# Patient Record
Sex: MALE | Race: WHITE | Employment: OTHER | URBAN - METROPOLITAN AREA
[De-identification: names, ages, dates, MRNs, and addresses within clinical notes are randomized per-mention and may not be internally consistent; named-entity substitution may affect disease eponyms.]

---

## 2017-06-24 PROBLEM — R77.8 ELEVATED TROPONIN: Status: ACTIVE | Noted: 2017-06-24

## 2017-06-24 PROBLEM — R79.89 ELEVATED TROPONIN: Status: ACTIVE | Noted: 2017-06-24

## 2017-06-24 PROBLEM — R42 DIZZINESS: Status: ACTIVE | Noted: 2017-06-24

## 2017-12-18 PROBLEM — R06.89 ACUTE RESPIRATORY INSUFFICIENCY: Status: ACTIVE | Noted: 2017-12-18

## 2017-12-18 PROBLEM — J18.9 PNEUMONIA: Status: ACTIVE | Noted: 2017-12-18

## 2017-12-18 PROBLEM — Z86.711 HISTORY OF PULMONARY EMBOLUS (PE): Status: ACTIVE | Noted: 2017-12-18

## 2019-08-07 ENCOUNTER — HOSPITAL ENCOUNTER (OUTPATIENT)
Age: 84
Discharge: HOME | End: 2019-08-09
Payer: MEDICARE

## 2019-08-07 PROCEDURE — 87205 SMEAR GRAM STAIN: CPT

## 2019-08-07 PROCEDURE — 87070 CULTURE OTHR SPECIMN AEROBIC: CPT

## 2019-08-07 PROCEDURE — 87075 CULTR BACTERIA EXCEPT BLOOD: CPT

## 2019-08-08 LAB — GRAM STAIN ORDERABLE: NORMAL

## 2019-08-09 LAB
ANAEROBIC CULTURE: NORMAL
WOUND/ABSCESS: NORMAL

## 2020-07-28 ENCOUNTER — HOSPITAL ENCOUNTER (OUTPATIENT)
Age: 85
Discharge: HOME | End: 2020-07-30
Payer: MEDICARE

## 2020-07-28 PROCEDURE — U0003 INFECTIOUS AGENT DETECTION BY NUCLEIC ACID (DNA OR RNA); SEVERE ACUTE RESPIRATORY SYNDROME CORONAVIRUS 2 (SARS-COV-2) (CORONAVIRUS DISEASE [COVID-19]), AMPLIFIED PROBE TECHNIQUE, MAKING USE OF HIGH THROUGHPUT TECHNOLOGIES AS DESCRIBED BY CMS-2020-01-R: HCPCS

## 2020-07-30 LAB
SARS-COV-2: NOT DETECTED
SOURCE: NORMAL

## 2020-08-12 ENCOUNTER — HOSPITAL ENCOUNTER (OUTPATIENT)
Age: 85
Discharge: HOME | End: 2020-08-14
Payer: MEDICARE

## 2020-08-12 PROCEDURE — U0003 INFECTIOUS AGENT DETECTION BY NUCLEIC ACID (DNA OR RNA); SEVERE ACUTE RESPIRATORY SYNDROME CORONAVIRUS 2 (SARS-COV-2) (CORONAVIRUS DISEASE [COVID-19]), AMPLIFIED PROBE TECHNIQUE, MAKING USE OF HIGH THROUGHPUT TECHNOLOGIES AS DESCRIBED BY CMS-2020-01-R: HCPCS

## 2020-08-14 LAB
SARS-COV-2: NOT DETECTED
SOURCE: NORMAL

## 2020-09-18 ENCOUNTER — HOSPITAL ENCOUNTER (OUTPATIENT)
Age: 85
Discharge: HOME | End: 2020-09-20
Payer: MEDICARE

## 2020-09-18 PROCEDURE — U0003 INFECTIOUS AGENT DETECTION BY NUCLEIC ACID (DNA OR RNA); SEVERE ACUTE RESPIRATORY SYNDROME CORONAVIRUS 2 (SARS-COV-2) (CORONAVIRUS DISEASE [COVID-19]), AMPLIFIED PROBE TECHNIQUE, MAKING USE OF HIGH THROUGHPUT TECHNOLOGIES AS DESCRIBED BY CMS-2020-01-R: HCPCS

## 2020-09-20 LAB
SARS-COV-2: NOT DETECTED
SOURCE: NORMAL

## 2020-10-16 ENCOUNTER — HOSPITAL ENCOUNTER (OUTPATIENT)
Age: 85
Discharge: HOME | End: 2020-10-18
Payer: MEDICARE

## 2020-10-16 PROCEDURE — U0003 INFECTIOUS AGENT DETECTION BY NUCLEIC ACID (DNA OR RNA); SEVERE ACUTE RESPIRATORY SYNDROME CORONAVIRUS 2 (SARS-COV-2) (CORONAVIRUS DISEASE [COVID-19]), AMPLIFIED PROBE TECHNIQUE, MAKING USE OF HIGH THROUGHPUT TECHNOLOGIES AS DESCRIBED BY CMS-2020-01-R: HCPCS

## 2020-10-17 LAB
SARS-COV-2: NOT DETECTED
SOURCE: NORMAL

## 2020-10-23 ENCOUNTER — HOSPITAL ENCOUNTER (OUTPATIENT)
Age: 85
Discharge: HOME | End: 2020-10-25
Payer: MEDICARE

## 2020-10-23 LAB
ALBUMIN SERPL-MCNC: 3.4 G/DL (ref 3.5–5.2)
ALP BLD-CCNC: 138 U/L (ref 40–129)
ALT SERPL-CCNC: 15 U/L (ref 0–40)
ANION GAP SERPL CALCULATED.3IONS-SCNC: 9 MMOL/L (ref 7–16)
AST SERPL-CCNC: 23 U/L (ref 0–39)
BILIRUB SERPL-MCNC: 0.3 MG/DL (ref 0–1.2)
BUN BLDV-MCNC: 42 MG/DL (ref 8–23)
CALCIUM SERPL-MCNC: 9.1 MG/DL (ref 8.6–10.2)
CHLORIDE BLD-SCNC: 103 MMOL/L (ref 98–107)
CHOLESTEROL, TOTAL: 118 MG/DL (ref 0–199)
CO2: 30 MMOL/L (ref 22–29)
CREAT SERPL-MCNC: 1.4 MG/DL (ref 0.7–1.2)
GFR AFRICAN AMERICAN: 58
GFR NON-AFRICAN AMERICAN: 48 ML/MIN/1.73
GLUCOSE BLD-MCNC: 192 MG/DL (ref 74–99)
HBA1C MFR BLD: 7.2 % (ref 4–5.6)
HDLC SERPL-MCNC: 43 MG/DL
LDL CHOLESTEROL CALCULATED: 56 MG/DL (ref 0–99)
POTASSIUM SERPL-SCNC: 4.9 MMOL/L (ref 3.5–5)
SODIUM BLD-SCNC: 142 MMOL/L (ref 132–146)
TOTAL PROTEIN: 6 G/DL (ref 6.4–8.3)
TRIGL SERPL-MCNC: 97 MG/DL (ref 0–149)
VLDLC SERPL CALC-MCNC: 19 MG/DL

## 2020-10-23 PROCEDURE — 80061 LIPID PANEL: CPT

## 2020-10-23 PROCEDURE — 36415 COLL VENOUS BLD VENIPUNCTURE: CPT

## 2020-10-23 PROCEDURE — 80053 COMPREHEN METABOLIC PANEL: CPT

## 2020-10-23 PROCEDURE — 83036 HEMOGLOBIN GLYCOSYLATED A1C: CPT

## 2020-10-23 PROCEDURE — 85025 COMPLETE CBC W/AUTO DIFF WBC: CPT

## 2020-11-03 LAB
SARS-COV-2: NOT DETECTED
SOURCE: NORMAL

## 2020-11-10 LAB
SARS-COV-2: NOT DETECTED
SOURCE: NORMAL

## 2020-11-18 LAB
SARS-COV-2: NOT DETECTED
SOURCE: NORMAL

## 2020-11-25 LAB
SARS-COV-2: NOT DETECTED
SOURCE: NORMAL

## 2020-12-02 LAB
SARS-COV-2: NOT DETECTED
SOURCE: NORMAL

## 2020-12-08 LAB
SARS-COV-2: NOT DETECTED
SOURCE: NORMAL

## 2020-12-17 LAB
SARS-COV-2: NOT DETECTED
SOURCE: NORMAL

## 2020-12-23 LAB
SARS-COV-2: NOT DETECTED
SOURCE: NORMAL

## 2020-12-30 LAB
SARS-COV-2: NOT DETECTED
SOURCE: NORMAL

## 2021-01-03 LAB — SOURCE: NORMAL

## 2021-01-06 LAB
SARS-COV-2: NOT DETECTED
SOURCE: NORMAL

## 2021-01-14 LAB
SARS-COV-2: NOT DETECTED
SOURCE: NORMAL

## 2021-01-20 LAB
SARS-COV-2: NOT DETECTED
SOURCE: NORMAL

## 2021-01-27 LAB
SARS-COV-2: NOT DETECTED
SOURCE: NORMAL

## 2021-02-03 LAB
SARS-COV-2: NOT DETECTED
SOURCE: NORMAL

## 2021-02-10 LAB
SARS-COV-2: NOT DETECTED
SOURCE: NORMAL

## 2021-02-17 LAB
SARS-COV-2: NOT DETECTED
SOURCE: NORMAL

## 2021-02-24 LAB
SARS-COV-2: NOT DETECTED
SOURCE: NORMAL

## 2021-03-03 LAB
SARS-COV-2: NOT DETECTED
SOURCE: NORMAL

## 2021-03-10 LAB
SARS-COV-2: NOT DETECTED
SOURCE: NORMAL

## 2021-03-17 LAB
SARS-COV-2: NOT DETECTED
SOURCE: NORMAL

## 2021-03-19 LAB — SOURCE: NORMAL

## 2021-03-24 LAB
SARS-COV-2: NOT DETECTED
SOURCE: NORMAL

## 2021-03-30 ENCOUNTER — APPOINTMENT (OUTPATIENT)
Dept: ULTRASOUND IMAGING | Age: 86
DRG: 602 | End: 2021-03-30
Payer: MEDICARE

## 2021-03-30 ENCOUNTER — APPOINTMENT (OUTPATIENT)
Dept: CT IMAGING | Age: 86
DRG: 602 | End: 2021-03-30
Payer: MEDICARE

## 2021-03-30 ENCOUNTER — HOSPITAL ENCOUNTER (INPATIENT)
Age: 86
LOS: 1 days | Discharge: OTHER INSTITUTION | DRG: 602 | End: 2021-03-31
Attending: EMERGENCY MEDICINE | Admitting: INTERNAL MEDICINE
Payer: MEDICARE

## 2021-03-30 ENCOUNTER — APPOINTMENT (OUTPATIENT)
Dept: GENERAL RADIOLOGY | Age: 86
DRG: 602 | End: 2021-03-30
Payer: MEDICARE

## 2021-03-30 DIAGNOSIS — R60.9 PERIPHERAL EDEMA: Primary | ICD-10-CM

## 2021-03-30 DIAGNOSIS — N17.9 AKI (ACUTE KIDNEY INJURY) (HCC): ICD-10-CM

## 2021-03-30 DIAGNOSIS — I50.9 CONGESTIVE HEART FAILURE, UNSPECIFIED HF CHRONICITY, UNSPECIFIED HEART FAILURE TYPE (HCC): ICD-10-CM

## 2021-03-30 PROBLEM — I50.1 ACUTE LEFT-SIDED CHF (CONGESTIVE HEART FAILURE) (HCC): Status: ACTIVE | Noted: 2021-03-30

## 2021-03-30 LAB
ALBUMIN SERPL-MCNC: 3.6 G/DL (ref 3.5–5.2)
ALP BLD-CCNC: 158 U/L (ref 40–129)
ALT SERPL-CCNC: 10 U/L (ref 0–40)
ANION GAP SERPL CALCULATED.3IONS-SCNC: 6 MMOL/L (ref 7–16)
AST SERPL-CCNC: 18 U/L (ref 0–39)
BASOPHILS ABSOLUTE: 0.02 E9/L (ref 0–0.2)
BASOPHILS RELATIVE PERCENT: 0.2 % (ref 0–2)
BILIRUB SERPL-MCNC: 0.2 MG/DL (ref 0–1.2)
BUN BLDV-MCNC: 38 MG/DL (ref 8–23)
CALCIUM SERPL-MCNC: 8.8 MG/DL (ref 8.6–10.2)
CHLORIDE BLD-SCNC: 103 MMOL/L (ref 98–107)
CO2: 30 MMOL/L (ref 22–29)
CREAT SERPL-MCNC: 1.7 MG/DL (ref 0.7–1.2)
D DIMER: 313 NG/ML DDU
EOSINOPHILS ABSOLUTE: 0.14 E9/L (ref 0.05–0.5)
EOSINOPHILS RELATIVE PERCENT: 1.5 % (ref 0–6)
GFR AFRICAN AMERICAN: 46
GFR NON-AFRICAN AMERICAN: 38 ML/MIN/1.73
GLUCOSE BLD-MCNC: 151 MG/DL (ref 74–99)
HCT VFR BLD CALC: 36.6 % (ref 37–54)
HEMOGLOBIN: 11.3 G/DL (ref 12.5–16.5)
IMMATURE GRANULOCYTES #: 0.04 E9/L
IMMATURE GRANULOCYTES %: 0.4 % (ref 0–5)
LACTIC ACID, SEPSIS: 1.5 MMOL/L (ref 0.5–1.9)
LYMPHOCYTES ABSOLUTE: 1.08 E9/L (ref 1.5–4)
LYMPHOCYTES RELATIVE PERCENT: 11.5 % (ref 20–42)
MCH RBC QN AUTO: 29 PG (ref 26–35)
MCHC RBC AUTO-ENTMCNC: 30.9 % (ref 32–34.5)
MCV RBC AUTO: 93.8 FL (ref 80–99.9)
METER GLUCOSE: 135 MG/DL (ref 74–99)
MONOCYTES ABSOLUTE: 0.63 E9/L (ref 0.1–0.95)
MONOCYTES RELATIVE PERCENT: 6.7 % (ref 2–12)
NEUTROPHILS ABSOLUTE: 7.49 E9/L (ref 1.8–7.3)
NEUTROPHILS RELATIVE PERCENT: 79.7 % (ref 43–80)
PDW BLD-RTO: 14.4 FL (ref 11.5–15)
PLATELET # BLD: 176 E9/L (ref 130–450)
PMV BLD AUTO: 9.5 FL (ref 7–12)
POTASSIUM SERPL-SCNC: 5 MMOL/L (ref 3.5–5)
PRO-BNP: 379 PG/ML (ref 0–450)
RBC # BLD: 3.9 E12/L (ref 3.8–5.8)
SARS-COV-2, NAAT: NOT DETECTED
SODIUM BLD-SCNC: 139 MMOL/L (ref 132–146)
TOTAL PROTEIN: 6.4 G/DL (ref 6.4–8.3)
TROPONIN: 0.04 NG/ML (ref 0–0.03)
WBC # BLD: 9.4 E9/L (ref 4.5–11.5)

## 2021-03-30 PROCEDURE — 71045 X-RAY EXAM CHEST 1 VIEW: CPT

## 2021-03-30 PROCEDURE — 6370000000 HC RX 637 (ALT 250 FOR IP): Performed by: INTERNAL MEDICINE

## 2021-03-30 PROCEDURE — 96376 TX/PRO/DX INJ SAME DRUG ADON: CPT

## 2021-03-30 PROCEDURE — 71250 CT THORAX DX C-: CPT

## 2021-03-30 PROCEDURE — 2580000003 HC RX 258: Performed by: INTERNAL MEDICINE

## 2021-03-30 PROCEDURE — 93005 ELECTROCARDIOGRAM TRACING: CPT | Performed by: EMERGENCY MEDICINE

## 2021-03-30 PROCEDURE — 96374 THER/PROPH/DIAG INJ IV PUSH: CPT

## 2021-03-30 PROCEDURE — 83605 ASSAY OF LACTIC ACID: CPT

## 2021-03-30 PROCEDURE — 84484 ASSAY OF TROPONIN QUANT: CPT

## 2021-03-30 PROCEDURE — 85025 COMPLETE CBC W/AUTO DIFF WBC: CPT

## 2021-03-30 PROCEDURE — 2060000000 HC ICU INTERMEDIATE R&B

## 2021-03-30 PROCEDURE — 85378 FIBRIN DEGRADE SEMIQUANT: CPT

## 2021-03-30 PROCEDURE — 87635 SARS-COV-2 COVID-19 AMP PRB: CPT

## 2021-03-30 PROCEDURE — 99284 EMERGENCY DEPT VISIT MOD MDM: CPT

## 2021-03-30 PROCEDURE — 83880 ASSAY OF NATRIURETIC PEPTIDE: CPT

## 2021-03-30 PROCEDURE — 96375 TX/PRO/DX INJ NEW DRUG ADDON: CPT

## 2021-03-30 PROCEDURE — 36415 COLL VENOUS BLD VENIPUNCTURE: CPT

## 2021-03-30 PROCEDURE — 80053 COMPREHEN METABOLIC PANEL: CPT

## 2021-03-30 PROCEDURE — 82962 GLUCOSE BLOOD TEST: CPT

## 2021-03-30 PROCEDURE — 6360000002 HC RX W HCPCS: Performed by: INTERNAL MEDICINE

## 2021-03-30 PROCEDURE — 87150 DNA/RNA AMPLIFIED PROBE: CPT

## 2021-03-30 PROCEDURE — 87040 BLOOD CULTURE FOR BACTERIA: CPT

## 2021-03-30 PROCEDURE — 93970 EXTREMITY STUDY: CPT

## 2021-03-30 RX ORDER — CALCIUM CARBONATE 200(500)MG
1 TABLET,CHEWABLE ORAL EVERY 4 HOURS PRN
Status: DISCONTINUED | OUTPATIENT
Start: 2021-03-30 | End: 2021-03-31 | Stop reason: HOSPADM

## 2021-03-30 RX ORDER — CALCIUM CARBONATE 200(500)MG
1 TABLET,CHEWABLE ORAL EVERY 4 HOURS PRN
COMMUNITY

## 2021-03-30 RX ORDER — FUROSEMIDE 40 MG/1
20 TABLET ORAL DAILY
COMMUNITY

## 2021-03-30 RX ORDER — ONDANSETRON 4 MG/1
4 TABLET, FILM COATED ORAL
Status: ON HOLD | COMMUNITY
End: 2021-03-31 | Stop reason: HOSPADM

## 2021-03-30 RX ORDER — ACETAMINOPHEN 325 MG/1
650 TABLET ORAL EVERY 4 HOURS PRN
COMMUNITY

## 2021-03-30 RX ORDER — FUROSEMIDE 40 MG/1
40 TABLET ORAL DAILY
Status: DISCONTINUED | OUTPATIENT
Start: 2021-03-31 | End: 2021-03-31 | Stop reason: HOSPADM

## 2021-03-30 RX ORDER — PANTOPRAZOLE SODIUM 40 MG/1
40 TABLET, DELAYED RELEASE ORAL
Status: DISCONTINUED | OUTPATIENT
Start: 2021-03-31 | End: 2021-03-31 | Stop reason: HOSPADM

## 2021-03-30 RX ORDER — OMEPRAZOLE 20 MG/1
20 CAPSULE, DELAYED RELEASE ORAL DAILY
COMMUNITY

## 2021-03-30 RX ORDER — CLOTRIMAZOLE AND BETAMETHASONE DIPROPIONATE 10; .64 MG/G; MG/G
CREAM TOPICAL 2 TIMES DAILY PRN
Status: DISCONTINUED | OUTPATIENT
Start: 2021-03-30 | End: 2021-03-31 | Stop reason: HOSPADM

## 2021-03-30 RX ORDER — M-VIT,TX,IRON,MINS/CALC/FOLIC 27MG-0.4MG
1 TABLET ORAL DAILY
COMMUNITY

## 2021-03-30 RX ORDER — FUROSEMIDE 10 MG/ML
40 INJECTION INTRAMUSCULAR; INTRAVENOUS ONCE
Status: COMPLETED | OUTPATIENT
Start: 2021-03-30 | End: 2021-03-30

## 2021-03-30 RX ORDER — GABAPENTIN 100 MG/1
200 CAPSULE ORAL
Status: DISCONTINUED | OUTPATIENT
Start: 2021-03-30 | End: 2021-03-31 | Stop reason: HOSPADM

## 2021-03-30 RX ORDER — ACETAMINOPHEN 325 MG/1
650 TABLET ORAL EVERY 6 HOURS PRN
Status: DISCONTINUED | OUTPATIENT
Start: 2021-03-30 | End: 2021-03-31 | Stop reason: HOSPADM

## 2021-03-30 RX ORDER — ONDANSETRON 4 MG/1
4 TABLET, FILM COATED ORAL
Status: DISCONTINUED | OUTPATIENT
Start: 2021-03-30 | End: 2021-03-31 | Stop reason: HOSPADM

## 2021-03-30 RX ORDER — ATORVASTATIN CALCIUM 40 MG/1
80 TABLET, FILM COATED ORAL
Status: DISCONTINUED | OUTPATIENT
Start: 2021-03-30 | End: 2021-03-31 | Stop reason: HOSPADM

## 2021-03-30 RX ORDER — M-VIT,TX,IRON,MINS/CALC/FOLIC 27MG-0.4MG
1 TABLET ORAL DAILY
Status: DISCONTINUED | OUTPATIENT
Start: 2021-03-31 | End: 2021-03-31 | Stop reason: HOSPADM

## 2021-03-30 RX ORDER — VITS A,C,E/LUTEIN/MINERALS 300MCG-200
1 TABLET ORAL 2 TIMES DAILY WITH MEALS
Status: DISCONTINUED | OUTPATIENT
Start: 2021-03-31 | End: 2021-03-31 | Stop reason: HOSPADM

## 2021-03-30 RX ADMIN — ATORVASTATIN CALCIUM 80 MG: 40 TABLET, FILM COATED ORAL at 23:56

## 2021-03-30 RX ADMIN — CEFAZOLIN 1000 MG: 1 INJECTION, POWDER, FOR SOLUTION INTRAMUSCULAR; INTRAVENOUS at 23:56

## 2021-03-30 RX ADMIN — ONDANSETRON HYDROCHLORIDE 4 MG: 4 TABLET, FILM COATED ORAL at 23:56

## 2021-03-30 RX ADMIN — APIXABAN 5 MG: 5 TABLET, FILM COATED ORAL at 23:55

## 2021-03-30 RX ADMIN — FUROSEMIDE 40 MG: 10 INJECTION, SOLUTION INTRAMUSCULAR; INTRAVENOUS at 23:56

## 2021-03-30 RX ADMIN — GABAPENTIN 200 MG: 100 CAPSULE ORAL at 23:56

## 2021-03-30 ASSESSMENT — PAIN SCALES - GENERAL: PAINLEVEL_OUTOF10: 0

## 2021-03-30 NOTE — ED PROVIDER NOTES
HPI:  3/30/21,   Time: 4:01 PM EDT       Jeanne Nguyen is a 80 y.o. male presenting to the ED for leg swelling and shortness of breath, beginning 3 days ago. The complaint has been persistent, mild in severity, and worsened by walking. She is very pleasant 24-year-old gentleman looks younger than his stated age comes in from assisted living. He states the nurse at his facility took a look at his right shin today and there was an area of redness about 2 x 3 cm they were concerned about cellulitis they also noted pitting edema. When asked about shortness of breath he states he has had some shortness of breath for weeks and actually has noticed leg swelling for \"years\". He denies any chest pain or pleuritic pain. No fevers or chills. He does not recall any injury to the shin. He was sent in by his PCP to rule out for blood clots versus CHF diagnosis. He is not chronically on Lasix. He is never been diagnosed with CHF. He is on Eliquis for prior PE. No abdominal pain no nausea vomiting no fevers no urinary complaints. Patient has had by both Covid shots already; second was greater than 4 weeks ago. Review of Systems:   Pertinent positives and negatives are stated within HPI, all other systems reviewed and are negative.          --------------------------------------------- PAST HISTORY ---------------------------------------------  Past Medical History:  has a past medical history of Cancer ([facility] [address] Diabetes mellitus ([facility] [address] History of lumpectomy, Hx of blood clots, Hyperlipidemia, PE [facility] throm[facility]embolism) [facility] and Staph aureus infection. Past Surgical History:  has a past surgical history that includes Mastectomy; Toe Surgery; Toe amputation; and Tonsillectomy. Social History:  reports that he has never smoked. He has never used smokeless tobacco. He reports current alcohol use of about 1.0 standard drinks of alcohol per week. He reports that he does not use drugs.     Family History: family history is not on file. The patients home medications have been reviewed. Allergies: Clindamycin/lincomycin and Sulfa antibiotics        ---------------------------------------------------PHYSICAL EXAM--------------------------------------    Constitutional/General: Alert and oriented x3, well appearing, non toxic in NAD; younger than his stated age  Head: Normocephalic and atraumatic  Eyes: PERRL, EOMI, conjunctive normal, sclera non icteric  Mouth: Oropharynx clear, handling secretions, no trismus, no asymmetry of the posterior oropharynx or uvular edema  Neck: Supple, full ROM, non tender to palpation in the midline, no stridor, no crepitus, no meningeal signs  Respiratory: Lungs good air movement bilaterally. No wheezes or rhonchi. Slightly decreased breath sounds at the bases; mild rales at both bases; not in respiratory distress; no tachypnea or accessory muscle use or retractions. He is able speak in full sentences  Cardiovascular:  Regular rate. Regular rhythm. No murmurs, gallops, or rubs. 2+ distal pulses  Chest: No chest wall tenderness  GI:  Abdomen Soft, Non tender, Non distended. +BS. No organomegaly, no palpable masses,  No rebound, guarding, or rigidity. Musculoskeletal: Moves all extremities x 4. Warm and well perfused, cyanosis. 1+ pitting edema to bilateral lower extremities from feet to knees. Capillary refill <3 seconds; area of 2 x 3 cm to the anterior right distal shin area of increased warmth and redness; normal distal pulses of bilateral lower extremities  Integument: skin warm and dry. No rashes. Lymphatic: no lymphadenopathy noted  Neurologic: GCS 15, no focal deficits, symmetric strength 5/5 in the upper and lower extremities bilaterally  Psychiatric: Normal Affect    -------------------------------------------------- RESULTS -------------------------------------------------  I have personally reviewed all laboratory and imaging results for this patient.  Results are listed below.      LABS:  Results for orders placed or performed during the hospital encounter of 03/30/21   COVID-19, Rapid    Specimen: Nasopharyngeal Swab   Result Value Ref Range    SARS-CoV-2, NAAT Not Detected Not Detected   CBC Auto Differential   Result Value Ref Range    WBC 9.4 4.5 - 11.5 E9/L    RBC 3.90 3.80 - 5.80 E12/L    Hemoglobin 11.3 (L) 12.5 - 16.5 g/dL    Hematocrit 36.6 (L) 37.0 - 54.0 %    MCV 93.8 80.0 - 99.9 fL    MCH 29.0 26.0 - 35.0 pg    MCHC 30.9 (L) 32.0 - 34.5 %    RDW 14.4 11.5 - 15.0 fL    Platelets 543 319 - 058 E9/L    MPV 9.5 7.0 - 12.0 fL    Neutrophils % 79.7 43.0 - 80.0 %    Immature Granulocytes % 0.4 0.0 - 5.0 %    Lymphocytes % 11.5 (L) 20.0 - 42.0 %    Monocytes % 6.7 2.0 - 12.0 %    Eosinophils % 1.5 0.0 - 6.0 %    Basophils % 0.2 0.0 - 2.0 %    Neutrophils Absolute 7.49 (H) 1.80 - 7.30 E9/L    Immature Granulocytes # 0.04 E9/L    Lymphocytes Absolute 1.08 (L) 1.50 - 4.00 E9/L    Monocytes Absolute 0.63 0.10 - 0.95 E9/L    Eosinophils Absolute 0.14 0.05 - 0.50 E9/L    Basophils Absolute 0.02 0.00 - 0.20 E9/L   Comprehensive Metabolic Panel   Result Value Ref Range    Sodium 139 132 - 146 mmol/L    Potassium 5.0 3.5 - 5.0 mmol/L    Chloride 103 98 - 107 mmol/L    CO2 30 (H) 22 - 29 mmol/L    Anion Gap 6 (L) 7 - 16 mmol/L    Glucose 151 (H) 74 - 99 mg/dL    BUN 38 (H) 8 - 23 mg/dL    CREATININE 1.7 (H) 0.7 - 1.2 mg/dL    GFR Non-African American 38 >=60 mL/min/1.73    GFR African American 46     Calcium 8.8 8.6 - 10.2 mg/dL    Total Protein 6.4 6.4 - 8.3 g/dL    Albumin 3.6 3.5 - 5.2 g/dL    Total Bilirubin 0.2 0.0 - 1.2 mg/dL    Alkaline Phosphatase 158 (H) 40 - 129 U/L    ALT 10 0 - 40 U/L    AST 18 0 - 39 U/L   Troponin   Result Value Ref Range    Troponin 0.04 (H) 0.00 - 0.03 ng/mL   Brain Natriuretic Peptide   Result Value Ref Range    Pro- 0 - 450 pg/mL   Lactate, Sepsis   Result Value Ref Range    Lactic Acid, Sepsis 1.5 0.5 - 1.9 mmol/L   D-Dimer, Quantitative   Result Value Ref Range    D-Dimer, Quant 313 ng/mL DDU   Basic Metabolic Panel   Result Value Ref Range    Sodium 142 132 - 146 mmol/L    Potassium 4.3 3.5 - 5.0 mmol/L    Chloride 103 98 - 107 mmol/L    CO2 28 22 - 29 mmol/L    Anion Gap 11 7 - 16 mmol/L    Glucose 179 (H) 74 - 99 mg/dL    BUN 40 (H) 8 - 23 mg/dL    CREATININE 1.6 (H) 0.7 - 1.2 mg/dL    GFR Non-African American 41 >=60 mL/min/1.73    GFR African American 50     Calcium 8.9 8.6 - 10.2 mg/dL   POCT Glucose   Result Value Ref Range    Meter Glucose 135 (H) 74 - 99 mg/dL   POCT Glucose   Result Value Ref Range    Meter Glucose 138 (H) 74 - 99 mg/dL   EKG 12 Lead   Result Value Ref Range    Ventricular Rate 86 BPM    Atrial Rate 86 BPM    P-R Interval 216 ms    QRS Duration 88 ms    Q-T Interval 374 ms    QTc Calculation (Bazett) 447 ms    P Axis 45 degrees    R Axis -5 degrees    T Axis 55 degrees       RADIOLOGY:  Interpreted by Radiologist.  CT CHEST WO CONTRAST   Final Result   No evidence of acute intrathoracic pathology. Prominent coronary atherosclerotic calcifications. Stable 4 mm nodule in the right lower lobe. No routine follow-up recommended. US DUP LOWER EXTREMITIES BILATERAL VENOUS   Final Result   No evidence of DVT in either lower extremity. XR CHEST PORTABLE   Final Result   Bibasilar opacities right greater than left could represent atelectasis or   pneumonia             EKG:  This EKG is signed and interpreted by the EP. Time: 18:47  Rate: 86  Rhythm: Sinus  Interpretation: no acute changes; no ST changes. QTc 447  Comparison: stable as compared to patient's most recent EKG      ------------------------- NURSING NOTES AND VITALS REVIEWED ---------------------------   The nursing notes within the ED encounter and vital signs as below have been reviewed by myself.   /60   Pulse 97   Temp 98.5 °F (36.9 °C) (Oral)   Resp 16   Ht 6' (1.829 m)   Wt 258 lb (117 kg)   SpO2 95%   BMI 34.99 kg/m²   Oxygen Saturation Interpretation: Normal    The patients available past medical records and past encounters were reviewed. ------------------------------ ED COURSE/MEDICAL DECISION MAKING----------------------  Medications   furosemide (LASIX) injection 40 mg (40 mg Intravenous Given 3/30/21 9020)   apixaban (ELIQUIS) tablet 5 mg (5 mg Oral Given 3/30/21 2775)         ED COURSE:  ED Course as of Mar 31 1952   Tue Mar 30, 2021   2051 And continues to be on Eliquis at home. Does not need a CTA. However, he does have acute on chronic kidney disease. He states he is on 40 of Lasix at this point by his PCP. We have called Dr. Henry Bee. My colleague Dr. Vasile Davila will talk with him about possible admission and renal consult as well as cardiac consult regarding his CHF and ROWAN.    [KK]      ED Course User Index  [KK] Jerod Back MD       Medical Decision Making:    Patient is a pleasant [de-identified] gentleman comes in with increasing leg swelling pitting edema and mild shortness of breath. No known diagnosis of CHF. Doppler bilateral lower extremities to rule out for VT. Differential diagnosis CHF pneumonia bronchitis cellulitis of right lower extremity DVT    This patient has remained hemodynamically stable during their ED course. Patient EKG shows sinus rhythm 86 beats minute no signs of any ST changes. Covid test was negative. CBC was normal.  White count was 9. Chemistry showed an elevated creatinine of 1.7. Is elevated above his baseline of 1.4. Troponin was borderline at 0.04 BNP was 379 lactic acid was normal at 1.5. Bilateral Doppler lower extremities were negative for DVT chest x-ray showed some opacities that could be atelectasis versus early CHF or pneumonia. CTA of the chest showed no evidence of any other computer acute pathology. D-dimer was positive, but patient is already anticoagulated on Eliquis.   Patient does have increasing pitting edema and a very weak could be a very early cellulitis. Given the acute on chronic kidney disease as well as increasing peripheral edema despite Lasix at home, patient will be admitted for further evaluation by his PCP and cardiology as needed. Patient otherwise will be able to be orally treated for the early cellulitis to the right anterior shin area. Patient stable for admission to a monitored bed. Dr. Nimo Carvalho accepted. Re-Evaluations:             Re-evaluation. Patients symptoms show no change    Re-examination  3/30/21   4:01 PM EDT          Vital Signs:   Vitals:    03/30/21 1830 03/30/21 2125 03/30/21 2200 03/31/21 0845   BP: (!) 168/80 (!) 178/71 (!) 171/76 138/60   Pulse: 85 94 97 97   Resp: 20 16 18 16   Temp: 98.1 °F (36.7 °C) 97.6 °F (36.4 °C) 97.9 °F (36.6 °C) 98.5 °F (36.9 °C)   TempSrc:  Oral Oral Oral   SpO2: 94% 95% 96% 95%   Weight:       Height:               Consultations:       Patient was signed out to my colleague, Dr. Jason Dia. He spoke to patient's PCP Dr. Nimo Carvalho who admit the patient for further assessment and treatment. Counseling: The emergency provider has spoken with the patient and discussed todays results, in addition to providing specific details for the plan of care and counseling regarding the diagnosis and prognosis. Questions are answered at this time and they are agreeable with the plan.       --------------------------------- IMPRESSION AND DISPOSITION ---------------------------------    IMPRESSION  1. Peripheral edema    2. Congestive heart failure, unspecified HF chronicity, unspecified heart failure type ([facility] [address]    3. ROWAN (acute kidney injury) ([facility]        DISPOSITION  Disposition: Admit to telemetry  Patient condition is fair    NOTE: This report was transcribed using voice recognition software.  Every effort was made to ensure accuracy; however, inadvertent computerized transcription errors may be present        Berna Arauz MD  03/31/21 1956

## 2021-03-30 NOTE — ED NOTES
Pt states that he is felling \"off\". Pt states that he would like his bgl checked. Pt bgl is 135 and within his normal range according to him. Physician aware at this time.      Allie Alvarez RN  03/30/21 1958

## 2021-03-31 VITALS
OXYGEN SATURATION: 95 % | BODY MASS INDEX: 34.95 KG/M2 | SYSTOLIC BLOOD PRESSURE: 138 MMHG | TEMPERATURE: 98.5 F | RESPIRATION RATE: 16 BRPM | WEIGHT: 258 LBS | DIASTOLIC BLOOD PRESSURE: 60 MMHG | HEIGHT: 72 IN | HEART RATE: 97 BPM

## 2021-03-31 PROBLEM — L02.415 CELLULITIS AND ABSCESS OF RIGHT LOWER EXTREMITY: Status: ACTIVE | Noted: 2021-03-31

## 2021-03-31 PROBLEM — L02.416 CELLULITIS AND ABSCESS OF LEFT LOWER EXTREMITY: Status: ACTIVE | Noted: 2021-03-31

## 2021-03-31 PROBLEM — N18.30 TYPE 1 DIABETES WITH STAGE 3 CHRONIC KIDNEY DISEASE MODERATE GFR 30-59 (HCC): Status: ACTIVE | Noted: 2021-03-31

## 2021-03-31 PROBLEM — I87.2 CHRONIC VENOUS INSUFFICIENCY OF LOWER EXTREMITY: Status: ACTIVE | Noted: 2021-03-31

## 2021-03-31 PROBLEM — L03.116 CELLULITIS AND ABSCESS OF LEFT LOWER EXTREMITY: Status: ACTIVE | Noted: 2021-03-31

## 2021-03-31 PROBLEM — E10.22 TYPE 1 DIABETES WITH STAGE 3 CHRONIC KIDNEY DISEASE MODERATE GFR 30-59 (HCC): Status: ACTIVE | Noted: 2021-03-31

## 2021-03-31 PROBLEM — L03.115 CELLULITIS AND ABSCESS OF RIGHT LOWER EXTREMITY: Status: ACTIVE | Noted: 2021-03-31

## 2021-03-31 LAB
ANION GAP SERPL CALCULATED.3IONS-SCNC: 11 MMOL/L (ref 7–16)
BUN BLDV-MCNC: 40 MG/DL (ref 8–23)
CALCIUM SERPL-MCNC: 8.9 MG/DL (ref 8.6–10.2)
CHLORIDE BLD-SCNC: 103 MMOL/L (ref 98–107)
CO2: 28 MMOL/L (ref 22–29)
CREAT SERPL-MCNC: 1.6 MG/DL (ref 0.7–1.2)
EKG ATRIAL RATE: 86 BPM
EKG P AXIS: 45 DEGREES
EKG P-R INTERVAL: 216 MS
EKG Q-T INTERVAL: 374 MS
EKG QRS DURATION: 88 MS
EKG QTC CALCULATION (BAZETT): 447 MS
EKG R AXIS: -5 DEGREES
EKG T AXIS: 55 DEGREES
EKG VENTRICULAR RATE: 86 BPM
GFR AFRICAN AMERICAN: 50
GFR NON-AFRICAN AMERICAN: 41 ML/MIN/1.73
GLUCOSE BLD-MCNC: 179 MG/DL (ref 74–99)
METER GLUCOSE: 138 MG/DL (ref 74–99)
POTASSIUM SERPL-SCNC: 4.3 MMOL/L (ref 3.5–5)
SODIUM BLD-SCNC: 142 MMOL/L (ref 132–146)

## 2021-03-31 PROCEDURE — 82962 GLUCOSE BLOOD TEST: CPT

## 2021-03-31 PROCEDURE — 36415 COLL VENOUS BLD VENIPUNCTURE: CPT

## 2021-03-31 PROCEDURE — 6360000002 HC RX W HCPCS: Performed by: INTERNAL MEDICINE

## 2021-03-31 PROCEDURE — 80048 BASIC METABOLIC PNL TOTAL CA: CPT

## 2021-03-31 PROCEDURE — 2580000003 HC RX 258: Performed by: INTERNAL MEDICINE

## 2021-03-31 PROCEDURE — 6370000000 HC RX 637 (ALT 250 FOR IP): Performed by: INTERNAL MEDICINE

## 2021-03-31 PROCEDURE — 93010 ELECTROCARDIOGRAM REPORT: CPT | Performed by: INTERNAL MEDICINE

## 2021-03-31 RX ORDER — NICOTINE POLACRILEX 4 MG
15 LOZENGE BUCCAL PRN
Status: DISCONTINUED | OUTPATIENT
Start: 2021-03-31 | End: 2021-03-31 | Stop reason: HOSPADM

## 2021-03-31 RX ORDER — AMOXICILLIN AND CLAVULANATE POTASSIUM 875; 125 MG/1; MG/1
1 TABLET, FILM COATED ORAL EVERY 12 HOURS SCHEDULED
Status: DISCONTINUED | OUTPATIENT
Start: 2021-03-31 | End: 2021-03-31 | Stop reason: HOSPADM

## 2021-03-31 RX ORDER — AMOXICILLIN AND CLAVULANATE POTASSIUM 875; 125 MG/1; MG/1
1 TABLET, FILM COATED ORAL EVERY 12 HOURS SCHEDULED
Qty: 20 TABLET | Refills: 0 | DISCHARGE
Start: 2021-03-31 | End: 2021-04-10

## 2021-03-31 RX ORDER — DEXTROSE MONOHYDRATE 50 MG/ML
100 INJECTION, SOLUTION INTRAVENOUS PRN
Status: DISCONTINUED | OUTPATIENT
Start: 2021-03-31 | End: 2021-03-31 | Stop reason: HOSPADM

## 2021-03-31 RX ORDER — DEXTROSE MONOHYDRATE 25 G/50ML
12.5 INJECTION, SOLUTION INTRAVENOUS PRN
Status: DISCONTINUED | OUTPATIENT
Start: 2021-03-31 | End: 2021-03-31 | Stop reason: HOSPADM

## 2021-03-31 RX ADMIN — CALCIUM CARBONATE 500 MG: 500 TABLET, CHEWABLE ORAL at 12:55

## 2021-03-31 RX ADMIN — PANTOPRAZOLE SODIUM 40 MG: 40 TABLET, DELAYED RELEASE ORAL at 07:01

## 2021-03-31 RX ADMIN — Medication 1 TABLET: at 08:56

## 2021-03-31 RX ADMIN — FUROSEMIDE 40 MG: 40 TABLET ORAL at 08:56

## 2021-03-31 RX ADMIN — INSULIN LISPRO 55 UNITS: 100 INJECTION, SUSPENSION SUBCUTANEOUS at 00:00

## 2021-03-31 RX ADMIN — APIXABAN 5 MG: 5 TABLET, FILM COATED ORAL at 07:01

## 2021-03-31 RX ADMIN — CEFAZOLIN 1000 MG: 1 INJECTION, POWDER, FOR SOLUTION INTRAMUSCULAR; INTRAVENOUS at 07:01

## 2021-03-31 RX ADMIN — Medication 1 TABLET: at 08:57

## 2021-03-31 RX ADMIN — INSULIN LISPRO 72 UNITS: 100 INJECTION, SUSPENSION SUBCUTANEOUS at 07:00

## 2021-03-31 ASSESSMENT — PAIN SCALES - GENERAL: PAINLEVEL_OUTOF10: 0

## 2021-03-31 NOTE — CARE COORDINATION
COVID negative 3/30. From [facility] PTA. Hx [facility] [place] [facility]. Has anders,nnamdi. PCP is Dr. Audelia Leigh and pharmacy is [facility]. Per pt, plan is to return to [facility] on discharge- no needs- states he drove himself to the hospital and will be providing his own transportation home. On Eliquis PTA.   Will follow Gilmer Fleischer

## 2021-03-31 NOTE — H&P
CHIEF COMPLAINT:  Lower extremity swelling and erythema      HISTORY OF PRESENT ILLNESS:      The patient is a 80 y.o. male patient of  who presents with the above. Admitted from [facility] after concerns regarding loweer extremity swelling and erythema anterior legs. He has chronically swollen legs secondary to chronic venous insufficiency. On chronic anticoagulation re remote PE. US of legs negative(as they have been in the past). Past Medical History:    Past Medical History:   Diagnosis Date    Cancer ([facility] [address]     breast    Diabetes mellitus ([facility] [address]     History of lumpectomy     Hx of blood clots     Hyperlipidemia     PE (pulmonary thromboembolism) ([facility] [address]     Staph aureus infection        Past Surgical History:    Past Surgical History:   Procedure Laterality Date    MASTECTOMY      TOE AMPUTATION      TOE SURGERY      TONSILLECTOMY         Medications Prior to Admission:    Medications Prior to Admission: furosemide (LASIX) 40 MG tablet, Take 40 mg by mouth daily  omeprazole (PRILOSEC) 20 MG delayed release capsule, Take 20 mg by mouth daily  ondansetron (ZOFRAN) 4 MG tablet, Take 4 mg by mouth Daily with supper  Multiple Vitamins-Minerals (THERAPEUTIC MULTIVITAMIN-MINERALS) tablet, Take 1 tablet by mouth daily  Multiple Vitamins-Minerals (PRESERVISION AREDS PO), Take 1 tablet by mouth 2 times daily (with meals)  acetaminophen (TYLENOL) 325 MG tablet, Take 650 mg by mouth every 6 hours as needed for Pain or Fever  calcium carbonate (TUMS) 500 MG chewable tablet, Take 1 tablet by mouth every 4 hours as needed for Heartburn  apixaban (ELIQUIS) 5 MG TABS tablet, Take 5 mg by mouth 2 times daily (before meals)   gabapentin (NEURONTIN) 100 MG capsule, Take 200 mg by mouth Daily with supper.    atorvastatin (LIPITOR) 80 MG tablet, Take 80 mg by mouth Daily with supper   INSULIN LISP PROT & LISP, HUM, (75-25) 100 UNIT/ML SUSP, Inject 72 Units into the skin daily (with breakfast)   INSULIN LISP PROT & LISP, HUM, (75-25) 100 UNIT/ML SUSP, Inject 55 Units into the skin Daily with supper   clotrimazole-betamethasone (LOTRISONE) cream, Apply  topically as needed. Apply topically 2 times daily. ipratropium-albuterol (DUONEB) 0.5-2.5 (3) MG/3ML SOLN nebulizer solution, Inhale 3 mLs into the lungs every 4 hours (while awake) for 30 doses  [DISCONTINUED] metoprolol tartrate (LOPRESSOR) 25 MG tablet, Take 0.5 tablets by mouth 2 times daily  [DISCONTINUED] famotidine (PEPCID) 20 MG tablet, Take 20 mg by mouth Daily with supper  [DISCONTINUED] ranitidine (ZANTAC) 150 MG capsule, Take 150 mg by mouth daily (with breakfast)  [DISCONTINUED] b complex vitamins capsule, Take 1 capsule by mouth daily  [DISCONTINUED] therapeutic multivitamin-minerals (THERAGRAN-M) tablet, Take 1 tablet by mouth daily. [DISCONTINUED] calcium carbonate (OSCAL) 500 MG TABS tablet, Take 500 mg by mouth Daily with supper   [DISCONTINUED] diclofenac sodium 1 % GEL, Apply 2 g topically as needed. Allergies:    Clindamycin/lincomycin and Sulfa antibiotics    Social History:    reports that he has never smoked. He has never used smokeless tobacco. He reports current alcohol use of about 1.0 standard drinks of alcohol per week. He reports that he does not use drugs. Family History:   family history is not on file. REVIEW OF SYSTEMS:  As above in the HPI, otherwise negative    PHYSICAL EXAM:    Vitals:  /60   Pulse 97   Temp 98.5 °F (36.9 °C) (Oral)   Resp 16   Ht 6' (1.829 m)   Wt 258 lb (117 kg)   SpO2 95%   BMI 34.99 kg/m²     General:  Awake, alert, oriented X 3. Well developed, well nourished, well groomed. No apparent distress. HEENT:  Normocephalic, atraumatic. Pupils equal, round, reactive to light. No scleral icterus. No conjunctival injection. Normal lips, teeth, and gums. No nasal discharge.   Neck:  Supple  Heart:  RRR, no murmurs, gallops, rubs  Lungs:  CTA bilaterally, bilat symmetrical expansion, no wheeze, rales, or rhonchi  Abdomen: Bowel sounds present, soft, nontender, no masses, no organomegaly, no peritoneal signs  Extremities:  No clubbing, cyanosis, or edema  Skin:  Warm and dry, no open lesions.   Anterio lower ext erythema bilaterally s ulceration or breakdown  Neuro:  Cranial nerves 2-12 intact, no focal deficits  Breast: deferred  Rectal: deferred  Genitalia:  deferred    LABS:  CBC:   Lab Results   Component Value Date    WBC 9.4 03/30/2021    RBC 3.90 03/30/2021    HGB 11.3 03/30/2021    HCT 36.6 03/30/2021    MCV 93.8 03/30/2021    MCH 29.0 03/30/2021    MCHC 30.9 03/30/2021    RDW 14.4 03/30/2021     03/30/2021    MPV 9.5 03/30/2021     CMP:    Lab Results   Component Value Date     03/31/2021    K 4.3 03/31/2021     03/31/2021    CO2 28 03/31/2021    BUN 40 03/31/2021    CREATININE 1.6 03/31/2021    GFRAA 50 03/31/2021    LABGLOM 41 03/31/2021    GLUCOSE 179 03/31/2021    PROT 6.4 03/30/2021    LABALBU 3.6 03/30/2021    CALCIUM 8.9 03/31/2021    BILITOT 0.2 03/30/2021    ALKPHOS 158 03/30/2021    AST 18 03/30/2021    ALT 10 03/30/2021     BMP:    Lab Results   Component Value Date     03/31/2021    K 4.3 03/31/2021     03/31/2021    CO2 28 03/31/2021    BUN 40 03/31/2021    LABALBU 3.6 03/30/2021    CREATININE 1.6 03/31/2021    CALCIUM 8.9 03/31/2021    GFRAA 50 03/31/2021    LABGLOM 41 03/31/2021    GLUCOSE 179 03/31/2021         ASSESSMENT:      Active Problems:    HTN (hypertension)  Plan: Controlled    Essential hypertension, benign  Plan: Stable    Troponin level elevated  Plan: Chronic    Shortness of breath  Plan: Stable    History of pulmonary embolus (PE)  Plan: On Eliquis    Acute left-sided CHF (congestive heart failure) ([facility])  Plan: Diuresed    Type 1 diabetes with stage 3 chronic kidney disease moderate GFR 30-59 ([facility])  Plan: Control in progress    Cellulitis and abscess of right lower extremity  Plan: Mild chronic ant erythema    Cellulitis and abscess of left lower extremity  Plan: Mild ant erythema    Chronic venous insufficiency of lower extremity  Plan: Chronic.   Rx stockings      PLAN:    Home on po ATBs Lavona Severance Koval  12:28 PM  3/31/2021

## 2021-03-31 NOTE — PROGRESS NOTES
Full H&P to follow. Admitted from [facility] after concerns regarding loweer extremity swelling and erythema anterior legs. He has chronically swollen legs secondary to chronic venous insufficiency. On chronic anticoagulation re remote PE.  US of legs negative(as they have been in the past). IV ATBs given. Will likely DC later this AM or early PM on po ATB regimen.

## 2021-03-31 NOTE — PROGRESS NOTES
Patient admitting dx, Acute left-sided CHF.  Strict I&O and standing daily weights added to orders per protocol  Electronically signed by Shaheen Marion RN on 3/31/2021 at 9:32 AM

## 2021-04-01 LAB
ACINETOBACTER BAUMANNII BY PCR: NOT DETECTED
BOTTLE TYPE: ABNORMAL
CANDIDA ALBICANS BY PCR: NOT DETECTED
CANDIDA GLABRATA BY PCR: NOT DETECTED
CANDIDA KRUSEI BY PCR: NOT DETECTED
CANDIDA PARAPSILOSIS BY PCR: NOT DETECTED
CANDIDA TROPICALIS BY PCR: NOT DETECTED
ENTEROBACTER CLOACAE COMPLEX BY PCR: NOT DETECTED
ENTEROBACTERALES BY PCR: NOT DETECTED
ENTEROCOCCUS BY PCR: NOT DETECTED
ESCHERICHIA COLI BY PCR: NOT DETECTED
HAEMOPHILUS INFLUENZAE BY PCR: NOT DETECTED
KLEBSIELLA OXYTOCA BY PCR: NOT DETECTED
KLEBSIELLA PNEUMONIAE GROUP BY PCR: NOT DETECTED
LISTERIA MONOCYTOGENES BY PCR: NOT DETECTED
METHICILLIN RESISTANCE MECA/C  BY PCR: NOT DETECTED
NEISSERIA MENINGITIDIS BY PCR: NOT DETECTED
ORDER NUMBER: ABNORMAL
PROTEUS SPECIES BY PCR: NOT DETECTED
PSEUDOMONAS AERUGINOSA BY PCR: NOT DETECTED
SERRATIA MARCESCENS BY PCR: NOT DETECTED
SOURCE OF BLOOD CULTURE: ABNORMAL
STAPHYLOCOCCUS AUREUS BY PCR: NOT DETECTED
STAPHYLOCOCCUS SPECIES BY PCR: DETECTED
STREPTOCOCCUS AGALACTIAE BY PCR: NOT DETECTED
STREPTOCOCCUS PNEUMONIAE BY PCR: NOT DETECTED
STREPTOCOCCUS PYOGENES  BY PCR: NOT DETECTED
STREPTOCOCCUS SPECIES BY PCR: NOT DETECTED

## 2021-04-01 NOTE — PROGRESS NOTES
Physician Progress Note      PATIENT:               Louise Almonte  [facility] #:                  [id number]  :                       1932  ADMIT DATE:       3/30/2021 3:07 PM  Socorro Elanie DATE:        3/31/2021 2:35 PM  RESPONDING  PROVIDER #:        Jamie Dunn MD          QUERY TEXT:    Pt admitted with leg swelling and has CHF documented. If possible, please   document in progress notes and discharge summary further specificity regarding   the type and acuity of CHF:    The medical record reflects the following:  Risk Factors: h/o DM  Clinical Indicators: +3 pitting leg edema, per H&P \". Bettey Grad Bettey Grad Acute left-sided CHF   (congestive heart failure)? \"  Treatment: IV Lasix x1 dose, oral Lasix started    Thank you,  Jacqueline Buchanan RN, BSN, CDIS  Clinical Documentation Improvement  [email or web address]  Options provided:  -- Acute on Chronic Systolic CHF/HFrEF  -- Acute on Chronic Diastolic CHF/HFpEF  -- Acute on Chronic Systolic and Diastolic CHF  -- Acute Systolic CHF/HFrEF  -- Acute Diastolic CHF/HFpEF  -- Acute Systolic and Diastolic CHF  -- Other - I will add my own diagnosis  -- Disagree - Not applicable / Not valid  -- Disagree - Clinically unable to determine / Unknown  -- Refer to Clinical Documentation Reviewer    PROVIDER RESPONSE TEXT:    This patient is in acute on chronic systolic and diastolic CHF.     Query created by: Rickey Sheth on 3/31/2021 2:07 PM      Electronically signed by:  Jamie Dunn MD 2021 4:47 AM

## 2021-04-04 LAB — BLOOD CULTURE, ROUTINE: NORMAL

## 2021-04-05 LAB
CULTURE, BLOOD 2: ABNORMAL
ORGANISM: ABNORMAL

## 2021-04-09 LAB
SARS-COV-2: NOT DETECTED
SOURCE: NORMAL

## 2021-08-25 ENCOUNTER — HOSPITAL ENCOUNTER (INPATIENT)
Age: 86
LOS: 9 days | Discharge: NURSING, REHAB OR LONG-TERM CARE | DRG: 177 | End: 2021-09-03
Attending: EMERGENCY MEDICINE | Admitting: INTERNAL MEDICINE
Payer: MEDICARE

## 2021-08-25 ENCOUNTER — APPOINTMENT (OUTPATIENT)
Dept: GENERAL RADIOLOGY | Age: 86
DRG: 177 | End: 2021-08-25
Payer: MEDICARE

## 2021-08-25 DIAGNOSIS — J96.21 ACUTE ON CHRONIC RESPIRATORY FAILURE WITH HYPOXIA (HCC): Primary | ICD-10-CM

## 2021-08-25 DIAGNOSIS — I50.9 ACUTE ON CHRONIC CONGESTIVE HEART FAILURE, UNSPECIFIED HEART FAILURE TYPE (HCC): ICD-10-CM

## 2021-08-25 DIAGNOSIS — U07.1 COVID-19: ICD-10-CM

## 2021-08-25 PROBLEM — J96.01 ACUTE HYPOXEMIC RESPIRATORY FAILURE DUE TO COVID-19 (HCC): Status: ACTIVE | Noted: 2021-08-25

## 2021-08-25 LAB
ALBUMIN SERPL-MCNC: 3.4 G/DL (ref 3.5–5.2)
ALBUMIN SERPL-MCNC: 3.4 G/DL (ref 3.5–5.2)
ALP BLD-CCNC: 138 U/L (ref 40–129)
ALP BLD-CCNC: 141 U/L (ref 40–129)
ALT SERPL-CCNC: 12 U/L (ref 0–40)
ALT SERPL-CCNC: 12 U/L (ref 0–40)
ANION GAP SERPL CALCULATED.3IONS-SCNC: 10 MMOL/L (ref 7–16)
ANION GAP SERPL CALCULATED.3IONS-SCNC: 8 MMOL/L (ref 7–16)
ANISOCYTOSIS: ABNORMAL
AST SERPL-CCNC: 31 U/L (ref 0–39)
AST SERPL-CCNC: 32 U/L (ref 0–39)
B.E.: 1.8 MMOL/L (ref -3–3)
BACTERIA: ABNORMAL /HPF
BASOPHILS ABSOLUTE: 0.01 E9/L (ref 0–0.2)
BASOPHILS RELATIVE PERCENT: 0.1 % (ref 0–2)
BILIRUB SERPL-MCNC: 0.4 MG/DL (ref 0–1.2)
BILIRUB SERPL-MCNC: 0.4 MG/DL (ref 0–1.2)
BILIRUBIN DIRECT: <0.2 MG/DL (ref 0–0.3)
BILIRUBIN URINE: NEGATIVE
BILIRUBIN, INDIRECT: ABNORMAL MG/DL (ref 0–1)
BLOOD, URINE: ABNORMAL
BUN BLDV-MCNC: 25 MG/DL (ref 6–23)
BUN BLDV-MCNC: 25 MG/DL (ref 6–23)
BURR CELLS: ABNORMAL
CALCIUM SERPL-MCNC: 8.1 MG/DL (ref 8.6–10.2)
CALCIUM SERPL-MCNC: 8.3 MG/DL (ref 8.6–10.2)
CHLORIDE BLD-SCNC: 97 MMOL/L (ref 98–107)
CHLORIDE BLD-SCNC: 99 MMOL/L (ref 98–107)
CLARITY: CLEAR
CO2: 27 MMOL/L (ref 22–29)
CO2: 30 MMOL/L (ref 22–29)
COHB: 1 % (ref 0–1.5)
COLOR: YELLOW
CREAT SERPL-MCNC: 1.1 MG/DL (ref 0.7–1.2)
CREAT SERPL-MCNC: 1.2 MG/DL (ref 0.7–1.2)
CRITICAL: ABNORMAL
DATE ANALYZED: ABNORMAL
DATE OF COLLECTION: ABNORMAL
EKG ATRIAL RATE: 96 BPM
EKG P AXIS: 41 DEGREES
EKG P-R INTERVAL: 192 MS
EKG Q-T INTERVAL: 362 MS
EKG QRS DURATION: 96 MS
EKG QTC CALCULATION (BAZETT): 457 MS
EKG R AXIS: 3 DEGREES
EKG T AXIS: 51 DEGREES
EKG VENTRICULAR RATE: 96 BPM
EOSINOPHILS ABSOLUTE: 0 E9/L (ref 0.05–0.5)
EOSINOPHILS RELATIVE PERCENT: 0 % (ref 0–6)
EPITHELIAL CELLS, UA: ABNORMAL /HPF
GFR AFRICAN AMERICAN: >60
GFR AFRICAN AMERICAN: >60
GFR NON-AFRICAN AMERICAN: 57 ML/MIN/1.73
GFR NON-AFRICAN AMERICAN: >60 ML/MIN/1.73
GLUCOSE BLD-MCNC: 203 MG/DL (ref 74–99)
GLUCOSE BLD-MCNC: 216 MG/DL (ref 74–99)
GLUCOSE URINE: NEGATIVE MG/DL
HCO3: 26.9 MMOL/L (ref 22–26)
HCT VFR BLD CALC: 35.5 % (ref 37–54)
HEMOGLOBIN: 11.2 G/DL (ref 12.5–16.5)
HHB: 7.7 % (ref 0–5)
HYALINE CASTS: ABNORMAL /LPF (ref 0–2)
IMMATURE GRANULOCYTES #: 0.04 E9/L
IMMATURE GRANULOCYTES %: 0.5 % (ref 0–5)
KETONES, URINE: NEGATIVE MG/DL
LAB: ABNORMAL
LACTIC ACID: 1.2 MMOL/L (ref 0.5–2.2)
LEUKOCYTE ESTERASE, URINE: NEGATIVE
LYMPHOCYTES ABSOLUTE: 0.59 E9/L (ref 1.5–4)
LYMPHOCYTES RELATIVE PERCENT: 7.4 % (ref 20–42)
Lab: ABNORMAL
MCH RBC QN AUTO: 28.9 PG (ref 26–35)
MCHC RBC AUTO-ENTMCNC: 31.5 % (ref 32–34.5)
MCV RBC AUTO: 91.7 FL (ref 80–99.9)
METER GLUCOSE: 212 MG/DL (ref 74–99)
METHB: 0.3 % (ref 0–1.5)
MODE: ABNORMAL
MONOCYTES ABSOLUTE: 0.76 E9/L (ref 0.1–0.95)
MONOCYTES RELATIVE PERCENT: 9.6 % (ref 2–12)
NEUTROPHILS ABSOLUTE: 6.53 E9/L (ref 1.8–7.3)
NEUTROPHILS RELATIVE PERCENT: 82.4 % (ref 43–80)
NITRITE, URINE: NEGATIVE
O2 CONTENT: 15.6 ML/DL
O2 SATURATION: 92.2 % (ref 92–98.5)
O2HB: 91 % (ref 94–97)
OPERATOR ID: 1741
OVALOCYTES: ABNORMAL
PATIENT TEMP: 37 C
PCO2: 44.6 MMHG (ref 35–45)
PDW BLD-RTO: 14.7 FL (ref 11.5–15)
PH BLOOD GAS: 7.4 (ref 7.35–7.45)
PH UA: 5 (ref 5–9)
PLATELET # BLD: 128 E9/L (ref 130–450)
PMV BLD AUTO: 9.3 FL (ref 7–12)
PO2: 67.1 MMHG (ref 75–100)
POIKILOCYTES: ABNORMAL
POLYCHROMASIA: ABNORMAL
POTASSIUM REFLEX MAGNESIUM: 4 MMOL/L (ref 3.5–5)
POTASSIUM REFLEX MAGNESIUM: 4.1 MMOL/L (ref 3.5–5)
PRO-BNP: 7687 PG/ML (ref 0–450)
PROTEIN UA: ABNORMAL MG/DL
RBC # BLD: 3.87 E12/L (ref 3.8–5.8)
RBC UA: ABNORMAL /HPF (ref 0–2)
SCHISTOCYTES: ABNORMAL
SODIUM BLD-SCNC: 135 MMOL/L (ref 132–146)
SODIUM BLD-SCNC: 136 MMOL/L (ref 132–146)
SOURCE, BLOOD GAS: ABNORMAL
SPECIFIC GRAVITY UA: 1.01 (ref 1–1.03)
TEAR DROP CELLS: ABNORMAL
THB: 12.2 G/DL (ref 11.5–16.5)
TIME ANALYZED: 1450
TOTAL PROTEIN: 6.3 G/DL (ref 6.4–8.3)
TOTAL PROTEIN: 6.5 G/DL (ref 6.4–8.3)
TROPONIN, HIGH SENSITIVITY: 100 NG/L (ref 0–11)
TROPONIN, HIGH SENSITIVITY: 95 NG/L (ref 0–11)
UROBILINOGEN, URINE: 0.2 E.U./DL
WBC # BLD: 7.9 E9/L (ref 4.5–11.5)
WBC UA: ABNORMAL /HPF (ref 0–5)

## 2021-08-25 PROCEDURE — 83880 ASSAY OF NATRIURETIC PEPTIDE: CPT

## 2021-08-25 PROCEDURE — 93005 ELECTROCARDIOGRAM TRACING: CPT | Performed by: EMERGENCY MEDICINE

## 2021-08-25 PROCEDURE — 6370000000 HC RX 637 (ALT 250 FOR IP): Performed by: NURSE PRACTITIONER

## 2021-08-25 PROCEDURE — 6360000002 HC RX W HCPCS: Performed by: NURSE PRACTITIONER

## 2021-08-25 PROCEDURE — 80048 BASIC METABOLIC PNL TOTAL CA: CPT

## 2021-08-25 PROCEDURE — 2580000003 HC RX 258: Performed by: NURSE PRACTITIONER

## 2021-08-25 PROCEDURE — 71045 X-RAY EXAM CHEST 1 VIEW: CPT

## 2021-08-25 PROCEDURE — 99223 1ST HOSP IP/OBS HIGH 75: CPT | Performed by: INTERNAL MEDICINE

## 2021-08-25 PROCEDURE — 87150 DNA/RNA AMPLIFIED PROBE: CPT

## 2021-08-25 PROCEDURE — 82805 BLOOD GASES W/O2 SATURATION: CPT

## 2021-08-25 PROCEDURE — 99283 EMERGENCY DEPT VISIT LOW MDM: CPT

## 2021-08-25 PROCEDURE — 82962 GLUCOSE BLOOD TEST: CPT

## 2021-08-25 PROCEDURE — 80076 HEPATIC FUNCTION PANEL: CPT

## 2021-08-25 PROCEDURE — 6370000000 HC RX 637 (ALT 250 FOR IP): Performed by: EMERGENCY MEDICINE

## 2021-08-25 PROCEDURE — 87077 CULTURE AEROBIC IDENTIFY: CPT

## 2021-08-25 PROCEDURE — 36415 COLL VENOUS BLD VENIPUNCTURE: CPT

## 2021-08-25 PROCEDURE — 87040 BLOOD CULTURE FOR BACTERIA: CPT

## 2021-08-25 PROCEDURE — 2500000003 HC RX 250 WO HCPCS: Performed by: NURSE PRACTITIONER

## 2021-08-25 PROCEDURE — 2060000000 HC ICU INTERMEDIATE R&B

## 2021-08-25 PROCEDURE — 94640 AIRWAY INHALATION TREATMENT: CPT

## 2021-08-25 PROCEDURE — 84484 ASSAY OF TROPONIN QUANT: CPT

## 2021-08-25 PROCEDURE — 85025 COMPLETE CBC W/AUTO DIFF WBC: CPT

## 2021-08-25 PROCEDURE — 80053 COMPREHEN METABOLIC PANEL: CPT

## 2021-08-25 PROCEDURE — 81001 URINALYSIS AUTO W/SCOPE: CPT

## 2021-08-25 PROCEDURE — 6360000002 HC RX W HCPCS: Performed by: EMERGENCY MEDICINE

## 2021-08-25 PROCEDURE — 36600 WITHDRAWAL OF ARTERIAL BLOOD: CPT

## 2021-08-25 PROCEDURE — 87186 SC STD MICRODIL/AGAR DIL: CPT

## 2021-08-25 PROCEDURE — 83605 ASSAY OF LACTIC ACID: CPT

## 2021-08-25 RX ORDER — NICOTINE POLACRILEX 4 MG
15 LOZENGE BUCCAL PRN
Status: DISCONTINUED | OUTPATIENT
Start: 2021-08-25 | End: 2021-09-03 | Stop reason: HOSPADM

## 2021-08-25 RX ORDER — ACETAMINOPHEN 650 MG/1
650 SUPPOSITORY RECTAL EVERY 6 HOURS PRN
Status: DISCONTINUED | OUTPATIENT
Start: 2021-08-25 | End: 2021-09-03 | Stop reason: HOSPADM

## 2021-08-25 RX ORDER — VITAMIN B COMPLEX
2000 TABLET ORAL DAILY
Status: DISCONTINUED | OUTPATIENT
Start: 2021-08-25 | End: 2021-09-03 | Stop reason: HOSPADM

## 2021-08-25 RX ORDER — SODIUM CHLORIDE 0.9 % (FLUSH) 0.9 %
5-40 SYRINGE (ML) INJECTION PRN
Status: DISCONTINUED | OUTPATIENT
Start: 2021-08-25 | End: 2021-09-03 | Stop reason: HOSPADM

## 2021-08-25 RX ORDER — DEXAMETHASONE 4 MG/1
6 TABLET ORAL DAILY
Status: COMPLETED | OUTPATIENT
Start: 2021-08-25 | End: 2021-09-03

## 2021-08-25 RX ORDER — ATORVASTATIN CALCIUM 40 MG/1
80 TABLET, FILM COATED ORAL
Status: DISCONTINUED | OUTPATIENT
Start: 2021-08-25 | End: 2021-09-03 | Stop reason: HOSPADM

## 2021-08-25 RX ORDER — ONDANSETRON 4 MG/1
4 TABLET, ORALLY DISINTEGRATING ORAL EVERY 8 HOURS PRN
Status: DISCONTINUED | OUTPATIENT
Start: 2021-08-25 | End: 2021-09-03 | Stop reason: HOSPADM

## 2021-08-25 RX ORDER — FUROSEMIDE 10 MG/ML
40 INJECTION INTRAMUSCULAR; INTRAVENOUS ONCE
Status: COMPLETED | OUTPATIENT
Start: 2021-08-25 | End: 2021-08-25

## 2021-08-25 RX ORDER — PANTOPRAZOLE SODIUM 40 MG/1
40 TABLET, DELAYED RELEASE ORAL DAILY
Status: DISCONTINUED | OUTPATIENT
Start: 2021-08-25 | End: 2021-09-03 | Stop reason: HOSPADM

## 2021-08-25 RX ORDER — 0.9 % SODIUM CHLORIDE 0.9 %
30 INTRAVENOUS SOLUTION INTRAVENOUS PRN
Status: DISCONTINUED | OUTPATIENT
Start: 2021-08-25 | End: 2021-09-03 | Stop reason: HOSPADM

## 2021-08-25 RX ORDER — DEXTROSE MONOHYDRATE 50 MG/ML
100 INJECTION, SOLUTION INTRAVENOUS PRN
Status: DISCONTINUED | OUTPATIENT
Start: 2021-08-25 | End: 2021-09-03 | Stop reason: HOSPADM

## 2021-08-25 RX ORDER — CALCIUM CARBONATE 200(500)MG
1 TABLET,CHEWABLE ORAL EVERY 4 HOURS PRN
Status: DISCONTINUED | OUTPATIENT
Start: 2021-08-25 | End: 2021-09-03 | Stop reason: HOSPADM

## 2021-08-25 RX ORDER — SODIUM CHLORIDE 0.9 % (FLUSH) 0.9 %
5-40 SYRINGE (ML) INJECTION EVERY 12 HOURS SCHEDULED
Status: DISCONTINUED | OUTPATIENT
Start: 2021-08-25 | End: 2021-09-03 | Stop reason: HOSPADM

## 2021-08-25 RX ORDER — ONDANSETRON 2 MG/ML
4 INJECTION INTRAMUSCULAR; INTRAVENOUS EVERY 6 HOURS PRN
Status: DISCONTINUED | OUTPATIENT
Start: 2021-08-25 | End: 2021-09-03 | Stop reason: HOSPADM

## 2021-08-25 RX ORDER — ASCORBIC ACID 500 MG
500 TABLET ORAL 2 TIMES DAILY
Status: DISCONTINUED | OUTPATIENT
Start: 2021-08-25 | End: 2021-09-03 | Stop reason: HOSPADM

## 2021-08-25 RX ORDER — CLOTRIMAZOLE AND BETAMETHASONE DIPROPIONATE 10; .64 MG/G; MG/G
CREAM TOPICAL PRN
Status: DISCONTINUED | OUTPATIENT
Start: 2021-08-25 | End: 2021-09-03 | Stop reason: HOSPADM

## 2021-08-25 RX ORDER — INSULIN GLARGINE 100 [IU]/ML
33 INJECTION, SOLUTION SUBCUTANEOUS NIGHTLY
Status: DISCONTINUED | OUTPATIENT
Start: 2021-08-25 | End: 2021-08-30

## 2021-08-25 RX ORDER — POLYETHYLENE GLYCOL 3350 17 G/17G
17 POWDER, FOR SOLUTION ORAL DAILY PRN
Status: DISCONTINUED | OUTPATIENT
Start: 2021-08-25 | End: 2021-09-03 | Stop reason: HOSPADM

## 2021-08-25 RX ORDER — IPRATROPIUM BROMIDE AND ALBUTEROL SULFATE 2.5; .5 MG/3ML; MG/3ML
1 SOLUTION RESPIRATORY (INHALATION) ONCE
Status: COMPLETED | OUTPATIENT
Start: 2021-08-25 | End: 2021-08-25

## 2021-08-25 RX ORDER — FUROSEMIDE 40 MG/1
40 TABLET ORAL DAILY
Status: DISCONTINUED | OUTPATIENT
Start: 2021-08-26 | End: 2021-09-01

## 2021-08-25 RX ORDER — SODIUM CHLORIDE 9 MG/ML
25 INJECTION, SOLUTION INTRAVENOUS PRN
Status: DISCONTINUED | OUTPATIENT
Start: 2021-08-25 | End: 2021-09-03 | Stop reason: HOSPADM

## 2021-08-25 RX ORDER — GUAIFENESIN/DEXTROMETHORPHAN 100-10MG/5
5 SYRUP ORAL EVERY 4 HOURS PRN
Status: DISCONTINUED | OUTPATIENT
Start: 2021-08-25 | End: 2021-09-03 | Stop reason: HOSPADM

## 2021-08-25 RX ORDER — ONDANSETRON 4 MG/1
4 TABLET, ORALLY DISINTEGRATING ORAL ONCE
Status: COMPLETED | OUTPATIENT
Start: 2021-08-25 | End: 2021-08-25

## 2021-08-25 RX ORDER — DEXTROSE MONOHYDRATE 25 G/50ML
12.5 INJECTION, SOLUTION INTRAVENOUS PRN
Status: DISCONTINUED | OUTPATIENT
Start: 2021-08-25 | End: 2021-09-03 | Stop reason: HOSPADM

## 2021-08-25 RX ORDER — ALBUTEROL SULFATE 90 UG/1
2 AEROSOL, METERED RESPIRATORY (INHALATION) EVERY 6 HOURS PRN
Status: DISCONTINUED | OUTPATIENT
Start: 2021-08-25 | End: 2021-09-03 | Stop reason: HOSPADM

## 2021-08-25 RX ORDER — INSULIN GLARGINE 100 [IU]/ML
43 INJECTION, SOLUTION SUBCUTANEOUS EVERY MORNING
Status: DISCONTINUED | OUTPATIENT
Start: 2021-08-26 | End: 2021-08-30

## 2021-08-25 RX ORDER — VITS A,C,E/LUTEIN/MINERALS 300MCG-200
1 TABLET ORAL 2 TIMES DAILY WITH MEALS
Status: DISCONTINUED | OUTPATIENT
Start: 2021-08-25 | End: 2021-09-03 | Stop reason: HOSPADM

## 2021-08-25 RX ORDER — ZINC SULFATE 50(220)MG
50 CAPSULE ORAL DAILY
Status: DISCONTINUED | OUTPATIENT
Start: 2021-08-25 | End: 2021-09-03 | Stop reason: HOSPADM

## 2021-08-25 RX ORDER — M-VIT,TX,IRON,MINS/CALC/FOLIC 27MG-0.4MG
1 TABLET ORAL DAILY
Status: DISCONTINUED | OUTPATIENT
Start: 2021-08-25 | End: 2021-09-03 | Stop reason: HOSPADM

## 2021-08-25 RX ORDER — ACETAMINOPHEN 325 MG/1
650 TABLET ORAL EVERY 6 HOURS PRN
Status: DISCONTINUED | OUTPATIENT
Start: 2021-08-25 | End: 2021-09-03 | Stop reason: HOSPADM

## 2021-08-25 RX ORDER — GABAPENTIN 100 MG/1
200 CAPSULE ORAL
Status: DISCONTINUED | OUTPATIENT
Start: 2021-08-25 | End: 2021-09-03 | Stop reason: HOSPADM

## 2021-08-25 RX ADMIN — IPRATROPIUM BROMIDE AND ALBUTEROL SULFATE 1 AMPULE: .5; 3 SOLUTION RESPIRATORY (INHALATION) at 13:01

## 2021-08-25 RX ADMIN — Medication 500 MG: at 23:00

## 2021-08-25 RX ADMIN — REMDESIVIR 200 MG: 100 INJECTION, POWDER, LYOPHILIZED, FOR SOLUTION INTRAVENOUS at 23:00

## 2021-08-25 RX ADMIN — ZINC SULFATE 220 MG (50 MG) CAPSULE 50 MG: CAPSULE at 23:00

## 2021-08-25 RX ADMIN — INSULIN GLARGINE 33 UNITS: 100 INJECTION, SOLUTION SUBCUTANEOUS at 23:04

## 2021-08-25 RX ADMIN — DEXAMETHASONE 6 MG: 4 TABLET ORAL at 23:01

## 2021-08-25 RX ADMIN — FUROSEMIDE 40 MG: 10 INJECTION, SOLUTION INTRAMUSCULAR; INTRAVENOUS at 16:31

## 2021-08-25 RX ADMIN — GABAPENTIN 200 MG: 100 CAPSULE ORAL at 23:00

## 2021-08-25 RX ADMIN — ONDANSETRON 4 MG: 4 TABLET, ORALLY DISINTEGRATING ORAL at 13:51

## 2021-08-25 RX ADMIN — APIXABAN 5 MG: 5 TABLET, FILM COATED ORAL at 23:01

## 2021-08-25 RX ADMIN — Medication 10 ML: at 23:02

## 2021-08-25 RX ADMIN — Medication 1 TABLET: at 23:01

## 2021-08-25 ASSESSMENT — ENCOUNTER SYMPTOMS
DIARRHEA: 0
VOMITING: 0
NAUSEA: 0
EYE REDNESS: 0
BACK PAIN: 0
COUGH: 1
ABDOMINAL PAIN: 0
SHORTNESS OF BREATH: 1
WHEEZING: 0
SORE THROAT: 0
EYE PAIN: 0
SINUS PAIN: 0

## 2021-08-25 ASSESSMENT — PAIN DESCRIPTION - LOCATION: LOCATION: ABDOMEN;FLANK

## 2021-08-25 ASSESSMENT — PAIN DESCRIPTION - DESCRIPTORS: DESCRIPTORS: CONSTANT;STABBING

## 2021-08-25 ASSESSMENT — PAIN SCALES - GENERAL
PAINLEVEL_OUTOF10: 0
PAINLEVEL_OUTOF10: 7

## 2021-08-25 ASSESSMENT — PAIN DESCRIPTION - FREQUENCY: FREQUENCY: CONTINUOUS

## 2021-08-25 ASSESSMENT — PAIN DESCRIPTION - PAIN TYPE: TYPE: ACUTE PAIN

## 2021-08-25 NOTE — H&P
[facility] History and Physical      CHIEF COMPLAINT: sent in from nursing home, COVID +, sob      History of Present Illness:    Patient is an 81 yo male patient who follows with PCP Dr. Andre Killian with a past medical history of breast cancer- h/o lumpectomy, DM, h/o blood clots- on anticoagulation, h/o PE, HLD. Pt resides in Assisted Living- reporting he is very independent. Patient presented to the ER with concerns of sob and worsening symptoms of COVID- 19. Per pt he started not feeling well on Monday. Reporting significant fatigue and body aches. Reporting associated cough and sob starting yesterday. Symptoms moderate, ongoing and progressive. Nothing makes better or worse. Reporting he had nursing call his PCP due to concern that he was feeling worse and PCP told assisted living to send him to ER for evaluation and covid treatment. Per pt he was vaccinated in January- he received 2 Pfizer vaccines. Reporting he has never been diagnosed with CHF. He does have chronic lower extremity swelling, however per pt does not appear worse. Appetite has been decreased the last 3 days and he has only been able to keep down orange juice. Reporting due to juice his blood sugar has been running in the 300s. Patient awake and alert resting in ER in no acute distress. Seen coughing. Denies fevers, chills, nausea, vomiting, dysuria, hematuria, hematochezia.      Informant(s) for H&P:Patient    REVIEW OF SYSTEMS:  A comprehensive review of systems was negative except for: what is in the HPI      PMH:  Past Medical History:   Diagnosis Date    Cancer ([facility]     breast    Diabetes mellitus ([facility]     History of lumpectomy     Hx of blood clots     Hyperlipidemia     PE (pulmonary thromboembolism) [facility]     Staph aureus infection        Surgical History:  Past Surgical History:   Procedure Laterality Date    MASTECTOMY      TOE AMPUTATION      TOE SURGERY      TONSILLECTOMY         Medications Prior to Admission:    Prior to Admission medications    Medication Sig Start Date End Date Taking? Authorizing Provider   furosemide (LASIX) 40 MG tablet Take 40 mg by mouth daily    Historical Provider, MD   omeprazole (PRILOSEC) 20 MG delayed release capsule Take 20 mg by mouth daily    Historical Provider, MD   Multiple Vitamins-Minerals (THERAPEUTIC MULTIVITAMIN-MINERALS) tablet Take 1 tablet by mouth daily    Historical Provider, MD   Multiple Vitamins-Minerals (PRESERVISION AREDS PO) Take 1 tablet by mouth 2 times daily (with meals)    Historical Provider, MD   acetaminophen (TYLENOL) 325 MG tablet Take 650 mg by mouth every 6 hours as needed for Pain or Fever    Historical Provider, MD   calcium carbonate (TUMS) 500 MG chewable tablet Take 1 tablet by mouth every 4 hours as needed for Heartburn    Historical Provider, MD   ipratropium-albuterol (DUONEB) 0.5-2.5 (3) MG/3ML SOLN nebulizer solution Inhale 3 mLs into the lungs every 4 hours (while awake) for 30 doses 12/21/17 12/29/17  Edel Castañeda MD   apixaban (ELIQUIS) 5 MG TABS tablet Take 5 mg by mouth 2 times daily (before meals)     Historical Provider, MD   gabapentin (NEURONTIN) 100 MG capsule Take 200 mg by mouth Daily with supper. Historical Provider, MD   atorvastatin (LIPITOR) 80 MG tablet Take 80 mg by mouth Daily with supper     Historical Provider, MD   INSULIN LISP PROT & LISP, HUM, (75-25) 100 UNIT/ML SUSP Inject 72 Units into the skin daily (with breakfast)     Historical Provider, MD   INSULIN LISP PROT & LISP, HUM, (75-25) 100 UNIT/ML SUSP Inject 55 Units into the skin Daily with supper     Historical Provider, MD   clotrimazole-betamethasone (LOTRISONE) cream Apply  topically as needed. Apply topically 2 times daily. Historical Provider, MD       Allergies:    Clindamycin/lincomycin and Sulfa antibiotics    Social History:    reports that he has never smoked.  He has never used smokeless tobacco. He reports current alcohol use of about 1.0 standard drinks of alcohol per week. He reports that he does not use drugs. Denies tobacco  Remote h/o alcohol    Family History:   family history is not on file. No significant medical issues run in family     PHYSICAL EXAM:  Vitals:  BP (!) 163/82   Pulse 90   Temp 98.6 °F (37 °C) (Oral)   Resp 18   SpO2 96%     General Appearance: alert and oriented to person, place and time and in no acute distress  Skin: warm and dry  Head: normocephalic and atraumatic  Neck: neck supple and non tender without mass   Pulmonary/Chest: diminished throughout to auscultation   Cardiovascular: normal rate, normal S1 and S2 and no carotid bruits  Abdomen: soft, non-tender, non-distended, normal bowel sounds, no masses or organomegaly  Extremities: no cyanosis, no clubbing and lower extremity edema 1+ pitting   Neurologic: speech normal         LABS:  Recent Labs     08/25/21  1510      K 4.1   CL 99   CO2 27   BUN 25*   CREATININE 1.1   GLUCOSE 216*   CALCIUM 8.1*       Recent Labs     08/25/21  1510   WBC 7.9   RBC 3.87   HGB 11.2*   HCT 35.5*   MCV 91.7   MCH 28.9   MCHC 31.5*   RDW 14.7   *   MPV 9.3       No results for input(s): POCGLU in the last 72 hours. Radiology:   XR CHEST PORTABLE   Final Result   Pulmonary vascular congestive changes. ASSESSMENT:      Active Problems:    Acute hypoxemic respiratory failure due to COVID-19 [facility])  Resolved Problems:    * No resolved hospital problems. *      PLAN:    1. Acute respiratory failure with hypoxia related to COVID - 19 +/- fluid overload: pt presents to the ER with c/o worsening sob. He was diagnosed with COVID- 19 on 8/23/21. + cough and nausea. Currently on 2 L 02. He does not wear 02 at baseline. No documented hypoxia in ER. Will trial off RA. Monitor pulse ox and wean. Check inflammatory markers. 2. COVID 19 Viral infection in vaccinated individual ( pfizer x 2 in January): tested positive on 8/23. Check inflammatory markers. Start decadron. Pharmacy consult for IV remdesivir. CXR reviewed- no significant viral pneumonia. Encourage SMI. Vitamin supplementation. Pt already on chronic anticoagulation. Check procal    3. Possible fluid overload/ CHF: last echo in system from 6/2017 with EF 55%. Pro bnp elevated and CXR showing pulmonary vascular congestion. Denies prior h/o CHFOn lasix 40mg daily for chronic lower ext swelling/ venous insuffiency. PT reporting no worse edema than normal. Received IV lasix in ER- hold off on further diuresis for now as reporting he has not eaten well in 3 days and no appetite. May benefit from echo. Low salt diet. Strict intake /output. Weight monitoring. Monitor electrolytes. 4. 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 DM: check hg a1c. PT on 75/24 insulin- monitor blood sugars. Reporting only drinking OJ at assisted living. Monitor on decadron    6. H/o breast cancer in male- h/o lumpectomy     7. HLD: statin         Code Status: DNR CCA  DVT prophylaxis: eliquis      NOTE: This report was transcribed using voice recognition software. Every effort was made to ensure accuracy; however, inadvertent computerized transcription errors may be present.   Electronically signed by SHAHEEN Burgess on 8/25/2021 at 4:19 PM

## 2021-08-25 NOTE — LETTER
that your Medicare rights and benefits wont change because your health care provider is participating in [address] [place] [address]. Medicare will keep covering all of your medically necessary services. Even though Medicare will pay your doctor in a different way under B[employer] Advanced, how much you have to pay wont change. Health care providers and suppliers who are enrolled in Medicare will submit their Medicare claims like they always have. Youll have all the same Medicare rights and protections, including the right to choose which hospital, doctor, or other health care provider you see. If you dont want to get care from a health care provider whos participating in [address] [place] [address], then youll have to choose a different health care provider whos not participating in the Model. How can I give feedback about my health care? Medicare might ask you to take a voluntary survey about the services and care you received from [address] [place] [address] during your hospital stay or outpatient procedure and for a specific period of time afterwards. You can decide whether you want to take the voluntary survey, but if you do, itll help Medicare make BPCI Advanced and the care of other Medicare patients better. If you have concerns or complaints about your care, you can:   · Talk to your doctor or health care provider. · Contact your Beneficiary and Family Centered Care Quality Improvement   Organization [facility]). You can get your [employer]s phone number  at Medicare.gov/contacts or by calling 1-800-MEDICARE. TTY users can  call 4-668.247.4310. Where can I learn more about [employer] Advanced? Learn more about [employer] at https://innovation.cms.gov/initiatives/bpci-advanced/:  · A list of all the hospitals and physician group practices in the country participating in [address] [place] [address]. · All of the inpatient and outpatient Clinical Episodes that are currently included under B[employer] Advanced.  A Clinical Episode is a grouping of medical conditions or diagnoses that are included in the [address].

## 2021-08-25 NOTE — ED PROVIDER NOTES
80-year-old male with past medical history since type 2 diabetes, hypertension, hyperlipidemia, CHF, on Eliquis with a chief complaint of flank pain and shortness of breath. He tested positive for Covid on the 23rd he resides in a nursing home. He says that shortness of breath has been worsening, associated with cough, talking makes it worse, nothing seems to make it better. He has a cough and says he has not been coughing anything up yet, also associated with some nausea. He is not admitting to fevers or chills, no itching or burning with urination however he is admitting to increased frequency. His legs are chronically swollen. He did not take any of his medications this morning. Review of Systems   Constitutional: Negative for chills and fever. HENT: Negative for ear pain, sinus pain and sore throat. Eyes: Negative for pain and redness. Respiratory: Positive for cough and shortness of breath. Negative for wheezing. Cardiovascular: Negative for chest pain. Gastrointestinal: Negative for abdominal pain, diarrhea, nausea and vomiting. Genitourinary: Positive for flank pain. Negative for dysuria. Musculoskeletal: Negative for back pain and neck pain. Skin: Negative for rash. Neurological: Negative for seizures and headaches. Hematological: Negative for adenopathy. All other systems reviewed and are negative. Physical Exam  Vitals and nursing note reviewed. Constitutional:       Appearance: Normal appearance. He is not ill-appearing. HENT:      Head: Normocephalic and atraumatic. Right Ear: External ear normal.      Left Ear: External ear normal.      Nose: Nose normal.      Mouth/Throat:      Mouth: Mucous membranes are moist.      Pharynx: Oropharynx is clear. Eyes:      Conjunctiva/sclera: Conjunctivae normal.      Pupils: Pupils are equal, round, and reactive to light. Cardiovascular:      Rate and Rhythm: Normal rate and regular rhythm.    Pulmonary: Breath sounds: Wheezing and rales present. Comments: Accessory muscle usage. Abdominal:      General: Abdomen is flat. Palpations: Abdomen is soft. Musculoskeletal:         General: No deformity or signs of injury. Cervical back: Neck supple. Right lower leg: Edema present. Left lower leg: Edema present. Comments: Chronic and nontender. Skin:     General: Skin is warm and dry. Neurological:      General: No focal deficit present. Mental Status: He is alert. Mental status is at baseline. Procedures   EKG: This EKG is signed and interpreted by me. Rate: 96  Rhythm: Sinus rhythm with no acute ST elevations or changes. Intervals normal.  Interpretation: no acute changes  Comparison: stable as compared to patient's most recent EKG      MDM  Number of Diagnoses or Management Options  Acute on chronic congestive heart failure, unspecified heart failure type ([address]  Acute on chronic respiratory failure with hypoxia ([address]  COVID-19  Diagnosis management comments: 43-year-old male with past medical history of type 2 diabetes, hypertension, hyperlipidemia, CHF, on Eliquis with a chief complaint of leg pain or shortness of breath. He is hemodynamically stable upon arrival to the emergency department. BMP did not show any acute changes, BUN was slightly elevated at 25 otherwise within normal limits. He has been on 4 L of oxygen since his arrival he is normally not on any oxygen. Hepatic function panel did not show any acute changes. Initial high-sensitivity troponin was 95 and we are awaiting repeat. proBNP was 7687 which is up from the 300s it was in 3 months ago. CBC showed hemoglobin of 11.2 with no white cell count. Arterial blood gas did not show acute abnormalities, PO2 was 67.1 and bicarb was slightly elevated to 26.9. Otherwise normal limits. Lactic acid was 1.2. EKG showed normal sinus rhythm without acute ST elevations or other changes.   Chest x-ray showing pulmonary vasculature congestive changes. Since his BNP was elevated, potassium was normal at 4.1 he was given a dose of 40 mg IV Lasix here. He had been feeling a little better after DuoNeb's. Since he is in acute hypoxic respiratory failure with Covid he will be admitted. Spoke to Dr. Julieta Harper he will take him. Repeat troponin is still pending. ED Course as of Aug 25 1641   Wed Aug 25, 2021   1329 Was feeling better after DuoNeb. [MM]   1501 Pt with [place] dnr signed form by pcp pt is dnrcca    [FERCHO]   8027 Patient's BNP is elevated and chest x-ray showed pulmonary vascular congestion, potassium was 4.1 so we will give him a dose of IV Lasix. [MM]   9196 Spoke with Dr. Julieta Harper he will accept patient. [MM]   426 1660 Discussed plan for admission with patient who verbalized understanding. [MM]      ED Course User Index  [FERCHO] Checo Forrester DO  [MM] Michelle Dubon DO      ED Course as of Aug 25 1641   Wed Aug 25, 2021   1329 Was feeling better after DuoNeb. [MM]   1501 Pt with [place] dnr signed form by pcp pt is dnrcca    [FERCHO]   9864 Patient's BNP is elevated and chest x-ray showed pulmonary vascular congestion, potassium was 4.1 so we will give him a dose of IV Lasix. [MM]   9971 Spoke with Dr. Julieta Harper he will accept patient. [MM]   428 1660 Discussed plan for admission with patient who verbalized understanding. [MM]      ED Course User Index  [FERCHO] Checo Forrester DO  [MM] Michelle Dubon DO       --------------------------------------------- PAST HISTORY ---------------------------------------------  Past Medical History:  has a past medical history of Cancer ([facility] Diabetes mellitus ([facility] History of lumpectomy, Hx of blood clots, Hyperlipidemia, PE (pulmonary thromboembolism) ([address] and Staph aureus infection. Past Surgical History:  has a past surgical history that includes Mastectomy; Toe Surgery; Toe amputation; and Tonsillectomy.     Social History:  reports that he has never smoked. He has never used smokeless tobacco. He reports current alcohol use of about 1.0 standard drinks of alcohol per week. He reports that he does not use drugs. Family History: family history is not on file. The patients home medications have been reviewed.     Allergies: Clindamycin/lincomycin and Sulfa antibiotics    -------------------------------------------------- RESULTS -------------------------------------------------    LABS:  Results for orders placed or performed during the hospital encounter of 08/25/21   CBC Auto Differential   Result Value Ref Range    WBC 7.9 4.5 - 11.5 E9/L    RBC 3.87 3.80 - 5.80 E12/L    Hemoglobin 11.2 (L) 12.5 - 16.5 g/dL    Hematocrit 35.5 (L) 37.0 - 54.0 %    MCV 91.7 80.0 - 99.9 fL    MCH 28.9 26.0 - 35.0 pg    MCHC 31.5 (L) 32.0 - 34.5 %    RDW 14.7 11.5 - 15.0 fL    Platelets 314 (L) 116 - 450 E9/L    MPV 9.3 7.0 - 12.0 fL   Basic Metabolic Panel w/ Reflex to MG   Result Value Ref Range    Sodium 136 132 - 146 mmol/L    Potassium reflex Magnesium 4.1 3.5 - 5.0 mmol/L    Chloride 99 98 - 107 mmol/L    CO2 27 22 - 29 mmol/L    Anion Gap 10 7 - 16 mmol/L    Glucose 216 (H) 74 - 99 mg/dL    BUN 25 (H) 6 - 23 mg/dL    CREATININE 1.1 0.7 - 1.2 mg/dL    GFR Non-African American >60 >=60 mL/min/1.73    GFR African American >60     Calcium 8.1 (L) 8.6 - 10.2 mg/dL   Hepatic Function Panel   Result Value Ref Range    Total Protein 6.3 (L) 6.4 - 8.3 g/dL    Albumin 3.4 (L) 3.5 - 5.2 g/dL    Alkaline Phosphatase 138 (H) 40 - 129 U/L    ALT 12 0 - 40 U/L    AST 32 0 - 39 U/L    Total Bilirubin 0.4 0.0 - 1.2 mg/dL    Bilirubin, Direct <0.2 0.0 - 0.3 mg/dL    Bilirubin, Indirect see below 0.0 - 1.0 mg/dL   Troponin   Result Value Ref Range    Troponin, High Sensitivity 95 (H) 0 - 11 ng/L   Brain Natriuretic Peptide   Result Value Ref Range    Pro-BNP 7,687 (H) 0 - 450 pg/mL   Lactic Acid, Plasma   Result Value Ref Range    Lactic Acid 1.2 0.5 - 2.2 mmol/L   Blood Gas, Arterial Result Value Ref Range    Date Analyzed [id number]     Time Analyzed 1450     Source: Blood Arterial     pH, Blood Gas 7.399 7.350 - 7.450    PCO2 44.6 35.0 - 45.0 mmHg    PO2 67.1 (L) 75.0 - 100.0 mmHg    HCO3 26.9 (H) 22.0 - 26.0 mmol/L    B.E. 1.8 -3.0 - 3.0 mmol/L    O2 Sat 92.2 92.0 - 98.5 %    O2Hb 91.0 (L) 94.0 - 97.0 %    COHb 1.0 0.0 - 1.5 %    MetHb 0.3 0.0 - 1.5 %    O2 Content 15.6 mL/dL    HHb 7.7 (H) 0.0 - 5.0 %    tHb (est) 12.2 11.5 - 16.5 g/dL    Mode NC- 3 L     Date Of Collection      Time Collected      Pt Temp 37.0 C     ID [id number]     Lab [id number]     Critical(s) Notified . No Critical Values    EKG 12 Lead   Result Value Ref Range    Ventricular Rate 96 BPM    Atrial Rate 96 BPM    P-R Interval 192 ms    QRS Duration 96 ms    Q-T Interval 362 ms    QTc Calculation (Bazett) 457 ms    P Axis 41 degrees    R Axis 3 degrees    T Axis 51 degrees       RADIOLOGY:  XR CHEST PORTABLE   Final Result   Pulmonary vascular congestive changes. ------------------------- NURSING NOTES AND VITALS REVIEWED ---------------------------  Date / Time Roomed:  8/25/2021 11:16 AM  ED Bed Assignment:  28/28    The nursing notes within the ED encounter and vital signs as below have been reviewed. Patient Vitals for the past 24 hrs:   BP Temp Temp src Pulse Resp SpO2   08/25/21 1356 (!) 163/82 98.6 °F (37 °C) Oral 90 18 96 %   08/25/21 1301 -- -- -- -- 18 94 %       Oxygen Saturation Interpretation: Improved after treatment    ------------------------------------------ PROGRESS NOTES ------------------------------------------  Re-evaluation(s):  Time: 1500  Patients symptoms are improving  Repeat physical examination is not changed    Counseling:  I have spoken with the patient and discussed todays results, in addition to providing specific details for the plan of care and counseling regarding the diagnosis and prognosis.   Their questions are answered at this time and they are agreeable with the

## 2021-08-26 LAB
ALBUMIN SERPL-MCNC: 3.6 G/DL (ref 3.5–5.2)
ALP BLD-CCNC: 147 U/L (ref 40–129)
ALT SERPL-CCNC: 15 U/L (ref 0–40)
ANION GAP SERPL CALCULATED.3IONS-SCNC: 6 MMOL/L (ref 7–16)
AST SERPL-CCNC: 39 U/L (ref 0–39)
BASOPHILS ABSOLUTE: 0.01 E9/L (ref 0–0.2)
BASOPHILS RELATIVE PERCENT: 0.2 % (ref 0–2)
BILIRUB SERPL-MCNC: 0.4 MG/DL (ref 0–1.2)
BUN BLDV-MCNC: 30 MG/DL (ref 6–23)
BURR CELLS: ABNORMAL
C-REACTIVE PROTEIN: 7.5 MG/DL (ref 0–0.4)
CALCIUM SERPL-MCNC: 8.8 MG/DL (ref 8.6–10.2)
CHLORIDE BLD-SCNC: 98 MMOL/L (ref 98–107)
CO2: 32 MMOL/L (ref 22–29)
CREAT SERPL-MCNC: 1.2 MG/DL (ref 0.7–1.2)
D DIMER: 468 NG/ML DDU
EOSINOPHILS ABSOLUTE: 0 E9/L (ref 0.05–0.5)
EOSINOPHILS RELATIVE PERCENT: 0 % (ref 0–6)
GFR AFRICAN AMERICAN: >60
GFR NON-AFRICAN AMERICAN: 57 ML/MIN/1.73
GLUCOSE BLD-MCNC: 232 MG/DL (ref 74–99)
HCT VFR BLD CALC: 40.8 % (ref 37–54)
HEMOGLOBIN: 12.8 G/DL (ref 12.5–16.5)
IMMATURE GRANULOCYTES #: 0.03 E9/L
IMMATURE GRANULOCYTES %: 0.5 % (ref 0–5)
LYMPHOCYTES ABSOLUTE: 0.38 E9/L (ref 1.5–4)
LYMPHOCYTES RELATIVE PERCENT: 6 % (ref 20–42)
MCH RBC QN AUTO: 29 PG (ref 26–35)
MCHC RBC AUTO-ENTMCNC: 31.4 % (ref 32–34.5)
MCV RBC AUTO: 92.5 FL (ref 80–99.9)
METER GLUCOSE: 217 MG/DL (ref 74–99)
METER GLUCOSE: 248 MG/DL (ref 74–99)
METER GLUCOSE: 258 MG/DL (ref 74–99)
METER GLUCOSE: 348 MG/DL (ref 74–99)
MONOCYTES ABSOLUTE: 0.3 E9/L (ref 0.1–0.95)
MONOCYTES RELATIVE PERCENT: 4.7 % (ref 2–12)
NEUTROPHILS ABSOLUTE: 5.62 E9/L (ref 1.8–7.3)
NEUTROPHILS RELATIVE PERCENT: 88.6 % (ref 43–80)
OVALOCYTES: ABNORMAL
PDW BLD-RTO: 14.6 FL (ref 11.5–15)
PLATELET # BLD: 154 E9/L (ref 130–450)
PMV BLD AUTO: 9.3 FL (ref 7–12)
POIKILOCYTES: ABNORMAL
POTASSIUM REFLEX MAGNESIUM: 4.2 MMOL/L (ref 3.5–5)
RBC # BLD: 4.41 E12/L (ref 3.8–5.8)
SODIUM BLD-SCNC: 136 MMOL/L (ref 132–146)
TOTAL PROTEIN: 7 G/DL (ref 6.4–8.3)
WBC # BLD: 6.3 E9/L (ref 4.5–11.5)

## 2021-08-26 PROCEDURE — 82962 GLUCOSE BLOOD TEST: CPT

## 2021-08-26 PROCEDURE — 6360000002 HC RX W HCPCS: Performed by: NURSE PRACTITIONER

## 2021-08-26 PROCEDURE — 36415 COLL VENOUS BLD VENIPUNCTURE: CPT

## 2021-08-26 PROCEDURE — 2580000003 HC RX 258: Performed by: NURSE PRACTITIONER

## 2021-08-26 PROCEDURE — 99233 SBSQ HOSP IP/OBS HIGH 50: CPT | Performed by: INTERNAL MEDICINE

## 2021-08-26 PROCEDURE — 2060000000 HC ICU INTERMEDIATE R&B

## 2021-08-26 PROCEDURE — 86140 C-REACTIVE PROTEIN: CPT

## 2021-08-26 PROCEDURE — 85378 FIBRIN DEGRADE SEMIQUANT: CPT

## 2021-08-26 PROCEDURE — 85025 COMPLETE CBC W/AUTO DIFF WBC: CPT

## 2021-08-26 PROCEDURE — 2500000003 HC RX 250 WO HCPCS: Performed by: INTERNAL MEDICINE

## 2021-08-26 PROCEDURE — 2700000000 HC OXYGEN THERAPY PER DAY

## 2021-08-26 PROCEDURE — 80053 COMPREHEN METABOLIC PANEL: CPT

## 2021-08-26 PROCEDURE — 6370000000 HC RX 637 (ALT 250 FOR IP): Performed by: NURSE PRACTITIONER

## 2021-08-26 PROCEDURE — 2500000003 HC RX 250 WO HCPCS: Performed by: NURSE PRACTITIONER

## 2021-08-26 RX ADMIN — MICONAZOLE NITRATE: 20 POWDER TOPICAL at 20:51

## 2021-08-26 RX ADMIN — DEXAMETHASONE 6 MG: 4 TABLET ORAL at 08:44

## 2021-08-26 RX ADMIN — MICONAZOLE NITRATE: 20 POWDER TOPICAL at 11:36

## 2021-08-26 RX ADMIN — APIXABAN 5 MG: 5 TABLET, FILM COATED ORAL at 06:17

## 2021-08-26 RX ADMIN — INSULIN LISPRO 6 UNITS: 100 INJECTION, SOLUTION INTRAVENOUS; SUBCUTANEOUS at 16:30

## 2021-08-26 RX ADMIN — GABAPENTIN 200 MG: 100 CAPSULE ORAL at 16:22

## 2021-08-26 RX ADMIN — REMDESIVIR 100 MG: 100 INJECTION, POWDER, LYOPHILIZED, FOR SOLUTION INTRAVENOUS at 20:51

## 2021-08-26 RX ADMIN — ZINC SULFATE 220 MG (50 MG) CAPSULE 50 MG: CAPSULE at 08:44

## 2021-08-26 RX ADMIN — FUROSEMIDE 40 MG: 40 TABLET ORAL at 08:44

## 2021-08-26 RX ADMIN — Medication 10 ML: at 08:45

## 2021-08-26 RX ADMIN — Medication 1 TABLET: at 08:44

## 2021-08-26 RX ADMIN — Medication 1 TABLET: at 16:22

## 2021-08-26 RX ADMIN — Medication 2000 UNITS: at 08:44

## 2021-08-26 RX ADMIN — ATORVASTATIN CALCIUM 80 MG: 40 TABLET, FILM COATED ORAL at 16:23

## 2021-08-26 RX ADMIN — INSULIN LISPRO 6 UNITS: 100 INJECTION, SOLUTION INTRAVENOUS; SUBCUTANEOUS at 06:19

## 2021-08-26 RX ADMIN — PANTOPRAZOLE SODIUM 40 MG: 40 TABLET, DELAYED RELEASE ORAL at 08:44

## 2021-08-26 RX ADMIN — Medication 500 MG: at 20:51

## 2021-08-26 RX ADMIN — INSULIN GLARGINE 43 UNITS: 100 INJECTION, SOLUTION SUBCUTANEOUS at 08:33

## 2021-08-26 RX ADMIN — APIXABAN 5 MG: 5 TABLET, FILM COATED ORAL at 16:22

## 2021-08-26 RX ADMIN — Medication 1 TABLET: at 08:45

## 2021-08-26 RX ADMIN — Medication 500 MG: at 08:44

## 2021-08-26 RX ADMIN — INSULIN LISPRO 6 UNITS: 100 INJECTION, SOLUTION INTRAVENOUS; SUBCUTANEOUS at 11:28

## 2021-08-26 RX ADMIN — INSULIN GLARGINE 33 UNITS: 100 INJECTION, SOLUTION SUBCUTANEOUS at 21:33

## 2021-08-26 RX ADMIN — Medication 10 ML: at 20:51

## 2021-08-26 ASSESSMENT — PAIN SCALES - GENERAL
PAINLEVEL_OUTOF10: 0
PAINLEVEL_OUTOF10: 0

## 2021-08-27 LAB
BOTTLE TYPE: ABNORMAL
C-REACTIVE PROTEIN: 4.9 MG/DL (ref 0–0.4)
CANDIDA ALBICANS BY PCR: NOT DETECTED
CANDIDA GLABRATA BY PCR: NOT DETECTED
CANDIDA KRUSEI BY PCR: NOT DETECTED
CANDIDA PARAPSILOSIS BY PCR: NOT DETECTED
CANDIDA TROPICALIS BY PCR: NOT DETECTED
D DIMER: 349 NG/ML DDU
ENTEROBACTER CLOACAE COMPLEX BY PCR: NOT DETECTED
ENTEROBACTERALES BY PCR: NOT DETECTED
ESCHERICHIA COLI BY PCR: NOT DETECTED
HAEMOPHILUS INFLUENZAE BY PCR: NOT DETECTED
KLEBSIELLA OXYTOCA BY PCR: NOT DETECTED
KLEBSIELLA PNEUMONIAE GROUP BY PCR: NOT DETECTED
LISTERIA MONOCYTOGENES BY PCR: NOT DETECTED
METER GLUCOSE: 258 MG/DL (ref 74–99)
METER GLUCOSE: 265 MG/DL (ref 74–99)
METER GLUCOSE: 292 MG/DL (ref 74–99)
METER GLUCOSE: 308 MG/DL (ref 74–99)
METHICILLIN RESISTANCE MECA/C  BY PCR: NOT DETECTED
NEISSERIA MENINGITIDIS BY PCR: NOT DETECTED
ORDER NUMBER: ABNORMAL
PROCALCITONIN: 0.18 NG/ML (ref 0–0.08)
PROTEUS SPECIES BY PCR: NOT DETECTED
PSEUDOMONAS AERUGINOSA BY PCR: NOT DETECTED
SERRATIA MARCESCENS BY PCR: NOT DETECTED
SOURCE OF BLOOD CULTURE: ABNORMAL
STAPHYLOCOCCUS AUREUS BY PCR: NOT DETECTED
STAPHYLOCOCCUS SPECIES BY PCR: DETECTED
STREPTOCOCCUS AGALACTIAE BY PCR: NOT DETECTED
STREPTOCOCCUS PNEUMONIAE BY PCR: NOT DETECTED
STREPTOCOCCUS PYOGENES  BY PCR: NOT DETECTED
STREPTOCOCCUS SPECIES BY PCR: NOT DETECTED

## 2021-08-27 PROCEDURE — 2060000000 HC ICU INTERMEDIATE R&B

## 2021-08-27 PROCEDURE — 84145 PROCALCITONIN (PCT): CPT

## 2021-08-27 PROCEDURE — 85378 FIBRIN DEGRADE SEMIQUANT: CPT

## 2021-08-27 PROCEDURE — 6360000002 HC RX W HCPCS: Performed by: NURSE PRACTITIONER

## 2021-08-27 PROCEDURE — 86140 C-REACTIVE PROTEIN: CPT

## 2021-08-27 PROCEDURE — 82962 GLUCOSE BLOOD TEST: CPT

## 2021-08-27 PROCEDURE — 97165 OT EVAL LOW COMPLEX 30 MIN: CPT

## 2021-08-27 PROCEDURE — 97161 PT EVAL LOW COMPLEX 20 MIN: CPT

## 2021-08-27 PROCEDURE — 2580000003 HC RX 258: Performed by: NURSE PRACTITIONER

## 2021-08-27 PROCEDURE — 2500000003 HC RX 250 WO HCPCS: Performed by: NURSE PRACTITIONER

## 2021-08-27 PROCEDURE — 6370000000 HC RX 637 (ALT 250 FOR IP): Performed by: NURSE PRACTITIONER

## 2021-08-27 PROCEDURE — 36415 COLL VENOUS BLD VENIPUNCTURE: CPT

## 2021-08-27 PROCEDURE — 97530 THERAPEUTIC ACTIVITIES: CPT

## 2021-08-27 PROCEDURE — 99232 SBSQ HOSP IP/OBS MODERATE 35: CPT | Performed by: INTERNAL MEDICINE

## 2021-08-27 PROCEDURE — 6370000000 HC RX 637 (ALT 250 FOR IP): Performed by: INTERNAL MEDICINE

## 2021-08-27 PROCEDURE — 2700000000 HC OXYGEN THERAPY PER DAY

## 2021-08-27 RX ADMIN — Medication 10 ML: at 17:46

## 2021-08-27 RX ADMIN — Medication 1 TABLET: at 09:09

## 2021-08-27 RX ADMIN — FUROSEMIDE 40 MG: 40 TABLET ORAL at 09:09

## 2021-08-27 RX ADMIN — DEXAMETHASONE 6 MG: 4 TABLET ORAL at 09:09

## 2021-08-27 RX ADMIN — MICONAZOLE NITRATE: 20 POWDER TOPICAL at 09:11

## 2021-08-27 RX ADMIN — APIXABAN 5 MG: 5 TABLET, FILM COATED ORAL at 06:19

## 2021-08-27 RX ADMIN — INSULIN LISPRO 6 UNITS: 100 INJECTION, SOLUTION INTRAVENOUS; SUBCUTANEOUS at 16:17

## 2021-08-27 RX ADMIN — Medication 500 MG: at 21:15

## 2021-08-27 RX ADMIN — MICONAZOLE NITRATE: 20 POWDER TOPICAL at 21:19

## 2021-08-27 RX ADMIN — GABAPENTIN 200 MG: 100 CAPSULE ORAL at 16:21

## 2021-08-27 RX ADMIN — INSULIN LISPRO 2 UNITS: 100 INJECTION, SOLUTION INTRAVENOUS; SUBCUTANEOUS at 21:17

## 2021-08-27 RX ADMIN — Medication 10 ML: at 21:15

## 2021-08-27 RX ADMIN — Medication 500 MG: at 09:09

## 2021-08-27 RX ADMIN — Medication 2000 UNITS: at 11:32

## 2021-08-27 RX ADMIN — INSULIN LISPRO 6 UNITS: 100 INJECTION, SOLUTION INTRAVENOUS; SUBCUTANEOUS at 11:28

## 2021-08-27 RX ADMIN — ZINC SULFATE 220 MG (50 MG) CAPSULE 50 MG: CAPSULE at 09:09

## 2021-08-27 RX ADMIN — INSULIN GLARGINE 33 UNITS: 100 INJECTION, SOLUTION SUBCUTANEOUS at 21:17

## 2021-08-27 RX ADMIN — ATORVASTATIN CALCIUM 80 MG: 40 TABLET, FILM COATED ORAL at 16:21

## 2021-08-27 RX ADMIN — PANTOPRAZOLE SODIUM 40 MG: 40 TABLET, DELAYED RELEASE ORAL at 09:09

## 2021-08-27 RX ADMIN — Medication 10 ML: at 09:10

## 2021-08-27 RX ADMIN — REMDESIVIR 100 MG: 100 INJECTION, POWDER, LYOPHILIZED, FOR SOLUTION INTRAVENOUS at 21:18

## 2021-08-27 RX ADMIN — APIXABAN 5 MG: 5 TABLET, FILM COATED ORAL at 16:21

## 2021-08-27 RX ADMIN — INSULIN LISPRO 3 UNITS: 100 INJECTION, SOLUTION INTRAVENOUS; SUBCUTANEOUS at 16:16

## 2021-08-27 RX ADMIN — INSULIN LISPRO 6 UNITS: 100 INJECTION, SOLUTION INTRAVENOUS; SUBCUTANEOUS at 06:50

## 2021-08-27 RX ADMIN — Medication 1 TABLET: at 16:21

## 2021-08-27 RX ADMIN — INSULIN GLARGINE 43 UNITS: 100 INJECTION, SOLUTION SUBCUTANEOUS at 09:33

## 2021-08-27 ASSESSMENT — PAIN SCALES - GENERAL
PAINLEVEL_OUTOF10: 0

## 2021-08-27 NOTE — PROGRESS NOTES
Physical Therapy    Facility/Department: [address] INTERMEDIATE 1  Initial Assessment    NAME: Vane Sinlcair. : 1932  MRN: [id number]    Date of Service: 2021      Patient Diagnosis(es): The primary encounter diagnosis was Acute on chronic respiratory failure with hypoxia ([facility] Diagnoses of Acute on chronic congestive heart failure, unspecified heart failure type ([facility] and COVID-19 were also pertinent to this visit. has a past medical history of Cancer ([facility] Diabetes mellitus ([facility] History of lumpectomy, Hx of blood clots, Hyperlipidemia, PE (pulmonary thromboembolism) ([facility] and Staph aureus infection. has a past surgical history that includes Mastectomy; Toe Surgery; Toe amputation; and Tonsillectomy. Evaluating Therapist: Kwame Dillard PT      Room #:  7391/9604-D  Diagnosis:  Acute on chronic respiratory failure with hypoxia (HCC) [J96.21]  Acute on chronic congestive heart failure, unspecified heart failure type ([facility] [I50.9]  Acute hypoxemic respiratory failure due to COVID-19 (HCC) [U07.1, J96.01]  COVID-19 [U07.1]  PMHx/PSHx:  CA, DM, CHF  Precautions:  Falls, O2, droplet plus isolation      Social:  Pt from [facility]. Ambulates with swivel wheeled walker. Initial Evaluation  Date: 21 Treatment      Short Term/ Long Term   Goals   Was pt agreeable to Eval/treatment? yes     Does pt have pain? No c/o pain     Bed Mobility  Rolling: min assist  Supine to sit: min assist  Sit to supine: NT  Scooting: min assist  SBA   Transfers Sit <> stand min assist from bed  Mod assist from commode and lower chair.    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 Patient education  Pt educated on PT objectives    Patient response to education:   Pt verbalized understanding Pt demonstrated skill Pt requires further education in this area   yes           Comments/treatment:  Pt in bed and motivated for PT session. Assisted to sit up to edge of bed. Mild dizziness upon sitting up. Sitting balance edge of bed supervision. Pt instructed on transfer technique and walker safety. Pt had his own swivel walker in room. At this time swivel walker was unsafe for pt unable to put weight through swivel walker. Switched pt to non swivel wheeled walker and was able to ambulate in room min assist 2 times. SpO2 at rest on 3L 97%. Nursing present and decreased O2 to 2L. After ambulation SpOn on 2L 85%. Increased back to 3L and pt recovered to 98%. Pt's/ family goals   1. To be independent    Conditions Requiring Skilled Therapeutic Intervention:    [x]Decreased strength     []Decreased ROM  [x]Decreased functional mobility  [x]Decreased balance   [x]Decreased endurance   []Decreased posture  []Decreased sensation  []Decreased coordination   []Decreased vision  []Decreased safety awareness   []Increased pain       Patient and or family understand(s) diagnosis, prognosis, and plan of care.     Prognosis is good for reaching above PT goals    PHYSICAL THERAPY PLAN OF CARE:    PT POC is established based on physician order and patient diagnosis     Referring provider/PT Order: Lesley Wilks DO/ PT eval and treat      Current Treatment Recommendations:     [x] Strengthening to improve independence with functional mobility   [] ROM to improve independence with functional mobility   [x] Balance Training to improve static/dynamic balance and to reduce fall risk  [x] Endurance Training to improve activity tolerance during functional mobility

## 2021-08-27 NOTE — CARE COORDINATION
Social Work/Discharge Planning:  Chart reviewed. COVID positive 8/23. Remdesivir day 3. Called patient and completed initial assessment. Explained Social Work role and discussed transition of care/discharge planning. Patient admitted from [address] [place], [place] [address] and he plans to return to facility at discharge. PTA patient was independent with a walker. Patient is currently requiring three liters of oxygen and RN to wean as tolerated. Patient did NOT wear oxygen at the assisted living. Called liaison Tsering Roberts with [facility] and confirmed patient can return to the assisted living at discharge. Will continue to follow and assist with discharge planning.   Electronically signed by ELKE Prather on 8/27/2021 at 10:44 AM

## 2021-08-27 NOTE — PROGRESS NOTES
PRN         Objective:          PHYSICAL EXAM:    Vitals:  BP (!) 178/79   Pulse 88   Temp 97.3 °F (36.3 °C) (Oral)   Resp 18   Ht 6' (1.829 m)   Wt 251 lb (113.9 kg)   SpO2 92%   BMI 34.04 kg/m²     General:  Appears comfortable. Answers questions appropriately and cooperative with exam  HEENT:  Mucous membranes moist. No erythema, rhinorrhea, or post-nasal drip noted. Neck:  No carotid bruits. Heart:  Rhythm regular at rate of 84  Lungs:  CTA. No wheeze, rales, or rhonchi  Abdomen:  Positive bowel sounds positive. Soft. Non-tender. No guarding, rebound or rigidity. Breast/Rectal/Genitourinary: not pertinent. Extremities:  positive for 1-2+ b/l lower extremity edema  Skin:  Warm and dry  Vascular: 2/4 Dorsalis Pedis pulses bilaterally. Neuro:  Cranial nerves 2-12 grossly intact, no focal weakness or change in sensation noted. Extraocular muscles intact. Pupils equal, round, reactive to light.               Recent Labs     08/25/21  1510 08/25/21  1852 08/26/21  1135    135 136   K 4.1 4.0 4.2   CL 99 97* 98   CO2 27 30* 32*   BUN 25* 25* 30*   CREATININE 1.1 1.2 1.2   GLUCOSE 216* 203* 232*   CALCIUM 8.1* 8.3* 8.8       Recent Labs     08/25/21  1510 08/26/21  1135   WBC 7.9 6.3   RBC 3.87 4.41   HGB 11.2* 12.8   HCT 35.5* 40.8   MCV 91.7 92.5   MCH 28.9 29.0   MCHC 31.5* 31.4*   RDW 14.7 14.6   * 154   MPV 9.3 9.3       CBC with Differential:    Lab Results   Component Value Date    WBC 6.3 08/26/2021    RBC 4.41 08/26/2021    HGB 12.8 08/26/2021    HCT 40.8 08/26/2021     08/26/2021    MCV 92.5 08/26/2021    MCH 29.0 08/26/2021    MCHC 31.4 08/26/2021    RDW 14.6 08/26/2021    LYMPHOPCT 6.0 08/26/2021    MONOPCT 4.7 08/26/2021    BASOPCT 0.2 08/26/2021    MONOSABS 0.30 08/26/2021    LYMPHSABS 0.38 08/26/2021    EOSABS 0.00 08/26/2021    BASOSABS 0.01 08/26/2021     CMP:    Lab Results   Component Value Date     08/26/2021    K 4.2 08/26/2021    CL 98 08/26/2021    CO2 32 08/26/2021    BUN 30 08/26/2021    CREATININE 1.2 08/26/2021    GFRAA >60 08/26/2021    LABGLOM 57 08/26/2021    GLUCOSE 232 08/26/2021    PROT 7.0 08/26/2021    LABALBU 3.6 08/26/2021    CALCIUM 8.8 08/26/2021    BILITOT 0.4 08/26/2021    ALKPHOS 147 08/26/2021    AST 39 08/26/2021    ALT 15 08/26/2021        Radiology:   XR CHEST PORTABLE   Final Result   Pulmonary vascular congestive changes. Assessment:    Active Problems:    Acute hypoxemic respiratory failure due to COVID-19 [facility])  Resolved Problems:    * No resolved hospital problems. *      Plan:  1. Acute respiratory failure with hypoxia(po2 is 67. 1)POA pt being weaned down of oxygen. Encourage activity and IS. Pt had been tried off o2 while I was in room and pt was 84%. 2. covid 19 infection with likely pneumonia  Pt on steroids and remdesivir. Will check ct chest for further workup   3. Hyperlipidemia continue med  4. Dm type 2 uncontrolled monitor bs and tx with long and short term insulin    5.  Hx of blood clots   Continue pt's eliquis    Chart reviewed and updated by nursing    Time spent is 35 min    Electronically signed by Hemrelinda Kam DO on 8/26/2021 at 9:18 PM

## 2021-08-27 NOTE — ACP (ADVANCE CARE PLANNING)
Advance Care Planning     Advance Care Planning Activator (Inpatient)  Conversation Note      Date of ACP Conversation: 8/27/2021     Conversation Conducted with: Patient with Decision Making Capacity    ACP Activator: Jadyn Velasquez, [address] [place] [address] Decision Maker:     Current Designated Health Care Decision Maker:     Primary Decision Maker (Active): Demi Sanchez Child - [phone]    Secondary Decision Maker: Tyesha De Los Santos - Child - [phone]  Click here to complete Healthcare Decision Makers including section of the Healthcare Decision Maker Relationship (ie \"Primary\")  Today we documented Decision Maker(s) consistent with Legal Next of Kin hierarchy. Care Preferences    Ventilation: \"If you were in your present state of health and suddenly became very ill and were unable to breathe on your own, what would your preference be about the use of a ventilator (breathing machine) if it were available to you? \"      Would the patient desire the use of ventilator (breathing machine)?: yes    \"If your health worsens and it becomes clear that your chance of recovery is unlikely, what would your preference be about the use of a ventilator (breathing machine) if it were available to you? \"     Would the patient desire the use of ventilator (breathing machine)?: No      Resuscitation  \"CPR works best to restart the heart when there is a sudden event, like a heart attack, in someone who is otherwise healthy. Unfortunately, CPR does not typically restart the heart for people who have serious health conditions or who are very sick. \"    \"In the event your heart stopped as a result of an underlying serious health condition, would you want attempts to be made to restart your heart (answer \"yes\" for attempt to resuscitate) or would you prefer a natural death (answer \"no\" for do not attempt to resuscitate)? \" no       [] Yes   [x] No   Educated Patient / At[place] Blank regarding differences between Advance Directives and portable DNR orders.     Length of ACP Conversation in minutes:  10 minutes    Conversation Outcomes:  [x] ACP discussion completed  [] Existing advance directive reviewed with patient; no changes to patient's previously recorded wishes  [] New Advance Directive completed  [] Portable Do Not Rescitate prepared for Provider review and signature  [] POLST/POST/MOLST/MOST prepared for Provider review and signature      Follow-up plan:    [] Schedule follow-up conversation to continue planning  [x] Referred individual to Provider for additional questions/concerns   [] Advised patient/agent/surrogate to review completed ACP document and update if needed with changes in condition, patient preferences or care setting    [] This note routed to one or more involved healthcare providers

## 2021-08-28 ENCOUNTER — APPOINTMENT (OUTPATIENT)
Dept: CT IMAGING | Age: 86
DRG: 177 | End: 2021-08-28
Payer: MEDICARE

## 2021-08-28 LAB
ALBUMIN SERPL-MCNC: 3.1 G/DL (ref 3.5–5.2)
ALP BLD-CCNC: 118 U/L (ref 40–129)
ALT SERPL-CCNC: 20 U/L (ref 0–40)
ANION GAP SERPL CALCULATED.3IONS-SCNC: 7 MMOL/L (ref 7–16)
AST SERPL-CCNC: 42 U/L (ref 0–39)
BASOPHILS ABSOLUTE: 0 E9/L (ref 0–0.2)
BASOPHILS RELATIVE PERCENT: 0 % (ref 0–2)
BILIRUB SERPL-MCNC: 0.3 MG/DL (ref 0–1.2)
BUN BLDV-MCNC: 39 MG/DL (ref 6–23)
C-REACTIVE PROTEIN: 3 MG/DL (ref 0–0.4)
CALCIUM SERPL-MCNC: 8.3 MG/DL (ref 8.6–10.2)
CHLORIDE BLD-SCNC: 100 MMOL/L (ref 98–107)
CO2: 31 MMOL/L (ref 22–29)
CREAT SERPL-MCNC: 1.3 MG/DL (ref 0.7–1.2)
D DIMER: 318 NG/ML DDU
EOSINOPHILS ABSOLUTE: 0 E9/L (ref 0.05–0.5)
EOSINOPHILS RELATIVE PERCENT: 0 % (ref 0–6)
GFR AFRICAN AMERICAN: >60
GFR NON-AFRICAN AMERICAN: 52 ML/MIN/1.73
GLUCOSE BLD-MCNC: 243 MG/DL (ref 74–99)
HCT VFR BLD CALC: 37 % (ref 37–54)
HEMOGLOBIN: 11.8 G/DL (ref 12.5–16.5)
IMMATURE GRANULOCYTES #: 0.04 E9/L
IMMATURE GRANULOCYTES %: 0.6 % (ref 0–5)
LYMPHOCYTES ABSOLUTE: 0.67 E9/L (ref 1.5–4)
LYMPHOCYTES RELATIVE PERCENT: 10 % (ref 20–42)
MAGNESIUM: 2.4 MG/DL (ref 1.6–2.6)
MCH RBC QN AUTO: 29.4 PG (ref 26–35)
MCHC RBC AUTO-ENTMCNC: 31.9 % (ref 32–34.5)
MCV RBC AUTO: 92 FL (ref 80–99.9)
METER GLUCOSE: 193 MG/DL (ref 74–99)
METER GLUCOSE: 196 MG/DL (ref 74–99)
METER GLUCOSE: 218 MG/DL (ref 74–99)
METER GLUCOSE: 234 MG/DL (ref 74–99)
MONOCYTES ABSOLUTE: 0.64 E9/L (ref 0.1–0.95)
MONOCYTES RELATIVE PERCENT: 9.6 % (ref 2–12)
NEUTROPHILS ABSOLUTE: 5.34 E9/L (ref 1.8–7.3)
NEUTROPHILS RELATIVE PERCENT: 79.8 % (ref 43–80)
PDW BLD-RTO: 14.3 FL (ref 11.5–15)
PHOSPHORUS: 3.4 MG/DL (ref 2.5–4.5)
PLATELET # BLD: 140 E9/L (ref 130–450)
PMV BLD AUTO: 9.8 FL (ref 7–12)
POTASSIUM SERPL-SCNC: 4.6 MMOL/L (ref 3.5–5)
RBC # BLD: 4.02 E12/L (ref 3.8–5.8)
REASON FOR REJECTION: NORMAL
REJECTED TEST: NORMAL
SODIUM BLD-SCNC: 138 MMOL/L (ref 132–146)
TOTAL PROTEIN: 6.2 G/DL (ref 6.4–8.3)
WBC # BLD: 6.7 E9/L (ref 4.5–11.5)

## 2021-08-28 PROCEDURE — 2700000000 HC OXYGEN THERAPY PER DAY

## 2021-08-28 PROCEDURE — 71250 CT THORAX DX C-: CPT

## 2021-08-28 PROCEDURE — 36415 COLL VENOUS BLD VENIPUNCTURE: CPT

## 2021-08-28 PROCEDURE — 85378 FIBRIN DEGRADE SEMIQUANT: CPT

## 2021-08-28 PROCEDURE — 83735 ASSAY OF MAGNESIUM: CPT

## 2021-08-28 PROCEDURE — 6370000000 HC RX 637 (ALT 250 FOR IP): Performed by: INTERNAL MEDICINE

## 2021-08-28 PROCEDURE — 99232 SBSQ HOSP IP/OBS MODERATE 35: CPT | Performed by: INTERNAL MEDICINE

## 2021-08-28 PROCEDURE — 84100 ASSAY OF PHOSPHORUS: CPT

## 2021-08-28 PROCEDURE — 2060000000 HC ICU INTERMEDIATE R&B

## 2021-08-28 PROCEDURE — 80053 COMPREHEN METABOLIC PANEL: CPT

## 2021-08-28 PROCEDURE — 85025 COMPLETE CBC W/AUTO DIFF WBC: CPT

## 2021-08-28 PROCEDURE — 6360000002 HC RX W HCPCS: Performed by: NURSE PRACTITIONER

## 2021-08-28 PROCEDURE — 6370000000 HC RX 637 (ALT 250 FOR IP): Performed by: NURSE PRACTITIONER

## 2021-08-28 PROCEDURE — 2580000003 HC RX 258: Performed by: NURSE PRACTITIONER

## 2021-08-28 PROCEDURE — 86140 C-REACTIVE PROTEIN: CPT

## 2021-08-28 PROCEDURE — 82962 GLUCOSE BLOOD TEST: CPT

## 2021-08-28 PROCEDURE — 2500000003 HC RX 250 WO HCPCS: Performed by: NURSE PRACTITIONER

## 2021-08-28 RX ADMIN — DEXAMETHASONE 6 MG: 4 TABLET ORAL at 09:04

## 2021-08-28 RX ADMIN — Medication 500 MG: at 22:42

## 2021-08-28 RX ADMIN — MICONAZOLE NITRATE: 20 POWDER TOPICAL at 22:46

## 2021-08-28 RX ADMIN — FUROSEMIDE 40 MG: 40 TABLET ORAL at 09:04

## 2021-08-28 RX ADMIN — Medication 10 ML: at 09:05

## 2021-08-28 RX ADMIN — INSULIN LISPRO 6 UNITS: 100 INJECTION, SOLUTION INTRAVENOUS; SUBCUTANEOUS at 13:03

## 2021-08-28 RX ADMIN — APIXABAN 5 MG: 5 TABLET, FILM COATED ORAL at 16:52

## 2021-08-28 RX ADMIN — INSULIN GLARGINE 43 UNITS: 100 INJECTION, SOLUTION SUBCUTANEOUS at 09:07

## 2021-08-28 RX ADMIN — INSULIN LISPRO 6 UNITS: 100 INJECTION, SOLUTION INTRAVENOUS; SUBCUTANEOUS at 17:59

## 2021-08-28 RX ADMIN — APIXABAN 5 MG: 5 TABLET, FILM COATED ORAL at 06:48

## 2021-08-28 RX ADMIN — REMDESIVIR 100 MG: 100 INJECTION, POWDER, LYOPHILIZED, FOR SOLUTION INTRAVENOUS at 22:45

## 2021-08-28 RX ADMIN — MICONAZOLE NITRATE: 20 POWDER TOPICAL at 09:06

## 2021-08-28 RX ADMIN — PANTOPRAZOLE SODIUM 40 MG: 40 TABLET, DELAYED RELEASE ORAL at 09:07

## 2021-08-28 RX ADMIN — Medication 1 TABLET: at 16:52

## 2021-08-28 RX ADMIN — INSULIN LISPRO 2 UNITS: 100 INJECTION, SOLUTION INTRAVENOUS; SUBCUTANEOUS at 13:02

## 2021-08-28 RX ADMIN — CALCIUM CARBONATE 500 MG: 500 TABLET, CHEWABLE ORAL at 09:04

## 2021-08-28 RX ADMIN — INSULIN LISPRO 6 UNITS: 100 INJECTION, SOLUTION INTRAVENOUS; SUBCUTANEOUS at 07:22

## 2021-08-28 RX ADMIN — ZINC SULFATE 220 MG (50 MG) CAPSULE 50 MG: CAPSULE at 09:04

## 2021-08-28 RX ADMIN — INSULIN LISPRO 1 UNITS: 100 INJECTION, SOLUTION INTRAVENOUS; SUBCUTANEOUS at 22:58

## 2021-08-28 RX ADMIN — Medication 500 MG: at 09:04

## 2021-08-28 RX ADMIN — Medication 2000 UNITS: at 09:03

## 2021-08-28 RX ADMIN — ATORVASTATIN CALCIUM 80 MG: 40 TABLET, FILM COATED ORAL at 16:52

## 2021-08-28 RX ADMIN — Medication 10 ML: at 22:45

## 2021-08-28 RX ADMIN — INSULIN LISPRO 2 UNITS: 100 INJECTION, SOLUTION INTRAVENOUS; SUBCUTANEOUS at 06:53

## 2021-08-28 RX ADMIN — GABAPENTIN 200 MG: 100 CAPSULE ORAL at 18:01

## 2021-08-28 RX ADMIN — Medication 1 TABLET: at 09:06

## 2021-08-28 RX ADMIN — ALBUTEROL SULFATE 2 PUFF: 90 AEROSOL, METERED RESPIRATORY (INHALATION) at 09:05

## 2021-08-28 RX ADMIN — INSULIN GLARGINE 33 UNITS: 100 INJECTION, SOLUTION SUBCUTANEOUS at 22:58

## 2021-08-28 RX ADMIN — Medication 1 TABLET: at 09:03

## 2021-08-28 RX ADMIN — INSULIN LISPRO 1 UNITS: 100 INJECTION, SOLUTION INTRAVENOUS; SUBCUTANEOUS at 17:58

## 2021-08-28 ASSESSMENT — PAIN SCALES - GENERAL
PAINLEVEL_OUTOF10: 0

## 2021-08-28 NOTE — PROGRESS NOTES
Lindsay Goodman Hospitalist   Progress Note    Admitting Date and Time: 8/25/2021 11:16 AM  Admit Dx: Acute on chronic respiratory failure with hypoxia (HCC) [J96.21]  Acute on chronic congestive heart failure, unspecified heart failure type ([facility] [I50.9]  [facility] hypox[facility] [U07.1, J96.01]  COVID-19 [U07.1]    Subjective:    Patient was admitted with Acute on chronic respiratory failure with hypoxia (HCC) [J96.21]  Acute on chronic congestive heart failure, unspecified heart failure type ([facility] [address] [I50.9]  Acute hypoxemic respiratory failure due to COVID-19 ([facility] [U07.1, J96.01]  COVID-19 [U07.1].  Patient denies fever, chills, cp, sob, n/v.     insulin lispro  0-6 Units Subcutaneous TID WC    insulin lispro  0-3 Units Subcutaneous Nightly    miconazole   Topical BID    sodium chloride flush  5-40 mL IntraVENous 2 times per day    dexamethasone  6 mg Oral Daily    Vitamin D  2,000 Units Oral Daily    ascorbic acid  500 mg Oral BID    zinc sulfate  50 mg Oral Daily    atorvastatin  80 mg Oral Dinner    furosemide  40 mg Oral Daily    gabapentin  200 mg Oral Dinner    ocuvite-lutein  1 tablet Oral BID WC    therapeutic multivitamin-minerals  1 tablet Oral Daily    pantoprazole  40 mg Oral Daily    apixaban  5 mg Oral BID AC    remdesivir IVPB  100 mg IntraVENous Q24H    insulin glargine  43 Units Subcutaneous QAM    insulin glargine  33 Units Subcutaneous Nightly    insulin lispro  6 Units Subcutaneous TID      sodium chloride flush, 5-40 mL, PRN  sodium chloride, 25 mL, PRN  ondansetron, 4 mg, Q8H PRN   Or  ondansetron, 4 mg, Q6H PRN  polyethylene glycol, 17 g, Daily PRN  acetaminophen, 650 mg, Q6H PRN   Or  acetaminophen, 650 mg, Q6H PRN  guaiFENesin-dextromethorphan, 5 mL, Q4H PRN  albuterol sulfate HFA, 2 puff, Q6H PRN  calcium carbonate, 1 tablet, Q4H PRN  clotrimazole-betamethasone, , PRN  glucose, 15 g, PRN  dextrose, 12.5 g, PRN  glucagon (rDNA), 1 mg, PRN  dextrose, 100 mL/hr, PRN  sodium chloride, 30 mL, PRN         Objective:    /66   Pulse 76   Temp 98.8 °F (37.1 °C) (Oral)   Resp 20   Ht 6' (1.829 m)   Wt 251 lb (113.9 kg)   SpO2 97%   BMI 34.04 kg/m²   Skin: warm and dry, no rash or erythema  Pulmonary/Chest: clear to auscultation bilaterally- no wheezes, rales or rhonchi, normal air movement, no respiratory distress  Cardiovascular: rhythm reg at rate of 78  Abdomen: soft, non-tender, non-distended, normal bowel sounds, no masses or organomegaly  Extremities: no cyanosis, no clubbing and no edema      Recent Labs     08/25/21  1510 08/25/21  1852 08/26/21  1135    135 136   K 4.1 4.0 4.2   CL 99 97* 98   CO2 27 30* 32*   BUN 25* 25* 30*   CREATININE 1.1 1.2 1.2   GLUCOSE 216* 203* 232*   CALCIUM 8.1* 8.3* 8.8       Recent Labs     08/25/21  1510 08/26/21  1135   WBC 7.9 6.3   RBC 3.87 4.41   HGB 11.2* 12.8   HCT 35.5* 40.8   MCV 91.7 92.5   MCH 28.9 29.0   MCHC 31.5* 31.4*   RDW 14.7 14.6   * 154   MPV 9.3 9.3            Radiology:   XR CHEST PORTABLE   Final Result   Pulmonary vascular congestive changes. Assessment:    Active Problems:    Acute hypoxemic respiratory failure due to COVID-19 [facility])    Acute respiratory failure with hypoxia ([facility]    COVID-19 virus infection    Hyperlipidemia  Resolved Problems:    * No resolved hospital problems. *      Plan:  1. Acute respiratory failure with hypoxia(po2 is 67. 1)POA pt being weaned down of oxygen. Encourage activity and IS. 2. covid 19 infection with likely pneumonia  Pt on steroids and remdesivir. Will check ct chest for further workup   3. Hyperlipidemia continue med  4. Dm type 2 uncontrolled monitor bs and tx with long and short term insulin    5. Hx of blood clots   Continue eliquis    Pt/ot following pt.     Electronically signed by Noé Escalante DO on 8/27/2021 at 10:44 PM

## 2021-08-29 LAB
ALBUMIN SERPL-MCNC: 3.1 G/DL (ref 3.5–5.2)
ALP BLD-CCNC: 119 U/L (ref 40–129)
ALT SERPL-CCNC: 19 U/L (ref 0–40)
ANION GAP SERPL CALCULATED.3IONS-SCNC: 8 MMOL/L (ref 7–16)
AST SERPL-CCNC: 36 U/L (ref 0–39)
BASOPHILS ABSOLUTE: 0 E9/L (ref 0–0.2)
BASOPHILS RELATIVE PERCENT: 0 % (ref 0–2)
BILIRUB SERPL-MCNC: 0.3 MG/DL (ref 0–1.2)
BLOOD CULTURE, ROUTINE: ABNORMAL
BUN BLDV-MCNC: 38 MG/DL (ref 6–23)
C-REACTIVE PROTEIN: 1.5 MG/DL (ref 0–0.4)
CALCIUM SERPL-MCNC: 8.4 MG/DL (ref 8.6–10.2)
CHLORIDE BLD-SCNC: 100 MMOL/L (ref 98–107)
CO2: 30 MMOL/L (ref 22–29)
CREAT SERPL-MCNC: 1.1 MG/DL (ref 0.7–1.2)
EOSINOPHILS ABSOLUTE: 0 E9/L (ref 0.05–0.5)
EOSINOPHILS RELATIVE PERCENT: 0 % (ref 0–6)
GFR AFRICAN AMERICAN: >60
GFR NON-AFRICAN AMERICAN: >60 ML/MIN/1.73
GLUCOSE BLD-MCNC: 126 MG/DL (ref 74–99)
HCT VFR BLD CALC: 38.9 % (ref 37–54)
HEMOGLOBIN: 12.4 G/DL (ref 12.5–16.5)
IMMATURE GRANULOCYTES #: 0.06 E9/L
IMMATURE GRANULOCYTES %: 0.8 % (ref 0–5)
LYMPHOCYTES ABSOLUTE: 0.83 E9/L (ref 1.5–4)
LYMPHOCYTES RELATIVE PERCENT: 11 % (ref 20–42)
MCH RBC QN AUTO: 28.8 PG (ref 26–35)
MCHC RBC AUTO-ENTMCNC: 31.9 % (ref 32–34.5)
MCV RBC AUTO: 90.5 FL (ref 80–99.9)
METER GLUCOSE: 105 MG/DL (ref 74–99)
METER GLUCOSE: 129 MG/DL (ref 74–99)
METER GLUCOSE: 135 MG/DL (ref 74–99)
METER GLUCOSE: 235 MG/DL (ref 74–99)
MONOCYTES ABSOLUTE: 0.88 E9/L (ref 0.1–0.95)
MONOCYTES RELATIVE PERCENT: 11.7 % (ref 2–12)
NEUTROPHILS ABSOLUTE: 5.78 E9/L (ref 1.8–7.3)
NEUTROPHILS RELATIVE PERCENT: 76.5 % (ref 43–80)
ORGANISM: ABNORMAL
ORGANISM: ABNORMAL
PDW BLD-RTO: 14.3 FL (ref 11.5–15)
PLATELET # BLD: 167 E9/L (ref 130–450)
PMV BLD AUTO: 9.6 FL (ref 7–12)
POTASSIUM SERPL-SCNC: 4.1 MMOL/L (ref 3.5–5)
RBC # BLD: 4.3 E12/L (ref 3.8–5.8)
SEDIMENTATION RATE, ERYTHROCYTE: 23 MM/HR (ref 0–15)
SODIUM BLD-SCNC: 138 MMOL/L (ref 132–146)
TOTAL PROTEIN: 6 G/DL (ref 6.4–8.3)
WBC # BLD: 7.6 E9/L (ref 4.5–11.5)

## 2021-08-29 PROCEDURE — 2500000003 HC RX 250 WO HCPCS: Performed by: NURSE PRACTITIONER

## 2021-08-29 PROCEDURE — 6370000000 HC RX 637 (ALT 250 FOR IP): Performed by: NURSE PRACTITIONER

## 2021-08-29 PROCEDURE — 87040 BLOOD CULTURE FOR BACTERIA: CPT

## 2021-08-29 PROCEDURE — 6370000000 HC RX 637 (ALT 250 FOR IP): Performed by: INTERNAL MEDICINE

## 2021-08-29 PROCEDURE — 2700000000 HC OXYGEN THERAPY PER DAY

## 2021-08-29 PROCEDURE — 36415 COLL VENOUS BLD VENIPUNCTURE: CPT

## 2021-08-29 PROCEDURE — 80053 COMPREHEN METABOLIC PANEL: CPT

## 2021-08-29 PROCEDURE — 2580000003 HC RX 258: Performed by: NURSE PRACTITIONER

## 2021-08-29 PROCEDURE — 85025 COMPLETE CBC W/AUTO DIFF WBC: CPT

## 2021-08-29 PROCEDURE — 6360000002 HC RX W HCPCS: Performed by: SPECIALIST

## 2021-08-29 PROCEDURE — 86140 C-REACTIVE PROTEIN: CPT

## 2021-08-29 PROCEDURE — 85651 RBC SED RATE NONAUTOMATED: CPT

## 2021-08-29 PROCEDURE — 2060000000 HC ICU INTERMEDIATE R&B

## 2021-08-29 PROCEDURE — 87081 CULTURE SCREEN ONLY: CPT

## 2021-08-29 PROCEDURE — 82962 GLUCOSE BLOOD TEST: CPT

## 2021-08-29 PROCEDURE — 6360000002 HC RX W HCPCS: Performed by: NURSE PRACTITIONER

## 2021-08-29 PROCEDURE — 99232 SBSQ HOSP IP/OBS MODERATE 35: CPT | Performed by: INTERNAL MEDICINE

## 2021-08-29 PROCEDURE — 2580000003 HC RX 258: Performed by: SPECIALIST

## 2021-08-29 RX ADMIN — APIXABAN 5 MG: 5 TABLET, FILM COATED ORAL at 06:18

## 2021-08-29 RX ADMIN — Medication 500 MG: at 21:29

## 2021-08-29 RX ADMIN — FUROSEMIDE 40 MG: 40 TABLET ORAL at 10:00

## 2021-08-29 RX ADMIN — Medication 500 MG: at 10:00

## 2021-08-29 RX ADMIN — GABAPENTIN 200 MG: 100 CAPSULE ORAL at 16:28

## 2021-08-29 RX ADMIN — Medication 10 ML: at 10:00

## 2021-08-29 RX ADMIN — VANCOMYCIN HYDROCHLORIDE 1750 MG: 5 INJECTION, POWDER, LYOPHILIZED, FOR SOLUTION INTRAVENOUS at 15:14

## 2021-08-29 RX ADMIN — PANTOPRAZOLE SODIUM 40 MG: 40 TABLET, DELAYED RELEASE ORAL at 10:00

## 2021-08-29 RX ADMIN — ZINC SULFATE 220 MG (50 MG) CAPSULE 50 MG: CAPSULE at 09:59

## 2021-08-29 RX ADMIN — Medication 2000 UNITS: at 09:58

## 2021-08-29 RX ADMIN — INSULIN GLARGINE 33 UNITS: 100 INJECTION, SOLUTION SUBCUTANEOUS at 21:00

## 2021-08-29 RX ADMIN — Medication 1 TABLET: at 09:58

## 2021-08-29 RX ADMIN — CALCIUM CARBONATE 500 MG: 500 TABLET, CHEWABLE ORAL at 12:00

## 2021-08-29 RX ADMIN — INSULIN LISPRO 1 UNITS: 100 INJECTION, SOLUTION INTRAVENOUS; SUBCUTANEOUS at 22:10

## 2021-08-29 RX ADMIN — Medication 1 TABLET: at 10:00

## 2021-08-29 RX ADMIN — INSULIN GLARGINE 43 UNITS: 100 INJECTION, SOLUTION SUBCUTANEOUS at 09:44

## 2021-08-29 RX ADMIN — ATORVASTATIN CALCIUM 80 MG: 40 TABLET, FILM COATED ORAL at 16:28

## 2021-08-29 RX ADMIN — DEXAMETHASONE 6 MG: 4 TABLET ORAL at 09:58

## 2021-08-29 RX ADMIN — Medication 10 ML: at 21:00

## 2021-08-29 RX ADMIN — REMDESIVIR 100 MG: 100 INJECTION, POWDER, LYOPHILIZED, FOR SOLUTION INTRAVENOUS at 21:29

## 2021-08-29 RX ADMIN — MICONAZOLE NITRATE: 20 POWDER TOPICAL at 09:59

## 2021-08-29 RX ADMIN — INSULIN LISPRO 6 UNITS: 100 INJECTION, SOLUTION INTRAVENOUS; SUBCUTANEOUS at 16:40

## 2021-08-29 RX ADMIN — INSULIN LISPRO 6 UNITS: 100 INJECTION, SOLUTION INTRAVENOUS; SUBCUTANEOUS at 06:47

## 2021-08-29 RX ADMIN — Medication 1 TABLET: at 16:27

## 2021-08-29 RX ADMIN — APIXABAN 5 MG: 5 TABLET, FILM COATED ORAL at 16:28

## 2021-08-29 ASSESSMENT — PAIN SCALES - GENERAL: PAINLEVEL_OUTOF10: 0

## 2021-08-29 NOTE — PROGRESS NOTES
Patient with upset stomach. Not eating. States will not allow this nurse to get blood work until [employer] down. Medication given per order, see MAR.

## 2021-08-29 NOTE — PROGRESS NOTES
Lindsay Goodman Hospitalist   Progress Note    Admitting Date and Time: 8/25/2021 11:16 AM  Admit Dx: Acute on chronic respiratory failure with hypoxia (HCC) [J96.21]  Acute on chronic congestive heart failure, unspecified heart failure type ([facility] [I50.9]  Ac[facility] hypoxemic respiratory [facility] [U07.1, J96.01]  COVID-19 [U07.1]    Subjective:    Patient was admitted with Acute on chronic respiratory failure with hypoxia (HCC) [J96.21]  Acute on chronic congestive heart failure, unspecified heart failure type ([facility] [I50.9]  Acute hypoxemic respiratory failure due to COVID-19 ([facility] [U07.1, J96.01]  COVID-19 [U07.1].  Patient denies fever, chills, cp, sob, n/v.     insulin lispro  0-6 Units Subcutaneous TID WC    insulin lispro  0-3 Units Subcutaneous Nightly    miconazole   Topical BID    sodium chloride flush  5-40 mL IntraVENous 2 times per day    dexamethasone  6 mg Oral Daily    Vitamin D  2,000 Units Oral Daily    ascorbic acid  500 mg Oral BID    zinc sulfate  50 mg Oral Daily    atorvastatin  80 mg Oral Dinner    furosemide  40 mg Oral Daily    gabapentin  200 mg Oral Dinner    ocuvite-lutein  1 tablet Oral BID WC    therapeutic multivitamin-minerals  1 tablet Oral Daily    pantoprazole  40 mg Oral Daily    apixaban  5 mg Oral BID AC    remdesivir IVPB  100 mg IntraVENous Q24H    insulin glargine  43 Units Subcutaneous QAM    insulin glargine  33 Units Subcutaneous Nightly    insulin lispro  6 Units Subcutaneous TID      sodium chloride flush, 5-40 mL, PRN  sodium chloride, 25 mL, PRN  ondansetron, 4 mg, Q8H PRN   Or  ondansetron, 4 mg, Q6H PRN  polyethylene glycol, 17 g, Daily PRN  acetaminophen, 650 mg, Q6H PRN   Or  acetaminophen, 650 mg, Q6H PRN  guaiFENesin-dextromethorphan, 5 mL, Q4H PRN  albuterol sulfate HFA, 2 puff, Q6H PRN  calcium carbonate, 1 tablet, Q4H PRN  clotrimazole-betamethasone, , PRN  glucose, 15 g, PRN  dextrose, 12.5 g, PRN  glucagon (rDNA), 1 mg, PRN  dextrose, 100 mL/hr, PRN  sodium chloride, 30 mL, PRN         Objective:    BP (!) 151/73   Pulse 75   Temp 98.4 °F (36.9 °C) (Oral)   Resp 18   Ht 6' (1.829 m)   Wt 254 lb (115.2 kg)   SpO2 93%   BMI 34.45 kg/m²   Skin: warm and dry, no rash or erythema  Pulmonary/Chest: clear to auscultation bilaterally- no wheezes, rales or rhonchi, normal air movement, no respiratory distress  Cardiovascular: rhythm reg at rate of 76  Abdomen: soft, non-tender, non-distended, normal bowel sounds, no masses or organomegaly  Extremities: no cyanosis, no clubbing and no edema      Recent Labs     08/26/21  1135 08/28/21  0427    138   K 4.2 4.6   CL 98 100   CO2 32* 31*   BUN 30* 39*   CREATININE 1.2 1.3*   GLUCOSE 232* 243*   CALCIUM 8.8 8.3*       Recent Labs     08/26/21  1135 08/28/21  0427   WBC 6.3 6.7   RBC 4.41 4.02   HGB 12.8 11.8*   HCT 40.8 37.0   MCV 92.5 92.0   MCH 29.0 29.4   MCHC 31.4* 31.9*   RDW 14.6 14.3    140   MPV 9.3 9.8       CBC with Differential:    Lab Results   Component Value Date    WBC 6.7 08/28/2021    RBC 4.02 08/28/2021    HGB 11.8 08/28/2021    HCT 37.0 08/28/2021     08/28/2021    MCV 92.0 08/28/2021    MCH 29.4 08/28/2021    MCHC 31.9 08/28/2021    RDW 14.3 08/28/2021    LYMPHOPCT 10.0 08/28/2021    MONOPCT 9.6 08/28/2021    BASOPCT 0.0 08/28/2021    MONOSABS 0.64 08/28/2021    LYMPHSABS 0.67 08/28/2021    EOSABS 0.00 08/28/2021    BASOSABS 0.00 08/28/2021     CMP:    Lab Results   Component Value Date     08/28/2021    K 4.6 08/28/2021    K 4.2 08/26/2021     08/28/2021    CO2 31 08/28/2021    BUN 39 08/28/2021    CREATININE 1.3 08/28/2021    GFRAA >60 08/28/2021    LABGLOM 52 08/28/2021    GLUCOSE 243 08/28/2021    PROT 6.2 08/28/2021    LABALBU 3.1 08/28/2021    CALCIUM 8.3 08/28/2021    BILITOT 0.3 08/28/2021    ALKPHOS 118 08/28/2021    AST 42 08/28/2021    ALT 20 08/28/2021     Magnesium:    Lab Results   Component Value Date    MG 2.4 08/28/2021     Phosphorus:    Lab Results   Component Value Date    PHOS 3.4 08/28/2021        Radiology:   CT CHEST WO CONTRAST   Final Result   1. Patchy ground-glass opacities throughout both lungs suggesting bilateral   viral pneumonia or pneumonitis. 2. Confluent opacities are present in lung bases likely related to   atelectatic changes. 3. Small bilateral pleural effusions slightly larger on the left. XR CHEST PORTABLE   Final Result   Pulmonary vascular congestive changes. Assessment:    Active Problems:    Acute hypoxemic respiratory failure due to COVID-19 [facility])    Acute respiratory failure with hypoxia ([address]    COVID-19 virus infection    Hyperlipidemia  Resolved Problems:    * No resolved hospital problems. *      Plan:  1. Acute respiratory failure with hypoxia(po2 is 67. 1)POA pt being weaned down of oxygen. Encourage activity and IS. 2. Pneumonia due to covid 19 ct scan reviewed  Pt on steroids and remdesivir. 3. Hyperlipidemia continue med  4. Dm type 2 uncontrolled monitor bs and tx with long and short term insulin    5. B/l pleural effusions monitor  6. Hx of blood clots   Continue eliquis  7. Debility/deconditioning. [facility] scores reviewed and discussed with nursing who are concerned that he has been requiring a lot more assistance getting up. Will have pt/ot reevaluate pt. Discussed with pt that he may not be able to back to [place] right away.          Electronically signed by Mary Matias DO on 8/28/2021 at 10:11 PM

## 2021-08-29 NOTE — CONSULTS
[address] [place] [address] [facility]  Consultation Note     Admit Date: 8/25/2021 11:16 AM    Reason for Consult:   Positive blood cultures    Attending Physician:  Ermelinda Patrick DO    HISTORY OF PRESENT ILLNESS:             The history is obtained from extensive review of available past medical records. The patient is a 80 y.o. male who is not previously known to the ID service. He received a SARS-CoV-2 vaccine earlier this year. The patient presents to the ED on 8/25/2021 with shortness of breath and flank pain. He had tested positive for SARS-CoV-2 on 8/23/2021. Remdesivir was started. He feels good. Denies any fevers. 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 He has not had any fevers since he was admitted. His breathing seems to be better. He has mild dry cough.     Past Medical History:        Diagnosis Date    Cancer ([facility]     breast    Diabetes mellitus ([facility]     History of lumpectomy     Hx of blood clots     Hyperlipidemia     PE (pulmonary thromboembolism) (HCC)     Staph aureus infection      Past Surgical History:        Procedure Laterality Date    MASTECTOMY      TOE AMPUTATION      TOE SURGERY      TONSILLECTOMY       Current Medications:   Scheduled Meds:   insulin lispro  0-6 Units Subcutaneous TID WC    insulin lispro  0-3 Units Subcutaneous Nightly    miconazole   Topical BID    sodium chloride flush  5-40 mL IntraVENous 2 times per day    dexamethasone  6 mg Oral Daily    Vitamin D  2,000 Units Oral Daily    ascorbic acid  500 mg Oral BID    zinc sulfate  50 mg Oral Daily    atorvastatin  80 mg Oral Dinner    furosemide  40 mg Oral Daily    gabapentin  200 mg Oral Dinner    ocuvite-lutein  1 tablet Oral BID WC    therapeutic multivitamin-minerals  1 tablet Oral Daily    pantoprazole  40 mg Oral Daily    apixaban  5 mg Oral BID AC    remdesivir IVPB  100 mg IntraVENous Q24H    insulin glargine  43 Units Subcutaneous QAM    insulin glargine  33 Units Subcutaneous Nightly    insulin lispro  6 Units Subcutaneous TID WC     Continuous Infusions:   sodium chloride      dextrose       PRN Meds:sodium chloride flush, sodium chloride, ondansetron **OR** ondansetron, polyethylene glycol, acetaminophen **OR** acetaminophen, guaiFENesin-dextromethorphan, albuterol sulfate HFA, calcium carbonate, clotrimazole-betamethasone, glucose, dextrose, glucagon (rDNA), dextrose, sodium chloride    Allergies:  Clindamycin/lincomycin and Sulfa antibiotics    Social History:   Social History     Socioeconomic History    Marital status:      Spouse name: None    Number of children: None    Years of education: None    Highest education level: None   Occupational History    None   Tobacco Use    Smoking status: Never Smoker    Smokeless tobacco: Never Used   Vaping Use    Vaping Use: Never used   Substance and Sexual Activity    Alcohol use: Not Currently     Alcohol/week: 1.0 standard drinks     Types: 1 Cans of beer per week    Drug use: No    Sexual activity: Not Currently     Partners: Female   Other Topics Concern    None   Social History Narrative    None     Social Determinants of Health     Financial Resource Strain:     Difficulty of Paying Living Expenses:    Food Insecurity:     Worried About Running Out of Food in the Last Year:     Ran Out of Food in the Last Year:    Transportation Needs:     Lack of Transportation (Medical):      Lack of Transportation (Non-Medical):    Physical Activity:     Days of Exercise per Week:     Minutes of Exercise per Session:    Stress:     Feeling of Stress :    Social Connections:     Frequency of Communication with Friends and Family:     Frequency of Social Gatherings with Friends and Family:     Attends Spiritism Services:     Active Member of Clubs or Organizations:     Attends Club or Organization Meetings:     Marital Status:    Intimate Partner Violence:     Fear of Current or Ex-Partner:     Emotionally Abused:     Physically Abused:     Sexually Abused:       Nursing home resident. He retired from operating heavy machinery    Family History:   History reviewed. No pertinent family history. . Otherwise non-pertinent to the chief complaint. REVIEW OF SYSTEMS:    Constitutional: Negative for fevers, chills, diaphoresis  Neurologic: Negative   Psychiatric: Negative  Rheumatologic: Negative   Endocrine: Negative  Hematologic: Negative  Immunologic: SARS-CoV-2 vaccine January-February 2021  ENT: Negative  Respiratory: As in the HPI  Cardiovascular: Negative  GI: Negative  : Negative  Musculoskeletal: As in the HPI. He has right fourth toe amputation about 15 years ago and said he had a staphylococcal infection. Skin: No rash. PHYSICAL EXAM:    Vitals:   BP (!) 168/85   Pulse 77   Temp 98.9 °F (37.2 °C) (Oral)   Resp 16   Ht 6' (1.829 m)   Wt 254 lb (115.2 kg)   SpO2 95%   BMI 34.45 kg/m²   Constitutional: The patient is awake, alert, and oriented. Sitting up in a chair. He is in no distress. 2 L nasal cannula. Skin: Warm and dry. No rashes were noted. HEENT: Eyes show round, and reactive pupils. No jaundice. Moist mucous membranes, no ulcerations, no thrush. Neck: Supple to movements. No lymphadenopathy. Chest: No use of accessory muscles to breathe. Symmetrical expansion. Auscultation reveals crackles on bases posteriorly. Cardiovascular: S1 and S2 are rhythmic and regular. No murmurs appreciated. Abdomen: Positive bowel sounds to auscultation. Benign to palpation. No masses felt. No hepatosplenomegaly. Extremities:   Bilateral feet edema. Right fourth toe is missing. No open wounds. No erythema.   Lines: peripheral      CBC+dif:  Recent Labs     08/26/21  1135 08/26/21  1135 08/28/21  0427 08/28/21  0427 08/29/21  0630   WBC 6.3  --  6.7  --  7.6   HGB 12.8   < > 11.8*   < > 12.4*   HCT 40.8   < > 37.0   < > 38.9   MCV 92.5   < > 92.0   < > 90.5      < > 140   < > 167   NEUTROABS 5.62   < > 5.34   < > 5.78    < > = values in this interval not displayed. Lab Results   Component Value Date    CRP 3.0 (H) 08/28/2021    CRP 4.9 (H) 08/27/2021    CRP 7.5 (H) 08/26/2021      No results found for: CRPHS  No results found for: SEDRATE  Lab Results   Component Value Date    ALT 19 08/29/2021    AST 36 08/29/2021    ALKPHOS 119 08/29/2021    BILITOT 0.3 08/29/2021     Lab Results   Component Value Date     08/29/2021    K 4.1 08/29/2021    K 4.2 08/26/2021     08/29/2021    CO2 30 08/29/2021    BUN 38 08/29/2021    CREATININE 1.1 08/29/2021    GFRAA >60 08/29/2021    LABGLOM >60 08/29/2021    GLUCOSE 126 08/29/2021    PROT 6.0 08/29/2021    LABALBU 3.1 08/29/2021    CALCIUM 8.4 08/29/2021    BILITOT 0.3 08/29/2021    ALKPHOS 119 08/29/2021    AST 36 08/29/2021    ALT 19 08/29/2021       Lab Results   Component Value Date    PROTIME 15.7 06/23/2017    INR 1.4 06/23/2017       No results found for: TSH    Lab Results   Component Value Date    COLORU Yellow 08/25/2021    PHUR 5.0 08/25/2021    WBCUA 1-3 08/25/2021    RBCUA 2-5 08/25/2021    BACTERIA RARE 08/25/2021    CLARITYU Clear 08/25/2021    SPECGRAV 1.015 08/25/2021    LEUKOCYTESUR Negative 08/25/2021    UROBILINOGEN 0.2 08/25/2021    BILIRUBINUR Negative 08/25/2021    BLOODU MODERATE 08/25/2021    GLUCOSEU Negative 08/25/2021       Lab Results   Component Value Date    HCO3 26.9 08/25/2021    BE 1.8 08/25/2021    O2SAT 92.2 08/25/2021    PH 7.399 08/25/2021    PCO2 44.6 08/25/2021    PO2 67.1 08/25/2021     Radiology:      Microbiology:  Pending  No results for input(s): BC in the last 72 hours. No results for input(s): ORG in the last 72 hours. No results for input(s): Elmira Gallegos in the last 72 hours. No results for input(s): STREPNEUMAGU in the last 72 hours. No results for input(s): LP1UAG in the last 72 hours.   No results for input(s): ASO in the last 72 hours. No results for input(s): CULTRESP in the last 72 hours. Recent Labs     08/27/21  1735   PROCAL 0.18*       Assessment:  · SARS-CoV-2 infection with mild to moderate viral pneumonia  · Left lower lobe infiltrate. Probable superimposed bacterial pneumonia  · MRSA bacteremia on admission. Patient has no fever. Her source is not clear. This organism usually does not cause blood culture contamination. Cannot rule out pneumonia or prosthesis infection. He does not report any pain in the right hip, however. He has no pacers    Plan:    · Remdesivir, day 5  · Repeat blood cultures x2  · Vancomycin 1.25 g IV x1. Check random level in a.m. · Check cultures, baseline ESR, CRP  · Nares screen MRSA  · Urine antigen dexamethasone  ·   · Will follow with you    Thank you for having us see this patient in consultation.  Case discussed with Dr. Jc Conte MD  10:31 AM  8/29/2021

## 2021-08-30 LAB
ALBUMIN SERPL-MCNC: 3.1 G/DL (ref 3.5–5.2)
ALP BLD-CCNC: 114 U/L (ref 40–129)
ALT SERPL-CCNC: 21 U/L (ref 0–40)
ANION GAP SERPL CALCULATED.3IONS-SCNC: 7 MMOL/L (ref 7–16)
AST SERPL-CCNC: 38 U/L (ref 0–39)
BASOPHILS ABSOLUTE: 0.01 E9/L (ref 0–0.2)
BASOPHILS RELATIVE PERCENT: 0.1 % (ref 0–2)
BILIRUB SERPL-MCNC: 0.4 MG/DL (ref 0–1.2)
BUN BLDV-MCNC: 38 MG/DL (ref 6–23)
CALCIUM SERPL-MCNC: 8.4 MG/DL (ref 8.6–10.2)
CHLORIDE BLD-SCNC: 98 MMOL/L (ref 98–107)
CO2: 33 MMOL/L (ref 22–29)
CREAT SERPL-MCNC: 1.2 MG/DL (ref 0.7–1.2)
EOSINOPHILS ABSOLUTE: 0 E9/L (ref 0.05–0.5)
EOSINOPHILS RELATIVE PERCENT: 0 % (ref 0–6)
GFR AFRICAN AMERICAN: >60
GFR NON-AFRICAN AMERICAN: 57 ML/MIN/1.73
GLUCOSE BLD-MCNC: 70 MG/DL (ref 74–99)
HCT VFR BLD CALC: 37.5 % (ref 37–54)
HEMOGLOBIN: 12.2 G/DL (ref 12.5–16.5)
IMMATURE GRANULOCYTES #: 0.06 E9/L
IMMATURE GRANULOCYTES %: 0.7 % (ref 0–5)
LYMPHOCYTES ABSOLUTE: 1.19 E9/L (ref 1.5–4)
LYMPHOCYTES RELATIVE PERCENT: 14.2 % (ref 20–42)
MCH RBC QN AUTO: 29 PG (ref 26–35)
MCHC RBC AUTO-ENTMCNC: 32.5 % (ref 32–34.5)
MCV RBC AUTO: 89.3 FL (ref 80–99.9)
METER GLUCOSE: 102 MG/DL (ref 74–99)
METER GLUCOSE: 194 MG/DL (ref 74–99)
METER GLUCOSE: 213 MG/DL (ref 74–99)
METER GLUCOSE: 84 MG/DL (ref 74–99)
MONOCYTES ABSOLUTE: 0.95 E9/L (ref 0.1–0.95)
MONOCYTES RELATIVE PERCENT: 11.4 % (ref 2–12)
NEUTROPHILS ABSOLUTE: 6.16 E9/L (ref 1.8–7.3)
NEUTROPHILS RELATIVE PERCENT: 73.6 % (ref 43–80)
PDW BLD-RTO: 14.3 FL (ref 11.5–15)
PLATELET # BLD: 186 E9/L (ref 130–450)
PMV BLD AUTO: 9.4 FL (ref 7–12)
POTASSIUM SERPL-SCNC: 4 MMOL/L (ref 3.5–5)
RBC # BLD: 4.2 E12/L (ref 3.8–5.8)
SODIUM BLD-SCNC: 138 MMOL/L (ref 132–146)
TOTAL PROTEIN: 5.9 G/DL (ref 6.4–8.3)
VANCOMYCIN RANDOM: 8.9 MCG/ML (ref 5–40)
WBC # BLD: 8.4 E9/L (ref 4.5–11.5)

## 2021-08-30 PROCEDURE — 80053 COMPREHEN METABOLIC PANEL: CPT

## 2021-08-30 PROCEDURE — 6370000000 HC RX 637 (ALT 250 FOR IP): Performed by: INTERNAL MEDICINE

## 2021-08-30 PROCEDURE — 6360000002 HC RX W HCPCS: Performed by: NURSE PRACTITIONER

## 2021-08-30 PROCEDURE — 82962 GLUCOSE BLOOD TEST: CPT

## 2021-08-30 PROCEDURE — 36415 COLL VENOUS BLD VENIPUNCTURE: CPT

## 2021-08-30 PROCEDURE — 85025 COMPLETE CBC W/AUTO DIFF WBC: CPT

## 2021-08-30 PROCEDURE — 2580000003 HC RX 258: Performed by: NURSE PRACTITIONER

## 2021-08-30 PROCEDURE — 99232 SBSQ HOSP IP/OBS MODERATE 35: CPT | Performed by: INTERNAL MEDICINE

## 2021-08-30 PROCEDURE — 6370000000 HC RX 637 (ALT 250 FOR IP): Performed by: NURSE PRACTITIONER

## 2021-08-30 PROCEDURE — 2580000003 HC RX 258: Performed by: SPECIALIST

## 2021-08-30 PROCEDURE — 80202 ASSAY OF VANCOMYCIN: CPT

## 2021-08-30 PROCEDURE — 6360000002 HC RX W HCPCS: Performed by: SPECIALIST

## 2021-08-30 PROCEDURE — 97530 THERAPEUTIC ACTIVITIES: CPT

## 2021-08-30 PROCEDURE — 2700000000 HC OXYGEN THERAPY PER DAY

## 2021-08-30 PROCEDURE — 2060000000 HC ICU INTERMEDIATE R&B

## 2021-08-30 PROCEDURE — XW033E5 INTRODUCTION OF REMDESIVIR ANTI-INFECTIVE INTO PERIPHERAL VEIN, PERCUTANEOUS APPROACH, NEW TECHNOLOGY GROUP 5: ICD-10-PCS | Performed by: NURSE PRACTITIONER

## 2021-08-30 RX ORDER — INSULIN GLARGINE 100 [IU]/ML
20 INJECTION, SOLUTION SUBCUTANEOUS EVERY MORNING
Status: DISCONTINUED | OUTPATIENT
Start: 2021-08-31 | End: 2021-09-03 | Stop reason: HOSPADM

## 2021-08-30 RX ORDER — INSULIN GLARGINE 100 [IU]/ML
10 INJECTION, SOLUTION SUBCUTANEOUS NIGHTLY
Status: DISCONTINUED | OUTPATIENT
Start: 2021-08-30 | End: 2021-09-02

## 2021-08-30 RX ORDER — INSULIN GLARGINE 100 [IU]/ML
25 INJECTION, SOLUTION SUBCUTANEOUS NIGHTLY
Status: DISCONTINUED | OUTPATIENT
Start: 2021-08-30 | End: 2021-08-30

## 2021-08-30 RX ADMIN — Medication 10 ML: at 10:04

## 2021-08-30 RX ADMIN — INSULIN LISPRO 1 UNITS: 100 INJECTION, SOLUTION INTRAVENOUS; SUBCUTANEOUS at 21:16

## 2021-08-30 RX ADMIN — GABAPENTIN 200 MG: 100 CAPSULE ORAL at 17:12

## 2021-08-30 RX ADMIN — FUROSEMIDE 40 MG: 40 TABLET ORAL at 10:00

## 2021-08-30 RX ADMIN — APIXABAN 5 MG: 5 TABLET, FILM COATED ORAL at 06:12

## 2021-08-30 RX ADMIN — ATORVASTATIN CALCIUM 80 MG: 40 TABLET, FILM COATED ORAL at 17:12

## 2021-08-30 RX ADMIN — APIXABAN 5 MG: 5 TABLET, FILM COATED ORAL at 17:12

## 2021-08-30 RX ADMIN — MICONAZOLE NITRATE: 20 POWDER TOPICAL at 10:01

## 2021-08-30 RX ADMIN — Medication 1 TABLET: at 17:12

## 2021-08-30 RX ADMIN — PANTOPRAZOLE SODIUM 40 MG: 40 TABLET, DELAYED RELEASE ORAL at 10:00

## 2021-08-30 RX ADMIN — Medication 1 TABLET: at 09:59

## 2021-08-30 RX ADMIN — Medication 1 TABLET: at 10:01

## 2021-08-30 RX ADMIN — ZINC SULFATE 220 MG (50 MG) CAPSULE 50 MG: CAPSULE at 10:00

## 2021-08-30 RX ADMIN — VANCOMYCIN HYDROCHLORIDE 1750 MG: 5 INJECTION, POWDER, LYOPHILIZED, FOR SOLUTION INTRAVENOUS at 17:13

## 2021-08-30 RX ADMIN — DEXAMETHASONE 6 MG: 4 TABLET ORAL at 10:00

## 2021-08-30 RX ADMIN — INSULIN GLARGINE 10 UNITS: 100 INJECTION, SOLUTION SUBCUTANEOUS at 21:15

## 2021-08-30 RX ADMIN — Medication 500 MG: at 10:00

## 2021-08-30 RX ADMIN — Medication 2000 UNITS: at 10:00

## 2021-08-30 RX ADMIN — INSULIN LISPRO 1 UNITS: 100 INJECTION, SOLUTION INTRAVENOUS; SUBCUTANEOUS at 17:40

## 2021-08-30 RX ADMIN — Medication 500 MG: at 21:04

## 2021-08-30 RX ADMIN — MICONAZOLE NITRATE: 20 POWDER TOPICAL at 21:04

## 2021-08-30 RX ADMIN — Medication 10 ML: at 21:04

## 2021-08-30 ASSESSMENT — PAIN SCALES - GENERAL: PAINLEVEL_OUTOF10: 0

## 2021-08-30 NOTE — CARE COORDINATION
Social Work/Discharge Planning:  Chart reviewed. COVID positive 8/23. Remdesivr completed. Patient currently on two liters oxygen and RN to wean as tolerated. Called Digna with [facility] and confirmed patient can return to the Assisted Living at discharge. Will continue to follow.   Electronically signed by ELKE Giang on 8/30/2021 at 10:53 AM

## 2021-08-30 NOTE — PROGRESS NOTES
[address] [place] [address] [facility]  Progress Note    SUBJECTIVE:  Chief Complaint   Patient presents with    Flank Pain    Back Pain     The patient has no appetite today. No fever. He tolerated Vancomycin well yesterday. No nausea or vomiting. Review of systems:  As stated above in the chief complaint, otherwise negative. Medications:  Scheduled Meds:   insulin lispro  0-6 Units SubCUTAneous TID WC    insulin lispro  0-3 Units SubCUTAneous Nightly    miconazole   Topical BID    sodium chloride flush  5-40 mL IntraVENous 2 times per day    dexamethasone  6 mg Oral Daily    Vitamin D  2,000 Units Oral Daily    ascorbic acid  500 mg Oral BID    zinc sulfate  50 mg Oral Daily    atorvastatin  80 mg Oral Dinner    furosemide  40 mg Oral Daily    gabapentin  200 mg Oral Dinner    ocuvite-lutein  1 tablet Oral BID WC    therapeutic multivitamin-minerals  1 tablet Oral Daily    pantoprazole  40 mg Oral Daily    apixaban  5 mg Oral BID AC    insulin glargine  43 Units SubCUTAneous QAM    insulin glargine  33 Units SubCUTAneous Nightly    insulin lispro  6 Units SubCUTAneous TID WC     Continuous Infusions:   sodium chloride      dextrose       PRN Meds:sodium chloride flush, sodium chloride, ondansetron **OR** ondansetron, polyethylene glycol, acetaminophen **OR** acetaminophen, guaiFENesin-dextromethorphan, albuterol sulfate HFA, calcium carbonate, clotrimazole-betamethasone, glucose, dextrose, glucagon (rDNA), dextrose, sodium chloride    OBJECTIVE:  BP (!) 146/68   Pulse 79   Temp 97.6 °F (36.4 °C) (Oral)   Resp 18   Ht 6' (1.829 m)   Wt 254 lb (115.2 kg)   SpO2 96%   BMI 34.45 kg/m²   Temp  Av.2 °F (36.8 °C)  Min: 97.6 °F (36.4 °C)  Max: 98.5 °F (36.9 °C)  Constitutional: The patient is awake, alert, and oriented. O2 by nasal cannula.  2 L. Skin: Warm and dry. No rashes were noted. HEENT: Round and reactive pupils. Moist mucous membranes.   No ulcerations or thrush. Neck: Supple to movements. Chest: No use of accessory muscles to breathe. Symmetrical expansion. No wheezing, crackles or rhonchi. Cardiovascular: S1 and S2 are rhythmic and regular. No murmurs appreciated. Abdomen: Positive bowel sounds to auscultation. Benign to palpation. No masses felt. No hepatosplenomegaly. Extremities: No clubbing, no cyanosis, no edema. Lines: peripheral    Laboratory and Tests Review:  Lab Results   Component Value Date    WBC 8.4 08/30/2021    WBC 7.6 08/29/2021    WBC 6.7 08/28/2021    HGB 12.2 (L) 08/30/2021    HCT 37.5 08/30/2021    MCV 89.3 08/30/2021     08/30/2021     Lab Results   Component Value Date    NEUTROABS 6.16 08/30/2021    NEUTROABS 5.78 08/29/2021    NEUTROABS 5.34 08/28/2021     No results found for: CRP  Lab Results   Component Value Date    ALT 21 08/30/2021    AST 38 08/30/2021    ALKPHOS 114 08/30/2021    BILITOT 0.4 08/30/2021     Lab Results   Component Value Date     08/30/2021    K 4.0 08/30/2021    K 4.2 08/26/2021    CL 98 08/30/2021    CO2 33 08/30/2021    BUN 38 08/30/2021    CREATININE 1.2 08/30/2021    CREATININE 1.1 08/29/2021    CREATININE 1.3 08/28/2021    GFRAA >60 08/30/2021    LABGLOM 57 08/30/2021    GLUCOSE 70 08/30/2021    PROT 5.9 08/30/2021    LABALBU 3.1 08/30/2021    CALCIUM 8.4 08/30/2021    BILITOT 0.4 08/30/2021    ALKPHOS 114 08/30/2021    AST 38 08/30/2021    ALT 21 08/30/2021     Lab Results   Component Value Date    CRP 1.5 (H) 08/29/2021    CRP 3.0 (H) 08/28/2021    CRP 4.9 (H) 08/27/2021     Lab Results   Component Value Date    SEDRATE 23 (H) 08/29/2021     Radiology:      Microbiology:   Blood cultures 8/25/2021: MRSA in 2 of 4 bottles. Staphylococcus epidermidis in 1 of 4 bottles. Blood cultures 8/29/2021: Negative so far  Nares screen MRSA: Pending    Recent Labs     08/27/21  1735   PROCAL 0.18*       ASSESSMENT:  · Polymicrobial bacteremia. Blood cultures turned +2 days after the admission. They both have MRSA and CoNS. Bacteremia may have been transient or possibly even a contaminant  · SARS-CoV-2 infection with mild to moderate pneumonia. Completed 5 days of Remdesivir    PLAN:  · We will give another dose of Vancomycin today and check another random level tomorrow a.m.   · Check repeat blood cultures  · Dexamethasone    Spoke with Juve Emanuel MD  12:21 PM  8/30/2021

## 2021-08-30 NOTE — PROGRESS NOTES
[facility] Hospitalist   Progress Note    Admitting Date and Time: 8/25/2021 11:16 AM  Admit Dx: Acute on chronic respiratory failure with hypoxia (HCC) [J96.21]  Acute on chronic congestive heart failure, unspecified heart failure type ([facility] [I50.9]  Acute hypoxemic respiratory failure due to COVID-19 ([facility] [U07.1, J96.01]  COVID-19 [U07.1]    Subjective:    Patient was admitted with Acute on chronic respiratory failure with hypoxia (HCC) [J96.21]  Acute on chronic congestive heart failure, unspecified heart failure type ([facility] [I50.9]  Acute hypoxemic respiratory failure due to COVID-19 ([facility] [U07.1, J96.01]  COVID-19 [U07.1]. Patient denies fever, chills, cp, n/v. Pt with some sob.       insulin lispro  0-6 Units Subcutaneous TID WC    insulin lispro  0-3 Units Subcutaneous Nightly    miconazole   Topical BID    sodium chloride flush  5-40 mL IntraVENous 2 times per day    dexamethasone  6 mg Oral Daily    Vitamin D  2,000 Units Oral Daily    ascorbic acid  500 mg Oral BID    zinc sulfate  50 mg Oral Daily    atorvastatin  80 mg Oral Dinner    furosemide  40 mg Oral Daily    gabapentin  200 mg Oral Dinner    ocuvite-lutein  1 tablet Oral BID WC    therapeutic multivitamin-minerals  1 tablet Oral Daily    pantoprazole  40 mg Oral Daily    apixaban  5 mg Oral BID AC    remdesivir IVPB  100 mg IntraVENous Q24H    insulin glargine  43 Units Subcutaneous QAM    insulin glargine  33 Units Subcutaneous Nightly    insulin lispro  6 Units Subcutaneous TID      sodium chloride flush, 5-40 mL, PRN  sodium chloride, 25 mL, PRN  ondansetron, 4 mg, Q8H PRN   Or  ondansetron, 4 mg, Q6H PRN  polyethylene glycol, 17 g, Daily PRN  acetaminophen, 650 mg, Q6H PRN   Or  acetaminophen, 650 mg, Q6H PRN  guaiFENesin-dextromethorphan, 5 mL, Q4H PRN  albuterol sulfate HFA, 2 puff, Q6H PRN  calcium carbonate, 1 tablet, Q4H PRN  clotrimazole-betamethasone, , PRN  glucose, 15 g, PRN  dextrose, 12.5 g, PRN  glucagon (rDNA), 1 mg, PRN  dextrose, 100 mL/hr, PRN  sodium chloride, 30 mL, PRN         Objective:    /63   Pulse 85   Temp 98.3 °F (36.8 °C) (Oral)   Resp 18   Ht 6' (1.829 m)   Wt 254 lb (115.2 kg)   SpO2 97%   BMI 34.45 kg/m²   Skin: warm and dry, no rash or erythema  Pulmonary/Chest: clear to auscultation bilaterally- no wheezes, rales or rhonchi, normal air movement, no respiratory distress  Cardiovascular: rhythm reg at rate of 84  Abdomen: soft, non-tender, non-distended, normal bowel sounds, no masses or organomegaly  Extremities: no cyanosis, no clubbing and no edema      Recent Labs     08/28/21  0427 08/29/21  0630    138   K 4.6 4.1    100   CO2 31* 30*   BUN 39* 38*   CREATININE 1.3* 1.1   GLUCOSE 243* 126*   CALCIUM 8.3* 8.4*       Recent Labs     08/28/21  0427 08/29/21  0630   WBC 6.7 7.6   RBC 4.02 4.30   HGB 11.8* 12.4*   HCT 37.0 38.9   MCV 92.0 90.5   MCH 29.4 28.8   MCHC 31.9* 31.9*   RDW 14.3 14.3    167   MPV 9.8 9.6       CBC with Differential:    Lab Results   Component Value Date    WBC 7.6 08/29/2021    RBC 4.30 08/29/2021    HGB 12.4 08/29/2021    HCT 38.9 08/29/2021     08/29/2021    MCV 90.5 08/29/2021    MCH 28.8 08/29/2021    MCHC 31.9 08/29/2021    RDW 14.3 08/29/2021    LYMPHOPCT 11.0 08/29/2021    MONOPCT 11.7 08/29/2021    BASOPCT 0.0 08/29/2021    MONOSABS 0.88 08/29/2021    LYMPHSABS 0.83 08/29/2021    EOSABS 0.00 08/29/2021    BASOSABS 0.00 08/29/2021     CMP:    Lab Results   Component Value Date     08/29/2021    K 4.1 08/29/2021    K 4.2 08/26/2021     08/29/2021    CO2 30 08/29/2021    BUN 38 08/29/2021    CREATININE 1.1 08/29/2021    GFRAA >60 08/29/2021    LABGLOM >60 08/29/2021    GLUCOSE 126 08/29/2021    PROT 6.0 08/29/2021    LABALBU 3.1 08/29/2021    CALCIUM 8.4 08/29/2021    BILITOT 0.3 08/29/2021    ALKPHOS 119 08/29/2021    AST 36 08/29/2021    ALT 19 08/29/2021        Radiology:   CT CHEST [address] Final Result   1. Patchy ground-glass opacities throughout both lungs suggesting bilateral   viral pneumonia or pneumonitis. 2. Confluent opacities are present in lung bases likely related to   atelectatic changes. 3. Small bilateral pleural effusions slightly larger on the left. XR CHEST PORTABLE   Final Result   Pulmonary vascular congestive changes. Assessment:    Active Problems:    Acute hypoxemic respiratory failure due to COVID-19 [facility])    Acute respiratory failure with hypoxia ([address]    COVID-19 virus infection    Hyperlipidemia    Pneumonia due to COVID-19 virus  Resolved Problems:    * No resolved hospital problems. *      Plan:  1. Acute respiratory failure with hypoxia(po2 is 67. 1)POA pt being weaned down of oxygen. Encourage activity and IS. 2. Pneumonia due to covid 19 ct scan reviewed  Pt on steroids and remdesivir. 3. Hyperlipidemia continue med  4. Dm type 2 uncontrolled monitor bs and tx with long and short term insulin    5. B/l pleural effusions monitor  6. Hx of blood clots   Continue eliquis  7. Debility/deconditioning. [facility] scores reviewed and discussed with nursing who are concerned that he has been requiring a lot more assistance getting up. Will have pt/ot reevaluate pt. Discussed with pt that he may not be able to back to [place] right away. 8. Positive blood cultures with mrsa ID consulted and d/w Dr Samuel Lei. Pt given a dose of vanc. Repeat bc ordered. 9.  LLL infiltrate on ct with possible bacterial pneumonia ID following         Electronically signed by Carleen Ross,  on 8/29/2021 at 8:15 PM

## 2021-08-30 NOTE — PROGRESS NOTES
Physical Therapy  Facility/Department: [address] INTERMEDIATE 1  Daily Treatment Note  NAME: Digna Garcia. : 1932  MRN: [id number]    Date of Service: 2021    Patient Diagnosis(es): The primary encounter diagnosis was Acute on chronic respiratory failure with hypoxia ([facility] Diagnoses of Acute on chronic congestive heart failure, unspecified heart failure type ([facility] and COVID-19 were also pertinent to this visit. has a past medical history of Cancer ([facility] Diabetes mellitus ([facility] History of lumpectomy, Hx of blood clots, Hyperlipidemia, PE (pulmonary thromboembolism) ([facility] and Staph aureus infection. has a past surgical history that includes Mastectomy; Toe Surgery; Toe amputation; and Tonsillectomy. Evaluating Therapist: Katlin Cui PT        Room #:  4128/7546-X  Diagnosis:  Acute on chronic respiratory failure with hypoxia (HCC) [J96.21]  Acute on chronic congestive heart failure, unspecified heart failure type ([facility] [I50.9]  Acute hypoxemic respiratory failure due to COVID-19 (HCC) [U07.1, J96.01]  COVID-19 [U07.1]  PMHx/PSHx:  CA, DM, CHF  Precautions:  Falls, O2, droplet plus isolation        Social:  Pt from [facility]. Ambulates with swivel wheeled walker.           Initial Evaluation  Date: 21 Treatment     21 Short Term/ Long Term   Goals   Was pt agreeable to Eval/treatment? yes yes      Does pt have pain?  No c/o pain No c/o pain      Bed Mobility  Rolling: min assist  Supine to sit: min assist  Sit to supine: NT  Scooting: min assist  supine to sit min assist  Sit to supine min assist  Scooting seated edge of bed min assist SBA   Transfers Sit <> stand min assist from bed  Mod assist from commode and lower chair.   sit to stand min assist from bed mod assist form commode secondary to lightheaded SBA   Ambulation    40 feet and 25 feet x2 with ww with min assist  25 feet with ww min assist  75 feet with ww with SBA   Stair Negotiation  Ascended and descended  NT    N/A   LE strength     4-/5     4/5   balance      Fair with ww       AM-PAC Raw score               16/24 16/24             Patient education  Pt was educated on transfer technique    Patient response to education:   Pt verbalized understanding Pt demonstrated skill Pt requires further education in this area   yes With assist yes     Additional Comments: Pt c/o dizziness upon first sitting up. SpO2 on room air seated edge of bed 99%. Pt ambulated in room with ww min assist. Pt began to c/o dizziness, sat on bedside commode, pt then stated he felt as if her were blacking out. Slow to respond to questions. SpO2 88%. Nursing also present in room /47. Pt assisted back to bed mod assist for transfer secondary to feeling lightheaded. Pt in bed at end of session with nursing in room. Pt was left in bed with call light left by patient. Time in: 0956  Time out: 1020    Total treatment time 24 minutes    Pt is making  progress toward established Physical Therapy goals. Continue with physical therapy current plan of care.     [facility] PSYCHIATRY PT [id number]      CPT codes:  [] Low Complexity PT evaluation 99134  [] Moderate Complexity PT evaluation 18247  [] High Complexity PT evaluation 22538  [] PT Re-evaluation 15585  [] Gait training 11989  minutes  [] Manual therapy 34498    minutes  [x] Therapeutic activities 38352 24   minutes  [] Therapeutic exercises 64248     minutes  [] Neuromuscular reeducation 40854     minutes

## 2021-08-31 ENCOUNTER — APPOINTMENT (OUTPATIENT)
Dept: GENERAL RADIOLOGY | Age: 86
DRG: 177 | End: 2021-08-31
Payer: MEDICARE

## 2021-08-31 LAB
ALBUMIN SERPL-MCNC: 3 G/DL (ref 3.5–5.2)
ALP BLD-CCNC: 109 U/L (ref 40–129)
ALT SERPL-CCNC: 20 U/L (ref 0–40)
ANION GAP SERPL CALCULATED.3IONS-SCNC: 8 MMOL/L (ref 7–16)
AST SERPL-CCNC: 33 U/L (ref 0–39)
BASOPHILS ABSOLUTE: 0.01 E9/L (ref 0–0.2)
BASOPHILS RELATIVE PERCENT: 0.1 % (ref 0–2)
BILIRUB SERPL-MCNC: 0.4 MG/DL (ref 0–1.2)
BUN BLDV-MCNC: 37 MG/DL (ref 6–23)
CALCIUM SERPL-MCNC: 8.2 MG/DL (ref 8.6–10.2)
CHLORIDE BLD-SCNC: 99 MMOL/L (ref 98–107)
CO2: 29 MMOL/L (ref 22–29)
CREAT SERPL-MCNC: 1.2 MG/DL (ref 0.7–1.2)
CULTURE, BLOOD 2: ABNORMAL
CULTURE, BLOOD 2: ABNORMAL
EOSINOPHILS ABSOLUTE: 0.01 E9/L (ref 0.05–0.5)
EOSINOPHILS RELATIVE PERCENT: 0.1 % (ref 0–6)
GFR AFRICAN AMERICAN: >60
GFR NON-AFRICAN AMERICAN: 57 ML/MIN/1.73
GLUCOSE BLD-MCNC: 148 MG/DL (ref 74–99)
HCT VFR BLD CALC: 37.3 % (ref 37–54)
HEMOGLOBIN: 11.9 G/DL (ref 12.5–16.5)
IMMATURE GRANULOCYTES #: 0.09 E9/L
IMMATURE GRANULOCYTES %: 0.8 % (ref 0–5)
LYMPHOCYTES ABSOLUTE: 1.42 E9/L (ref 1.5–4)
LYMPHOCYTES RELATIVE PERCENT: 12.6 % (ref 20–42)
MCH RBC QN AUTO: 28.7 PG (ref 26–35)
MCHC RBC AUTO-ENTMCNC: 31.9 % (ref 32–34.5)
MCV RBC AUTO: 90.1 FL (ref 80–99.9)
METER GLUCOSE: 112 MG/DL (ref 74–99)
METER GLUCOSE: 157 MG/DL (ref 74–99)
METER GLUCOSE: 158 MG/DL (ref 74–99)
METER GLUCOSE: 262 MG/DL (ref 74–99)
MONOCYTES ABSOLUTE: 1.32 E9/L (ref 0.1–0.95)
MONOCYTES RELATIVE PERCENT: 11.7 % (ref 2–12)
MRSA CULTURE ONLY: NORMAL
NEUTROPHILS ABSOLUTE: 8.46 E9/L (ref 1.8–7.3)
NEUTROPHILS RELATIVE PERCENT: 74.7 % (ref 43–80)
ORGANISM: ABNORMAL
PDW BLD-RTO: 14.3 FL (ref 11.5–15)
PLATELET # BLD: 200 E9/L (ref 130–450)
PMV BLD AUTO: 9.7 FL (ref 7–12)
POTASSIUM SERPL-SCNC: 4.1 MMOL/L (ref 3.5–5)
RBC # BLD: 4.14 E12/L (ref 3.8–5.8)
SODIUM BLD-SCNC: 136 MMOL/L (ref 132–146)
TOTAL PROTEIN: 5.6 G/DL (ref 6.4–8.3)
VANCOMYCIN RANDOM: 15.6 MCG/ML (ref 5–40)
WBC # BLD: 11.3 E9/L (ref 4.5–11.5)

## 2021-08-31 PROCEDURE — 97530 THERAPEUTIC ACTIVITIES: CPT

## 2021-08-31 PROCEDURE — 80053 COMPREHEN METABOLIC PANEL: CPT

## 2021-08-31 PROCEDURE — 71045 X-RAY EXAM CHEST 1 VIEW: CPT

## 2021-08-31 PROCEDURE — 80202 ASSAY OF VANCOMYCIN: CPT

## 2021-08-31 PROCEDURE — 2580000003 HC RX 258: Performed by: INTERNAL MEDICINE

## 2021-08-31 PROCEDURE — 6360000002 HC RX W HCPCS: Performed by: SPECIALIST

## 2021-08-31 PROCEDURE — 2580000003 HC RX 258: Performed by: SPECIALIST

## 2021-08-31 PROCEDURE — 2700000000 HC OXYGEN THERAPY PER DAY

## 2021-08-31 PROCEDURE — 6370000000 HC RX 637 (ALT 250 FOR IP): Performed by: INTERNAL MEDICINE

## 2021-08-31 PROCEDURE — 99233 SBSQ HOSP IP/OBS HIGH 50: CPT | Performed by: INTERNAL MEDICINE

## 2021-08-31 PROCEDURE — 82962 GLUCOSE BLOOD TEST: CPT

## 2021-08-31 PROCEDURE — 2060000000 HC ICU INTERMEDIATE R&B

## 2021-08-31 PROCEDURE — 6360000002 HC RX W HCPCS: Performed by: NURSE PRACTITIONER

## 2021-08-31 PROCEDURE — 6370000000 HC RX 637 (ALT 250 FOR IP): Performed by: NURSE PRACTITIONER

## 2021-08-31 PROCEDURE — 36415 COLL VENOUS BLD VENIPUNCTURE: CPT

## 2021-08-31 PROCEDURE — 2580000003 HC RX 258: Performed by: NURSE PRACTITIONER

## 2021-08-31 PROCEDURE — 85025 COMPLETE CBC W/AUTO DIFF WBC: CPT

## 2021-08-31 RX ORDER — SODIUM CHLORIDE 9 MG/ML
INJECTION, SOLUTION INTRAVENOUS CONTINUOUS
Status: DISCONTINUED | OUTPATIENT
Start: 2021-08-31 | End: 2021-09-03 | Stop reason: HOSPADM

## 2021-08-31 RX ADMIN — FUROSEMIDE 40 MG: 40 TABLET ORAL at 08:26

## 2021-08-31 RX ADMIN — Medication 500 MG: at 08:25

## 2021-08-31 RX ADMIN — Medication 500 MG: at 21:08

## 2021-08-31 RX ADMIN — ATORVASTATIN CALCIUM 80 MG: 40 TABLET, FILM COATED ORAL at 16:20

## 2021-08-31 RX ADMIN — SODIUM CHLORIDE: 9 INJECTION, SOLUTION INTRAVENOUS at 11:56

## 2021-08-31 RX ADMIN — GABAPENTIN 200 MG: 100 CAPSULE ORAL at 16:20

## 2021-08-31 RX ADMIN — MICONAZOLE NITRATE: 20 POWDER TOPICAL at 08:28

## 2021-08-31 RX ADMIN — Medication 1 TABLET: at 08:28

## 2021-08-31 RX ADMIN — INSULIN GLARGINE 20 UNITS: 100 INJECTION, SOLUTION SUBCUTANEOUS at 10:09

## 2021-08-31 RX ADMIN — Medication 10 ML: at 11:57

## 2021-08-31 RX ADMIN — Medication 2000 UNITS: at 08:26

## 2021-08-31 RX ADMIN — INSULIN LISPRO 1 UNITS: 100 INJECTION, SOLUTION INTRAVENOUS; SUBCUTANEOUS at 06:19

## 2021-08-31 RX ADMIN — APIXABAN 5 MG: 5 TABLET, FILM COATED ORAL at 06:15

## 2021-08-31 RX ADMIN — APIXABAN 5 MG: 5 TABLET, FILM COATED ORAL at 16:20

## 2021-08-31 RX ADMIN — VANCOMYCIN HYDROCHLORIDE 1500 MG: 10 INJECTION, POWDER, LYOPHILIZED, FOR SOLUTION INTRAVENOUS at 16:24

## 2021-08-31 RX ADMIN — ZINC SULFATE 220 MG (50 MG) CAPSULE 50 MG: CAPSULE at 08:25

## 2021-08-31 RX ADMIN — INSULIN GLARGINE 10 UNITS: 100 INJECTION, SOLUTION SUBCUTANEOUS at 21:20

## 2021-08-31 RX ADMIN — INSULIN LISPRO 2 UNITS: 100 INJECTION, SOLUTION INTRAVENOUS; SUBCUTANEOUS at 21:21

## 2021-08-31 RX ADMIN — DEXAMETHASONE 6 MG: 4 TABLET ORAL at 08:26

## 2021-08-31 RX ADMIN — Medication 1 TABLET: at 16:20

## 2021-08-31 RX ADMIN — MICONAZOLE NITRATE: 20 POWDER TOPICAL at 21:09

## 2021-08-31 RX ADMIN — Medication 10 ML: at 09:15

## 2021-08-31 RX ADMIN — PANTOPRAZOLE SODIUM 40 MG: 40 TABLET, DELAYED RELEASE ORAL at 08:26

## 2021-08-31 RX ADMIN — Medication 1 TABLET: at 08:26

## 2021-08-31 RX ADMIN — SODIUM CHLORIDE: 9 INJECTION, SOLUTION INTRAVENOUS at 21:08

## 2021-08-31 ASSESSMENT — PAIN SCALES - GENERAL
PAINLEVEL_OUTOF10: 0

## 2021-08-31 NOTE — PLAN OF CARE
Problem: Airway Clearance - Ineffective  Goal: Achieve or maintain patent airway  Outcome: Met This Shift     Problem: Gas Exchange - Impaired  Goal: Absence of hypoxia  Outcome: Met This Shift  Goal: Promote optimal lung function  Outcome: Met This Shift     Problem: Falls - Risk of:  Goal: Will remain free from falls  Description: Will remain free from falls  Outcome: Met This Shift  Goal: Absence of physical injury  Description: Absence of physical injury  Outcome: Met This Shift

## 2021-08-31 NOTE — PROGRESS NOTES
Occupational Therapy  OT BEDSIDE TREATMENT NOTE      Date:2021  Patient Name: Luis Jones. MRN: [id number]  : 1932  Room: [address] [place], [place] [address]     Evaluating OT: Joo Gallegos OTR/L   [id number]       Referring Provider:Stephen Velasquez DO    Specific Provider Orders/Date:OT eval and treat 2021       Diagnosis: Acute resp failure with hypoxia       Pertinent Medical History: DM, CA    Precautions:  Fall Risk, DROPLET PLUS      Assessment of current deficits    [x]? Functional mobility            [x]?ADLs           [x]? Strength                  []?Cognition    [x]? Functional transfers          [x]? IADLs         [x]? Safety Awareness   [x]? Endurance    []? Fine Coordination                         [x]? Balance      []? Vision/perception   []? Sensation      []? Gross Motor Coordination             []? ROM           []? Delirium                   []? Motor Control      OT PLAN OF CARE   OT POC based on physician orders, patient diagnosis and results of clinical assessment     Frequency/Duration  2-4 days/wk for 2 weeks PRN   Specific OT Treatment Interventions to include:   ADL retraining/adapted techniques and AE recommendations to increase functional independence within precautions                    Energy conservation techniques to improve tolerance for selfcare routine   Functional transfer/mobility training/DME recommendations for increased independence, safety and fall prevention         Patient/family education to increase safety and functional independence             Environmental modifications for safe mobility and completion of ADLs                             Therapeutic activity to improve functional performance during ADLs.                                         Therapeutic exercise to improve tolerance and functional strength for ADLs    Balance retraining/tolerance tasks for facilitation of postural control with dynamic challenges during ADLs .       Positioning to improve functional independence        Recommended Adaptive Equipment: TBD      Home Living: Pt lives in [facility].       Prior Level of Function: Independent  with ADLs , assist as needed  with IADLs; ambulated with walke     Pain Level: Pt reports pain in R buttock. Pillow under L hip shifting pt to R side. At end of the session, pt repositioned off R buttock. Cognition: Awake and alert. Min cues for safety. Functional Assessment:  AM-PAC Daily Activity Raw Score: 16/24    Initial Eval Status  Date: 8/27/21 Treatment Status  Date:8/31/21  STGs = LTGs  Time frame: 10-14 days   Feeding Independent        Grooming SBA/Set-up  Seated   Decrease standing tolerance  SBA seated.   Independent    UB Dressing SBA/set-up    Independent    LB Dressing Mod A   LE weakness    Mod I    Bathing Mod A    Mod I    Toileting SBA, seated on commode    Independent    Bed Mobility  Min A  Supine to sit    Meli    Functional Transfers Min A  Sit-stand from bed   Mod A  Sit-stand from chair and commode   (lower surfaces) CGA to stand from elevated bed surface.   Mod I    Functional Mobility Min A,w/walker   Ambulated to/from bathroom  CGA using w/w side step to the [facility].   Mdo I  with good tolerance    Balance Sitting:     Static:  Independent    Dynamic:Min A  Standing: min A   Independent    Activity Tolerance Fair with light activity  Patient on 3 L initially - at rest O2 sats 97%  O2 lowered to 2 L during activity - sats dropped lowest to 87%  Increased to 3 L - O2 sats in 90s  Mild SOB with activity      -reinforced importance of incentive spirometer  O2 saturation decreased to 87% when standing less than 30 seconds for repositioning. See comments.   Good  with ADL activity        Comments:  Pt agreeable to activity. Nursing requesting EOB activity only and return to bed at end of the session due to +orthostatics this AM.  Limited activity at this time. Min dizziness when standing to side step to the [facility].   No other complaint of dizziness. Pt requesting ensure drinks due to poor appetite. Nursing notified. Education/treatment:  ADL retraining with facilitation of movement to increase self care skills. Therapeutic activity to address balance and endurance for ADL and transfers. Pt education of transfer safety and walker safety. · Pt has made  progress towards set goals.        Time In: 2:15  Time Out: 2:33     Min Units   Therapeutic Ex [id number]     Therapeutic Activities [id number]   ADL/Self Care [id number] 5    Orthotic Management [id number]     Neuro Re-Ed [id number]     Non-Billable Time     TOTAL TIMED TREATMENT 18 [address] [facility] VALERIO/L [id number]

## 2021-08-31 NOTE — CONSULTS
8/31/2021  2:31 PM      Comprehensive Nutrition Assessment    Type and Reason for Visit:  Initial, Consult    Nutrition Recommendations/Plan: Continue current diet and ONS, as tolerated    Nutrition Assessment:  [place] from [place] 2/2 Covid+PNA. Hx. DM, CHF, Breast CA. Pt has had poor appetite since admit and PTA. Will add ONS to all meals to optimize nutrient intake. Malnutrition Assessment:  Malnutrition Status: At risk for malnutrition (Comment)    Context:  Acute Illness     Findings of the 6 clinical characteristics of malnutrition:  Energy Intake:  7 - 50% or less of estimated energy requirements for 5 or more days  Weight Loss:  No significant weight loss     Body Fat Loss:  Unable to assess (Covid Isolation)     Muscle Mass Loss:  Unable to assess (Covid Isolation)    Fluid Accumulation:  No significant fluid accumulation     Strength:  Not Performed    Estimated Daily Nutrient Needs:  Energy (kcal):  ; Weight Used for Energy Requirements:  Current     Protein (g):  100-115 (1.3-1.5 g/kg); Weight Used for Protein Requirements:  Ideal        Fluid (ml/day):  ; Method Used for Fluid Requirements:  1 ml/kcal      Nutrition Related Findings:  A&Ox4, +2-+3 edema, poor appetite, nausea, abdomen WDL, -I/O 4.9L,      Wounds:  None       Current Nutrition Therapies:    ADULT DIET; Regular; 5 carb choices (75 gm/meal); No Added Salt (3-4 gm)  Adult Oral Nutrition Supplement; Low Calorie/High Protein Oral Supplement  Adult Oral Nutrition Supplement; Other Oral Supplement; GELATEIN 20    Anthropometric Measures:  · Height: 6' (182.9 cm)  · Current Body Weight: 244 lb 9.6 oz (110.9 kg) (8/[facility])   · Admission Body Weight:      · Usual Body Weight: 258 lb (117 kg) (per EMR x 6 mo)     · Ideal Body Weight: 178 lbs; % Ideal Body Weight 137.4 %   · BMI: 33.2  · BMI Categories: Obese Class 1 (BMI 30.0-34. 9)       Nutrition Diagnosis:   · Inadequate oral intake related to impaired respiratory function (2/2 Covid PNA) as evidenced by poor intake prior to admission, intake 0-25%      Nutrition Interventions:   Food and/or Nutrient Delivery:  Continue Current Diet, Start Oral Nutrition Supplement  Nutrition Education/Counseling:  No recommendation at this time   Coordination of Nutrition Care:  Continue to monitor while inpatient    Goals:  PO >75% at meals/ONS       Nutrition Monitoring and Evaluation:   Behavioral-Environmental Outcomes:  None Identified   Food/Nutrient Intake Outcomes:  Food and Nutrient Intake, Supplement Intake  Physical Signs/Symptoms Outcomes:  Biochemical Data, Meal Time Behavior, GI Status, Fluid Status or Edema, Nutrition Focused Physical Findings, Skin, Weight     Discharge Planning:    Continue Oral Nutrition Supplement     Electronically signed by Lisa Guerrero RD, CNSC, LD on 8/31/21 at 2:31 PM EDT    Contact: [phone]

## 2021-08-31 NOTE — PROGRESS NOTES
[address] [place] [address] [facility]  Progress Note    SUBJECTIVE:  Chief Complaint   Patient presents with    Flank Pain    Back Pain     The patient is doing well. No new complaints. Tolerating antibiotics. No nausea or vomiting. Appetite is poor    Review of systems:  As stated above in the chief complaint, otherwise negative. Medications:  Scheduled Meds:   insulin glargine  10 Units SubCUTAneous Nightly    insulin glargine  20 Units SubCUTAneous QAM    insulin lispro  0-6 Units SubCUTAneous TID WC    insulin lispro  0-3 Units SubCUTAneous Nightly    miconazole   Topical BID    sodium chloride flush  5-40 mL IntraVENous 2 times per day    dexamethasone  6 mg Oral Daily    Vitamin D  2,000 Units Oral Daily    ascorbic acid  500 mg Oral BID    zinc sulfate  50 mg Oral Daily    atorvastatin  80 mg Oral Dinner    furosemide  40 mg Oral Daily    gabapentin  200 mg Oral Dinner    ocuvite-lutein  1 tablet Oral BID WC    therapeutic multivitamin-minerals  1 tablet Oral Daily    pantoprazole  40 mg Oral Daily    apixaban  5 mg Oral BID AC    insulin lispro  6 Units SubCUTAneous TID WC     Continuous Infusions:   sodium chloride 100 mL/hr at 21 1156    sodium chloride      dextrose       PRN Meds:sodium chloride flush, sodium chloride, ondansetron **OR** ondansetron, polyethylene glycol, acetaminophen **OR** acetaminophen, guaiFENesin-dextromethorphan, albuterol sulfate HFA, calcium carbonate, clotrimazole-betamethasone, glucose, dextrose, glucagon (rDNA), dextrose, sodium chloride    OBJECTIVE:  /68   Pulse 72   Temp 97 °F (36.1 °C) (Oral)   Resp 18   Ht 6' (1.829 m)   Wt 244 lb 9.6 oz (110.9 kg)   SpO2 92%   BMI 33.17 kg/m²   Temp  Av.6 °F (36.4 °C)  Min: 97 °F (36.1 °C)  Max: 97.9 °F (36.6 °C)  Constitutional: The patient is awake, alert, and oriented. O2 by nasal cannula.  2 L. Skin: Warm and dry. No rashes were noted.    HEENT: Round and reactive pupils. Moist mucous membranes. No ulcerations or thrush. Neck: Supple to movements. Chest: No use of accessory muscles to breathe. Symmetrical expansion. Cardiovascular: Heart sounds rhythmic and regular. Abdomen: Positive bowel sounds to auscultation. Benign to palpation. Extremities: No edema. Lines: peripheral    Laboratory and Tests Review:  Lab Results   Component Value Date    WBC 11.3 08/31/2021    WBC 8.4 08/30/2021    WBC 7.6 08/29/2021    HGB 11.9 (L) 08/31/2021    HCT 37.3 08/31/2021    MCV 90.1 08/31/2021     08/31/2021     Lab Results   Component Value Date    NEUTROABS 8.46 (H) 08/31/2021    NEUTROABS 6.16 08/30/2021    NEUTROABS 5.78 08/29/2021     No results found for: [facility]  Lab Results   Component Value Date    ALT 20 08/31/2021    AST 33 08/31/2021    ALKPHOS 109 08/31/2021    BILITOT 0.4 08/31/2021     Lab Results   Component Value Date     08/31/2021    K 4.1 08/31/2021    K 4.2 08/26/2021    CL 99 08/31/2021    CO2 29 08/31/2021    BUN 37 08/31/2021    CREATININE 1.2 08/31/2021    CREATININE 1.2 08/30/2021    CREATININE 1.1 08/29/2021    GFRAA >60 08/31/2021    LABGLOM 57 08/31/2021    GLUCOSE 148 08/31/2021    PROT 5.6 08/31/2021    LABALBU 3.0 08/31/2021    CALCIUM 8.2 08/31/2021    BILITOT 0.4 08/31/2021    ALKPHOS 109 08/31/2021    AST 33 08/31/2021    ALT 20 08/31/2021     Lab Results   Component Value Date    CRP 1.5 (H) 08/29/2021    CRP 3.0 (H) 08/28/2021    CRP 4.9 (H) 08/27/2021     Lab Results   Component Value Date    SEDRATE 23 (H) 08/29/2021     Radiology:      Microbiology:   Blood cultures 8/25/2021: MRSA in 2 of 4 bottles. Staphylococcus epidermidis in 1 of 4 bottles. Blood cultures 8/29/2021: Negative so far  Nares screen MRSA: Pending    No results for input(s): PROCAL in the last 72 hours. ASSESSMENT:  · Polymicrobial bacteremia. Blood cultures turned +2 days after the admission. They both have MRSA and CoNS.   Bacteremia may have been transient or possibly even a contaminant  · SARS-CoV-2 infection with mild to moderate pneumonia. Completed 5 days of Remdesivir    PLAN:  · Continue Vancomycin once daily.   Check trough tomorrow  · Check repeat blood cultures  · Dexamethasone    Spoke with nursing    Ray Rodgers MD  12:01 PM  8/31/2021

## 2021-08-31 NOTE — CARE COORDINATION
CASE MANAGEMENT. .. COVID + 8/23. ID following. Received 2 doses of iv vanc. Off atbs at this time. Repeat blood cx pending. On po decadron. Had + orthostatic bps yesterday. Nursing to reassess orthos today. On 2lnc-weaning as tolerated. Await pcp/id input today. PT 16/24. Anticipate discharge soon. Plan is for patient to return to [facility]. Will cont to follow.

## 2021-08-31 NOTE — PROGRESS NOTES
PRN  sodium chloride, 30 mL, PRN         Objective:    BP (!) 152/78   Pulse 75   Temp 97.5 °F (36.4 °C) (Oral)   Resp 18   Ht 6' (1.829 m)   Wt 254 lb (115.2 kg)   SpO2 94%   BMI 34.45 kg/m²   Skin: warm and dry, no rash or erythema  Pulmonary/Chest: clear to auscultation bilaterally- no wheezes, rales or rhonchi, normal air movement, no respiratory distress  Cardiovascular: rhythm reg at rate of 76  Abdomen: soft, non-tender, non-distended, normal bowel sounds, no masses or organomegaly  Extremities: no cyanosis, no clubbing and no edema      Recent Labs     08/28/21 0427 08/29/21  0630 08/30/21  0954    138 138   K 4.6 4.1 4.0    100 98   CO2 31* 30* 33*   BUN 39* 38* 38*   CREATININE 1.3* 1.1 1.2   GLUCOSE 243* 126* 70*   CALCIUM 8.3* 8.4* 8.4*       Recent Labs     08/28/21 0427 08/29/21  0630 08/30/21  0954   WBC 6.7 7.6 8.4   RBC 4.02 4.30 4.20   HGB 11.8* 12.4* 12.2*   HCT 37.0 38.9 37.5   MCV 92.0 90.5 89.3   MCH 29.4 28.8 29.0   MCHC 31.9* 31.9* 32.5   RDW 14.3 14.3 14.3    167 186   MPV 9.8 9.6 9.4       CBC with Differential:    Lab Results   Component Value Date    WBC 8.4 08/30/2021    RBC 4.20 08/30/2021    HGB 12.2 08/30/2021    HCT 37.5 08/30/2021     08/30/2021    MCV 89.3 08/30/2021    MCH 29.0 08/30/2021    MCHC 32.5 08/30/2021    RDW 14.3 08/30/2021    LYMPHOPCT 14.2 08/30/2021    MONOPCT 11.4 08/30/2021    BASOPCT 0.1 08/30/2021    MONOSABS 0.95 08/30/2021    LYMPHSABS 1.19 08/30/2021    EOSABS 0.00 08/30/2021    BASOSABS 0.01 08/30/2021     CMP:    Lab Results   Component Value Date     08/30/2021    K 4.0 08/30/2021    K 4.2 08/26/2021    CL 98 08/30/2021    CO2 33 08/30/2021    BUN 38 08/30/2021    CREATININE 1.2 08/30/2021    GFRAA >60 08/30/2021    LABGLOM 57 08/30/2021    GLUCOSE 70 08/30/2021    PROT 5.9 08/30/2021    LABALBU 3.1 08/30/2021    CALCIUM 8.4 08/30/2021    BILITOT 0.4 08/30/2021    ALKPHOS 114 08/30/2021    AST 38 08/30/2021    ALT 21 08/30/2021        Radiology:   CT CHEST WO CONTRAST   Final Result   1. Patchy ground-glass opacities throughout both lungs suggesting bilateral   viral pneumonia or pneumonitis. 2. Confluent opacities are present in lung bases likely related to   atelectatic changes. 3. Small bilateral pleural effusions slightly larger on the left. XR CHEST PORTABLE   Final Result   Pulmonary vascular congestive changes. Assessment:    Active Problems:    Acute hypoxemic respiratory failure due to COVID-19 [facility])    Acute respiratory failure with hypoxia ([address]    COVID-19 virus infection    Hyperlipidemia    Pneumonia due to COVID-19 virus  Resolved Problems:    * No resolved hospital problems. *      Plan:  1. Acute respiratory failure with hypoxia(po2 is 67. 1)POA pt being weaned down of oxygen. Encourage activity and IS.   2. Pneumonia due to covid 19   Pt on steroids and s/p remdesivir. 3. Hyperlipidemia continue med  4. Dm type 2 uncontrolled monitor bs and tx with long and short term insulin    5. B/l pleural effusions monitor  6. Hx of blood clots   Continue eliquis  7. Debility/deconditioning. ampac scores reviewed and discussed with nursing who are concerned that he has been requiring a lot more assistance getting up. PT note reviewed. Discussed with pt that he may not be able to back to [place] right away.   8. Positive blood cultures with mrsa  vanc per ID. Repeat bc ordered. 9. LLL infiltrate on ct with possible bacterial pneumonia ID following      bs appear to be trending downward and will adjust insulin tonight. Nursing reports to me that appetite has been diminished  Elevated bp/htn will consider adding low dose norvasc.      Electronically signed by Lawson Phillips DO on 8/30/2021 at 8:14 PM

## 2021-08-31 NOTE — PROGRESS NOTES
[facility] Hospitalist   Progress Note    Admitting Date and Time: 8/25/2021 11:16 AM  Admit Dx: Acute on chronic respiratory failure with hypoxia (HCC) [J96.21]  Acute on chronic congestive heart failure, unspecified heart failure type ([facility] [I50.9]  Acute hypoxemic respiratory failure due to COVID-19 ([facility] [U07.1, J96.01]  COVID-19 [U07.1]    Subjective:    Patient was admitted with Acute on chronic respiratory failure with hypoxia (HCC) [J96.21]  Acute on chronic congestive heart failure, unspecified heart failure type ([facility] [I50.9]  Acute hypoxemic respiratory failure due to COVID-19 ([facility] [U07.1, J96.01]  COVID-19 [U07.1].  Patient denies fever, chills, cp, sob, n/v. Pt c/o dizziness     vancomycin  1,500 mg IntraVENous Q24H    insulin glargine  10 Units SubCUTAneous Nightly    insulin glargine  20 Units SubCUTAneous QAM    insulin lispro  0-6 Units SubCUTAneous TID WC    insulin lispro  0-3 Units SubCUTAneous Nightly    miconazole   Topical BID    sodium chloride flush  5-40 mL IntraVENous 2 times per day    dexamethasone  6 mg Oral Daily    Vitamin D  2,000 Units Oral Daily    ascorbic acid  500 mg Oral BID    zinc sulfate  50 mg Oral Daily    atorvastatin  80 mg Oral Dinner    furosemide  40 mg Oral Daily    gabapentin  200 mg Oral Dinner    ocuvite-lutein  1 tablet Oral BID WC    therapeutic multivitamin-minerals  1 tablet Oral Daily    pantoprazole  40 mg Oral Daily    apixaban  5 mg Oral BID AC     sodium chloride flush, 5-40 mL, PRN  sodium chloride, 25 mL, PRN  ondansetron, 4 mg, Q8H PRN   Or  ondansetron, 4 mg, Q6H PRN  polyethylene glycol, 17 g, Daily PRN  acetaminophen, 650 mg, Q6H PRN   Or  acetaminophen, 650 mg, Q6H PRN  guaiFENesin-dextromethorphan, 5 mL, Q4H PRN  albuterol sulfate HFA, 2 puff, Q6H PRN  calcium carbonate, 1 tablet, Q4H PRN  clotrimazole-betamethasone, , PRN  glucose, 15 g, PRN  dextrose, 12.5 g, PRN  glucagon (rDNA), 1 mg, PRN  dextrose, 100 08/31/2021    K 4.1 08/31/2021    K 4.2 08/26/2021    CL 99 08/31/2021    CO2 29 08/31/2021    BUN 37 08/31/2021    CREATININE 1.2 08/31/2021    GFRAA >60 08/31/2021    LABGLOM 57 08/31/2021    GLUCOSE 148 08/31/2021    PROT 5.6 08/31/2021    LABALBU 3.0 08/31/2021    CALCIUM 8.2 08/31/2021    BILITOT 0.4 08/31/2021    ALKPHOS 109 08/31/2021    AST 33 08/31/2021    ALT 20 08/31/2021        Radiology:   CT CHEST WO CONTRAST   Final Result   1. Patchy ground-glass opacities throughout both lungs suggesting bilateral   viral pneumonia or pneumonitis. 2. Confluent opacities are present in lung bases likely related to   atelectatic changes. 3. Small bilateral pleural effusions slightly larger on the left. XR CHEST PORTABLE   Final Result   Pulmonary vascular congestive changes. XR CHEST PORTABLE    (Results Pending)       Assessment:    Active Problems:    Acute hypoxemic respiratory failure due to COVID-19 [facility])    Acute respiratory failure with hypoxia ([facility]    COVID-19 virus infection    Hyperlipidemia    Pneumonia due to COVID-19 virus  Resolved Problems:    * No resolved hospital problems. *      Plan:  1. Acute respiratory failure with hypoxia(po2 is 67. 1)POA pt being weaned down off of oxygen. Encourage activity and IS.   2. Pneumonia due to covid 19   Pt on steroids and s/p remdesivir. 3. Hyperlipidemia continue med  4. Dm type 2 uncontrolled monitor bs and scale back insulin as glucose trending downward    5. B/l pleural effusions monitor  6. Hx of blood clots   Continue eliquis  7. Debility/deconditioning. ampac scores reviewed and discussed with nursing who are concerned that he has been requiring a lot more assistance getting up. PT note reviewed. Discussed with pt that he may not be able to back to [place] right away.   8. Positive blood cultures with mrsa  vanc per ID. Repeat bc ordered. 9. LLL infiltrate on ct with possible bacterial pneumonia ID following    10.  Orthostatic hypotension orthos ordered due to symptoms and are  Positive   will give iv fluids    Chart reviewed and updated by nursing    Time spent is 35 min    Electronically signed by Kaur Rojo DO on 8/31/2021 at 7:25 PM

## 2021-09-01 LAB
ALBUMIN SERPL-MCNC: 2.8 G/DL (ref 3.5–5.2)
ALP BLD-CCNC: 106 U/L (ref 40–129)
ALT SERPL-CCNC: 22 U/L (ref 0–40)
ANION GAP SERPL CALCULATED.3IONS-SCNC: 7 MMOL/L (ref 7–16)
AST SERPL-CCNC: 35 U/L (ref 0–39)
BASOPHILS ABSOLUTE: 0.02 E9/L (ref 0–0.2)
BASOPHILS RELATIVE PERCENT: 0.1 % (ref 0–2)
BILIRUB SERPL-MCNC: 0.5 MG/DL (ref 0–1.2)
BUN BLDV-MCNC: 37 MG/DL (ref 6–23)
CALCIUM SERPL-MCNC: 8.1 MG/DL (ref 8.6–10.2)
CHLORIDE BLD-SCNC: 103 MMOL/L (ref 98–107)
CO2: 29 MMOL/L (ref 22–29)
CREAT SERPL-MCNC: 1.2 MG/DL (ref 0.7–1.2)
EOSINOPHILS ABSOLUTE: 0.02 E9/L (ref 0.05–0.5)
EOSINOPHILS RELATIVE PERCENT: 0.1 % (ref 0–6)
GFR AFRICAN AMERICAN: >60
GFR NON-AFRICAN AMERICAN: 57 ML/MIN/1.73
GLUCOSE BLD-MCNC: 151 MG/DL (ref 74–99)
HCT VFR BLD CALC: 35.5 % (ref 37–54)
HEMOGLOBIN: 11.4 G/DL (ref 12.5–16.5)
IMMATURE GRANULOCYTES #: 0.16 E9/L
IMMATURE GRANULOCYTES %: 1 % (ref 0–5)
LYMPHOCYTES ABSOLUTE: 1.24 E9/L (ref 1.5–4)
LYMPHOCYTES RELATIVE PERCENT: 7.8 % (ref 20–42)
MCH RBC QN AUTO: 28.9 PG (ref 26–35)
MCHC RBC AUTO-ENTMCNC: 32.1 % (ref 32–34.5)
MCV RBC AUTO: 90.1 FL (ref 80–99.9)
METER GLUCOSE: 138 MG/DL (ref 74–99)
METER GLUCOSE: 188 MG/DL (ref 74–99)
METER GLUCOSE: 196 MG/DL (ref 74–99)
METER GLUCOSE: 305 MG/DL (ref 74–99)
MONOCYTES ABSOLUTE: 1.27 E9/L (ref 0.1–0.95)
MONOCYTES RELATIVE PERCENT: 8 % (ref 2–12)
NEUTROPHILS ABSOLUTE: 13.14 E9/L (ref 1.8–7.3)
NEUTROPHILS RELATIVE PERCENT: 83 % (ref 43–80)
PDW BLD-RTO: 14.4 FL (ref 11.5–15)
PLATELET # BLD: 199 E9/L (ref 130–450)
PMV BLD AUTO: 9.8 FL (ref 7–12)
POTASSIUM SERPL-SCNC: 4.1 MMOL/L (ref 3.5–5)
RBC # BLD: 3.94 E12/L (ref 3.8–5.8)
SODIUM BLD-SCNC: 139 MMOL/L (ref 132–146)
TOTAL PROTEIN: 5.4 G/DL (ref 6.4–8.3)
VANCOMYCIN TROUGH: 16.5 MCG/ML (ref 5–16)
WBC # BLD: 15.9 E9/L (ref 4.5–11.5)

## 2021-09-01 PROCEDURE — 2580000003 HC RX 258: Performed by: SPECIALIST

## 2021-09-01 PROCEDURE — 6360000002 HC RX W HCPCS: Performed by: NURSE PRACTITIONER

## 2021-09-01 PROCEDURE — 99232 SBSQ HOSP IP/OBS MODERATE 35: CPT | Performed by: INTERNAL MEDICINE

## 2021-09-01 PROCEDURE — 6370000000 HC RX 637 (ALT 250 FOR IP): Performed by: NURSE PRACTITIONER

## 2021-09-01 PROCEDURE — 85025 COMPLETE CBC W/AUTO DIFF WBC: CPT

## 2021-09-01 PROCEDURE — 6360000002 HC RX W HCPCS: Performed by: SPECIALIST

## 2021-09-01 PROCEDURE — 6370000000 HC RX 637 (ALT 250 FOR IP): Performed by: INTERNAL MEDICINE

## 2021-09-01 PROCEDURE — 36415 COLL VENOUS BLD VENIPUNCTURE: CPT

## 2021-09-01 PROCEDURE — 80053 COMPREHEN METABOLIC PANEL: CPT

## 2021-09-01 PROCEDURE — 2060000000 HC ICU INTERMEDIATE R&B

## 2021-09-01 PROCEDURE — 2580000003 HC RX 258: Performed by: INTERNAL MEDICINE

## 2021-09-01 PROCEDURE — 82962 GLUCOSE BLOOD TEST: CPT

## 2021-09-01 PROCEDURE — 97530 THERAPEUTIC ACTIVITIES: CPT

## 2021-09-01 PROCEDURE — 80202 ASSAY OF VANCOMYCIN: CPT

## 2021-09-01 RX ORDER — SODIUM CHLORIDE 9 MG/ML
25 INJECTION, SOLUTION INTRAVENOUS PRN
Status: DISCONTINUED | OUTPATIENT
Start: 2021-09-01 | End: 2021-09-03 | Stop reason: HOSPADM

## 2021-09-01 RX ORDER — SODIUM CHLORIDE 0.9 % (FLUSH) 0.9 %
5-40 SYRINGE (ML) INJECTION PRN
Status: DISCONTINUED | OUTPATIENT
Start: 2021-09-01 | End: 2021-09-03 | Stop reason: HOSPADM

## 2021-09-01 RX ORDER — HEPARIN SODIUM (PORCINE) LOCK FLUSH IV SOLN 100 UNIT/ML 100 UNIT/ML
3 SOLUTION INTRAVENOUS PRN
Status: DISCONTINUED | OUTPATIENT
Start: 2021-09-01 | End: 2021-09-03 | Stop reason: HOSPADM

## 2021-09-01 RX ORDER — HEPARIN SODIUM (PORCINE) LOCK FLUSH IV SOLN 100 UNIT/ML 100 UNIT/ML
3 SOLUTION INTRAVENOUS EVERY 12 HOURS SCHEDULED
Status: DISCONTINUED | OUTPATIENT
Start: 2021-09-01 | End: 2021-09-03 | Stop reason: HOSPADM

## 2021-09-01 RX ORDER — SODIUM CHLORIDE 0.9 % (FLUSH) 0.9 %
5-40 SYRINGE (ML) INJECTION EVERY 12 HOURS SCHEDULED
Status: DISCONTINUED | OUTPATIENT
Start: 2021-09-01 | End: 2021-09-03 | Stop reason: HOSPADM

## 2021-09-01 RX ORDER — LIDOCAINE HYDROCHLORIDE 10 MG/ML
5 INJECTION, SOLUTION EPIDURAL; INFILTRATION; INTRACAUDAL; PERINEURAL ONCE
Status: COMPLETED | OUTPATIENT
Start: 2021-09-01 | End: 2021-09-02

## 2021-09-01 RX ADMIN — SODIUM CHLORIDE, PRESERVATIVE FREE 10 ML: 5 INJECTION INTRAVENOUS at 20:31

## 2021-09-01 RX ADMIN — INSULIN GLARGINE 20 UNITS: 100 INJECTION, SOLUTION SUBCUTANEOUS at 08:19

## 2021-09-01 RX ADMIN — Medication 1 TABLET: at 08:03

## 2021-09-01 RX ADMIN — INSULIN LISPRO 1 UNITS: 100 INJECTION, SOLUTION INTRAVENOUS; SUBCUTANEOUS at 16:19

## 2021-09-01 RX ADMIN — MICONAZOLE NITRATE: 20 POWDER TOPICAL at 08:05

## 2021-09-01 RX ADMIN — Medication 1 TABLET: at 08:04

## 2021-09-01 RX ADMIN — SODIUM CHLORIDE: 9 INJECTION, SOLUTION INTRAVENOUS at 05:58

## 2021-09-01 RX ADMIN — Medication 1 TABLET: at 15:46

## 2021-09-01 RX ADMIN — PANTOPRAZOLE SODIUM 40 MG: 40 TABLET, DELAYED RELEASE ORAL at 08:05

## 2021-09-01 RX ADMIN — DEXAMETHASONE 6 MG: 4 TABLET ORAL at 08:04

## 2021-09-01 RX ADMIN — Medication 2000 UNITS: at 08:03

## 2021-09-01 RX ADMIN — ACETAMINOPHEN 650 MG: 325 TABLET ORAL at 08:07

## 2021-09-01 RX ADMIN — APIXABAN 5 MG: 5 TABLET, FILM COATED ORAL at 05:51

## 2021-09-01 RX ADMIN — ATORVASTATIN CALCIUM 80 MG: 40 TABLET, FILM COATED ORAL at 15:46

## 2021-09-01 RX ADMIN — SODIUM CHLORIDE: 9 INJECTION, SOLUTION INTRAVENOUS at 22:01

## 2021-09-01 RX ADMIN — APIXABAN 5 MG: 5 TABLET, FILM COATED ORAL at 15:46

## 2021-09-01 RX ADMIN — INSULIN LISPRO 1 UNITS: 100 INJECTION, SOLUTION INTRAVENOUS; SUBCUTANEOUS at 11:47

## 2021-09-01 RX ADMIN — MICONAZOLE NITRATE: 20 POWDER TOPICAL at 20:33

## 2021-09-01 RX ADMIN — VANCOMYCIN HYDROCHLORIDE 1500 MG: 10 INJECTION, POWDER, LYOPHILIZED, FOR SOLUTION INTRAVENOUS at 15:47

## 2021-09-01 RX ADMIN — ACETAMINOPHEN 650 MG: 325 TABLET ORAL at 21:22

## 2021-09-01 RX ADMIN — Medication 500 MG: at 20:27

## 2021-09-01 RX ADMIN — INSULIN LISPRO 2 UNITS: 100 INJECTION, SOLUTION INTRAVENOUS; SUBCUTANEOUS at 21:54

## 2021-09-01 RX ADMIN — INSULIN GLARGINE 10 UNITS: 100 INJECTION, SOLUTION SUBCUTANEOUS at 21:53

## 2021-09-01 RX ADMIN — GABAPENTIN 200 MG: 100 CAPSULE ORAL at 15:46

## 2021-09-01 RX ADMIN — ZINC SULFATE 220 MG (50 MG) CAPSULE 50 MG: CAPSULE at 08:03

## 2021-09-01 RX ADMIN — Medication 500 MG: at 08:04

## 2021-09-01 ASSESSMENT — PAIN SCALES - GENERAL
PAINLEVEL_OUTOF10: 0
PAINLEVEL_OUTOF10: 6
PAINLEVEL_OUTOF10: 4
PAINLEVEL_OUTOF10: 0

## 2021-09-01 NOTE — PROGRESS NOTES
Lindsay Goodman Hospitalist   Progress Note    Admitting Date and Time: 8/25/2021 11:16 AM  Admit Dx: Acute on chronic respiratory failure with hypoxia (HCC) [J96.21]  Acute on chronic congestive heart failure, unspecified heart failure type ([facility] [I50.9]  Acute hypoxemic respiratory failure due to COVID-19 ([facility] [U07.1, J96.01]  COVID-19 [U07.1]    Subjective:    Patient was admitted with Acute on chronic respiratory failure with hypoxia (HCC) [J96.21]  Acute on chronic congestive heart failure, unspecified heart failure type ([facility] [I50.9]  Acute hypoxemic respiratory failure due to COVID-19 ([facility] [U07.1, J96.01]  COVID-19 [U07.1]. Patient denies fever, chills, cp, sob, n/v.  Pt with some dizziness      lidocaine PF  5 mL IntraDERmal Once    sodium chloride flush  5-40 mL IntraVENous 2 times per day    heparin flush  3 mL IntraVENous 2 times per day    vancomycin  1,500 mg IntraVENous Q24H    insulin glargine  10 Units SubCUTAneous Nightly    insulin glargine  20 Units SubCUTAneous QAM    insulin lispro  0-6 Units SubCUTAneous TID WC    insulin lispro  0-3 Units SubCUTAneous Nightly    miconazole   Topical BID    sodium chloride flush  5-40 mL IntraVENous 2 times per day    dexamethasone  6 mg Oral Daily    Vitamin D  2,000 Units Oral Daily    ascorbic acid  500 mg Oral BID    zinc sulfate  50 mg Oral Daily    atorvastatin  80 mg Oral Dinner    gabapentin  200 mg Oral Dinner    ocuvite-lutein  1 tablet Oral BID WC    therapeutic multivitamin-minerals  1 tablet Oral Daily    pantoprazole  40 mg Oral Daily    apixaban  5 mg Oral BID AC     sodium chloride flush, 5-40 mL, PRN  sodium chloride, 25 mL, PRN  heparin flush, 3 mL, PRN  sodium chloride flush, 5-40 mL, PRN  sodium chloride, 25 mL, PRN  ondansetron, 4 mg, Q8H PRN   Or  ondansetron, 4 mg, Q6H PRN  polyethylene glycol, 17 g, Daily PRN  acetaminophen, 650 mg, Q6H PRN   Or  acetaminophen, 650 mg, Q6H PRN  guaiFENesin-dextromethorphan, 5 mL, Q4H PRN  albuterol sulfate HFA, 2 puff, Q6H PRN  calcium carbonate, 1 tablet, Q4H PRN  clotrimazole-betamethasone, , PRN  glucose, 15 g, PRN  dextrose, 12.5 g, PRN  glucagon (rDNA), 1 mg, PRN  dextrose, 100 mL/hr, PRN  sodium chloride, 30 mL, PRN         Objective:    /68   Pulse 82   Temp 98.3 °F (36.8 °C) (Oral)   Resp 18   Ht 6' (1.829 m)   Wt 244 lb 9.6 oz (110.9 kg)   SpO2 93%   BMI 33.17 kg/m²   Skin: warm and dry, no rash or erythema  Pulmonary/Chest: clear to auscultation bilaterally- no wheezes, rales or rhonchi, normal air movement, no respiratory distress  Cardiovascular: rhythm reg at rate of 84  Abdomen: soft, non-tender, non-distended, normal bowel sounds, no masses or organomegaly  Extremities: no cyanosis, no clubbing and pos for 1+ b/l le edema      Recent Labs     08/30/21  0954 08/31/21  1034 09/01/21  1028    136 139   K 4.0 4.1 4.1   CL 98 99 103   CO2 33* 29 29   BUN 38* 37* 37*   CREATININE 1.2 1.2 1.2   GLUCOSE 70* 148* 151*   CALCIUM 8.4* 8.2* 8.1*       Recent Labs     08/30/21  0954 08/31/21  1034 09/01/21  1028   WBC 8.4 11.3 15.9*   RBC 4.20 4.14 3.94   HGB 12.2* 11.9* 11.4*   HCT 37.5 37.3 35.5*   MCV 89.3 90.1 90.1   MCH 29.0 28.7 28.9   MCHC 32.5 31.9* 32.1   RDW 14.3 14.3 14.4    200 199   MPV 9.4 9.7 9.8       CBC with Differential:    Lab Results   Component Value Date    WBC 15.9 09/01/2021    RBC 3.94 09/01/2021    HGB 11.4 09/01/2021    HCT 35.5 09/01/2021     09/01/2021    MCV 90.1 09/01/2021    MCH 28.9 09/01/2021    MCHC 32.1 09/01/2021    RDW 14.4 09/01/2021    LYMPHOPCT 7.8 09/01/2021    MONOPCT 8.0 09/01/2021    BASOPCT 0.1 09/01/2021    MONOSABS 1.27 09/01/2021    LYMPHSABS 1.24 09/01/2021    EOSABS 0.02 09/01/2021    BASOSABS 0.02 09/01/2021     CMP:    Lab Results   Component Value Date     09/01/2021    K 4.1 09/01/2021    K 4.2 08/26/2021     09/01/2021    CO2 29 09/01/2021    BUN 37 09/01/2021    CREATININE 1.2 09/01/2021    GFRAA >60 09/01/2021    LABGLOM 57 09/01/2021    GLUCOSE 151 09/01/2021    PROT 5.4 09/01/2021    LABALBU 2.8 09/01/2021    CALCIUM 8.1 09/01/2021    BILITOT 0.5 09/01/2021    ALKPHOS 106 09/01/2021    AST 35 09/01/2021    ALT 22 09/01/2021        Radiology:   XR CHEST PORTABLE   Final Result   Partial interval clearing of the previously noted multifocal ground-glass   infiltrates. Trace left pleural effusion. CT CHEST WO CONTRAST   Final Result   1. Patchy ground-glass opacities throughout both lungs suggesting bilateral   viral pneumonia or pneumonitis. 2. Confluent opacities are present in lung bases likely related to   atelectatic changes. 3. Small bilateral pleural effusions slightly larger on the left. XR CHEST PORTABLE   Final Result   Pulmonary vascular congestive changes. Assessment:    Active Problems:    Acute hypoxemic respiratory failure due to COVID-19 [facility])    Acute respiratory failure with hypoxia (HCC)    COVID-19 virus infection    Hyperlipidemia    Pneumonia due to COVID-19 virus    Orthostatic hypotension  Resolved Problems:    * No resolved hospital problems. *      Plan:  1. Acute respiratory failure with hypoxia(po2 is 67. 1)POA pt being weaned down off of oxygen. Encourage activity and IS.   2. Pneumonia due to covid 19   Pt on steroids and s/p remdesivir. 3. Hyperlipidemia continue med  4. Dm type 2 uncontrolled monitor bs and scale back insulin as glucose trending downward    5. B/l pleural effusions monitor  6. Hx of blood clots   Continue eliquis  7. Debility/deconditioning. ampac scores reviewed and discussed with nursing who are concerned that he has been requiring a lot more assistance getting up. PT note reviewed. Discussed with pt that he may not be able to back to [place] right away.   8. Bacteremia/Positive blood cultures with mrsa  vanc per ID. Pt to have picc for further abx. 9.  LLL infiltrate on ct with possible bacterial pneumonia ID following   10.  Orthostatic hypotension orthos ordered due to symptoms and are still positive continue iv fluids        Electronically signed by Hermelinda Kam DO on 9/1/2021 at 6:53 PM

## 2021-09-01 NOTE — CARE COORDINATION
Social Work/Discharge Planning:  Referral made to Ana Laura Cade with [facility] and she states she will review patient information. Ana Laura Cade states facility only has shared rooms available. Notified patient and he states he prefers [facility].  Referral made to Alex Rodriguez with [facility] and she will review patient information. Will continue to follow.   Electronically signed by ELKE Marie on 9/1/2021 at 3:26 PM

## 2021-09-01 NOTE — PROGRESS NOTES
[address] [place] [address] [facility]  Progress Note    SUBJECTIVE:  Chief Complaint   Patient presents with    Flank Pain    Back Pain     The patient is sitting up in bed. On room air today  Tolerating antibiotics     Review of systems:  As stated above in the chief complaint, otherwise negative. Medications:  Scheduled Meds:   vancomycin  1,500 mg IntraVENous Q24H    insulin glargine  10 Units SubCUTAneous Nightly    insulin glargine  20 Units SubCUTAneous QAM    insulin lispro  0-6 Units SubCUTAneous TID WC    insulin lispro  0-3 Units SubCUTAneous Nightly    miconazole   Topical BID    sodium chloride flush  5-40 mL IntraVENous 2 times per day    dexamethasone  6 mg Oral Daily    Vitamin D  2,000 Units Oral Daily    ascorbic acid  500 mg Oral BID    zinc sulfate  50 mg Oral Daily    atorvastatin  80 mg Oral Dinner    gabapentin  200 mg Oral Dinner    ocuvite-lutein  1 tablet Oral BID WC    therapeutic multivitamin-minerals  1 tablet Oral Daily    pantoprazole  40 mg Oral Daily    apixaban  5 mg Oral BID AC     Continuous Infusions:   sodium chloride 100 mL/hr at 21 0558    sodium chloride      dextrose       PRN Meds:sodium chloride flush, sodium chloride, ondansetron **OR** ondansetron, polyethylene glycol, acetaminophen **OR** acetaminophen, guaiFENesin-dextromethorphan, albuterol sulfate HFA, calcium carbonate, clotrimazole-betamethasone, glucose, dextrose, glucagon (rDNA), dextrose, sodium chloride    OBJECTIVE:  /68   Pulse 82   Temp 97 °F (36.1 °C) (Oral)   Resp 18   Ht 6' (1.829 m)   Wt 244 lb 9.6 oz (110.9 kg)   SpO2 93%   BMI 33.17 kg/m²   Temp  Av.6 °F (36.4 °C)  Min: 97 °F (36.1 °C)  Max: 98 °F (36.7 °C)  Constitutional: The patient is awake, alert, and oriented. In no distress. On room air. Skin: Warm and dry. No rashes were noted. HEENT: Round and reactive pupils. Moist mucous membranes. No ulcerations or thrush.   Neck: Supple to movements. Chest: No use of accessory muscles to breathe. Symmetrical expansion. Cardiovascular: Heart sounds rhythmic and regular. Abdomen: Positive bowel sounds to auscultation. Benign to palpation. Extremities: No edema. Lines: peripheral    Laboratory and Tests Review:  Lab Results   Component Value Date    WBC 15.9 (H) 09/01/2021    WBC 11.3 08/31/2021    WBC 8.4 08/30/2021    HGB 11.4 (L) 09/01/2021    HCT 35.5 (L) 09/01/2021    MCV 90.1 09/01/2021     09/01/2021     Lab Results   Component Value Date    NEUTROABS 13.14 (H) 09/01/2021    NEUTROABS 8.46 (H) 08/31/2021    NEUTROABS 6.16 08/30/2021     No results found for: [facility]  Lab Results   Component Value Date    ALT 22 09/01/2021    AST 35 09/01/2021    ALKPHOS 106 09/01/2021    BILITOT 0.5 09/01/2021     Lab Results   Component Value Date     09/01/2021    K 4.1 09/01/2021    K 4.2 08/26/2021     09/01/2021    CO2 29 09/01/2021    BUN 37 09/01/2021    CREATININE 1.2 09/01/2021    CREATININE 1.2 08/31/2021    CREATININE 1.2 08/30/2021    GFRAA >60 09/01/2021    LABGLOM 57 09/01/2021    GLUCOSE 151 09/01/2021    PROT 5.4 09/01/2021    LABALBU 2.8 09/01/2021    CALCIUM 8.1 09/01/2021    BILITOT 0.5 09/01/2021    ALKPHOS 106 09/01/2021    AST 35 09/01/2021    ALT 22 09/01/2021     Lab Results   Component Value Date    CRP 1.5 (H) 08/29/2021    CRP 3.0 (H) 08/28/2021    CRP 4.9 (H) 08/27/2021     Lab Results   Component Value Date    SEDRATE 23 (H) 08/29/2021     Radiology:      Microbiology:   Blood cultures 8/25/2021: MRSA in 2 of 4 bottles. Staphylococcus epidermidis in 1 of 4 bottles. Blood cultures 8/29/2021: Negative so far  Nares screen MRSA: Pending    No results for input(s): PROCAL in the last 72 hours. ASSESSMENT:  · Polymicrobial bacteremia. Blood cultures turned +2 days after the admission. They both have MRSA and CoNS.   Bacteremia may have been transient or possibly even a contaminant  · SARS-CoV-2 infection with mild to moderate pneumonia. Completed 5 days of Remdesivir    PLAN:  · Continue Vancomycin once daily. Check trough today  · Check repeat blood cultures - negative so far  · Dexamethasone 6mg PO daily     SUSANNA Banerjee - CNP  1:04 PM  9/1/2021    Patient seen and examined. I had a face to face encounter with the patient. Agree with exam.  Assessment and plan as outlined above and directed by me. Addition and corrections were done as deemed appropriate. My exam and plan include: Patient is doing well. He is currently off oxygen. Complaining of hypotension and being somewhat lightheaded. He is tolerating antibiotics. He will need a PICC for IV antibiotics for at least another 10 days. Informed Consent for PICC:  I explained the risks, benefits, alternative options, and potential complications associated with insertion of Peripherally Inserted Central Catheter (PICC) with the patient prior to the procedure.     Electronically signed by Mariama Colunga MD on 9/1/2021 at 1:20 PM     Mariama Colunga MD  9/1/2021  1:20 PM

## 2021-09-01 NOTE — DISCHARGE INSTR - COC
Continuity of Care Form    Patient Name: Halina Velazquez. :  1932  MRN:  [id number]    Admit date:  2021  Discharge date:  21    Code Status Order: DNR-CCA   Advance Directives:     Admitting Physician:  Patricia Butts MD  PCP: Joi Courtney MD    Discharging Nurse: [facility] Unit/Room#: 4973/9617-H  Discharging Unit Phone Number: [phone]    Emergency Contact:   Extended Emergency Contact Information  Primary Emergency Contact: Javan Thomas [address] Phone: [phone]  Relation: Child  Secondary Emergency Contact: Claude Lizarraga [place] [address] Phone: [phone]  Relation: Child    Past Surgical History:  Past Surgical History:   Procedure Laterality Date    MASTECTOMY      TOE AMPUTATION      TOE SURGERY      TONSILLECTOMY         Immunization History:   Immunization History   Administered Date(s) Administered    Influenza Virus Vaccine 10/23/2016    Pneumococcal Conjugate Vaccine 2013    Td, unspecified formulation 2012       Active Problems:  Patient Active Problem List   Diagnosis Code    HTN (hypertension) I10    Breast cancer, male ([facility] C50.929    Obesity (BMI 30.0-34. 9) E66.9    Right hip pain M25.551    Diabetes mellitus type 2, uncontrolled ([facility]) E11.65    Essential hypertension, benign I10    Hyperlipidemia with target LDL less than 100 E78.5    Dizzy spells R42    Troponin level elevated R77.8    Shortness of breath R06.02    Chest pressure R07.89    Acute respiratory insufficiency R06.89    Pneumonia J18.9    History of pulmonary embolus (PE) Z86.711    Acute left-sided CHF (congestive heart failure) ([facility]) I50.1    Type 1 diabetes with stage 3 chronic kidney disease moderate GFR 30-59 ([facility]) E10.22, N18.30    Cellulitis and abscess of right lower extremity L03.115, L02.415    Cellulitis and abscess of left lower extremity L03.116, L02.416    Chronic venous insufficiency of lower extremity I87.2  Acute hypoxemic respiratory failure due to COVID-19 ([facility] [address] U07.1, J96.01    Acute on chronic respiratory failure with hypoxia (HCC) J96.21    Acute respiratory failure with hypoxia (HCC) J96.01    COVID-19 virus infection U07.1    Hyperlipidemia E78.5    Pneumonia due to COVID-19 virus U07.1, J12.82    Orthostatic hypotension I95.1       Isolation/Infection:   Isolation          Droplet Plus        Patient Infection Status     Infection Onset Added Last Indicated Last Indicated By Review Planned Expiration Resolved Resolved By    MRSA 08/25/21 08/29/21 08/25/21 Culture, Blood 2        MRSA blood 8/25/2021    COVID-19 08/23/21 08/24/21 08/23/21 Covid-19 Ambulatory 08/31/21 09/06/21      Resolved    COVID-19 Rule Out 03/30/21 03/30/21 03/30/21 COVID-19, Rapid (Ordered)   03/30/21 Rule-Out Test Resulted    COVID-19 Rule Out 10/16/20 10/16/20 10/16/20 Covid-19 Ambulatory (Ordered)   10/17/20 Rule-Out Test Resulted    COVID-19 Rule Out 09/18/20 09/18/20 09/18/20 Covid-19 Ambulatory (Ordered)   09/20/20 Rule-Out Test Resulted    COVID-19 Rule Out 08/12/20 08/12/20 08/12/20 Covid-19 Ambulatory (Ordered)   08/14/20 Rule-Out Test Resulted    COVID-19 Rule Out 07/28/20 07/29/20 07/28/20 Covid-19 Ambulatory (Ordered)   07/30/20 Rule-Out Test Resulted          Nurse Assessment:  Last Vital Signs: /68   Pulse 82   Temp 97 °F (36.1 °C) (Oral)   Resp 18   Ht 6' (1.829 m)   Wt 244 lb 9.6 oz (110.9 kg)   SpO2 93%   BMI 33.17 kg/m²     Last documented pain score (0-10 scale): Pain Level: 4  Last Weight:   Wt Readings from Last 1 Encounters:   08/31/21 244 lb 9.6 oz (110.9 kg)     Mental Status:  oriented, alert and able to concentrate and follow conversation    IV Access:  - PICC - site  left brachial, insertion date: 9/2/21    Nursing Mobility/ADLs:  Walking   Assisted  Transfer  Assisted  Bathing  Assisted  Dressing  Assisted  Toileting  Assisted  Feeding  Independent  Med Admin  Assisted  Med Delivery whole    Wound Care Documentation and Therapy:        Elimination:  Continence:   · Bowel: Yes/accidents at times  · Bladder: Yes/accidents at times  Urinary Catheter: None   Colostomy/Ileostomy/Ileal Conduit: No       Date of Last BM: 9/2/21    Intake/Output Summary (Last 24 hours) at 9/1/2021 1327  Last data filed at 9/1/2021 1137  Gross per 24 hour   Intake 360 ml   Output 1800 ml   Net -1440 ml     I/O last 3 completed shifts: In: 180 [P.O.:180]  Out: 1200 [Urine:1200]    Safety Concerns: At Risk for Falls    Impairments/Disabilities:      Hearing    Nutrition Therapy:  Current Nutrition Therapy:   - Oral Diet:  Carb Control 5 carbs/meal (2000kcals/day), Low Sodium (3-4gm) and low calorie/highprotein oral supplement /aldult oralsupplement ,Gelatein    Routes of Feeding: Oral  Liquids: Thin Liquids  Daily Fluid Restriction: no  Last Modified Barium Swallow with Video (Video Swallowing Test): not done    Treatments at the Time of Hospital Discharge:   Respiratory Treatments:albuterol PRN  Oxygen Therapy:  is not on home oxygen therapy. Ventilator:    - No ventilator support    Rehab Therapies: Physical Therapy and Occupational Therapy  Weight Bearing Status/Restrictions: No weight bearing restirctions  Other Medical Equipment (for information only, NOT a DME order):  walker, bedside commode and hospital bed  Other Treatments:     Patient's personal belongings (please select all that are sent with patient):  Glasses.  Phone ,  , partials , 3 bags of clothes ,slippers,wallet    RN SIGNATURE:  Electronically signed by Lili Vargas RN on 9/2/21 at 5:28 PM EDT    CASE MANAGEMENT/SOCIAL WORK SECTION    Inpatient Status Date: 8/25/2021    Readmission Risk Assessment Score:  Readmission Risk              Risk of Unplanned Readmission:  29           Discharging to Facility/ Agency   · Name: [facility]   · Address: [address] [place], [place] [address]megan Lange, S.E., [facility], [address]  · Phone: [phone]  · Fax: [phone]    Dialysis Facility (if applicable)   · Name:  · Address:  · Dialysis Schedule:  · Phone:  · Fax:    / signature: Electronically signed by ELKE Cullen on 9/1/21 at 1:27 PM EDT    PHYSICIAN SECTION    Prognosis: Good    Condition at Discharge: Stable    Rehab Potential (if transferring to Rehab): Good    Recommended Labs or Other Treatments After Discharge: ***    Physician Certification: I certify the above information and transfer of Luis Jones.  is necessary for the continuing treatment of the diagnosis listed and that he requires [facility] for less 30 days.      Update Admission H&P: {CHP DME Changes in [facility]:[id number]}    PHYSICIAN SIGNATURE:  Nico Laird MD

## 2021-09-01 NOTE — PROGRESS NOTES
Physical Therapy  Facility/Department: [address] INTERMEDIATE 1  Daily Treatment Note  NAME: Caesar Romero. : 1932  MRN: [id number]    Date of Service: 2021    Patient Diagnosis(es): The primary encounter diagnosis was Acute on chronic respiratory failure with hypoxia ([facility] [address] [address] Diagnoses of Acute on chronic congestive heart failure, unspecified heart failure type ([facility] [address] and COVID-19 were also pertinent to this visit. has a past medical history of Cancer ([facility] [address] Diabetes mellitus ([address] History of lumpectomy, Hx of blood clots, Hyperlipidemia, PE (pulmonary thromboembolism) ([facility] [address] and Staph aureus infection. has a past surgical history that includes Mastectomy; Toe Surgery; Toe amputation; and Tonsillectomy. Evaluating Therapist: Patricia Fox PT        Room #:  0430/0430-A  Diagnosis:  Acute on chronic respiratory failure with hypoxia ([facility] [address] [J96.21]  Acute on chronic congestive heart failure, unspecified heart failure type ([facility] [address] [facility] [I50.9]  Acute hypoxemic respiratory failure due to COVID-19 ([facility]) [U07.1, J96.01]  COVID-19 [U07.1]  PMHx/PSHx:  CA, DM, CHF  Precautions:  Falls, O2, droplet plus isolation        Social:  Pt from [facility]. Ambulates with swivel wheeled walker.           Initial Evaluation  Date: 21 Treatment     21 Short Term/ Long Term   Goals   Was pt agreeable to Eval/treatment? yes yes      Does pt have pain? No c/o pain No c/o pain      Bed Mobility  Rolling: min assist  Supine to sit: min assist  Sit to supine: NT  Scooting: min assist  supine to sit mod  Sit to supine NT  Scooting seated edge of bed min assist SBA   Transfers Sit <> stand min assist from bed  Mod assist from commode and lower chair.   Sit <> stand mod asssit SBA   Ambulation    40 feet and 25 feet x2 with ww with min assist  3 feet with ww bed to chair min assist 75 feet with ww with SBA   Stair Negotiation  Ascended and descended  NT    N/A   LE strength     4-/5     4/5   balance      Fair with ww     AM-PAC Raw score               16/24 14/24          Patient education  Pt was educated on safety with transfers    Patient response to education:   Pt verbalized understanding Pt demonstrated skill Pt requires further education in this area   yes With assist yes     Additional Comments/treatment: Pt in bed and agreeable to PT session. Per nursing pt has been orthostatic. Pt assisted to sit up to edge of bed. C/o dizziness feeling like he is in \"twilight\" Pt sat edge of bed x10 minutes. States he was beginning to feel better. Instructed on safety with transfers, pt able to take a few steps bed to chair with ww but limited by dizziness. Reviewed use of incentive spirometer with pt. Pt was left in chair with call light left by patient. Chair/bed alarm: yes    Time in: 100  Time out: 125    Total treatment time 25 minutes    Pt is making  progress toward established Physical Therapy goals. Continue with physical therapy current plan of care.     Saima PT [id number]      CPT codes:  [] Low Complexity PT evaluation 78805  [] Moderate Complexity PT evaluation 82826  [] High Complexity PT evaluation 52410  [] PT Re-evaluation 87076  [] Gait training 36218  minutes  [] Manual therapy 87832    minutes  [x] Therapeutic activities 01103  25  minutes  [] Therapeutic exercises 39083     minutes  [] Neuromuscular reeducation 57047     minutes

## 2021-09-02 LAB
ALBUMIN SERPL-MCNC: 2.9 G/DL (ref 3.5–5.2)
ALP BLD-CCNC: 107 U/L (ref 40–129)
ALT SERPL-CCNC: 22 U/L (ref 0–40)
ANION GAP SERPL CALCULATED.3IONS-SCNC: 7 MMOL/L (ref 7–16)
AST SERPL-CCNC: 32 U/L (ref 0–39)
BILIRUB SERPL-MCNC: 0.4 MG/DL (ref 0–1.2)
BUN BLDV-MCNC: 40 MG/DL (ref 6–23)
CALCIUM SERPL-MCNC: 8.1 MG/DL (ref 8.6–10.2)
CHLORIDE BLD-SCNC: 99 MMOL/L (ref 98–107)
CO2: 28 MMOL/L (ref 22–29)
CREAT SERPL-MCNC: 1.1 MG/DL (ref 0.7–1.2)
GFR AFRICAN AMERICAN: >60
GFR NON-AFRICAN AMERICAN: >60 ML/MIN/1.73
GLUCOSE BLD-MCNC: 269 MG/DL (ref 74–99)
METER GLUCOSE: 164 MG/DL (ref 74–99)
METER GLUCOSE: 164 MG/DL (ref 74–99)
METER GLUCOSE: 246 MG/DL (ref 74–99)
METER GLUCOSE: 259 MG/DL (ref 74–99)
POTASSIUM SERPL-SCNC: 4.1 MMOL/L (ref 3.5–5)
SODIUM BLD-SCNC: 134 MMOL/L (ref 132–146)
TOTAL PROTEIN: 5.4 G/DL (ref 6.4–8.3)

## 2021-09-02 PROCEDURE — 2580000003 HC RX 258: Performed by: SPECIALIST

## 2021-09-02 PROCEDURE — 76937 US GUIDE VASCULAR ACCESS: CPT

## 2021-09-02 PROCEDURE — 2580000003 HC RX 258: Performed by: NURSE PRACTITIONER

## 2021-09-02 PROCEDURE — 2580000003 HC RX 258: Performed by: INTERNAL MEDICINE

## 2021-09-02 PROCEDURE — 36569 INSJ PICC 5 YR+ W/O IMAGING: CPT

## 2021-09-02 PROCEDURE — 6370000000 HC RX 637 (ALT 250 FOR IP): Performed by: INTERNAL MEDICINE

## 2021-09-02 PROCEDURE — 36415 COLL VENOUS BLD VENIPUNCTURE: CPT

## 2021-09-02 PROCEDURE — 6360000002 HC RX W HCPCS: Performed by: NURSE PRACTITIONER

## 2021-09-02 PROCEDURE — 02HV33Z INSERTION OF INFUSION DEVICE INTO SUPERIOR VENA CAVA, PERCUTANEOUS APPROACH: ICD-10-PCS | Performed by: INTERNAL MEDICINE

## 2021-09-02 PROCEDURE — 82962 GLUCOSE BLOOD TEST: CPT

## 2021-09-02 PROCEDURE — 99239 HOSP IP/OBS DSCHRG MGMT >30: CPT | Performed by: INTERNAL MEDICINE

## 2021-09-02 PROCEDURE — C1751 CATH, INF, PER/CENT/MIDLINE: HCPCS

## 2021-09-02 PROCEDURE — 2060000000 HC ICU INTERMEDIATE R&B

## 2021-09-02 PROCEDURE — 6360000002 HC RX W HCPCS: Performed by: SPECIALIST

## 2021-09-02 PROCEDURE — 80053 COMPREHEN METABOLIC PANEL: CPT

## 2021-09-02 PROCEDURE — 2500000003 HC RX 250 WO HCPCS: Performed by: SPECIALIST

## 2021-09-02 PROCEDURE — 6370000000 HC RX 637 (ALT 250 FOR IP): Performed by: NURSE PRACTITIONER

## 2021-09-02 RX ORDER — DEXAMETHASONE 6 MG/1
6 TABLET ORAL DAILY
Qty: 2 TABLET | Refills: 0 | DISCHARGE
Start: 2021-09-03 | End: 2021-09-05

## 2021-09-02 RX ORDER — GUAIFENESIN/DEXTROMETHORPHAN 100-10MG/5
5 SYRUP ORAL EVERY 4 HOURS PRN
Qty: 120 ML | DISCHARGE
Start: 2021-09-02 | End: 2021-09-12

## 2021-09-02 RX ORDER — INSULIN GLARGINE 100 [IU]/ML
20 INJECTION, SOLUTION SUBCUTANEOUS NIGHTLY
Status: DISCONTINUED | OUTPATIENT
Start: 2021-09-02 | End: 2021-09-03 | Stop reason: HOSPADM

## 2021-09-02 RX ORDER — ASCORBIC ACID 500 MG
500 TABLET ORAL 2 TIMES DAILY
Qty: 30 TABLET | Refills: 0 | DISCHARGE
Start: 2021-09-02

## 2021-09-02 RX ORDER — CHOLECALCIFEROL (VITAMIN D3) 50 MCG
2000 TABLET ORAL DAILY
Qty: 60 TABLET | Refills: 0 | DISCHARGE
Start: 2021-09-03

## 2021-09-02 RX ADMIN — INSULIN LISPRO 3 UNITS: 100 INJECTION, SOLUTION INTRAVENOUS; SUBCUTANEOUS at 06:25

## 2021-09-02 RX ADMIN — LIDOCAINE HYDROCHLORIDE 1.5 ML: 10 INJECTION, SOLUTION EPIDURAL; INFILTRATION; INTRACAUDAL; PERINEURAL at 16:00

## 2021-09-02 RX ADMIN — Medication 20 ML: at 16:10

## 2021-09-02 RX ADMIN — Medication 300 UNITS: at 20:59

## 2021-09-02 RX ADMIN — INSULIN LISPRO 1 UNITS: 100 INJECTION, SOLUTION INTRAVENOUS; SUBCUTANEOUS at 17:25

## 2021-09-02 RX ADMIN — Medication 1 TABLET: at 17:03

## 2021-09-02 RX ADMIN — INSULIN GLARGINE 20 UNITS: 100 INJECTION, SOLUTION SUBCUTANEOUS at 21:11

## 2021-09-02 RX ADMIN — SODIUM CHLORIDE: 9 INJECTION, SOLUTION INTRAVENOUS at 12:53

## 2021-09-02 RX ADMIN — INSULIN GLARGINE 20 UNITS: 100 INJECTION, SOLUTION SUBCUTANEOUS at 10:00

## 2021-09-02 RX ADMIN — Medication 500 MG: at 20:59

## 2021-09-02 RX ADMIN — ATORVASTATIN CALCIUM 80 MG: 40 TABLET, FILM COATED ORAL at 17:03

## 2021-09-02 RX ADMIN — Medication 500 MG: at 10:42

## 2021-09-02 RX ADMIN — Medication 10 ML: at 10:10

## 2021-09-02 RX ADMIN — VANCOMYCIN HYDROCHLORIDE 1500 MG: 10 INJECTION, POWDER, LYOPHILIZED, FOR SOLUTION INTRAVENOUS at 17:06

## 2021-09-02 RX ADMIN — Medication 1 TABLET: at 09:59

## 2021-09-02 RX ADMIN — PANTOPRAZOLE SODIUM 40 MG: 40 TABLET, DELAYED RELEASE ORAL at 09:59

## 2021-09-02 RX ADMIN — GABAPENTIN 200 MG: 100 CAPSULE ORAL at 17:03

## 2021-09-02 RX ADMIN — Medication 2000 UNITS: at 09:59

## 2021-09-02 RX ADMIN — SODIUM CHLORIDE, PRESERVATIVE FREE 10 ML: 5 INJECTION INTRAVENOUS at 20:59

## 2021-09-02 RX ADMIN — MICONAZOLE NITRATE: 20 POWDER TOPICAL at 21:00

## 2021-09-02 RX ADMIN — SODIUM CHLORIDE, PRESERVATIVE FREE 10 ML: 5 INJECTION INTRAVENOUS at 10:10

## 2021-09-02 RX ADMIN — SODIUM CHLORIDE, PRESERVATIVE FREE 10 ML: 5 INJECTION INTRAVENOUS at 09:58

## 2021-09-02 RX ADMIN — APIXABAN 5 MG: 5 TABLET, FILM COATED ORAL at 17:03

## 2021-09-02 RX ADMIN — INSULIN LISPRO 1 UNITS: 100 INJECTION, SOLUTION INTRAVENOUS; SUBCUTANEOUS at 11:40

## 2021-09-02 RX ADMIN — DEXAMETHASONE 6 MG: 4 TABLET ORAL at 09:59

## 2021-09-02 RX ADMIN — INSULIN LISPRO 1 UNITS: 100 INJECTION, SOLUTION INTRAVENOUS; SUBCUTANEOUS at 21:12

## 2021-09-02 RX ADMIN — APIXABAN 5 MG: 5 TABLET, FILM COATED ORAL at 06:19

## 2021-09-02 RX ADMIN — ZINC SULFATE 220 MG (50 MG) CAPSULE 50 MG: CAPSULE at 09:58

## 2021-09-02 ASSESSMENT — PAIN SCALES - GENERAL
PAINLEVEL_OUTOF10: 0
PAINLEVEL_OUTOF10: 0

## 2021-09-02 NOTE — PROGRESS NOTES
During assessment patient's IV was found to be infiltrated. Patient is no R arm, has normal saline ordered due to orthostatic hypotension. IV team consult placed, PO intake encouraged.

## 2021-09-02 NOTE — PROCEDURES
PICC   Catheter insertion date: 9/2/2021     Product Number:  [id number]   Lot No: [id number]   Gauge: 18   Lumen: SINGLE/ 4.5 Greek   L BRACHIAL    Vein Diameter : 0.56CM   Mid upper arm circumference: 32CM   Catheter Length : 50CM   Internal Length: 47CM   External Catheter Length: 3CM   Ultrasound Used: YES  ARIANNE Talamantes confirms PICC tip is placed in the lower 1/3 of the SVC or at the Cavoatrial junction. Floor nurse notified PICC is okay to use.    : Lilibeth Zhu RN

## 2021-09-02 NOTE — DISCHARGE SUMMARY
[facility] Physician Discharge Summary       Marilynn Hay, [address] [facility] [address]  [address] [phone]    Schedule an appointment as soon as possible for a visit in 2 weeks  For outpatient follow up    [address] [place] [address]. [address] [facility] [address] [phone]          Activity level: As tolerated     Dispo: SNF      Condition on discharge: Stable     Patient ID:  Jermaine Nam  [id number]  80 y.o.  1932    Admit date: 2021    Discharge date and time:  2021  5:34 PM    Admission Diagnoses: Active Problems:    Acute hypoxemic respiratory failure due to COVID-19 [facility])    Acute respiratory failure with hypoxia (HCC)    COVID-19 virus infection    Hyperlipidemia    Pneumonia due to COVID-19 virus    Orthostatic hypotension  Resolved Problems:    * No resolved hospital problems. *      Discharge Diagnoses: Active Problems:    Acute hypoxemic respiratory failure due to COVID-19 [facility])    Acute respiratory failure with hypoxia (HCC)    COVID-19 virus infection    Hyperlipidemia    Pneumonia due to COVID-19 virus    Orthostatic hypotension  Resolved Problems:    * No resolved hospital problems. *      Consults:  IP CONSULT TO PHARMACY  IP CONSULT TO IV TEAM  IP CONSULT TO INFECTIOUS DISEASES  IP CONSULT TO DIETITIAN  IP CONSULT TO IV TEAM  IP CONSULT TO IV TEAM      Hospital Course:   Patient Jermaine Nam. is a 80 y.o. presented with shortness of breath, found to be in acute hypoxic respiratory failure, due to covid 19 pneumonia, we admitted the patient for eval and treated him for the followin. Acute hypoxic aspiratory failure on presentation O2 sat was at 67, patient was on oxygen during his stay, we were able to wean off oxygen with good oxygen saturation on room air now. Most likely due to Covid pneumonia.   2. Covid pneumonia, patient finished a course of remdesivir, continue on Decadron, monitor inflammatory markers. 3. Polymicrobial bacteremia MRSA and coag negative staph, appreciate ID input, not sure if true or transient or contaminant, patient was started on vancomycin. Patient will need a PICC in 10 days if IV vancomycin. 4. Diabetes type 2, uncontrolled especially in the setting of steroids, currently on Lantus 20 units in the morning and 10 units at night as well as sliding scale insulin, restarted on home insulin on discharge  5. History of hyperlipidemia, continue on atorvastatin. 6. History of PE, continue Eliquis  7. Dispo, SNF    Discharge Exam:    General Appearance: alert and oriented to person, place and time and in no acute distress  Skin: warm and dry  Head: normocephalic and atraumatic  Eyes: pupils equal, round, and reactive to light, extraocular eye movements intact, conjunctivae normal  Neck: neck supple and non tender without mass   Pulmonary/Chest: clear to auscultation bilaterally- no wheezes, rales or rhonchi, normal air movement, no respiratory distress  Cardiovascular: normal rate, normal S1 and S2 and no carotid bruits  Abdomen: soft, non-tender, non-distended, normal bowel sounds, no masses or organomegaly  Extremities: no cyanosis, no clubbing and +1 edema  Neurologic: no cranial nerve deficit and speech normal    I/O last 3 completed shifts: In: 1160 [P.O.:760; I.V.:400]  Out: 850 [Urine:850]  I/O this shift: In: 869 [I.V.:869]  Out: 200 [Urine:200]      LABS:  Recent Labs     08/31/21  1034 09/01/21  1028 09/02/21  0200    139 134   K 4.1 4.1 4.1   CL 99 103 99   CO2 29 29 28   BUN 37* 37* 40*   CREATININE 1.2 1.2 1.1   GLUCOSE 148* 151* 269*   CALCIUM 8.2* 8.1* 8.1*       Recent Labs     08/31/21  1034 09/01/21  1028   WBC 11.3 15.9*   RBC 4.14 3.94   HGB 11.9* 11.4*   HCT 37.3 35.5*   MCV 90.1 90.1   MCH 28.7 28.9   MCHC 31.9* 32.1   RDW 14.3 14.4    199   MPV 9.7 9.8       No results for input(s): POCGLU in the last 72 hours.     Imaging:  XR CHEST PORTABLE    Result Date: 8/25/2021  EXAMINATION: ONE XRAY VIEW OF THE CHEST 8/25/2021 12:11 pm COMPARISON: 03/30/2021 HISTORY: ORDERING SYSTEM PROVIDED HISTORY: shortness of breath TECHNOLOGIST PROVIDED HISTORY: Reason for exam:->shortness of breath FINDINGS: Pulmonary vascular congestive changes. .  There is no effusion or pneumothorax. The cardiomediastinal silhouette is without acute process. The osseous structures are without acute process. Pulmonary vascular congestive changes. Patient Instructions:      Medication List      START taking these medications    ascorbic acid 500 MG tablet  Commonly known as: VITAMIN C  Take 1 tablet by mouth 2 times daily     dexamethasone 6 MG tablet  Commonly known as: DECADRON  Take 1 tablet by mouth daily for 2 doses  Start taking on: September 3, 2021     guaiFENesin-dextromethorphan 100-10 MG/5ML syrup  Commonly known as: ROBITUSSIN DM  Take 5 mLs by mouth every 4 hours as needed for Cough     miconazole 2 % powder  Commonly known as: MICOTIN  Apply topically 2 times daily. vancomycin  infusion  Commonly known as: VANCOCIN  Infuse 1,500 mg intravenously every 24 hours for 10 days Compound per protocol.      vitamin D 50 MCG (2000 UT) Tabs tablet  Commonly known as: CHOLECALCIFEROL  Take 1 tablet by mouth daily  Start taking on: September 3, 2021        CONTINUE taking these medications    acetaminophen 325 MG tablet  Commonly known as: TYLENOL     atorvastatin 80 MG tablet  Commonly known as: LIPITOR     calcium carbonate 500 MG chewable tablet  Commonly known as: TUMS     clotrimazole-betamethasone 1-0.05 % cream  Commonly known as: LOTRISONE     Eliquis 5 MG Tabs tablet  Generic drug: apixaban     furosemide 40 MG tablet  Commonly known as: LASIX     gabapentin 100 MG capsule  Commonly known as: NEURONTIN     * insulin lispro protamine & lispro (75-25) 100 UNIT per ML Susp injection vial  Commonly known as: HUMALOG MIX     * insulin lispro protamine & lispro (75-25) 100 UNIT per ML Susp injection vial  Commonly known as: HUMALOG MIX     ipratropium-albuterol 0.5-2.5 (3) MG/3ML Soln nebulizer solution  Commonly known as: DUONEB  Inhale 3 mLs into the lungs every 4 hours (while awake) for 30 doses     omeprazole 20 MG delayed release capsule  Commonly known as: PRILOSEC     * therapeutic multivitamin-minerals tablet     * PRESERVISION AREDS PO         * This list has 4 medication(s) that are the same as other medications prescribed for you. Read the directions carefully, and ask your doctor or other care provider to review them with you.                Where to Get Your Medications      Information about where to get these medications is not yet available    Ask your nurse or doctor about these medications  · ascorbic acid 500 MG tablet  · dexamethasone 6 MG tablet  · guaiFENesin-dextromethorphan 100-10 MG/5ML syrup  · miconazole 2 % powder  · vancomycin  infusion  · vitamin D 50 MCG (2000 UT) Tabs tablet           Note that more than 30 minutes was spent in preparing discharge papers, discussing discharge with patient, medication review, etc.    Signed:  Electronically signed by Debby Martin MD on 9/2/2021 at 5:34 PM

## 2021-09-02 NOTE — CARE COORDINATION
Social Work/Discharge Planning:  Discharge order noted. Patient to discharge to [facility] following PICC placement. Called [facility] and arranged Ambulance transport for 7:00pm.  Notified patient, RN and liaison Kimberley Hastings at [place] of discharge time. HENS completed.   Electronically signed by ELKE Duran on 9/2/2021 at 3:17 PM

## 2021-09-02 NOTE — CARE COORDINATION
Patient/family seen: No: called patients room and explained letter. Informed patient/family of [facility] Medical Bundle Program with potential outreach by either Care Transitions Team or [facility]iHealth Team based on hospital admission and location.        [facility] Notification Letter given: Yes  Letter sent in the mail     Current discharge plan:

## 2021-09-02 NOTE — CARE COORDINATION
Social Work/Discharge Planning:  Chart reviewed. Patient to get PICC line. Axel Parker with [facility] states facility can accept patient at discharge and no pre-cert needed. Called patient and informed him of facility acceptance. Electronic N-17 in Epic and transport form in soft chart. Social Work will need to complete HENS, ONCE patient has a discharge order. Will continue to follow.   Electronically signed by ELKE Grove on 9/2/2021 at 9:56 AM

## 2021-09-02 NOTE — PROGRESS NOTES
is sitting in a chair. Awake and alert. No distress. Skin: Warm and dry. No rashes were noted. HEENT: Round and reactive pupils. Moist mucous membranes. No ulcerations or thrush. Neck: Supple to movements. Chest: No use of accessory muscles to breathe. Symmetrical expansion. Cardiovascular: Heart sounds rhythmic and regular. Abdomen: Positive bowel sounds to auscultation. Benign to palpation. Extremities: No edema. Lines: peripheral    Laboratory and Tests Review:  Lab Results   Component Value Date    WBC 15.9 (H) 09/01/2021    WBC 11.3 08/31/2021    WBC 8.4 08/30/2021    HGB 11.4 (L) 09/01/2021    HCT 35.5 (L) 09/01/2021    MCV 90.1 09/01/2021     09/01/2021     Lab Results   Component Value Date    NEUTROABS 13.14 (H) 09/01/2021    NEUTROABS 8.46 (H) 08/31/2021    NEUTROABS 6.16 08/30/2021     No results found for: [facility]  Lab Results   Component Value Date    ALT 22 09/02/2021    AST 32 09/02/2021    ALKPHOS 107 09/02/2021    BILITOT 0.4 09/02/2021     Lab Results   Component Value Date     09/02/2021    K 4.1 09/02/2021    K 4.2 08/26/2021    CL 99 09/02/2021    CO2 28 09/02/2021    BUN 40 09/02/2021    CREATININE 1.1 09/02/2021    CREATININE 1.2 09/01/2021    CREATININE 1.2 08/31/2021    GFRAA >60 09/02/2021    LABGLOM >60 09/02/2021    GLUCOSE 269 09/02/2021    PROT 5.4 09/02/2021    LABALBU 2.9 09/02/2021    CALCIUM 8.1 09/02/2021    BILITOT 0.4 09/02/2021    ALKPHOS 107 09/02/2021    AST 32 09/02/2021    ALT 22 09/02/2021     Lab Results   Component Value Date    CRP 1.5 (H) 08/29/2021    CRP 3.0 (H) 08/28/2021    CRP 4.9 (H) 08/27/2021     Lab Results   Component Value Date    SEDRATE 23 (H) 08/29/2021     Radiology:      Microbiology:   Blood cultures 8/25/2021: MRSA in 2 of 4 bottles. Staphylococcus epidermidis in 1 of 4 bottles.   Blood cultures 8/29/2021: Negative so far  Nares screen MRSA: Pending    No results for input(s): PROCAL in the last 72 hours.    ASSESSMENT:  · Polymicrobial bacteremia. Blood cultures turned +2 days after the admission. They both have MRSA and CoNS. Bacteremia may have been transient or possibly even a contaminant. Repeat cultures are negative so far  · SARS-CoV-2 infection with mild to moderate pneumonia. Completed 5 days of Remdesivir    PLAN:  · Continue Vancomycin once daily.  Trough was therapeutic at 16.5 yesterday  · Awaiting for PICC    Spoke with Yesenia García MD  12:29 PM  9/2/2021

## 2021-09-03 VITALS
BODY MASS INDEX: 33.31 KG/M2 | HEART RATE: 75 BPM | WEIGHT: 245.91 LBS | OXYGEN SATURATION: 96 % | HEIGHT: 72 IN | DIASTOLIC BLOOD PRESSURE: 75 MMHG | RESPIRATION RATE: 20 BRPM | TEMPERATURE: 98 F | SYSTOLIC BLOOD PRESSURE: 172 MMHG

## 2021-09-03 LAB
ALBUMIN SERPL-MCNC: 2.9 G/DL (ref 3.5–5.2)
ALP BLD-CCNC: 105 U/L (ref 40–129)
ALT SERPL-CCNC: 21 U/L (ref 0–40)
ANION GAP SERPL CALCULATED.3IONS-SCNC: 6 MMOL/L (ref 7–16)
AST SERPL-CCNC: 28 U/L (ref 0–39)
BASOPHILS ABSOLUTE: 0.01 E9/L (ref 0–0.2)
BASOPHILS RELATIVE PERCENT: 0.1 % (ref 0–2)
BILIRUB SERPL-MCNC: 0.5 MG/DL (ref 0–1.2)
BLOOD CULTURE, ROUTINE: NORMAL
BUN BLDV-MCNC: 32 MG/DL (ref 6–23)
CALCIUM SERPL-MCNC: 8.3 MG/DL (ref 8.6–10.2)
CHLORIDE BLD-SCNC: 104 MMOL/L (ref 98–107)
CO2: 30 MMOL/L (ref 22–29)
CREAT SERPL-MCNC: 1 MG/DL (ref 0.7–1.2)
CULTURE, BLOOD 2: NORMAL
EOSINOPHILS ABSOLUTE: 0.07 E9/L (ref 0.05–0.5)
EOSINOPHILS RELATIVE PERCENT: 0.6 % (ref 0–6)
GFR AFRICAN AMERICAN: >60
GFR NON-AFRICAN AMERICAN: >60 ML/MIN/1.73
GLUCOSE BLD-MCNC: 158 MG/DL (ref 74–99)
HCT VFR BLD CALC: 36.3 % (ref 37–54)
HEMOGLOBIN: 11.5 G/DL (ref 12.5–16.5)
IMMATURE GRANULOCYTES #: 0.11 E9/L
IMMATURE GRANULOCYTES %: 0.9 % (ref 0–5)
LYMPHOCYTES ABSOLUTE: 1.44 E9/L (ref 1.5–4)
LYMPHOCYTES RELATIVE PERCENT: 12.4 % (ref 20–42)
MCH RBC QN AUTO: 28.6 PG (ref 26–35)
MCHC RBC AUTO-ENTMCNC: 31.7 % (ref 32–34.5)
MCV RBC AUTO: 90.3 FL (ref 80–99.9)
METER GLUCOSE: 168 MG/DL (ref 74–99)
METER GLUCOSE: 196 MG/DL (ref 74–99)
MONOCYTES ABSOLUTE: 0.96 E9/L (ref 0.1–0.95)
MONOCYTES RELATIVE PERCENT: 8.3 % (ref 2–12)
NEUTROPHILS ABSOLUTE: 9 E9/L (ref 1.8–7.3)
NEUTROPHILS RELATIVE PERCENT: 77.7 % (ref 43–80)
PDW BLD-RTO: 14.4 FL (ref 11.5–15)
PLATELET # BLD: 236 E9/L (ref 130–450)
PMV BLD AUTO: 9.3 FL (ref 7–12)
POTASSIUM SERPL-SCNC: 4 MMOL/L (ref 3.5–5)
RBC # BLD: 4.02 E12/L (ref 3.8–5.8)
SODIUM BLD-SCNC: 140 MMOL/L (ref 132–146)
TOTAL PROTEIN: 5.5 G/DL (ref 6.4–8.3)
WBC # BLD: 11.6 E9/L (ref 4.5–11.5)

## 2021-09-03 PROCEDURE — 85025 COMPLETE CBC W/AUTO DIFF WBC: CPT

## 2021-09-03 PROCEDURE — 6370000000 HC RX 637 (ALT 250 FOR IP): Performed by: INTERNAL MEDICINE

## 2021-09-03 PROCEDURE — 2580000003 HC RX 258: Performed by: SPECIALIST

## 2021-09-03 PROCEDURE — 36415 COLL VENOUS BLD VENIPUNCTURE: CPT

## 2021-09-03 PROCEDURE — 6360000002 HC RX W HCPCS: Performed by: NURSE PRACTITIONER

## 2021-09-03 PROCEDURE — 36592 COLLECT BLOOD FROM PICC: CPT

## 2021-09-03 PROCEDURE — 6360000002 HC RX W HCPCS: Performed by: SPECIALIST

## 2021-09-03 PROCEDURE — 80053 COMPREHEN METABOLIC PANEL: CPT

## 2021-09-03 PROCEDURE — 82962 GLUCOSE BLOOD TEST: CPT

## 2021-09-03 PROCEDURE — 6370000000 HC RX 637 (ALT 250 FOR IP): Performed by: NURSE PRACTITIONER

## 2021-09-03 RX ORDER — ZINC SULFATE 50(220)MG
50 CAPSULE ORAL DAILY
Qty: 14 CAPSULE | Refills: 0 | DISCHARGE
Start: 2021-09-04 | End: 2022-11-02

## 2021-09-03 RX ADMIN — Medication 500 MG: at 08:49

## 2021-09-03 RX ADMIN — Medication 1 TABLET: at 08:48

## 2021-09-03 RX ADMIN — Medication 2000 UNITS: at 08:48

## 2021-09-03 RX ADMIN — APIXABAN 5 MG: 5 TABLET, FILM COATED ORAL at 06:28

## 2021-09-03 RX ADMIN — Medication 300 UNITS: at 08:49

## 2021-09-03 RX ADMIN — DEXAMETHASONE 6 MG: 4 TABLET ORAL at 08:49

## 2021-09-03 RX ADMIN — ZINC SULFATE 220 MG (50 MG) CAPSULE 50 MG: CAPSULE at 08:48

## 2021-09-03 RX ADMIN — INSULIN GLARGINE 20 UNITS: 100 INJECTION, SOLUTION SUBCUTANEOUS at 09:15

## 2021-09-03 RX ADMIN — PANTOPRAZOLE SODIUM 40 MG: 40 TABLET, DELAYED RELEASE ORAL at 08:49

## 2021-09-03 RX ADMIN — Medication 1 TABLET: at 08:50

## 2021-09-03 RX ADMIN — INSULIN LISPRO 1 UNITS: 100 INJECTION, SOLUTION INTRAVENOUS; SUBCUTANEOUS at 06:55

## 2021-09-03 RX ADMIN — INSULIN LISPRO 1 UNITS: 100 INJECTION, SOLUTION INTRAVENOUS; SUBCUTANEOUS at 11:07

## 2021-09-03 RX ADMIN — MICONAZOLE NITRATE: 20 POWDER TOPICAL at 08:50

## 2021-09-03 RX ADMIN — SODIUM CHLORIDE, PRESERVATIVE FREE 10 ML: 5 INJECTION INTRAVENOUS at 08:49

## 2021-09-03 ASSESSMENT — PAIN SCALES - GENERAL: PAINLEVEL_OUTOF10: 0

## 2021-09-03 NOTE — CARE COORDINATION
CASE MANAGEMENT. .. Patient was set up for discharge with [address] [place] [address] at 7pm last pm. Did not show until 11pm and then refused to take patient because he had more than 1 bag of belongings. Patient now set up for transport this am at 6 with [facility]. N 17 and forms in chart. OF NOTE. .. Mahad Cherry Patient has 4 bags of belongings and a walker. [facility] can only transport 2 bags. I informed Mr Kevin Merritt of this and he is going to call friend Ruth Leavitt [phone] to see if she can  his belongings. IN ADDENDUM. Mahad Merritt states that [facility] Barry will be able to  his belongings at 2pm today.

## 2021-09-03 NOTE — PROGRESS NOTES
Ambulance company arrived at approximately [phone]. When preparing to transfer patient onto the cart he was told by transport company that they were unable to accommodate more than one bag of belongings. Patient stated that he could and would not transfer tonight without his rehab equipment in his room. Transport canceled at this time.

## 2021-12-01 ENCOUNTER — OFFICE VISIT (OUTPATIENT)
Dept: VASCULAR SURGERY | Age: 86
End: 2021-12-01
Payer: MEDICARE

## 2021-12-01 ENCOUNTER — TELEPHONE (OUTPATIENT)
Dept: VASCULAR SURGERY | Age: 86
End: 2021-12-01

## 2021-12-01 VITALS — HEIGHT: 72 IN | WEIGHT: 248 LBS | BODY MASS INDEX: 33.59 KG/M2

## 2021-12-01 DIAGNOSIS — L89.620 UNSTAGEABLE PRESSURE ULCER OF LEFT HEEL (HCC): Primary | ICD-10-CM

## 2021-12-01 DIAGNOSIS — I73.9 PERIPHERAL VASCULAR DISEASE (HCC): Primary | ICD-10-CM

## 2021-12-01 DIAGNOSIS — L89.610 UNSTAGEABLE PRESSURE ULCER OF RIGHT HEEL (HCC): ICD-10-CM

## 2021-12-01 DIAGNOSIS — I70.299 ATHEROSCLEROSIS OF ARTERY OF EXTREMITY WITH ULCERATION (HCC): ICD-10-CM

## 2021-12-01 DIAGNOSIS — L97.909 ATHEROSCLEROSIS OF ARTERY OF EXTREMITY WITH ULCERATION (HCC): ICD-10-CM

## 2021-12-01 PROCEDURE — G8484 FLU IMMUNIZE NO ADMIN: HCPCS | Performed by: SURGERY

## 2021-12-01 PROCEDURE — 4040F PNEUMOC VAC/ADMIN/RCVD: CPT | Performed by: SURGERY

## 2021-12-01 PROCEDURE — G8417 CALC BMI ABV UP PARAM F/U: HCPCS | Performed by: SURGERY

## 2021-12-01 PROCEDURE — 1036F TOBACCO NON-USER: CPT | Performed by: SURGERY

## 2021-12-01 PROCEDURE — 1123F ACP DISCUSS/DSCN MKR DOCD: CPT | Performed by: SURGERY

## 2021-12-01 PROCEDURE — G8427 DOCREV CUR MEDS BY ELIG CLIN: HCPCS | Performed by: SURGERY

## 2021-12-01 PROCEDURE — 99203 OFFICE O/P NEW LOW 30 MIN: CPT | Performed by: NURSE PRACTITIONER

## 2021-12-01 NOTE — TELEPHONE ENCOUNTER
Spoke with Sandra Pope at [facility] at [address] [facility] on 12-14-21 at 8:00 am, register at 7:30 am.  Angiogram on 12-20-21 (instructions sent to nursing home with patient).

## 2021-12-14 ENCOUNTER — HOSPITAL ENCOUNTER (OUTPATIENT)
Dept: INTERVENTIONAL RADIOLOGY/VASCULAR | Age: 86
Discharge: HOME | End: 2021-12-16
Payer: MEDICARE

## 2021-12-14 DIAGNOSIS — I73.9 PERIPHERAL VASCULAR DISEASE (HCC): ICD-10-CM

## 2021-12-14 PROCEDURE — 93922 UPR/L XTREMITY ART 2 LEVELS: CPT

## 2021-12-15 ENCOUNTER — TELEPHONE (OUTPATIENT)
Dept: VASCULAR SURGERY | Age: 86
End: 2021-12-15

## 2021-12-15 PROBLEM — I70.244 ATHEROSCLEROSIS OF NATIVE ARTERY OF LEFT LOWER EXTREMITY WITH ULCERATION OF HEEL (HCC): Status: ACTIVE | Noted: 2021-12-15

## 2021-12-15 NOTE — TELEPHONE ENCOUNTER
Km Cárdenas at [facility] notified of time change for 12/20 angiogram; she will have the pt report to [facility] at 6:15 a.m.

## 2021-12-20 ENCOUNTER — HOSPITAL ENCOUNTER (OUTPATIENT)
Dept: CARDIAC CATH/INVASIVE PROCEDURES | Age: 86
Discharge: NURSING, REHAB OR LONG-TERM CARE | End: 2021-12-20
Attending: SURGERY | Admitting: SURGERY
Payer: MEDICARE

## 2021-12-20 VITALS
DIASTOLIC BLOOD PRESSURE: 75 MMHG | SYSTOLIC BLOOD PRESSURE: 159 MMHG | HEART RATE: 81 BPM | OXYGEN SATURATION: 95 % | TEMPERATURE: 97.3 F | RESPIRATION RATE: 18 BRPM

## 2021-12-20 DIAGNOSIS — I73.9 PAD (PERIPHERAL ARTERY DISEASE) (HCC): ICD-10-CM

## 2021-12-20 DIAGNOSIS — I73.9 PVD (PERIPHERAL VASCULAR DISEASE) (HCC): ICD-10-CM

## 2021-12-20 LAB
ABO/RH: NORMAL
ANION GAP SERPL CALCULATED.3IONS-SCNC: 8 MMOL/L (ref 7–16)
ANTIBODY SCREEN: NORMAL
BUN BLDV-MCNC: 45 MG/DL (ref 6–23)
CALCIUM SERPL-MCNC: 9.3 MG/DL (ref 8.6–10.2)
CHLORIDE BLD-SCNC: 103 MMOL/L (ref 98–107)
CO2: 31 MMOL/L (ref 22–29)
CREAT SERPL-MCNC: 1.2 MG/DL (ref 0.7–1.2)
GFR AFRICAN AMERICAN: >60
GFR NON-AFRICAN AMERICAN: 57 ML/MIN/1.73
GLUCOSE BLD-MCNC: 134 MG/DL (ref 74–99)
HCT VFR BLD CALC: 36.2 % (ref 37–54)
HEMOGLOBIN: 11.1 G/DL (ref 12.5–16.5)
INR BLD: 1.1
MCH RBC QN AUTO: 27 PG (ref 26–35)
MCHC RBC AUTO-ENTMCNC: 30.7 % (ref 32–34.5)
MCV RBC AUTO: 88.1 FL (ref 80–99.9)
PDW BLD-RTO: 15.8 FL (ref 11.5–15)
PLATELET # BLD: 196 E9/L (ref 130–450)
PMV BLD AUTO: 9.8 FL (ref 7–12)
POTASSIUM REFLEX MAGNESIUM: 4.2 MMOL/L (ref 3.5–5)
PROTHROMBIN TIME: 12.1 SEC (ref 9.3–12.4)
RBC # BLD: 4.11 E12/L (ref 3.8–5.8)
SODIUM BLD-SCNC: 142 MMOL/L (ref 132–146)
WBC # BLD: 10.6 E9/L (ref 4.5–11.5)

## 2021-12-20 PROCEDURE — 2709999900 HC NON-CHARGEABLE SUPPLY

## 2021-12-20 PROCEDURE — 75625 CONTRAST EXAM ABDOMINL AORTA: CPT | Performed by: SURGERY

## 2021-12-20 PROCEDURE — 75716 ARTERY X-RAYS ARMS/LEGS: CPT

## 2021-12-20 PROCEDURE — 36200 PLACE CATHETER IN AORTA: CPT

## 2021-12-20 PROCEDURE — 85610 PROTHROMBIN TIME: CPT

## 2021-12-20 PROCEDURE — 80048 BASIC METABOLIC PNL TOTAL CA: CPT

## 2021-12-20 PROCEDURE — C1769 GUIDE WIRE: HCPCS

## 2021-12-20 PROCEDURE — 75716 ARTERY X-RAYS ARMS/LEGS: CPT | Performed by: SURGERY

## 2021-12-20 PROCEDURE — 2500000003 HC RX 250 WO HCPCS

## 2021-12-20 PROCEDURE — 2580000003 HC RX 258: Performed by: NURSE PRACTITIONER

## 2021-12-20 PROCEDURE — 86900 BLOOD TYPING SEROLOGIC ABO: CPT

## 2021-12-20 PROCEDURE — 86901 BLOOD TYPING SEROLOGIC RH(D): CPT

## 2021-12-20 PROCEDURE — C1894 INTRO/SHEATH, NON-LASER: HCPCS

## 2021-12-20 PROCEDURE — 36415 COLL VENOUS BLD VENIPUNCTURE: CPT

## 2021-12-20 PROCEDURE — 6360000002 HC RX W HCPCS

## 2021-12-20 PROCEDURE — 85027 COMPLETE CBC AUTOMATED: CPT

## 2021-12-20 PROCEDURE — 86850 RBC ANTIBODY SCREEN: CPT

## 2021-12-20 PROCEDURE — 36247 INS CATH ABD/L-EXT ART 3RD: CPT | Performed by: SURGERY

## 2021-12-20 RX ORDER — SODIUM CHLORIDE 0.9 % (FLUSH) 0.9 %
10 SYRINGE (ML) INJECTION EVERY 12 HOURS SCHEDULED
Status: DISCONTINUED | OUTPATIENT
Start: 2021-12-20 | End: 2021-12-20 | Stop reason: HOSPADM

## 2021-12-20 RX ORDER — SODIUM CHLORIDE 0.9 % (FLUSH) 0.9 %
10 SYRINGE (ML) INJECTION PRN
Status: DISCONTINUED | OUTPATIENT
Start: 2021-12-20 | End: 2021-12-20 | Stop reason: HOSPADM

## 2021-12-20 RX ORDER — SODIUM CHLORIDE 9 MG/ML
INJECTION, SOLUTION INTRAVENOUS CONTINUOUS
Status: DISCONTINUED | OUTPATIENT
Start: 2021-12-20 | End: 2021-12-20 | Stop reason: HOSPADM

## 2021-12-20 RX ORDER — SODIUM CHLORIDE 9 MG/ML
25 INJECTION, SOLUTION INTRAVENOUS PRN
Status: DISCONTINUED | OUTPATIENT
Start: 2021-12-20 | End: 2021-12-20 | Stop reason: HOSPADM

## 2021-12-20 RX ADMIN — SODIUM CHLORIDE: 9 INJECTION, SOLUTION INTRAVENOUS at 10:03

## 2021-12-20 NOTE — PROGRESS NOTES
Teresa Sandy from [facility] notified of patient transport needed, he will provide transportation back to facility. [phone]- Nurse to nurse called to Vic Dwyer at [facility].

## 2021-12-20 NOTE — DISCHARGE INSTR - COC
Continuity of Care Form    Patient Name: Gregor Kwok :  1932  MRN:  [id number]    Admit date:  2021  Discharge date:  ***    Code Status Order: Full Code   Advance Directives:      Admitting Physician:  Esteban Juarez MD  PCP: Jose L Chanel MD    Discharging Nurse: [facility] Unit/Room#: 7638/0329-P  Discharging Unit Phone Number: ***    Emergency Contact:   Extended Emergency Contact Information  Primary Emergency Contact: [address] Phone: [phone]  Relation: Child  Secondary Emergency Contact: Claude Lizarraga [place] [address] Phone: [phone]  Relation: Child    Past Surgical History:  Past Surgical History:   Procedure Laterality Date    [facility] [address] [facility]  2021         MASTECTOMY      TOE AMPUTATION      TOE SURGERY      TONSILLECTOMY         Immunization History:   Immunization History   Administered Date(s) Administered    Influenza Virus Vaccine 10/23/2016    Pneumococcal Conjugate Vaccine 2013    Td, unspecified formulation 2012       Active Problems:  Patient Active Problem List   Diagnosis Code    HTN (hypertension) I10    [facility] C50.929    Obesity (BMI 30.0-34. 9) E66.9    Right hip pain M25.551    Diabetes mellitus type 2, uncontrolled ([facility]) E11.65    Essential hypertension, benign I10    Hyperlipidemia with target LDL less than 100 E78.5    Dizzy spells R42    Troponin level elevated R77.8    Shortness of breath R06.02    Chest pressure R07.89    Acute respiratory insufficiency R06.89    Pneumonia J18.9    History of pulmonary embolus (PE) Z86.711    Acute left-sided CHF (congestive heart failure) ([facility]) I50.1    Type 1 diabetes with stage 3 chronic kidney disease moderate GFR 30-59 ([facility]) E10.22, N18.30    Cellulitis and abscess of right lower extremity L03.115, L02.415    Cellulitis and abscess of left lower extremity L03.116, L02.416    Chronic venous insufficiency of lower extremity I87.2    Acute hypoxemic respiratory failure due to COVID-19 ([facility]) U07.1, J96.01    Acute on chronic respiratory failure with hypoxia ([facility]) J96.21    Acute respiratory failure with hypoxia ([facility]) J96.01    COVID-19 virus infection U07.1    Hyperlipidemia E78.5    Pneumonia due to COVID-19 virus U07.1, J12.82    Orthostatic hypotension I95.1    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 ([facility]) I70.244    PVD (peripheral vascular disease) ([facility]) I73.9    PAD (peripheral artery disease) ([facility]) I73.9       Isolation/Infection:   Isolation            No Isolation          Patient Infection Status       Infection Onset Added Last Indicated Last Indicated By Review Planned Expiration Resolved Resolved By    MRSA 21 Culture, Blood 2        MRSA blood 2021    Resolved    COVID-19 21 Covid-19 Ambulatory   21     COVID-19 (Rule Out) 21 COVID-19, Rapid (Ordered)   21 Rule-Out Test Resulted    COVID-19 (Rule Out) 10/16/20 10/16/20 10/16/20 Covid-19 Ambulatory (Ordered)   10/17/20 Rule-Out Test Resulted    COVID-19 (Rule Out) 20 Covid-19 Ambulatory (Ordered)   20 Rule-Out Test Resulted    COVID-19 (Rule Out) 20 Covid-19 Ambulatory (Ordered)   20 Rule-Out Test Resulted    COVID-19 (Rule Out) 20 Covid-19 Ambulatory (Ordered)   20 Rule-Out Test Resulted            Nurse Assessment:  Last Vital Signs: BP (!) 149/70   Pulse 76   Temp 97.3 °F (36.3 °C) (Temporal)   Resp 18   SpO2 95%     Last documented pain score (0-10 scale):    Last Weight:   Wt Readings from Last 1 Encounters:   21 248 lb (112.5 kg)     Mental Status:  {IP PT MENTAL STATUS:}    IV Access:  - None    Nursing Mobility/ADLs:  Walking   Assisted  Transfer  Assisted  Bathing  Assisted  Dressing  Assisted  Toileting  Assisted  Feeding

## 2021-12-20 NOTE — H&P
HISTORY OF PRESENT ILLNESS:                 The patient is a 80 y.o. male who is referred for evaluation of nonhealing bilateral heel wounds. He developed the wounds after hospitalization due to COVID-19 this past August/September. The wounds have been stable with dressing changes and offloading, but are not making progress healing. He presents for evaluation.      He had a similar wound on the left heel several years ago after a lengthy hospitalization from PE. It eventually healed without any intervention.      He denies any history of vascular surgery. He has not had any arterial studies in the recent past.   Past Medical History:             Diagnosis Date    Cancer [facility])       breast    Diabetes mellitus ([facility] [address]      History of lumpectomy      Hx of blood clots      Hyperlipidemia      PE (pulmonary thromboembolism) (HCC)      Staph aureus infection        Past Surgical History:              Procedure Laterality Date    [facility]          MASTECTOMY        TOE AMPUTATION        TOE SURGERY        TONSILLECTOMY          Current Medications:           Prior to Admission medications    Medication Sig Start Date End Date Taking? Authorizing Provider   zinc sulfate (ZINCATE) 220 (50 Zn) MG capsule Take 1 capsule by mouth daily for 14 days 9/4/21 9/18/21   SHAHEEN Elam   miconazole (MICOTIN) 2 % powder Apply topically 2 times daily.  9/2/21     Galen Workman MD   ascorbic acid (VITAMIN C) 500 MG tablet Take 1 tablet by mouth 2 times daily 9/2/21     Galen Workman MD   Vitamin D (CHOLECALCIFEROL) 50 MCG (2000 UT) TABS tablet Take 1 tablet by mouth daily 9/3/21     Galen Workman MD   furosemide (LASIX) 40 MG tablet Take 40 mg by mouth daily       Historical Provider, MD   omeprazole (PRILOSEC) 20 MG delayed release capsule Take 20 mg by mouth daily       Historical Provider, MD   Multiple Vitamins-Minerals (THERAPEUTIC MULTIVITAMIN-MINERALS) tablet Take 1 tablet by of beer per week    Drug use: No    Sexual activity: Not Currently       Partners: Female   Other Topics Concern    Not on file   Social History Narrative    Not on file      Social Determinants of Health      Financial Resource Strain:     Difficulty of Paying Living Expenses: Not on file   Food Insecurity:     Worried About Running Out of Food in the Last Year: Not on file    Naty of Food in the Last Year: Not on file   Transportation Needs:     Lack of Transportation (Medical): Not on file    Lack of Transportation (Non-Medical): Not on file   Physical Activity:     Days of Exercise per Week: Not on file    Minutes of Exercise per Session: Not on file   Stress:     Feeling of Stress : Not on file   Social Connections:     Frequency of Communication with Friends and Family: Not on file    Frequency of Social Gatherings with Friends and Family: Not on file    Attends Religion Services: Not on file    Active Member of [address] [place] [address] or Organizations: Not on file    Attends Club or Organization Meetings: Not on file    Marital Status: Not on file   Intimate Partner Violence:     Fear of Current or Ex-Partner: Not on file    Emotionally Abused: Not on file    Physically Abused: Not on file    Sexually Abused: Not on file   Housing Stability:     Unable to Pay for Housing in the Last Year: Not on file    Number of [place] in the Last Year: Not on file    Unstable Housing in the Last Year: Not on file         History reviewed. No pertinent family history.     REVIEW OF SYSTEMS (New symptoms):     Eyes:                                       Blurred vision:             No [x]?/Yes []? Diplopia:                      No [x]?/Yes []? Vision loss:                  No [x]?/Yes []? Ears, nose, throat:                Hearing loss:               No [x]?/Yes []? Vertigo:                        No [x]?/Yes []? Swallowing problem:   No [x]?/Yes []? Nose bleeds:               No [x]?/Yes []? Voice hoarseness:      No [x]?/Yes []? Respiratory:                           Cough:                        No [x]?/Yes []? Pleuritic chest pain:     No [x]?/Yes []? Dyspnea:                     No [x]?/Yes []? Wheezing:                   No [x]?/Yes []? Cardiovascular:                     Angina:                        No [x]?/Yes []? Palpitations:                No [x]?/Yes []? Claudication:               No [x]?/Yes []? Leg swelling:               No []?/Yes [x]? Gastrointestinal:                   Nausea or vomiting:    No [x]?/Yes []? Abdominal pain:          No [x]?/Yes []? Intestinal bleeding:      No [x]?/Yes []? Musculoskeletal:                   Leg pain:                     No [x]?/Yes []? Back pain:                   No [x]?/Yes []? Weakness:                  No []?/Yes [x]? Neurologic:                            Numbness:                  No [x]?/Yes []? Paralysis:                    No [x]?/Yes []? Headaches:                 No [x]?/Yes []? Hematologic, lymphatic:      Anemia:                       No [x]?/Yes []? Bleeding or bruising:   No [x]?/Yes []? Fevers or chills:           No [x]?/Yes []? Endocrine:                             Temp intolerance:       No [x]?/Yes []? Polydipsia, polyuria:    No [x]?/Yes []? Skin:                                       Rash:                           No [x]?/Yes []? Ulcers:                         No [x]?/Yes []? Abnorm pigment:        No [x]?/Yes []? :                                          Frequency/urgency:    No [x]?/Yes []? Hematuria:                  No [x]?/Yes []? Incontinence:               No [x]?/Yes []?     PHYSICAL EXAM:  There were no vitals filed for this visit.   General Appearance: alert and oriented to person, place and time, well developed and well- nourished, in no acute distress  Skin: warm and dry, no rash or erythema  Head: normocephalic and atraumatic  Eyes: extraocular eye movements intact, conjunctivae normal  ENT: external ear and ear canal normal bilaterally, nose without deformity  Pulmonary/Chest: clear to auscultation bilaterally- no wheezes, rales or rhonchi, normal air movement, no respiratory distress  Cardiovascular: normal rate, regular rhythm, normal S1 and S2  Abdomen: soft, non-tender, non-distended, normal bowel sounds, no masses or organomegaly  Musculoskeletal: normal range of motion, no joint swelling, deformity or tenderness  Neurologic: no cranial nerve deficit, gait, coordination and speech normal  Extremities: + leg edema bilaterally  R LE: unstageable heel wound covered in eschar  L LE: larger unstageable heel wound covered in eschar      PULSE EXAM      Right      Left   Brachial       Radial 2 2   Femoral       Popliteal     Dorsalis Pedis signal signal   Posterior Tibial signal signal   (3=normal, 2=diminished, 1=barely palpable, 4=widened)            Problem List Items Addressed This Visit      None               Visit Diagnoses      Unstageable pressure ulcer of left heel (HCC)    -  Primary     Unstageable pressure ulcer of right heel (HCC)         Atherosclerosis of artery of extremity with ulceration (HCC)              I reviewed with the patient that he has evidence of arterial occlusive disease on exam. At this point, due to his nonhealing wounds, I would recommend arteriogram. The procedure including risks, benefits, and alternative options were dicussed. The patient understands and wishes to proceed. We will proceed with the left leg first as the wound is worse on that side. We will also obtain ABIs prior to his procedure to obtain a baseline. Patient was seen and examined. Agree with above. Were planning arteriogram the bilateral lower extremity. Left is worse than the right therefore we will come from the right to the left side. H&P has been updated. Unchanged from prior. Risk benefits alternatives were discussed again with the patient he understands and wishes to proceed    His prior ABIs were 1 bilaterally. However he had poor signals on physical examination. He may have inline flow if he has inline flow with an I recommend just local wound care. However based on the findings and the ulcerations. I would not be surprised if there is disease distally.     Inocencia Delacruz

## 2021-12-20 NOTE — PROGRESS NOTES
Patient [address] printed and reviewed with Negrito Trveino at [facility]. IV removed and dressing applied. Telemetry box removed, cleaned, and returned to nurses station. Elizabeth Hackett from [facility] providing transportation to patient. Patient in possession of all bedside belongings including his home wheelchair upon discharge from the unit.

## 2021-12-20 NOTE — OP NOTE
Operative Note      Patient: Milton Teran. YOB: 1932  MRN: [id number]    Date of Procedure: 12/20/2021    Preoperative diagnosis: Bilateral lower extremity nonhealing heel ulcers    Postoperative diagnosis: Same    Findings: The abdominal aorta is widely patent. Mild calcific atherosclerotic disease is noted. The bilateral renals are patent. The infrarenal abdominal aorta is patent. The bilateral common iliacs, hypogastric, and external iliacs are widely patent. The right lower extremity demonstrates a common femoral, profunda, superficial femoral, and popliteal that are widely patent. The below-knee popliteal is widely patent. The trifurcation is patent except for the anterior tibial artery which occludes just distal to the takeoff. The dominant runoff is the posterior tibial vessel all the way onto the foot. The peroneal vessel also continues just to the level of the ankle. Left lower extremity demonstrates a common femoral, profunda femoris, superficial femoral, above-knee and below-knee popliteal artery all are patent. There is mild calcific disease noted. He has an anterior tibial artery that is occluded right at the takeoff. This reconstitutes just above the level of the ankle. The posterior tibial is a dominant runoff to the foot without any significant disease. The peroneal extends down to the ankle. Surgeon: Antwan Figueroa MD    Assistant: None    Anesthesia: Local combined with fentanyl and Versed    Estimated blood loss: Less than 10 cc    Estimated contrast: Approximate 60 to 65 cc    Procedure:  #1 ultrasound-guided needle access the right common femoral artery  #2 abdominal aortogram with catheter position placed above the level of the suprarenal aortic position.   Images were also taken infrarenally at the aortic bifurcation  #3 catheter position at the contralateral common femoral and superficial femoral artery    Description of the procedure: After risk benefits and alternatives were discussed with the patient consent was achieved. The day the procedure is brought to the operating room and placed in the supine position. I reviewed his ABIs. His ABIs were good he had good waveforms. However secondary the fact that he has been nonhealing for the better part of 2 months. We decided to perform an arteriogram to rule out any occlusive disease and any need for any intervention. The day the procedure is brought to the Cath Lab and placed in supine position. He was prepped and draped in the standard sterile fashion. A timeout was taken verify the person the operative site as well as the procedure. Fluoroscopy was used identify the femoral head. There was an indwelling prosthesis. Ultrasound was used identify the bifurcation. With ultrasound guidance lidocaine was infused followed by micropuncture needle micropuncture sheath and wire. We then placed a Bentson wire upsized to a 4 Western Aisha sheath/flushed with heparinized saline solution. The pigtail catheter was brought up to the level of the suprarenal aortic position images taken. Brought down to the infrarenal aortic position and [id number] image was taken. We then crossed over the aortic bifurcation with a pigtail catheter the Bentson wire. The catheter was placed at the common femoral artery and the proximal superficial femoral artery. The wire was then removed. Images were taken of the left extremity via the pigtail catheter. See above at this point we had good runoff. Anterior tibial vessel was occluded. But based on his blood flow in the ulcer location no plans for intervention were performed    We then took all the catheters and wires out. We we then imaged the right extremity via the sheath demonstrating the above findings. He has similar bilateral disease. At this point the catheters were taken out pressure held.   At the end of the procedure over-the-counter for the patient tolerated procedure well he go back to podiatry. No plans for any additional vascular surgical intervention. He will need aggressive wound care and offloading.         Electronically signed by Sixto Parham MD on 12/20/2021 at 8:20 AM

## 2021-12-20 NOTE — CARE COORDINATION
12/20: Transition of care: Pt came in for an Angiogram with Margo Allison on 12/20. Pt is from [employer] is to return to [employer]. Pt has a wheelchair & his RN Yusra Lay has arranged transportation. Sw/KEITH will continue to follow.  Electronically signed by Brett Nunes RN on 12/20/2021 at 12:04 PM

## 2022-01-05 ENCOUNTER — HOSPITAL ENCOUNTER (OUTPATIENT)
Dept: WOUND CARE | Age: 87
Discharge: HOME | End: 2022-01-05
Payer: MEDICARE

## 2022-01-05 VITALS
HEIGHT: 72 IN | DIASTOLIC BLOOD PRESSURE: 70 MMHG | WEIGHT: 243 LBS | HEART RATE: 94 BPM | SYSTOLIC BLOOD PRESSURE: 148 MMHG | RESPIRATION RATE: 18 BRPM | BODY MASS INDEX: 32.91 KG/M2 | TEMPERATURE: 96.5 F

## 2022-01-05 DIAGNOSIS — L97.422 DIABETIC ULCER OF LEFT HEEL ASSOCIATED WITH DIABETES MELLITUS DUE TO UNDERLYING CONDITION, WITH FAT LAYER EXPOSED (HCC): ICD-10-CM

## 2022-01-05 DIAGNOSIS — E08.621 DIABETIC ULCER OF LEFT HEEL ASSOCIATED WITH DIABETES MELLITUS DUE TO UNDERLYING CONDITION, WITH FAT LAYER EXPOSED (HCC): ICD-10-CM

## 2022-01-05 DIAGNOSIS — L97.412 DIABETIC ULCER OF RIGHT HEEL ASSOCIATED WITH TYPE 2 DIABETES MELLITUS, WITH FAT LAYER EXPOSED (HCC): ICD-10-CM

## 2022-01-05 DIAGNOSIS — E11.621 DIABETIC ULCER OF RIGHT HEEL ASSOCIATED WITH TYPE 2 DIABETES MELLITUS, WITH FAT LAYER EXPOSED (HCC): ICD-10-CM

## 2022-01-05 PROBLEM — L97.429 DIABETIC ULCER OF LEFT HEEL (HCC): Status: ACTIVE | Noted: 2022-01-05

## 2022-01-05 PROCEDURE — 87070 CULTURE OTHR SPECIMN AEROBIC: CPT

## 2022-01-05 PROCEDURE — 11045 DBRDMT SUBQ TISS EACH ADDL: CPT

## 2022-01-05 PROCEDURE — 87075 CULTR BACTERIA EXCEPT BLOOD: CPT

## 2022-01-05 PROCEDURE — 99203 OFFICE O/P NEW LOW 30 MIN: CPT

## 2022-01-05 PROCEDURE — 11042 DBRDMT SUBQ TIS 1ST 20SQCM/<: CPT

## 2022-01-05 RX ORDER — LIDOCAINE 50 MG/G
OINTMENT TOPICAL ONCE
Status: CANCELLED | OUTPATIENT
Start: 2022-01-05 | End: 2022-01-05

## 2022-01-05 RX ORDER — LIDOCAINE HYDROCHLORIDE 20 MG/ML
JELLY TOPICAL ONCE
Status: CANCELLED | OUTPATIENT
Start: 2022-01-05 | End: 2022-01-05

## 2022-01-05 RX ORDER — CLOBETASOL PROPIONATE 0.5 MG/G
OINTMENT TOPICAL ONCE
Status: CANCELLED | OUTPATIENT
Start: 2022-01-05 | End: 2022-01-05

## 2022-01-05 RX ORDER — LIDOCAINE HYDROCHLORIDE 40 MG/ML
SOLUTION TOPICAL ONCE
Status: CANCELLED | OUTPATIENT
Start: 2022-01-05 | End: 2022-01-05

## 2022-01-05 RX ORDER — BACITRACIN ZINC AND POLYMYXIN B SULFATE 500; 1000 [USP'U]/G; [USP'U]/G
OINTMENT TOPICAL ONCE
Status: CANCELLED | OUTPATIENT
Start: 2022-01-05 | End: 2022-01-05

## 2022-01-05 RX ORDER — GENTAMICIN SULFATE 1 MG/G
OINTMENT TOPICAL ONCE
Status: CANCELLED | OUTPATIENT
Start: 2022-01-05 | End: 2022-01-05

## 2022-01-05 RX ORDER — LIDOCAINE HYDROCHLORIDE 40 MG/ML
SOLUTION TOPICAL ONCE
Status: DISCONTINUED | OUTPATIENT
Start: 2022-01-05 | End: 2022-01-06 | Stop reason: HOSPADM

## 2022-01-05 RX ORDER — LIDOCAINE 40 MG/G
CREAM TOPICAL ONCE
Status: CANCELLED | OUTPATIENT
Start: 2022-01-05 | End: 2022-01-05

## 2022-01-05 RX ORDER — BACITRACIN, NEOMYCIN, POLYMYXIN B 400; 3.5; 5 [USP'U]/G; MG/G; [USP'U]/G
OINTMENT TOPICAL ONCE
Status: CANCELLED | OUTPATIENT
Start: 2022-01-05 | End: 2022-01-05

## 2022-01-05 RX ORDER — BETAMETHASONE DIPROPIONATE 0.05 %
OINTMENT (GRAM) TOPICAL ONCE
Status: CANCELLED | OUTPATIENT
Start: 2022-01-05 | End: 2022-01-05

## 2022-01-05 RX ORDER — GINSENG 100 MG
CAPSULE ORAL ONCE
Status: CANCELLED | OUTPATIENT
Start: 2022-01-05 | End: 2022-01-05

## 2022-01-05 ASSESSMENT — PAIN - FUNCTIONAL ASSESSMENT: PAIN_FUNCTIONAL_ASSESSMENT: PREVENTS OR INTERFERES SOME ACTIVE ACTIVITIES AND ADLS

## 2022-01-05 ASSESSMENT — PAIN DESCRIPTION - PAIN TYPE: TYPE: CHRONIC PAIN

## 2022-01-05 ASSESSMENT — PAIN DESCRIPTION - ONSET: ONSET: ON-GOING

## 2022-01-05 ASSESSMENT — PAIN DESCRIPTION - ORIENTATION: ORIENTATION: RIGHT;LEFT

## 2022-01-05 ASSESSMENT — PAIN DESCRIPTION - LOCATION: LOCATION: FOOT

## 2022-01-05 ASSESSMENT — PAIN DESCRIPTION - DESCRIPTORS: DESCRIPTORS: ACHING

## 2022-01-05 ASSESSMENT — PAIN DESCRIPTION - FREQUENCY: FREQUENCY: CONTINUOUS

## 2022-01-05 ASSESSMENT — PAIN SCALES - GENERAL: PAINLEVEL_OUTOF10: 2

## 2022-01-05 ASSESSMENT — PAIN DESCRIPTION - PROGRESSION: CLINICAL_PROGRESSION: NOT CHANGED

## 2022-01-05 NOTE — PROGRESS NOTES
Wound Healing Center  History and Physical/Consultation  Podiatry    Referring Physician : Roya Hancock MD  Juarez Justice. MEDICAL RECORD NUMBER:  [id number]  AGE: 80 y.o. GENDER: male  : 1932  EPISODE DATE:  2022  Subjective:     Chief Complaint   Patient presents with    Wound Check     bilateral heels         HISTORY of PRESENT ILLNESS HPI     Juarez Justice. is a 80 y.o. male who presents today for wound/ulcer evaluation. History of Wound Context:  The patient has had a wound of diabetic b/l heels,and right ankle which was first noted approximately months ago. This has been treated betadyne. On their initial visit to the wound healing center, 22 ,  the patient has noted that the wound has been improving. The patient has had similar previous wounds in the past.      Pt is not on abx at time of initial visit.       Wound/Ulcer Pain Timing/Severity: constant  Quality of pain: sharp  Severity:  3 / 10   Modifying Factors: Pain worsens with walking  Associated Signs/Symptoms: edema, erythema and drainage    Ulcer Identification:  Ulcer Type: diabetic  Contributing Factors: edema and diabetes    Diabetic/Pressure/Non Pressure Ulcers onl y:  Ulcer: Diabetic ulcer, fat layer exposed    If patient has diabetic lower extremity wounds  Campbell Classification of diabetic lower extremity wounds:    Grade Description   []  0 No open wound   []  1 Superficial ulcer involving the full skin thickness   [x]  2 Deep ulcer involves ligament, tendon, joint capsule, or fascia  No bone involvement or abscess presence   []  3 Deep Ulcer with abcess formation and/or osteomyelitis   []  4 Localized gangrene   []  5 Extensive gangrene of the foot     Wound: N/A        PAST MEDICAL HISTORY      Diagnosis Date    Arthritis     Cancer ([facility] [address]     breast    CHF (congestive heart failure) ([facility] [address]     Diabetes mellitus ([facility] [address]     History of lumpectomy     Hx of blood clots     Hyperlipidemia     Hypertension     PE (pulmonary thromboembolism) ([facility] [address]     Staph aureus infection      Past Surgical History:   Procedure Laterality Date    [facility] S[facility]  9/2/2021         MASTECTOMY      TOE AMPUTATION      TOE SURGERY      TONSILLECTOMY       History reviewed. No pertinent family history. Social History     Tobacco Use    Smoking status: Never Smoker    Smokeless tobacco: Never Used   Vaping Use    Vaping Use: Never used   Substance Use Topics    Alcohol use: Not Currently     Alcohol/week: 1.0 standard drink     Types: 1 Cans of beer per week    Drug use: No     Allergies   Allergen Reactions    Clindamycin/Lincomycin     Sulfa Antibiotics      Current Outpatient Medications on File Prior to Encounter   Medication Sig Dispense Refill    miconazole (MICOTIN) 2 % powder Apply topically 2 times daily. 45 g 1    ascorbic acid (VITAMIN C) 500 MG tablet Take 1 tablet by mouth 2 times daily 30 tablet 0    Vitamin D (CHOLECALCIFEROL) 50 MCG (2000 UT) TABS tablet Take 1 tablet by mouth daily 60 tablet 0    furosemide (LASIX) 40 MG tablet Take 40 mg by mouth daily      omeprazole (PRILOSEC) 20 MG delayed release capsule Take 20 mg by mouth daily      Multiple Vitamins-Minerals (THERAPEUTIC MULTIVITAMIN-MINERALS) tablet Take 1 tablet by mouth daily      Multiple Vitamins-Minerals (PRESERVISION AREDS PO) Take 1 tablet by mouth 2 times daily (with meals)      acetaminophen (TYLENOL) 325 MG tablet Take 650 mg by mouth every 6 hours as needed for Pain or Fever      apixaban (ELIQUIS) 5 MG TABS tablet Take 5 mg by mouth 2 times daily (before meals)       gabapentin (NEURONTIN) 100 MG capsule Take 200 mg by mouth Daily with supper.        atorvastatin (LIPITOR) 80 MG tablet Take 80 mg by mouth Daily with supper       INSULIN LISP PROT & LISP, HUM, (75-25) 100 UNIT/ML SUSP Inject 72 Units into the skin daily (with breakfast)       INSULIN LISP PROT & LISP, HUM, (75-25) 100 UNIT/ML SUSP Inject 55 Units into the skin Daily with supper       clotrimazole-betamethasone (LOTRISONE) cream Apply  topically as needed. Apply topically 2 times daily.  zinc sulfate (ZINCATE) 220 (50 Zn) MG capsule Take 1 capsule by mouth daily for 14 days 14 capsule 0    calcium carbonate (TUMS) 500 MG chewable tablet Take 1 tablet by mouth every 4 hours as needed for Heartburn      ipratropium-albuterol (DUONEB) 0.5-2.5 (3) MG/3ML SOLN nebulizer solution Inhale 3 mLs into the lungs every 4 hours (while awake) for 30 doses 90 mL 0     No current facility-administered medications on file prior to encounter.        REVIEW OF SYSTEMS   ROS : All others Negative if blank [], Positive if [x]  General Vascular   [] Fevers [] Claudication   [] Chills [] Rest Pain   Skin Neurologic   [x] Tissue Loss [x] Lower extremity neuropathy     Objective:    BP (!) 148/70   Pulse 94   Temp 96.5 °F (35.8 °C) (Temporal)   Resp 18   Ht 6' (1.829 m)   Wt 243 lb (110.2 kg)   BMI 32.96 kg/m²   Wt Readings from Last 3 Encounters:   01/05/22 243 lb (110.2 kg)   12/01/21 248 lb (112.5 kg)   09/03/21 245 lb 14.6 oz (111.5 kg)       PHYSICAL EXAM   CONSTITUTIONAL:   Awake, alert, cooperative   PSYCHIATRIC :  Oriented to time, place and person      normal insight to disease process  EXTREMITIES:   R LE Open wounds are noted   Skin color is abnormal with ulcers   Edema is  noted   Sensation deficit noted - protective   Palpation of the foot does cause pain   4/5 strength DF/PF  L LE Open wounds are noted   Skin color is abnormal with ulcers   Edema is  noted   Sensation deficit noted - protective   Palpation of the foot does cause pain   4/5 strength DF/PF  R dorsalis pedis 1 L dorsalis pedis 1   R posterior tibial 1 L posterior tibial 1     Assessment:     Problem List Items Addressed This Visit     Diabetic ulcer of right heel associated with type 2 diabetes mellitus, with fat layer exposed ([address]    Diabetic ulcer of left heel (HCC)          Pre Debridement Measurements:  Are located in the [place]  Documentation Flow Sheet  Post Debridement Measurements:  Wound/Ulcer Descriptions are Pre Debridement except measurements:     Wound 01/05/22 Heel Left #1 (Active)   Wound Image   01/05/22 0902   Wound Etiology Diabetic Campbell 2 01/05/22 0943   Wound Length (cm) 4.6 cm 01/05/22 0902   Wound Width (cm) 3 cm 01/05/22 0902   Wound Depth (cm) 0.2 cm 01/05/22 0902   Wound Surface Area (cm^2) 13.8 cm^2 01/05/22 0902   Wound Volume (cm^3) 2.76 cm^3 01/05/22 0902   Post-Procedure Length (cm) 4.6 cm 01/05/22 0943   Post-Procedure Width (cm) 3 cm 01/05/22 0943   Post-Procedure Depth (cm) 0.2 cm 01/05/22 0943   Post-Procedure Surface Area (cm^2) 13.8 cm^2 01/05/22 0943   Post-Procedure Volume (cm^3) 2.76 cm^3 01/05/22 0943   Wound Assessment Pink/red 01/05/22 0902   Drainage Amount Small 01/05/22 0902   Drainage Description Yellow 01/05/22 0902   Odor None 01/05/22 0902   Rashmi-wound Assessment Hyperkeratosis (callous) 01/05/22 0902   Number of days: 0       Wound 01/05/22 Heel Right #2 (Active)   Wound Image   01/05/22 0902   Wound Etiology Venous 01/05/22 0943   Wound Length (cm) 2.5 cm 01/05/22 0902   Wound Width (cm) 1.7 cm 01/05/22 0902   Wound Depth (cm) 0.2 cm 01/05/22 0902   Wound Surface Area (cm^2) 4.25 cm^2 01/05/22 0902   Wound Volume (cm^3) 0.85 cm^3 01/05/22 0902   Post-Procedure Length (cm) 2.5 cm 01/05/22 0943   Post-Procedure Width (cm) 1.7 cm 01/05/22 0943   Post-Procedure Depth (cm) 0.1 cm 01/05/22 0943   Post-Procedure Surface Area (cm^2) 4.25 cm^2 01/05/22 0943   Post-Procedure Volume (cm^3) 0.425 cm^3 01/05/22 0943   Wound Assessment Eschar dry 01/05/22 0902   Drainage Amount None 01/05/22 0902   Odor None 01/05/22 0902   Rashmi-wound Assessment Hyperkeratosis (callous) 01/05/22 0902   Number of days: 0       Wound 01/05/22 Ankle Right;Lateral #3 (Active)   Wound Image   01/05/22 0902   Wound Etiology Venous 01/05/22 0943   Wound Length (cm) 0.3 cm 01/05/22 0902   Wound Width (cm) 0.3 cm 01/05/22 0902   Wound Depth (cm) 0.1 cm 01/05/22 0902   Wound Surface Area (cm^2) 0.09 cm^2 01/05/22 0902   Wound Volume (cm^3) 0.009 cm^3 01/05/22 0902   Post-Procedure Length (cm) 0.3 cm 01/05/22 0943   Post-Procedure Width (cm) 0.3 cm 01/05/22 0943   Post-Procedure Depth (cm) 0.1 cm 01/05/22 0943   Post-Procedure Surface Area (cm^2) 0.09 cm^2 01/05/22 0943   Post-Procedure Volume (cm^3) 0.009 cm^3 01/05/22 0943   Wound Assessment Pink/red 01/05/22 0902   Drainage Amount Small 01/05/22 0902   Drainage Description Yellow 01/05/22 0902   Odor None 01/05/22 0902   Rashmi-wound Assessment Fragile 01/05/22 0902   Number of days: 0       Wound 01/05/22 Ankle Left;Lateral #4 (Active)   Wound Image   01/05/22 0902   Wound Etiology Venous 01/05/22 0943   Wound Length (cm) 0.7 cm 01/05/22 0902   Wound Width (cm) 0.6 cm 01/05/22 0902   Wound Depth (cm) 0.2 cm 01/05/22 0902   Wound Surface Area (cm^2) 0.42 cm^2 01/05/22 0902   Wound Volume (cm^3) 0.084 cm^3 01/05/22 0902   Post-Procedure Length (cm) 0.7 cm 01/05/22 0943   Post-Procedure Width (cm) 0.6 cm 01/05/22 0943   Post-Procedure Depth (cm) 0.2 cm 01/05/22 0943   Post-Procedure Surface Area (cm^2) 0.42 cm^2 01/05/22 0943   Post-Procedure Volume (cm^3) 0.084 cm^3 01/05/22 0943   Wound Assessment Fibrin;Pink/red 01/05/22 0902   Drainage Amount Moderate 01/05/22 0902   Drainage Description Yellow 01/05/22 0902   Odor None 01/05/22 0902   Rashmi-wound Assessment Intact 01/05/22 0902   Number of days: 0       Wound 01/05/22 Pretibial Left #5 (Active)   Wound Image   01/05/22 0902   Wound Etiology Venous 01/05/22 0943   Wound Length (cm) 3.5 cm 01/05/22 0902   Wound Width (cm) 3 cm 01/05/22 0902   Wound Depth (cm) 0.1 cm 01/05/22 0902   Wound Surface Area (cm^2) 10.5 cm^2 01/05/22 0902   Wound Volume (cm^3) 1.05 cm^3 01/05/22 0902   Post-Procedure Length (cm) 3.5 cm 01/05/22 0943   Post-Procedure Width (cm) 3 cm 01/05/22 0943

## 2022-01-05 NOTE — PLAN OF CARE
Problem: Pain:  Goal: Pain level will decrease  Description: Pain level will decrease  Outcome: Ongoing  Goal: Control of acute pain  Description: Control of acute pain  Outcome: Ongoing  Goal: Control of chronic pain  Description: Control of chronic pain  Outcome: Ongoing     Problem: Wound:  Goal: Will show signs of wound healing; wound closure and no evidence of infection  Description: Will show signs of wound healing; wound closure and no evidence of infection  Outcome: Ongoing     Problem: Venous:  Goal: Signs of wound healing will improve  Description: Signs of wound healing will improve  Outcome: Ongoing     Problem: Blood Glucose:  Goal: Ability to maintain appropriate glucose levels will improve  Description: Ability to maintain appropriate glucose levels will improve  Outcome: Ongoing

## 2022-01-08 LAB — WOUND/ABSCESS: NORMAL

## 2022-01-11 LAB — ANAEROBIC CULTURE: NORMAL

## 2022-01-12 ENCOUNTER — HOSPITAL ENCOUNTER (OUTPATIENT)
Dept: WOUND CARE | Age: 87
Discharge: HOME | End: 2022-01-12
Payer: MEDICARE

## 2022-01-12 VITALS
TEMPERATURE: 97.2 F | DIASTOLIC BLOOD PRESSURE: 76 MMHG | BODY MASS INDEX: 32.91 KG/M2 | WEIGHT: 243 LBS | SYSTOLIC BLOOD PRESSURE: 138 MMHG | RESPIRATION RATE: 18 BRPM | HEIGHT: 72 IN | HEART RATE: 83 BPM

## 2022-01-12 DIAGNOSIS — L97.412 DIABETIC ULCER OF RIGHT HEEL ASSOCIATED WITH TYPE 2 DIABETES MELLITUS, WITH FAT LAYER EXPOSED (HCC): Primary | ICD-10-CM

## 2022-01-12 DIAGNOSIS — E11.621 DIABETIC ULCER OF RIGHT HEEL ASSOCIATED WITH TYPE 2 DIABETES MELLITUS, WITH FAT LAYER EXPOSED (HCC): Primary | ICD-10-CM

## 2022-01-12 DIAGNOSIS — E08.621 DIABETIC ULCER OF LEFT HEEL ASSOCIATED WITH DIABETES MELLITUS DUE TO UNDERLYING CONDITION, LIMITED TO BREAKDOWN OF SKIN (HCC): ICD-10-CM

## 2022-01-12 DIAGNOSIS — L97.421 DIABETIC ULCER OF LEFT HEEL ASSOCIATED WITH DIABETES MELLITUS DUE TO UNDERLYING CONDITION, LIMITED TO BREAKDOWN OF SKIN (HCC): ICD-10-CM

## 2022-01-12 PROCEDURE — 11042 DBRDMT SUBQ TIS 1ST 20SQCM/<: CPT

## 2022-01-12 PROCEDURE — 11042 DBRDMT SUBQ TIS 1ST 20SQCM/<: CPT | Performed by: SURGERY

## 2022-01-12 PROCEDURE — 6370000000 HC RX 637 (ALT 250 FOR IP): Performed by: PODIATRIST

## 2022-01-12 RX ORDER — LIDOCAINE HYDROCHLORIDE 20 MG/ML
JELLY TOPICAL ONCE
Status: CANCELLED | OUTPATIENT
Start: 2022-01-12 | End: 2022-01-12

## 2022-01-12 RX ORDER — LIDOCAINE HYDROCHLORIDE 40 MG/ML
SOLUTION TOPICAL ONCE
Status: CANCELLED | OUTPATIENT
Start: 2022-01-12 | End: 2022-01-12

## 2022-01-12 RX ORDER — GENTAMICIN SULFATE 1 MG/G
OINTMENT TOPICAL ONCE
Status: CANCELLED | OUTPATIENT
Start: 2022-01-12 | End: 2022-01-12

## 2022-01-12 RX ORDER — BACITRACIN, NEOMYCIN, POLYMYXIN B 400; 3.5; 5 [USP'U]/G; MG/G; [USP'U]/G
OINTMENT TOPICAL ONCE
Status: CANCELLED | OUTPATIENT
Start: 2022-01-12 | End: 2022-01-12

## 2022-01-12 RX ORDER — BACITRACIN ZINC AND POLYMYXIN B SULFATE 500; 1000 [USP'U]/G; [USP'U]/G
OINTMENT TOPICAL ONCE
Status: CANCELLED | OUTPATIENT
Start: 2022-01-12 | End: 2022-01-12

## 2022-01-12 RX ORDER — LIDOCAINE 40 MG/G
CREAM TOPICAL ONCE
Status: CANCELLED | OUTPATIENT
Start: 2022-01-12 | End: 2022-01-12

## 2022-01-12 RX ORDER — GINSENG 100 MG
CAPSULE ORAL ONCE
Status: CANCELLED | OUTPATIENT
Start: 2022-01-12 | End: 2022-01-12

## 2022-01-12 RX ORDER — CLOBETASOL PROPIONATE 0.5 MG/G
OINTMENT TOPICAL ONCE
Status: CANCELLED | OUTPATIENT
Start: 2022-01-12 | End: 2022-01-12

## 2022-01-12 RX ORDER — LIDOCAINE 50 MG/G
OINTMENT TOPICAL ONCE
Status: CANCELLED | OUTPATIENT
Start: 2022-01-12 | End: 2022-01-12

## 2022-01-12 RX ORDER — BETAMETHASONE DIPROPIONATE 0.05 %
OINTMENT (GRAM) TOPICAL ONCE
Status: CANCELLED | OUTPATIENT
Start: 2022-01-12 | End: 2022-01-12

## 2022-01-12 RX ORDER — LIDOCAINE HYDROCHLORIDE 40 MG/ML
SOLUTION TOPICAL ONCE
Status: COMPLETED | OUTPATIENT
Start: 2022-01-12 | End: 2022-01-12

## 2022-01-12 RX ADMIN — LIDOCAINE HYDROCHLORIDE 10 ML: 40 SOLUTION TOPICAL at 08:25

## 2022-01-12 ASSESSMENT — PAIN SCALES - GENERAL: PAINLEVEL_OUTOF10: 0

## 2022-01-12 ASSESSMENT — PAIN - FUNCTIONAL ASSESSMENT: PAIN_FUNCTIONAL_ASSESSMENT: PREVENTS OR INTERFERES SOME ACTIVE ACTIVITIES AND ADLS

## 2022-01-12 NOTE — PROGRESS NOTES
Wound Healing Center Followup Visit Note    Referring Physician : Carl Nicolas MD  [facility] MEDICAL RECORD NUMBER:  [id number]  AGE: 80 y.o. GENDER: male  : 1932  EPISODE DATE:  2022    Subjective:     Chief Complaint   Patient presents with    Wound Check     bilateral heels      HISTORY of PRESENT ILLNESS HPI   [facility] Anders Elaine. is a 80 y.o. male who presents today in regards to follow up evaluation and treatment of wound/ulcer. That patient's past medical, family and social hx were reviewed and changes were made if present. History of Wound Context:  The patient has had a wound of diabetic b/l heels,and right ankle which was first noted approximately months ago. This has been treated betadyne. On their initial visit to the wound healing center, 22 ,  the patient has noted that the wound has been improving. The patient has had similar previous wounds in the past.       Pt is not on abx at time of initial visit. 2022  · Patient does have heel ulcers left heel more than the right, with necrotic tissues, wound care nurses tells me that the last culture did not reveal any bacterial growth      Wound/Ulcer Pain Timing/Severity: constant  Quality of pain: sharp  Severity:  3 / 10   Modifying Factors: Pain worsens with walking  Associated Signs/Symptoms: edema, erythema and drainage     Ulcer Identification:  Ulcer Type: diabetic  Contributing Factors: edema and diabetes     Diabetic/Pressure/Non Pressure Ulcers onl y:  Ulcer: Diabetic ulcer, fat layer exposed     If patient has diabetic lower extremity wounds  Campbell Classification of diabetic lower extremity wounds:     Grade Description   []? 0 No open wound   []? 1 Superficial ulcer involving the full skin thickness   [x]? 2 Deep ulcer involves ligament, tendon, joint capsule, or fascia  No bone involvement or abscess presence   []?   3 Deep Ulcer with abcess formation and/or osteomyelitis   []?  4 Localized gangrene   []? 5 Extensive gangrene of the foot      Wound:  Patient has bilateral heel ulcers, left more than the right, please look at the wound care notes for details                                                  PAST MEDICAL HISTORY      Diagnosis Date    Arthritis     Cancer [facility])     breast    CHF ([facility]estive heart failure) [facility]     Diabetes mellitus ([facility]     History of lumpectomy     Hx of blood clots     Hyperlipidemia     Hypertension     PE (pulmonary thromboembolism) ([facility]     Staph aureus infection      Past Surgical History:   Procedure Laterality Date    [facility] SINGLE  9/2/2021         MASTECTOMY      TOE AMPUTATION      TOE SURGERY      TONSILLECTOMY       History reviewed. No pertinent family history. Social History     Tobacco Use    Smoking status: Never Smoker    Smokeless tobacco: Never Used   Vaping Use    Vaping Use: Never used   Substance Use Topics    Alcohol use: Not Currently     Alcohol/week: 1.0 standard drink     Types: 1 Cans of beer per week    Drug use: No     Allergies   Allergen Reactions    Clindamycin/Lincomycin     Sulfa Antibiotics      Current Outpatient Medications on File Prior to Encounter   Medication Sig Dispense Refill    miconazole (MICOTIN) 2 % powder Apply topically 2 times daily.  45 g 1    ascorbic acid (VITAMIN C) 500 MG tablet Take 1 tablet by mouth 2 times daily 30 tablet 0    Vitamin D (CHOLECALCIFEROL) 50 MCG (2000 UT) TABS tablet Take 1 tablet by mouth daily 60 tablet 0    furosemide (LASIX) 40 MG tablet Take 40 mg by mouth daily      omeprazole (PRILOSEC) 20 MG delayed release capsule Take 20 mg by mouth daily      Multiple Vitamins-Minerals (THERAPEUTIC MULTIVITAMIN-MINERALS) tablet Take 1 tablet by mouth daily      Multiple Vitamins-Minerals (PRESERVISION AREDS PO) Take 1 tablet by mouth 2 times daily (with meals)      apixaban (ELIQUIS) 5 MG TABS tablet Take 5 mg by mouth 2 times daily (before meals)  gabapentin (NEURONTIN) 100 MG capsule Take 200 mg by mouth Daily with supper.  atorvastatin (LIPITOR) 80 MG tablet Take 80 mg by mouth Daily with supper       INSULIN LISP PROT & LISP, HUM, (75-25) 100 UNIT/ML SUSP Inject 72 Units into the skin daily (with breakfast)       INSULIN LISP PROT & LISP, HUM, (75-25) 100 UNIT/ML SUSP Inject 55 Units into the skin Daily with supper       clotrimazole-betamethasone (LOTRISONE) cream Apply  topically as needed. Apply topically 2 times daily.  zinc sulfate (ZINCATE) 220 (50 Zn) MG capsule Take 1 capsule by mouth daily for 14 days 14 capsule 0    acetaminophen (TYLENOL) 325 MG tablet Take 650 mg by mouth every 6 hours as needed for Pain or Fever      calcium carbonate (TUMS) 500 MG chewable tablet Take 1 tablet by mouth every 4 hours as needed for Heartburn      ipratropium-albuterol (DUONEB) 0.5-2.5 (3) MG/3ML SOLN nebulizer solution Inhale 3 mLs into the lungs every 4 hours (while awake) for 30 doses 90 mL 0     No current facility-administered medications on file prior to encounter.        REVIEW OF SYSTEMS See HPI    Objective:    /76   Pulse 83   Temp 97.2 °F (36.2 °C) (Temporal)   Resp 18   Ht 6' (1.829 m)   Wt 243 lb (110.2 kg)   BMI 32.96 kg/m²   Wt Readings from Last 3 Encounters:   01/12/22 243 lb (110.2 kg)   01/05/22 243 lb (110.2 kg)   12/01/21 248 lb (112.5 kg)     PHYSICAL EXAM  CONSTITUTIONAL:   Awake, alert, cooperative   EYES:  lids and lashes normal   ENT: external ears and nose without lesions   NECK:  supple, symmetrical, trachea midline   SKIN:  Open wound Present    Assessment:     Problem List Items Addressed This Visit     Diabetic ulcer of left heel ([address]    Relevant Orders    Initiate Outpatient Wound Care Protocol    Diabetic ulcer of right heel associated with type 2 diabetes mellitus, with fat layer exposed ([address] - Primary    Relevant Orders    Initiate Outpatient Wound Care Protocol          Pre Debridement Measurements:  Are located in the [place]  Documentation Flow Sheet  Post Debridement Measurements:  Wound/Ulcer Descriptions are Pre Debridement except measurements:     Wound 01/05/22 Heel Left #1 (Active)   Wound Image   01/05/22 0902   Wound Etiology Diabetic Campbell 2 01/05/22 0943   Wound Length (cm) 5 cm 01/12/22 0814   Wound Width (cm) 2.6 cm 01/12/22 0814   Wound Depth (cm) 1 cm 01/12/22 0814   Wound Surface Area (cm^2) 13 cm^2 01/12/22 0814   Change in Wound Size % (l*w) 5.8 01/12/22 0814   Wound Volume (cm^3) 13 cm^3 01/12/22 0814   Wound Healing % -371 01/12/22 0814   Post-Procedure Length (cm) 4.6 cm 01/05/22 0943   Post-Procedure Width (cm) 3 cm 01/05/22 0943   Post-Procedure Depth (cm) 0.2 cm 01/05/22 0943   Post-Procedure Surface Area (cm^2) 13.8 cm^2 01/05/22 0943   Post-Procedure Volume (cm^3) 2.76 cm^3 01/05/22 0943   Wound Assessment Granulation tissue;Slough 01/12/22 0814   Drainage Amount Moderate 01/12/22 0814   Drainage Description Serosanguinous 01/12/22 0814   Odor None 01/12/22 0814   Rashmi-wound Assessment Maceration 01/12/22 0814   Number of days: 6       Wound 01/05/22 Heel Right #2 (Active)   Wound Image   01/05/22 0902   Wound Etiology Venous 01/05/22 0943   Wound Length (cm) 2.4 cm 01/12/22 0814   Wound Width (cm) 1.5 cm 01/12/22 0814   Wound Depth (cm) 0.1 cm 01/12/22 0814   Wound Surface Area (cm^2) 3.6 cm^2 01/12/22 0814   Change in Wound Size % (l*w) 15.29 01/12/22 0814   Wound Volume (cm^3) 0.36 cm^3 01/12/22 0814   Wound Healing % 58 01/12/22 0814   Post-Procedure Length (cm) 2.5 cm 01/05/22 0943   Post-Procedure Width (cm) 1.7 cm 01/05/22 0943   Post-Procedure Depth (cm) 0.1 cm 01/05/22 0943   Post-Procedure Surface Area (cm^2) 4.25 cm^2 01/05/22 0943   Post-Procedure Volume (cm^3) 0.425 cm^3 01/05/22 0943   Wound Assessment Granulation tissue 01/12/22 0814   Drainage Amount Moderate 01/12/22 0814   Drainage Description Serosanguinous 01/12/22 0814   Odor None 01/12/22 0814 Rashmi-wound Assessment Maceration 01/12/22 0814   Number of days: 6       Wound 01/05/22 Ankle Right;Lateral #3 (Active)   Wound Image   01/05/22 0902   Wound Etiology Venous 01/05/22 0943   Wound Length (cm) 0.9 cm 01/12/22 0814   Wound Width (cm) 0.6 cm 01/12/22 0814   Wound Depth (cm) 0.1 cm 01/12/22 0814   Wound Surface Area (cm^2) 0.54 cm^2 01/12/22 0814   Change in Wound Size % (l*w) -500 01/12/22 0814   Wound Volume (cm^3) 0.054 cm^3 01/12/22 0814   Wound Healing % -500 01/12/22 0814   Post-Procedure Length (cm) 0.3 cm 01/05/22 0943   Post-Procedure Width (cm) 0.3 cm 01/05/22 0943   Post-Procedure Depth (cm) 0.1 cm 01/05/22 0943   Post-Procedure Surface Area (cm^2) 0.09 cm^2 01/05/22 0943   Post-Procedure Volume (cm^3) 0.009 cm^3 01/05/22 0943   Wound Assessment Pink/red 01/12/22 0814   Drainage Amount Moderate 01/12/22 0814   Drainage Description Serous 01/12/22 0814   Odor None 01/12/22 0814   Rashmi-wound Assessment Intact 01/12/22 0814   Number of days: 6       Wound 01/05/22 Ankle Left;Lateral #4 (Active)   Wound Image   01/05/22 0902   Wound Etiology Venous 01/05/22 0943   Wound Length (cm) 0.2 cm 01/12/22 0814   Wound Width (cm) 0.2 cm 01/12/22 0814   Wound Depth (cm) 0.2 cm 01/12/22 0814   Wound Surface Area (cm^2) 0.04 cm^2 01/12/22 0814   Change in Wound Size % (l*w) 90.48 01/12/22 0814   Wound Volume (cm^3) 0.008 cm^3 01/12/22 0814   Wound Healing % 90 01/12/22 0814   Post-Procedure Length (cm) 0.7 cm 01/05/22 0943   Post-Procedure Width (cm) 0.6 cm 01/05/22 0943   Post-Procedure Depth (cm) 0.2 cm 01/05/22 0943   Post-Procedure Surface Area (cm^2) 0.42 cm^2 01/05/22 0943   Post-Procedure Volume (cm^3) 0.084 cm^3 01/05/22 0943   Wound Assessment Pale granulation tissue 01/12/22 0814   Drainage Amount Scant 01/12/22 0814   Drainage Description Yellow 01/12/22 0814   Odor None 01/12/22 0814   Rashmi-wound Assessment Intact 01/12/22 0814   Number of days: 6       Wound 01/05/22 Pretibial Left #5 (Active) Wound Image   01/05/22 0902   Wound Etiology Venous 01/05/22 0943   Wound Length (cm) 0.6 cm 01/12/22 0814   Wound Width (cm) 0.4 cm 01/12/22 0814   Wound Depth (cm) 0.1 cm 01/12/22 0814   Wound Surface Area (cm^2) 0.24 cm^2 01/12/22 0814   Change in Wound Size % (l*w) 97.71 01/12/22 0814   Wound Volume (cm^3) 0.024 cm^3 01/12/22 0814   Wound Healing % 98 01/12/22 0814   Post-Procedure Length (cm) 3.5 cm 01/05/22 0943   Post-Procedure Width (cm) 3 cm 01/05/22 0943   Post-Procedure Depth (cm) 0.1 cm 01/05/22 0943   Post-Procedure Surface Area (cm^2) 10.5 cm^2 01/05/22 0943   Post-Procedure Volume (cm^3) 1.05 cm^3 01/05/22 0943   Wound Assessment Pink/red 01/12/22 0814   Drainage Amount Scant 01/12/22 0814   Drainage Description Serous 01/12/22 0814   Odor None 01/12/22 0814   Rashmi-wound Assessment Fragile 01/12/22 0814   Number of days: 6          Procedure Note  Indications:  Based on my examination of this patient's wound(s)/ulcer(s) today, debridement is required to promote healing and evaluate the wound base. Performed by: Maritza Pickett MD    Consent obtained:  Yes    Time out taken:  Yes    Pain Control: Anesthetic  Anesthetic: 4% Lidocaine Liquid Topical     Debridement:Excisional Debridement    Using curette, scissors and forceps the wound(s)/ulcer(s) was/were sharply debrided down through and including the removal of epidermis, dermis and subcutaneous tissue. Devitalized Tissue Debrided:  fibrin, slough and necrotic/eschar to stimulate bleeding to promote healing, post debridement good bleeding base and wound edges noted    Wound/Ulcer #: 1 and 2    Percent of Wound/Ulcer Debrided: 100%    Total Surface Area Debrided:  15 sq cm     Estimated Blood Loss:  Minimal  Hemostasis Achieved:  by pressure    Procedural Pain:  4  / 10   Post Procedural Pain:  3 / 10     Response to treatment:  Well tolerated by patient.      Plan:   Treatment Note please see attached Discharge Instructions    Written patient dismissal instructions given to patient and signed by patient or POA. Discharge Instructions       Visit Discharge/Physician Orders    Discharge condition: Stable    Assessment of pain at discharge:  NONE    Anesthetic used: 4% lidocaine solution    Discharge to: Home    Left via:Private automobile    Accompanied by: accompanied by SELF    ECF/[facility]:  [facility]    Dressing Orders:    Treatment Orders:    [facility] followup visit ________ONE WEEK WITH DR. JUAREZ_____________________  (Please note your next appointment above and if you are unable to keep, kindly give a 24 hour notice. Thank you.)    Physician signature:__________________________      If you experience any of the following, please call the [facility] [place] during business hours:    * Increase in Pain  * Temperature over 101  * Increase in drainage from your wound  * Drainage with a foul odor  * Bleeding  * Increase in swelling  * Need for compression bandage changes due to slippage, breakthrough drainage. If you need medical attention outside of the business hours of the [address] [place] [address] [facility] please contact your PCP or go to the nearest emergency room.         Electronically signed by Jorge Mccarthy MD on 1/12/2022 at 8:50 AM

## 2022-01-19 ENCOUNTER — HOSPITAL ENCOUNTER (OUTPATIENT)
Dept: WOUND CARE | Age: 87
Discharge: HOME | End: 2022-01-19
Payer: MEDICARE

## 2022-01-19 VITALS
SYSTOLIC BLOOD PRESSURE: 144 MMHG | TEMPERATURE: 97.9 F | DIASTOLIC BLOOD PRESSURE: 80 MMHG | RESPIRATION RATE: 18 BRPM | HEART RATE: 96 BPM

## 2022-01-19 DIAGNOSIS — E08.621 DIABETIC ULCER OF LEFT HEEL ASSOCIATED WITH DIABETES MELLITUS DUE TO UNDERLYING CONDITION, LIMITED TO BREAKDOWN OF SKIN (HCC): ICD-10-CM

## 2022-01-19 DIAGNOSIS — E11.621 DIABETIC ULCER OF RIGHT HEEL ASSOCIATED WITH TYPE 2 DIABETES MELLITUS, WITH FAT LAYER EXPOSED (HCC): Primary | ICD-10-CM

## 2022-01-19 DIAGNOSIS — L97.412 DIABETIC ULCER OF RIGHT HEEL ASSOCIATED WITH TYPE 2 DIABETES MELLITUS, WITH FAT LAYER EXPOSED (HCC): Primary | ICD-10-CM

## 2022-01-19 DIAGNOSIS — L97.421 DIABETIC ULCER OF LEFT HEEL ASSOCIATED WITH DIABETES MELLITUS DUE TO UNDERLYING CONDITION, LIMITED TO BREAKDOWN OF SKIN (HCC): ICD-10-CM

## 2022-01-19 DIAGNOSIS — L97.422 DIABETIC ULCER OF LEFT HEEL ASSOCIATED WITH DIABETES MELLITUS DUE TO UNDERLYING CONDITION, WITH FAT LAYER EXPOSED (HCC): ICD-10-CM

## 2022-01-19 DIAGNOSIS — E08.621 DIABETIC ULCER OF LEFT HEEL ASSOCIATED WITH DIABETES MELLITUS DUE TO UNDERLYING CONDITION, WITH FAT LAYER EXPOSED (HCC): ICD-10-CM

## 2022-01-19 PROCEDURE — 11042 DBRDMT SUBQ TIS 1ST 20SQCM/<: CPT

## 2022-01-19 PROCEDURE — 6370000000 HC RX 637 (ALT 250 FOR IP): Performed by: PODIATRIST

## 2022-01-19 RX ORDER — LIDOCAINE HYDROCHLORIDE 40 MG/ML
SOLUTION TOPICAL ONCE
Status: CANCELLED | OUTPATIENT
Start: 2022-01-19 | End: 2022-01-19

## 2022-01-19 RX ORDER — LIDOCAINE HYDROCHLORIDE 40 MG/ML
SOLUTION TOPICAL ONCE
Status: COMPLETED | OUTPATIENT
Start: 2022-01-19 | End: 2022-01-19

## 2022-01-19 RX ORDER — GINSENG 100 MG
CAPSULE ORAL ONCE
Status: CANCELLED | OUTPATIENT
Start: 2022-01-19 | End: 2022-01-19

## 2022-01-19 RX ORDER — CLOBETASOL PROPIONATE 0.5 MG/G
OINTMENT TOPICAL ONCE
Status: CANCELLED | OUTPATIENT
Start: 2022-01-19 | End: 2022-01-19

## 2022-01-19 RX ORDER — LIDOCAINE 40 MG/G
CREAM TOPICAL ONCE
Status: CANCELLED | OUTPATIENT
Start: 2022-01-19 | End: 2022-01-19

## 2022-01-19 RX ORDER — BACITRACIN, NEOMYCIN, POLYMYXIN B 400; 3.5; 5 [USP'U]/G; MG/G; [USP'U]/G
OINTMENT TOPICAL ONCE
Status: CANCELLED | OUTPATIENT
Start: 2022-01-19 | End: 2022-01-19

## 2022-01-19 RX ORDER — LIDOCAINE HYDROCHLORIDE 20 MG/ML
JELLY TOPICAL ONCE
Status: CANCELLED | OUTPATIENT
Start: 2022-01-19 | End: 2022-01-19

## 2022-01-19 RX ORDER — GENTAMICIN SULFATE 1 MG/G
OINTMENT TOPICAL ONCE
Status: CANCELLED | OUTPATIENT
Start: 2022-01-19 | End: 2022-01-19

## 2022-01-19 RX ORDER — LIDOCAINE 50 MG/G
OINTMENT TOPICAL ONCE
Status: CANCELLED | OUTPATIENT
Start: 2022-01-19 | End: 2022-01-19

## 2022-01-19 RX ORDER — BACITRACIN ZINC AND POLYMYXIN B SULFATE 500; 1000 [USP'U]/G; [USP'U]/G
OINTMENT TOPICAL ONCE
Status: CANCELLED | OUTPATIENT
Start: 2022-01-19 | End: 2022-01-19

## 2022-01-19 RX ORDER — BETAMETHASONE DIPROPIONATE 0.05 %
OINTMENT (GRAM) TOPICAL ONCE
Status: CANCELLED | OUTPATIENT
Start: 2022-01-19 | End: 2022-01-19

## 2022-01-19 RX ADMIN — LIDOCAINE HYDROCHLORIDE: 40 SOLUTION TOPICAL at 08:48

## 2022-01-19 ASSESSMENT — PAIN SCALES - GENERAL: PAINLEVEL_OUTOF10: 0

## 2022-01-19 NOTE — PROGRESS NOTES
[facility]  History and Physical/Consultation  Podiatry    Referring Physician : Lara Byrne MD  Shaista Lima. MEDICAL RECORD NUMBER:  [id number]  AGE: 80 y.o. GENDER: male  : 1932  EPISODE DATE:  2022  Subjective:     Chief Complaint   Patient presents with    Wound Check     bilateral legs         HISTORY of PRESENT ILLNESS HPI     Shaista Benjamin is a 80 y.o. male who presents today for wound/ulcer evaluation. History of Wound Context:  The patient has had a wound of diabetic b/l heels,and right ankle which was first noted approximately months ago. This has been treated betadyne. On their initial visit to the wound healing center, 22 ,  the patient has noted that the wound has been improving. The patient has had similar previous wounds in the past.      Pt is not on abx at time of initial visit.       Wound/Ulcer Pain Timing/Severity: constant  Quality of pain: sharp  Severity:  3 / 10   Modifying Factors: Pain worsens with walking  Associated Signs/Symptoms: edema, erythema and drainage    Ulcer Identification:  Ulcer Type: diabetic  Contributing Factors: edema and diabetes    Diabetic/Pressure/Non Pressure Ulcers onl y:  Ulcer: Diabetic ulcer, fat layer exposed    If patient has diabetic lower extremity wounds  Campbell Classification of diabetic lower extremity wounds:    Grade Description   []  0 No open wound   []  1 Superficial ulcer involving the full skin thickness   [x]  2 Deep ulcer involves ligament, tendon, joint capsule, or fascia  No bone involvement or abscess presence   []  3 Deep Ulcer with abcess formation and/or osteomyelitis   []  4 Localized gangrene   []  5 Extensive gangrene of the foot     Wound: N/A          22  Debrided, cont tx and care   PAST MEDICAL HISTORY      Diagnosis Date    Arthritis     Cancer ([facility] [address]     breast    CHF (congestive heart failure) ([facility] [facility]     Diabetes mellitus ([facility] [address]     History of lumpectomy     Hx of blood clots     Hyperlipidemia     Hypertension     PE (pulmonary thromboembolism) ([address]     Staph aureus infection      Past Surgical History:   Procedure Laterality Date    [facility]  [facility] SINGLE  9/2/2021         MASTECTOMY      TOE AMPUTATION      TOE SURGERY      TONSILLECTOMY       History reviewed. No pertinent family history. Social History     Tobacco Use    Smoking status: Never Smoker    Smokeless tobacco: Never Used   Vaping Use    Vaping Use: Never used   Substance Use Topics    Alcohol use: Not Currently     Alcohol/week: 1.0 standard drink     Types: 1 Cans of beer per week    Drug use: No     Allergies   Allergen Reactions    Clindamycin/Lincomycin     Sulfa Antibiotics      Current Outpatient Medications on File Prior to Encounter   Medication Sig Dispense Refill    zinc sulfate (ZINCATE) 220 (50 Zn) MG capsule Take 1 capsule by mouth daily for 14 days 14 capsule 0    miconazole (MICOTIN) 2 % powder Apply topically 2 times daily.  45 g 1    ascorbic acid (VITAMIN C) 500 MG tablet Take 1 tablet by mouth 2 times daily 30 tablet 0    Vitamin D (CHOLECALCIFEROL) 50 MCG (2000 UT) TABS tablet Take 1 tablet by mouth daily 60 tablet 0    furosemide (LASIX) 40 MG tablet Take 40 mg by mouth daily      omeprazole (PRILOSEC) 20 MG delayed release capsule Take 20 mg by mouth daily      Multiple Vitamins-Minerals (THERAPEUTIC MULTIVITAMIN-MINERALS) tablet Take 1 tablet by mouth daily      Multiple Vitamins-Minerals (PRESERVISION AREDS PO) Take 1 tablet by mouth 2 times daily (with meals)      acetaminophen (TYLENOL) 325 MG tablet Take 650 mg by mouth every 6 hours as needed for Pain or Fever      calcium carbonate (TUMS) 500 MG chewable tablet Take 1 tablet by mouth every 4 hours as needed for Heartburn      ipratropium-albuterol (DUONEB) 0.5-2.5 (3) MG/3ML SOLN nebulizer solution Inhale 3 mLs into the lungs every 4 hours (while awake) for 30 doses 90 mL 0    apixaban (ELIQUIS) 5 MG TABS tablet Take 5 mg by mouth 2 times daily (before meals)       gabapentin (NEURONTIN) 100 MG capsule Take 200 mg by mouth Daily with supper.  atorvastatin (LIPITOR) 80 MG tablet Take 80 mg by mouth Daily with supper       INSULIN LISP PROT & LISP, HUM, (75-25) 100 UNIT/ML SUSP Inject 72 Units into the skin daily (with breakfast)       INSULIN LISP PROT & LISP, HUM, (75-25) 100 UNIT/ML SUSP Inject 55 Units into the skin Daily with supper       clotrimazole-betamethasone (LOTRISONE) cream Apply  topically as needed. Apply topically 2 times daily. No current facility-administered medications on file prior to encounter.        REVIEW OF SYSTEMS   ROS : All others Negative if blank [], Positive if [x]  General Vascular   [] Fevers [] Claudication   [] Chills [] Rest Pain   Skin Neurologic   [x] Tissue Loss [x] Lower extremity neuropathy     Objective:    BP (!) 144/80   Pulse 96   Temp 97.9 °F (36.6 °C) (Temporal)   Resp 18   Wt Readings from Last 3 Encounters:   01/12/22 243 lb (110.2 kg)   01/05/22 243 lb (110.2 kg)   12/01/21 248 lb (112.5 kg)       PHYSICAL EXAM   CONSTITUTIONAL:   Awake, alert, cooperative   PSYCHIATRIC :  Oriented to time, place and person      normal insight to disease process  EXTREMITIES:   R LE Open wounds are noted   Skin color is abnormal with ulcers   Edema is  noted   Sensation deficit noted - protective   Palpation of the foot does cause pain   4/5 strength DF/PF  L LE Open wounds are noted   Skin color is abnormal with ulcers   Edema is  noted   Sensation deficit noted - protective   Palpation of the foot does cause pain   4/5 strength DF/PF  R dorsalis pedis 1 L dorsalis pedis 1   R posterior tibial 1 L posterior tibial 1     Assessment:     Problem List Items Addressed This Visit     Diabetic ulcer of right heel associated with type 2 diabetes mellitus, with fat layer exposed ([address] - Primary    Relevant Orders    Initiate Outpatient Wound Care Protocol    Diabetic ulcer of left heel [facility])    Relevant Orders    Initiate Outpatient Wound Care Protocol          Pre Debridement Measurements:  Are located in the [place]  Documentation Flow Sheet  Post Debridement Measurements:  Wound/Ulcer Descriptions are Pre Debridement except measurements:     Wound 01/05/22 Heel Left #1 (Active)   Wound Image   01/05/22 0902   Wound Etiology Diabetic Campbell 2 01/05/22 0943   Dressing Status New dressing applied 01/12/22 0915   Wound Cleansed Cleansed with saline 01/12/22 0915   Dressing/Treatment Pharmaceutical agent (see MAR); Dry dressing 01/12/22 0915   Wound Length (cm) 4.3 cm 01/19/22 0839   Wound Width (cm) 2.9 cm 01/19/22 0839   Wound Depth (cm) 0.2 cm 01/19/22 0839   Wound Surface Area (cm^2) 12.47 cm^2 01/19/22 0839   Change in Wound Size % (l*w) 9.64 01/19/22 0839   Wound Volume (cm^3) 2.494 cm^3 01/19/22 0839   Wound Healing % 10 01/19/22 0839   Post-Procedure Length (cm) 5 cm 01/12/22 0850   Post-Procedure Width (cm) 2.6 cm 01/12/22 0850   Post-Procedure Depth (cm) 1 cm 01/12/22 0850   Post-Procedure Surface Area (cm^2) 13 cm^2 01/12/22 0850   Post-Procedure Volume (cm^3) 13 cm^3 01/12/22 0850   Wound Assessment Granulation tissue;Slough 01/19/22 0839   Drainage Amount Moderate 01/19/22 0839   Drainage Description Serosanguinous;Green 01/19/22 0839   Odor None 01/19/22 0839   Rashmi-wound Assessment Maceration 01/19/22 0839   Number of days: 14       Wound 01/05/22 Heel Right #2 (Active)   Wound Image   01/05/22 0902   Wound Etiology Venous 01/05/22 0943   Dressing Status New dressing applied 01/12/22 0915   Wound Cleansed Cleansed with saline 01/12/22 0915   Dressing/Treatment Pharmaceutical agent (see MAR); Dry dressing 01/12/22 0915   Wound Length (cm) 2.5 cm 01/19/22 0839   Wound Width (cm) 2.6 cm 01/19/22 0839   Wound Depth (cm) 0.1 cm 01/19/22 0839   Wound Surface Area (cm^2) 6.5 cm^2 01/19/22 0839   Change in Wound Size % (l*w) -52.94 01/19/22 0839   Wound Volume (cm^3) 0.65 cm^3 01/19/22 0839   Wound Healing % 24 01/19/22 0839   Post-Procedure Length (cm) 2.4 cm 01/12/22 0850   Post-Procedure Width (cm) 1.5 cm 01/12/22 0850   Post-Procedure Depth (cm) 0.1 cm 01/12/22 0850   Post-Procedure Surface Area (cm^2) 3.6 cm^2 01/12/22 0850   Post-Procedure Volume (cm^3) 0.36 cm^3 01/12/22 0850   Wound Assessment Granulation tissue;Slough 01/19/22 0839   Drainage Amount Moderate 01/19/22 0839   Drainage Description Serosanguinous;Green 01/19/22 0839   Odor None 01/19/22 0839   Rashmi-wound Assessment Maceration 01/19/22 0839   Number of days: 14       Wound 01/05/22 Ankle Right;Lateral #3 (Active)   Wound Image   01/05/22 0902   Wound Etiology Venous 01/05/22 0943   Wound Length (cm) 0.5 cm 01/19/22 0839   Wound Width (cm) 0.3 cm 01/19/22 0839   Wound Depth (cm) 0.1 cm 01/19/22 0839   Wound Surface Area (cm^2) 0.15 cm^2 01/19/22 0839   Change in Wound Size % (l*w) -66.67 01/19/22 0839   Wound Volume (cm^3) 0.015 cm^3 01/19/22 0839   Wound Healing % -67 01/19/22 0839   Post-Procedure Length (cm) 0.9 cm 01/12/22 0850   Post-Procedure Width (cm) 0.6 cm 01/12/22 0850   Post-Procedure Depth (cm) 0.1 cm 01/12/22 0850   Post-Procedure Surface Area (cm^2) 0.54 cm^2 01/12/22 0850   Post-Procedure Volume (cm^3) 0.054 cm^3 01/12/22 0850   Wound Assessment Pink/red 01/19/22 0839   Drainage Amount Moderate 01/19/22 0839   Drainage Description Serous 01/19/22 0839   Odor None 01/19/22 0839   Rashmi-wound Assessment Intact 01/19/22 0839   Number of days: 13       Wound 01/05/22 Ankle Left;Lateral #4 (Active)   Wound Image   01/05/22 0902   Wound Etiology Venous 01/05/22 0943   Wound Length (cm) 0.2 cm 01/19/22 0839   Wound Width (cm) 0.2 cm 01/19/22 0839   Wound Depth (cm) 0.2 cm 01/19/22 0839   Wound Surface Area (cm^2) 0.04 cm^2 01/19/22 0839   Change in Wound Size % (l*w) 90.48 01/19/22 0839   Wound Volume (cm^3) 0.008 cm^3 01/19/22 0839   Wound Healing % 90 01/19/22 0839   Post-Procedure Length (cm) 0.2 cm 01/12/22 0076   Post-Procedure Width (cm) 0.2 cm 01/12/22 0850   Post-Procedure Depth (cm) 0.2 cm 01/12/22 0850   Post-Procedure Surface Area (cm^2) 0.04 cm^2 01/12/22 0850   Post-Procedure Volume (cm^3) 0.008 cm^3 01/12/22 0850   Wound Assessment Pale granulation tissue 01/19/22 0839   Drainage Amount Scant 01/19/22 0839   Drainage Description Yellow 01/19/22 0839   Odor None 01/19/22 0839   Rashmi-wound Assessment Intact 01/19/22 0839   Number of days: 13       Wound 01/05/22 Pretibial Left #5 (Active)   Wound Image   01/05/22 0902   Wound Etiology Venous 01/05/22 0943   Wound Length (cm) 0.3 cm 01/19/22 0839   Wound Width (cm) 0.3 cm 01/19/22 0839   Wound Depth (cm) 0.1 cm 01/19/22 0839   Wound Surface Area (cm^2) 0.09 cm^2 01/19/22 0839   Change in Wound Size % (l*w) 99.14 01/19/22 0839   Wound Volume (cm^3) 0.009 cm^3 01/19/22 0839   Wound Healing % 99 01/19/22 0839   Post-Procedure Length (cm) 0.6 cm 01/12/22 0850   Post-Procedure Width (cm) 0.4 cm 01/12/22 0850   Post-Procedure Depth (cm) 0.1 cm 01/12/22 0850   Post-Procedure Surface Area (cm^2) 0.24 cm^2 01/12/22 0850   Post-Procedure Volume (cm^3) 0.024 cm^3 01/12/22 0850   Wound Assessment Pink/red 01/19/22 0839   Drainage Amount Scant 01/19/22 0839   Drainage Description Serous 01/19/22 0839   Odor None 01/19/22 0839   Rashmi-wound Assessment Fragile 01/19/22 0839   Number of days: 13          Procedure Note  Indications:  Based on my examination of this patient's wound(s)/ulcer(s) today, debridement is required to promote healing and evaluate the wound base. Performed by: Scott Elizondo DPM    Consent obtained:  Yes    Time out taken:  Yes    Pain Control: Anesthetic  Anesthetic: 4% Lidocaine Liquid Topical     Debridement:Excisional Debridement    Using #15 blade scalpel the wound(s)/ulcer(s) was/were sharply debrided down through and including the removal of subcutaneous tissue.         Devitalized Tissue Debrided:  fibrin, biofilm, slough and necrotic/eschar to stimulate bleeding to promote healing, post debridement good bleeding base and wound edges noted    Wound/Ulcer #: 1, 2 and 3, 4    Percent of Wound/Ulcer Debrided: 100%    Total Surface Area Debrided:  18 sq cm     Estimated Blood Loss:  Minimal  Hemostasis Achieved:  by pressure    Procedural Pain:  4  / 10   Post Procedural Pain:  5 / 10     Response to treatment:  Well tolerated by patient. A culture was done. Plan:     Pt is not a smoker   - Discussed relationship of smoking and negative affects on wound healing   - Emphasized importance of tobacco avoidace/cessation   - Script for nicotine patch given to patient   - Information regarding support groups for smoking cessation given    In my professional opinion and based off the information that is available at this time this patient has appropriate indication for HBO Therapy: Maybe    Treatment Note please see attached Discharge Instructions    Written patient dismissal instructions given to patient and signed by patient or POA. Discharge Instructions       Visit Discharge/Physician Orders    Discharge condition: Stable    Assessment of pain at discharge:  NONE    Anesthetic used: 4% lidocaine solution    Discharge to: Home    Left via:Private automobile    Accompanied by: accompanied by SELF    [facility]F/[facility]:  [facility]    Dressing Orders:  Clean bilateral heel ulcers with normal saline. Apply Santyl, cover with Alginate, dry dressing and ABD and secure. Change every day. Prescription given for bilateral heel offloading boot. Physical therapy for ambulating safely while wearing offloading boots as indicated. Treatment Orders:      [facility] followup visit ________ONE WEEK WITH DR. JUAREZ_____________________  (Please note your next appointment above and if you are unable to keep, kindly give a 24 hour notice.  Thank you.)    Physician signature:__________________________      If you experience any of the following, please call the [facility] during business hours:    * Increase in Pain  * Temperature over 101  * Increase in drainage from your wound  * Drainage with a foul odor  * Bleeding  * Increase in swelling  * Need for compression bandage changes due to slippage, breakthrough drainage. If you need medical attention outside of the business hours of the [facility] please contact your PCP or go to the nearest emergency room.         Electronically signed by Riddhi Eli DPM on 1/19/2022 at 9:08 AM

## 2022-01-19 NOTE — PLAN OF CARE
Problem: Wound:  Goal: Will show signs of wound healing; wound closure and no evidence of infection  Description: Will show signs of wound healing; wound closure and no evidence of infection  Outcome: Ongoing     Problem: Venous:  Goal: Signs of wound healing will improve  Description: Signs of wound healing will improve  Outcome: Ongoing     Problem: Blood Glucose:  Goal: Ability to maintain appropriate glucose levels will improve  Description: Ability to maintain appropriate glucose levels will improve  Outcome: Ongoing     Problem: Pain:  Goal: Pain level will decrease  Description: Pain level will decrease  Outcome: Ongoing  Goal: Control of acute pain  Description: Control of acute pain  Outcome: Ongoing  Goal: Control of chronic pain  Description: Control of chronic pain  Outcome: Ongoing

## 2022-01-26 ENCOUNTER — HOSPITAL ENCOUNTER (OUTPATIENT)
Dept: WOUND CARE | Age: 87
Discharge: HOME | End: 2022-01-26
Payer: MEDICARE

## 2022-01-26 VITALS
HEART RATE: 91 BPM | SYSTOLIC BLOOD PRESSURE: 136 MMHG | TEMPERATURE: 97.8 F | DIASTOLIC BLOOD PRESSURE: 70 MMHG | RESPIRATION RATE: 18 BRPM

## 2022-01-26 DIAGNOSIS — E08.621 DIABETIC ULCER OF LEFT HEEL ASSOCIATED WITH DIABETES MELLITUS DUE TO UNDERLYING CONDITION, WITH FAT LAYER EXPOSED (HCC): ICD-10-CM

## 2022-01-26 DIAGNOSIS — E08.621 DIABETIC ULCER OF LEFT HEEL ASSOCIATED WITH DIABETES MELLITUS DUE TO UNDERLYING CONDITION, LIMITED TO BREAKDOWN OF SKIN (HCC): ICD-10-CM

## 2022-01-26 DIAGNOSIS — L97.421 DIABETIC ULCER OF LEFT HEEL ASSOCIATED WITH DIABETES MELLITUS DUE TO UNDERLYING CONDITION, LIMITED TO BREAKDOWN OF SKIN (HCC): ICD-10-CM

## 2022-01-26 DIAGNOSIS — E11.621 DIABETIC ULCER OF RIGHT HEEL ASSOCIATED WITH TYPE 2 DIABETES MELLITUS, WITH FAT LAYER EXPOSED (HCC): Primary | ICD-10-CM

## 2022-01-26 DIAGNOSIS — L97.412 DIABETIC ULCER OF RIGHT HEEL ASSOCIATED WITH TYPE 2 DIABETES MELLITUS, WITH FAT LAYER EXPOSED (HCC): Primary | ICD-10-CM

## 2022-01-26 DIAGNOSIS — L97.422 DIABETIC ULCER OF LEFT HEEL ASSOCIATED WITH DIABETES MELLITUS DUE TO UNDERLYING CONDITION, WITH FAT LAYER EXPOSED (HCC): ICD-10-CM

## 2022-01-26 PROCEDURE — 11042 DBRDMT SUBQ TIS 1ST 20SQCM/<: CPT

## 2022-01-26 PROCEDURE — 6370000000 HC RX 637 (ALT 250 FOR IP): Performed by: PODIATRIST

## 2022-01-26 RX ORDER — LIDOCAINE HYDROCHLORIDE 40 MG/ML
SOLUTION TOPICAL ONCE
Status: CANCELLED | OUTPATIENT
Start: 2022-01-26 | End: 2022-01-26

## 2022-01-26 RX ORDER — LIDOCAINE 40 MG/G
CREAM TOPICAL ONCE
Status: CANCELLED | OUTPATIENT
Start: 2022-01-26 | End: 2022-01-26

## 2022-01-26 RX ORDER — GINSENG 100 MG
CAPSULE ORAL ONCE
Status: CANCELLED | OUTPATIENT
Start: 2022-01-26 | End: 2022-01-26

## 2022-01-26 RX ORDER — LIDOCAINE HYDROCHLORIDE 20 MG/ML
JELLY TOPICAL ONCE
Status: CANCELLED | OUTPATIENT
Start: 2022-01-26 | End: 2022-01-26

## 2022-01-26 RX ORDER — CLOBETASOL PROPIONATE 0.5 MG/G
OINTMENT TOPICAL ONCE
Status: CANCELLED | OUTPATIENT
Start: 2022-01-26 | End: 2022-01-26

## 2022-01-26 RX ORDER — LIDOCAINE 50 MG/G
OINTMENT TOPICAL ONCE
Status: CANCELLED | OUTPATIENT
Start: 2022-01-26 | End: 2022-01-26

## 2022-01-26 RX ORDER — LIDOCAINE HYDROCHLORIDE 40 MG/ML
SOLUTION TOPICAL ONCE
Status: COMPLETED | OUTPATIENT
Start: 2022-01-26 | End: 2022-01-26

## 2022-01-26 RX ORDER — BACITRACIN ZINC AND POLYMYXIN B SULFATE 500; 1000 [USP'U]/G; [USP'U]/G
OINTMENT TOPICAL ONCE
Status: CANCELLED | OUTPATIENT
Start: 2022-01-26 | End: 2022-01-26

## 2022-01-26 RX ORDER — GENTAMICIN SULFATE 1 MG/G
OINTMENT TOPICAL ONCE
Status: CANCELLED | OUTPATIENT
Start: 2022-01-26 | End: 2022-01-26

## 2022-01-26 RX ORDER — BETAMETHASONE DIPROPIONATE 0.05 %
OINTMENT (GRAM) TOPICAL ONCE
Status: CANCELLED | OUTPATIENT
Start: 2022-01-26 | End: 2022-01-26

## 2022-01-26 RX ORDER — BACITRACIN, NEOMYCIN, POLYMYXIN B 400; 3.5; 5 [USP'U]/G; MG/G; [USP'U]/G
OINTMENT TOPICAL ONCE
Status: CANCELLED | OUTPATIENT
Start: 2022-01-26 | End: 2022-01-26

## 2022-01-26 RX ADMIN — LIDOCAINE HYDROCHLORIDE 5 ML: 40 SOLUTION TOPICAL at 09:10

## 2022-01-26 ASSESSMENT — PAIN DESCRIPTION - FREQUENCY: FREQUENCY: INTERMITTENT

## 2022-01-26 ASSESSMENT — PAIN DESCRIPTION - DESCRIPTORS: DESCRIPTORS: ACHING

## 2022-01-26 ASSESSMENT — PAIN DESCRIPTION - PROGRESSION: CLINICAL_PROGRESSION: NOT CHANGED

## 2022-01-26 ASSESSMENT — PAIN DESCRIPTION - ONSET: ONSET: ON-GOING

## 2022-01-26 ASSESSMENT — PAIN SCALES - GENERAL: PAINLEVEL_OUTOF10: 4

## 2022-01-26 ASSESSMENT — PAIN DESCRIPTION - ORIENTATION: ORIENTATION: LEFT

## 2022-01-26 ASSESSMENT — PAIN DESCRIPTION - PAIN TYPE: TYPE: CHRONIC PAIN

## 2022-01-26 ASSESSMENT — PAIN - FUNCTIONAL ASSESSMENT: PAIN_FUNCTIONAL_ASSESSMENT: PREVENTS OR INTERFERES SOME ACTIVE ACTIVITIES AND ADLS

## 2022-01-26 ASSESSMENT — PAIN DESCRIPTION - LOCATION: LOCATION: FOOT

## 2022-01-26 NOTE — PROGRESS NOTES
[facility]  History and Physical/Consultation  Podiatry    Referring Physician : Juan Jose Herrera MD  Oralia Meeks. MEDICAL RECORD NUMBER:  [id number]  AGE: 80 y.o. GENDER: male  : 1932  EPISODE DATE:  2022  Subjective:     Chief Complaint   Patient presents with    Wound Check     RIGHT AND LEFT LOWER EXTREMITIES         HISTORY of PRESENT ILLNESS HPI     Oralia Chapa is a 80 y.o. male who presents today for wound/ulcer evaluation. History of Wound Context:  The patient has had a wound of diabetic b/l heels,and right ankle which was first noted approximately months ago. This has been treated betadyne. On their initial visit to the wound healing center, 22 ,  the patient has noted that the wound has been improving. The patient has had similar previous wounds in the past.      Pt is not on abx at time of initial visit.       Wound/Ulcer Pain Timing/Severity: constant  Quality of pain: sharp  Severity:  3 / 10   Modifying Factors: Pain worsens with walking  Associated Signs/Symptoms: edema, erythema and drainage    Ulcer Identification:  Ulcer Type: diabetic  Contributing Factors: edema and diabetes    Diabetic/Pressure/Non Pressure Ulcers onl y:  Ulcer: Diabetic ulcer, fat layer exposed    If patient has diabetic lower extremity wounds  Campbell Classification of diabetic lower extremity wounds:    Grade Description   []  0 No open wound   []  1 Superficial ulcer involving the full skin thickness   [x]  2 Deep ulcer involves ligament, tendon, joint capsule, or fascia  No bone involvement or abscess presence   []  3 Deep Ulcer with abcess formation and/or osteomyelitis   []  4 Localized gangrene   []  5 Extensive gangrene of the foot     Wound: N/A          22  Debrided, cont tx and care     22  Debrided, cont tx and care, await graft approval  PAST MEDICAL HISTORY      Diagnosis Date    Arthritis     Cancer [facility])     breast    [facility]estive [facility] failure) [facility] [address]     Diabetes mellitus ([facility] [facility]     History of lumpectomy     Hx of blood clots     Hyperlipidemia     Hypertension     PE (pulmonary thromboembolism) ([facility] [address]     Staph aureus infection      Past Surgical History:   Procedure Laterality Date    [facility]  [facility] SINGLE  9/2/2021         MASTECTOMY      TOE AMPUTATION      TOE SURGERY      TONSILLECTOMY       History reviewed. No pertinent family history. Social History     Tobacco Use    Smoking status: Never Smoker    Smokeless tobacco: Never Used   Vaping Use    Vaping Use: Never used   Substance Use Topics    Alcohol use: Not Currently     Alcohol/week: 1.0 standard drink     Types: 1 Cans of beer per week    Drug use: No     Allergies   Allergen Reactions    Clindamycin/Lincomycin     Sulfa Antibiotics      Current Outpatient Medications on File Prior to Encounter   Medication Sig Dispense Refill    zinc sulfate (ZINCATE) 220 (50 Zn) MG capsule Take 1 capsule by mouth daily for 14 days 14 capsule 0    miconazole (MICOTIN) 2 % powder Apply topically 2 times daily.  45 g 1    ascorbic acid (VITAMIN C) 500 MG tablet Take 1 tablet by mouth 2 times daily 30 tablet 0    Vitamin D (CHOLECALCIFEROL) 50 MCG (2000 UT) TABS tablet Take 1 tablet by mouth daily 60 tablet 0    furosemide (LASIX) 40 MG tablet Take 40 mg by mouth daily      omeprazole (PRILOSEC) 20 MG delayed release capsule Take 20 mg by mouth daily      Multiple Vitamins-Minerals (THERAPEUTIC MULTIVITAMIN-MINERALS) tablet Take 1 tablet by mouth daily      Multiple Vitamins-Minerals (PRESERVISION AREDS PO) Take 1 tablet by mouth 2 times daily (with meals)      acetaminophen (TYLENOL) 325 MG tablet Take 650 mg by mouth every 6 hours as needed for Pain or Fever      calcium carbonate (TUMS) 500 MG chewable tablet Take 1 tablet by mouth every 4 hours as needed for Heartburn      ipratropium-albuterol (DUONEB) 0.5-2.5 (3) MG/3ML SOLN nebulizer solution Inhale 3 mLs into the lungs every 4 hours (while awake) for 30 doses 90 mL 0    apixaban (ELIQUIS) 5 MG TABS tablet Take 5 mg by mouth 2 times daily (before meals)       gabapentin (NEURONTIN) 100 MG capsule Take 200 mg by mouth Daily with supper.  atorvastatin (LIPITOR) 80 MG tablet Take 80 mg by mouth Daily with supper       INSULIN LISP PROT & LISP, HUM, (75-25) 100 UNIT/ML SUSP Inject 72 Units into the skin daily (with breakfast)       INSULIN LISP PROT & LISP, HUM, (75-25) 100 UNIT/ML SUSP Inject 55 Units into the skin Daily with supper       clotrimazole-betamethasone (LOTRISONE) cream Apply  topically as needed. Apply topically 2 times daily. No current facility-administered medications on file prior to encounter.        REVIEW OF SYSTEMS   ROS : All others Negative if blank [], Positive if [x]  General Vascular   [] Fevers [] Claudication   [] Chills [] Rest Pain   Skin Neurologic   [x] Tissue Loss [x] Lower extremity neuropathy     Objective:    /70   Pulse 91   Temp 97.8 °F (36.6 °C) (Temporal)   Resp 18   Wt Readings from Last 3 Encounters:   01/12/22 243 lb (110.2 kg)   01/05/22 243 lb (110.2 kg)   12/01/21 248 lb (112.5 kg)       PHYSICAL EXAM   CONSTITUTIONAL:   Awake, alert, cooperative   PSYCHIATRIC :  Oriented to time, place and person      normal insight to disease process  EXTREMITIES:   R LE Open wounds are noted   Skin color is abnormal with ulcers   Edema is  noted   Sensation deficit noted - protective   Palpation of the foot does cause pain   4/5 strength DF/PF  L LE Open wounds are noted   Skin color is abnormal with ulcers   Edema is  noted   Sensation deficit noted - protective   Palpation of the foot does cause pain   4/5 strength DF/PF  R dorsalis pedis 1 L dorsalis pedis 1   R posterior tibial 1 L posterior tibial 1     Assessment:     Problem List Items Addressed This Visit     Diabetic ulcer of right heel associated with type 2 diabetes mellitus, with fat layer exposed ([facility] [address] - Primary Relevant Orders    Initiate Outpatient Wound Care Protocol    Diabetic ulcer of left heel ([address]    Relevant Orders    Initiate Outpatient Wound Care Protocol          Pre Debridement Measurements:  Are located in the [place]  Documentation Flow Sheet  Post Debridement Measurements:  Wound/Ulcer Descriptions are Pre Debridement except measurements:     Wound 01/05/22 Heel Left #1 (Active)   Wound Image   01/05/22 0902   Wound Etiology Diabetic Campbell 2 01/05/22 0943   Dressing Status New dressing applied 01/19/22 0921   Wound Cleansed Cleansed with saline 01/19/22 0921   Dressing/Treatment Alginate;ABD;Roll gauze 01/19/22 0921   Wound Length (cm) 4 cm 01/26/22 0855   Wound Width (cm) 2.6 cm 01/26/22 0855   Wound Depth (cm) 0.2 cm 01/26/22 0855   Wound Surface Area (cm^2) 10.4 cm^2 01/26/22 0855   Change in Wound Size % (l*w) 24.64 01/26/22 0855   Wound Volume (cm^3) 2.08 cm^3 01/26/22 0855   Wound Healing % 25 01/26/22 0855   Post-Procedure Length (cm) 4 cm 01/26/22 0901   Post-Procedure Width (cm) 2.6 cm 01/26/22 0901   Post-Procedure Depth (cm) 0.2 cm 01/26/22 0901   Post-Procedure Surface Area (cm^2) 10.4 cm^2 01/26/22 0901   Post-Procedure Volume (cm^3) 2.08 cm^3 01/26/22 0901   Wound Assessment Pink/red 01/26/22 0855   Drainage Amount Moderate 01/26/22 0855   Drainage Description Serosanguinous 01/26/22 0855   Odor None 01/26/22 0855   Rashmi-wound Assessment Intact 01/26/22 0855   Wound Thickness Description not for Pressure Injury Full thickness 01/19/22 0909   Number of days: 21       Wound 01/05/22 Heel Right #2 (Active)   Wound Image   01/05/22 0902   Wound Etiology Venous 01/05/22 0943   Dressing Status New dressing applied 01/19/22 0921   Wound Cleansed Cleansed with saline 01/19/22 0921   Dressing/Treatment Alginate;ABD;Roll gauze 01/19/22 0921   Wound Length (cm) 2.2 cm 01/26/22 0855   Wound Width (cm) 1.7 cm 01/26/22 0855   Wound Depth (cm) 0.2 cm 01/26/22 0855   Wound Surface Area (cm^2) 3.74 cm^2 01/26/22 0855   Change in Wound Size % (l*w) 12 01/26/22 0855   Wound Volume (cm^3) 0.748 cm^3 01/26/22 0855   Wound Healing % 12 01/26/22 0855   Post-Procedure Length (cm) 2.2 cm 01/26/22 0901   Post-Procedure Width (cm) 1.7 cm 01/26/22 0901   Post-Procedure Depth (cm) 0.2 cm 01/26/22 0901   Post-Procedure Surface Area (cm^2) 3.74 cm^2 01/26/22 0901   Post-Procedure Volume (cm^3) 0.748 cm^3 01/26/22 0901   Wound Assessment Pink/red;Slough 01/26/22 0855   Drainage Amount Moderate 01/26/22 0855   Drainage Description Serosanguinous 01/26/22 0855   Odor None 01/26/22 0855   Rashmi-wound Assessment Intact 01/26/22 0855   Wound Thickness Description not for Pressure Injury Full thickness 01/19/22 0909   Number of days: 21       Wound 01/05/22 Ankle Right;Lateral #3 (Active)   Wound Image   01/05/22 0902   Wound Etiology Venous 01/05/22 0943   Dressing Status New dressing applied 01/19/22 0921   Wound Cleansed Cleansed with saline 01/19/22 0921   Dressing/Treatment Alginate;Dry dressing 01/19/22 0921   Wound Length (cm) 0.2 cm 01/26/22 0855   Wound Width (cm) 0.2 cm 01/26/22 0855   Wound Depth (cm) 0.1 cm 01/26/22 0855   Wound Surface Area (cm^2) 0.04 cm^2 01/26/22 0855   Change in Wound Size % (l*w) 55.56 01/26/22 0855   Wound Volume (cm^3) 0.004 cm^3 01/26/22 0855   Wound Healing % 56 01/26/22 0855   Post-Procedure Length (cm) 0.2 cm 01/26/22 0901   Post-Procedure Width (cm) 0.2 cm 01/26/22 0901   Post-Procedure Depth (cm) 0.1 cm 01/26/22 0901   Post-Procedure Surface Area (cm^2) 0.04 cm^2 01/26/22 0901   Post-Procedure Volume (cm^3) 0.004 cm^3 01/26/22 0901   Wound Assessment Dry;Fibrin 01/26/22 0855   Drainage Amount None 01/26/22 0855   Drainage Description Serous 01/19/22 0839   Odor None 01/26/22 0855   Rashmi-wound Assessment Intact; Blanchable erythema 01/26/22 0855   Number of days: 21       Wound 01/05/22 Ankle Left;Lateral #4 (Active)   Wound Image   01/05/22 0902   Wound Etiology Venous 01/05/22 0943   Dressing Status New dressing applied 01/19/22 0921   Wound Cleansed Cleansed with saline 01/19/22 0921   Dressing/Treatment Alginate;Dry dressing 01/19/22 0921   Wound Length (cm) 0.3 cm 01/26/22 0855   Wound Width (cm) 0.3 cm 01/26/22 0855   Wound Depth (cm) 0.2 cm 01/26/22 0855   Wound Surface Area (cm^2) 0.09 cm^2 01/26/22 0855   Change in Wound Size % (l*w) 78.57 01/26/22 0855   Wound Volume (cm^3) 0.018 cm^3 01/26/22 0855   Wound Healing % 79 01/26/22 0855   Post-Procedure Length (cm) 0.3 cm 01/26/22 0901   Post-Procedure Width (cm) 0.3 cm 01/26/22 0901   Post-Procedure Depth (cm) 0.2 cm 01/26/22 0901   Post-Procedure Surface Area (cm^2) 0.09 cm^2 01/26/22 0901   Post-Procedure Volume (cm^3) 0.018 cm^3 01/26/22 0901   Wound Assessment Fibrin;Pink/red 01/26/22 0855   Drainage Amount Small 01/26/22 0855   Drainage Description Serosanguinous; Yellow 01/26/22 0855   Odor None 01/26/22 0855   Rashmi-wound Assessment Intact 01/26/22 0855   Number of days: 21       Wound 01/05/22 Pretibial Left #5 (Active)   Wound Image   01/26/22 0901   Wound Etiology Venous 01/05/22 0943   Dressing Status New dressing applied 01/19/22 0921   Wound Cleansed Cleansed with saline 01/19/22 0921   Dressing/Treatment Alginate;Dry dressing 01/19/22 0921   Wound Length (cm) 0 cm 01/26/22 0901   Wound Width (cm) 0 cm 01/26/22 0901   Wound Depth (cm) 0 cm 01/26/22 0901   Wound Surface Area (cm^2) 0 cm^2 01/26/22 0901   Change in Wound Size % (l*w) 100 01/26/22 0901   Wound Volume (cm^3) 0 cm^3 01/26/22 0901   Wound Healing % 100 01/26/22 0901   Post-Procedure Length (cm) 0.3 cm 01/19/22 0909   Post-Procedure Width (cm) 0.3 cm 01/19/22 0909   Post-Procedure Depth (cm) 0.1 cm 01/19/22 0909   Post-Procedure Surface Area (cm^2) 0.09 cm^2 01/19/22 0909   Post-Procedure Volume (cm^3) 0.009 cm^3 01/19/22 0909   Wound Assessment Pink/red 01/19/22 0839   Drainage Amount Scant 01/19/22 0839   Drainage Description Serous 01/19/22 0839   Odor None 01/19/22 0839 Rashmi-wound Assessment Fragile 01/19/22 0839   Number of days: 21       Wound 01/26/22 Tibial Left;Posterior #6 (Active)   Wound Image   01/26/22 0855   Wound Length (cm) 1.2 cm 01/26/22 0855   Wound Width (cm) 1.3 cm 01/26/22 0855   Wound Depth (cm) 0.1 cm 01/26/22 0855   Wound Surface Area (cm^2) 1.56 cm^2 01/26/22 0855   Wound Volume (cm^3) 0.156 cm^3 01/26/22 0855   Post-Procedure Length (cm) 1.2 cm 01/26/22 0901   Post-Procedure Width (cm) 1.3 cm 01/26/22 0901   Post-Procedure Depth (cm) 0.1 cm 01/26/22 0901   Post-Procedure Surface Area (cm^2) 1.56 cm^2 01/26/22 0901   Post-Procedure Volume (cm^3) 0.156 cm^3 01/26/22 0901   Wound Assessment Pink/red 01/26/22 0855   Drainage Amount Small 01/26/22 0855   Drainage Description Serosanguinous 01/26/22 0855   Odor None 01/26/22 0855   Rashmi-wound Assessment Maceration;Blanchable erythema 01/26/22 0855   Number of days: 0       Wound 01/26/22 Pretibial Left;Medial #7 (Active)   Wound Image   01/26/22 0855   Wound Length (cm) 1 cm 01/26/22 0855   Wound Width (cm) 1.1 cm 01/26/22 0855   Wound Depth (cm) 0.1 cm 01/26/22 0855   Wound Surface Area (cm^2) 1.1 cm^2 01/26/22 0855   Wound Volume (cm^3) 0.11 cm^3 01/26/22 0855   Post-Procedure Length (cm) 1 cm 01/26/22 0901   Post-Procedure Width (cm) 1.1 cm 01/26/22 0901   Post-Procedure Depth (cm) 0.1 cm 01/26/22 0901   Post-Procedure Surface Area (cm^2) 1.1 cm^2 01/26/22 0901   Post-Procedure Volume (cm^3) 0.11 cm^3 01/26/22 0901   Wound Assessment Pink/red 01/26/22 0855   Drainage Amount None 01/26/22 0855   Odor None 01/26/22 0855   Rashmi-wound Assessment Intact 01/26/22 0855   Number of days: 0          Procedure Note  Indications:  Based on my examination of this patient's wound(s)/ulcer(s) today, debridement is required to promote healing and evaluate the wound base.     Performed by: Kristen Phillips DPM    Consent obtained:  Yes    Time out taken:  Yes    Pain Control: Anesthetic  Anesthetic: 4% Lidocaine Liquid Topical     Debridement:Excisional Debridement    Using #15 blade scalpel the wound(s)/ulcer(s) was/were sharply debrided down through and including the removal of subcutaneous tissue. Devitalized Tissue Debrided:  fibrin, biofilm, slough and necrotic/eschar to stimulate bleeding to promote healing, post debridement good bleeding base and wound edges noted    Wound/Ulcer #: 1, 2 and 3, 4, 5, 6, 7    Percent of Wound/Ulcer Debrided: 100%    Total Surface Area Debrided:  18 sq cm     Estimated Blood Loss:  Minimal  Hemostasis Achieved:  by pressure    Procedural Pain:  4  / 10   Post Procedural Pain:  5 / 10     Response to treatment:  Well tolerated by patient. A culture was done. Plan:     Pt is not a smoker   - Discussed relationship of smoking and negative affects on wound healing   - Emphasized importance of tobacco avoidace/cessation   - Script for nicotine patch given to patient   - Information regarding support groups for smoking cessation given    In my professional opinion and based off the information that is available at this time this patient has appropriate indication for HBO Therapy: Maybe    Treatment Note please see attached Discharge Instructions    Written patient dismissal instructions given to patient and signed by patient or POA. Discharge Instructions         Visit Discharge/Physician Orders    Discharge condition: Stable    Assessment of pain at discharge:  NONE    Anesthetic used: 4% lidocaine solution    Discharge to: Home    Left via:Private automobile    Accompanied by: accompanied by SELF    ECF/[facility]:  [facility]    Dressing Orders:  Clean bilateral heel ulcers AND LEFT POSTERIOR TIBIAL AND LEFT MEDIAL PRETIBIAL ULCERS with normal saline. Apply Santyl, cover with Alginate, dry dressing and ABD and secure. Change every day. Prescription given for bilateral heel offloading boot. ECF placed on order.  Thank you   Physical therapy for ambulating safely while wearing offloading boots as indicated.     Treatment Orders:      Wound measurements:      Wound 01/05/22 Heel Left #1 (Active)   Wound Image   01/05/22 0902   Wound Etiology Diabetic Campbell 2 01/05/22 0943   Dressing Status New dressing applied 01/19/22 0921   Wound Cleansed Cleansed with saline 01/19/22 0921   Dressing/Treatment Alginate;ABD;Roll gauze 01/19/22 0921   Wound Length (cm) 4 cm 01/26/22 0855   Wound Width (cm) 2.6 cm 01/26/22 0855   Wound Depth (cm) 0.2 cm 01/26/22 0855   Wound Surface Area (cm^2) 10.4 cm^2 01/26/22 0855   Change in Wound Size % (l*w) 24.64 01/26/22 0855   Wound Volume (cm^3) 2.08 cm^3 01/26/22 0855   Wound Healing % 25 01/26/22 0855   Post-Procedure Length (cm) 4 cm 01/26/22 0901   Post-Procedure Width (cm) 2.6 cm 01/26/22 0901   Post-Procedure Depth (cm) 0.2 cm 01/26/22 0901   Post-Procedure Surface Area (cm^2) 10.4 cm^2 01/26/22 0901   Post-Procedure Volume (cm^3) 2.08 cm^3 01/26/22 0901   Wound Assessment Pink/red 01/26/22 0855   Drainage Amount Moderate 01/26/22 0855   Drainage Description Serosanguinous 01/26/22 0855   Odor None 01/26/22 0855   Rashmi-wound Assessment Intact 01/26/22 0855   Wound Thickness Description not for Pressure Injury Full thickness 01/19/22 0909   Number of days: 21       Wound 01/05/22 Heel Right #2 (Active)   Wound Image   01/05/22 0902   Wound Etiology Venous 01/05/22 0943   Dressing Status New dressing applied 01/19/22 0921   Wound Cleansed Cleansed with saline 01/19/22 0921   Dressing/Treatment Alginate;ABD;Roll gauze 01/19/22 0921   Wound Length (cm) 2.2 cm 01/26/22 0855   Wound Width (cm) 1.7 cm 01/26/22 0855   Wound Depth (cm) 0.2 cm 01/26/22 0855   Wound Surface Area (cm^2) 3.74 cm^2 01/26/22 0855   Change in Wound Size % (l*w) 12 01/26/22 0855   Wound Volume (cm^3) 0.748 cm^3 01/26/22 0855   Wound Healing % 12 01/26/22 0855   Post-Procedure Length (cm) 2.2 cm 01/26/22 0901   Post-Procedure Width (cm) 1.7 cm 01/26/22 0901   Post-Procedure Depth (cm) 0.2 cm 01/26/22 0901   Post-Procedure Surface Area (cm^2) 3.74 cm^2 01/26/22 0901   Post-Procedure Volume (cm^3) 0.748 cm^3 01/26/22 0901   Wound Assessment Pink/red;Slough 01/26/22 0855   Drainage Amount Moderate 01/26/22 0855   Drainage Description Serosanguinous 01/26/22 0855   Odor None 01/26/22 0855   Rashmi-wound Assessment Intact 01/26/22 0855   Wound Thickness Description not for Pressure Injury Full thickness 01/19/22 0909   Number of days: 21       Wound 01/05/22 Ankle Right;Lateral #3 (Active)   Wound Image   01/05/22 0902   Wound Etiology Venous 01/05/22 0943   Dressing Status New dressing applied 01/19/22 0921   Wound Cleansed Cleansed with saline 01/19/22 0921   Dressing/Treatment Alginate;Dry dressing 01/19/22 0921   Wound Length (cm) 0.2 cm 01/26/22 0855   Wound Width (cm) 0.2 cm 01/26/22 0855   Wound Depth (cm) 0.1 cm 01/26/22 0855   Wound Surface Area (cm^2) 0.04 cm^2 01/26/22 0855   Change in Wound Size % (l*w) 55.56 01/26/22 0855   Wound Volume (cm^3) 0.004 cm^3 01/26/22 0855   Wound Healing % 56 01/26/22 0855   Post-Procedure Length (cm) 0.2 cm 01/26/22 0901   Post-Procedure Width (cm) 0.2 cm 01/26/22 0901   Post-Procedure Depth (cm) 0.1 cm 01/26/22 0901   Post-Procedure Surface Area (cm^2) 0.04 cm^2 01/26/22 0901   Post-Procedure Volume (cm^3) 0.004 cm^3 01/26/22 0901   Wound Assessment Dry;Fibrin 01/26/22 0855   Drainage Amount None 01/26/22 0855   Drainage Description Serous 01/19/22 0839   Odor None 01/26/22 0855   Rashmi-wound Assessment Intact; Blanchable erythema 01/26/22 0855   Number of days: 21       Wound 01/05/22 Ankle Left;Lateral #4 (Active)   Wound Image   01/05/22 0902   Wound Etiology Venous 01/05/22 0943   Dressing Status New dressing applied 01/19/22 0921   Wound Cleansed Cleansed with saline 01/19/22 0921   Dressing/Treatment Alginate;Dry dressing 01/19/22 0921   Wound Length (cm) 0.3 cm 01/26/22 0855   Wound Width (cm) 0.3 cm 01/26/22 0855   Wound Depth (cm) 0.2 cm 01/26/22 4827   Wound Surface Area (cm^2) 0.09 cm^2 01/26/22 0855   Change in Wound Size % (l*w) 78.57 01/26/22 0855   Wound Volume (cm^3) 0.018 cm^3 01/26/22 0855   Wound Healing % 79 01/26/22 0855   Post-Procedure Length (cm) 0.3 cm 01/26/22 0901   Post-Procedure Width (cm) 0.3 cm 01/26/22 0901   Post-Procedure Depth (cm) 0.2 cm 01/26/22 0901   Post-Procedure Surface Area (cm^2) 0.09 cm^2 01/26/22 0901   Post-Procedure Volume (cm^3) 0.018 cm^3 01/26/22 0901   Wound Assessment Fibrin;Pink/red 01/26/22 0855   Drainage Amount Small 01/26/22 0855   Drainage Description Serosanguinous; Yellow 01/26/22 0855   Odor None 01/26/22 0855   Rashmi-wound Assessment Intact 01/26/22 0855   Number of days: 21       Wound 01/05/22 Pretibial Left #5 (Active)   Wound Image   01/26/22 0901   Wound Etiology Venous 01/05/22 0943   Dressing Status New dressing applied 01/19/22 0921   Wound Cleansed Cleansed with saline 01/19/22 0921   Dressing/Treatment Alginate;Dry dressing 01/19/22 0921   Wound Length (cm) 0 cm 01/26/22 0901   Wound Width (cm) 0 cm 01/26/22 0901   Wound Depth (cm) 0 cm 01/26/22 0901   Wound Surface Area (cm^2) 0 cm^2 01/26/22 0901   Change in Wound Size % (l*w) 100 01/26/22 0901   Wound Volume (cm^3) 0 cm^3 01/26/22 0901   Wound Healing % 100 01/26/22 0901   Post-Procedure Length (cm) 0.3 cm 01/19/22 0909   Post-Procedure Width (cm) 0.3 cm 01/19/22 0909   Post-Procedure Depth (cm) 0.1 cm 01/19/22 0909   Post-Procedure Surface Area (cm^2) 0.09 cm^2 01/19/22 0909   Post-Procedure Volume (cm^3) 0.009 cm^3 01/19/22 0909   Wound Assessment Pink/red 01/19/22 0839   Drainage Amount Scant 01/19/22 0839   Drainage Description Serous 01/19/22 0839   Odor None 01/19/22 0839   Rashmi-wound Assessment Fragile 01/19/22 0839   Number of days: 21       Wound 01/26/22 Tibial Left;Posterior #6 (Active)   Wound Image   01/26/22 0855   Wound Length (cm) 1.2 cm 01/26/22 0855   Wound Width (cm) 1.3 cm 01/26/22 0855   Wound Depth (cm) 0.1 cm 01/26/22 0855   Wound Surface Area (cm^2) 1.56 cm^2 01/26/22 0855   Wound Volume (cm^3) 0.156 cm^3 01/26/22 0855   Post-Procedure Length (cm) 1.2 cm 01/26/22 0901   Post-Procedure Width (cm) 1.3 cm 01/26/22 0901   Post-Procedure Depth (cm) 0.1 cm 01/26/22 0901   Post-Procedure Surface Area (cm^2) 1.56 cm^2 01/26/22 0901   Post-Procedure Volume (cm^3) 0.156 cm^3 01/26/22 0901   Wound Assessment Pink/red 01/26/22 0855   Drainage Amount Small 01/26/22 0855   Drainage Description Serosanguinous 01/26/22 0855   Odor None 01/26/22 0855   Rashmi-wound Assessment Maceration;Blanchable erythema 01/26/22 0855   Number of days: 0       Wound 01/26/22 Pretibial Left;Medial #7 (Active)   Wound Image   01/26/22 0855   Wound Length (cm) 1 cm 01/26/22 0855   Wound Width (cm) 1.1 cm 01/26/22 0855   Wound Depth (cm) 0.1 cm 01/26/22 0855   Wound Surface Area (cm^2) 1.1 cm^2 01/26/22 0855   Wound Volume (cm^3) 0.11 cm^3 01/26/22 0855   Post-Procedure Length (cm) 1 cm 01/26/22 0901   Post-Procedure Width (cm) 1.1 cm 01/26/22 0901   Post-Procedure Depth (cm) 0.1 cm 01/26/22 0901   Post-Procedure Surface Area (cm^2) 1.1 cm^2 01/26/22 0901   Post-Procedure Volume (cm^3) 0.11 cm^3 01/26/22 0901   Wound Assessment Pink/red 01/26/22 0855   Drainage Amount None 01/26/22 0855   Odor None 01/26/22 0855   Rashmi-wound Assessment Intact 01/26/22 0855   Number of days: 0        [facility] followup visit ______One week with Dr. Gastelum_______________________  (Please note your next appointment above and if you are unable to keep, kindly give a 24 hour notice. Thank you.)    Physician signature:__________________________      If you experience any of the following, please call the [address] [place] [address] Road during business hours:    * Increase in Pain  * Temperature over 101  * Increase in drainage from your wound  * Drainage with a foul odor  * Bleeding  * Increase in swelling  * Need for compression bandage changes due to slippage, breakthrough drainage.     If you need medical attention outside of the business hours of the [address] [place] [address] Road please contact your PCP or go to the nearest emergency room.         Electronically signed by Debra Dupree DPM on 1/26/2022 at 9:42 AM

## 2022-02-02 ENCOUNTER — HOSPITAL ENCOUNTER (OUTPATIENT)
Dept: WOUND CARE | Age: 87
Discharge: HOME | End: 2022-02-02
Payer: MEDICARE

## 2022-02-02 ENCOUNTER — HOSPITAL ENCOUNTER (OUTPATIENT)
Dept: ULTRASOUND IMAGING | Age: 87
Discharge: HOME | End: 2022-02-04
Payer: MEDICARE

## 2022-02-02 VITALS
TEMPERATURE: 97.5 F | BODY MASS INDEX: 32.91 KG/M2 | HEART RATE: 94 BPM | DIASTOLIC BLOOD PRESSURE: 62 MMHG | SYSTOLIC BLOOD PRESSURE: 122 MMHG | RESPIRATION RATE: 18 BRPM | HEIGHT: 72 IN | WEIGHT: 243 LBS

## 2022-02-02 DIAGNOSIS — M79.89 SWELLING, LIMB: ICD-10-CM

## 2022-02-02 DIAGNOSIS — I82.403 DEEP VEIN THROMBOSIS (DVT) OF BOTH LOWER EXTREMITIES, UNSPECIFIED CHRONICITY, UNSPECIFIED VEIN (HCC): ICD-10-CM

## 2022-02-02 DIAGNOSIS — M79.89 SWELLING OF LIMB: ICD-10-CM

## 2022-02-02 DIAGNOSIS — M79.604 PAIN IN BOTH LOWER EXTREMITIES: ICD-10-CM

## 2022-02-02 DIAGNOSIS — M79.662 PAIN IN BOTH LOWER LEGS: ICD-10-CM

## 2022-02-02 DIAGNOSIS — M79.661 PAIN IN BOTH LOWER LEGS: ICD-10-CM

## 2022-02-02 DIAGNOSIS — L97.412 DIABETIC ULCER OF RIGHT HEEL ASSOCIATED WITH TYPE 2 DIABETES MELLITUS, WITH FAT LAYER EXPOSED (HCC): Primary | ICD-10-CM

## 2022-02-02 DIAGNOSIS — L97.421 DIABETIC ULCER OF LEFT HEEL ASSOCIATED WITH DIABETES MELLITUS DUE TO UNDERLYING CONDITION, LIMITED TO BREAKDOWN OF SKIN (HCC): ICD-10-CM

## 2022-02-02 DIAGNOSIS — M79.605 PAIN IN BOTH LOWER EXTREMITIES: ICD-10-CM

## 2022-02-02 DIAGNOSIS — E11.621 DIABETIC ULCER OF RIGHT HEEL ASSOCIATED WITH TYPE 2 DIABETES MELLITUS, WITH FAT LAYER EXPOSED (HCC): Primary | ICD-10-CM

## 2022-02-02 DIAGNOSIS — E08.621 DIABETIC ULCER OF LEFT HEEL ASSOCIATED WITH DIABETES MELLITUS DUE TO UNDERLYING CONDITION, LIMITED TO BREAKDOWN OF SKIN (HCC): ICD-10-CM

## 2022-02-02 PROCEDURE — 6370000000 HC RX 637 (ALT 250 FOR IP): Performed by: PODIATRIST

## 2022-02-02 PROCEDURE — 93970 EXTREMITY STUDY: CPT

## 2022-02-02 PROCEDURE — 15271 SKIN SUB GRAFT TRNK/ARM/LEG: CPT

## 2022-02-02 RX ORDER — LIDOCAINE HYDROCHLORIDE 20 MG/ML
JELLY TOPICAL ONCE
Status: CANCELLED | OUTPATIENT
Start: 2022-02-02 | End: 2022-02-02

## 2022-02-02 RX ORDER — CLOBETASOL PROPIONATE 0.5 MG/G
OINTMENT TOPICAL ONCE
Status: CANCELLED | OUTPATIENT
Start: 2022-02-02 | End: 2022-02-02

## 2022-02-02 RX ORDER — GINSENG 100 MG
CAPSULE ORAL ONCE
Status: CANCELLED | OUTPATIENT
Start: 2022-02-02 | End: 2022-02-02

## 2022-02-02 RX ORDER — GENTAMICIN SULFATE 1 MG/G
OINTMENT TOPICAL ONCE
Status: CANCELLED | OUTPATIENT
Start: 2022-02-02 | End: 2022-02-02

## 2022-02-02 RX ORDER — BACITRACIN ZINC AND POLYMYXIN B SULFATE 500; 1000 [USP'U]/G; [USP'U]/G
OINTMENT TOPICAL ONCE
Status: CANCELLED | OUTPATIENT
Start: 2022-02-02 | End: 2022-02-02

## 2022-02-02 RX ORDER — BETAMETHASONE DIPROPIONATE 0.05 %
OINTMENT (GRAM) TOPICAL ONCE
Status: CANCELLED | OUTPATIENT
Start: 2022-02-02 | End: 2022-02-02

## 2022-02-02 RX ORDER — LIDOCAINE 40 MG/G
CREAM TOPICAL ONCE
Status: CANCELLED | OUTPATIENT
Start: 2022-02-02 | End: 2022-02-02

## 2022-02-02 RX ORDER — LIDOCAINE HYDROCHLORIDE 40 MG/ML
SOLUTION TOPICAL ONCE
Status: COMPLETED | OUTPATIENT
Start: 2022-02-02 | End: 2022-02-02

## 2022-02-02 RX ORDER — BACITRACIN, NEOMYCIN, POLYMYXIN B 400; 3.5; 5 [USP'U]/G; MG/G; [USP'U]/G
OINTMENT TOPICAL ONCE
Status: CANCELLED | OUTPATIENT
Start: 2022-02-02 | End: 2022-02-02

## 2022-02-02 RX ORDER — LIDOCAINE 50 MG/G
OINTMENT TOPICAL ONCE
Status: CANCELLED | OUTPATIENT
Start: 2022-02-02 | End: 2022-02-02

## 2022-02-02 RX ORDER — LIDOCAINE HYDROCHLORIDE 40 MG/ML
SOLUTION TOPICAL ONCE
Status: CANCELLED | OUTPATIENT
Start: 2022-02-02 | End: 2022-02-02

## 2022-02-02 RX ADMIN — LIDOCAINE HYDROCHLORIDE 5 ML: 40 SOLUTION TOPICAL at 08:30

## 2022-02-02 ASSESSMENT — PAIN SCALES - GENERAL: PAINLEVEL_OUTOF10: 0

## 2022-02-02 NOTE — PROGRESS NOTES
[facility]  History and Physical/Consultation  Podiatry    Referring Physician : Trini Brooke MD  Candice You. MEDICAL RECORD NUMBER:  [id number]  AGE: 80 y.o. GENDER: male  : 1932  EPISODE DATE:  2022  Subjective:     Chief Complaint   Patient presents with    Wound Check     bilateral heels         HISTORY of PRESENT ILLNESS HPI     Candice Antonio is a 80 y.o. male who presents today for wound/ulcer evaluation. History of Wound Context:  The patient has had a wound of diabetic b/l heels,and right ankle which was first noted approximately months ago. This has been treated betadyne. On their initial visit to the wound healing center, 22 ,  the patient has noted that the wound has been improving. The patient has had similar previous wounds in the past.      Pt is not on abx at time of initial visit.       Wound/Ulcer Pain Timing/Severity: constant  Quality of pain: sharp  Severity:  3 / 10   Modifying Factors: Pain worsens with walking  Associated Signs/Symptoms: edema, erythema and drainage    Ulcer Identification:  Ulcer Type: diabetic  Contributing Factors: edema and diabetes    Diabetic/Pressure/Non Pressure Ulcers onl y:  Ulcer: Diabetic ulcer, fat layer exposed    If patient has diabetic lower extremity wounds  Campbell Classification of diabetic lower extremity wounds:    Grade Description   []  0 No open wound   []  1 Superficial ulcer involving the full skin thickness   [x]  2 Deep ulcer involves ligament, tendon, joint capsule, or fascia  No bone involvement or abscess presence   []  3 Deep Ulcer with abcess formation and/or osteomyelitis   []  4 Localized gangrene   []  5 Extensive gangrene of the foot     Wound: N/A          22  Debrided, cont tx and care     22  Debrided, cont tx and care, await graft approval    22  Debrided, cont tx and care, puraply applied  PAST MEDICAL HISTORY      Diagnosis Date    Arthritis     Cancer [facility])     breast  CHF (congestive heart failure) (HCC)     Diabetes mellitus ([facility]     History of lumpectomy     Hx of blood clots     Hyperlipidemia     Hypertension     PE (pulmonary thromboembolism) ([facility]     Staph aureus infection      Past Surgical History:   Procedure Laterality Date    [facility].  [facility] SINGLE  9/2/2021         MASTECTOMY      TOE AMPUTATION      TOE SURGERY      TONSILLECTOMY       History reviewed. No pertinent family history. Social History     Tobacco Use    Smoking status: Never Smoker    Smokeless tobacco: Never Used   Vaping Use    Vaping Use: Never used   Substance Use Topics    Alcohol use: Not Currently     Alcohol/week: 1.0 standard drink     Types: 1 Cans of beer per week    Drug use: No     Allergies   Allergen Reactions    Clindamycin/Lincomycin     Sulfa Antibiotics      Current Outpatient Medications on File Prior to Encounter   Medication Sig Dispense Refill    miconazole (MICOTIN) 2 % powder Apply topically 2 times daily. 45 g 1    ascorbic acid (VITAMIN C) 500 MG tablet Take 1 tablet by mouth 2 times daily 30 tablet 0    Vitamin D (CHOLECALCIFEROL) 50 MCG (2000 UT) TABS tablet Take 1 tablet by mouth daily 60 tablet 0    furosemide (LASIX) 40 MG tablet Take 40 mg by mouth daily      omeprazole (PRILOSEC) 20 MG delayed release capsule Take 20 mg by mouth daily      Multiple Vitamins-Minerals (THERAPEUTIC MULTIVITAMIN-MINERALS) tablet Take 1 tablet by mouth daily      Multiple Vitamins-Minerals (PRESERVISION AREDS PO) Take 1 tablet by mouth 2 times daily (with meals)      apixaban (ELIQUIS) 5 MG TABS tablet Take 5 mg by mouth 2 times daily (before meals)       gabapentin (NEURONTIN) 100 MG capsule Take 200 mg by mouth Daily with supper.        atorvastatin (LIPITOR) 80 MG tablet Take 80 mg by mouth Daily with supper       INSULIN LISP PROT & LISP, HUM, (75-25) 100 UNIT/ML SUSP Inject 72 Units into the skin daily (with breakfast)       INSULIN LISP PROT & LISP, HUM, (75-25) 100 UNIT/ML SUSP Inject 55 Units into the skin Daily with supper       zinc sulfate (ZINCATE) 220 (50 Zn) MG capsule Take 1 capsule by mouth daily for 14 days 14 capsule 0    acetaminophen (TYLENOL) 325 MG tablet Take 650 mg by mouth every 6 hours as needed for Pain or Fever      calcium carbonate (TUMS) 500 MG chewable tablet Take 1 tablet by mouth every 4 hours as needed for Heartburn      ipratropium-albuterol (DUONEB) 0.5-2.5 (3) MG/3ML SOLN nebulizer solution Inhale 3 mLs into the lungs every 4 hours (while awake) for 30 doses 90 mL 0    clotrimazole-betamethasone (LOTRISONE) cream Apply  topically as needed. Apply topically 2 times daily. No current facility-administered medications on file prior to encounter.        REVIEW OF SYSTEMS   ROS : All others Negative if blank [], Positive if [x]  General Vascular   [] Fevers [] Claudication   [] Chills [] Rest Pain   Skin Neurologic   [x] Tissue Loss [x] Lower extremity neuropathy     Objective:    /62   Pulse 94   Temp 97.5 °F (36.4 °C) (Temporal)   Resp 18   Ht 6' (1.829 m)   Wt 243 lb (110.2 kg)   BMI 32.96 kg/m²   Wt Readings from Last 3 Encounters:   02/02/22 243 lb (110.2 kg)   01/12/22 243 lb (110.2 kg)   01/05/22 243 lb (110.2 kg)       PHYSICAL EXAM   CONSTITUTIONAL:   Awake, alert, cooperative   PSYCHIATRIC :  Oriented to time, place and person      normal insight to disease process  EXTREMITIES:   R LE Open wounds are noted   Skin color is abnormal with ulcers   Edema is  noted   Sensation deficit noted - protective   Palpation of the foot does cause pain   4/5 strength DF/PF  L LE Open wounds are noted   Skin color is abnormal with ulcers   Edema is  noted   Sensation deficit noted - protective   Palpation of the foot does cause pain   4/5 strength DF/PF  R dorsalis pedis 1 L dorsalis pedis 1   R posterior tibial 1 L posterior tibial 1     Assessment:     Problem List Items Addressed This Visit     Diabetic ulcer of right heel associated with type 2 diabetes mellitus, with fat layer exposed ([address] - Primary    Relevant Orders    Initiate Outpatient Wound Care Protocol    US DUP LOWER EXTREMITIES BILATERAL VENOUS    Diabetic ulcer of left heel ([address]    Relevant Orders    Initiate Outpatient Wound Care Protocol    US DUP LOWER EXTREMITIES BILATERAL VENOUS      Other Visit Diagnoses     Deep vein thrombosis (DVT) of both lower extremities, unspecified chronicity, unspecified vein ([address]        Relevant Orders    US DUP LOWER EXTREMITIES BILATERAL VENOUS          Pre Debridement Measurements:  Are located in the [place]  Documentation Flow Sheet  Post Debridement Measurements:  Wound/Ulcer Descriptions are Pre Debridement except measurements:     Wound 01/05/22 Heel Left #1 (Active)   Wound Image   01/05/22 0902   Wound Etiology Diabetic Campbell 2 01/05/22 0943   Dressing Status New dressing applied 02/02/22 0918   Wound Cleansed Cleansed with saline 02/02/22 0918   Dressing/Treatment Dry dressing;Foam;Non adherent 02/02/22 0918   Offloading for Diabetic Foot Ulcers Offloading boot 02/02/22 0918   Wound Length (cm) 4.5 cm 02/02/22 0819   Wound Width (cm) 2.5 cm 02/02/22 0819   Wound Depth (cm) 0.1 cm 02/02/22 0819   Wound Surface Area (cm^2) 11.25 cm^2 02/02/22 0819   Change in Wound Size % (l*w) 18.48 02/02/22 0819   Wound Volume (cm^3) 1.125 cm^3 02/02/22 0819   Wound Healing % 59 02/02/22 0819   Post-Procedure Length (cm) 4.5 cm 02/02/22 0840   Post-Procedure Width (cm) 2.5 cm 02/02/22 0840   Post-Procedure Depth (cm) 0.1 cm 02/02/22 0840   Post-Procedure Surface Area (cm^2) 11.25 cm^2 02/02/22 0840   Post-Procedure Volume (cm^3) 1.125 cm^3 02/02/22 0840   Wound Assessment Fibrin;Pink/red 02/02/22 0819   Drainage Amount Moderate 02/02/22 0819   Drainage Description Serosanguinous 02/02/22 0819   Odor None 02/02/22 0819   Rashmi-wound Assessment Maceration 02/02/22 0819   Wound Thickness Description not for Pressure Injury Full thickness 01/19/22 0909   Number of days: 28       Wound 01/05/22 Heel Right #2 (Active)   Wound Image   01/05/22 0902   Wound Etiology Venous 01/05/22 0943   Dressing Status New dressing applied 02/02/22 0918   Wound Cleansed Cleansed with saline 02/02/22 0918   Dressing/Treatment Dry dressing;Foam;Non adherent 02/02/22 0918   Offloading for Diabetic Foot Ulcers Offloading boot 02/02/22 0918   Wound Length (cm) 2.5 cm 02/02/22 0819   Wound Width (cm) 1.7 cm 02/02/22 0819   Wound Depth (cm) 0.2 cm 02/02/22 0819   Wound Surface Area (cm^2) 4.25 cm^2 02/02/22 0819   Change in Wound Size % (l*w) 0 02/02/22 0819   Wound Volume (cm^3) 0.85 cm^3 02/02/22 0819   Wound Healing % 0 02/02/22 0819   Post-Procedure Length (cm) 2.5 cm 02/02/22 0840   Post-Procedure Width (cm) 1.7 cm 02/02/22 0840   Post-Procedure Depth (cm) 0.2 cm 02/02/22 0840   Post-Procedure Surface Area (cm^2) 4.25 cm^2 02/02/22 0840   Post-Procedure Volume (cm^3) 0.85 cm^3 02/02/22 0840   Wound Assessment Fibrin;Pink/red 02/02/22 0819   Drainage Amount Moderate 02/02/22 0819   Drainage Description Serosanguinous; Yellow 02/02/22 0819   Odor None 02/02/22 0819   Rashmi-wound Assessment Intact 02/02/22 0819   Wound Thickness Description not for Pressure Injury Full thickness 01/19/22 0909   Number of days: 28       Wound 01/05/22 Ankle Right;Lateral #3 (Active)   Wound Image   01/05/22 0902   Wound Etiology Venous 01/05/22 0943   Dressing Status New dressing applied 01/26/22 0901   Wound Cleansed Cleansed with saline 01/26/22 0901   Dressing/Treatment Alginate;ABD 01/26/22 0901   Wound Length (cm) 0 cm 02/02/22 0819   Wound Width (cm) 0 cm 02/02/22 0819   Wound Depth (cm) 0 cm 02/02/22 0819   Wound Surface Area (cm^2) 0 cm^2 02/02/22 0819   Change in Wound Size % (l*w) 100 02/02/22 0819   Wound Volume (cm^3) 0 cm^3 02/02/22 0819   Wound Healing % 100 02/02/22 0819   Post-Procedure Length (cm) 0.2 cm 01/26/22 0901   Post-Procedure Width (cm) 0.2 cm 01/26/22 0901 Post-Procedure Depth (cm) 0.1 cm 01/26/22 0901   Post-Procedure Surface Area (cm^2) 0.04 cm^2 01/26/22 0901   Post-Procedure Volume (cm^3) 0.004 cm^3 01/26/22 0901   Wound Assessment Dry;Fibrin 01/26/22 0855   Drainage Amount None 01/26/22 0855   Drainage Description Serous 01/19/22 0839   Odor None 01/26/22 0855   Rashmi-wound Assessment Intact; Blanchable erythema 01/26/22 0855   Number of days: 28       Wound 01/05/22 Ankle Left;Lateral #4 (Active)   Wound Image   01/05/22 0902   Wound Etiology Venous 01/05/22 0943   Dressing Status New dressing applied 02/02/22 0918   Wound Cleansed Cleansed with saline 02/02/22 0918   Dressing/Treatment Collagen with Ag;Dry dressing 02/02/22 0918   Offloading for Diabetic Foot Ulcers Offloading boot 02/02/22 0918   Wound Length (cm) 0.7 cm 02/02/22 0819   Wound Width (cm) 0.5 cm 02/02/22 0819   Wound Depth (cm) 0.2 cm 02/02/22 0819   Wound Surface Area (cm^2) 0.35 cm^2 02/02/22 0819   Change in Wound Size % (l*w) 16.67 02/02/22 0819   Wound Volume (cm^3) 0.07 cm^3 02/02/22 0819   Wound Healing % 17 02/02/22 0819   Post-Procedure Length (cm) 0.7 cm 02/02/22 0840   Post-Procedure Width (cm) 0.5 cm 02/02/22 0840   Post-Procedure Depth (cm) 0.2 cm 02/02/22 0840   Post-Procedure Surface Area (cm^2) 0.35 cm^2 02/02/22 0840   Post-Procedure Volume (cm^3) 0.07 cm^3 02/02/22 0840   Wound Assessment Pink/red 02/02/22 0819   Drainage Amount Scant 02/02/22 0819   Drainage Description Yellow 02/02/22 0819   Odor None 02/02/22 0819   Rashmi-wound Assessment Intact 02/02/22 0819   Number of days: 28       Wound 01/05/22 Pretibial Left #5 (Active)   Wound Image   01/26/22 0901   Wound Etiology Venous 01/05/22 0943   Dressing Status New dressing applied 01/19/22 0921   Wound Cleansed Cleansed with saline 01/19/22 0921   Dressing/Treatment Alginate;Dry dressing 01/19/22 0921   Wound Length (cm) 0 cm 02/02/22 0819   Wound Width (cm) 0 cm 02/02/22 0819   Wound Depth (cm) 0 cm 02/02/22 0819   Wound Surface Area (cm^2) 0 cm^2 02/02/22 0819   Change in Wound Size % (l*w) 100 02/02/22 0819   Wound Volume (cm^3) 0 cm^3 02/02/22 0819   Wound Healing % 100 02/02/22 0819   Post-Procedure Length (cm) 0.3 cm 01/19/22 0909   Post-Procedure Width (cm) 0.3 cm 01/19/22 0909   Post-Procedure Depth (cm) 0.1 cm 01/19/22 0909   Post-Procedure Surface Area (cm^2) 0.09 cm^2 01/19/22 0909   Post-Procedure Volume (cm^3) 0.009 cm^3 01/19/22 0909   Wound Assessment Pink/red 01/19/22 0839   Drainage Amount Scant 01/19/22 0839   Drainage Description Serous 01/19/22 0839   Odor None 01/19/22 0839   Rashmi-wound Assessment Fragile 01/19/22 0839   Number of days: 28       Wound 01/26/22 Tibial Left;Posterior #6 (Active)   Wound Image   01/26/22 0855   Dressing Status New dressing applied 02/02/22 0918   Wound Cleansed Cleansed with saline 02/02/22 0918   Dressing/Treatment Non adherent;Dry dressing 02/02/22 0918   Offloading for Diabetic Foot Ulcers Offloading boot 02/02/22 0918   Wound Length (cm) 2.1 cm 02/02/22 0819   Wound Width (cm) 1.4 cm 02/02/22 0819   Wound Depth (cm) 0.1 cm 02/02/22 0819   Wound Surface Area (cm^2) 2.94 cm^2 02/02/22 0819   Change in Wound Size % (l*w) -88.46 02/02/22 0819   Wound Volume (cm^3) 0.294 cm^3 02/02/22 0819   Wound Healing % -88 02/02/22 0819   Post-Procedure Length (cm) 2.1 cm 02/02/22 0840   Post-Procedure Width (cm) 1.4 cm 02/02/22 0840   Post-Procedure Depth (cm) 0.1 cm 02/02/22 0840   Post-Procedure Surface Area (cm^2) 2.94 cm^2 02/02/22 0840   Post-Procedure Volume (cm^3) 0.294 cm^3 02/02/22 0840   Wound Assessment Fibrin 02/02/22 0819   Drainage Amount Small 02/02/22 0819   Drainage Description Serosanguinous 02/02/22 0819   Odor None 02/02/22 0819   Rashmi-wound Assessment Blanchable erythema; Maceration 02/02/22 0819   Number of days: 7       Wound 01/26/22 Pretibial Left;Medial #7 (Active)   Wound Image   01/26/22 0855   Dressing Status New dressing applied 02/02/22 0918   Wound Cleansed Cleansed with saline 02/02/22 0918   Dressing/Treatment Dry dressing;Non adherent 02/02/22 0918   Offloading for Diabetic Foot Ulcers Offloading boot 02/02/22 0918   Wound Length (cm) 1.2 cm 02/02/22 0819   Wound Width (cm) 1 cm 02/02/22 0819   Wound Depth (cm) 0.1 cm 02/02/22 0819   Wound Surface Area (cm^2) 1.2 cm^2 02/02/22 0819   Change in Wound Size % (l*w) -9.09 02/02/22 0819   Wound Volume (cm^3) 0.12 cm^3 02/02/22 0819   Wound Healing % -9 02/02/22 0819   Post-Procedure Length (cm) 1.2 cm 02/02/22 0840   Post-Procedure Width (cm) 1 cm 02/02/22 0840   Post-Procedure Depth (cm) 0.1 cm 02/02/22 0840   Post-Procedure Surface Area (cm^2) 1.2 cm^2 02/02/22 0840   Post-Procedure Volume (cm^3) 0.12 cm^3 02/02/22 0840   Wound Assessment Pink/red 02/02/22 0819   Drainage Amount None 02/02/22 0819   Odor None 02/02/22 0819   Rashmi-wound Assessment Intact 02/02/22 0819   Number of days: 7          Procedure Note  Indications:  Based on my examination of this patient's wound(s)/ulcer(s) today, debridement is required to promote healing and evaluate the wound base. Performed by: Jennifer Morelos DPM    Consent obtained:  Yes    Time out taken:  Yes    Pain Control: Anesthetic  Anesthetic: 4% Lidocaine Liquid Topical     Debridement:Excisional Debridement    Using #15 blade scalpel the wound(s)/ulcer(s) was/were sharply debrided down through and including the removal of subcutaneous tissue. Devitalized Tissue Debrided:  fibrin, biofilm, slough and necrotic/eschar to stimulate bleeding to promote healing, post debridement good bleeding base and wound edges noted    Wound/Ulcer #: 1, 2 and 3, 4, 5, 6, 7    Percent of Wound/Ulcer Debrided: 100%    Total Surface Area Debrided:  18 sq cm     Estimated Blood Loss:  Minimal  Hemostasis Achieved:  by pressure    Procedural Pain:  4  / 10   Post Procedural Pain:  5 / 10     Response to treatment:  Well tolerated by patient. A culture was done.       Skin Substitute Applied:         [] APLIGRAF   []44 sq/cm   []88 sq/cm    []132 sq/cm  []176  sq/cm           [] DERMAGRAFT  [] 38sq/cm   []76 sq/cm    []114 sq/cm  []152 sq/cm         [] OASIS   [] 10.5 sq/cm   []21 sq/cm    []4 sq/cm PuraPly  []8 sq/cm PuraPly        [x] OTHER puraply 16    [x]        sq/cm       Performed by: Daria Winn DPM    Wound Type:diabetic    Consent obtained: Yes    Time out taken: Yes     Fenestrated: Yes    Instrument(s) #15 blade scalpel      [] Mesher Utilized    Skin Substitute was Applied to Wound Number(s):     Wound #: 1 and 2      Wound 01/05/22 Heel Left #1 (Active)   Wound Image   01/05/22 0902   Wound Etiology Diabetic Campbell 2 01/05/22 0943   Dressing Status New dressing applied 02/02/22 0918   Wound Cleansed Cleansed with saline 02/02/22 0918   Dressing/Treatment Dry dressing;Foam;Non adherent 02/02/22 0918   Offloading for Diabetic Foot Ulcers Offloading boot 02/02/22 0918   Wound Length (cm) 4.5 cm 02/02/22 0819   Wound Width (cm) 2.5 cm 02/02/22 0819   Wound Depth (cm) 0.1 cm 02/02/22 0819   Wound Surface Area (cm^2) 11.25 cm^2 02/02/22 0819   Change in Wound Size % (l*w) 18.48 02/02/22 0819   Wound Volume (cm^3) 1.125 cm^3 02/02/22 0819   Wound Healing % 59 02/02/22 0819   Post-Procedure Length (cm) 4.5 cm 02/02/22 0840   Post-Procedure Width (cm) 2.5 cm 02/02/22 0840   Post-Procedure Depth (cm) 0.1 cm 02/02/22 0840   Post-Procedure Surface Area (cm^2) 11.25 cm^2 02/02/22 0840   Post-Procedure Volume (cm^3) 1.125 cm^3 02/02/22 0840   Wound Assessment Fibrin;Pink/red 02/02/22 0819   Drainage Amount Moderate 02/02/22 0819   Drainage Description Serosanguinous 02/02/22 0819   Odor None 02/02/22 0819   Rashmi-wound Assessment Maceration 02/02/22 0819   Wound Thickness Description not for Pressure Injury Full thickness 01/19/22 0909   Number of days: 28       Wound 01/05/22 Heel Right #2 (Active)   Wound Image   01/05/22 0902   Wound Etiology Venous 01/05/22 0943   Dressing Status New dressing applied 02/02/22 0918   Wound Cleansed Cleansed with saline 02/02/22 0918   Dressing/Treatment Dry dressing;Foam;Non adherent 02/02/22 0918   Offloading for Diabetic Foot Ulcers Offloading boot 02/02/22 0918   Wound Length (cm) 2.5 cm 02/02/22 0819   Wound Width (cm) 1.7 cm 02/02/22 0819   Wound Depth (cm) 0.2 cm 02/02/22 0819   Wound Surface Area (cm^2) 4.25 cm^2 02/02/22 0819   Change in Wound Size % (l*w) 0 02/02/22 0819   Wound Volume (cm^3) 0.85 cm^3 02/02/22 0819   Wound Healing % 0 02/02/22 0819   Post-Procedure Length (cm) 2.5 cm 02/02/22 0840   Post-Procedure Width (cm) 1.7 cm 02/02/22 0840   Post-Procedure Depth (cm) 0.2 cm 02/02/22 0840   Post-Procedure Surface Area (cm^2) 4.25 cm^2 02/02/22 0840   Post-Procedure Volume (cm^3) 0.85 cm^3 02/02/22 0840   Wound Assessment Fibrin;Pink/red 02/02/22 0819   Drainage Amount Moderate 02/02/22 0819   Drainage Description Serosanguinous; Yellow 02/02/22 0819   Odor None 02/02/22 0819   Rashmi-wound Assessment Intact 02/02/22 0819   Wound Thickness Description not for Pressure Injury Full thickness 01/19/22 0909   Number of days: 28       Wound 01/05/22 Ankle Right;Lateral #3 (Active)   Wound Image   01/05/22 0902   Wound Etiology Venous 01/05/22 0943   Dressing Status New dressing applied 01/26/22 0901   Wound Cleansed Cleansed with saline 01/26/22 0901   Dressing/Treatment Alginate;ABD 01/26/22 0901   Wound Length (cm) 0 cm 02/02/22 0819   Wound Width (cm) 0 cm 02/02/22 0819   Wound Depth (cm) 0 cm 02/02/22 0819   Wound Surface Area (cm^2) 0 cm^2 02/02/22 0819   Change in Wound Size % (l*w) 100 02/02/22 0819   Wound Volume (cm^3) 0 cm^3 02/02/22 0819   Wound Healing % 100 02/02/22 0819   Post-Procedure Length (cm) 0.2 cm 01/26/22 0901   Post-Procedure Width (cm) 0.2 cm 01/26/22 0901   Post-Procedure Depth (cm) 0.1 cm 01/26/22 0901   Post-Procedure Surface Area (cm^2) 0.04 cm^2 01/26/22 0901   Post-Procedure Volume (cm^3) 0.004 cm^3 01/26/22 0901   Wound Assessment Dry;Fibrin 01/26/22 0855   Drainage Amount None 01/26/22 0855   Drainage Description Serous 01/19/22 0839   Odor None 01/26/22 0855   Rashmi-wound Assessment Intact; Blanchable erythema 01/26/22 0855   Number of days: 28       Wound 01/05/22 Ankle Left;Lateral #4 (Active)   Wound Image   01/05/22 0902   Wound Etiology Venous 01/05/22 0943   Dressing Status New dressing applied 02/02/22 0918   Wound Cleansed Cleansed with saline 02/02/22 0918   Dressing/Treatment Collagen with Ag;Dry dressing 02/02/22 0918   Offloading for Diabetic Foot Ulcers Offloading boot 02/02/22 0918   Wound Length (cm) 0.7 cm 02/02/22 0819   Wound Width (cm) 0.5 cm 02/02/22 0819   Wound Depth (cm) 0.2 cm 02/02/22 0819   Wound Surface Area (cm^2) 0.35 cm^2 02/02/22 0819   Change in Wound Size % (l*w) 16.67 02/02/22 0819   Wound Volume (cm^3) 0.07 cm^3 02/02/22 0819   Wound Healing % 17 02/02/22 0819   Post-Procedure Length (cm) 0.7 cm 02/02/22 0840   Post-Procedure Width (cm) 0.5 cm 02/02/22 0840   Post-Procedure Depth (cm) 0.2 cm 02/02/22 0840   Post-Procedure Surface Area (cm^2) 0.35 cm^2 02/02/22 0840   Post-Procedure Volume (cm^3) 0.07 cm^3 02/02/22 0840   Wound Assessment Pink/red 02/02/22 0819   Drainage Amount Scant 02/02/22 0819   Drainage Description Yellow 02/02/22 0819   Odor None 02/02/22 0819   Rashmi-wound Assessment Intact 02/02/22 0819   Number of days: 28       Wound 01/05/22 Pretibial Left #5 (Active)   Wound Image   01/26/22 0901   Wound Etiology Venous 01/05/22 0943   Dressing Status New dressing applied 01/19/22 0921   Wound Cleansed Cleansed with saline 01/19/22 0921   Dressing/Treatment Alginate;Dry dressing 01/19/22 0921   Wound Length (cm) 0 cm 02/02/22 0819   Wound Width (cm) 0 cm 02/02/22 0819   Wound Depth (cm) 0 cm 02/02/22 0819   Wound Surface Area (cm^2) 0 cm^2 02/02/22 0819   Change in Wound Size % (l*w) 100 02/02/22 0819   Wound Volume (cm^3) 0 cm^3 02/02/22 0819   Wound Healing % 100 02/02/22 0819 Post-Procedure Length (cm) 0.3 cm 01/19/22 0909   Post-Procedure Width (cm) 0.3 cm 01/19/22 0909   Post-Procedure Depth (cm) 0.1 cm 01/19/22 0909   Post-Procedure Surface Area (cm^2) 0.09 cm^2 01/19/22 0909   Post-Procedure Volume (cm^3) 0.009 cm^3 01/19/22 0909   Wound Assessment Pink/red 01/19/22 0839   Drainage Amount Scant 01/19/22 0839   Drainage Description Serous 01/19/22 0839   Odor None 01/19/22 0839   Rashmi-wound Assessment Fragile 01/19/22 0839   Number of days: 28       Wound 01/26/22 Tibial Left;Posterior #6 (Active)   Wound Image   01/26/22 0855   Dressing Status New dressing applied 02/02/22 0918   Wound Cleansed Cleansed with saline 02/02/22 0918   Dressing/Treatment Non adherent;Dry dressing 02/02/22 0918   Offloading for Diabetic Foot Ulcers Offloading boot 02/02/22 0918   Wound Length (cm) 2.1 cm 02/02/22 0819   Wound Width (cm) 1.4 cm 02/02/22 0819   Wound Depth (cm) 0.1 cm 02/02/22 0819   Wound Surface Area (cm^2) 2.94 cm^2 02/02/22 0819   Change in Wound Size % (l*w) -88.46 02/02/22 0819   Wound Volume (cm^3) 0.294 cm^3 02/02/22 0819   Wound Healing % -88 02/02/22 0819   Post-Procedure Length (cm) 2.1 cm 02/02/22 0840   Post-Procedure Width (cm) 1.4 cm 02/02/22 0840   Post-Procedure Depth (cm) 0.1 cm 02/02/22 0840   Post-Procedure Surface Area (cm^2) 2.94 cm^2 02/02/22 0840   Post-Procedure Volume (cm^3) 0.294 cm^3 02/02/22 0840   Wound Assessment Fibrin 02/02/22 0819   Drainage Amount Small 02/02/22 0819   Drainage Description Serosanguinous 02/02/22 0819   Odor None 02/02/22 0819   Rashmi-wound Assessment Blanchable erythema; Maceration 02/02/22 0819   Number of days: 7       Wound 01/26/22 Pretibial Left;Medial #7 (Active)   Wound Image   01/26/22 0855   Dressing Status New dressing applied 02/02/22 0918   Wound Cleansed Cleansed with saline 02/02/22 0918   Dressing/Treatment Dry dressing;Non adherent 02/02/22 0918   Offloading for Diabetic Foot Ulcers Offloading boot 02/02/22 0918   Wound Length (cm) 1.2 cm 02/02/22 0819   Wound Width (cm) 1 cm 02/02/22 0819   Wound Depth (cm) 0.1 cm 02/02/22 0819   Wound Surface Area (cm^2) 1.2 cm^2 02/02/22 0819   Change in Wound Size % (l*w) -9.09 02/02/22 0819   Wound Volume (cm^3) 0.12 cm^3 02/02/22 0819   Wound Healing % -9 02/02/22 0819   Post-Procedure Length (cm) 1.2 cm 02/02/22 0840   Post-Procedure Width (cm) 1 cm 02/02/22 0840   Post-Procedure Depth (cm) 0.1 cm 02/02/22 0840   Post-Procedure Surface Area (cm^2) 1.2 cm^2 02/02/22 0840   Post-Procedure Volume (cm^3) 0.12 cm^3 02/02/22 0840   Wound Assessment Pink/red 02/02/22 0819   Drainage Amount None 02/02/22 0819   Odor None 02/02/22 0819   Rashmi-wound Assessment Intact 02/02/22 0819   Number of days: 7         Total Surface Area Covered 4 sq/cm     Amount Wasted 0 sq/cm    Reason for Waste 0      Secured: Yes    Secured With:  [x]Steri Strips    []Sutures     []Staples []Other    Procedural Pain: 2/10     Post Procedural Pain: 3 / 10    Response to Treatment:  Well tolerated by patient. Plan:     Pt is not a smoker   - Discussed relationship of smoking and negative affects on wound healing   - Emphasized importance of tobacco avoidace/cessation   - Script for nicotine patch given to patient   - Information regarding support groups for smoking cessation given    In my professional opinion and based off the information that is available at this time this patient has appropriate indication for HBO Therapy: Maybe    Treatment Note please see attached Discharge Instructions    Written patient dismissal instructions given to patient and signed by patient or POA.          Discharge Instructions         Visit Discharge/Physician Orders    Discharge condition: Stable    Assessment of pain at discharge:  NONE    Anesthetic used: 4% lidocaine solution    Discharge to: Home    Left via:Private automobile    Accompanied by: accompanied by SELF    ECF/[facility]:  [facility]    Dressing Orders:  Clean bilateral heel ulcers AND LEFT POSTERIOR TIBIAL AND LEFT MEDIAL PRETIBIAL ULCERS with normal saline. DR. Kia White APPLIED 2 X 4 CM PURAPLY BILATERAL HEELS. APPLY ADAPTIC, FOAM AND DRY  DRESSING TO SECURE. LEAVE IN PLACE FOR ONE WEEK. TO LEG ULCER, APPLY SALINE MOIST BRANDEE AND DRY DRESSING TO SECURE. CHANGE EVERY OTHER DAY. Prescription given for bilateral heel offloading boot. ECF placed on order. Thank you   Physical therapy for ambulating safely while wearing offloading boots as indicated.     Treatment Orders:  STAT VENOUS ULTRASOUND BILATERAL LEGS  Wound measurements:                                                      Wound 01/05/22 Heel Left #1 (Active)   Wound Image   01/05/22 0902   Wound Etiology Diabetic Campbell 2 01/05/22 0943   Dressing Status New dressing applied 01/26/22 0901   Wound Cleansed Cleansed with saline 01/26/22 0901   Dressing/Treatment Alginate;ABD 01/26/22 0901   Offloading for Diabetic Foot Ulcers Back offloading shoe 01/26/22 0901   Wound Length (cm) 4.5 cm 02/02/22 0819   Wound Width (cm) 2.5 cm 02/02/22 0819   Wound Depth (cm) 0.1 cm 02/02/22 0819   Wound Surface Area (cm^2) 11.25 cm^2 02/02/22 0819   Change in Wound Size % (l*w) 18.48 02/02/22 0819   Wound Volume (cm^3) 1.125 cm^3 02/02/22 0819   Wound Healing % 59 02/02/22 0819   Post-Procedure Length (cm) 4.5 cm 02/02/22 0840   Post-Procedure Width (cm) 2.5 cm 02/02/22 0840   Post-Procedure Depth (cm) 0.1 cm 02/02/22 0840   Post-Procedure Surface Area (cm^2) 11.25 cm^2 02/02/22 0840   Post-Procedure Volume (cm^3) 1.125 cm^3 02/02/22 0840   Wound Assessment Fibrin;Pink/red 02/02/22 0819   Drainage Amount Moderate 02/02/22 0819   Drainage Description Serosanguinous 02/02/22 0819   Odor None 02/02/22 0819   Rashmi-wound Assessment Maceration 02/02/22 0819   Wound Thickness Description not for Pressure Injury Full thickness 01/19/22 0909   Number of days: 28       Wound 01/05/22 Heel Right #2 (Active)   Wound Image   01/05/22 0902   Wound Etiology Venous 01/05/22 0943   Dressing Status New dressing applied 01/19/22 0921   Wound Cleansed Cleansed with saline 01/19/22 0921   Dressing/Treatment Alginate;ABD;Roll gauze 01/19/22 0921   Wound Length (cm) 2.5 cm 02/02/22 0819   Wound Width (cm) 1.7 cm 02/02/22 0819   Wound Depth (cm) 0.2 cm 02/02/22 0819   Wound Surface Area (cm^2) 4.25 cm^2 02/02/22 0819   Change in Wound Size % (l*w) 0 02/02/22 0819   Wound Volume (cm^3) 0.85 cm^3 02/02/22 0819   Wound Healing % 0 02/02/22 0819   Post-Procedure Length (cm) 2.5 cm 02/02/22 0840   Post-Procedure Width (cm) 1.7 cm 02/02/22 0840   Post-Procedure Depth (cm) 0.2 cm 02/02/22 0840   Post-Procedure Surface Area (cm^2) 4.25 cm^2 02/02/22 0840   Post-Procedure Volume (cm^3) 0.85 cm^3 02/02/22 0840   Wound Assessment Fibrin;Pink/red 02/02/22 0819   Drainage Amount Moderate 02/02/22 0819   Drainage Description Serosanguinous; Yellow 02/02/22 0819   Odor None 02/02/22 0819   Rashmi-wound Assessment Intact 02/02/22 0819   Wound Thickness Description not for Pressure Injury Full thickness 01/19/22 0909   Number of days: 28       Wound 01/05/22 Ankle Right;Lateral #3 (Active)   Wound Image   01/05/22 0902   Wound Etiology Venous 01/05/22 0943   Dressing Status New dressing applied 01/26/22 0901   Wound Cleansed Cleansed with saline 01/26/22 0901   Dressing/Treatment Alginate;ABD 01/26/22 0901   Wound Length (cm) 0 cm 02/02/22 0819   Wound Width (cm) 0 cm 02/02/22 0819   Wound Depth (cm) 0 cm 02/02/22 0819   Wound Surface Area (cm^2) 0 cm^2 02/02/22 0819   Change in Wound Size % (l*w) 100 02/02/22 0819   Wound Volume (cm^3) 0 cm^3 02/02/22 0819   Wound Healing % 100 02/02/22 0819   Post-Procedure Length (cm) 0.2 cm 01/26/22 0901   Post-Procedure Width (cm) 0.2 cm 01/26/22 0901   Post-Procedure Depth (cm) 0.1 cm 01/26/22 0901   Post-Procedure Surface Area (cm^2) 0.04 cm^2 01/26/22 0901   Post-Procedure Volume (cm^3) 0.004 cm^3 01/26/22 0901   Wound Assessment Dry;Fibrin 01/26/22 0855 Drainage Amount None 01/26/22 0855   Drainage Description Serous 01/19/22 0839   Odor None 01/26/22 0855   Rashmi-wound Assessment Intact; Blanchable erythema 01/26/22 0855   Number of days: 27       Wound 01/05/22 Ankle Left;Lateral #4 (Active)   Wound Image   01/05/22 0902   Wound Etiology Venous 01/05/22 0943   Dressing Status New dressing applied 01/19/22 0921   Wound Cleansed Cleansed with saline 01/19/22 0921   Dressing/Treatment Alginate;Dry dressing 01/19/22 0921   Wound Length (cm) 0.7 cm 02/02/22 0819   Wound Width (cm) 0.5 cm 02/02/22 0819   Wound Depth (cm) 0.2 cm 02/02/22 0819   Wound Surface Area (cm^2) 0.35 cm^2 02/02/22 0819   Change in Wound Size % (l*w) 16.67 02/02/22 0819   Wound Volume (cm^3) 0.07 cm^3 02/02/22 0819   Wound Healing % 17 02/02/22 0819   Post-Procedure Length (cm) 0.7 cm 02/02/22 0840   Post-Procedure Width (cm) 0.5 cm 02/02/22 0840   Post-Procedure Depth (cm) 0.2 cm 02/02/22 0840   Post-Procedure Surface Area (cm^2) 0.35 cm^2 02/02/22 0840   Post-Procedure Volume (cm^3) 0.07 cm^3 02/02/22 0840   Wound Assessment Pink/red 02/02/22 0819   Drainage Amount Scant 02/02/22 0819   Drainage Description Yellow 02/02/22 0819   Odor None 02/02/22 0819   Rashmi-wound Assessment Intact 02/02/22 0819   Number of days: 27       Wound 01/05/22 Pretibial Left #5 (Active)   Wound Image   01/26/22 0901   Wound Etiology Venous 01/05/22 0943   Dressing Status New dressing applied 01/19/22 0921   Wound Cleansed Cleansed with saline 01/19/22 0921   Dressing/Treatment Alginate;Dry dressing 01/19/22 0921   Wound Length (cm) 0 cm 02/02/22 0819   Wound Width (cm) 0 cm 02/02/22 0819   Wound Depth (cm) 0 cm 02/02/22 0819   Wound Surface Area (cm^2) 0 cm^2 02/02/22 0819   Change in Wound Size % (l*w) 100 02/02/22 0819   Wound Volume (cm^3) 0 cm^3 02/02/22 0819   Wound Healing % 100 02/02/22 0819   Post-Procedure Length (cm) 0.3 cm 01/19/22 0909   Post-Procedure Width (cm) 0.3 cm 01/19/22 0909   Post-Procedure Depth (cm) 0.1 cm 01/19/22 0909   Post-Procedure Surface Area (cm^2) 0.09 cm^2 01/19/22 0909   Post-Procedure Volume (cm^3) 0.009 cm^3 01/19/22 0909   Wound Assessment Pink/red 01/19/22 0839   Drainage Amount Scant 01/19/22 0839   Drainage Description Serous 01/19/22 0839   Odor None 01/19/22 0839   Rashmi-wound Assessment Fragile 01/19/22 0839   Number of days: 27       Wound 01/26/22 Tibial Left;Posterior #6 (Active)   Wound Image   01/26/22 0855   Dressing Status New dressing applied 01/26/22 0901   Wound Cleansed Cleansed with saline 01/26/22 0901   Dressing/Treatment Alginate;ABD 01/26/22 0901   Offloading for Diabetic Foot Ulcers Back offloading shoe 01/26/22 0901   Wound Length (cm) 2.1 cm 02/02/22 0819   Wound Width (cm) 1.4 cm 02/02/22 0819   Wound Depth (cm) 0.1 cm 02/02/22 0819   Wound Surface Area (cm^2) 2.94 cm^2 02/02/22 0819   Change in Wound Size % (l*w) -88.46 02/02/22 0819   Wound Volume (cm^3) 0.294 cm^3 02/02/22 0819   Wound Healing % -88 02/02/22 0819   Post-Procedure Length (cm) 2.1 cm 02/02/22 0840   Post-Procedure Width (cm) 1.4 cm 02/02/22 0840   Post-Procedure Depth (cm) 0.1 cm 02/02/22 0840   Post-Procedure Surface Area (cm^2) 2.94 cm^2 02/02/22 0840   Post-Procedure Volume (cm^3) 0.294 cm^3 02/02/22 0840   Wound Assessment Fibrin 02/02/22 0819   Drainage Amount Small 02/02/22 0819   Drainage Description Serosanguinous 02/02/22 0819   Odor None 02/02/22 0819   Rashmi-wound Assessment Blanchable erythema; Maceration 02/02/22 0819   Number of days: 7       Wound 01/26/22 Pretibial Left;Medial #7 (Active)   Wound Image   01/26/22 0855   Wound Length (cm) 1.2 cm 02/02/22 0819   Wound Width (cm) 1 cm 02/02/22 0819   Wound Depth (cm) 0.1 cm 02/02/22 0819   Wound Surface Area (cm^2) 1.2 cm^2 02/02/22 0819   Change in Wound Size % (l*w) -9.09 02/02/22 0819   Wound Volume (cm^3) 0.12 cm^3 02/02/22 0819   Wound Healing % -9 02/02/22 0819   Post-Procedure Length (cm) 1.2 cm 02/02/22 0840   Post-Procedure Width (cm) 1 cm 02/02/22 0840   Post-Procedure Depth (cm) 0.1 cm 02/02/22 0840   Post-Procedure Surface Area (cm^2) 1.2 cm^2 02/02/22 0840   Post-Procedure Volume (cm^3) 0.12 cm^3 02/02/22 0840   Wound Assessment Pink/red 02/02/22 0819   Drainage Amount None 02/02/22 0819   Odor None 02/02/22 0819   Rashmi-wound Assessment Intact 02/02/22 0819   Number of days: 7          [address] [place] [address],[address] followup visit ________One week with Dr. Gastelum___________________  (Please note your next appointment above and if you are unable to keep, kindly give a 24 hour notice. Thank you.)    Physician signature:__________________________      If you experience any of the following, please call the [address] [place] [address] during business hours:    * Increase in Pain  * Temperature over 101  * Increase in drainage from your wound  * Drainage with a foul odor  * Bleeding  * Increase in swelling  * Need for compression bandage changes due to slippage, breakthrough drainage. If you need medical attention outside of the business hours of the [address] [place] [address] please contact your PCP or go to the nearest emergency room.         Electronically signed by Riddhi Eli DPM on 2/2/2022 at 9:21 AM

## 2022-02-09 ENCOUNTER — HOSPITAL ENCOUNTER (OUTPATIENT)
Dept: WOUND CARE | Age: 87
Discharge: HOME | End: 2022-02-09
Payer: MEDICARE

## 2022-02-09 VITALS — HEART RATE: 98 BPM | RESPIRATION RATE: 18 BRPM | TEMPERATURE: 97.9 F

## 2022-02-09 DIAGNOSIS — L97.421 DIABETIC ULCER OF LEFT HEEL ASSOCIATED WITH DIABETES MELLITUS DUE TO UNDERLYING CONDITION, LIMITED TO BREAKDOWN OF SKIN (HCC): ICD-10-CM

## 2022-02-09 DIAGNOSIS — L97.412 DIABETIC ULCER OF RIGHT HEEL ASSOCIATED WITH TYPE 2 DIABETES MELLITUS, WITH FAT LAYER EXPOSED (HCC): Primary | ICD-10-CM

## 2022-02-09 DIAGNOSIS — E11.621 DIABETIC ULCER OF RIGHT HEEL ASSOCIATED WITH TYPE 2 DIABETES MELLITUS, WITH FAT LAYER EXPOSED (HCC): Primary | ICD-10-CM

## 2022-02-09 DIAGNOSIS — E08.621 DIABETIC ULCER OF LEFT HEEL ASSOCIATED WITH DIABETES MELLITUS DUE TO UNDERLYING CONDITION, LIMITED TO BREAKDOWN OF SKIN (HCC): ICD-10-CM

## 2022-02-09 PROCEDURE — 15275 SKIN SUB GRAFT FACE/NK/HF/G: CPT

## 2022-02-09 RX ORDER — BETAMETHASONE DIPROPIONATE 0.05 %
OINTMENT (GRAM) TOPICAL ONCE
Status: CANCELLED | OUTPATIENT
Start: 2022-02-09 | End: 2022-02-09

## 2022-02-09 RX ORDER — LIDOCAINE HYDROCHLORIDE 20 MG/ML
JELLY TOPICAL ONCE
Status: CANCELLED | OUTPATIENT
Start: 2022-02-09 | End: 2022-02-09

## 2022-02-09 RX ORDER — LIDOCAINE 50 MG/G
OINTMENT TOPICAL ONCE
Status: CANCELLED | OUTPATIENT
Start: 2022-02-09 | End: 2022-02-09

## 2022-02-09 RX ORDER — GENTAMICIN SULFATE 1 MG/G
OINTMENT TOPICAL ONCE
Status: CANCELLED | OUTPATIENT
Start: 2022-02-09 | End: 2022-02-09

## 2022-02-09 RX ORDER — LIDOCAINE 40 MG/G
CREAM TOPICAL ONCE
Status: CANCELLED | OUTPATIENT
Start: 2022-02-09 | End: 2022-02-09

## 2022-02-09 RX ORDER — GINSENG 100 MG
CAPSULE ORAL ONCE
Status: CANCELLED | OUTPATIENT
Start: 2022-02-09 | End: 2022-02-09

## 2022-02-09 RX ORDER — BACITRACIN ZINC AND POLYMYXIN B SULFATE 500; 1000 [USP'U]/G; [USP'U]/G
OINTMENT TOPICAL ONCE
Status: CANCELLED | OUTPATIENT
Start: 2022-02-09 | End: 2022-02-09

## 2022-02-09 RX ORDER — LIDOCAINE HYDROCHLORIDE 40 MG/ML
SOLUTION TOPICAL ONCE
Status: CANCELLED | OUTPATIENT
Start: 2022-02-09 | End: 2022-02-09

## 2022-02-09 RX ORDER — BACITRACIN, NEOMYCIN, POLYMYXIN B 400; 3.5; 5 [USP'U]/G; MG/G; [USP'U]/G
OINTMENT TOPICAL ONCE
Status: CANCELLED | OUTPATIENT
Start: 2022-02-09 | End: 2022-02-09

## 2022-02-09 RX ORDER — CLOBETASOL PROPIONATE 0.5 MG/G
OINTMENT TOPICAL ONCE
Status: CANCELLED | OUTPATIENT
Start: 2022-02-09 | End: 2022-02-09

## 2022-02-09 RX ORDER — LIDOCAINE HYDROCHLORIDE 40 MG/ML
SOLUTION TOPICAL ONCE
Status: DISCONTINUED | OUTPATIENT
Start: 2022-02-09 | End: 2022-02-10 | Stop reason: HOSPADM

## 2022-02-09 ASSESSMENT — PAIN DESCRIPTION - LOCATION: LOCATION: LEG;FOOT

## 2022-02-09 ASSESSMENT — PAIN - FUNCTIONAL ASSESSMENT: PAIN_FUNCTIONAL_ASSESSMENT: PREVENTS OR INTERFERES SOME ACTIVE ACTIVITIES AND ADLS

## 2022-02-09 ASSESSMENT — PAIN DESCRIPTION - PROGRESSION: CLINICAL_PROGRESSION: NOT CHANGED

## 2022-02-09 ASSESSMENT — PAIN DESCRIPTION - FREQUENCY: FREQUENCY: INTERMITTENT

## 2022-02-09 ASSESSMENT — PAIN DESCRIPTION - ONSET: ONSET: ON-GOING

## 2022-02-09 ASSESSMENT — PAIN DESCRIPTION - PAIN TYPE: TYPE: CHRONIC PAIN

## 2022-02-09 ASSESSMENT — PAIN SCALES - GENERAL: PAINLEVEL_OUTOF10: 3

## 2022-02-09 ASSESSMENT — PAIN DESCRIPTION - ORIENTATION: ORIENTATION: LEFT

## 2022-02-09 ASSESSMENT — PAIN DESCRIPTION - DESCRIPTORS: DESCRIPTORS: ACHING

## 2022-02-09 NOTE — PROGRESS NOTES
Dermagraft Treatment Note    NAME:  Marry Dominguez. YOB: 1932  MEDICAL RECORD NUMBER:  [id number]  DATE:  2/9/2022    Goal:  Patient will receive safe and proper application of skin substitute. Patient will comply with caring for dressing, offloading and reporting complications. Expiration date of Dermagraft checked immediately prior to use. Package intact prior to use and no damage noted. Transport temperature controlled and acceptable. Dermagraft was prepared for application by removing from box and within 1 minute placing in thaw tub at a temperature of 34 to 37 degrees celsius until ice crystals were no longer present but no longer than 3 minutes. Dermagraft was rinsed 3 times in room temperature normal saline for 5 seconds each time. A 4th saline rinse was left on the Dermagraft until the physician was ready to apply it within 30 minutes of thawing. Dermagraft was applied to bilateral heel ulcers. and affixed with steri-strips by the provider. Dermagraft was covered with non-adherent dressing. steristrip to secure graft was applied on top of non-adherent dressing. A foam plug cut to fit into ulcer was applied. Fluffed gauze was applied. Dressing was secured with cover roll type tape to stabilize graft. Coban or ace wrap was applied to secure graft and decrease edema. Patient/caregiver was instructed not to remove dressing and to keep it clean and dry. Pt/family/caregiver was instructed on signs and symptoms of complications to report such as draining through, falling down/slipping, getting wet, or severe pain or tingling in toes   Pt/family/caregiver was instructed on need for offloading and elevation of affected extremity and on use of prescribed offloading device.  Dermagraft may be applied a total of 8 times per wound over a 12 week period. Additionally Dermagraft may only be used every 12 months per wound.       Date of first application of Dermagraft for this current wound is January 9, 2022.                 Guidelines followed  Dermagraft 24 steps followed    Electronically signed by Bry García RN on 2/9/2022 at 10:09 AM

## 2022-02-09 NOTE — PLAN OF CARE
Problem: Wound:  Goal: Will show signs of wound healing; wound closure and no evidence of infection  Description: Will show signs of wound healing; wound closure and no evidence of infection  Outcome: Ongoing     Problem: Venous:  Goal: Signs of wound healing will improve  Description: Signs of wound healing will improve  Outcome: Ongoing     Problem: Blood Glucose:  Goal: Ability to maintain appropriate glucose levels will improve  Description: Ability to maintain appropriate glucose levels will improve  Outcome: Ongoing     Problem: Pain:  Goal: Pain level will decrease  Description: Pain level will decrease  Outcome: Ongoing  Goal: Control of acute pain  Description: Control of acute pain  Outcome: Ongoing  Goal: Control of chronic pain  Description: Control of chronic pain  Outcome: Ongoing Ketoconazole Pregnancy And Lactation Text: This medication is Pregnancy Category C and it isn't know if it is safe during pregnancy. It is also excreted in breast milk and breast feeding isn't recommended.

## 2022-02-09 NOTE — PROGRESS NOTES
[facility]  History and Physical/Consultation  Podiatry    Referring Physician : Lakisha Norwood MD  Caroleen Bamberger. MEDICAL RECORD NUMBER:  [id number]  AGE: 80 y.o. GENDER: male  : 1932  EPISODE DATE:  2022  Subjective:     Chief Complaint   Patient presents with    Wound Check     BILATERAL FEET         HISTORY of PRESENT ILLNESS HPI     Caroleen Bamberger. is a 80 y.o. male who presents today for wound/ulcer evaluation. History of Wound Context:  The patient has had a wound of diabetic b/l heels,and right ankle which was first noted approximately months ago. This has been treated betadyne. On their initial visit to the wound healing center, 22 ,  the patient has noted that the wound has been improving. The patient has had similar previous wounds in the past.      Pt is not on abx at time of initial visit.       Wound/Ulcer Pain Timing/Severity: constant  Quality of pain: sharp  Severity:  3 / 10   Modifying Factors: Pain worsens with walking  Associated Signs/Symptoms: edema, erythema and drainage    Ulcer Identification:  Ulcer Type: diabetic  Contributing Factors: edema and diabetes    Diabetic/Pressure/Non Pressure Ulcers onl y:  Ulcer: Diabetic ulcer, fat layer exposed    If patient has diabetic lower extremity wounds  Campbell Classification of diabetic lower extremity wounds:    Grade Description   []  0 No open wound   []  1 Superficial ulcer involving the full skin thickness   [x]  2 Deep ulcer involves ligament, tendon, joint capsule, or fascia  No bone involvement or abscess presence   []  3 Deep Ulcer with abcess formation and/or osteomyelitis   []  4 Localized gangrene   []  5 Extensive gangrene of the foot     Wound: N/A          22  Debrided, cont tx and care     22  Debrided, cont tx and care, await graft approval    22  Debrided, cont tx and care, puraply applied    22  Debrided, dermagraft applied  PAST MEDICAL HISTORY      Diagnosis Date    Arthritis     Cancer ([facility]     breast    CHF (congestive heart failure) (HCC)     Diabetes mellitus ([facility]     History of lumpectomy     Hx of blood clots     Hyperlipidemia     Hypertension     PE (pulmonary thromboembolism) ([facility]     Staph aureus infection      Past Surgical History:   Procedure Laterality Date    [facility]  [facility]  9/2/2021         MASTECTOMY      TOE AMPUTATION      TOE SURGERY      TONSILLECTOMY       History reviewed. No pertinent family history. Social History     Tobacco Use    Smoking status: Never Smoker    Smokeless tobacco: Never Used   Vaping Use    Vaping Use: Never used   Substance Use Topics    Alcohol use: Not Currently     Alcohol/week: 1.0 standard drink     Types: 1 Cans of beer per week    Drug use: No     Allergies   Allergen Reactions    Clindamycin/Lincomycin     Sulfa Antibiotics      Current Outpatient Medications on File Prior to Encounter   Medication Sig Dispense Refill    zinc sulfate (ZINCATE) 220 (50 Zn) MG capsule Take 1 capsule by mouth daily for 14 days 14 capsule 0    miconazole (MICOTIN) 2 % powder Apply topically 2 times daily.  45 g 1    ascorbic acid (VITAMIN C) 500 MG tablet Take 1 tablet by mouth 2 times daily 30 tablet 0    Vitamin D (CHOLECALCIFEROL) 50 MCG (2000 UT) TABS tablet Take 1 tablet by mouth daily 60 tablet 0    furosemide (LASIX) 40 MG tablet Take 40 mg by mouth daily      omeprazole (PRILOSEC) 20 MG delayed release capsule Take 20 mg by mouth daily      Multiple Vitamins-Minerals (THERAPEUTIC MULTIVITAMIN-MINERALS) tablet Take 1 tablet by mouth daily      Multiple Vitamins-Minerals (PRESERVISION AREDS PO) Take 1 tablet by mouth 2 times daily (with meals)      acetaminophen (TYLENOL) 325 MG tablet Take 650 mg by mouth every 6 hours as needed for Pain or Fever      calcium carbonate (TUMS) 500 MG chewable tablet Take 1 tablet by mouth every 4 hours as needed for Heartburn      ipratropium-albuterol (DUONEB) 0.5-2.5 (3) MG/3ML SOLN nebulizer solution Inhale 3 mLs into the lungs every 4 hours (while awake) for 30 doses 90 mL 0    apixaban (ELIQUIS) 5 MG TABS tablet Take 5 mg by mouth 2 times daily (before meals)       gabapentin (NEURONTIN) 100 MG capsule Take 200 mg by mouth Daily with supper.  atorvastatin (LIPITOR) 80 MG tablet Take 80 mg by mouth Daily with supper       INSULIN LISP PROT & LISP, HUM, (75-25) 100 UNIT/ML SUSP Inject 72 Units into the skin daily (with breakfast)       INSULIN LISP PROT & LISP, HUM, (75-25) 100 UNIT/ML SUSP Inject 55 Units into the skin Daily with supper       clotrimazole-betamethasone (LOTRISONE) cream Apply  topically as needed. Apply topically 2 times daily. No current facility-administered medications on file prior to encounter.        REVIEW OF SYSTEMS   ROS : All others Negative if blank [], Positive if [x]  General Vascular   [] Fevers [] Claudication   [] Chills [] Rest Pain   Skin Neurologic   [x] Tissue Loss [x] Lower extremity neuropathy     Objective:    Pulse 98   Temp 97.9 °F (36.6 °C) (Temporal)   Resp 18   Wt Readings from Last 3 Encounters:   02/02/22 243 lb (110.2 kg)   01/12/22 243 lb (110.2 kg)   01/05/22 243 lb (110.2 kg)       PHYSICAL EXAM   CONSTITUTIONAL:   Awake, alert, cooperative   PSYCHIATRIC :  Oriented to time, place and person      normal insight to disease process  EXTREMITIES:   R LE Open wounds are noted   Skin color is abnormal with ulcers   Edema is  noted   Sensation deficit noted - protective   Palpation of the foot does cause pain   4/5 strength DF/PF  L LE Open wounds are noted   Skin color is abnormal with ulcers   Edema is  noted   Sensation deficit noted - protective   Palpation of the foot does cause pain   4/5 strength DF/PF  R dorsalis pedis 1 L dorsalis pedis 1   R posterior tibial 1 L posterior tibial 1     Assessment:     Problem List Items Addressed This Visit     Diabetic ulcer of right heel associated with type 2 diabetes mellitus, with fat layer exposed ([facility] - Primary    Relevant Medications    lidocaine (XYLOCAINE) 4 % external solution    Other Relevant Orders    Initiate Outpatient Wound Care Protocol    Diabetic ulcer of left heel (HCC)    Relevant Medications    lidocaine (XYLOCAINE) 4 % external solution    Other Relevant Orders    Initiate Outpatient Wound Care Protocol          Pre Debridement Measurements:  Are located in the [place]  Documentation Flow Sheet  Post Debridement Measurements:  Wound/Ulcer Descriptions are Pre Debridement except measurements:     Wound 01/05/22 Heel Left #1 (Active)   Wound Image   01/05/22 0902   Wound Etiology Diabetic Campbell 2 01/05/22 0943   Dressing Status New dressing applied 02/02/22 0918   Wound Cleansed Cleansed with saline 02/02/22 0918   Dressing/Treatment Dry dressing;Foam;Non adherent 02/02/22 0918   Offloading for Diabetic Foot Ulcers Offloading boot 02/02/22 0918   Wound Length (cm) 3.5 cm 02/09/22 0935   Wound Width (cm) 3.1 cm 02/09/22 0935   Wound Depth (cm) 0.2 cm 02/09/22 0935   Wound Surface Area (cm^2) 10.85 cm^2 02/09/22 0935   Change in Wound Size % (l*w) 21.38 02/09/22 0935   Wound Volume (cm^3) 2.17 cm^3 02/09/22 0935   Wound Healing % 21 02/09/22 0935   Post-Procedure Length (cm) 3.5 cm 02/09/22 0948   Post-Procedure Width (cm) 3.1 cm 02/09/22 0948   Post-Procedure Depth (cm) 0.2 cm 02/09/22 0948   Post-Procedure Surface Area (cm^2) 10.85 cm^2 02/09/22 0948   Post-Procedure Volume (cm^3) 2.17 cm^3 02/09/22 0948   Wound Assessment Fibrin;Pink/red 02/09/22 0935   Drainage Amount Large 02/09/22 0935   Drainage Description Serosanguinous 02/09/22 0935   Odor None 02/09/22 0935   Rashmi-wound Assessment Intact 02/09/22 0935   Wound Thickness Description not for Pressure Injury Full thickness 01/19/22 0909   Number of days: 35       Wound 01/05/22 Heel Right #2 (Active)   Wound Image   01/05/22 0902   Wound Etiology Venous 01/05/22 0943   Dressing Status New dressing applied 02/02/22 0918   Wound Cleansed Cleansed with saline 02/02/22 0918   Dressing/Treatment Dry dressing;Foam;Non adherent 02/02/22 0918   Offloading for Diabetic Foot Ulcers Offloading boot 02/02/22 0918   Wound Length (cm) 2.2 cm 02/09/22 0935   Wound Width (cm) 1.6 cm 02/09/22 0935   Wound Depth (cm) 0.2 cm 02/09/22 0935   Wound Surface Area (cm^2) 3.52 cm^2 02/09/22 0935   Change in Wound Size % (l*w) 17.18 02/09/22 0935   Wound Volume (cm^3) 0.704 cm^3 02/09/22 0935   Wound Healing % 17 02/09/22 0935   Post-Procedure Length (cm) 2.2 cm 02/09/22 0948   Post-Procedure Width (cm) 1.6 cm 02/09/22 0948   Post-Procedure Depth (cm) 0.2 cm 02/09/22 0948   Post-Procedure Surface Area (cm^2) 3.52 cm^2 02/09/22 0948   Post-Procedure Volume (cm^3) 0.704 cm^3 02/09/22 0948   Wound Assessment Fibrin;Pink/red 02/09/22 0935   Drainage Amount Moderate 02/09/22 0935   Drainage Description Serosanguinous; Yellow 02/09/22 0935   Odor None 02/09/22 0935   Rashmi-wound Assessment Intact 02/09/22 0935   Wound Thickness Description not for Pressure Injury Full thickness 01/19/22 0909   Number of days: 35       Wound 01/05/22 Ankle Left;Lateral #4 (Active)   Wound Image   01/05/22 0902   Wound Etiology Venous 01/05/22 0943   Dressing Status New dressing applied 02/02/22 0918   Wound Cleansed Cleansed with saline 02/02/22 0918   Dressing/Treatment Collagen with Ag;Dry dressing 02/02/22 0918   Offloading for Diabetic Foot Ulcers Offloading boot 02/02/22 0918   Wound Length (cm) 0.3 cm 02/09/22 0935   Wound Width (cm) 0.2 cm 02/09/22 0935   Wound Depth (cm) 0.1 cm 02/09/22 0935   Wound Surface Area (cm^2) 0.06 cm^2 02/09/22 0935   Change in Wound Size % (l*w) 85.71 02/09/22 0935   Wound Volume (cm^3) 0.006 cm^3 02/09/22 0935   Wound Healing % 93 02/09/22 0935   Post-Procedure Length (cm) 0.3 cm 02/09/22 0949   Post-Procedure Width (cm) 0.2 cm 02/09/22 0949   Post-Procedure Depth (cm) 0.1 cm 02/09/22 0949   Post-Procedure Surface Area (cm^2) 0.06 cm^2 02/09/22 0949   Post-Procedure Volume (cm^3) 0.006 cm^3 02/09/22 0949   Wound Assessment Pink/red 02/09/22 0935   Drainage Amount Scant 02/09/22 0935   Drainage Description Yellow 02/09/22 0935   Odor None 02/09/22 0935   Rashmi-wound Assessment Intact 02/09/22 0935   Number of days: 35       Wound 01/26/22 Tibial Left;Posterior #6 (Active)   Wound Image   01/26/22 0855   Dressing Status New dressing applied 02/02/22 0918   Wound Cleansed Cleansed with saline 02/02/22 0918   Dressing/Treatment Non adherent;Dry dressing 02/02/22 0918   Offloading for Diabetic Foot Ulcers Offloading boot 02/02/22 0918   Wound Length (cm) 1.9 cm 02/09/22 0935   Wound Width (cm) 1.3 cm 02/09/22 0935   Wound Depth (cm) 0.4 cm 02/09/22 0935   Wound Surface Area (cm^2) 2.47 cm^2 02/09/22 0935   Change in Wound Size % (l*w) -58.33 02/09/22 0935   Wound Volume (cm^3) 0.988 cm^3 02/09/22 0935   Wound Healing % -533 02/09/22 0935   Post-Procedure Length (cm) 1.9 cm 02/09/22 0948   Post-Procedure Width (cm) 1.3 cm 02/09/22 0948   Post-Procedure Depth (cm) 0.4 cm 02/09/22 0948   Post-Procedure Surface Area (cm^2) 2.47 cm^2 02/09/22 0948   Post-Procedure Volume (cm^3) 0.988 cm^3 02/09/22 0948   Wound Assessment Fibrin;Pale granulation tissue 02/09/22 0935   Drainage Amount Moderate 02/09/22 0935   Drainage Description Serosanguinous 02/09/22 0935   Odor None 02/09/22 0935   Rashmi-wound Assessment Blanchable erythema; Maceration 02/09/22 0935   Number of days: 14       Wound 01/26/22 Pretibial Left;Medial #7 (Active)   Wound Image   02/09/22 0935   Dressing Status New dressing applied 02/02/22 0918   Wound Cleansed Cleansed with saline 02/02/22 0918   Dressing/Treatment Dry dressing;Non adherent 02/02/22 0918   Offloading for Diabetic Foot Ulcers Offloading boot 02/02/22 0918   Wound Length (cm) 0 cm 02/09/22 0935   Wound Width (cm) 0 cm 02/09/22 0935   Wound Depth (cm) 0 cm 02/09/22 0935   Wound Surface Area (cm^2) 0 cm^2 Loss:  Minimal  Hemostasis Achieved:  by pressure    Procedural Pain:  4  / 10   Post Procedural Pain:  5 / 10     Response to treatment:  Well tolerated by patient. A culture was done. Skin Substitute Applied:         [] APLIGRAF   []44 sq/cm   []88 sq/cm    []132 sq/cm  []176  sq/cm           [x] DERMAGRAFT  [x] 38sq/cm   []76 sq/cm    []114 sq/cm  []152 sq/cm         [] OASIS   [] 10.5 sq/cm   []21 sq/cm    []4 sq/cm PuraPly  []8 sq/cm PuraPly        [] OTHER puraply 16    []        sq/cm       Performed by: Jerry Boss DPM    Wound Type:diabetic    Consent obtained: Yes    Time out taken: Yes     Fenestrated: Yes    Instrument(s) #15 blade scalpel      [] Mesher Utilized    Skin Substitute was Applied to Wound Number(s):     Wound #: 1 and 2      Wound 01/05/22 Heel Left #1 (Active)   Wound Image   01/05/22 0902   Wound Etiology Diabetic Campbell 2 01/05/22 0943   Dressing Status New dressing applied 02/02/22 0918   Wound Cleansed Cleansed with saline 02/02/22 0918   Dressing/Treatment Dry dressing;Foam;Non adherent 02/02/22 0918   Offloading for Diabetic Foot Ulcers Offloading boot 02/02/22 0918   Wound Length (cm) 4.5 cm 02/02/22 0819   Wound Width (cm) 2.5 cm 02/02/22 0819   Wound Depth (cm) 0.1 cm 02/02/22 0819   Wound Surface Area (cm^2) 11.25 cm^2 02/02/22 0819   Change in Wound Size % (l*w) 18.48 02/02/22 0819   Wound Volume (cm^3) 1.125 cm^3 02/02/22 0819   Wound Healing % 59 02/02/22 0819   Post-Procedure Length (cm) 4.5 cm 02/02/22 0840   Post-Procedure Width (cm) 2.5 cm 02/02/22 0840   Post-Procedure Depth (cm) 0.1 cm 02/02/22 0840   Post-Procedure Surface Area (cm^2) 11.25 cm^2 02/02/22 0840   Post-Procedure Volume (cm^3) 1.125 cm^3 02/02/22 0840   Wound Assessment Fibrin;Pink/red 02/02/22 0819   Drainage Amount Moderate 02/02/22 0819   Drainage Description Serosanguinous 02/02/22 0819   Odor None 02/02/22 0819   Rashmi-wound Assessment Maceration 02/02/22 0819   Wound Thickness Description not for Pressure Injury Full thickness 01/19/22 0909   Number of days: 28       Wound 01/05/22 Heel Right #2 (Active)   Wound Image   01/05/22 0902   Wound Etiology Venous 01/05/22 0943   Dressing Status New dressing applied 02/02/22 0918   Wound Cleansed Cleansed with saline 02/02/22 0918   Dressing/Treatment Dry dressing;Foam;Non adherent 02/02/22 0918   Offloading for Diabetic Foot Ulcers Offloading boot 02/02/22 0918   Wound Length (cm) 2.5 cm 02/02/22 0819   Wound Width (cm) 1.7 cm 02/02/22 0819   Wound Depth (cm) 0.2 cm 02/02/22 0819   Wound Surface Area (cm^2) 4.25 cm^2 02/02/22 0819   Change in Wound Size % (l*w) 0 02/02/22 0819   Wound Volume (cm^3) 0.85 cm^3 02/02/22 0819   Wound Healing % 0 02/02/22 0819   Post-Procedure Length (cm) 2.5 cm 02/02/22 0840   Post-Procedure Width (cm) 1.7 cm 02/02/22 0840   Post-Procedure Depth (cm) 0.2 cm 02/02/22 0840   Post-Procedure Surface Area (cm^2) 4.25 cm^2 02/02/22 0840   Post-Procedure Volume (cm^3) 0.85 cm^3 02/02/22 0840   Wound Assessment Fibrin;Pink/red 02/02/22 0819   Drainage Amount Moderate 02/02/22 0819   Drainage Description Serosanguinous; Yellow 02/02/22 0819   Odor None 02/02/22 0819   Rashmi-wound Assessment Intact 02/02/22 0819   Wound Thickness Description not for Pressure Injury Full thickness 01/19/22 0909   Number of days: 28       Wound 01/05/22 Ankle Right;Lateral #3 (Active)   Wound Image   01/05/22 0902   Wound Etiology Venous 01/05/22 0943   Dressing Status New dressing applied 01/26/22 0901   Wound Cleansed Cleansed with saline 01/26/22 0901   Dressing/Treatment Alginate;ABD 01/26/22 0901   Wound Length (cm) 0 cm 02/02/22 0819   Wound Width (cm) 0 cm 02/02/22 0819   Wound Depth (cm) 0 cm 02/02/22 0819   Wound Surface Area (cm^2) 0 cm^2 02/02/22 0819   Change in Wound Size % (l*w) 100 02/02/22 0819   Wound Volume (cm^3) 0 cm^3 02/02/22 0819   Wound Healing % 100 02/02/22 0819   Post-Procedure Length (cm) 0.2 cm 01/26/22 0901 Post-Procedure Width (cm) 0.2 cm 01/26/22 0901   Post-Procedure Depth (cm) 0.1 cm 01/26/22 0901   Post-Procedure Surface Area (cm^2) 0.04 cm^2 01/26/22 0901   Post-Procedure Volume (cm^3) 0.004 cm^3 01/26/22 0901   Wound Assessment Dry;Fibrin 01/26/22 0855   Drainage Amount None 01/26/22 0855   Drainage Description Serous 01/19/22 0839   Odor None 01/26/22 0855   Rashmi-wound Assessment Intact; Blanchable erythema 01/26/22 0855   Number of days: 28       Wound 01/05/22 Ankle Left;Lateral #4 (Active)   Wound Image   01/05/22 0902   Wound Etiology Venous 01/05/22 0943   Dressing Status New dressing applied 02/02/22 0918   Wound Cleansed Cleansed with saline 02/02/22 0918   Dressing/Treatment Collagen with Ag;Dry dressing 02/02/22 0918   Offloading for Diabetic Foot Ulcers Offloading boot 02/02/22 0918   Wound Length (cm) 0.7 cm 02/02/22 0819   Wound Width (cm) 0.5 cm 02/02/22 0819   Wound Depth (cm) 0.2 cm 02/02/22 0819   Wound Surface Area (cm^2) 0.35 cm^2 02/02/22 0819   Change in Wound Size % (l*w) 16.67 02/02/22 0819   Wound Volume (cm^3) 0.07 cm^3 02/02/22 0819   Wound Healing % 17 02/02/22 0819   Post-Procedure Length (cm) 0.7 cm 02/02/22 0840   Post-Procedure Width (cm) 0.5 cm 02/02/22 0840   Post-Procedure Depth (cm) 0.2 cm 02/02/22 0840   Post-Procedure Surface Area (cm^2) 0.35 cm^2 02/02/22 0840   Post-Procedure Volume (cm^3) 0.07 cm^3 02/02/22 0840   Wound Assessment Pink/red 02/02/22 0819   Drainage Amount Scant 02/02/22 0819   Drainage Description Yellow 02/02/22 0819   Odor None 02/02/22 0819   Rashmi-wound Assessment Intact 02/02/22 0819   Number of days: 28       Wound 01/05/22 Pretibial Left #5 (Active)   Wound Image   01/26/22 0901   Wound Etiology Venous 01/05/22 0943   Dressing Status New dressing applied 01/19/22 0921   Wound Cleansed Cleansed with saline 01/19/22 0921   Dressing/Treatment Alginate;Dry dressing 01/19/22 0921   Wound Length (cm) 0 cm 02/02/22 0819   Wound Width (cm) 0 cm 02/02/22 0819 Wound Depth (cm) 0 cm 02/02/22 0819   Wound Surface Area (cm^2) 0 cm^2 02/02/22 0819   Change in Wound Size % (l*w) 100 02/02/22 0819   Wound Volume (cm^3) 0 cm^3 02/02/22 0819   Wound Healing % 100 02/02/22 0819   Post-Procedure Length (cm) 0.3 cm 01/19/22 0909   Post-Procedure Width (cm) 0.3 cm 01/19/22 0909   Post-Procedure Depth (cm) 0.1 cm 01/19/22 0909   Post-Procedure Surface Area (cm^2) 0.09 cm^2 01/19/22 0909   Post-Procedure Volume (cm^3) 0.009 cm^3 01/19/22 0909   Wound Assessment Pink/red 01/19/22 0839   Drainage Amount Scant 01/19/22 0839   Drainage Description Serous 01/19/22 0839   Odor None 01/19/22 0839   Rashmi-wound Assessment Fragile 01/19/22 0839   Number of days: 28       Wound 01/26/22 Tibial Left;Posterior #6 (Active)   Wound Image   01/26/22 0855   Dressing Status New dressing applied 02/02/22 0918   Wound Cleansed Cleansed with saline 02/02/22 0918   Dressing/Treatment Non adherent;Dry dressing 02/02/22 0918   Offloading for Diabetic Foot Ulcers Offloading boot 02/02/22 0918   Wound Length (cm) 2.1 cm 02/02/22 0819   Wound Width (cm) 1.4 cm 02/02/22 0819   Wound Depth (cm) 0.1 cm 02/02/22 0819   Wound Surface Area (cm^2) 2.94 cm^2 02/02/22 0819   Change in Wound Size % (l*w) -88.46 02/02/22 0819   Wound Volume (cm^3) 0.294 cm^3 02/02/22 0819   Wound Healing % -88 02/02/22 0819   Post-Procedure Length (cm) 2.1 cm 02/02/22 0840   Post-Procedure Width (cm) 1.4 cm 02/02/22 0840   Post-Procedure Depth (cm) 0.1 cm 02/02/22 0840   Post-Procedure Surface Area (cm^2) 2.94 cm^2 02/02/22 0840   Post-Procedure Volume (cm^3) 0.294 cm^3 02/02/22 0840   Wound Assessment Fibrin 02/02/22 0819   Drainage Amount Small 02/02/22 0819   Drainage Description Serosanguinous 02/02/22 0819   Odor None 02/02/22 0819   Rashmi-wound Assessment Blanchable erythema; Maceration 02/02/22 0819   Number of days: 7       Wound 01/26/22 Pretibial Left;Medial #7 (Active)   Wound Image   01/26/22 0855   Dressing Status New dressing applied 02/02/22 0918   Wound Cleansed Cleansed with saline 02/02/22 0918   Dressing/Treatment Dry dressing;Non adherent 02/02/22 0918   Offloading for Diabetic Foot Ulcers Offloading boot 02/02/22 0918   Wound Length (cm) 1.2 cm 02/02/22 0819   Wound Width (cm) 1 cm 02/02/22 0819   Wound Depth (cm) 0.1 cm 02/02/22 0819   Wound Surface Area (cm^2) 1.2 cm^2 02/02/22 0819   Change in Wound Size % (l*w) -9.09 02/02/22 0819   Wound Volume (cm^3) 0.12 cm^3 02/02/22 0819   Wound Healing % -9 02/02/22 0819   Post-Procedure Length (cm) 1.2 cm 02/02/22 0840   Post-Procedure Width (cm) 1 cm 02/02/22 0840   Post-Procedure Depth (cm) 0.1 cm 02/02/22 0840   Post-Procedure Surface Area (cm^2) 1.2 cm^2 02/02/22 0840   Post-Procedure Volume (cm^3) 0.12 cm^3 02/02/22 0840   Wound Assessment Pink/red 02/02/22 0819   Drainage Amount None 02/02/22 0819   Odor None 02/02/22 0819   Rashmi-wound Assessment Intact 02/02/22 0819   Number of days: 7         Total Surface Area Covered 4 sq/cm     Amount Wasted 0 sq/cm    Reason for Waste 0      Secured: Yes    Secured With:  [x]Steri Strips    []Sutures     []Staples []Other    Procedural Pain: 2/10     Post Procedural Pain: 3 / 10    Response to Treatment:  Well tolerated by patient. Plan:     Pt is not a smoker   - Discussed relationship of smoking and negative affects on wound healing   - Emphasized importance of tobacco avoidace/cessation   - Script for nicotine patch given to patient   - Information regarding support groups for smoking cessation given    In my professional opinion and based off the information that is available at this time this patient has appropriate indication for HBO Therapy: Maybe    Treatment Note please see attached Discharge Instructions    Written patient dismissal instructions given to patient and signed by patient or POA.          Discharge Instructions         Visit Discharge/Physician Orders    Discharge condition: Stable    Assessment of pain at discharge: NONE    Anesthetic used: 4% lidocaine solution    Discharge to: Home    Left via:Private automobile    Accompanied by: accompanied by SELF    [facility]:  [facility]    Dressing Orders:  Clean bilateral heel ulcers AND LEFT POSTERIOR TIBIAL AND LEFT MEDIAL PRETIBIAL ULCERS with normal saline. DR. Delon Jolley 1ST DERMAGRAFT BILATERAL HEELS. (2 OF 10 TOTAL GRAFTS) APPLY ADAPTIC, FOAM AND DRY  DRESSING TO SECURE. LEAVE IN PLACE FOR ONE WEEK. MAKE SURE TO WRAP HEEL AND LEG ULCERS SEPARATELY. TO LEG ULCER, APPLY SALINE MOIST BRANDEE AND DRY DRESSING TO SECURE. CHANGE EVERY OTHER DAY. Prescription given for bilateral heel offloading boot. ECF placed on order. Thank you   Physical therapy for ambulating safely while wearing offloading boots as indicated.     Treatment Orders: wear prevalon boots    Wound measurements:                                                   Wound 01/05/22 Heel Left #1 (Active)   Wound Image   01/05/22 0902   Wound Etiology Diabetic Campbell 2 01/05/22 0943   Dressing Status New dressing applied 02/02/22 0918   Wound Cleansed Cleansed with saline 02/02/22 0918   Dressing/Treatment Dry dressing;Foam;Non adherent 02/02/22 0918   Offloading for Diabetic Foot Ulcers Offloading boot 02/02/22 0918   Wound Length (cm) 3.5 cm 02/09/22 0935   Wound Width (cm) 3.1 cm 02/09/22 0935   Wound Depth (cm) 0.2 cm 02/09/22 0935   Wound Surface Area (cm^2) 10.85 cm^2 02/09/22 0935   Change in Wound Size % (l*w) 21.38 02/09/22 0935   Wound Volume (cm^3) 2.17 cm^3 02/09/22 0935   Wound Healing % 21 02/09/22 0935   Post-Procedure Length (cm) 3.5 cm 02/09/22 0948   Post-Procedure Width (cm) 3.1 cm 02/09/22 0948   Post-Procedure Depth (cm) 0.2 cm 02/09/22 0948   Post-Procedure Surface Area (cm^2) 10.85 cm^2 02/09/22 0948   Post-Procedure Volume (cm^3) 2.17 cm^3 02/09/22 0948   Wound Assessment Fibrin;Pink/red 02/09/22 0935   Drainage Amount Large 02/09/22 0935   Drainage Description Serosanguinous 02/09/22 0935 Odor None 02/09/22 0935   Rashmi-wound Assessment Intact 02/09/22 0935   Wound Thickness Description not for Pressure Injury Full thickness 01/19/22 0909   Number of days: 35       Wound 01/05/22 Heel Right #2 (Active)   Wound Image   01/05/22 0902   Wound Etiology Venous 01/05/22 0943   Dressing Status New dressing applied 02/02/22 0918   Wound Cleansed Cleansed with saline 02/02/22 0918   Dressing/Treatment Dry dressing;Foam;Non adherent 02/02/22 0918   Offloading for Diabetic Foot Ulcers Offloading boot 02/02/22 0918   Wound Length (cm) 2.2 cm 02/09/22 0935   Wound Width (cm) 1.6 cm 02/09/22 0935   Wound Depth (cm) 0.2 cm 02/09/22 0935   Wound Surface Area (cm^2) 3.52 cm^2 02/09/22 0935   Change in Wound Size % (l*w) 17.18 02/09/22 0935   Wound Volume (cm^3) 0.704 cm^3 02/09/22 0935   Wound Healing % 17 02/09/22 0935   Post-Procedure Length (cm) 2.2 cm 02/09/22 0948   Post-Procedure Width (cm) 1.6 cm 02/09/22 0948   Post-Procedure Depth (cm) 0.2 cm 02/09/22 0948   Post-Procedure Surface Area (cm^2) 3.52 cm^2 02/09/22 0948   Post-Procedure Volume (cm^3) 0.704 cm^3 02/09/22 0948   Wound Assessment Fibrin;Pink/red 02/09/22 0935   Drainage Amount Moderate 02/09/22 0935   Drainage Description Serosanguinous; Yellow 02/09/22 0935   Odor None 02/09/22 0935   Rashmi-wound Assessment Intact 02/09/22 0935   Wound Thickness Description not for Pressure Injury Full thickness 01/19/22 0909   Number of days: 35       Wound 01/05/22 Ankle Left;Lateral #4 (Active)   Wound Image   01/05/22 0902   Wound Etiology Venous 01/05/22 0943   Dressing Status New dressing applied 02/02/22 0918   Wound Cleansed Cleansed with saline 02/02/22 0918   Dressing/Treatment Collagen with Ag;Dry dressing 02/02/22 0918   Offloading for Diabetic Foot Ulcers Offloading boot 02/02/22 0918   Wound Length (cm) 0.3 cm 02/09/22 0935   Wound Width (cm) 0.2 cm 02/09/22 0935   Wound Depth (cm) 0.1 cm 02/09/22 0935   Wound Surface Area (cm^2) 0.06 cm^2 02/09/22 0935   Change in Wound Size % (l*w) 85.71 02/09/22 0935   Wound Volume (cm^3) 0.006 cm^3 02/09/22 0935   Wound Healing % 93 02/09/22 0935   Post-Procedure Length (cm) 0.3 cm 02/09/22 0949   Post-Procedure Width (cm) 0.2 cm 02/09/22 0949   Post-Procedure Depth (cm) 0.1 cm 02/09/22 0949   Post-Procedure Surface Area (cm^2) 0.06 cm^2 02/09/22 0949   Post-Procedure Volume (cm^3) 0.006 cm^3 02/09/22 0949   Wound Assessment Pink/red 02/09/22 0935   Drainage Amount Scant 02/09/22 0935   Drainage Description Yellow 02/09/22 0935   Odor None 02/09/22 0935   Rashmi-wound Assessment Intact 02/09/22 0935   Number of days: 35       Wound 01/26/22 Tibial Left;Posterior #6 (Active)   Wound Image   01/26/22 0855   Dressing Status New dressing applied 02/02/22 0918   Wound Cleansed Cleansed with saline 02/02/22 0918   Dressing/Treatment Non adherent;Dry dressing 02/02/22 0918   Offloading for Diabetic Foot Ulcers Offloading boot 02/02/22 0918   Wound Length (cm) 1.9 cm 02/09/22 0935   Wound Width (cm) 1.3 cm 02/09/22 0935   Wound Depth (cm) 0.4 cm 02/09/22 0935   Wound Surface Area (cm^2) 2.47 cm^2 02/09/22 0935   Change in Wound Size % (l*w) -58.33 02/09/22 0935   Wound Volume (cm^3) 0.988 cm^3 02/09/22 0935   Wound Healing % -533 02/09/22 0935   Post-Procedure Length (cm) 1.9 cm 02/09/22 0948   Post-Procedure Width (cm) 1.3 cm 02/09/22 0948   Post-Procedure Depth (cm) 0.4 cm 02/09/22 0948   Post-Procedure Surface Area (cm^2) 2.47 cm^2 02/09/22 0948   Post-Procedure Volume (cm^3) 0.988 cm^3 02/09/22 0948   Wound Assessment Fibrin;Pale granulation tissue 02/09/22 0935   Drainage Amount Moderate 02/09/22 0935   Drainage Description Serosanguinous 02/09/22 0935   Odor None 02/09/22 0935   Rashmi-wound Assessment Blanchable erythema; Maceration 02/09/22 0935   Number of days: 14       Wound 01/26/22 Pretibial Left;Medial #7 (Active)   Wound Image   02/09/22 0935   Dressing Status New dressing applied 02/02/22 0918   Wound Cleansed Cleansed with saline 02/02/22 0918   Dressing/Treatment Dry dressing;Non adherent 02/02/22 0918   Offloading for Diabetic Foot Ulcers Offloading boot 02/02/22 0918   Wound Length (cm) 0 cm 02/09/22 0935   Wound Width (cm) 0 cm 02/09/22 0935   Wound Depth (cm) 0 cm 02/09/22 0935   Wound Surface Area (cm^2) 0 cm^2 02/09/22 0935   Change in Wound Size % (l*w) 100 02/09/22 0935   Wound Volume (cm^3) 0 cm^3 02/09/22 0935   Wound Healing % 100 02/09/22 0935   Post-Procedure Length (cm) 1.2 cm 02/02/22 0840   Post-Procedure Width (cm) 1 cm 02/02/22 0840   Post-Procedure Depth (cm) 0.1 cm 02/02/22 0840   Post-Procedure Surface Area (cm^2) 1.2 cm^2 02/02/22 0840   Post-Procedure Volume (cm^3) 0.12 cm^3 02/02/22 0840   Wound Assessment Pink/red 02/02/22 0819   Drainage Amount None 02/02/22 0819   Odor None 02/02/22 0819   Rashmi-wound Assessment Intact 02/02/22 0819   Number of days: 14       Wound 02/09/22 Toe (Comment  which one) Right #8 RIGHT 2ND TOE (Active)   Wound Image   02/09/22 0935   Wound Length (cm) 0.3 cm 02/09/22 0935   Wound Width (cm) 0.3 cm 02/09/22 0935   Wound Depth (cm) 0.1 cm 02/09/22 0935   Wound Surface Area (cm^2) 0.09 cm^2 02/09/22 0935   Wound Volume (cm^3) 0.009 cm^3 02/09/22 0935   Wound Assessment Pink/red 02/09/22 0935   Drainage Amount Scant 02/09/22 0935   Drainage Description Serosanguinous 02/09/22 0935   Odor None 02/09/22 0935   Rashmi-wound Assessment Intact 02/09/22 0935   Number of days: 0                                                          [facility] followup visit ___________one week with Dr. Gastelum__________________  (Please note your next appointment above and if you are unable to keep, kindly give a 24 hour notice.  Thank you.)    Physician signature:__________________________      If you experience any of the following, please call the [address] [place] [address] Road during business hours:    * Increase in Pain  * Temperature over 101  * Increase in drainage from your wound  * Drainage with a foul odor  * Bleeding  * Increase in swelling  * Need for compression bandage changes due to slippage, breakthrough drainage. If you need medical attention outside of the business hours of the [address] [place] [address] Road please contact your PCP or go to the nearest emergency room.         Electronically signed by Daria Winn DPM on 2/9/2022 at 10:18 AM

## 2022-02-16 ENCOUNTER — HOSPITAL ENCOUNTER (OUTPATIENT)
Dept: WOUND CARE | Age: 87
Discharge: HOME | End: 2022-02-16
Payer: MEDICARE

## 2022-02-16 VITALS
HEIGHT: 72 IN | DIASTOLIC BLOOD PRESSURE: 52 MMHG | RESPIRATION RATE: 19 BRPM | WEIGHT: 243 LBS | BODY MASS INDEX: 32.91 KG/M2 | SYSTOLIC BLOOD PRESSURE: 138 MMHG | HEART RATE: 68 BPM | TEMPERATURE: 97.9 F

## 2022-02-16 DIAGNOSIS — E08.621 DIABETIC ULCER OF LEFT HEEL ASSOCIATED WITH DIABETES MELLITUS DUE TO UNDERLYING CONDITION, LIMITED TO BREAKDOWN OF SKIN (HCC): ICD-10-CM

## 2022-02-16 DIAGNOSIS — L97.421 DIABETIC ULCER OF LEFT HEEL ASSOCIATED WITH DIABETES MELLITUS DUE TO UNDERLYING CONDITION, LIMITED TO BREAKDOWN OF SKIN (HCC): ICD-10-CM

## 2022-02-16 DIAGNOSIS — E11.621 DIABETIC ULCER OF RIGHT HEEL ASSOCIATED WITH TYPE 2 DIABETES MELLITUS, WITH FAT LAYER EXPOSED (HCC): Primary | ICD-10-CM

## 2022-02-16 DIAGNOSIS — L97.412 DIABETIC ULCER OF RIGHT HEEL ASSOCIATED WITH TYPE 2 DIABETES MELLITUS, WITH FAT LAYER EXPOSED (HCC): Primary | ICD-10-CM

## 2022-02-16 PROCEDURE — 6370000000 HC RX 637 (ALT 250 FOR IP): Performed by: PODIATRIST

## 2022-02-16 PROCEDURE — 15271 SKIN SUB GRAFT TRNK/ARM/LEG: CPT

## 2022-02-16 RX ORDER — LIDOCAINE HYDROCHLORIDE 20 MG/ML
JELLY TOPICAL ONCE
Status: CANCELLED | OUTPATIENT
Start: 2022-02-16 | End: 2022-02-16

## 2022-02-16 RX ORDER — BACITRACIN, NEOMYCIN, POLYMYXIN B 400; 3.5; 5 [USP'U]/G; MG/G; [USP'U]/G
OINTMENT TOPICAL ONCE
Status: CANCELLED | OUTPATIENT
Start: 2022-02-16 | End: 2022-02-16

## 2022-02-16 RX ORDER — LIDOCAINE 40 MG/G
CREAM TOPICAL ONCE
Status: CANCELLED | OUTPATIENT
Start: 2022-02-16 | End: 2022-02-16

## 2022-02-16 RX ORDER — BACITRACIN ZINC AND POLYMYXIN B SULFATE 500; 1000 [USP'U]/G; [USP'U]/G
OINTMENT TOPICAL ONCE
Status: CANCELLED | OUTPATIENT
Start: 2022-02-16 | End: 2022-02-16

## 2022-02-16 RX ORDER — GENTAMICIN SULFATE 1 MG/G
OINTMENT TOPICAL ONCE
Status: CANCELLED | OUTPATIENT
Start: 2022-02-16 | End: 2022-02-16

## 2022-02-16 RX ORDER — BETAMETHASONE DIPROPIONATE 0.05 %
OINTMENT (GRAM) TOPICAL ONCE
Status: CANCELLED | OUTPATIENT
Start: 2022-02-16 | End: 2022-02-16

## 2022-02-16 RX ORDER — LIDOCAINE 50 MG/G
OINTMENT TOPICAL ONCE
Status: CANCELLED | OUTPATIENT
Start: 2022-02-16 | End: 2022-02-16

## 2022-02-16 RX ORDER — GINSENG 100 MG
CAPSULE ORAL ONCE
Status: CANCELLED | OUTPATIENT
Start: 2022-02-16 | End: 2022-02-16

## 2022-02-16 RX ORDER — LIDOCAINE HYDROCHLORIDE 40 MG/ML
SOLUTION TOPICAL ONCE
Status: CANCELLED | OUTPATIENT
Start: 2022-02-16 | End: 2022-02-16

## 2022-02-16 RX ORDER — CLOBETASOL PROPIONATE 0.5 MG/G
OINTMENT TOPICAL ONCE
Status: CANCELLED | OUTPATIENT
Start: 2022-02-16 | End: 2022-02-16

## 2022-02-16 RX ORDER — LIDOCAINE HYDROCHLORIDE 40 MG/ML
SOLUTION TOPICAL ONCE
Status: COMPLETED | OUTPATIENT
Start: 2022-02-16 | End: 2022-02-16

## 2022-02-16 RX ADMIN — LIDOCAINE HYDROCHLORIDE: 40 SOLUTION TOPICAL at 09:03

## 2022-02-16 ASSESSMENT — PAIN DESCRIPTION - LOCATION: LOCATION: LEG;FOOT

## 2022-02-16 ASSESSMENT — PAIN DESCRIPTION - PROGRESSION: CLINICAL_PROGRESSION: NOT CHANGED

## 2022-02-16 ASSESSMENT — PAIN DESCRIPTION - DESCRIPTORS: DESCRIPTORS: ACHING

## 2022-02-16 ASSESSMENT — PAIN DESCRIPTION - PAIN TYPE: TYPE: CHRONIC PAIN

## 2022-02-16 ASSESSMENT — PAIN SCALES - GENERAL: PAINLEVEL_OUTOF10: 4

## 2022-02-16 ASSESSMENT — PAIN DESCRIPTION - ORIENTATION: ORIENTATION: LEFT

## 2022-02-16 ASSESSMENT — PAIN - FUNCTIONAL ASSESSMENT: PAIN_FUNCTIONAL_ASSESSMENT: PREVENTS OR INTERFERES SOME ACTIVE ACTIVITIES AND ADLS

## 2022-02-16 ASSESSMENT — PAIN DESCRIPTION - ONSET: ONSET: ON-GOING

## 2022-02-16 ASSESSMENT — PAIN DESCRIPTION - FREQUENCY: FREQUENCY: INTERMITTENT

## 2022-02-16 NOTE — PROGRESS NOTES
[facility]  History and Physical/Consultation  Podiatry    Referring Physician : Dimple Allen MD  Zoey Gray. MEDICAL RECORD NUMBER:  [id number]  AGE: 80 y.o. GENDER: male  : 1932  EPISODE DATE:  2022  Subjective:     Chief Complaint   Patient presents with    Wound Check     BLE         HISTORY of PRESENT ILLNESS HPI     Zoey Heath is a 80 y.o. male who presents today for wound/ulcer evaluation. History of Wound Context:  The patient has had a wound of diabetic b/l heels,and right ankle which was first noted approximately months ago. This has been treated betadyne. On their initial visit to the wound healing center, 22 ,  the patient has noted that the wound has been improving. The patient has had similar previous wounds in the past.      Pt is not on abx at time of initial visit.       Wound/Ulcer Pain Timing/Severity: constant  Quality of pain: sharp  Severity:  3 / 10   Modifying Factors: Pain worsens with walking  Associated Signs/Symptoms: edema, erythema and drainage    Ulcer Identification:  Ulcer Type: diabetic  Contributing Factors: edema and diabetes    Diabetic/Pressure/Non Pressure Ulcers onl y:  Ulcer: Diabetic ulcer, fat layer exposed    If patient has diabetic lower extremity wounds  Campbell Classification of diabetic lower extremity wounds:    Grade Description   []  0 No open wound   []  1 Superficial ulcer involving the full skin thickness   [x]  2 Deep ulcer involves ligament, tendon, joint capsule, or fascia  No bone involvement or abscess presence   []  3 Deep Ulcer with abcess formation and/or osteomyelitis   []  4 Localized gangrene   []  5 Extensive gangrene of the foot     Wound: N/A          22  Debrided, cont tx and care     22  Debrided, cont tx and care, await graft approval    22  Debrided, cont tx and care, puraply applied    22  Debrided, dermagraft applied    22  Debrided, dermagraft applied  PAST MEDICAL HISTORY      Diagnosis Date    Arthritis     [facility])     breast    CHF (congestive heart failure) (HCC)     Diabetes mellitus ([facility]     History of lumpectomy     Hx of blood clots     Hyperlipidemia     Hypertension     PE (pulmonary thromboembolism) ([facility]     Staph aureus infection      Past Surgical History:   Procedure Laterality Date    [facility]         MASTECTOMY      TOE AMPUTATION      TOE SURGERY      TONSILLECTOMY       History reviewed. No pertinent family history. Social History     Tobacco Use    Smoking status: Never Smoker    Smokeless tobacco: Never Used   Vaping Use    Vaping Use: Never used   Substance Use Topics    Alcohol use: Not Currently     Alcohol/week: 1.0 standard drink     Types: 1 Cans of beer per week    Drug use: No     Allergies   Allergen Reactions    Clindamycin/Lincomycin     Sulfa Antibiotics      Current Outpatient Medications on File Prior to Encounter   Medication Sig Dispense Refill    miconazole (MICOTIN) 2 % powder Apply topically 2 times daily.  45 g 1    ascorbic acid (VITAMIN C) 500 MG tablet Take 1 tablet by mouth 2 times daily 30 tablet 0    Vitamin D (CHOLECALCIFEROL) 50 MCG (2000 UT) TABS tablet Take 1 tablet by mouth daily 60 tablet 0    furosemide (LASIX) 40 MG tablet Take 40 mg by mouth daily      omeprazole (PRILOSEC) 20 MG delayed release capsule Take 20 mg by mouth daily      Multiple Vitamins-Minerals (THERAPEUTIC MULTIVITAMIN-MINERALS) tablet Take 1 tablet by mouth daily      Multiple Vitamins-Minerals (PRESERVISION AREDS PO) Take 1 tablet by mouth 2 times daily (with meals)      acetaminophen (TYLENOL) 325 MG tablet Take 650 mg by mouth every 6 hours as needed for Pain or Fever      calcium carbonate (TUMS) 500 MG chewable tablet Take 1 tablet by mouth every 4 hours as needed for Heartburn      apixaban (ELIQUIS) 5 MG TABS tablet Take 5 mg by mouth 2 times daily (before meals)       gabapentin (NEURONTIN) 100 MG capsule Take 200 mg by mouth Daily with supper.  atorvastatin (LIPITOR) 80 MG tablet Take 80 mg by mouth Daily with supper       INSULIN LISP PROT & LISP, HUM, (75-25) 100 UNIT/ML SUSP Inject 72 Units into the skin daily (with breakfast)       INSULIN LISP PROT & LISP, HUM, (75-25) 100 UNIT/ML SUSP Inject 55 Units into the skin Daily with supper       clotrimazole-betamethasone (LOTRISONE) cream Apply  topically as needed. Apply topically 2 times daily.  zinc sulfate (ZINCATE) 220 (50 Zn) MG capsule Take 1 capsule by mouth daily for 14 days 14 capsule 0    ipratropium-albuterol (DUONEB) 0.5-2.5 (3) MG/3ML SOLN nebulizer solution Inhale 3 mLs into the lungs every 4 hours (while awake) for 30 doses 90 mL 0     No current facility-administered medications on file prior to encounter.        REVIEW OF SYSTEMS   ROS : All others Negative if blank [], Positive if [x]  General Vascular   [] Fevers [] Claudication   [] Chills [] Rest Pain   Skin Neurologic   [x] Tissue Loss [x] Lower extremity neuropathy     Objective:    BP (!) 138/52   Pulse 68   Temp 97.9 °F (36.6 °C) (Temporal)   Resp 19   Ht 6' (1.829 m)   Wt 243 lb (110.2 kg)   BMI 32.96 kg/m²   Wt Readings from Last 3 Encounters:   02/16/22 243 lb (110.2 kg)   02/02/22 243 lb (110.2 kg)   01/12/22 243 lb (110.2 kg)       PHYSICAL EXAM   CONSTITUTIONAL:   Awake, alert, cooperative   PSYCHIATRIC :  Oriented to time, place and person      normal insight to disease process  EXTREMITIES:   R LE Open wounds are noted   Skin color is abnormal with ulcers   Edema is  noted   Sensation deficit noted - protective   Palpation of the foot does cause pain   4/5 strength DF/PF  L LE Open wounds are noted   Skin color is abnormal with ulcers   Edema is  noted   Sensation deficit noted - protective   Palpation of the foot does cause pain   4/5 strength DF/PF  R dorsalis pedis 1 L dorsalis pedis 1   R posterior tibial 1 L posterior tibial 1 Assessment:     Problem List Items Addressed This Visit     Diabetic ulcer of right heel associated with type 2 diabetes mellitus, with fat layer exposed ([facility] - Primary    Relevant Orders    Initiate Outpatient Wound Care Protocol    Diabetic ulcer of left heel ([facility]    Relevant Orders    Initiate Outpatient Wound Care Protocol          Pre Debridement Measurements:  Are located in the [place]  Documentation Flow Sheet  Post Debridement Measurements:  Wound/Ulcer Descriptions are Pre Debridement except measurements:     Wound 01/05/22 Heel Left #1 (Active)   Wound Image   01/05/22 0902   Wound Etiology Diabetic Campbell 2 01/05/22 0943   Dressing Status New dressing applied 02/09/22 1100   Wound Cleansed Cleansed with saline 02/09/22 1100   Dressing/Treatment Dry dressing;Foam;Non adherent 02/02/22 0918   Offloading for Diabetic Foot Ulcers Back offloading shoe 02/09/22 1100   Wound Length (cm) 3.6 cm 02/16/22 0852   Wound Width (cm) 3.1 cm 02/16/22 0852   Wound Depth (cm) 0.2 cm 02/16/22 0852   Wound Surface Area (cm^2) 11.16 cm^2 02/16/22 0852   Change in Wound Size % (l*w) 19.13 02/16/22 0852   Wound Volume (cm^3) 2.232 cm^3 02/16/22 0852   Wound Healing % 19 02/16/22 0852   Post-Procedure Length (cm) 3.6 cm 02/16/22 0908   Post-Procedure Width (cm) 3.1 cm 02/16/22 0908   Post-Procedure Depth (cm) 0.2 cm 02/16/22 0908   Post-Procedure Surface Area (cm^2) 11.16 cm^2 02/16/22 0908   Post-Procedure Volume (cm^3) 2.232 cm^3 02/16/22 0908   Wound Assessment Fibrin;Pink/red;Devitalized tissue 02/16/22 0852   Drainage Amount Large 02/16/22 0852   Drainage Description Serosanguinous 02/16/22 0852   Odor None 02/16/22 0852   Rashmi-wound Assessment Intact 02/16/22 0852   Wound Thickness Description not for Pressure Injury Full thickness 01/19/22 0909   Number of days: 42       Wound 01/05/22 Heel Right #2 (Active)   Wound Image   01/05/22 0902   Wound Etiology Venous 01/05/22 0943   Dressing Status New dressing applied 02/09/22 1100   Wound Cleansed Cleansed with saline 02/09/22 1100   Dressing/Treatment Dry dressing;Foam;Non adherent 02/02/22 0918   Offloading for Diabetic Foot Ulcers Back offloading shoe 02/09/22 1100   Wound Length (cm) 2.4 cm 02/16/22 0852   Wound Width (cm) 1.4 cm 02/16/22 0852   Wound Depth (cm) 0.2 cm 02/16/22 0852   Wound Surface Area (cm^2) 3.36 cm^2 02/16/22 0852   Change in Wound Size % (l*w) 20.94 02/16/22 0852   Wound Volume (cm^3) 0.672 cm^3 02/16/22 0852   Wound Healing % 21 02/16/22 0852   Post-Procedure Length (cm) 2.4 cm 02/16/22 0908   Post-Procedure Width (cm) 1.4 cm 02/16/22 0908   Post-Procedure Depth (cm) 0.2 cm 02/16/22 0908   Post-Procedure Surface Area (cm^2) 3.36 cm^2 02/16/22 0908   Post-Procedure Volume (cm^3) 0.672 cm^3 02/16/22 0908   Wound Assessment Fibrin;Pink/red;Devitalized tissue 02/16/22 0852   Drainage Amount Moderate 02/16/22 0852   Drainage Description Serosanguinous; Yellow 02/16/22 0852   Odor None 02/16/22 0852   Rashmi-wound Assessment Intact 02/16/22 0852   Wound Thickness Description not for Pressure Injury Full thickness 01/19/22 0909   Number of days: 42       Wound 01/05/22 Ankle Left;Lateral #4 (Active)   Wound Image   01/05/22 0902   Wound Etiology Venous 01/05/22 0943   Dressing Status New dressing applied 02/02/22 0918   Wound Cleansed Cleansed with saline 02/02/22 0918   Dressing/Treatment Collagen with Ag;Dry dressing 02/02/22 0918   Offloading for Diabetic Foot Ulcers Offloading boot 02/02/22 0918   Wound Length (cm) 0.1 cm 02/16/22 0852   Wound Width (cm) 0.1 cm 02/16/22 0852   Wound Depth (cm) 0.2 cm 02/16/22 0852   Wound Surface Area (cm^2) 0.01 cm^2 02/16/22 0852   Change in Wound Size % (l*w) 97.62 02/16/22 0852   Wound Volume (cm^3) 0.002 cm^3 02/16/22 0852   Wound Healing % 98 02/16/22 0852   Post-Procedure Length (cm) 0.1 cm 02/16/22 0908   Post-Procedure Width (cm) 0.1 cm 02/16/22 0908   Post-Procedure Depth (cm) 0.2 cm 02/16/22 0908 Post-Procedure Surface Area (cm^2) 0.01 cm^2 02/16/22 0908   Post-Procedure Volume (cm^3) 0.002 cm^3 02/16/22 0908   Wound Assessment Pink/red 02/16/22 0852   Drainage Amount Scant 02/16/22 0852   Drainage Description Yellow 02/16/22 0852   Odor None 02/16/22 0852   Rashmi-wound Assessment Intact 02/09/22 0935   Number of days: 42       Wound 01/26/22 Tibial Left;Posterior #6 (Active)   Wound Image   01/26/22 0855   Dressing Status New dressing applied 02/09/22 1100   Wound Cleansed Cleansed with saline 02/09/22 1100   Dressing/Treatment Collagen with Ag;Silicone border 20/24/39 1100   Offloading for Diabetic Foot Ulcers Offloading boot 02/02/22 0918   Wound Length (cm) 2 cm 02/16/22 0852   Wound Width (cm) 1.1 cm 02/16/22 0852   Wound Depth (cm) 0.4 cm 02/16/22 0852   Wound Surface Area (cm^2) 2.2 cm^2 02/16/22 0852   Change in Wound Size % (l*w) -41.03 02/16/22 0852   Wound Volume (cm^3) 0.88 cm^3 02/16/22 0852   Wound Healing % -464 02/16/22 0852   Post-Procedure Length (cm) 2 cm 02/16/22 0908   Post-Procedure Width (cm) 1.1 cm 02/16/22 0908   Post-Procedure Depth (cm) 0.4 cm 02/16/22 0908   Post-Procedure Surface Area (cm^2) 2.2 cm^2 02/16/22 0908   Post-Procedure Volume (cm^3) 0.88 cm^3 02/16/22 0908   Wound Assessment Fibrin;Pale granulation tissue 02/16/22 0852   Drainage Amount Moderate 02/16/22 0852   Drainage Description Serosanguinous 02/16/22 0852   Odor None 02/16/22 0852   Rashmi-wound Assessment Blanchable erythema; Maceration 02/16/22 0852   Number of days: 21       Wound 02/09/22 Toe (Comment  which one) Right #8 RIGHT 2ND TOE (Active)   Wound Image   02/09/22 0935   Dressing Status New dressing applied 02/09/22 1100   Wound Cleansed Not Cleansed 02/09/22 1100   Wound Length (cm) 0.2 cm 02/16/22 0852   Wound Width (cm) 0.2 cm 02/16/22 0852   Wound Depth (cm) 0.1 cm 02/16/22 0852   Wound Surface Area (cm^2) 0.04 cm^2 02/16/22 0852   Change in Wound Size % (l*w) 55.56 02/16/22 0852   Wound Volume (cm^3) 0.004 cm^3 02/16/22 0852   Wound Healing % 56 02/16/22 0852   Post-Procedure Length (cm) 0.2 cm 02/16/22 0908   Post-Procedure Width (cm) 0.2 cm 02/16/22 0908   Post-Procedure Depth (cm) 0.1 cm 02/16/22 0908   Post-Procedure Surface Area (cm^2) 0.04 cm^2 02/16/22 0908   Post-Procedure Volume (cm^3) 0.004 cm^3 02/16/22 0908   Wound Assessment Pink/red 02/16/22 0852   Drainage Amount Scant 02/16/22 0852   Drainage Description Serosanguinous 02/16/22 0852   Odor None 02/16/22 0852   Rashmi-wound Assessment Intact 02/16/22 0852   Number of days: 6          Procedure Note  Indications:  Based on my examination of this patient's wound(s)/ulcer(s) today, debridement is required to promote healing and evaluate the wound base. Performed by: Yony Langston DPM    Consent obtained:  Yes    Time out taken:  Yes    Pain Control: Anesthetic  Anesthetic: 4% Lidocaine Liquid Topical     Debridement:Excisional Debridement    Using #15 blade scalpel the wound(s)/ulcer(s) was/were sharply debrided down through and including the removal of subcutaneous tissue. Devitalized Tissue Debrided:  fibrin, biofilm, slough and necrotic/eschar to stimulate bleeding to promote healing, post debridement good bleeding base and wound edges noted    Wound/Ulcer #: 2, 4    Percent of Wound/Ulcer Debrided: 100%    Total Surface Area Debrided:  18 sq cm     Estimated Blood Loss:  Minimal  Hemostasis Achieved:  by pressure    Procedural Pain:  4  / 10   Post Procedural Pain:  5 / 10     Response to treatment:  Well tolerated by patient. A culture was done.       Skin Substitute Applied:         [] APLIGRAF   []44 sq/cm   []88 sq/cm    []132 sq/cm  []176  sq/cm           [x] DERMAGRAFT  [x] 38sq/cm   []76 sq/cm    []114 sq/cm  []152 sq/cm         [] OASIS   [] 10.5 sq/cm   []21 sq/cm    []4 sq/cm PuraPly  []8 sq/cm PuraPly        [] OTHER puraply 16    []        sq/cm       Performed by: Yony Langston DPM    Wound Type:diabetic    Consent obtained: Yes    Time out taken: Yes     Fenestrated: Yes    Instrument(s) #15 blade scalpel      [] Mesher Utilized    Skin Substitute was Applied to Wound Number(s):     Wound #: 1 and 2      Wound 01/05/22 Heel Left #1 (Active)   Wound Image   01/05/22 0902   Wound Etiology Diabetic Campbell 2 01/05/22 0943   Dressing Status New dressing applied 02/02/22 0918   Wound Cleansed Cleansed with saline 02/02/22 0918   Dressing/Treatment Dry dressing;Foam;Non adherent 02/02/22 0918   Offloading for Diabetic Foot Ulcers Offloading boot 02/02/22 0918   Wound Length (cm) 4.5 cm 02/02/22 0819   Wound Width (cm) 2.5 cm 02/02/22 0819   Wound Depth (cm) 0.1 cm 02/02/22 0819   Wound Surface Area (cm^2) 11.25 cm^2 02/02/22 0819   Change in Wound Size % (l*w) 18.48 02/02/22 0819   Wound Volume (cm^3) 1.125 cm^3 02/02/22 0819   Wound Healing % 59 02/02/22 0819   Post-Procedure Length (cm) 4.5 cm 02/02/22 0840   Post-Procedure Width (cm) 2.5 cm 02/02/22 0840   Post-Procedure Depth (cm) 0.1 cm 02/02/22 0840   Post-Procedure Surface Area (cm^2) 11.25 cm^2 02/02/22 0840   Post-Procedure Volume (cm^3) 1.125 cm^3 02/02/22 0840   Wound Assessment Fibrin;Pink/red 02/02/22 0819   Drainage Amount Moderate 02/02/22 0819   Drainage Description Serosanguinous 02/02/22 0819   Odor None 02/02/22 0819   Rashmi-wound Assessment Maceration 02/02/22 0819   Wound Thickness Description not for Pressure Injury Full thickness 01/19/22 0909   Number of days: 28       Wound 01/05/22 Heel Right #2 (Active)   Wound Image   01/05/22 0902   Wound Etiology Venous 01/05/22 0943   Dressing Status New dressing applied 02/02/22 0918   Wound Cleansed Cleansed with saline 02/02/22 0918   Dressing/Treatment Dry dressing;Foam;Non adherent 02/02/22 0918   Offloading for Diabetic Foot Ulcers Offloading boot 02/02/22 0918   Wound Length (cm) 2.5 cm 02/02/22 0819   Wound Width (cm) 1.7 cm 02/02/22 0819   Wound Depth (cm) 0.2 cm 02/02/22 0819   Wound Surface Area (cm^2) 4.25 cm^2 02/02/22 0819   Change in Wound Size % (l*w) 0 02/02/22 0819   Wound Volume (cm^3) 0.85 cm^3 02/02/22 0819   Wound Healing % 0 02/02/22 0819   Post-Procedure Length (cm) 2.5 cm 02/02/22 0840   Post-Procedure Width (cm) 1.7 cm 02/02/22 0840   Post-Procedure Depth (cm) 0.2 cm 02/02/22 0840   Post-Procedure Surface Area (cm^2) 4.25 cm^2 02/02/22 0840   Post-Procedure Volume (cm^3) 0.85 cm^3 02/02/22 0840   Wound Assessment Fibrin;Pink/red 02/02/22 0819   Drainage Amount Moderate 02/02/22 0819   Drainage Description Serosanguinous; Yellow 02/02/22 0819   Odor None 02/02/22 0819   Rashmi-wound Assessment Intact 02/02/22 0819   Wound Thickness Description not for Pressure Injury Full thickness 01/19/22 0909   Number of days: 28       Wound 01/05/22 Ankle Right;Lateral #3 (Active)   Wound Image   01/05/22 0902   Wound Etiology Venous 01/05/22 0943   Dressing Status New dressing applied 01/26/22 0901   Wound Cleansed Cleansed with saline 01/26/22 0901   Dressing/Treatment Alginate;ABD 01/26/22 0901   Wound Length (cm) 0 cm 02/02/22 0819   Wound Width (cm) 0 cm 02/02/22 0819   Wound Depth (cm) 0 cm 02/02/22 0819   Wound Surface Area (cm^2) 0 cm^2 02/02/22 0819   Change in Wound Size % (l*w) 100 02/02/22 0819   Wound Volume (cm^3) 0 cm^3 02/02/22 0819   Wound Healing % 100 02/02/22 0819   Post-Procedure Length (cm) 0.2 cm 01/26/22 0901   Post-Procedure Width (cm) 0.2 cm 01/26/22 0901   Post-Procedure Depth (cm) 0.1 cm 01/26/22 0901   Post-Procedure Surface Area (cm^2) 0.04 cm^2 01/26/22 0901   Post-Procedure Volume (cm^3) 0.004 cm^3 01/26/22 0901   Wound Assessment Dry;Fibrin 01/26/22 0855   Drainage Amount None 01/26/22 0855   Drainage Description Serous 01/19/22 0839   Odor None 01/26/22 0855   Rashmi-wound Assessment Intact; Blanchable erythema 01/26/22 0855   Number of days: 28       Wound 01/05/22 Ankle Left;Lateral #4 (Active)   Wound Image   01/05/22 0902   Wound Etiology Venous 01/05/22 0943 Dressing Status New dressing applied 02/02/22 0918   Wound Cleansed Cleansed with saline 02/02/22 0918   Dressing/Treatment Collagen with Ag;Dry dressing 02/02/22 0918   Offloading for Diabetic Foot Ulcers Offloading boot 02/02/22 0918   Wound Length (cm) 0.7 cm 02/02/22 0819   Wound Width (cm) 0.5 cm 02/02/22 0819   Wound Depth (cm) 0.2 cm 02/02/22 0819   Wound Surface Area (cm^2) 0.35 cm^2 02/02/22 0819   Change in Wound Size % (l*w) 16.67 02/02/22 0819   Wound Volume (cm^3) 0.07 cm^3 02/02/22 0819   Wound Healing % 17 02/02/22 0819   Post-Procedure Length (cm) 0.7 cm 02/02/22 0840   Post-Procedure Width (cm) 0.5 cm 02/02/22 0840   Post-Procedure Depth (cm) 0.2 cm 02/02/22 0840   Post-Procedure Surface Area (cm^2) 0.35 cm^2 02/02/22 0840   Post-Procedure Volume (cm^3) 0.07 cm^3 02/02/22 0840   Wound Assessment Pink/red 02/02/22 0819   Drainage Amount Scant 02/02/22 0819   Drainage Description Yellow 02/02/22 0819   Odor None 02/02/22 0819   Rashmi-wound Assessment Intact 02/02/22 0819   Number of days: 28       Wound 01/05/22 Pretibial Left #5 (Active)   Wound Image   01/26/22 0901   Wound Etiology Venous 01/05/22 0943   Dressing Status New dressing applied 01/19/22 0921   Wound Cleansed Cleansed with saline 01/19/22 0921   Dressing/Treatment Alginate;Dry dressing 01/19/22 0921   Wound Length (cm) 0 cm 02/02/22 0819   Wound Width (cm) 0 cm 02/02/22 0819   Wound Depth (cm) 0 cm 02/02/22 0819   Wound Surface Area (cm^2) 0 cm^2 02/02/22 0819   Change in Wound Size % (l*w) 100 02/02/22 0819   Wound Volume (cm^3) 0 cm^3 02/02/22 0819   Wound Healing % 100 02/02/22 0819   Post-Procedure Length (cm) 0.3 cm 01/19/22 0909   Post-Procedure Width (cm) 0.3 cm 01/19/22 0909   Post-Procedure Depth (cm) 0.1 cm 01/19/22 0909   Post-Procedure Surface Area (cm^2) 0.09 cm^2 01/19/22 0909   Post-Procedure Volume (cm^3) 0.009 cm^3 01/19/22 0909   Wound Assessment Pink/red 01/19/22 0839   Drainage Amount Scant 01/19/22 0839   Drainage Description Serous 01/19/22 0839   Odor None 01/19/22 0839   Rashmi-wound Assessment Fragile 01/19/22 0839   Number of days: 28       Wound 01/26/22 Tibial Left;Posterior #6 (Active)   Wound Image   01/26/22 0855   Dressing Status New dressing applied 02/02/22 0918   Wound Cleansed Cleansed with saline 02/02/22 0918   Dressing/Treatment Non adherent;Dry dressing 02/02/22 0918   Offloading for Diabetic Foot Ulcers Offloading boot 02/02/22 0918   Wound Length (cm) 2.1 cm 02/02/22 0819   Wound Width (cm) 1.4 cm 02/02/22 0819   Wound Depth (cm) 0.1 cm 02/02/22 0819   Wound Surface Area (cm^2) 2.94 cm^2 02/02/22 0819   Change in Wound Size % (l*w) -88.46 02/02/22 0819   Wound Volume (cm^3) 0.294 cm^3 02/02/22 0819   Wound Healing % -88 02/02/22 0819   Post-Procedure Length (cm) 2.1 cm 02/02/22 0840   Post-Procedure Width (cm) 1.4 cm 02/02/22 0840   Post-Procedure Depth (cm) 0.1 cm 02/02/22 0840   Post-Procedure Surface Area (cm^2) 2.94 cm^2 02/02/22 0840   Post-Procedure Volume (cm^3) 0.294 cm^3 02/02/22 0840   Wound Assessment Fibrin 02/02/22 0819   Drainage Amount Small 02/02/22 0819   Drainage Description Serosanguinous 02/02/22 0819   Odor None 02/02/22 0819   Rashmi-wound Assessment Blanchable erythema; Maceration 02/02/22 0819   Number of days: 7       Wound 01/26/22 Pretibial Left;Medial #7 (Active)   Wound Image   01/26/22 0855   Dressing Status New dressing applied 02/02/22 0918   Wound Cleansed Cleansed with saline 02/02/22 0918   Dressing/Treatment Dry dressing;Non adherent 02/02/22 0918   Offloading for Diabetic Foot Ulcers Offloading boot 02/02/22 0918   Wound Length (cm) 1.2 cm 02/02/22 0819   Wound Width (cm) 1 cm 02/02/22 0819   Wound Depth (cm) 0.1 cm 02/02/22 0819   Wound Surface Area (cm^2) 1.2 cm^2 02/02/22 0819   Change in Wound Size % (l*w) -9.09 02/02/22 0819   Wound Volume (cm^3) 0.12 cm^3 02/02/22 0819   Wound Healing % -9 02/02/22 0819   Post-Procedure Length (cm) 1.2 cm 02/02/22 0840 Post-Procedure Width (cm) 1 cm 02/02/22 0840   Post-Procedure Depth (cm) 0.1 cm 02/02/22 0840   Post-Procedure Surface Area (cm^2) 1.2 cm^2 02/02/22 0840   Post-Procedure Volume (cm^3) 0.12 cm^3 02/02/22 0840   Wound Assessment Pink/red 02/02/22 0819   Drainage Amount None 02/02/22 0819   Odor None 02/02/22 0819   Rashmi-wound Assessment Intact 02/02/22 0819   Number of days: 7         Total Surface Area Covered 17 sq/cm     Amount Wasted 0 sq/cm    Reason for Waste 0      Secured: Yes    Secured With:  [x]Steri Strips    []Sutures     []Staples []Other    Procedural Pain: 2/10     Post Procedural Pain: 3 / 10    Response to Treatment:  Well tolerated by patient. Plan:     Pt is not a smoker   - Discussed relationship of smoking and negative affects on wound healing   - Emphasized importance of tobacco avoidace/cessation   - Script for nicotine patch given to patient   - Information regarding support groups for smoking cessation given    In my professional opinion and based off the information that is available at this time this patient has appropriate indication for HBO Therapy: Maybe    Treatment Note please see attached Discharge Instructions    Written patient dismissal instructions given to patient and signed by patient or POA. Discharge Instructions         Visit Discharge/Physician Orders    Discharge condition: Stable    Assessment of pain at discharge:  NONE    Anesthetic used: 4% lidocaine solution    Discharge to: Home    Left via:Private automobile    Accompanied by: accompanied by SELF    [facility]:  [facility]    Dressing Orders:  Clean bilateral heel ulcers AND LEFT leg, ankle and right second tor ULCERS with normal saline. DR. Micaela Dakins 2ND DERMAGRAFT BILATERAL HEELS. (3 OF 10 TOTAL GRAFTS) APPLY ADAPTIC, FOAM AND DRY  DRESSING TO SECURE. LEAVE IN PLACE FOR ONE WEEK. MAKE SURE TO WRAP HEEL AND LEG ULCERS SEPARATELY.       TO LEFT LEG, ANKLE, RIGHT SECOND TOE AND, APPLY SALINE MOIST BRANDEE AND DRY DRESSING TO SECURE. CHANGE EVERY OTHER DAY. Prescription given for bilateral heel offloading boot. ECF placed on order. Thank you   Physical therapy for ambulating safely while wearing offloading boots as indicated.     Treatment Orders: wear prevalon boots    Wound measurements:                                Wound 01/05/22 Heel Left #1 (Active)   Wound Image   01/05/22 0902   Wound Etiology Diabetic Campbell 2 01/05/22 0943   Dressing Status New dressing applied 02/09/22 1100   Wound Cleansed Cleansed with saline 02/09/22 1100   Dressing/Treatment Dry dressing;Foam;Non adherent 02/02/22 0918   Offloading for Diabetic Foot Ulcers Back offloading shoe 02/09/22 1100   Wound Length (cm) 3.6 cm 02/16/22 0852   Wound Width (cm) 3.1 cm 02/16/22 0852   Wound Depth (cm) 0.2 cm 02/16/22 0852   Wound Surface Area (cm^2) 11.16 cm^2 02/16/22 0852   Change in Wound Size % (l*w) 19.13 02/16/22 0852   Wound Volume (cm^3) 2.232 cm^3 02/16/22 0852   Wound Healing % 19 02/16/22 0852   Post-Procedure Length (cm) 3.6 cm 02/16/22 0908   Post-Procedure Width (cm) 3.1 cm 02/16/22 0908   Post-Procedure Depth (cm) 0.2 cm 02/16/22 0908   Post-Procedure Surface Area (cm^2) 11.16 cm^2 02/16/22 0908   Post-Procedure Volume (cm^3) 2.232 cm^3 02/16/22 0908   Wound Assessment Fibrin;Pink/red;Devitalized tissue 02/16/22 0852   Drainage Amount Large 02/16/22 0852   Drainage Description Serosanguinous 02/16/22 0852   Odor None 02/16/22 0852   Rashmi-wound Assessment Intact 02/16/22 0852   Wound Thickness Description not for Pressure Injury Full thickness 01/19/22 0909   Number of days: 42       Wound 01/05/22 Heel Right #2 (Active)   Wound Image   01/05/22 0902   Wound Etiology Venous 01/05/22 0943   Dressing Status New dressing applied 02/09/22 1100   Wound Cleansed Cleansed with saline 02/09/22 1100   Dressing/Treatment Dry dressing;Foam;Non adherent 02/02/22 0918   Offloading for Diabetic Foot Ulcers Back offloading shoe 02/09/22 1100 Yellow 02/16/22 0852   Odor None 02/16/22 0852   Rashmi-wound Assessment Intact 02/09/22 0935   Number of days: 42       Wound 01/26/22 Tibial Left;Posterior #6 (Active)   Wound Image   01/26/22 0855   Dressing Status New dressing applied 02/09/22 1100   Wound Cleansed Cleansed with saline 02/09/22 1100   Dressing/Treatment Collagen with Ag;Silicone border 01/31/82 1100   Offloading for Diabetic Foot Ulcers Offloading boot 02/02/22 0918   Wound Length (cm) 2 cm 02/16/22 0852   Wound Width (cm) 1.1 cm 02/16/22 0852   Wound Depth (cm) 0.4 cm 02/16/22 0852   Wound Surface Area (cm^2) 2.2 cm^2 02/16/22 0852   Change in Wound Size % (l*w) -41.03 02/16/22 0852   Wound Volume (cm^3) 0.88 cm^3 02/16/22 0852   Wound Healing % -464 02/16/22 0852   Post-Procedure Length (cm) 2 cm 02/16/22 0908   Post-Procedure Width (cm) 1.1 cm 02/16/22 0908   Post-Procedure Depth (cm) 0.4 cm 02/16/22 0908   Post-Procedure Surface Area (cm^2) 2.2 cm^2 02/16/22 0908   Post-Procedure Volume (cm^3) 0.88 cm^3 02/16/22 0908   Wound Assessment Fibrin;Pale granulation tissue 02/16/22 0852   Drainage Amount Moderate 02/16/22 0852   Drainage Description Serosanguinous 02/16/22 0852   Odor None 02/16/22 0852   Rashmi-wound Assessment Blanchable erythema; Maceration 02/16/22 0852   Number of days: 21       Wound 02/09/22 Toe (Comment  which one) Right #8 RIGHT 2ND TOE (Active)   Wound Image   02/09/22 0935   Dressing Status New dressing applied 02/09/22 1100   Wound Cleansed Not Cleansed 02/09/22 1100   Wound Length (cm) 0.2 cm 02/16/22 0852   Wound Width (cm) 0.2 cm 02/16/22 0852   Wound Depth (cm) 0.1 cm 02/16/22 0852   Wound Surface Area (cm^2) 0.04 cm^2 02/16/22 0852   Change in Wound Size % (l*w) 55.56 02/16/22 0852   Wound Volume (cm^3) 0.004 cm^3 02/16/22 0852   Wound Healing % 56 02/16/22 0852   Post-Procedure Length (cm) 0.2 cm 02/16/22 0908   Post-Procedure Width (cm) 0.2 cm 02/16/22 0908   Post-Procedure Depth (cm) 0.1 cm 02/16/22 0908 Post-Procedure Surface Area (cm^2) 0.04 cm^2 02/16/22 0908   Post-Procedure Volume (cm^3) 0.004 cm^3 02/16/22 0908   Wound Assessment Pink/red 02/16/22 0852   Drainage Amount Scant 02/16/22 0852   Drainage Description Serosanguinous 02/16/22 0852   Odor None 02/16/22 0852   Rashmi-wound Assessment Intact 02/16/22 0852   Number of days: 6        [facility] followup visit __________ONE week with Dr. Gastelum___________________  (Please note your next appointment above and if you are unable to keep, kindly give a 24 hour notice. Thank you.)    Physician signature:__________________________      If you experience any of the following, please call the [address] [place] [address] during business hours:    * Increase in Pain  * Temperature over 101  * Increase in drainage from your wound  * Drainage with a foul odor  * Bleeding  * Increase in swelling  * Need for compression bandage changes due to slippage, breakthrough drainage. If you need medical attention outside of the business hours of the [address] [place] [address] Road please contact your PCP or go to the nearest emergency room.         Electronically signed by Randy Patton DPM on 2/16/2022 at 9:32 AM

## 2022-02-16 NOTE — PROGRESS NOTES
Dermagraft Treatment Note    NAME:  Lanny Ivey. YOB: 1932  MEDICAL RECORD NUMBER:  [id number]  DATE:  2/16/2022    Goal:  Patient will receive safe and proper application of skin substitute. Patient will comply with caring for dressing, offloading and reporting complications. Expiration date of Dermagraft checked immediately prior to use. Package intact prior to use and no damage noted. Transport temperature controlled and acceptable. Dermagraft was prepared for application by removing from box and within 1 minute placing in thaw tub at a temperature of 34 to 37 degrees celsius until ice crystals were no longer present but no longer than 3 minutes. Dermagraft was rinsed 3 times in room temperature normal saline for 5 seconds each time. A 4th saline rinse was left on the Dermagraft until the physician was ready to apply it within 30 minutes of thawing. Dermagraft was applied to bilateral heel ulcers and affixed with steri-strips by the provider. Dermagraft was covered with non-adherent dressing. steristrip applied to secure graft was applied on top of non-adherent dressing. A foam plug cut to fit into ulcer was applied. Fluffed gauze was applied. Dressing was secured with cover roll type tape to stabilize graft. Coban or ace wrap was applied to secure graft and decrease edema. Patient/caregiver was instructed not to remove dressing and to keep it clean and dry. Pt/family/caregiver was instructed on signs and symptoms of complications to report such as draining through, falling down/slipping, getting wet, or severe pain or tingling in toes   Pt/family/caregiver was instructed on need for offloading and elevation of affected extremity and on use of prescribed offloading device.  Dermagraft may be applied a total of 8 times per wound over a 12 week period. Additionally Dermagraft may only be used every 12 months per wound.       Date of first application of Dermagraft

## 2022-02-23 ENCOUNTER — HOSPITAL ENCOUNTER (OUTPATIENT)
Dept: WOUND CARE | Age: 87
Discharge: HOME | End: 2022-02-23
Payer: MEDICARE

## 2022-02-23 VITALS
SYSTOLIC BLOOD PRESSURE: 130 MMHG | TEMPERATURE: 97.9 F | DIASTOLIC BLOOD PRESSURE: 62 MMHG | HEART RATE: 84 BPM | RESPIRATION RATE: 18 BRPM

## 2022-02-23 DIAGNOSIS — E11.621 DIABETIC ULCER OF RIGHT HEEL ASSOCIATED WITH TYPE 2 DIABETES MELLITUS, WITH FAT LAYER EXPOSED (HCC): Primary | ICD-10-CM

## 2022-02-23 DIAGNOSIS — E08.621 DIABETIC ULCER OF LEFT HEEL ASSOCIATED WITH DIABETES MELLITUS DUE TO UNDERLYING CONDITION, LIMITED TO BREAKDOWN OF SKIN (HCC): ICD-10-CM

## 2022-02-23 DIAGNOSIS — L97.412 DIABETIC ULCER OF RIGHT HEEL ASSOCIATED WITH TYPE 2 DIABETES MELLITUS, WITH FAT LAYER EXPOSED (HCC): Primary | ICD-10-CM

## 2022-02-23 DIAGNOSIS — L97.421 DIABETIC ULCER OF LEFT HEEL ASSOCIATED WITH DIABETES MELLITUS DUE TO UNDERLYING CONDITION, LIMITED TO BREAKDOWN OF SKIN (HCC): ICD-10-CM

## 2022-02-23 PROCEDURE — 6370000000 HC RX 637 (ALT 250 FOR IP): Performed by: PODIATRIST

## 2022-02-23 PROCEDURE — 15271 SKIN SUB GRAFT TRNK/ARM/LEG: CPT

## 2022-02-23 RX ORDER — BETAMETHASONE DIPROPIONATE 0.05 %
OINTMENT (GRAM) TOPICAL ONCE
Status: CANCELLED | OUTPATIENT
Start: 2022-02-23 | End: 2022-02-23

## 2022-02-23 RX ORDER — GINSENG 100 MG
CAPSULE ORAL ONCE
Status: CANCELLED | OUTPATIENT
Start: 2022-02-23 | End: 2022-02-23

## 2022-02-23 RX ORDER — CLOBETASOL PROPIONATE 0.5 MG/G
OINTMENT TOPICAL ONCE
Status: CANCELLED | OUTPATIENT
Start: 2022-02-23 | End: 2022-02-23

## 2022-02-23 RX ORDER — GENTAMICIN SULFATE 1 MG/G
OINTMENT TOPICAL ONCE
Status: CANCELLED | OUTPATIENT
Start: 2022-02-23 | End: 2022-02-23

## 2022-02-23 RX ORDER — LIDOCAINE HYDROCHLORIDE 40 MG/ML
SOLUTION TOPICAL ONCE
Status: CANCELLED | OUTPATIENT
Start: 2022-02-23 | End: 2022-02-23

## 2022-02-23 RX ORDER — LIDOCAINE HYDROCHLORIDE 40 MG/ML
SOLUTION TOPICAL ONCE
Status: COMPLETED | OUTPATIENT
Start: 2022-02-23 | End: 2022-02-23

## 2022-02-23 RX ORDER — BACITRACIN ZINC AND POLYMYXIN B SULFATE 500; 1000 [USP'U]/G; [USP'U]/G
OINTMENT TOPICAL ONCE
Status: CANCELLED | OUTPATIENT
Start: 2022-02-23 | End: 2022-02-23

## 2022-02-23 RX ORDER — BACITRACIN, NEOMYCIN, POLYMYXIN B 400; 3.5; 5 [USP'U]/G; MG/G; [USP'U]/G
OINTMENT TOPICAL ONCE
Status: CANCELLED | OUTPATIENT
Start: 2022-02-23 | End: 2022-02-23

## 2022-02-23 RX ORDER — LIDOCAINE HYDROCHLORIDE 20 MG/ML
JELLY TOPICAL ONCE
Status: CANCELLED | OUTPATIENT
Start: 2022-02-23 | End: 2022-02-23

## 2022-02-23 RX ORDER — LIDOCAINE 40 MG/G
CREAM TOPICAL ONCE
Status: CANCELLED | OUTPATIENT
Start: 2022-02-23 | End: 2022-02-23

## 2022-02-23 RX ORDER — LIDOCAINE 50 MG/G
OINTMENT TOPICAL ONCE
Status: CANCELLED | OUTPATIENT
Start: 2022-02-23 | End: 2022-02-23

## 2022-02-23 RX ADMIN — LIDOCAINE HYDROCHLORIDE: 40 SOLUTION TOPICAL at 08:18

## 2022-02-23 ASSESSMENT — PAIN SCALES - GENERAL: PAINLEVEL_OUTOF10: 2

## 2022-02-23 ASSESSMENT — PAIN DESCRIPTION - ORIENTATION: ORIENTATION: LEFT;RIGHT

## 2022-02-23 ASSESSMENT — PAIN DESCRIPTION - FREQUENCY: FREQUENCY: INTERMITTENT

## 2022-02-23 ASSESSMENT — PAIN - FUNCTIONAL ASSESSMENT: PAIN_FUNCTIONAL_ASSESSMENT: PREVENTS OR INTERFERES SOME ACTIVE ACTIVITIES AND ADLS

## 2022-02-23 ASSESSMENT — PAIN DESCRIPTION - DESCRIPTORS: DESCRIPTORS: BURNING

## 2022-02-23 ASSESSMENT — PAIN DESCRIPTION - PAIN TYPE: TYPE: CHRONIC PAIN

## 2022-02-23 ASSESSMENT — PAIN DESCRIPTION - ONSET: ONSET: ON-GOING

## 2022-02-23 ASSESSMENT — PAIN DESCRIPTION - PROGRESSION: CLINICAL_PROGRESSION: NOT CHANGED

## 2022-02-23 ASSESSMENT — PAIN DESCRIPTION - LOCATION: LOCATION: LEG;FOOT

## 2022-02-23 NOTE — PROGRESS NOTES
Dermagraft Treatment Note    NAME:  Benigno Sanders. YOB: 1932  MEDICAL RECORD NUMBER:  [id number]  DATE:  2/23/2022    Goal:  Patient will receive safe and proper application of skin substitute. Patient will comply with caring for dressing, offloading and reporting complications. Expiration date of Dermagraft checked immediately prior to use. Package intact prior to use and no damage noted. Transport temperature controlled and acceptable. Dermagraft was prepared for application by removing from box and within 1 minute placing in thaw tub at a temperature of 34 to 37 degrees celsius until ice crystals were no longer present but no longer than 3 minutes. Dermagraft was rinsed 3 times in room temperature normal saline for 5 seconds each time. A 4th saline rinse was left on the Dermagraft until the physician was ready to apply it within 30 minutes of thawing. Dermagraft was applied to bilateral heel ulcers and affixed with steri-strips by the provider. Dermagraft was covered with non-adherent dressing. steri strips applied was applied on top of non-adherent dressing. A foam plug cut to fit into ulcer was applied. Fluffed gauze was applied. Dressing was secured with cover roll type tape to stabilize graft. Coban or ace wrap was applied to secure graft and decrease edema. Patient/caregiver was instructed not to remove dressing and to keep it clean and dry. Pt/family/caregiver was instructed on signs and symptoms of complications to report such as draining through, falling down/slipping, getting wet, or severe pain or tingling in toes   Pt/family/caregiver was instructed on need for offloading and elevation of affected extremity and on use of prescribed offloading device.  Dermagraft may be applied a total of 8 times per wound over a 12 week period. Additionally Dermagraft may only be used every 12 months per wound.       Date of first application of Dermagraft for this current wound is February 9, 2022.                 Guidelines followed  Dermagraft 24 steps followed    Electronically signed by Destiny Culver RN on 2/23/2022 at 8:53 AM

## 2022-02-23 NOTE — PROGRESS NOTES
[facility]  History and Physical/Consultation  Podiatry    Referring Physician : Veronika Magallanes MD  Carmen Pinto. MEDICAL RECORD NUMBER:  [id number]  AGE: 80 y.o. GENDER: male  : 1932  EPISODE DATE:  2022  Subjective:     Chief Complaint   Patient presents with    Wound Check     bilateral lower extremities         HISTORY of PRESENT ILLNESS HPI     Carmen Aldrich is a 80 y.o. male who presents today for wound/ulcer evaluation. History of Wound Context:  The patient has had a wound of diabetic b/l heels,and right ankle which was first noted approximately months ago. This has been treated betadyne. On their initial visit to the wound healing center, 22 ,  the patient has noted that the wound has been improving. The patient has had similar previous wounds in the past.      Pt is not on abx at time of initial visit.       Wound/Ulcer Pain Timing/Severity: constant  Quality of pain: sharp  Severity:  3 / 10   Modifying Factors: Pain worsens with walking  Associated Signs/Symptoms: edema, erythema and drainage    Ulcer Identification:  Ulcer Type: diabetic  Contributing Factors: edema and diabetes    Diabetic/Pressure/Non Pressure Ulcers onl y:  Ulcer: Diabetic ulcer, fat layer exposed    If patient has diabetic lower extremity wounds  Campbell Classification of diabetic lower extremity wounds:    Grade Description   []  0 No open wound   []  1 Superficial ulcer involving the full skin thickness   [x]  2 Deep ulcer involves ligament, tendon, joint capsule, or fascia  No bone involvement or abscess presence   []  3 Deep Ulcer with abcess formation and/or osteomyelitis   []  4 Localized gangrene   []  5 Extensive gangrene of the foot     Wound: N/A          22  Debrided, cont tx and care     22  Debrided, cont tx and care, await graft approval    22  Debrided, cont tx and care, puraply applied    22  Debrided, dermagraft applied    22  Debrided, dermagraft applied    2-23-22  Debrided, graft applied  PAST MEDICAL HISTORY      Diagnosis Date    Arthritis     Cancer [facility])     breast    CHF (congestive heart failure) ([facility] [address]     Diabetes mellitus ([facility] [address]     History of lumpectomy     Hx of blood clots     Hyperlipidemia     Hypertension     PE (pulmonary thromboembolism) ([facility] [address]     Staph aureus infection      Past Surgical History:   Procedure Laterality Date    [facility]  9/2/2021         MASTECTOMY      TOE AMPUTATION      TOE SURGERY      TONSILLECTOMY       History reviewed. No pertinent family history. Social History     Tobacco Use    Smoking status: Never Smoker    Smokeless tobacco: Never Used   Vaping Use    Vaping Use: Never used   Substance Use Topics    Alcohol use: Not Currently     Alcohol/week: 1.0 standard drink     Types: 1 Cans of beer per week    Drug use: No     Allergies   Allergen Reactions    Clindamycin/Lincomycin     Sulfa Antibiotics      Current Outpatient Medications on File Prior to Encounter   Medication Sig Dispense Refill    zinc sulfate (ZINCATE) 220 (50 Zn) MG capsule Take 1 capsule by mouth daily for 14 days 14 capsule 0    miconazole (MICOTIN) 2 % powder Apply topically 2 times daily.  45 g 1    ascorbic acid (VITAMIN C) 500 MG tablet Take 1 tablet by mouth 2 times daily 30 tablet 0    Vitamin D (CHOLECALCIFEROL) 50 MCG (2000 UT) TABS tablet Take 1 tablet by mouth daily 60 tablet 0    furosemide (LASIX) 40 MG tablet Take 40 mg by mouth daily      omeprazole (PRILOSEC) 20 MG delayed release capsule Take 20 mg by mouth daily      Multiple Vitamins-Minerals (THERAPEUTIC MULTIVITAMIN-MINERALS) tablet Take 1 tablet by mouth daily      Multiple Vitamins-Minerals (PRESERVISION AREDS PO) Take 1 tablet by mouth 2 times daily (with meals)      acetaminophen (TYLENOL) 325 MG tablet Take 650 mg by mouth every 6 hours as needed for Pain or Fever      calcium carbonate (TUMS) 500 MG chewable tablet Take 1 tablet by mouth every 4 hours as needed for Heartburn      ipratropium-albuterol (DUONEB) 0.5-2.5 (3) MG/3ML SOLN nebulizer solution Inhale 3 mLs into the lungs every 4 hours (while awake) for 30 doses 90 mL 0    apixaban (ELIQUIS) 5 MG TABS tablet Take 5 mg by mouth 2 times daily (before meals)       gabapentin (NEURONTIN) 100 MG capsule Take 200 mg by mouth Daily with supper.  atorvastatin (LIPITOR) 80 MG tablet Take 80 mg by mouth Daily with supper       INSULIN LISP PROT & LISP, HUM, (75-25) 100 UNIT/ML SUSP Inject 72 Units into the skin daily (with breakfast)       INSULIN LISP PROT & LISP, HUM, (75-25) 100 UNIT/ML SUSP Inject 55 Units into the skin Daily with supper       clotrimazole-betamethasone (LOTRISONE) cream Apply  topically as needed. Apply topically 2 times daily. No current facility-administered medications on file prior to encounter.        REVIEW OF SYSTEMS   ROS : All others Negative if blank [], Positive if [x]  General Vascular   [] Fevers [] Claudication   [] Chills [] Rest Pain   Skin Neurologic   [x] Tissue Loss [x] Lower extremity neuropathy     Objective:    /62   Pulse 84   Temp 97.9 °F (36.6 °C) (Temporal)   Resp 18   Wt Readings from Last 3 Encounters:   02/16/22 243 lb (110.2 kg)   02/02/22 243 lb (110.2 kg)   01/12/22 243 lb (110.2 kg)       PHYSICAL EXAM   CONSTITUTIONAL:   Awake, alert, cooperative   PSYCHIATRIC :  Oriented to time, place and person      normal insight to disease process  EXTREMITIES:   R LE Open wounds are noted   Skin color is abnormal with ulcers   Edema is  noted   Sensation deficit noted - protective   Palpation of the foot does cause pain   4/5 strength DF/PF  L LE Open wounds are noted   Skin color is abnormal with ulcers   Edema is  noted   Sensation deficit noted - protective   Palpation of the foot does cause pain   4/5 strength DF/PF  R dorsalis pedis 1 L dorsalis pedis 1   R posterior tibial 1 L posterior tibial 1 02/16/22 0908   Wound Cleansed Cleansed with saline 02/16/22 0908   Dressing/Treatment Dry dressing;Foam;Non adherent 02/02/22 0918   Offloading for Diabetic Foot Ulcers Back offloading shoe 02/16/22 0908   Wound Length (cm) 2.6 cm 02/23/22 0804   Wound Width (cm) 1.5 cm 02/23/22 0804   Wound Depth (cm) 0.2 cm 02/23/22 0804   Wound Surface Area (cm^2) 3.9 cm^2 02/23/22 0804   Change in Wound Size % (l*w) 8.24 02/23/22 0804   Wound Volume (cm^3) 0.78 cm^3 02/23/22 0804   Wound Healing % 8 02/23/22 0804   Post-Procedure Length (cm) 2.4 cm 02/16/22 0908   Post-Procedure Width (cm) 1.4 cm 02/16/22 0908   Post-Procedure Depth (cm) 0.2 cm 02/16/22 0908   Post-Procedure Surface Area (cm^2) 3.36 cm^2 02/16/22 0908   Post-Procedure Volume (cm^3) 0.672 cm^3 02/16/22 0908   Wound Assessment Fibrin;Pink/red;Devitalized tissue 02/23/22 0804   Drainage Amount Moderate 02/23/22 0804   Drainage Description Yellow;Brown 02/23/22 0804   Odor None 02/23/22 0804   Rashmi-wound Assessment Intact 02/23/22 0804   Wound Thickness Description not for Pressure Injury Full thickness 01/19/22 0909   Number of days: 48       Wound 01/05/22 Ankle Left;Lateral #4 (Active)   Wound Image   01/05/22 0902   Wound Etiology Venous 01/05/22 0943   Dressing Status New dressing applied 02/02/22 0918   Wound Cleansed Cleansed with saline 02/02/22 0918   Dressing/Treatment Collagen with Ag;Dry dressing 02/02/22 0918   Offloading for Diabetic Foot Ulcers Offloading boot 02/02/22 0918   Wound Length (cm) 0.1 cm 02/23/22 0804   Wound Width (cm) 0.1 cm 02/23/22 0804   Wound Depth (cm) 0.1 cm 02/23/22 0804   Wound Surface Area (cm^2) 0.01 cm^2 02/23/22 0804   Change in Wound Size % (l*w) 97.62 02/23/22 0804   Wound Volume (cm^3) 0.001 cm^3 02/23/22 0804   Wound Healing % 99 02/23/22 0804   Post-Procedure Length (cm) 0.1 cm 02/16/22 0908   Post-Procedure Width (cm) 0.1 cm 02/16/22 0908   Post-Procedure Depth (cm) 0.2 cm 02/16/22 0908   Post-Procedure Surface Area (cm^2) 0.01 cm^2 02/16/22 0908   Post-Procedure Volume (cm^3) 0.002 cm^3 02/16/22 0908   Wound Assessment Dry;Fibrin 02/23/22 0804   Drainage Amount None 02/23/22 0804   Drainage Description Yellow 02/16/22 0852   Odor None 02/23/22 0804   Rashmi-wound Assessment Intact 02/23/22 0804   Number of days: 48       Wound 01/26/22 Tibial Left;Posterior #6 (Active)   Wound Image   01/26/22 0855   Dressing Status New dressing applied 02/16/22 0908   Wound Cleansed Cleansed with saline 02/16/22 0908   Dressing/Treatment Collagen with Ag;Silicone border 79/87/32 0908   Offloading for Diabetic Foot Ulcers Offloading not required 02/16/22 0908   Wound Length (cm) 1.9 cm 02/23/22 0804   Wound Width (cm) 1.5 cm 02/23/22 0804   Wound Depth (cm) 0.3 cm 02/23/22 0804   Wound Surface Area (cm^2) 2.85 cm^2 02/23/22 0804   Change in Wound Size % (l*w) -82.69 02/23/22 0804   Wound Volume (cm^3) 0.855 cm^3 02/23/22 0804   Wound Healing % -448 02/23/22 0804   Post-Procedure Length (cm) 2 cm 02/16/22 0908   Post-Procedure Width (cm) 1.1 cm 02/16/22 0908   Post-Procedure Depth (cm) 0.4 cm 02/16/22 0908   Post-Procedure Surface Area (cm^2) 2.2 cm^2 02/16/22 0908   Post-Procedure Volume (cm^3) 0.88 cm^3 02/16/22 0908   Wound Assessment Fibrin;Pale granulation tissue 02/23/22 0804   Drainage Amount Moderate 02/23/22 0804   Drainage Description Serosanguinous 02/23/22 0804   Odor None 02/23/22 0804   Rashmi-wound Assessment Blanchable erythema; Maceration 02/23/22 0804   Number of days: 27       Wound 02/09/22 Toe (Comment  which one) Right #8 RIGHT 2ND TOE (Active)   Wound Image   02/09/22 0935   Dressing Status New dressing applied 02/16/22 0908   Wound Cleansed Not Cleansed 02/16/22 0908   Dressing/Treatment Collagen with Ag;Dry dressing 02/16/22 0908   Offloading for Diabetic Foot Ulcers Back offloading shoe 02/16/22 0908   Wound Length (cm) 0.2 cm 02/23/22 0804   Wound Width (cm) 0.2 cm 02/23/22 0804   Wound Depth (cm) 0.1 cm 02/23/22 0804 Wound Surface Area (cm^2) 0.04 cm^2 02/23/22 0804   Change in Wound Size % (l*w) 55.56 02/23/22 0804   Wound Volume (cm^3) 0.004 cm^3 02/23/22 0804   Wound Healing % 56 02/23/22 0804   Post-Procedure Length (cm) 0.2 cm 02/16/22 0908   Post-Procedure Width (cm) 0.2 cm 02/16/22 0908   Post-Procedure Depth (cm) 0.1 cm 02/16/22 0908   Post-Procedure Surface Area (cm^2) 0.04 cm^2 02/16/22 0908   Post-Procedure Volume (cm^3) 0.004 cm^3 02/16/22 0908   Wound Assessment Pink/red 02/23/22 0804   Drainage Amount Scant 02/23/22 0804   Drainage Description Serosanguinous 02/23/22 0804   Odor None 02/23/22 0804   Rashmi-wound Assessment Intact 02/23/22 0804   Number of days: 13          Procedure Note  Indications:  Based on my examination of this patient's wound(s)/ulcer(s) today, debridement is required to promote healing and evaluate the wound base. Performed by: Sadi August DPM    Consent obtained:  Yes    Time out taken:  Yes    Pain Control: Anesthetic  Anesthetic: 4% Lidocaine Liquid Topical     Debridement:Excisional Debridement    Using #15 blade scalpel the wound(s)/ulcer(s) was/were sharply debrided down through and including the removal of subcutaneous tissue. Devitalized Tissue Debrided:  fibrin, biofilm, slough and necrotic/eschar to stimulate bleeding to promote healing, post debridement good bleeding base and wound edges noted    Wound/Ulcer #: 2, 4    Percent of Wound/Ulcer Debrided: 100%    Total Surface Area Debrided:  15sq cm     Estimated Blood Loss:  Minimal  Hemostasis Achieved:  by pressure    Procedural Pain:  4  / 10   Post Procedural Pain:  5 / 10     Response to treatment:  Well tolerated by patient. A culture was done.       Skin Substitute Applied:         [] APLIGRAF   []44 sq/cm   []88 sq/cm    []132 sq/cm  []176  sq/cm           [x] DERMAGRAFT  [x] 38sq/cm   []76 sq/cm    []114 sq/cm  []152 sq/cm         [] OASIS   [] 10.5 sq/cm   []21 sq/cm    []4 sq/cm PuraPly  []8 sq/cm PuraPly        [] OTHER puraply 16    []        sq/cm       Performed by: Yandel Jackson DPM    Wound Type:diabetic    Consent obtained: Yes    Time out taken: Yes     Fenestrated: Yes    Instrument(s) #15 blade scalpel      [] Mesher Utilized    Skin Substitute was Applied to Wound Number(s):     Wound #: 1, 4, and 6      Wound 01/05/22 Heel Left #1 (Active)   Wound Image   01/05/22 0902   Wound Etiology Diabetic Campbell 2 01/05/22 0943   Dressing Status New dressing applied 02/02/22 0918   Wound Cleansed Cleansed with saline 02/02/22 0918   Dressing/Treatment Dry dressing;Foam;Non adherent 02/02/22 0918   Offloading for Diabetic Foot Ulcers Offloading boot 02/02/22 0918   Wound Length (cm) 4.5 cm 02/02/22 0819   Wound Width (cm) 2.5 cm 02/02/22 0819   Wound Depth (cm) 0.1 cm 02/02/22 0819   Wound Surface Area (cm^2) 11.25 cm^2 02/02/22 0819   Change in Wound Size % (l*w) 18.48 02/02/22 0819   Wound Volume (cm^3) 1.125 cm^3 02/02/22 0819   Wound Healing % 59 02/02/22 0819   Post-Procedure Length (cm) 4.5 cm 02/02/22 0840   Post-Procedure Width (cm) 2.5 cm 02/02/22 0840   Post-Procedure Depth (cm) 0.1 cm 02/02/22 0840   Post-Procedure Surface Area (cm^2) 11.25 cm^2 02/02/22 0840   Post-Procedure Volume (cm^3) 1.125 cm^3 02/02/22 0840   Wound Assessment Fibrin;Pink/red 02/02/22 0819   Drainage Amount Moderate 02/02/22 0819   Drainage Description Serosanguinous 02/02/22 0819   Odor None 02/02/22 0819   Rashmi-wound Assessment Maceration 02/02/22 0819   Wound Thickness Description not for Pressure Injury Full thickness 01/19/22 0909   Number of days: 28       Wound 01/05/22 Heel Right #2 (Active)   Wound Image   01/05/22 0902   Wound Etiology Venous 01/05/22 0943   Dressing Status New dressing applied 02/02/22 0918   Wound Cleansed Cleansed with saline 02/02/22 0918   Dressing/Treatment Dry dressing;Foam;Non adherent 02/02/22 0918   Offloading for Diabetic Foot Ulcers Offloading boot 02/02/22 0918   Wound Length (cm) 2.5 cm 02/02/22 0819   Wound Width (cm) 1.7 cm 02/02/22 0819   Wound Depth (cm) 0.2 cm 02/02/22 0819   Wound Surface Area (cm^2) 4.25 cm^2 02/02/22 0819   Change in Wound Size % (l*w) 0 02/02/22 0819   Wound Volume (cm^3) 0.85 cm^3 02/02/22 0819   Wound Healing % 0 02/02/22 0819   Post-Procedure Length (cm) 2.5 cm 02/02/22 0840   Post-Procedure Width (cm) 1.7 cm 02/02/22 0840   Post-Procedure Depth (cm) 0.2 cm 02/02/22 0840   Post-Procedure Surface Area (cm^2) 4.25 cm^2 02/02/22 0840   Post-Procedure Volume (cm^3) 0.85 cm^3 02/02/22 0840   Wound Assessment Fibrin;Pink/red 02/02/22 0819   Drainage Amount Moderate 02/02/22 0819   Drainage Description Serosanguinous; Yellow 02/02/22 0819   Odor None 02/02/22 0819   Rashmi-wound Assessment Intact 02/02/22 0819   Wound Thickness Description not for Pressure Injury Full thickness 01/19/22 0909   Number of days: 28       Wound 01/05/22 Ankle Right;Lateral #3 (Active)   Wound Image   01/05/22 0902   Wound Etiology Venous 01/05/22 0943   Dressing Status New dressing applied 01/26/22 0901   Wound Cleansed Cleansed with saline 01/26/22 0901   Dressing/Treatment Alginate;ABD 01/26/22 0901   Wound Length (cm) 0 cm 02/02/22 0819   Wound Width (cm) 0 cm 02/02/22 0819   Wound Depth (cm) 0 cm 02/02/22 0819   Wound Surface Area (cm^2) 0 cm^2 02/02/22 0819   Change in Wound Size % (l*w) 100 02/02/22 0819   Wound Volume (cm^3) 0 cm^3 02/02/22 0819   Wound Healing % 100 02/02/22 0819   Post-Procedure Length (cm) 0.2 cm 01/26/22 0901   Post-Procedure Width (cm) 0.2 cm 01/26/22 0901   Post-Procedure Depth (cm) 0.1 cm 01/26/22 0901   Post-Procedure Surface Area (cm^2) 0.04 cm^2 01/26/22 0901   Post-Procedure Volume (cm^3) 0.004 cm^3 01/26/22 0901   Wound Assessment Dry;Fibrin 01/26/22 0855   Drainage Amount None 01/26/22 0855   Drainage Description Serous 01/19/22 0839   Odor None 01/26/22 0855   Rashmi-wound Assessment Intact; Blanchable erythema 01/26/22 0855   Number of days: 28 Wound 01/05/22 Ankle Left;Lateral #4 (Active)   Wound Image   01/05/22 0902   Wound Etiology Venous 01/05/22 0943   Dressing Status New dressing applied 02/02/22 0918   Wound Cleansed Cleansed with saline 02/02/22 0918   Dressing/Treatment Collagen with Ag;Dry dressing 02/02/22 0918   Offloading for Diabetic Foot Ulcers Offloading boot 02/02/22 0918   Wound Length (cm) 0.7 cm 02/02/22 0819   Wound Width (cm) 0.5 cm 02/02/22 0819   Wound Depth (cm) 0.2 cm 02/02/22 0819   Wound Surface Area (cm^2) 0.35 cm^2 02/02/22 0819   Change in Wound Size % (l*w) 16.67 02/02/22 0819   Wound Volume (cm^3) 0.07 cm^3 02/02/22 0819   Wound Healing % 17 02/02/22 0819   Post-Procedure Length (cm) 0.7 cm 02/02/22 0840   Post-Procedure Width (cm) 0.5 cm 02/02/22 0840   Post-Procedure Depth (cm) 0.2 cm 02/02/22 0840   Post-Procedure Surface Area (cm^2) 0.35 cm^2 02/02/22 0840   Post-Procedure Volume (cm^3) 0.07 cm^3 02/02/22 0840   Wound Assessment Pink/red 02/02/22 0819   Drainage Amount Scant 02/02/22 0819   Drainage Description Yellow 02/02/22 0819   Odor None 02/02/22 0819   Rashmi-wound Assessment Intact 02/02/22 0819   Number of days: 28       Wound 01/05/22 Pretibial Left #5 (Active)   Wound Image   01/26/22 0901   Wound Etiology Venous 01/05/22 0943   Dressing Status New dressing applied 01/19/22 0921   Wound Cleansed Cleansed with saline 01/19/22 0921   Dressing/Treatment Alginate;Dry dressing 01/19/22 0921   Wound Length (cm) 0 cm 02/02/22 0819   Wound Width (cm) 0 cm 02/02/22 0819   Wound Depth (cm) 0 cm 02/02/22 0819   Wound Surface Area (cm^2) 0 cm^2 02/02/22 0819   Change in Wound Size % (l*w) 100 02/02/22 0819   Wound Volume (cm^3) 0 cm^3 02/02/22 0819   Wound Healing % 100 02/02/22 0819   Post-Procedure Length (cm) 0.3 cm 01/19/22 0909   Post-Procedure Width (cm) 0.3 cm 01/19/22 0909   Post-Procedure Depth (cm) 0.1 cm 01/19/22 0909   Post-Procedure Surface Area (cm^2) 0.09 cm^2 01/19/22 0909   Post-Procedure Volume (cm^3) 0.009 cm^3 01/19/22 0909   Wound Assessment Pink/red 01/19/22 0839   Drainage Amount Scant 01/19/22 0839   Drainage Description Serous 01/19/22 0839   Odor None 01/19/22 0839   Rashmi-wound Assessment Fragile 01/19/22 0839   Number of days: 28       Wound 01/26/22 Tibial Left;Posterior #6 (Active)   Wound Image   01/26/22 0855   Dressing Status New dressing applied 02/02/22 0918   Wound Cleansed Cleansed with saline 02/02/22 0918   Dressing/Treatment Non adherent;Dry dressing 02/02/22 0918   Offloading for Diabetic Foot Ulcers Offloading boot 02/02/22 0918   Wound Length (cm) 2.1 cm 02/02/22 0819   Wound Width (cm) 1.4 cm 02/02/22 0819   Wound Depth (cm) 0.1 cm 02/02/22 0819   Wound Surface Area (cm^2) 2.94 cm^2 02/02/22 0819   Change in Wound Size % (l*w) -88.46 02/02/22 0819   Wound Volume (cm^3) 0.294 cm^3 02/02/22 0819   Wound Healing % -88 02/02/22 0819   Post-Procedure Length (cm) 2.1 cm 02/02/22 0840   Post-Procedure Width (cm) 1.4 cm 02/02/22 0840   Post-Procedure Depth (cm) 0.1 cm 02/02/22 0840   Post-Procedure Surface Area (cm^2) 2.94 cm^2 02/02/22 0840   Post-Procedure Volume (cm^3) 0.294 cm^3 02/02/22 0840   Wound Assessment Fibrin 02/02/22 0819   Drainage Amount Small 02/02/22 0819   Drainage Description Serosanguinous 02/02/22 0819   Odor None 02/02/22 0819   Rashmi-wound Assessment Blanchable erythema; Maceration 02/02/22 0819   Number of days: 7       Wound 01/26/22 Pretibial Left;Medial #7 (Active)   Wound Image   01/26/22 0855   Dressing Status New dressing applied 02/02/22 0918   Wound Cleansed Cleansed with saline 02/02/22 0918   Dressing/Treatment Dry dressing;Non adherent 02/02/22 0918   Offloading for Diabetic Foot Ulcers Offloading boot 02/02/22 0918   Wound Length (cm) 1.2 cm 02/02/22 0819   Wound Width (cm) 1 cm 02/02/22 0819   Wound Depth (cm) 0.1 cm 02/02/22 0819   Wound Surface Area (cm^2) 1.2 cm^2 02/02/22 0819   Change in Wound Size % (l*w) -9.09 02/02/22 0819   Wound Volume (cm^3) 0.12 cm^3 02/02/22 0819   Wound Healing % -9 02/02/22 0819   Post-Procedure Length (cm) 1.2 cm 02/02/22 0840   Post-Procedure Width (cm) 1 cm 02/02/22 0840   Post-Procedure Depth (cm) 0.1 cm 02/02/22 0840   Post-Procedure Surface Area (cm^2) 1.2 cm^2 02/02/22 0840   Post-Procedure Volume (cm^3) 0.12 cm^3 02/02/22 0840   Wound Assessment Pink/red 02/02/22 0819   Drainage Amount None 02/02/22 0819   Odor None 02/02/22 0819   Rashmi-wound Assessment Intact 02/02/22 0819   Number of days: 7         Total Surface Area Covered 15 sq/cm     Amount Wasted 0 sq/cm    Reason for Waste 0      Secured: Yes    Secured With:  [x]Steri Strips    []Sutures     []Staples []Other    Procedural Pain: 2/10     Post Procedural Pain: 3 / 10    Response to Treatment:  Well tolerated by patient. Plan:     Pt is not a smoker   - Discussed relationship of smoking and negative affects on wound healing   - Emphasized importance of tobacco avoidace/cessation   - Script for nicotine patch given to patient   - Information regarding support groups for smoking cessation given    In my professional opinion and based off the information that is available at this time this patient has appropriate indication for HBO Therapy: Maybe    Treatment Note please see attached Discharge Instructions    Written patient dismissal instructions given to patient and signed by patient or POA. Discharge Instructions       Visit Discharge/Physician Orders    Discharge condition: Stable    Assessment of pain at discharge:  NONE    Anesthetic used: 4% lidocaine solution    Discharge to: Home    Left via:Private automobile    Accompanied by: accompanied by SELF    [facility]:  [facility]    Dressing Orders:  Clean bilateral heel ulcers AND LEFT leg, ankle and right second tor ULCERS with normal saline. DR. Heriberto Chavez DERMAGRAFT BILATERAL HEELS. (4 OF 10 TOTAL GRAFTS) APPLY ADAPTIC, FOAM AND DRY  DRESSING TO SECURE. LEAVE IN PLACE FOR ONE WEEK.    MAKE SURE TO WRAP HEEL AND LEG ULCERS SEPARATELY. TO LEFT LEG, ANKLE, RIGHT SECOND TOE AND, APPLY SALINE MOIST BRANDEE AND DRY DRESSING TO SECURE. CHANGE EVERY OTHER DAY. bilateral heel offloading boot. Physical therapy for ambulating safely while wearing offloading boots as indicated. Treatment Orders: wear prevalon boots    Wound measurements:                                [facility] followup visit ________one week with Dr. Gastelum_____________________  (Please note your next appointment above and if you are unable to keep, kindly give a 24 hour notice. Thank you.)    Physician signature:__________________________      If you experience any of the following, please call the [facility] during business hours:    * Increase in Pain  * Temperature over 101  * Increase in drainage from your wound  * Drainage with a foul odor  * Bleeding  * Increase in swelling  * Need for compression bandage changes due to slippage, breakthrough drainage. If you need medical attention outside of the business hours of the [facility] please contact your PCP or go to the nearest emergency room.         Electronically signed by Ida Chambers DPM on 2/23/2022 at 8:43 AM

## 2022-03-01 LAB
ALBUMIN SERPL-MCNC: 2.8 G/DL (ref 3.5–5.2)
ALP BLD-CCNC: 144 U/L (ref 40–129)
ALT SERPL-CCNC: 9 U/L (ref 0–40)
ANION GAP SERPL CALCULATED.3IONS-SCNC: 6 MMOL/L (ref 7–16)
AST SERPL-CCNC: 18 U/L (ref 0–39)
BASOPHILS ABSOLUTE: 0.04 E9/L (ref 0–0.2)
BASOPHILS RELATIVE PERCENT: 0.4 % (ref 0–2)
BILIRUB SERPL-MCNC: 0.3 MG/DL (ref 0–1.2)
BUN BLDV-MCNC: 35 MG/DL (ref 6–23)
CALCIUM SERPL-MCNC: 8.5 MG/DL (ref 8.6–10.2)
CHLORIDE BLD-SCNC: 104 MMOL/L (ref 98–107)
CO2: 32 MMOL/L (ref 22–29)
CREAT SERPL-MCNC: 1.1 MG/DL (ref 0.7–1.2)
EOSINOPHILS ABSOLUTE: 0.28 E9/L (ref 0.05–0.5)
EOSINOPHILS RELATIVE PERCENT: 2.6 % (ref 0–6)
GFR AFRICAN AMERICAN: >60
GFR NON-AFRICAN AMERICAN: >60 ML/MIN/1.73
GLUCOSE BLD-MCNC: 75 MG/DL (ref 74–99)
HBA1C MFR BLD: 7.4 % (ref 4–5.6)
HCT VFR BLD CALC: 33.6 % (ref 37–54)
HEMOGLOBIN: 9.9 G/DL (ref 12.5–16.5)
IMMATURE GRANULOCYTES #: 0.06 E9/L
IMMATURE GRANULOCYTES %: 0.6 % (ref 0–5)
LYMPHOCYTES ABSOLUTE: 1.67 E9/L (ref 1.5–4)
LYMPHOCYTES RELATIVE PERCENT: 15.7 % (ref 20–42)
MCH RBC QN AUTO: 26.1 PG (ref 26–35)
MCHC RBC AUTO-ENTMCNC: 29.5 % (ref 32–34.5)
MCV RBC AUTO: 88.7 FL (ref 80–99.9)
MONOCYTES ABSOLUTE: 0.75 E9/L (ref 0.1–0.95)
MONOCYTES RELATIVE PERCENT: 7.1 % (ref 2–12)
NEUTROPHILS ABSOLUTE: 7.83 E9/L (ref 1.8–7.3)
NEUTROPHILS RELATIVE PERCENT: 73.6 % (ref 43–80)
PDW BLD-RTO: 16.1 FL (ref 11.5–15)
PLATELET # BLD: 192 E9/L (ref 130–450)
PMV BLD AUTO: 9.5 FL (ref 7–12)
POTASSIUM SERPL-SCNC: 4.3 MMOL/L (ref 3.5–5)
RBC # BLD: 3.79 E12/L (ref 3.8–5.8)
SODIUM BLD-SCNC: 142 MMOL/L (ref 132–146)
TOTAL PROTEIN: 5.6 G/DL (ref 6.4–8.3)
WBC # BLD: 10.6 E9/L (ref 4.5–11.5)

## 2022-03-02 ENCOUNTER — HOSPITAL ENCOUNTER (OUTPATIENT)
Dept: WOUND CARE | Age: 87
Discharge: HOME | End: 2022-03-02
Payer: MEDICARE

## 2022-03-02 VITALS
WEIGHT: 243 LBS | HEART RATE: 87 BPM | SYSTOLIC BLOOD PRESSURE: 140 MMHG | HEIGHT: 72 IN | RESPIRATION RATE: 18 BRPM | BODY MASS INDEX: 32.91 KG/M2 | TEMPERATURE: 97.6 F | DIASTOLIC BLOOD PRESSURE: 76 MMHG

## 2022-03-02 DIAGNOSIS — L97.412 DIABETIC ULCER OF RIGHT HEEL ASSOCIATED WITH TYPE 2 DIABETES MELLITUS, WITH FAT LAYER EXPOSED (HCC): Primary | ICD-10-CM

## 2022-03-02 DIAGNOSIS — E11.621 DIABETIC ULCER OF RIGHT HEEL ASSOCIATED WITH TYPE 2 DIABETES MELLITUS, WITH FAT LAYER EXPOSED (HCC): Primary | ICD-10-CM

## 2022-03-02 DIAGNOSIS — L97.421 DIABETIC ULCER OF LEFT HEEL ASSOCIATED WITH DIABETES MELLITUS DUE TO UNDERLYING CONDITION, LIMITED TO BREAKDOWN OF SKIN (HCC): ICD-10-CM

## 2022-03-02 DIAGNOSIS — E08.621 DIABETIC ULCER OF LEFT HEEL ASSOCIATED WITH DIABETES MELLITUS DUE TO UNDERLYING CONDITION, LIMITED TO BREAKDOWN OF SKIN (HCC): ICD-10-CM

## 2022-03-02 PROCEDURE — 15275 SKIN SUB GRAFT FACE/NK/HF/G: CPT

## 2022-03-02 PROCEDURE — C5275 LOW COST SKIN SUBSTITUTE APP: HCPCS

## 2022-03-02 PROCEDURE — 6370000000 HC RX 637 (ALT 250 FOR IP): Performed by: PODIATRIST

## 2022-03-02 RX ORDER — LIDOCAINE HYDROCHLORIDE 40 MG/ML
SOLUTION TOPICAL ONCE
Status: COMPLETED | OUTPATIENT
Start: 2022-03-02 | End: 2022-03-02

## 2022-03-02 RX ORDER — LIDOCAINE HYDROCHLORIDE 20 MG/ML
JELLY TOPICAL ONCE
Status: CANCELLED | OUTPATIENT
Start: 2022-03-02 | End: 2022-03-02

## 2022-03-02 RX ORDER — LIDOCAINE 50 MG/G
OINTMENT TOPICAL ONCE
Status: CANCELLED | OUTPATIENT
Start: 2022-03-02 | End: 2022-03-02

## 2022-03-02 RX ORDER — BETAMETHASONE DIPROPIONATE 0.05 %
OINTMENT (GRAM) TOPICAL ONCE
Status: CANCELLED | OUTPATIENT
Start: 2022-03-02 | End: 2022-03-02

## 2022-03-02 RX ORDER — CLOBETASOL PROPIONATE 0.5 MG/G
OINTMENT TOPICAL ONCE
Status: CANCELLED | OUTPATIENT
Start: 2022-03-02 | End: 2022-03-02

## 2022-03-02 RX ORDER — BACITRACIN, NEOMYCIN, POLYMYXIN B 400; 3.5; 5 [USP'U]/G; MG/G; [USP'U]/G
OINTMENT TOPICAL ONCE
Status: CANCELLED | OUTPATIENT
Start: 2022-03-02 | End: 2022-03-02

## 2022-03-02 RX ORDER — GENTAMICIN SULFATE 1 MG/G
OINTMENT TOPICAL ONCE
Status: CANCELLED | OUTPATIENT
Start: 2022-03-02 | End: 2022-03-02

## 2022-03-02 RX ORDER — LIDOCAINE 40 MG/G
CREAM TOPICAL ONCE
Status: CANCELLED | OUTPATIENT
Start: 2022-03-02 | End: 2022-03-02

## 2022-03-02 RX ORDER — BACITRACIN ZINC AND POLYMYXIN B SULFATE 500; 1000 [USP'U]/G; [USP'U]/G
OINTMENT TOPICAL ONCE
Status: CANCELLED | OUTPATIENT
Start: 2022-03-02 | End: 2022-03-02

## 2022-03-02 RX ORDER — LIDOCAINE HYDROCHLORIDE 40 MG/ML
SOLUTION TOPICAL ONCE
Status: CANCELLED | OUTPATIENT
Start: 2022-03-02 | End: 2022-03-02

## 2022-03-02 RX ORDER — GINSENG 100 MG
CAPSULE ORAL ONCE
Status: CANCELLED | OUTPATIENT
Start: 2022-03-02 | End: 2022-03-02

## 2022-03-02 RX ADMIN — LIDOCAINE HYDROCHLORIDE: 40 SOLUTION TOPICAL at 09:02

## 2022-03-02 ASSESSMENT — PAIN DESCRIPTION - PAIN TYPE: TYPE: CHRONIC PAIN

## 2022-03-02 ASSESSMENT — PAIN DESCRIPTION - ORIENTATION: ORIENTATION: LEFT

## 2022-03-02 ASSESSMENT — PAIN DESCRIPTION - LOCATION: LOCATION: LEG;FOOT

## 2022-03-02 ASSESSMENT — PAIN SCALES - GENERAL: PAINLEVEL_OUTOF10: 3

## 2022-03-02 ASSESSMENT — PAIN DESCRIPTION - PROGRESSION: CLINICAL_PROGRESSION: NOT CHANGED

## 2022-03-02 NOTE — PROGRESS NOTES
[facility]  History and Physical/Consultation  Podiatry    Referring Physician : Carl Nicolas MD  Mandeep Sosa. MEDICAL RECORD NUMBER:  [id number]  AGE: 80 y.o. GENDER: male  : 1932  EPISODE DATE:  3/2/2022  Subjective:     Chief Complaint   Patient presents with    Wound Check     biltaeral lower extremity          HISTORY of PRESENT ILLNESS HPI     Mandeep Bills is a 80 y.o. male who presents today for wound/ulcer evaluation. History of Wound Context:  The patient has had a wound of diabetic b/l heels,and right ankle which was first noted approximately months ago. This has been treated betadyne. On their initial visit to the wound healing center, 22 ,  the patient has noted that the wound has been improving. The patient has had similar previous wounds in the past.      Pt is not on abx at time of initial visit.       Wound/Ulcer Pain Timing/Severity: constant  Quality of pain: sharp  Severity:  3 / 10   Modifying Factors: Pain worsens with walking  Associated Signs/Symptoms: edema, erythema and drainage    Ulcer Identification:  Ulcer Type: diabetic  Contributing Factors: edema and diabetes    Diabetic/Pressure/Non Pressure Ulcers onl y:  Ulcer: Diabetic ulcer, fat layer exposed    If patient has diabetic lower extremity wounds  Campbell Classification of diabetic lower extremity wounds:    Grade Description   []  0 No open wound   []  1 Superficial ulcer involving the full skin thickness   [x]  2 Deep ulcer involves ligament, tendon, joint capsule, or fascia  No bone involvement or abscess presence   []  3 Deep Ulcer with abcess formation and/or osteomyelitis   []  4 Localized gangrene   []  5 Extensive gangrene of the foot     Wound: N/A          22  Debrided, cont tx and care     22  Debrided, cont tx and care, await graft approval    22  Debrided, cont tx and care, puraply applied    22  Debrided, dermagraft applied    22  Debrided, dermagraft applied    2-23-22  Debrided, graft applied    3-2-22  Debrided, graft applied  PAST MEDICAL HISTORY      Diagnosis Date    Arthritis     Cancer [facility])     breast    [facility]     Diabetes mellitus [facility]     History of lumpectomy     Hx of blood clots     Hyperlipidemia     Hypertension     PE [facility] throm[facility]embolism) [facility]     Staph aureus infection      Past Surgical History:   Procedure Laterality Date    [facility] [address] [facility] [place]         MASTECTOMY      TOE AMPUTATION      TOE SURGERY      TONSILLECTOMY       History reviewed. No pertinent family history. Social History     Tobacco Use    Smoking status: Never Smoker    Smokeless tobacco: Never Used   Vaping Use    Vaping Use: Never used   Substance Use Topics    Alcohol use: Not Currently     Alcohol/week: 1.0 standard drink     Types: 1 Cans of beer per week    Drug use: No     Allergies   Allergen Reactions    Clindamycin/Lincomycin     Sulfa Antibiotics      Current Outpatient Medications on File Prior to Encounter   Medication Sig Dispense Refill    miconazole (MICOTIN) 2 % powder Apply topically 2 times daily.  45 g 1    ascorbic acid (VITAMIN C) 500 MG tablet Take 1 tablet by mouth 2 times daily 30 tablet 0    Vitamin D (CHOLECALCIFEROL) 50 MCG (2000 UT) TABS tablet Take 1 tablet by mouth daily 60 tablet 0    furosemide (LASIX) 40 MG tablet Take 40 mg by mouth daily      omeprazole (PRILOSEC) 20 MG delayed release capsule Take 20 mg by mouth daily      Multiple Vitamins-Minerals (THERAPEUTIC MULTIVITAMIN-MINERALS) tablet Take 1 tablet by mouth daily      Multiple Vitamins-Minerals (PRESERVISION AREDS PO) Take 1 tablet by mouth 2 times daily (with meals)      acetaminophen (TYLENOL) 325 MG tablet Take 650 mg by mouth every 6 hours as needed for Pain or Fever      calcium carbonate (TUMS) 500 MG chewable tablet Take 1 tablet by mouth every 4 hours as needed for Heartburn      apixaban (ELIQUIS) 5 MG TABS tablet Take 5 mg by mouth 2 times daily (before meals)       gabapentin (NEURONTIN) 100 MG capsule Take 200 mg by mouth Daily with supper.  atorvastatin (LIPITOR) 80 MG tablet Take 80 mg by mouth Daily with supper       INSULIN LISP PROT & LISP, HUM, (75-25) 100 UNIT/ML SUSP Inject 72 Units into the skin daily (with breakfast)       INSULIN LISP PROT & LISP, HUM, (75-25) 100 UNIT/ML SUSP Inject 55 Units into the skin Daily with supper       clotrimazole-betamethasone (LOTRISONE) cream Apply  topically as needed. Apply topically 2 times daily.  zinc sulfate (ZINCATE) 220 (50 Zn) MG capsule Take 1 capsule by mouth daily for 14 days 14 capsule 0    ipratropium-albuterol (DUONEB) 0.5-2.5 (3) MG/3ML SOLN nebulizer solution Inhale 3 mLs into the lungs every 4 hours (while awake) for 30 doses 90 mL 0     No current facility-administered medications on file prior to encounter.        REVIEW OF SYSTEMS   ROS : All others Negative if blank [], Positive if [x]  General Vascular   [] Fevers [] Claudication   [] Chills [] Rest Pain   Skin Neurologic   [x] Tissue Loss [x] Lower extremity neuropathy     Objective:    BP (!) 140/76   Pulse 87   Temp 97.6 °F (36.4 °C) (Temporal)   Resp 18   Ht 6' (1.829 m)   Wt 243 lb (110.2 kg)   BMI 32.96 kg/m²   Wt Readings from Last 3 Encounters:   03/02/22 243 lb (110.2 kg)   02/16/22 243 lb (110.2 kg)   02/02/22 243 lb (110.2 kg)       PHYSICAL EXAM   CONSTITUTIONAL:   Awake, alert, cooperative   PSYCHIATRIC :  Oriented to time, place and person      normal insight to disease process  EXTREMITIES:   R LE Open wounds are noted   Skin color is abnormal with ulcers   Edema is  noted   Sensation deficit noted - protective   Palpation of the foot does cause pain   4/5 strength DF/PF  L LE Open wounds are noted   Skin color is abnormal with ulcers   Edema is  noted   Sensation deficit noted - protective   Palpation of the foot does cause pain   4/5 strength DF/PF  R dorsalis pedis 1 L dorsalis pedis 1   R posterior tibial 1 L posterior tibial 1     Assessment:     Problem List Items Addressed This Visit     Diabetic ulcer of right heel associated with type 2 diabetes mellitus, with fat layer exposed ([facility] - Primary    Relevant Orders    Initiate Outpatient Wound Care Protocol    Diabetic ulcer of left heel ([facility]    Relevant Orders    Initiate Outpatient Wound Care Protocol          Pre Debridement Measurements:  Are located in the [place]  Documentation Flow Sheet  Post Debridement Measurements:  Wound/Ulcer Descriptions are Pre Debridement except measurements:     Wound 01/05/22 Heel Left #1 (Active)   Wound Image   01/05/22 0902   Wound Etiology Diabetic Campbell 2 01/05/22 0943   Dressing Status New dressing applied 02/23/22 0904   Wound Cleansed Cleansed with saline 02/23/22 0904   Dressing/Treatment Other (comment) 02/23/22 0904   Offloading for Diabetic Foot Ulcers Diabetic shoes/inserts 02/23/22 0904   Wound Length (cm) 3.4 cm 03/02/22 0854   Wound Width (cm) 3 cm 03/02/22 0854   Wound Depth (cm) 0.3 cm 03/02/22 0854   Wound Surface Area (cm^2) 10.2 cm^2 03/02/22 0854   Change in Wound Size % (l*w) 26.09 03/02/22 0854   Wound Volume (cm^3) 3.06 cm^3 03/02/22 0854   Wound Healing % -11 03/02/22 0854   Post-Procedure Length (cm) 3.4 cm 03/02/22 0929   Post-Procedure Width (cm) 3 cm 03/02/22 0929   Post-Procedure Depth (cm) 0.3 cm 03/02/22 0929   Post-Procedure Surface Area (cm^2) 10.2 cm^2 03/02/22 0929   Post-Procedure Volume (cm^3) 3.06 cm^3 03/02/22 0929   Wound Assessment Fibrin;Granulation tissue 03/02/22 0854   Drainage Amount Large 03/02/22 0854   Drainage Description Yellow;Brown 03/02/22 0854   Odor None 03/02/22 0854   Rashmi-wound Assessment Maceration 03/02/22 0854   Wound Thickness Description not for Pressure Injury Full thickness 01/19/22 0909   Number of days: 56       Wound 01/05/22 Heel Right #2 (Active)   Wound Image   01/05/22 0902   Wound Etiology Venous 01/05/22 0943   Dressing Status New dressing applied 02/23/22 0904   Wound Cleansed Cleansed with saline 02/23/22 0904   Dressing/Treatment Other (comment) 02/23/22 0904   Offloading for Diabetic Foot Ulcers Offloading boot 02/23/22 0904   Wound Length (cm) 2.2 cm 03/02/22 0854   Wound Width (cm) 1.6 cm 03/02/22 0854   Wound Depth (cm) 0.2 cm 03/02/22 0854   Wound Surface Area (cm^2) 3.52 cm^2 03/02/22 0854   Change in Wound Size % (l*w) 17.18 03/02/22 0854   Wound Volume (cm^3) 0.704 cm^3 03/02/22 0854   Wound Healing % 17 03/02/22 0854   Post-Procedure Length (cm) 2.2 cm 03/02/22 0929   Post-Procedure Width (cm) 1.6 cm 03/02/22 0929   Post-Procedure Depth (cm) 0.2 cm 03/02/22 0929   Post-Procedure Surface Area (cm^2) 3.52 cm^2 03/02/22 0929   Post-Procedure Volume (cm^3) 0.704 cm^3 03/02/22 0929   Wound Assessment Pink/red;Fibrin 03/02/22 0854   Drainage Amount Moderate 03/02/22 0854   Drainage Description Yellow 03/02/22 0854   Odor None 03/02/22 0854   Rashmi-wound Assessment Dry/flaky 03/02/22 0854   Wound Thickness Description not for Pressure Injury Full thickness 01/19/22 0909   Number of days: 56       Wound 01/05/22 Ankle Left;Lateral #4 (Active)   Wound Image   03/02/22 0854   Wound Etiology Venous 01/05/22 0943   Dressing Status New dressing applied 02/23/22 0904   Wound Cleansed Cleansed with saline 02/23/22 0904   Dressing/Treatment Collagen with Ag;Dry dressing 02/23/22 0904   Offloading for Diabetic Foot Ulcers Offloading boot 02/23/22 0904   Wound Length (cm) 0 cm 03/02/22 0854   Wound Width (cm) 0 cm 03/02/22 0854   Wound Depth (cm) 0 cm 03/02/22 0854   Wound Surface Area (cm^2) 0 cm^2 03/02/22 0854   Change in Wound Size % (l*w) 100 03/02/22 0854   Wound Volume (cm^3) 0 cm^3 03/02/22 0854   Wound Healing % 100 03/02/22 0854   Post-Procedure Length (cm) 0.1 cm 02/16/22 0908   Post-Procedure Width (cm) 0.1 cm 02/16/22 0908   Post-Procedure Depth (cm) 0.2 cm 02/16/22 0908 03/02/22 0854   Wound Surface Area (cm^2) 0 cm^2 03/02/22 0854   Change in Wound Size % (l*w) 100 03/02/22 0854   Wound Volume (cm^3) 0 cm^3 03/02/22 0854   Wound Healing % 100 03/02/22 0854   Post-Procedure Length (cm) 0.2 cm 02/23/22 0844   Post-Procedure Width (cm) 0.2 cm 02/23/22 0844   Post-Procedure Depth (cm) 0.1 cm 02/23/22 0844   Post-Procedure Surface Area (cm^2) 0.04 cm^2 02/23/22 0844   Post-Procedure Volume (cm^3) 0.004 cm^3 02/23/22 0844   Wound Assessment Pink/red 03/02/22 0854   Drainage Amount Scant 03/02/22 0854   Drainage Description Serosanguinous 03/02/22 0854   Odor None 03/02/22 0854   Rashmi-wound Assessment Intact 03/02/22 0854   Number of days: 21       Wound 03/02/22 Pretibial Left #9 (Active)   Wound Image   03/02/22 0854   Wound Length (cm) 1.2 cm 03/02/22 0854   Wound Width (cm) 1.6 cm 03/02/22 0854   Wound Depth (cm) 0.1 cm 03/02/22 0854   Wound Surface Area (cm^2) 1.92 cm^2 03/02/22 0854   Wound Volume (cm^3) 0.192 cm^3 03/02/22 0854   Post-Procedure Length (cm) 1.2 cm 03/02/22 0929   Post-Procedure Width (cm) 1.6 cm 03/02/22 0929   Post-Procedure Depth (cm) 0.1 cm 03/02/22 0929   Post-Procedure Surface Area (cm^2) 1.92 cm^2 03/02/22 0929   Post-Procedure Volume (cm^3) 0.192 cm^3 03/02/22 0929   Wound Assessment Pink/red 03/02/22 0854   Drainage Amount Small 03/02/22 0854   Drainage Description Yellow 03/02/22 0854   Odor None 03/02/22 0854   Rashmi-wound Assessment Intact 03/02/22 0854   Number of days: 0          Procedure Note  Indications:  Based on my examination of this patient's wound(s)/ulcer(s) today, debridement is required to promote healing and evaluate the wound base.     Performed by: Robinson Cross DPM    Consent obtained:  Yes    Time out taken:  Yes    Pain Control: Anesthetic  Anesthetic: 4% Lidocaine Liquid Topical     Debridement:Excisional Debridement    Using #15 blade scalpel the wound(s)/ulcer(s) was/were sharply debrided down through and including the removal of subcutaneous tissue. Devitalized Tissue Debrided:  fibrin, biofilm, slough and necrotic/eschar to stimulate bleeding to promote healing, post debridement good bleeding base and wound edges noted    Wound/Ulcer #: 2, 4    Percent of Wound/Ulcer Debrided: 100%    Total Surface Area Debrided:  15sq cm     Estimated Blood Loss:  Minimal  Hemostasis Achieved:  by pressure    Procedural Pain:  4  / 10   Post Procedural Pain:  5 / 10     Response to treatment:  Well tolerated by patient. A culture was done. Skin Substitute Applied:         [] APLIGRAF   []44 sq/cm   []88 sq/cm    []132 sq/cm  []176  sq/cm           [x] DERMAGRAFT  [x] 38sq/cm   []76 sq/cm    []114 sq/cm  []152 sq/cm         [] OASIS   [] 10.5 sq/cm   []21 sq/cm    []4 sq/cm PuraPly  []8 sq/cm PuraPly        [] OTHER puraply 16    []        sq/cm       Performed by: Jennifer Morelos DPM    Wound Type:diabetic    Consent obtained: Yes    Time out taken: Yes     Fenestrated: Yes    Instrument(s) #15 blade scalpel      [] Mesher Utilized    Skin Substitute was Applied to Wound Number(s):     Wound #: 1, 4, and 6      Wound 01/05/22 Heel Left #1 (Active)   Wound Image   01/05/22 0902   Wound Etiology Diabetic Campbell 2 01/05/22 0943   Dressing Status New dressing applied 02/02/22 0918   Wound Cleansed Cleansed with saline 02/02/22 0918   Dressing/Treatment Dry dressing;Foam;Non adherent 02/02/22 0918   Offloading for Diabetic Foot Ulcers Offloading boot 02/02/22 0918   Wound Length (cm) 4.5 cm 02/02/22 0819   Wound Width (cm) 2.5 cm 02/02/22 0819   Wound Depth (cm) 0.1 cm 02/02/22 0819   Wound Surface Area (cm^2) 11.25 cm^2 02/02/22 0819   Change in Wound Size % (l*w) 18.48 02/02/22 0819   Wound Volume (cm^3) 1.125 cm^3 02/02/22 0819   Wound Healing % 59 02/02/22 0819   Post-Procedure Length (cm) 4.5 cm 02/02/22 0840   Post-Procedure Width (cm) 2.5 cm 02/02/22 0840   Post-Procedure Depth (cm) 0.1 cm 02/02/22 0840   Post-Procedure Surface Area (cm^2) 11.25 cm^2 02/02/22 0840   Post-Procedure Volume (cm^3) 1.125 cm^3 02/02/22 0840   Wound Assessment Fibrin;Pink/red 02/02/22 0819   Drainage Amount Moderate 02/02/22 0819   Drainage Description Serosanguinous 02/02/22 0819   Odor None 02/02/22 0819   Rashmi-wound Assessment Maceration 02/02/22 0819   Wound Thickness Description not for Pressure Injury Full thickness 01/19/22 0909   Number of days: 28       Wound 01/05/22 Heel Right #2 (Active)   Wound Image   01/05/22 0902   Wound Etiology Venous 01/05/22 0943   Dressing Status New dressing applied 02/02/22 0918   Wound Cleansed Cleansed with saline 02/02/22 0918   Dressing/Treatment Dry dressing;Foam;Non adherent 02/02/22 0918   Offloading for Diabetic Foot Ulcers Offloading boot 02/02/22 0918   Wound Length (cm) 2.5 cm 02/02/22 0819   Wound Width (cm) 1.7 cm 02/02/22 0819   Wound Depth (cm) 0.2 cm 02/02/22 0819   Wound Surface Area (cm^2) 4.25 cm^2 02/02/22 0819   Change in Wound Size % (l*w) 0 02/02/22 0819   Wound Volume (cm^3) 0.85 cm^3 02/02/22 0819   Wound Healing % 0 02/02/22 0819   Post-Procedure Length (cm) 2.5 cm 02/02/22 0840   Post-Procedure Width (cm) 1.7 cm 02/02/22 0840   Post-Procedure Depth (cm) 0.2 cm 02/02/22 0840   Post-Procedure Surface Area (cm^2) 4.25 cm^2 02/02/22 0840   Post-Procedure Volume (cm^3) 0.85 cm^3 02/02/22 0840   Wound Assessment Fibrin;Pink/red 02/02/22 0819   Drainage Amount Moderate 02/02/22 0819   Drainage Description Serosanguinous; Yellow 02/02/22 0819   Odor None 02/02/22 0819   Rashmi-wound Assessment Intact 02/02/22 0819   Wound Thickness Description not for Pressure Injury Full thickness 01/19/22 0909   Number of days: 28       Wound 01/05/22 Ankle Right;Lateral #3 (Active)   Wound Image   01/05/22 0902   Wound Etiology Venous 01/05/22 0943   Dressing Status New dressing applied 01/26/22 0901   Wound Cleansed Cleansed with saline 01/26/22 0901   Dressing/Treatment Alginate;ABD 01/26/22 0901   Wound Length (cm) 0 cm 02/02/22 0819   Wound Width (cm) 0 cm 02/02/22 0819   Wound Depth (cm) 0 cm 02/02/22 0819   Wound Surface Area (cm^2) 0 cm^2 02/02/22 0819   Change in Wound Size % (l*w) 100 02/02/22 0819   Wound Volume (cm^3) 0 cm^3 02/02/22 0819   Wound Healing % 100 02/02/22 0819   Post-Procedure Length (cm) 0.2 cm 01/26/22 0901   Post-Procedure Width (cm) 0.2 cm 01/26/22 0901   Post-Procedure Depth (cm) 0.1 cm 01/26/22 0901   Post-Procedure Surface Area (cm^2) 0.04 cm^2 01/26/22 0901   Post-Procedure Volume (cm^3) 0.004 cm^3 01/26/22 0901   Wound Assessment Dry;Fibrin 01/26/22 0855   Drainage Amount None 01/26/22 0855   Drainage Description Serous 01/19/22 0839   Odor None 01/26/22 0855   Rashmi-wound Assessment Intact; Blanchable erythema 01/26/22 0855   Number of days: 28       Wound 01/05/22 Ankle Left;Lateral #4 (Active)   Wound Image   01/05/22 0902   Wound Etiology Venous 01/05/22 0943   Dressing Status New dressing applied 02/02/22 0918   Wound Cleansed Cleansed with saline 02/02/22 0918   Dressing/Treatment Collagen with Ag;Dry dressing 02/02/22 0918   Offloading for Diabetic Foot Ulcers Offloading boot 02/02/22 0918   Wound Length (cm) 0.7 cm 02/02/22 0819   Wound Width (cm) 0.5 cm 02/02/22 0819   Wound Depth (cm) 0.2 cm 02/02/22 0819   Wound Surface Area (cm^2) 0.35 cm^2 02/02/22 0819   Change in Wound Size % (l*w) 16.67 02/02/22 0819   Wound Volume (cm^3) 0.07 cm^3 02/02/22 0819   Wound Healing % 17 02/02/22 0819   Post-Procedure Length (cm) 0.7 cm 02/02/22 0840   Post-Procedure Width (cm) 0.5 cm 02/02/22 0840   Post-Procedure Depth (cm) 0.2 cm 02/02/22 0840   Post-Procedure Surface Area (cm^2) 0.35 cm^2 02/02/22 0840   Post-Procedure Volume (cm^3) 0.07 cm^3 02/02/22 0840   Wound Assessment Pink/red 02/02/22 0819   Drainage Amount Scant 02/02/22 0819   Drainage Description Yellow 02/02/22 0819   Odor None 02/02/22 0819   Rashmi-wound Assessment Intact 02/02/22 0819   Number of days: 28       Wound 01/05/22 Pretibial Left #5 (Active)   Wound Image   01/26/22 0901   Wound Etiology Venous 01/05/22 0943   Dressing Status New dressing applied 01/19/22 0921   Wound Cleansed Cleansed with saline 01/19/22 0921   Dressing/Treatment Alginate;Dry dressing 01/19/22 0921   Wound Length (cm) 0 cm 02/02/22 0819   Wound Width (cm) 0 cm 02/02/22 0819   Wound Depth (cm) 0 cm 02/02/22 0819   Wound Surface Area (cm^2) 0 cm^2 02/02/22 0819   Change in Wound Size % (l*w) 100 02/02/22 0819   Wound Volume (cm^3) 0 cm^3 02/02/22 0819   Wound Healing % 100 02/02/22 0819   Post-Procedure Length (cm) 0.3 cm 01/19/22 0909   Post-Procedure Width (cm) 0.3 cm 01/19/22 0909   Post-Procedure Depth (cm) 0.1 cm 01/19/22 0909   Post-Procedure Surface Area (cm^2) 0.09 cm^2 01/19/22 0909   Post-Procedure Volume (cm^3) 0.009 cm^3 01/19/22 0909   Wound Assessment Pink/red 01/19/22 0839   Drainage Amount Scant 01/19/22 0839   Drainage Description Serous 01/19/22 0839   Odor None 01/19/22 0839   Rashmi-wound Assessment Fragile 01/19/22 0839   Number of days: 28       Wound 01/26/22 Tibial Left;Posterior #6 (Active)   Wound Image   01/26/22 0855   Dressing Status New dressing applied 02/02/22 0918   Wound Cleansed Cleansed with saline 02/02/22 0918   Dressing/Treatment Non adherent;Dry dressing 02/02/22 0918   Offloading for Diabetic Foot Ulcers Offloading boot 02/02/22 0918   Wound Length (cm) 2.1 cm 02/02/22 0819   Wound Width (cm) 1.4 cm 02/02/22 0819   Wound Depth (cm) 0.1 cm 02/02/22 0819   Wound Surface Area (cm^2) 2.94 cm^2 02/02/22 0819   Change in Wound Size % (l*w) -88.46 02/02/22 0819   Wound Volume (cm^3) 0.294 cm^3 02/02/22 0819   Wound Healing % -88 02/02/22 0819   Post-Procedure Length (cm) 2.1 cm 02/02/22 0840   Post-Procedure Width (cm) 1.4 cm 02/02/22 0840   Post-Procedure Depth (cm) 0.1 cm 02/02/22 0840   Post-Procedure Surface Area (cm^2) 2.94 cm^2 02/02/22 0840   Post-Procedure Volume (cm^3) 0.294 cm^3 02/02/22 0840   Wound Assessment Fibrin 02/02/22 0819   Drainage Amount Small 02/02/22 0819   Drainage Description Serosanguinous 02/02/22 0819   Odor None 02/02/22 0819   Rashmi-wound Assessment Blanchable erythema; Maceration 02/02/22 0819   Number of days: 7       Wound 01/26/22 Pretibial Left;Medial #7 (Active)   Wound Image   01/26/22 0855   Dressing Status New dressing applied 02/02/22 0918   Wound Cleansed Cleansed with saline 02/02/22 0918   Dressing/Treatment Dry dressing;Non adherent 02/02/22 0918   Offloading for Diabetic Foot Ulcers Offloading boot 02/02/22 0918   Wound Length (cm) 1.2 cm 02/02/22 0819   Wound Width (cm) 1 cm 02/02/22 0819   Wound Depth (cm) 0.1 cm 02/02/22 0819   Wound Surface Area (cm^2) 1.2 cm^2 02/02/22 0819   Change in Wound Size % (l*w) -9.09 02/02/22 0819   Wound Volume (cm^3) 0.12 cm^3 02/02/22 0819   Wound Healing % -9 02/02/22 0819   Post-Procedure Length (cm) 1.2 cm 02/02/22 0840   Post-Procedure Width (cm) 1 cm 02/02/22 0840   Post-Procedure Depth (cm) 0.1 cm 02/02/22 0840   Post-Procedure Surface Area (cm^2) 1.2 cm^2 02/02/22 0840   Post-Procedure Volume (cm^3) 0.12 cm^3 02/02/22 0840   Wound Assessment Pink/red 02/02/22 0819   Drainage Amount None 02/02/22 0819   Odor None 02/02/22 0819   Rashmi-wound Assessment Intact 02/02/22 0819   Number of days: 7         Total Surface Area Covered 15 sq/cm     Amount Wasted 0 sq/cm    Reason for Waste 0      Secured: Yes    Secured With:  [x]Steri Strips    []Sutures     []Staples []Other    Procedural Pain: 2/10     Post Procedural Pain: 3 / 10    Response to Treatment:  Well tolerated by patient.   Plan:     Pt is not a smoker   - Discussed relationship of smoking and negative affects on wound healing   - Emphasized importance of tobacco avoidace/cessation   - Script for nicotine patch given to patient   - Information regarding support groups for smoking cessation given    In my professional opinion and based off the information that is available at this time this patient has appropriate indication for HBO Therapy: Maybe    Treatment Note please see attached Discharge Instructions    Written patient dismissal instructions given to patient and signed by patient or POA. Discharge Instructions         Visit Discharge/Physician Orders  Discharge condition: Stable    Assessment of pain at discharge:  LEVEL 4    Anesthetic used: 4% lidocaine solution    Discharge to: Home    Left via:Private automobile    Accompanied by: accompanied by SELF    [facility]:  [facility]. PLEASE MEDICATE FOR PAIN ON ARRIVAL    Dressing Orders:  Clean bilateral heel ulcers AND LEFT leg, ankle  ULCERS with normal saline. DR. Artemio Oseguera 4th DERMAGRAFT BILATERAL HEELS. (5 OF 10 TOTAL GRAFTS) APPLY ADAPTIC, FOAM AND DRY  DRESSING TO SECURE. LEAVE IN PLACE FOR ONE WEEK. MAKE SURE TO WRAP HEEL AND LEG ULCERS SEPARATELY. TO LEFT LEG ULCER, CLEAN WITH SALINE AND PACK WITH IODOFORM AND DRY DRESSING. CHANGE EVERY DAY     LEFT  ANKLE, HEALED   bilateral heel offloading boot. Physical therapy for ambulating safely while wearing offloading boots as indicated.     Treatment Orders: wear prevalon boots    Wound measurements:                                                 Wound 01/05/22 Heel Left #1 (Active)   Wound Image   01/05/22 0902   Wound Etiology Diabetic Campbell 2 01/05/22 0943   Dressing Status New dressing applied 02/23/22 0904   Wound Cleansed Cleansed with saline 02/23/22 0904   Dressing/Treatment Other (comment) 02/23/22 0904   Offloading for Diabetic Foot Ulcers Diabetic shoes/inserts 02/23/22 0904   Wound Length (cm) 3.4 cm 03/02/22 0854   Wound Width (cm) 3 cm 03/02/22 0854   Wound Depth (cm) 0.3 cm 03/02/22 0854   Wound Surface Area (cm^2) 10.2 cm^2 03/02/22 0854   Change in Wound Size % (l*w) 26.09 03/02/22 0854   Wound Volume (cm^3) 3.06 cm^3 03/02/22 0854   Wound Healing % -11 03/02/22 0854   Post-Procedure Length (cm) 3.4 cm 03/02/22 0929   Post-Procedure Width (cm) 3 cm 03/02/22 0929   Post-Procedure Depth (cm) 0.3 cm 03/02/22 0929   Post-Procedure Surface Area (cm^2) 10.2 cm^2 03/02/22 0929   Post-Procedure Volume (cm^3) 3.06 cm^3 03/02/22 0929   Wound Assessment Fibrin;Granulation tissue 03/02/22 0854   Drainage Amount Large 03/02/22 0854   Drainage Description Yellow;Brown 03/02/22 0854   Odor None 03/02/22 0854   Rashmi-wound Assessment Maceration 03/02/22 0854   Wound Thickness Description not for Pressure Injury Full thickness 01/19/22 0909   Number of days: 56       Wound 01/05/22 Heel Right #2 (Active)   Wound Image   01/05/22 0902   Wound Etiology Venous 01/05/22 0943   Dressing Status New dressing applied 02/23/22 0904   Wound Cleansed Cleansed with saline 02/23/22 0904   Dressing/Treatment Other (comment) 02/23/22 0904   Offloading for Diabetic Foot Ulcers Offloading boot 02/23/22 0904   Wound Length (cm) 2.2 cm 03/02/22 0854   Wound Width (cm) 1.6 cm 03/02/22 0854   Wound Depth (cm) 0.2 cm 03/02/22 0854   Wound Surface Area (cm^2) 3.52 cm^2 03/02/22 0854   Change in Wound Size % (l*w) 17.18 03/02/22 0854   Wound Volume (cm^3) 0.704 cm^3 03/02/22 0854   Wound Healing % 17 03/02/22 0854   Post-Procedure Length (cm) 2.2 cm 03/02/22 0929   Post-Procedure Width (cm) 1.6 cm 03/02/22 0929   Post-Procedure Depth (cm) 0.2 cm 03/02/22 0929   Post-Procedure Surface Area (cm^2) 3.52 cm^2 03/02/22 0929   Post-Procedure Volume (cm^3) 0.704 cm^3 03/02/22 0929   Wound Assessment Pink/red;Fibrin 03/02/22 0854   Drainage Amount Moderate 03/02/22 0854   Drainage Description Yellow 03/02/22 0854   Odor None 03/02/22 0854   Rashmi-wound Assessment Dry/flaky 03/02/22 0854   Wound Thickness Description not for Pressure Injury Full thickness 01/19/22 0909   Number of days: 56       Wound 01/05/22 Ankle Left;Lateral #4 (Active)   Wound Image   03/02/22 0854   Wound Etiology Venous 01/05/22 0943   Dressing Status New dressing applied 02/23/22 0904   Wound Cleansed Cleansed with saline 02/23/22 0904 Dressing/Treatment Collagen with Ag;Dry dressing 02/23/22 0904   Offloading for Diabetic Foot Ulcers Offloading boot 02/23/22 0904   Wound Length (cm) 0 cm 03/02/22 0854   Wound Width (cm) 0 cm 03/02/22 0854   Wound Depth (cm) 0 cm 03/02/22 0854   Wound Surface Area (cm^2) 0 cm^2 03/02/22 0854   Change in Wound Size % (l*w) 100 03/02/22 0854   Wound Volume (cm^3) 0 cm^3 03/02/22 0854   Wound Healing % 100 03/02/22 0854   Post-Procedure Length (cm) 0.1 cm 02/16/22 0908   Post-Procedure Width (cm) 0.1 cm 02/16/22 0908   Post-Procedure Depth (cm) 0.2 cm 02/16/22 0908   Post-Procedure Surface Area (cm^2) 0.01 cm^2 02/16/22 0908   Post-Procedure Volume (cm^3) 0.002 cm^3 02/16/22 0908   Wound Assessment Epithelialization 03/02/22 0854   Drainage Amount None 02/23/22 0804   Drainage Description Yellow 02/16/22 0852   Odor None 02/23/22 0804   Rashmi-wound Assessment Intact 02/23/22 0804   Number of days: 56       Wound 01/26/22 Tibial Left;Posterior #6 (Active)   Wound Image   01/26/22 0855   Dressing Status New dressing applied 02/23/22 0904   Wound Cleansed Cleansed with saline 02/23/22 0904   Dressing/Treatment Collagen with Ag;Dry dressing 02/23/22 0904   Offloading for Diabetic Foot Ulcers Offloading not required 02/16/22 0908   Wound Length (cm) 2.3 cm 03/02/22 0854   Wound Width (cm) 1.4 cm 03/02/22 0854   Wound Depth (cm) 0.4 cm 03/02/22 0854   Wound Surface Area (cm^2) 3.22 cm^2 03/02/22 0854   Change in Wound Size % (l*w) -106.41 03/02/22 0854   Wound Volume (cm^3) 1.288 cm^3 03/02/22 0854   Wound Healing % -726 03/02/22 0854   Post-Procedure Length (cm) 2.3 cm 03/02/22 0929   Post-Procedure Width (cm) 1.2 cm 03/02/22 0929   Post-Procedure Depth (cm) 0.4 cm 03/02/22 0929   Post-Procedure Surface Area (cm^2) 2.76 cm^2 03/02/22 0929   Post-Procedure Volume (cm^3) 1.104 cm^3 03/02/22 0929   Wound Assessment Fibrin;Eschar moist 03/02/22 0854   Drainage Amount Moderate 03/02/22 0854   Drainage Description Yellow 03/02/22 0854   Odor None 03/02/22 0854   Rashmi-wound Assessment Blanchable erythema; Maceration 03/02/22 0854   Number of days: 35       Wound 02/09/22 Toe (Comment  which one) Right #8 RIGHT 2ND TOE (Active)   Wound Image   02/09/22 0935   Dressing Status New dressing applied 02/23/22 0904   Wound Cleansed Not Cleansed 02/23/22 0904   Dressing/Treatment Collagen with Ag;Dry dressing 02/23/22 0904   Offloading for Diabetic Foot Ulcers Back offloading shoe 02/16/22 0908   Wound Length (cm) 0 cm 03/02/22 0854   Wound Width (cm) 0 cm 03/02/22 0854   Wound Depth (cm) 0 cm 03/02/22 0854   Wound Surface Area (cm^2) 0 cm^2 03/02/22 0854   Change in Wound Size % (l*w) 100 03/02/22 0854   Wound Volume (cm^3) 0 cm^3 03/02/22 0854   Wound Healing % 100 03/02/22 0854   Post-Procedure Length (cm) 0.2 cm 02/23/22 0844   Post-Procedure Width (cm) 0.2 cm 02/23/22 0844   Post-Procedure Depth (cm) 0.1 cm 02/23/22 0844   Post-Procedure Surface Area (cm^2) 0.04 cm^2 02/23/22 0844   Post-Procedure Volume (cm^3) 0.004 cm^3 02/23/22 0844   Wound Assessment Pink/red 03/02/22 0854   Drainage Amount Scant 03/02/22 0854   Drainage Description Serosanguinous 03/02/22 0854   Odor None 03/02/22 0854   Rashmi-wound Assessment Intact 03/02/22 0854   Number of days: 20       Wound 03/02/22 Pretibial Left #9 (Active)   Wound Image   03/02/22 0854   Wound Length (cm) 1.2 cm 03/02/22 0854   Wound Width (cm) 1.6 cm 03/02/22 0854   Wound Depth (cm) 0.1 cm 03/02/22 0854   Wound Surface Area (cm^2) 1.92 cm^2 03/02/22 0854   Wound Volume (cm^3) 0.192 cm^3 03/02/22 0854   Post-Procedure Length (cm) 1.2 cm 03/02/22 0929   Post-Procedure Width (cm) 1.6 cm 03/02/22 0929   Post-Procedure Depth (cm) 0.1 cm 03/02/22 0929   Post-Procedure Surface Area (cm^2) 1.92 cm^2 03/02/22 0929   Post-Procedure Volume (cm^3) 0.192 cm^3 03/02/22 0929   Wound Assessment Pink/red 03/02/22 0854   Drainage Amount Small 03/02/22 0854   Drainage Description Yellow 03/02/22 0854   Odor None 03/02/22 0854   Rashmi-wound Assessment Intact 03/02/22 0854   Number of days: 0      :    [facility] followup visit ______one week with Dr. Gastelum_______________________  (Please note your next appointment above and if you are unable to keep, kindly give a 24 hour notice. Thank you.)    Physician signature:__________________________      If you experience any of the following, please call the [facility] during business hours:    * Increase in Pain  * Temperature over 101  * Increase in drainage from your wound  * Drainage with a foul odor  * Bleeding  * Increase in swelling  * Need for compression bandage changes due to slippage, breakthrough drainage. If you need medical attention outside of the business hours of the [facility] please contact your PCP or go to the nearest emergency room.         Electronically signed by Forrest iHrsch DPM on 3/2/2022 at 10:43 AM

## 2022-03-02 NOTE — PROGRESS NOTES
Dermagraft Treatment Note    NAME:  Shaista Lima. YOB: 1932  MEDICAL RECORD NUMBER:  [id number]  DATE:  3/2/2022    Goal:  Patient will receive safe and proper application of skin substitute. Patient will comply with caring for dressing, offloading and reporting complications. Expiration date of Dermagraft checked immediately prior to use. Package intact prior to use and no damage noted. Transport temperature controlled and acceptable. Dermagraft was prepared for application by removing from box and within 1 minute placing in thaw tub at a temperature of 34 to 37 degrees celsius until ice crystals were no longer present but no longer than 3 minutes. Dermagraft was rinsed 3 times in room temperature normal saline for 5 seconds each time. A 4th saline rinse was left on the Dermagraft until the physician was ready to apply it within 30 minutes of thawing. Dermagraft was applied to bilateral heel ulcers and affixed with steri-strips by the provider. Dermagraft was covered with non-adherent dressing. steristrip applied to secure grafts was applied on top of non-adherent dressing. A foam plug cut to fit into ulcer was applied. Fluffed gauze was applied. Dressing was secured with cover roll type tape to stabilize graft. Coban or ace wrap was applied to secure graft and decrease edema. Patient/caregiver was instructed not to remove dressing and to keep it clean and dry. Pt/family/caregiver was instructed on signs and symptoms of complications to report such as draining through, falling down/slipping, getting wet, or severe pain or tingling in toes   Pt/family/caregiver was instructed on need for offloading and elevation of affected extremity and on use of prescribed offloading device.  Dermagraft may be applied a total of 8 times per wound over a 12 week period. Additionally Dermagraft may only be used every 12 months per wound.       Date of first application of Dermagraft for this current wound is February 9, 2022.                 Guidelines followed  Dermagraft 24 steps followed    Electronically signed by Bry García RN on 3/2/2022 at 9:44 AM

## 2022-03-09 ENCOUNTER — HOSPITAL ENCOUNTER (OUTPATIENT)
Dept: WOUND CARE | Age: 87
Discharge: HOME | End: 2022-03-09
Payer: MEDICARE

## 2022-03-09 VITALS
TEMPERATURE: 97.4 F | DIASTOLIC BLOOD PRESSURE: 60 MMHG | SYSTOLIC BLOOD PRESSURE: 138 MMHG | HEART RATE: 84 BPM | RESPIRATION RATE: 16 BRPM

## 2022-03-09 DIAGNOSIS — L97.421 DIABETIC ULCER OF LEFT HEEL ASSOCIATED WITH DIABETES MELLITUS DUE TO UNDERLYING CONDITION, LIMITED TO BREAKDOWN OF SKIN (HCC): ICD-10-CM

## 2022-03-09 DIAGNOSIS — E11.621 DIABETIC ULCER OF RIGHT HEEL ASSOCIATED WITH TYPE 2 DIABETES MELLITUS, WITH FAT LAYER EXPOSED (HCC): Primary | ICD-10-CM

## 2022-03-09 DIAGNOSIS — L97.412 DIABETIC ULCER OF RIGHT HEEL ASSOCIATED WITH TYPE 2 DIABETES MELLITUS, WITH FAT LAYER EXPOSED (HCC): Primary | ICD-10-CM

## 2022-03-09 DIAGNOSIS — E08.621 DIABETIC ULCER OF LEFT HEEL ASSOCIATED WITH DIABETES MELLITUS DUE TO UNDERLYING CONDITION, LIMITED TO BREAKDOWN OF SKIN (HCC): ICD-10-CM

## 2022-03-09 PROCEDURE — 87070 CULTURE OTHR SPECIMN AEROBIC: CPT

## 2022-03-09 PROCEDURE — 87077 CULTURE AEROBIC IDENTIFY: CPT

## 2022-03-09 PROCEDURE — 15271 SKIN SUB GRAFT TRNK/ARM/LEG: CPT

## 2022-03-09 PROCEDURE — 87205 SMEAR GRAM STAIN: CPT

## 2022-03-09 PROCEDURE — 87075 CULTR BACTERIA EXCEPT BLOOD: CPT

## 2022-03-09 PROCEDURE — 87186 SC STD MICRODIL/AGAR DIL: CPT

## 2022-03-09 RX ORDER — LIDOCAINE HYDROCHLORIDE 20 MG/ML
JELLY TOPICAL ONCE
Status: CANCELLED | OUTPATIENT
Start: 2022-03-09 | End: 2022-03-09

## 2022-03-09 RX ORDER — LIDOCAINE HYDROCHLORIDE 40 MG/ML
SOLUTION TOPICAL ONCE
Status: DISCONTINUED | OUTPATIENT
Start: 2022-03-09 | End: 2022-03-10 | Stop reason: HOSPADM

## 2022-03-09 RX ORDER — CLOBETASOL PROPIONATE 0.5 MG/G
OINTMENT TOPICAL ONCE
Status: CANCELLED | OUTPATIENT
Start: 2022-03-09 | End: 2022-03-09

## 2022-03-09 RX ORDER — BACITRACIN ZINC AND POLYMYXIN B SULFATE 500; 1000 [USP'U]/G; [USP'U]/G
OINTMENT TOPICAL ONCE
Status: CANCELLED | OUTPATIENT
Start: 2022-03-09 | End: 2022-03-09

## 2022-03-09 RX ORDER — GINSENG 100 MG
CAPSULE ORAL ONCE
Status: CANCELLED | OUTPATIENT
Start: 2022-03-09 | End: 2022-03-09

## 2022-03-09 RX ORDER — LIDOCAINE HYDROCHLORIDE 40 MG/ML
SOLUTION TOPICAL ONCE
Status: CANCELLED | OUTPATIENT
Start: 2022-03-09 | End: 2022-03-09

## 2022-03-09 RX ORDER — BACITRACIN, NEOMYCIN, POLYMYXIN B 400; 3.5; 5 [USP'U]/G; MG/G; [USP'U]/G
OINTMENT TOPICAL ONCE
Status: CANCELLED | OUTPATIENT
Start: 2022-03-09 | End: 2022-03-09

## 2022-03-09 RX ORDER — LIDOCAINE 40 MG/G
CREAM TOPICAL ONCE
Status: CANCELLED | OUTPATIENT
Start: 2022-03-09 | End: 2022-03-09

## 2022-03-09 RX ORDER — LIDOCAINE 50 MG/G
OINTMENT TOPICAL ONCE
Status: CANCELLED | OUTPATIENT
Start: 2022-03-09 | End: 2022-03-09

## 2022-03-09 RX ORDER — BETAMETHASONE DIPROPIONATE 0.05 %
OINTMENT (GRAM) TOPICAL ONCE
Status: CANCELLED | OUTPATIENT
Start: 2022-03-09 | End: 2022-03-09

## 2022-03-09 RX ORDER — GENTAMICIN SULFATE 1 MG/G
OINTMENT TOPICAL ONCE
Status: CANCELLED | OUTPATIENT
Start: 2022-03-09 | End: 2022-03-09

## 2022-03-09 RX ORDER — FERROUS SULFATE 325(65) MG
325 TABLET ORAL DAILY
COMMUNITY

## 2022-03-09 ASSESSMENT — PAIN SCALES - GENERAL: PAINLEVEL_OUTOF10: 0

## 2022-03-09 NOTE — PROGRESS NOTES
[facility]  History and Physical/Consultation  Podiatry    Referring Physician : Alfonzo Caceres MD  [address] MEDICAL RECORD NUMBER:  [id number]  AGE: 80 y.o. GENDER: male  : 1932  EPISODE DATE:  3/9/2022  Subjective:     Chief Complaint   Patient presents with    Wound Check     bilateral legs         HISTORY of PRESENT ILLNESS HPI     Diana Patel is a 80 y.o. male who presents today for wound/ulcer evaluation. History of Wound Context:  The patient has had a wound of diabetic b/l heels,and right ankle which was first noted approximately months ago. This has been treated betadyne. On their initial visit to the wound healing center, 22 ,  the patient has noted that the wound has been improving. The patient has had similar previous wounds in the past.      Pt is not on abx at time of initial visit.       Wound/Ulcer Pain Timing/Severity: constant  Quality of pain: sharp  Severity:  3 / 10   Modifying Factors: Pain worsens with walking  Associated Signs/Symptoms: edema, erythema and drainage    Ulcer Identification:  Ulcer Type: diabetic  Contributing Factors: edema and diabetes    Diabetic/Pressure/Non Pressure Ulcers onl y:  Ulcer: Diabetic ulcer, fat layer exposed    If patient has diabetic lower extremity wounds  Campbell Classification of diabetic lower extremity wounds:    Grade Description   []  0 No open wound   []  1 Superficial ulcer involving the full skin thickness   [x]  2 Deep ulcer involves ligament, tendon, joint capsule, or fascia  No bone involvement or abscess presence   []  3 Deep Ulcer with abcess formation and/or osteomyelitis   []  4 Localized gangrene   []  5 Extensive gangrene of the foot     Wound: N/A          22  Debrided, cont tx and care     22  Debrided, cont tx and care, await graft approval    22  Debrided, cont tx and care, puraply applied    22  Debrided, dermagraft applied    22  Debrided, dermagraft Fever      calcium carbonate (TUMS) 500 MG chewable tablet Take 1 tablet by mouth every 4 hours as needed for Heartburn      apixaban (ELIQUIS) 5 MG TABS tablet Take 5 mg by mouth 2 times daily (before meals)       gabapentin (NEURONTIN) 100 MG capsule Take 200 mg by mouth Daily with supper.  atorvastatin (LIPITOR) 80 MG tablet Take 80 mg by mouth Daily with supper       INSULIN LISP PROT & LISP, HUM, (75-25) 100 UNIT/ML SUSP Inject 72 Units into the skin daily (with breakfast)       INSULIN LISP PROT & LISP, HUM, (75-25) 100 UNIT/ML SUSP Inject 55 Units into the skin Daily with supper       clotrimazole-betamethasone (LOTRISONE) cream Apply  topically as needed. Apply topically 2 times daily.  zinc sulfate (ZINCATE) 220 (50 Zn) MG capsule Take 1 capsule by mouth daily for 14 days 14 capsule 0    ipratropium-albuterol (DUONEB) 0.5-2.5 (3) MG/3ML SOLN nebulizer solution Inhale 3 mLs into the lungs every 4 hours (while awake) for 30 doses 90 mL 0     No current facility-administered medications on file prior to encounter.        REVIEW OF SYSTEMS   ROS : All others Negative if blank [], Positive if [x]  General Vascular   [] Fevers [] Claudication   [] Chills [] Rest Pain   Skin Neurologic   [x] Tissue Loss [x] Lower extremity neuropathy     Objective:    /60   Pulse 84   Temp 97.4 °F (36.3 °C) (Temporal)   Resp 16   Wt Readings from Last 3 Encounters:   03/02/22 243 lb (110.2 kg)   02/16/22 243 lb (110.2 kg)   02/02/22 243 lb (110.2 kg)       PHYSICAL EXAM   CONSTITUTIONAL:   Awake, alert, cooperative   PSYCHIATRIC :  Oriented to time, place and person      normal insight to disease process  EXTREMITIES:   R LE Open wounds are noted   Skin color is abnormal with ulcers   Edema is  noted   Sensation deficit noted - protective   Palpation of the foot does cause pain   4/5 strength DF/PF  L LE Open wounds are noted   Skin color is abnormal with ulcers   Edema is  noted   Sensation deficit noted - protective   Palpation of the foot does cause pain   4/5 strength DF/PF  R dorsalis pedis 1 L dorsalis pedis 1   R posterior tibial 1 L posterior tibial 1     Assessment:     Problem List Items Addressed This Visit     Diabetic ulcer of right heel associated with type 2 diabetes mellitus, with fat layer exposed ([facility] [address] - Primary    Relevant Medications    lidocaine (XYLOCAINE) 4 % external solution    Other Relevant Orders    Initiate Outpatient Wound Care Protocol    Diabetic ulcer of left heel (HCC)    Relevant Medications    lidocaine (XYLOCAINE) 4 % external solution    Other Relevant Orders    Initiate Outpatient Wound Care Protocol          Pre Debridement Measurements:  Are located in the [facility]  Documentation Flow Sheet  Post Debridement Measurements:  Wound/Ulcer Descriptions are Pre Debridement except measurements:     Wound 01/05/22 Heel Left #1 (Active)   Wound Image   01/05/22 0902   Wound Etiology Diabetic Campbell 2 01/05/22 0943   Dressing Status New dressing applied 02/23/22 0904   Wound Cleansed Cleansed with saline 02/23/22 0904   Dressing/Treatment Other (comment) 02/23/22 0904   Offloading for Diabetic Foot Ulcers Diabetic shoes/inserts 02/23/22 0904   Wound Length (cm) 3.1 cm 03/09/22 0903   Wound Width (cm) 3 cm 03/09/22 0903   Wound Depth (cm) 0.2 cm 03/09/22 0903   Wound Surface Area (cm^2) 9.3 cm^2 03/09/22 0903   Change in Wound Size % (l*w) 32.61 03/09/22 0903   Wound Volume (cm^3) 1.86 cm^3 03/09/22 0903   Wound Healing % 33 03/09/22 0903   Post-Procedure Length (cm) 3.2 cm 03/09/22 0921   Post-Procedure Width (cm) 3.1 cm 03/09/22 0921   Post-Procedure Depth (cm) 0.3 cm 03/09/22 0921   Post-Procedure Surface Area (cm^2) 9.92 cm^2 03/09/22 0921   Post-Procedure Volume (cm^3) 2.976 cm^3 03/09/22 0921   Wound Assessment Fibrin;Granulation tissue 03/09/22 0903   Drainage Amount Large 03/09/22 0903   Drainage Description Yellow;Brown 03/09/22 0903   Odor None 03/09/22 0903   Rashmi-wound Assessment Maceration 03/09/22 0903   Wound Thickness Description not for Pressure Injury Full thickness 01/19/22 0909   Number of days: 63       Wound 01/05/22 Heel Right #2 (Active)   Wound Image   01/05/22 0902   Wound Etiology Venous 01/05/22 0943   Dressing Status New dressing applied 02/23/22 0904   Wound Cleansed Cleansed with saline 02/23/22 0904   Dressing/Treatment Other (comment) 02/23/22 0904   Offloading for Diabetic Foot Ulcers Offloading boot 02/23/22 0904   Wound Length (cm) 2.7 cm 03/09/22 0903   Wound Width (cm) 1.8 cm 03/09/22 0903   Wound Depth (cm) 0.2 cm 03/09/22 0903   Wound Surface Area (cm^2) 4.86 cm^2 03/09/22 0903   Change in Wound Size % (l*w) -14.35 03/09/22 0903   Wound Volume (cm^3) 0.972 cm^3 03/09/22 0903   Wound Healing % -14 03/09/22 0903   Post-Procedure Length (cm) 2.8 cm 03/09/22 0921   Post-Procedure Width (cm) 1.9 cm 03/09/22 0921   Post-Procedure Depth (cm) 0.3 cm 03/09/22 0921   Post-Procedure Surface Area (cm^2) 5.32 cm^2 03/09/22 0921   Post-Procedure Volume (cm^3) 1.596 cm^3 03/09/22 0921   Wound Assessment Pink/red;Fibrin 03/09/22 0903   Drainage Amount Moderate 03/09/22 0903   Drainage Description Yellow;Brown 03/09/22 0903   Odor None 03/09/22 0903   Rashmi-wound Assessment Maceration 03/09/22 0903   Wound Thickness Description not for Pressure Injury Full thickness 01/19/22 0909   Number of days: 63       Wound 01/26/22 Tibial Left;Posterior #6 (Active)   Wound Image   01/26/22 0855   Dressing Status New dressing applied 02/23/22 0904   Wound Cleansed Cleansed with saline 02/23/22 0904   Dressing/Treatment Collagen with Ag;Dry dressing 02/23/22 0904   Offloading for Diabetic Foot Ulcers Offloading not required 02/16/22 0908   Wound Length (cm) 1.7 cm 03/09/22 0903   Wound Width (cm) 1.6 cm 03/09/22 0903   Wound Depth (cm) 0.8 cm 03/09/22 0903   Wound Surface Area (cm^2) 2.72 cm^2 03/09/22 0903   Change in Wound Size % (l*w) -74.36 03/09/22 0903   Wound Volume (cm^3) 2.176 cm^3 03/09/22 0903   Wound Healing % -1295 03/09/22 0903   Post-Procedure Length (cm) 1.8 cm 03/09/22 0921   Post-Procedure Width (cm) 1.6 cm 03/09/22 0921   Post-Procedure Depth (cm) 0.9 cm 03/09/22 0921   Post-Procedure Surface Area (cm^2) 2.88 cm^2 03/09/22 0921   Post-Procedure Volume (cm^3) 2.592 cm^3 03/09/22 0921   Wound Assessment Fibrin;Eschar moist 03/09/22 0903   Drainage Amount Moderate 03/09/22 0903   Drainage Description Yellow;Serosanguinous 03/09/22 0903   Odor None 03/09/22 0903   Rashmi-wound Assessment Non-blanchable erythema 03/09/22 0903   Number of days: 42       Wound 03/02/22 Pretibial Left #9 (Active)   Wound Image   03/02/22 0854   Wound Length (cm) 1.7 cm 03/09/22 0903   Wound Width (cm) 3.1 cm 03/09/22 0903   Wound Depth (cm) 0.1 cm 03/09/22 0903   Wound Surface Area (cm^2) 5.27 cm^2 03/09/22 0903   Change in Wound Size % (l*w) -174.48 03/09/22 0903   Wound Volume (cm^3) 0.527 cm^3 03/09/22 0903   Wound Healing % -174 03/09/22 0903   Post-Procedure Length (cm) 1.8 cm 03/09/22 0921   Post-Procedure Width (cm) 3.2 cm 03/09/22 0921   Post-Procedure Depth (cm) 0.2 cm 03/09/22 0921   Post-Procedure Surface Area (cm^2) 5.76 cm^2 03/09/22 0921   Post-Procedure Volume (cm^3) 1.152 cm^3 03/09/22 0921   Wound Assessment Pink/red 03/09/22 0903   Drainage Amount Moderate 03/09/22 0903   Drainage Description Yellow 03/09/22 0903   Odor None 03/09/22 0903   Rashmi-wound Assessment Intact 03/09/22 0903   Number of days: 7          Procedure Note  Indications:  Based on my examination of this patient's wound(s)/ulcer(s) today, debridement is required to promote healing and evaluate the wound base.     Performed by: Jacob Ramires DPM    Consent obtained:  Yes    Time out taken:  Yes    Pain Control: Anesthetic  Anesthetic: 4% Lidocaine Liquid Topical     Debridement:Excisional Debridement    Using #15 blade scalpel the wound(s)/ulcer(s) was/were sharply debrided down through and including the removal of subcutaneous tissue. Devitalized Tissue Debrided:  fibrin, biofilm, slough and necrotic/eschar to stimulate bleeding to promote healing, post debridement good bleeding base and wound edges noted    Wound/Ulcer #: 2, 4    Percent of Wound/Ulcer Debrided: 100%    Total Surface Area Debrided:  15sq cm     Estimated Blood Loss:  Minimal  Hemostasis Achieved:  by pressure    Procedural Pain:  4  / 10   Post Procedural Pain:  5 / 10     Response to treatment:  Well tolerated by patient. A culture was done. Skin Substitute Applied:         [] APLIGRAF   []44 sq/cm   []88 sq/cm    []132 sq/cm  []176  sq/cm           [x] DERMAGRAFT  [x] 38sq/cm   []76 sq/cm    []114 sq/cm  []152 sq/cm         [] OASIS   [] 10.5 sq/cm   []21 sq/cm    []4 sq/cm PuraPly  []8 sq/cm PuraPly        [] OTHER puraply 16    []        sq/cm       Performed by: Ida Chambers DPM    Wound Type:diabetic    Consent obtained: Yes    Time out taken: Yes     Fenestrated: Yes    Instrument(s) #15 blade scalpel      [] Mesher Utilized    Skin Substitute was Applied to Wound Number(s):     Wound #: 1, 4, and 6      Wound 01/05/22 Heel Left #1 (Active)   Wound Image   01/05/22 0902   Wound Etiology Diabetic Campbell 2 01/05/22 0943   Dressing Status New dressing applied 02/02/22 0918   Wound Cleansed Cleansed with saline 02/02/22 0918   Dressing/Treatment Dry dressing;Foam;Non adherent 02/02/22 0918   Offloading for Diabetic Foot Ulcers Offloading boot 02/02/22 0918   Wound Length (cm) 4.5 cm 02/02/22 0819   Wound Width (cm) 2.5 cm 02/02/22 0819   Wound Depth (cm) 0.1 cm 02/02/22 0819   Wound Surface Area (cm^2) 11.25 cm^2 02/02/22 0819   Change in Wound Size % (l*w) 18.48 02/02/22 0819   Wound Volume (cm^3) 1.125 cm^3 02/02/22 0819   Wound Healing % 59 02/02/22 0819   Post-Procedure Length (cm) 4.5 cm 02/02/22 0840   Post-Procedure Width (cm) 2.5 cm 02/02/22 0840   Post-Procedure Depth (cm) 0.1 cm 02/02/22 0840   Post-Procedure Surface Area (cm^2) 11.25 cm^2 02/02/22 0840   Post-Procedure Volume (cm^3) 1.125 cm^3 02/02/22 0840   Wound Assessment Fibrin;Pink/red 02/02/22 0819   Drainage Amount Moderate 02/02/22 0819   Drainage Description Serosanguinous 02/02/22 0819   Odor None 02/02/22 0819   Rashmi-wound Assessment Maceration 02/02/22 0819   Wound Thickness Description not for Pressure Injury Full thickness 01/19/22 0909   Number of days: 28       Wound 01/05/22 Heel Right #2 (Active)   Wound Image   01/05/22 0902   Wound Etiology Venous 01/05/22 0943   Dressing Status New dressing applied 02/02/22 0918   Wound Cleansed Cleansed with saline 02/02/22 0918   Dressing/Treatment Dry dressing;Foam;Non adherent 02/02/22 0918   Offloading for Diabetic Foot Ulcers Offloading boot 02/02/22 0918   Wound Length (cm) 2.5 cm 02/02/22 0819   Wound Width (cm) 1.7 cm 02/02/22 0819   Wound Depth (cm) 0.2 cm 02/02/22 0819   Wound Surface Area (cm^2) 4.25 cm^2 02/02/22 0819   Change in Wound Size % (l*w) 0 02/02/22 0819   Wound Volume (cm^3) 0.85 cm^3 02/02/22 0819   Wound Healing % 0 02/02/22 0819   Post-Procedure Length (cm) 2.5 cm 02/02/22 0840   Post-Procedure Width (cm) 1.7 cm 02/02/22 0840   Post-Procedure Depth (cm) 0.2 cm 02/02/22 0840   Post-Procedure Surface Area (cm^2) 4.25 cm^2 02/02/22 0840   Post-Procedure Volume (cm^3) 0.85 cm^3 02/02/22 0840   Wound Assessment Fibrin;Pink/red 02/02/22 0819   Drainage Amount Moderate 02/02/22 0819   Drainage Description Serosanguinous; Yellow 02/02/22 0819   Odor None 02/02/22 0819   Rashmi-wound Assessment Intact 02/02/22 0819   Wound Thickness Description not for Pressure Injury Full thickness 01/19/22 0909   Number of days: 28       Wound 01/05/22 Ankle Right;Lateral #3 (Active)   Wound Image   01/05/22 0902   Wound Etiology Venous 01/05/22 0943   Dressing Status New dressing applied 01/26/22 0901   Wound Cleansed Cleansed with saline 01/26/22 0901   Dressing/Treatment Alginate;ABD 01/26/22 0901   Wound Length (cm) 0 cm 02/02/22 0819   Wound Width (cm) 0 cm 02/02/22 0819   Wound Depth (cm) 0 cm 02/02/22 0819   Wound Surface Area (cm^2) 0 cm^2 02/02/22 0819   Change in Wound Size % (l*w) 100 02/02/22 0819   Wound Volume (cm^3) 0 cm^3 02/02/22 0819   Wound Healing % 100 02/02/22 0819   Post-Procedure Length (cm) 0.2 cm 01/26/22 0901   Post-Procedure Width (cm) 0.2 cm 01/26/22 0901   Post-Procedure Depth (cm) 0.1 cm 01/26/22 0901   Post-Procedure Surface Area (cm^2) 0.04 cm^2 01/26/22 0901   Post-Procedure Volume (cm^3) 0.004 cm^3 01/26/22 0901   Wound Assessment Dry;Fibrin 01/26/22 0855   Drainage Amount None 01/26/22 0855   Drainage Description Serous 01/19/22 0839   Odor None 01/26/22 0855   Rashmi-wound Assessment Intact; Blanchable erythema 01/26/22 0855   Number of days: 28       Wound 01/05/22 Ankle Left;Lateral #4 (Active)   Wound Image   01/05/22 0902   Wound Etiology Venous 01/05/22 0943   Dressing Status New dressing applied 02/02/22 0918   Wound Cleansed Cleansed with saline 02/02/22 0918   Dressing/Treatment Collagen with Ag;Dry dressing 02/02/22 0918   Offloading for Diabetic Foot Ulcers Offloading boot 02/02/22 0918   Wound Length (cm) 0.7 cm 02/02/22 0819   Wound Width (cm) 0.5 cm 02/02/22 0819   Wound Depth (cm) 0.2 cm 02/02/22 0819   Wound Surface Area (cm^2) 0.35 cm^2 02/02/22 0819   Change in Wound Size % (l*w) 16.67 02/02/22 0819   Wound Volume (cm^3) 0.07 cm^3 02/02/22 0819   Wound Healing % 17 02/02/22 0819   Post-Procedure Length (cm) 0.7 cm 02/02/22 0840   Post-Procedure Width (cm) 0.5 cm 02/02/22 0840   Post-Procedure Depth (cm) 0.2 cm 02/02/22 0840   Post-Procedure Surface Area (cm^2) 0.35 cm^2 02/02/22 0840   Post-Procedure Volume (cm^3) 0.07 cm^3 02/02/22 0840   Wound Assessment Pink/red 02/02/22 0819   Drainage Amount Scant 02/02/22 0819   Drainage Description Yellow 02/02/22 0819   Odor None 02/02/22 0819   Rashmi-wound Assessment Intact 02/02/22 0819   Number of days: 28       Wound 01/05/22 Pretibial Left #5 (Active)   Wound Image   01/26/22 0901   Wound Etiology Venous 01/05/22 0943   Dressing Status New dressing applied 01/19/22 0921   Wound Cleansed Cleansed with saline 01/19/22 0921   Dressing/Treatment Alginate;Dry dressing 01/19/22 0921   Wound Length (cm) 0 cm 02/02/22 0819   Wound Width (cm) 0 cm 02/02/22 0819   Wound Depth (cm) 0 cm 02/02/22 0819   Wound Surface Area (cm^2) 0 cm^2 02/02/22 0819   Change in Wound Size % (l*w) 100 02/02/22 0819   Wound Volume (cm^3) 0 cm^3 02/02/22 0819   Wound Healing % 100 02/02/22 0819   Post-Procedure Length (cm) 0.3 cm 01/19/22 0909   Post-Procedure Width (cm) 0.3 cm 01/19/22 0909   Post-Procedure Depth (cm) 0.1 cm 01/19/22 0909   Post-Procedure Surface Area (cm^2) 0.09 cm^2 01/19/22 0909   Post-Procedure Volume (cm^3) 0.009 cm^3 01/19/22 0909   Wound Assessment Pink/red 01/19/22 0839   Drainage Amount Scant 01/19/22 0839   Drainage Description Serous 01/19/22 0839   Odor None 01/19/22 0839   Rashmi-wound Assessment Fragile 01/19/22 0839   Number of days: 28       Wound 01/26/22 Tibial Left;Posterior #6 (Active)   Wound Image   01/26/22 0855   Dressing Status New dressing applied 02/02/22 0918   Wound Cleansed Cleansed with saline 02/02/22 0918   Dressing/Treatment Non adherent;Dry dressing 02/02/22 0918   Offloading for Diabetic Foot Ulcers Offloading boot 02/02/22 0918   Wound Length (cm) 2.1 cm 02/02/22 0819   Wound Width (cm) 1.4 cm 02/02/22 0819   Wound Depth (cm) 0.1 cm 02/02/22 0819   Wound Surface Area (cm^2) 2.94 cm^2 02/02/22 0819   Change in Wound Size % (l*w) -88.46 02/02/22 0819   Wound Volume (cm^3) 0.294 cm^3 02/02/22 0819   Wound Healing % -88 02/02/22 0819   Post-Procedure Length (cm) 2.1 cm 02/02/22 0840   Post-Procedure Width (cm) 1.4 cm 02/02/22 0840   Post-Procedure Depth (cm) 0.1 cm 02/02/22 0840   Post-Procedure Surface Area (cm^2) 2.94 cm^2 02/02/22 0840   Post-Procedure Volume (cm^3) 0.294 cm^3 02/02/22 0840   Wound Assessment Fibrin 02/02/22 0819   Drainage Amount Small 02/02/22 0819   Drainage Description Serosanguinous 02/02/22 0819   Odor None 02/02/22 0819   Rashmi-wound Assessment Blanchable erythema; Maceration 02/02/22 0819   Number of days: 7       Wound 01/26/22 Pretibial Left;Medial #7 (Active)   Wound Image   01/26/22 0855   Dressing Status New dressing applied 02/02/22 0918   Wound Cleansed Cleansed with saline 02/02/22 0918   Dressing/Treatment Dry dressing;Non adherent 02/02/22 0918   Offloading for Diabetic Foot Ulcers Offloading boot 02/02/22 0918   Wound Length (cm) 1.2 cm 02/02/22 0819   Wound Width (cm) 1 cm 02/02/22 0819   Wound Depth (cm) 0.1 cm 02/02/22 0819   Wound Surface Area (cm^2) 1.2 cm^2 02/02/22 0819   Change in Wound Size % (l*w) -9.09 02/02/22 0819   Wound Volume (cm^3) 0.12 cm^3 02/02/22 0819   Wound Healing % -9 02/02/22 0819   Post-Procedure Length (cm) 1.2 cm 02/02/22 0840   Post-Procedure Width (cm) 1 cm 02/02/22 0840   Post-Procedure Depth (cm) 0.1 cm 02/02/22 0840   Post-Procedure Surface Area (cm^2) 1.2 cm^2 02/02/22 0840   Post-Procedure Volume (cm^3) 0.12 cm^3 02/02/22 0840   Wound Assessment Pink/red 02/02/22 0819   Drainage Amount None 02/02/22 0819   Odor None 02/02/22 0819   Rashmi-wound Assessment Intact 02/02/22 0819   Number of days: 7         Total Surface Area Covered 15 sq/cm     Amount Wasted 0 sq/cm    Reason for Waste 0      Secured: Yes    Secured With:  [x]Steri Strips    []Sutures     []Staples []Other    Procedural Pain: 2/10     Post Procedural Pain: 3 / 10    Response to Treatment:  Well tolerated by patient.   Plan:     Pt is not a smoker   - Discussed relationship of smoking and negative affects on wound healing   - Emphasized importance of tobacco avoidace/cessation   - Script for nicotine patch given to patient   - Information regarding support groups for smoking cessation given    In my professional opinion and based off the information that is available at this time this patient has appropriate indication for HBO Therapy: Maybe    Treatment Note please see attached Discharge Instructions    Written patient dismissal instructions given to patient and signed by patient or POA. Discharge Instructions       Visit Discharge/Physician Orders    Discharge condition: Stable    Assessment of pain at discharge:  LEVEL 4    Anesthetic used: 4% lidocaine solution    Discharge to: Home    Left via:Private automobile    Accompanied by: accompanied by SELF    ECF/[facility]:  [facility]. PLEASE MEDICATE FOR PAIN ON ARRIVAL    Dressing Orders:  Clean bilateral heel ulcers AND LEFT leg, ankle  ULCERS with normal saline. DR. Lenka Coates 5th DERMAGRAFT BILATERAL HEELS. (6 OF 10 TOTAL GRAFTS) APPLY ADAPTIC, FOAM AND DRY  DRESSING TO SECURE. LEAVE IN PLACE FOR ONE WEEK. MAKE SURE TO WRAP HEEL AND LEG ULCERS SEPARATELY. TO LEFT LEG ULCER, CLEAN WITH SALINE AND PACK WITH IODOFORM AND DRY DRESSING. CHANGE EVERY DAY     LEFT  ANKLE, HEALED   bilateral heel offloading boot. Physical therapy for ambulating safely while wearing offloading boots as indicated. Treatment Orders: wear prevalon boots    Wound measurements:                                               [facility] followup visit _________one week with Dr. Gastelum____________________  (Please note your next appointment above and if you are unable to keep, kindly give a 24 hour notice. Thank you.)    Physician signature:__________________________      If you experience any of the following, please call the [facility] during business hours:    * Increase in Pain  * Temperature over 101  * Increase in drainage from your wound  * Drainage with a foul odor  * Bleeding  * Increase in swelling  * Need for compression bandage changes due to slippage, breakthrough drainage. If you need medical attention outside of the business hours of the [facility] please contact your PCP or go to the nearest emergency room.         Electronically signed by Frankie Sever, DPM on 3/9/2022 at 9:42 AM

## 2022-03-09 NOTE — FLOWSHEET NOTE
Dermagraft Treatment Note    NAME:  Vijaya Moran. YOB: 1932  MEDICAL RECORD NUMBER:  [id number]  DATE:  3/9/2022    Goal:  Patient will receive safe and proper application of skin substitute. Patient will comply with caring for dressing, offloading and reporting complications. Expiration date of Dermagraft checked immediately prior to use. Package intact prior to use and no damage noted. Transport temperature controlled and acceptable. Dermagraft was prepared for application by removing from box and within 1 minute placing in thaw tub at a temperature of 34 to 37 degrees celsius until ice crystals were no longer present but no longer than 3 minutes. Dermagraft was rinsed 3 times in room temperature normal saline for 5 seconds each time. A 4th saline rinse was left on the Dermagraft until the physician was ready to apply it within 30 minutes of thawing. Dermagraft was applied to left and right heels and affixed with steri-strips by the provider. Dermagraft was covered with non-adherent dressing. adaptic was applied on top of non-adherent dressing. A foam plug cut to fit into ulcer was applied. Fluffed gauze was applied. Dressing was secured with cover roll type tape to stabilize graft. Coban or ace wrap was applied to secure graft and decrease edema. Patient/caregiver was instructed not to remove dressing and to keep it clean and dry. Pt/family/caregiver was instructed on signs and symptoms of complications to report such as draining through, falling down/slipping, getting wet, or severe pain or tingling in toes   Pt/family/caregiver was instructed on need for offloading and elevation of affected extremity and on use of prescribed offloading device.  Dermagraft may be applied a total of 8 times per wound over a 12 week period. Additionally Dermagraft may only be used every 12 months per wound.       Date of first application of Dermagraft for this current wound is February 22, 2022.                 Guidelines followed  Dermagraft 24 steps followed    Electronically signed by Karuna Omalley RN on 3/9/2022 at 1:04 PM

## 2022-03-12 LAB — ANAEROBIC CULTURE: NORMAL

## 2022-03-14 ENCOUNTER — HOSPITAL ENCOUNTER (INPATIENT)
Age: 87
LOS: 3 days | Discharge: NURSING, REHAB OR LONG-TERM CARE | DRG: 872 | End: 2022-03-17
Attending: EMERGENCY MEDICINE | Admitting: INTERNAL MEDICINE
Payer: MEDICARE

## 2022-03-14 ENCOUNTER — APPOINTMENT (OUTPATIENT)
Dept: GENERAL RADIOLOGY | Age: 87
DRG: 872 | End: 2022-03-14
Payer: MEDICARE

## 2022-03-14 DIAGNOSIS — S91.009A OPEN WOUND OF KNEE, LEG, AND ANKLE, UNSPECIFIED LATERALITY, INITIAL ENCOUNTER: ICD-10-CM

## 2022-03-14 DIAGNOSIS — S81.809A OPEN WOUND OF KNEE, LEG, AND ANKLE, UNSPECIFIED LATERALITY, INITIAL ENCOUNTER: ICD-10-CM

## 2022-03-14 DIAGNOSIS — I49.8 BIGEMINY: ICD-10-CM

## 2022-03-14 DIAGNOSIS — S81.009A OPEN WOUND OF KNEE, LEG, AND ANKLE, UNSPECIFIED LATERALITY, INITIAL ENCOUNTER: ICD-10-CM

## 2022-03-14 DIAGNOSIS — R09.02 HYPOXIA: ICD-10-CM

## 2022-03-14 DIAGNOSIS — R77.8 TROPONIN LEVEL ELEVATED: ICD-10-CM

## 2022-03-14 DIAGNOSIS — A41.9 SEPSIS, DUE TO UNSPECIFIED ORGANISM, UNSPECIFIED WHETHER ACUTE ORGAN DYSFUNCTION PRESENT (HCC): Primary | ICD-10-CM

## 2022-03-14 LAB
ALBUMIN SERPL-MCNC: 3.1 G/DL (ref 3.5–5.2)
ALP BLD-CCNC: 136 U/L (ref 40–129)
ALT SERPL-CCNC: 9 U/L (ref 0–40)
ANION GAP SERPL CALCULATED.3IONS-SCNC: 9 MMOL/L (ref 7–16)
ANTISTREPTOLYSIN-O: 42 IU/ML (ref 0–200)
AST SERPL-CCNC: 23 U/L (ref 0–39)
BACTERIA: ABNORMAL /HPF
BASOPHILS ABSOLUTE: 0.04 E9/L (ref 0–0.2)
BASOPHILS RELATIVE PERCENT: 0.1 % (ref 0–2)
BILIRUB SERPL-MCNC: 0.6 MG/DL (ref 0–1.2)
BILIRUBIN URINE: NEGATIVE
BLOOD, URINE: NEGATIVE
BUN BLDV-MCNC: 46 MG/DL (ref 6–23)
C-REACTIVE PROTEIN: 8.9 MG/DL (ref 0–0.4)
CALCIUM SERPL-MCNC: 8.7 MG/DL (ref 8.6–10.2)
CHLORIDE BLD-SCNC: 102 MMOL/L (ref 98–107)
CLARITY: CLEAR
CO2: 27 MMOL/L (ref 22–29)
COLOR: YELLOW
CREAT SERPL-MCNC: 1.4 MG/DL (ref 0.7–1.2)
EKG ATRIAL RATE: 99 BPM
EKG Q-T INTERVAL: 332 MS
EKG QRS DURATION: 80 MS
EKG QTC CALCULATION (BAZETT): 426 MS
EKG R AXIS: -8 DEGREES
EKG T AXIS: 115 DEGREES
EKG VENTRICULAR RATE: 99 BPM
EOSINOPHILS ABSOLUTE: 0 E9/L (ref 0.05–0.5)
EOSINOPHILS RELATIVE PERCENT: 0 % (ref 0–6)
GFR AFRICAN AMERICAN: 58
GFR NON-AFRICAN AMERICAN: 48 ML/MIN/1.73
GLUCOSE BLD-MCNC: 180 MG/DL (ref 74–99)
GLUCOSE URINE: NEGATIVE MG/DL
GRAM STAIN RESULT: ABNORMAL
HCT VFR BLD CALC: 34.2 % (ref 37–54)
HEMOGLOBIN: 10.5 G/DL (ref 12.5–16.5)
IMMATURE GRANULOCYTES #: 0.34 E9/L
IMMATURE GRANULOCYTES %: 1.2 % (ref 0–5)
KETONES, URINE: ABNORMAL MG/DL
LACTIC ACID, SEPSIS: 1.9 MMOL/L (ref 0.5–1.9)
LACTIC ACID, SEPSIS: 1.9 MMOL/L (ref 0.5–1.9)
LEUKOCYTE ESTERASE, URINE: NEGATIVE
LYMPHOCYTES ABSOLUTE: 0.59 E9/L (ref 1.5–4)
LYMPHOCYTES RELATIVE PERCENT: 2.2 % (ref 20–42)
MCH RBC QN AUTO: 26.4 PG (ref 26–35)
MCHC RBC AUTO-ENTMCNC: 30.7 % (ref 32–34.5)
MCV RBC AUTO: 86.1 FL (ref 80–99.9)
METER GLUCOSE: 226 MG/DL (ref 74–99)
METER GLUCOSE: 242 MG/DL (ref 74–99)
MONOCYTES ABSOLUTE: 0.65 E9/L (ref 0.1–0.95)
MONOCYTES RELATIVE PERCENT: 2.4 % (ref 2–12)
NEUTROPHILS ABSOLUTE: 25.82 E9/L (ref 1.8–7.3)
NEUTROPHILS RELATIVE PERCENT: 94.1 % (ref 43–80)
NITRITE, URINE: NEGATIVE
ORGANISM: ABNORMAL
OVALOCYTES: ABNORMAL
PDW BLD-RTO: 16.9 FL (ref 11.5–15)
PH UA: 5.5 (ref 5–9)
PLATELET # BLD: 171 E9/L (ref 130–450)
PMV BLD AUTO: 9.4 FL (ref 7–12)
POIKILOCYTES: ABNORMAL
POTASSIUM REFLEX MAGNESIUM: 4.6 MMOL/L (ref 3.5–5)
PRO-BNP: 5752 PG/ML (ref 0–450)
PROTEIN UA: ABNORMAL MG/DL
RBC # BLD: 3.97 E12/L (ref 3.8–5.8)
RBC UA: ABNORMAL /HPF (ref 0–2)
SARS-COV-2, NAAT: NOT DETECTED
SCHISTOCYTES: ABNORMAL
SEDIMENTATION RATE, ERYTHROCYTE: 44 MM/HR (ref 0–15)
SODIUM BLD-SCNC: 138 MMOL/L (ref 132–146)
SPECIFIC GRAVITY UA: 1.01 (ref 1–1.03)
TOTAL PROTEIN: 6 G/DL (ref 6.4–8.3)
TROPONIN, HIGH SENSITIVITY: 460 NG/L (ref 0–11)
TROPONIN, HIGH SENSITIVITY: 521 NG/L (ref 0–11)
UROBILINOGEN, URINE: 0.2 E.U./DL
WBC # BLD: 27.4 E9/L (ref 4.5–11.5)
WBC UA: ABNORMAL /HPF (ref 0–5)
WOUND/ABSCESS: ABNORMAL

## 2022-03-14 PROCEDURE — 93005 ELECTROCARDIOGRAM TRACING: CPT | Performed by: STUDENT IN AN ORGANIZED HEALTH CARE EDUCATION/TRAINING PROGRAM

## 2022-03-14 PROCEDURE — 87040 BLOOD CULTURE FOR BACTERIA: CPT

## 2022-03-14 PROCEDURE — APPSS180 APP SPLIT SHARED TIME > 60 MINUTES: Performed by: NURSE PRACTITIONER

## 2022-03-14 PROCEDURE — 71045 X-RAY EXAM CHEST 1 VIEW: CPT

## 2022-03-14 PROCEDURE — 83605 ASSAY OF LACTIC ACID: CPT

## 2022-03-14 PROCEDURE — 87186 SC STD MICRODIL/AGAR DIL: CPT

## 2022-03-14 PROCEDURE — 96361 HYDRATE IV INFUSION ADD-ON: CPT

## 2022-03-14 PROCEDURE — 2580000003 HC RX 258: Performed by: SPECIALIST

## 2022-03-14 PROCEDURE — 6370000000 HC RX 637 (ALT 250 FOR IP): Performed by: EMERGENCY MEDICINE

## 2022-03-14 PROCEDURE — 85651 RBC SED RATE NONAUTOMATED: CPT

## 2022-03-14 PROCEDURE — 73620 X-RAY EXAM OF FOOT: CPT

## 2022-03-14 PROCEDURE — 87070 CULTURE OTHR SPECIMN AEROBIC: CPT

## 2022-03-14 PROCEDURE — 73590 X-RAY EXAM OF LOWER LEG: CPT

## 2022-03-14 PROCEDURE — 99223 1ST HOSP IP/OBS HIGH 75: CPT | Performed by: INTERNAL MEDICINE

## 2022-03-14 PROCEDURE — 86140 C-REACTIVE PROTEIN: CPT

## 2022-03-14 PROCEDURE — 87635 SARS-COV-2 COVID-19 AMP PRB: CPT

## 2022-03-14 PROCEDURE — 6360000002 HC RX W HCPCS: Performed by: SPECIALIST

## 2022-03-14 PROCEDURE — 86060 ANTISTREPTOLYSIN O TITER: CPT

## 2022-03-14 PROCEDURE — 99285 EMERGENCY DEPT VISIT HI MDM: CPT

## 2022-03-14 PROCEDURE — 2580000003 HC RX 258: Performed by: STUDENT IN AN ORGANIZED HEALTH CARE EDUCATION/TRAINING PROGRAM

## 2022-03-14 PROCEDURE — 6370000000 HC RX 637 (ALT 250 FOR IP): Performed by: INTERNAL MEDICINE

## 2022-03-14 PROCEDURE — 2500000003 HC RX 250 WO HCPCS: Performed by: INTERNAL MEDICINE

## 2022-03-14 PROCEDURE — 81001 URINALYSIS AUTO W/SCOPE: CPT

## 2022-03-14 PROCEDURE — 2060000000 HC ICU INTERMEDIATE R&B

## 2022-03-14 PROCEDURE — 96374 THER/PROPH/DIAG INJ IV PUSH: CPT

## 2022-03-14 PROCEDURE — 87088 URINE BACTERIA CULTURE: CPT

## 2022-03-14 PROCEDURE — 36415 COLL VENOUS BLD VENIPUNCTURE: CPT

## 2022-03-14 PROCEDURE — 82962 GLUCOSE BLOOD TEST: CPT

## 2022-03-14 PROCEDURE — 84484 ASSAY OF TROPONIN QUANT: CPT

## 2022-03-14 PROCEDURE — 6360000002 HC RX W HCPCS: Performed by: INTERNAL MEDICINE

## 2022-03-14 PROCEDURE — 85025 COMPLETE CBC W/AUTO DIFF WBC: CPT

## 2022-03-14 PROCEDURE — 80053 COMPREHEN METABOLIC PANEL: CPT

## 2022-03-14 PROCEDURE — 87147 CULTURE TYPE IMMUNOLOGIC: CPT

## 2022-03-14 PROCEDURE — 83880 ASSAY OF NATRIURETIC PEPTIDE: CPT

## 2022-03-14 PROCEDURE — 93010 ELECTROCARDIOGRAM REPORT: CPT | Performed by: INTERNAL MEDICINE

## 2022-03-14 RX ORDER — PANTOPRAZOLE SODIUM 40 MG/1
40 TABLET, DELAYED RELEASE ORAL
Status: DISCONTINUED | OUTPATIENT
Start: 2022-03-14 | End: 2022-03-17 | Stop reason: HOSPADM

## 2022-03-14 RX ORDER — ZINC SULFATE 50(220)MG
50 CAPSULE ORAL DAILY
Status: DISCONTINUED | OUTPATIENT
Start: 2022-03-14 | End: 2022-03-17 | Stop reason: HOSPADM

## 2022-03-14 RX ORDER — CLOTRIMAZOLE AND BETAMETHASONE DIPROPIONATE 10; .64 MG/G; MG/G
CREAM TOPICAL PRN
Status: DISCONTINUED | OUTPATIENT
Start: 2022-03-14 | End: 2022-03-17 | Stop reason: HOSPADM

## 2022-03-14 RX ORDER — DOCUSATE SODIUM 100 MG/1
200 CAPSULE, LIQUID FILLED ORAL NIGHTLY
COMMUNITY

## 2022-03-14 RX ORDER — ACETAMINOPHEN 500 MG
1000 TABLET ORAL ONCE
Status: COMPLETED | OUTPATIENT
Start: 2022-03-14 | End: 2022-03-14

## 2022-03-14 RX ORDER — BISACODYL 10 MG
10 SUPPOSITORY, RECTAL RECTAL DAILY PRN
COMMUNITY

## 2022-03-14 RX ORDER — CHOLECALCIFEROL (VITAMIN D3) 50 MCG
2000 TABLET ORAL DAILY
Status: DISCONTINUED | OUTPATIENT
Start: 2022-03-14 | End: 2022-03-17 | Stop reason: HOSPADM

## 2022-03-14 RX ORDER — 0.9 % SODIUM CHLORIDE 0.9 %
1000 INTRAVENOUS SOLUTION INTRAVENOUS ONCE
Status: COMPLETED | OUTPATIENT
Start: 2022-03-14 | End: 2022-03-14

## 2022-03-14 RX ORDER — DOCUSATE SODIUM 100 MG/1
100 CAPSULE, LIQUID FILLED ORAL NIGHTLY
Status: DISCONTINUED | OUTPATIENT
Start: 2022-03-14 | End: 2022-03-17 | Stop reason: HOSPADM

## 2022-03-14 RX ORDER — M-VIT,TX,IRON,MINS/CALC/FOLIC 27MG-0.4MG
1 TABLET ORAL DAILY
Status: DISCONTINUED | OUTPATIENT
Start: 2022-03-14 | End: 2022-03-17 | Stop reason: HOSPADM

## 2022-03-14 RX ORDER — SODIUM PHOSPHATE, DIBASIC AND SODIUM PHOSPHATE, MONOBASIC 7; 19 G/133ML; G/133ML
1 ENEMA RECTAL DAILY PRN
COMMUNITY

## 2022-03-14 RX ORDER — INSULIN GLARGINE 100 [IU]/ML
76 INJECTION, SOLUTION SUBCUTANEOUS NIGHTLY
Status: DISCONTINUED | OUTPATIENT
Start: 2022-03-14 | End: 2022-03-17 | Stop reason: HOSPADM

## 2022-03-14 RX ORDER — VITS A,C,E/LUTEIN/MINERALS 300MCG-200
1 TABLET ORAL 2 TIMES DAILY WITH MEALS
Status: DISCONTINUED | OUTPATIENT
Start: 2022-03-14 | End: 2022-03-17 | Stop reason: HOSPADM

## 2022-03-14 RX ORDER — TAMSULOSIN HYDROCHLORIDE 0.4 MG/1
0.4 CAPSULE ORAL DAILY
COMMUNITY

## 2022-03-14 RX ORDER — ONDANSETRON 2 MG/ML
4 INJECTION INTRAMUSCULAR; INTRAVENOUS EVERY 6 HOURS PRN
Status: DISCONTINUED | OUTPATIENT
Start: 2022-03-14 | End: 2022-03-17 | Stop reason: HOSPADM

## 2022-03-14 RX ORDER — FERROUS SULFATE 325(65) MG
325 TABLET ORAL DAILY
Status: DISCONTINUED | OUTPATIENT
Start: 2022-03-14 | End: 2022-03-17 | Stop reason: HOSPADM

## 2022-03-14 RX ORDER — TAMSULOSIN HYDROCHLORIDE 0.4 MG/1
0.4 CAPSULE ORAL DAILY
Status: DISCONTINUED | OUTPATIENT
Start: 2022-03-14 | End: 2022-03-17 | Stop reason: HOSPADM

## 2022-03-14 RX ORDER — BISACODYL 10 MG
10 SUPPOSITORY, RECTAL RECTAL DAILY PRN
Status: DISCONTINUED | OUTPATIENT
Start: 2022-03-14 | End: 2022-03-17 | Stop reason: HOSPADM

## 2022-03-14 RX ORDER — CEFTRIAXONE 2 G/1
INJECTION, POWDER, FOR SOLUTION INTRAMUSCULAR; INTRAVENOUS
Status: DISPENSED
Start: 2022-03-14 | End: 2022-03-15

## 2022-03-14 RX ORDER — SODIUM PHOSPHATE, DIBASIC AND SODIUM PHOSPHATE, MONOBASIC 7; 19 G/133ML; G/133ML
1 ENEMA RECTAL DAILY PRN
Status: DISCONTINUED | OUTPATIENT
Start: 2022-03-14 | End: 2022-03-17 | Stop reason: HOSPADM

## 2022-03-14 RX ORDER — CALCIUM CARBONATE 200(500)MG
1 TABLET,CHEWABLE ORAL EVERY 4 HOURS PRN
Status: DISCONTINUED | OUTPATIENT
Start: 2022-03-14 | End: 2022-03-17 | Stop reason: HOSPADM

## 2022-03-14 RX ORDER — ATORVASTATIN CALCIUM 40 MG/1
80 TABLET, FILM COATED ORAL NIGHTLY
Status: DISCONTINUED | OUTPATIENT
Start: 2022-03-14 | End: 2022-03-17 | Stop reason: HOSPADM

## 2022-03-14 RX ORDER — FUROSEMIDE 20 MG/1
20 TABLET ORAL DAILY
Status: DISCONTINUED | OUTPATIENT
Start: 2022-03-14 | End: 2022-03-17 | Stop reason: HOSPADM

## 2022-03-14 RX ORDER — ACETAMINOPHEN 325 MG/1
650 TABLET ORAL EVERY 4 HOURS PRN
Status: DISCONTINUED | OUTPATIENT
Start: 2022-03-14 | End: 2022-03-17 | Stop reason: HOSPADM

## 2022-03-14 RX ORDER — GABAPENTIN 100 MG/1
200 CAPSULE ORAL 2 TIMES DAILY
Status: DISCONTINUED | OUTPATIENT
Start: 2022-03-14 | End: 2022-03-17 | Stop reason: HOSPADM

## 2022-03-14 RX ORDER — ASCORBIC ACID 500 MG
500 TABLET ORAL 2 TIMES DAILY
Status: DISCONTINUED | OUTPATIENT
Start: 2022-03-14 | End: 2022-03-17 | Stop reason: HOSPADM

## 2022-03-14 RX ADMIN — ZINC SULFATE 220 MG (50 MG) CAPSULE 50 MG: CAPSULE at 18:21

## 2022-03-14 RX ADMIN — WATER 2000 MG: 1 INJECTION INTRAMUSCULAR; INTRAVENOUS; SUBCUTANEOUS at 14:47

## 2022-03-14 RX ADMIN — SODIUM CHLORIDE 1000 ML: 9 INJECTION, SOLUTION INTRAVENOUS at 12:59

## 2022-03-14 RX ADMIN — INSULIN GLARGINE 55 UNITS: 100 INJECTION, SOLUTION SUBCUTANEOUS at 19:50

## 2022-03-14 RX ADMIN — ACETAMINOPHEN 1000 MG: 500 TABLET ORAL at 13:00

## 2022-03-14 RX ADMIN — Medication 2000 UNITS: at 18:21

## 2022-03-14 RX ADMIN — GABAPENTIN 200 MG: 100 CAPSULE ORAL at 20:04

## 2022-03-14 RX ADMIN — MICONAZOLE NITRATE: 2 POWDER TOPICAL at 19:55

## 2022-03-14 RX ADMIN — DOCUSATE SODIUM 100 MG: 100 CAPSULE, LIQUID FILLED ORAL at 20:04

## 2022-03-14 RX ADMIN — Medication 1 TABLET: at 18:20

## 2022-03-14 RX ADMIN — ONDANSETRON 4 MG: 2 INJECTION INTRAMUSCULAR; INTRAVENOUS at 20:03

## 2022-03-14 RX ADMIN — OXYCODONE HYDROCHLORIDE AND ACETAMINOPHEN 500 MG: 500 TABLET ORAL at 20:04

## 2022-03-14 RX ADMIN — APIXABAN 5 MG: 5 TABLET, FILM COATED ORAL at 18:21

## 2022-03-14 RX ADMIN — TAMSULOSIN HYDROCHLORIDE 0.4 MG: 0.4 CAPSULE ORAL at 18:21

## 2022-03-14 RX ADMIN — FERROUS SULFATE TAB 325 MG (65 MG ELEMENTAL FE) 325 MG: 325 (65 FE) TAB at 18:21

## 2022-03-14 RX ADMIN — ATORVASTATIN CALCIUM 80 MG: 40 TABLET, FILM COATED ORAL at 20:04

## 2022-03-14 RX ADMIN — ACETAMINOPHEN 650 MG: 325 TABLET ORAL at 18:50

## 2022-03-14 RX ADMIN — MULTIPLE VITAMINS W/ MINERALS TAB 1 TABLET: TAB at 18:21

## 2022-03-14 ASSESSMENT — PAIN DESCRIPTION - LOCATION
LOCATION: HIP
LOCATION: HIP

## 2022-03-14 ASSESSMENT — PAIN SCALES - GENERAL
PAINLEVEL_OUTOF10: 6
PAINLEVEL_OUTOF10: 0
PAINLEVEL_OUTOF10: 8
PAINLEVEL_OUTOF10: 0

## 2022-03-14 ASSESSMENT — PAIN DESCRIPTION - DESCRIPTORS: DESCRIPTORS: ACHING;CONSTANT;DISCOMFORT

## 2022-03-14 ASSESSMENT — ENCOUNTER SYMPTOMS
ABDOMINAL PAIN: 0
EYE DISCHARGE: 0
EYE REDNESS: 0
SINUS PRESSURE: 0
DIARRHEA: 0
NAUSEA: 0
VOMITING: 0
BACK PAIN: 0
SHORTNESS OF BREATH: 0
EYE PAIN: 0
WHEEZING: 0
COUGH: 0
SORE THROAT: 0

## 2022-03-14 ASSESSMENT — PAIN DESCRIPTION - PAIN TYPE
TYPE: CHRONIC PAIN
TYPE: CHRONIC PAIN

## 2022-03-14 ASSESSMENT — PAIN - FUNCTIONAL ASSESSMENT: PAIN_FUNCTIONAL_ASSESSMENT: PREVENTS OR INTERFERES SOME ACTIVE ACTIVITIES AND ADLS

## 2022-03-14 ASSESSMENT — PAIN DESCRIPTION - ORIENTATION
ORIENTATION: RIGHT
ORIENTATION: RIGHT

## 2022-03-14 ASSESSMENT — PAIN DESCRIPTION - ONSET: ONSET: ON-GOING

## 2022-03-14 ASSESSMENT — PAIN DESCRIPTION - FREQUENCY: FREQUENCY: CONTINUOUS

## 2022-03-14 ASSESSMENT — PAIN DESCRIPTION - PROGRESSION: CLINICAL_PROGRESSION: NOT CHANGED

## 2022-03-14 NOTE — CONSULTS
Podiatry Consult H&P  3/14/2022   [address] [place] REASON FOR CONSULT: b/l LE ulcers  REQUESTING PHYSICIAN: Rehana Merino    HISTORY OF PRESENT ILLNESS: This is a diabetic 80 y.o. male seen bedside in the ED for wounds to b/l LE. Pt states he currently follows up with Dr. Taryn Cazares at the wound care center. Dr. Taryn Cazares took wound cultures 5 days ago which grew Citrobacter werkmanii, Klebsiella oxytoca,  Corynebacterium species, and Streptococcus dysgalactiae ssp equisimilis. Pt states last night he developed nausea, fever and chills and was told to come to the ED. Pt states most of these symptoms have currently been resolved. Pt states the wound to posterior left leg is causing him moderate pain. Pt denies any other pain. Pt has no other pedal complaints at this time. Past Medical History:   Diagnosis Date    Arthritis     Cancer ([address]     breast    Cellulitis     CHF (congestive heart failure) ([facility])     CKD (chronic kidney disease) stage 3, GFR 30-59 ml/min ([facility])     Diabetes mellitus ([address]     History of lumpectomy     Hx of blood clots     Hyperlipidemia     Hypertension     Left ventricular failure ([facility])     Macular degeneration     Obesity     Orthostatic hypotension     PE (pulmonary thromboembolism) ([facility])     Pressure injury of both heels, unstageable ([address]     Staph aureus infection     Venous insufficiency         Past Surgical History:   Procedure Laterality Date    [facility] [address] SINGLE  9/2/2021         MASTECTOMY      TOE AMPUTATION      TOE SURGERY      TONSILLECTOMY           History reviewed. No pertinent family history. Social History     Tobacco Use    Smoking status: Never Smoker    Smokeless tobacco: Never Used   Substance Use Topics    Alcohol use: Not Currently     Alcohol/week: 1.0 standard drink     Types: 1 Cans of beer per week        Prior to Admission medications    Medication Sig Start Date End Date Taking?  Authorizing Provider   docusate sodium (COLACE) 100 MG capsule Take 100 mg by mouth nightly   Yes Historical Provider, MD   tamsulosin (FLOMAX) 0.4 MG capsule Take 0.4 mg by mouth daily   Yes Historical Provider, MD   bisacodyl (DULCOLAX) 10 MG suppository Place 10 mg rectally daily as needed for Constipation   Yes Historical Provider, MD   diphenhydrAMINE-zinc acetate (BENADRYL) 1-0.1 % cream Apply topically every 4 hours as needed for Itching Apply topically 3 times daily as needed. Yes Historical Provider, MD   Sodium Phosphates (FLEET) 7-19 GM/118ML Place 1 enema rectally daily as needed   Yes Historical Provider, MD   magnesium hydroxide (MILK OF MAGNESIA) 400 MG/5ML suspension Take 30 mLs by mouth daily as needed for Constipation   Yes Historical Provider, MD   Polyethylene Glycol 400 1 % SOLN Apply 1 drop to eye every 4 hours as needed   Yes Historical Provider, MD   ferrous sulfate (IRON 325) 325 (65 Fe) MG tablet Take 325 mg by mouth daily    Yes Historical Provider, MD   zinc sulfate (ZINCATE) 220 (50 Zn) MG capsule Take 1 capsule by mouth daily for 14 days 9/4/21 3/14/22 Yes SHAHEEN Thayer   miconazole (MICOTIN) 2 % powder Apply topically 2 times daily.  9/2/21  Yes Tray Morales MD   ascorbic acid (VITAMIN C) 500 MG tablet Take 1 tablet by mouth 2 times daily 9/2/21  Yes Tray Morales MD   Vitamin D (CHOLECALCIFEROL) 50 MCG (2000 UT) TABS tablet Take 1 tablet by mouth daily 9/3/21  Yes Tray Morales MD   furosemide (LASIX) 40 MG tablet Take 20 mg by mouth daily    Yes Historical Provider, MD   omeprazole (PRILOSEC) 20 MG delayed release capsule Take 20 mg by mouth daily   Yes Historical Provider, MD   Multiple Vitamins-Minerals (THERAPEUTIC MULTIVITAMIN-MINERALS) tablet Take 1 tablet by mouth daily   Yes Historical Provider, MD   Multiple Vitamins-Minerals (PRESERVISION AREDS PO) Take 1 tablet by mouth 2 times daily (with meals)   Yes Historical Provider, MD   acetaminophen (TYLENOL) 325 MG tablet Take 650 mg by mouth every 4 hours as needed for Pain or Fever    Yes Historical Provider, MD   calcium carbonate (TUMS) 500 MG chewable tablet Take 1 tablet by mouth every 4 hours as needed for Heartburn   Yes Historical Provider, MD   apixaban (ELIQUIS) 5 MG TABS tablet Take 5 mg by mouth 2 times daily (before meals)    Yes Historical Provider, MD   gabapentin (NEURONTIN) 100 MG capsule Take 200 mg by mouth Daily with supper. Yes Historical Provider, MD   atorvastatin (LIPITOR) 80 MG tablet Take 80 mg by mouth at bedtime    Yes Historical Provider, MD   INSULIN LISP PROT & LISP, HUM, (75-25) 100 UNIT/ML SUSP Inject 72 Units into the skin daily (with breakfast)    Yes Historical Provider, MD   INSULIN LISP PROT & LISP, HUM, (75-25) 100 UNIT/ML SUSP Inject 55 Units into the skin Daily with supper    Yes Historical Provider, MD   clotrimazole-betamethasone (LOTRISONE) cream Apply  topically as needed. Apply topically 2 times daily. Yes Historical Provider, MD   ipratropium-albuterol (DUONEB) 0.5-2.5 (3) MG/3ML SOLN nebulizer solution Inhale 3 mLs into the lungs every 4 hours (while awake) for 30 doses 12/21/17 12/29/17  Daylin Diaz MD        Clindamycin/lincomycin and Sulfa antibiotics         OBJECTIVE:        Vitals:    03/14/22 1420   BP: (!) 121/50   Pulse: 82   Resp: 16   Temp: 101.4 °F (38.6 °C)   SpO2: 95%              EXAM:        Pt is AAOx3, NAD    Vascular Exam:  DP and PT pulses are +2 b/l. CFT is <3 seconds bilateral lower extremity. Edema is present. Neuro Exam:  Light touch sensation and protective sensation is absent. Dermatologic Exam:  Full thickness ulcers measuring roughly 2x2 cm present to b/l heels to level of subcutaneous tissue. No malodor, crepitus or fluctuance noted. Minor serosanguinous drainage. There is also a full thickness ulcer measuring roughly 1.5x1.5 cm to L posterior calf to level of muscle. No malodor, crepitus or fluctuance noted. Minor serosanguinous drainage.  Erythema to b/l feet and ankles which pt states has been present for years. MSK: MMT deferred at this time. 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 Current Facility-Administered Medications   Medication Dose Route Frequency Provider Last Rate Last Admin                cefTRIAXone (ROCEPHIN) 2,000 mg in sterile water 20 mL IV syringe  2,000 mg IntraVENous Q24H Sharon Moseley MD   2,000 mg at 03/14/22 1447    cefTRIAXone (ROCEPHIN) 2 g injection              Current Outpatient Medications   Medication Sig Dispense Refill    docusate sodium (COLACE) 100 MG capsule Take 100 mg by mouth nightly      tamsulosin (FLOMAX) 0.4 MG capsule Take 0.4 mg by mouth daily      bisacodyl (DULCOLAX) 10 MG suppository Place 10 mg rectally daily as needed for Constipation      diphenhydrAMINE-zinc acetate (BENADRYL) 1-0.1 % cream Apply topically every 4 hours as needed for Itching Apply topically 3 times daily as needed. Sodium Phosphates (FLEET) 7-19 GM/118ML Place 1 enema rectally daily as needed      magnesium hydroxide (MILK OF MAGNESIA) 400 MG/5ML suspension Take 30 mLs by mouth daily as needed for Constipation      Polyethylene Glycol 400 1 % SOLN Apply 1 drop to eye every 4 hours as needed      ferrous sulfate (IRON 325) 325 (65 Fe) MG tablet Take 325 mg by mouth daily       zinc sulfate (ZINCATE) 220 (50 Zn) MG capsule Take 1 capsule by mouth daily for 14 days 14 capsule 0    miconazole (MICOTIN) 2 % powder Apply topically 2 times daily.  45 g 1    ascorbic acid (VITAMIN C) 500 MG tablet Take 1 tablet by mouth 2 times daily 30 tablet 0    Vitamin D (CHOLECALCIFEROL) 50 MCG (2000 UT) TABS tablet Take 1 tablet by mouth daily 60 tablet 0    furosemide (LASIX) 40 MG tablet Take 20 mg by mouth daily       omeprazole (PRILOSEC) 20 MG delayed release capsule Take 20 mg by mouth daily      Multiple Vitamins-Minerals (THERAPEUTIC MULTIVITAMIN-MINERALS) tablet Take 1 tablet by mouth daily      Multiple Vitamins-Minerals (PRESERVISION AREDS PO) Take 1 tablet by mouth 2 times daily (with meals)      acetaminophen (TYLENOL) 325 MG tablet Take 650 mg by mouth every 4 hours as needed for Pain or Fever       calcium carbonate (TUMS) 500 MG chewable tablet Take 1 tablet by mouth every 4 hours as needed for Heartburn      apixaban (ELIQUIS) 5 MG TABS tablet Take 5 mg by mouth 2 times daily (before meals)       gabapentin (NEURONTIN) 100 MG capsule Take 200 mg by mouth Daily with supper. atorvastatin (LIPITOR) 80 MG tablet Take 80 mg by mouth at bedtime       INSULIN LISP PROT & LISP, HUM, (75-25) 100 UNIT/ML SUSP Inject 72 Units into the skin daily (with breakfast)       INSULIN LISP PROT & LISP, HUM, (75-25) 100 UNIT/ML SUSP Inject 55 Units into the skin Daily with supper       clotrimazole-betamethasone (LOTRISONE) cream Apply  topically as needed. Apply topically 2 times daily. ipratropium-albuterol (DUONEB) 0.5-2.5 (3) MG/3ML SOLN nebulizer solution Inhale 3 mLs into the lungs every 4 hours (while awake) for 30 doses 90 mL 0        Lab Results   Component Value Date    WBC 27.4 (H) 03/14/2022    HCT 34.2 (L) 03/14/2022    HGB 10.5 (L) 03/14/2022     03/14/2022     03/14/2022    K 4.6 03/14/2022     03/14/2022    CO2 27 03/14/2022    BUN 46 (H) 03/14/2022    CREATININE 1.4 (H) 03/14/2022    GLUCOSE 180 (H) 03/14/2022    CRP 1.5 (H) 08/29/2021         Radiographs:    ASSESSMENT:  Full thickness ulcers to b/l LE  DM with neuropathy    Sepsis (HCC)  Diabetic ulcers b/l feet      PLAN:  -Patient examined and evaluated at bedside  -All pertinent labs, charts, and imaging reviewed prior to encounter  - WBC: 27.4  - x rays: Soft tissue defect without acute bony abnormality or radiopaque soft tissue foreign body. - Wound cultures pending. Previous wound cultures last week showed Citrobacter werkmanii, Klebsiella oxytoca,  Corynebacterium species, and Streptococcus dysgalactiae ssp equisimilis.   - ID consulted- Abx per their rec  - NIVS- no DVT seen on 2/2/22. - Arterial US on 12/14/22 show Findings suggestive of small caliber arterial stenosis. No convincing evidence of large or medium caliber arterial stenosis in the lower extremities. - Wounds dressed in DSD in ED.  - No plan for surgical debridement at this time. Will continue to follow pt while in house  - Pt discussed with Dr. Luis Cardona          Thank you for involving podiatry in this patients care.  Please do not hesitate to call with any questions or concerns       Morrison Holter, DPM  PGY1 Podiatry Resident   3/14/2022   3:27 PM

## 2022-03-14 NOTE — CONSULTS
[address] [place] [address] [facility]  Consultation Note     Admit Date: 3/14/2022 11:56 AM    Reason for Consult:   Concern for sepsis    Attending Physician:  Daniel Myers DO    HISTORY OF PRESENT ILLNESS:             The history is obtained from extensive review of available past medical records. The patient is a 80 y.o. male who is previously known to the ID service. The patient has has a chronic ulcer on his left heel. He sees podiatry at the wound clinic. A culture has been taken last week. He presents to the ED at [facility] on 3/14/2022 with generalized weakness. He admits to having some pain on the left leg where he has some redness. He had a temperature of 101.4 degrees at the facility. Past Medical History:        Diagnosis Date    Arthritis     Cancer [facility])     breast    CHF (congestive heart failure) (HCC)     [facility]     History of lumpectomy     Hx of blood clots     Hyperlipidemia     Hypertension     PE (pulmonary thromboembolism) [facility]     Staph aureus infection      August 2021. Admitted to [facility] with shortness of breath and flank pain. Tested positive for SARS-CoV-2 and treated with Remdesivir. Seen by ID for positive blood cultures with MRSA. Blood cultures also grew Staph epidermidis. It was thought this could possibly be a contaminant. Treated with Vancomycin for possible superimposed bacterial pneumonia. Repeat blood cultures were negative. He was discharged on Vancomycin for a minimum of 2 weeks. Seen in the office in follow-up. He was off antibiotics and doing well.     Past Surgical History:        Procedure Laterality Date    [facility]  9/2/2021         MASTECTOMY      TOE AMPUTATION      TOE SURGERY      TONSILLECTOMY       Current Medications:   Scheduled Meds:   sodium chloride  1,000 mL IntraVENous Once     Continuous Infusions:  PRN Meds:    Allergies:  Clindamycin/lincomycin and Sulfa antibiotics    Social History:   Social History     Socioeconomic History    Marital status:      Spouse name: Not on file    Number of children: Not on file    Years of education: Not on file    Highest education level: Not on file   Occupational History    Not on file   Tobacco Use    Smoking status: Never Smoker    Smokeless tobacco: Never Used   Vaping Use    Vaping Use: Never used   Substance and Sexual Activity    Alcohol use: Not Currently     Alcohol/week: 1.0 standard drink     Types: 1 Cans of beer per week    Drug use: No    Sexual activity: Not Currently     Partners: Female   Other Topics Concern    Not on file   Social History Narrative    Not on file     Social Determinants of Health     Financial Resource Strain:     Difficulty of Paying Living Expenses: Not on file   Food Insecurity:     Worried About Running Out of Food in the Last Year: Not on file    Naty of Food in the Last Year: Not on file   Transportation Needs:     Lack of Transportation (Medical): Not on file    Lack of Transportation (Non-Medical):  Not on file   Physical Activity:     Days of Exercise per Week: Not on file    Minutes of Exercise per Session: Not on file   Stress:     Feeling of Stress : Not on file   Social Connections:     Frequency of Communication with Friends and Family: Not on file    Frequency of Social Gatherings with Friends and Family: Not on file    Attends Yazidi Services: Not on file    Active Member of Clubs or Organizations: Not on file    Attends Club or Organization Meetings: Not on file    Marital Status: Not on file   Intimate Partner Violence:     Fear of Current or Ex-Partner: Not on file    Emotionally Abused: Not on file    Physically Abused: Not on file    Sexually Abused: Not on file   Housing Stability:     Unable to Pay for Housing in the Last Year: Not on file    Number of [place] in the Last Year: Not on file    Unstable Housing in the Last Year: Not on file      The patient resides at [address] [place] [address]. He retired from operating heavy machinery. Family History:   No family history on file. . Otherwise non-pertinent to the chief complaint. REVIEW OF SYSTEMS:    Constitutional: Positive for fevers, chills, diaphoresis  Neurologic: Negative   Psychiatric: Negative  Rheumatologic: Negative   Endocrine: Negative  Hematologic: Negative  Immunologic: SARS-CoV-2 vaccine up-to-date  ENT: Negative  Respiratory: Negative   Cardiovascular: Negative  GI: Negative  : Negative  Musculoskeletal: As in the HPI  Skin: As in the HPI    PHYSICAL EXAM:    Vitals:   /60   Pulse 105   Temp 103 °F (39.4 °C) (Axillary)   Resp 18   SpO2 94%   Constitutional: The patient is awake, alert, and oriented. No distress. Skin: Warm and dry. No rashes were noted. HEENT: Eyes show round, and reactive pupils. No jaundice. Moist mucous membranes, no ulcerations, no thrush. Neck: Supple to movements. No lymphadenopathy. Chest: No use of accessory muscles to breathe. Symmetrical expansion. Auscultation reveals no wheezing, crackles, or rhonchi. Cardiovascular: S1 and S2 are rhythmic and regular. No murmurs appreciated. Abdomen: Positive bowel sounds to auscultation. Benign to palpation. No masses felt. No hepatosplenomegaly. Extremities: Moderate edema with bilateral venous stasis dermatitis. There is diffuse erythema with warmth and some tender to the touch on the lower half of the left leg. There is an ulcer on the posterior left calf and another ulcer on the heel. None of these tunnel.   Lines: Peripheral.      CBC+dif:  Recent Labs     03/14/22  1217   WBC 27.4*   HGB 10.5*   HCT 34.2*   MCV 86.1      NEUTROABS 25.82*     Lab Results   Component Value Date    CRP 1.5 (H) 08/29/2021    CRP 3.0 (H) 08/28/2021    CRP 4.9 (H) 08/27/2021      No results found for: CRPHS  Lab Results   Component Value Date    SEDRATE 23 (H) 08/29/2021     Lab Results   Component Value Date    ALT 9 03/14/2022    AST 23 03/14/2022    ALKPHOS 136 (H) 03/14/2022    BILITOT 0.6 03/14/2022     Lab Results   Component Value Date     03/14/2022    K 4.6 03/14/2022     03/14/2022    CO2 27 03/14/2022    BUN 46 03/14/2022    CREATININE 1.4 03/14/2022    GFRAA 58 03/14/2022    LABGLOM 48 03/14/2022    GLUCOSE 180 03/14/2022    PROT 6.0 03/14/2022    LABALBU 3.1 03/14/2022    CALCIUM 8.7 03/14/2022    BILITOT 0.6 03/14/2022    ALKPHOS 136 03/14/2022    AST 23 03/14/2022    ALT 9 03/14/2022       Lab Results   Component Value Date    PROTIME 12.1 12/20/2021    INR 1.1 12/20/2021       No results found for: TSH    Lab Results   Component Value Date    COLORU Yellow 08/25/2021    PHUR 5.0 08/25/2021    WBCUA 1-3 08/25/2021    RBCUA 2-5 08/25/2021    BACTERIA RARE 08/25/2021    CLARITYU Clear 08/25/2021    SPECGRAV 1.015 08/25/2021    LEUKOCYTESUR Negative 08/25/2021    UROBILINOGEN 0.2 08/25/2021    BILIRUBINUR Negative 08/25/2021    BLOODU MODERATE 08/25/2021    GLUCOSEU Negative 08/25/2021       Lab Results   Component Value Date    HCO3 26.9 08/25/2021    BE 1.8 08/25/2021    O2SAT 92.2 08/25/2021    PH 7.399 08/25/2021    PCO2 44.6 08/25/2021    PO2 67.1 08/25/2021     Radiology:  Noted    Microbiology:  Left heel wound culture 3/9/2022: Citrobacter wermanii, Klebsiella oxytoca, Streptococcus agalactiae, Corynebacterium species, Klebsiella oxytoca  No results for input(s): BC in the last 72 hours. No results for input(s): ORG in the last 72 hours. No results for input(s): Bebeto Meiers in the last 72 hours. No results for input(s): STREPNEUMAGU in the last 72 hours. No results for input(s): LP1UAG in the last 72 hours. No results for input(s): ASO in the last 72 hours. No results for input(s): CULTRESP in the last 72 hours. No results for input(s): PROCAL in the last 72 hours. Assessment:  · Cellulitis of the left leg.   The port of entry was most likely the ulcer over the left heel. Cultures taken last week growing multiple organisms, including Citrobacter, Klebsiella and Streptococcus  · Fever with leukocytosis secondary to cellulitis  · Leukocytosis secondary to cellulitis    Plan:    · Start Ceftriaxone  · Check cultures, baseline ESR, CRP, ASO titer  · Repeat culture of the left heel  · Will follow with you    Thank you for having us see this patient in consultation. I will be discussing this case with the treating physicians. Spoke with nursing.     Jaspal Lee MD  1:46 PM  3/14/2022

## 2022-03-14 NOTE — PROGRESS NOTES
Admission database completed to best of this RN's ability. Care plan and education initiated. [place] [facility] Per facility , pt was able to ambulate with 1 assist and [employer]; but has been requiring the WC more. DNR CCA.

## 2022-03-14 NOTE — CONSULTS
Inpatient Cardiology Consultation      Reason for Consult:  Rising Troponin     Consulting Physician: Dr. Stella Hwang    Requesting Physician:  Dr. Leti Irwin    Date of Consultation: 3/14/2022    HISTORY OF PRESENT ILLNESS:   Mr. Pilar Lakhani is an 80year old male who was seen most recently in consultation with Dr. Patricia Tellez on 06/24/2017. He states that formally he followed with Dr. Stella Hwang at the [facility]. His medical history includes HLD, DM, Breast CA s/p right mastectomy, pulmonary embolism/DVT on chronic anticoagulation with Eliquis. Mr. Pilar Lakhani presented to [facility] ED on 3/14/2022 with complaints of fever and weakness. He states that prior to presentation the night before he noticed that he was having shaking and chills at which time he went to bed and \" put extra blankets on, including over my head\". He states that he woke up on 03/14/2022 with worsening chills and \" I knew I was not very lucid\". He states that he was also told that he had a fever and was hypoxic upon further evaluation by an APRN at the nursing facility where he currently resides. Sequently he was instructed to come to the ED for further evaluation. Upon arrival to the ED his VS were axillary temperature [id number] 9% on RA-117/60. SaO2 increased to 94% on 2 L by nasal cannula. CXR with shallow volumes with bibasilar atelectasis. BLE x-ray without osteomyelitis. BUN/SCR 46/1.4. Albumin 3.1. Troponin of 460, 521. proBNP 5752. WBC 27.4. H&H 10.5/34. 2. He received Tylenol 1 g, NS bolus, and Rocephin. Podiatry and ID were consulted. Cardiology was consulted for management of rising troponins. Currently, Mr. Pilar Lakhani is sitting up in bed. Denies chest pain and dyspnea. VS stable. Please note: past medical records were reviewed per electronic medical record (EMR) - see detailed reports under Past Medical/ Surgical History. Past Medical and Surgical History:  1.  Lexiscan MPS 05/12/2013: Limited area of basilar inferior ischemia, otherwise normal study. EF 58%. 2. Lexiscan MPS 06/24/2017: No evidence of left ventricular myocardial stress-induced ischemia. Focal small fixed foot appendix defect of the inferior wall laterally as prior study no significant interval change. 3. TTE 06/24/2017 (Dr. Bessie Bean): LV size is grossly normal.  Normal LV segmental wall motion. EF is visually estimated at 55%. Mild LVH. Normal LV diastolic filling pattern for age. 4. HLD  5. T2DM  6. Obesity  7. Breast CA s/p right mastectomy  8. Pulmonary embolism/DVT in 2015  9. Chronic anticoagulation with Eliquis  10. Peripheral neuropathy  11. CKD  12. CHF  13. Macular degeneration  14. Arthritis  15. S/p right hip replacement 03/2016  16. Chronic neck pain s/p injury   17. S/p tonsillectomy and toe amputation        Medications Prior to admit:  Prior to Admission medications    Medication Sig Start Date End Date Taking? Authorizing Provider   docusate sodium (COLACE) 100 MG capsule Take 100 mg by mouth nightly   Yes Historical Provider, MD   tamsulosin (FLOMAX) 0.4 MG capsule Take 0.4 mg by mouth daily   Yes Historical Provider, MD   bisacodyl (DULCOLAX) 10 MG suppository Place 10 mg rectally daily as needed for Constipation   Yes Historical Provider, MD   diphenhydrAMINE-zinc acetate (BENADRYL) 1-0.1 % cream Apply topically every 4 hours as needed for Itching Apply topically 3 times daily as needed.    Yes Historical Provider, MD   Sodium Phosphates (FLEET) 7-19 GM/118ML Place 1 enema rectally daily as needed   Yes Historical Provider, MD   magnesium hydroxide (MILK OF MAGNESIA) 400 MG/5ML suspension Take 30 mLs by mouth daily as needed for Constipation   Yes Historical Provider, MD   Polyethylene Glycol 400 1 % SOLN Apply 1 drop to eye every 4 hours as needed   Yes Historical Provider, MD   ferrous sulfate (IRON 325) 325 (65 Fe) MG tablet Take 325 mg by mouth daily    Yes Historical Provider, MD   zinc sulfate (ZINCATE) 220 (50 Zn) MG capsule Take 1 capsule by mouth daily for 14 days 9/4/21 3/14/22 Yes SHAHEEN Rankin   miconazole (MICOTIN) 2 % powder Apply topically 2 times daily. 9/2/21  Yes Jennifer Lorenzana MD   ascorbic acid (VITAMIN C) 500 MG tablet Take 1 tablet by mouth 2 times daily 9/2/21  Yes Jennifer Lorenzana MD   Vitamin D (CHOLECALCIFEROL) 50 MCG (2000 UT) TABS tablet Take 1 tablet by mouth daily 9/3/21  Yes Jennifer Lorenzana MD   furosemide (LASIX) 40 MG tablet Take 20 mg by mouth daily    Yes Historical Provider, MD   omeprazole (PRILOSEC) 20 MG delayed release capsule Take 20 mg by mouth daily   Yes Historical Provider, MD   Multiple Vitamins-Minerals (THERAPEUTIC MULTIVITAMIN-MINERALS) tablet Take 1 tablet by mouth daily   Yes Historical Provider, MD   Multiple Vitamins-Minerals (PRESERVISION AREDS PO) Take 1 tablet by mouth 2 times daily (with meals)   Yes Historical Provider, MD   acetaminophen (TYLENOL) 325 MG tablet Take 650 mg by mouth every 4 hours as needed for Pain or Fever    Yes Historical Provider, MD   calcium carbonate (TUMS) 500 MG chewable tablet Take 1 tablet by mouth every 4 hours as needed for Heartburn   Yes Historical Provider, MD   apixaban (ELIQUIS) 5 MG TABS tablet Take 5 mg by mouth 2 times daily (before meals)    Yes Historical Provider, MD   gabapentin (NEURONTIN) 100 MG capsule Take 200 mg by mouth Daily with supper. Yes Historical Provider, MD   atorvastatin (LIPITOR) 80 MG tablet Take 80 mg by mouth at bedtime    Yes Historical Provider, MD   INSULIN LISP PROT & LISP, HUM, (75-25) 100 UNIT/ML SUSP Inject 72 Units into the skin daily (with breakfast)    Yes Historical Provider, MD   INSULIN LISP PROT & LISP, HUM, (75-25) 100 UNIT/ML SUSP Inject 55 Units into the skin Daily with supper    Yes Historical Provider, MD   clotrimazole-betamethasone (LOTRISONE) cream Apply  topically as needed. Apply topically 2 times daily.    Yes Historical Provider, MD   ipratropium-albuterol (DUONEB) 0.5-2.5 (3) MG/3ML SOLN nebulizer solution Inhale 3 mLs into the lungs every 4 hours (while awake) for 30 doses 12/21/17 12/29/17  Markus Marcano MD       Current Medications:    Current Facility-Administered Medications: cefTRIAXone (ROCEPHIN) 2,000 mg in sterile water 20 mL IV syringe, 2,000 mg, IntraVENous, Q24H  cefTRIAXone (ROCEPHIN) 2 g injection, , ,     Allergies:  Clindamycin/lincomycin and Sulfa antibiotics    Social History:    Uses a walker with ambulation. Denies tobacco, alcohol, illicit drug use. Caffeine intake includes soda and coffee daily. Family History:   Please note this information was not obtained at this time, as it is limited in nature due to the patient's advanced age. REVIEW OF SYSTEMS:     · Constitutional: Complains of fevers, chills, night sweats, and fatigue-see HPI  · HEENT: Denies headaches, nose bleeds, and blurred vision,oral pain, abscess or lesion. Complains of chronic tinnitus with hearing loss left greater than right ear  · Musculoskeletal: Denies falls, pain to BLE with ambulation and complains of worsening edema to BLE-see HPI. · Neurological: Denies dizziness and lightheadedness, numbness and tingling  · Cardiovascular: Denies chest pain, palpitations, and feelings of heart racing. · Respiratory: Denies orthopnea and PND. Denies STARK  · Gastrointestinal: Denies heartburn, nausea/vomiting, diarrhea and constipation, black/bloody, and tarry stools. · Genitourinary: Denies dysuria and hematuria  · Hematologic: Denies excessive bruising or bleeding  · Lymphatic: Denies lumps and bumps to neck, axilla, breast, and groin  · Endocrine: Denies excessive thirst. Denies intolerance to hot and cold  · Psychiatric: Denies anxiety and depression. PHYSICAL EXAM:   BP (!) 121/50   Pulse 82   Temp 103 °F (39.4 °C) (Axillary)   Resp 16   SpO2 95%   CONST:  Well developed, obese elderly  male who appears stated age.  Awake, alert, cooperative, no apparent distress  HEENT:   Head- Normocephalic, atraumatic   Eyes- Conjunctivae pink, anicteric  Throat- Oral mucosa pink and moist  Neck-  No stridor, trachea midline, no jugular venous distention. No adenopathy   CHEST: Chest symmetrical and non-tender to palpation. No accessory muscle use or intercostal retractions  RESPIRATORY: Lung sounds - clear throughout anterior and lateral fields   CARDIOVASCULAR:     No carotid bruit  Heart Inspection- shows no noted pulsations  Heart Palpation- no heaves or thrills; PMI is non-displaced   Heart Ausculation- Regular rate and rhythm, no murmur. No s3, s4 or rub   PV: 2+ pitting bilateral lower extremity edema. No varicosities. Pedal pulses palpable, no clubbing or cyanosis. Noted erythema to BLE  ABDOMEN: Soft, obese, non-tender to light palpation. Bowel sounds present. No palpable masses no organomegaly; no abdominal bruit  MS: Good muscle strength and tone. No atrophy or abnormal movements. : Deferred  SKIN: Warm and dry no statis dermatitis or ulcers   NEURO / PSYCH: Oriented to person, place and time. Speech clear and appropriate. Follows all commands. Pleasant affect     DATA:    ECG: As above  Tele strips: SR  Diagnostic:      Intake/Output Summary (Last 24 hours) at 3/14/2022 1504  Last data filed at 3/14/2022 1408  Gross per 24 hour   Intake 1000 ml   Output --   Net 1000 ml       Labs:   CBC:   Recent Labs     03/14/22  1217   WBC 27.4*   HGB 10.5*   HCT 34.2*        BMP:   Recent Labs     03/14/22  1217      K 4.6   CO2 27   BUN 46*   CREATININE 1.4*   LABGLOM 48   CALCIUM 8.7     Lab Results   Component Value Date    LABA1C 7.4 (H) 03/01/2022   FASTING LIPID PANEL:  Lab Results   Component Value Date    CHOL 107 07/21/2021    HDL 33 07/21/2021    LDLCALC 49 07/21/2021    TRIG 124 07/21/2021     LIVER PROFILE:  Recent Labs     03/14/22  1217   AST 23   ALT 9   LABALBU 3.1*   Results for Enrique Miller (MRN [id number]) as of 3/14/2022 15:09   Ref.  Range 3/14/2022 12:17 3/14/2022 13:53 Troponin, High Sensitivity Latest Ref Range: 0 - 11 ng/L 460 (H) 521 (H)     03/14/2022 CXR:   Shallow lung volumes with bibasilar atelectasis. 03/14/2022 Left Tibia and Fibula XRay:  Soft tissue defect in the lateral aspect of the mid lower leg without acute  bony abnormality or radiopaque soft tissue foreign body. 03/14/2022 Left Foot XRay:  No radiographic evidence of osteomyelitis    03/14/2022 Right Foot XRay:   No radiographic evidence of osteomyelitis    IMPRESSION and PLAN to follow per Dr. Dileep Ford    Electronically signed by SUSANNA Gonzalez CNP on 3/14/2022 at 3:04 PM     The above documentation has been prepared under my direction and personally reviewed by me in its entirety. I confirm that the note above accurately reflects all work, treatment, procedures, and medical decision making performed by me. The patient's history was independently obtained. The patient was independently examined. Electrocardiogram, prior and present cardiovascular assessment, and laboratory studies were reviewed. The patient is an 80-year-old white male with no active association to [facility] and most recent inpatient assessment by Esme Medina in 2017 following presentation with an abnormal troponin level. At that time, he underwent a gated vasodilator myocardial perfusion imaging study demonstrating a small fixed inferior perfusion abnormality in the absence of regional wall motion abnormalities and a post stress ejection fraction between 45 and 50%. A corresponding echocardiogram demonstrated evidence of a normal-sized left ventricular chamber with mild concentric left ventricular hypertrophy with normal left ventricular systolic function. He additionally has a history of diabetes, hyperlipidemia, male breast cancer with a prior right mastectomy and thromboembolic disease with chronic anticoagulation.   He presently resides in an extended care facility and presents following the onset of shaking chills the previous evening with an associated documented fever and apparent hypoxia. On presentation, he was febrile to 103 degrees with borderline saturations on room air and adequate saturations with supplemental oxygen. Serial resting electrocardiographic tracings reviewed at the time of evaluation demonstrated evidence of sinus rhythm with occasional supraventricular ectopy and nonspecific ST changes without evolution and a chest x-ray again reviewed demonstrates poor inspiratory effort with volume loss of the right lower lobe. Additional laboratory studies demonstrated evidence of elevated high-sensitivity troponin level beyond that of baseline elevation and with asending levels during hospitalization as well as a proBNP level of 5750 pg/mL in the face of evidence of prerenal azotemia and acute kidney injury superimposed upon his chronic kidney disease as well as hypoalbuminemia and in the face of a significant leukocytosis. At the time of evaluation, he specifically denies anginal symptomatology or ischemic equivalents with mild lower extremity edema and no additional significant clinical evidence of volume overload. At the time of evaluation, the patient's medications and allergies were reviewed as well as that of his past medical history and review of systems as documented. On examination, he appears somewhat chronically ill and in no present distress. He is hemodynamically stable vital signs as documented. Jugular venous pressure appears normal with no identified carotid bruits. Lung fields are clear to auscultation. Cardiac examination is notable for a regular rate and rhythm with no gallop rhythm or cardiac murmur. A benign abdominal examination is present with the exception of obesity and mild pretibial and pedal edema is present. Diagnostic Assessment and Plan:  On a clinical basis, the patient presents with evidence suggestive of an infectious process presently of uncertain origin and

## 2022-03-14 NOTE — ED PROVIDER NOTES
guarding or rebound. Musculoskeletal:         General: No tenderness or deformity. Cervical back: Normal range of motion and neck supple. Skin:     General: Skin is warm and dry. Comments: Wounds on bilateral heels and left lateral calf   Neurological:      Mental Status: He is alert and oriented to person, place, and time. Cranial Nerves: No cranial nerve deficit. Coordination: Coordination normal.                      Procedures     MDM  Number of Diagnoses or Management Options  Hypoxia  Sepsis, due to unspecified organism, unspecified whether acute organ dysfunction present ([address]  Troponin level elevated  Diagnosis management comments: Patient presents with fever and fatigue. He does have a temperature of 103. Tylenol given. Patient is also hypoxic on room air. Patient placed on oxygen. He was also tachycardic. Fluids were given. EKG showed a new bigeminy. Patient also had an elevated troponin from baseline. He does not have any chest pain at this time. Cardiology was consulted. CBC showed a leukocytosis at 27.4. Inflammatory markers were elevated. Concern for sepsis given patient's vitals and history of lower extremity wounds. Cultures were drawn. ID was consulted who started patient on ceftriaxone. X-rays did not show any concerns for osteomyelitis. At this time patient will need to be admitted for further evaluation and care of his possible sepsis, acute respiratory failure with hypoxia, and bigeminy. Patient admitted to telemetry.          Amount and/or Complexity of Data Reviewed  Clinical lab tests: reviewed  Tests in the radiology section of CPT®: reviewed  Tests in the medicine section of CPT®: reviewed  Decide to obtain previous medical records or to obtain history from someone other than the patient: yes         ED Course as of 03/14/22 1533   Mon Mar 14, 2022   1408 D/w dr Meeta Woodward podiatry will evaluate [FERCHO]   1408 Infectious disease dr Jaime Watson now evaluating pt in ED [FERCHO]   1503 EKG shows a bigeminy with a ventricular rate of 99 bpm.  There appears to be a first-degree AV block. There are no obvious ST elevations or T wave inversions present. New compared to previous EKG on 8/25/2021. As interpreted by myself the ED physician. [BB]   1504 Repeat EKG again shows bigeminy with a ventricular rate of 91 bpm.  Continues to have a first-degree AV block. There are no ST elevations or T wave inversions. Comparable to previous EKG. As interpreted by myself the physician. [BB]      ED Course User Index  [BB] Luke Carver DO  [FERCHO] Jose Adkins DO        ED Course as of 03/14/22 1533   Mon Mar 14, 2022   1408 D/w dr Marquise Acosta podiatry will evaluate [FERCHO]   1408 Infectious disease dr Erinn Woods now evaluating pt in ED [FERCHO]   1503 EKG shows a bigeminy with a ventricular rate of 99 bpm.  There appears to be a first-degree AV block. There are no obvious ST elevations or T wave inversions present. New compared to previous EKG on 8/25/2021. As interpreted by myself the ED physician. [BB]   1504 Repeat EKG again shows bigeminy with a ventricular rate of 91 bpm.  Continues to have a first-degree AV block. There are no ST elevations or T wave inversions. Comparable to previous EKG.   As interpreted by myself the physician. [BB]      ED Course User Index  [BB] Luke Carver DO  [FERCHO] Jose Adkins DO       --------------------------------------------- PAST HISTORY ---------------------------------------------  Past Medical History:  has a past medical history of Arthritis, Cancer ([facility] Cellulitis, CHF (congestive heart failure) ([facility] [address] CKD (chronic kidney disease) stage 3, GFR 30-59 ml/min ([address] Diabetes mellitus ([address] History of lumpectomy, Hx of blood clots, Hyperlipidemia, Hypertension, Left ventricular failure ([address] Macular degeneration, Obesity, Orthostatic hypotension, PE (pulmonary thromboembolism) ([address] Pressure injury of both heels, unstageable ([address] Staph aureus infection, and Venous insufficiency. Past Surgical History:  has a past surgical history that includes Mastectomy; Toe Surgery; Toe amputation; Tonsillectomy; and hc  picc powerpic single (9/2/2021). Social History:  reports that he has never smoked. He has never used smokeless tobacco. He reports previous alcohol use of about 1.0 standard drink of alcohol per week. He reports that he does not use drugs. Family History: family history is not on file. The patients home medications have been reviewed.     Allergies: Clindamycin/lincomycin and Sulfa antibiotics    -------------------------------------------------- RESULTS -------------------------------------------------    LABS:  Results for orders placed or performed during the hospital encounter of 03/14/22   COVID-19, Rapid    Specimen: Nasopharyngeal Swab   Result Value Ref Range    SARS-CoV-2, NAAT Not Detected Not Detected   CBC with Auto Differential   Result Value Ref Range    WBC 27.4 (H) 4.5 - 11.5 E9/L    RBC 3.97 3.80 - 5.80 E12/L    Hemoglobin 10.5 (L) 12.5 - 16.5 g/dL    Hematocrit 34.2 (L) 37.0 - 54.0 %    MCV 86.1 80.0 - 99.9 fL    MCH 26.4 26.0 - 35.0 pg    MCHC 30.7 (L) 32.0 - 34.5 %    RDW 16.9 (H) 11.5 - 15.0 fL    Platelets 206 662 - 768 E9/L    MPV 9.4 7.0 - 12.0 fL    Neutrophils % 94.1 (H) 43.0 - 80.0 %    Immature Granulocytes % 1.2 0.0 - 5.0 %    Lymphocytes % 2.2 (L) 20.0 - 42.0 %    Monocytes % 2.4 2.0 - 12.0 %    Eosinophils % 0.0 0.0 - 6.0 %    Basophils % 0.1 0.0 - 2.0 %    Neutrophils Absolute 25.82 (H) 1.80 - 7.30 E9/L    Immature Granulocytes # 0.34 E9/L    Lymphocytes Absolute 0.59 (L) 1.50 - 4.00 E9/L    Monocytes Absolute 0.65 0.10 - 0.95 E9/L    Eosinophils Absolute 0.00 (L) 0.05 - 0.50 E9/L    Basophils Absolute 0.04 0.00 - 0.20 E9/L    Poikilocytes 1+     Schistocytes 1+     Ovalocytes 1+    Comprehensive Metabolic Panel w/ Reflex to MG   Result Value Ref Range    Sodium 138 132 - 146 mmol/L    Potassium ---------------------------  Date / Time Roomed:  3/14/2022 11:56 AM  ED Bed Assignment:  22/22    The nursing notes within the ED encounter and vital signs as below have been reviewed. Patient Vitals for the past 24 hrs:   BP Temp Temp src Pulse Resp SpO2   03/14/22 1420 (!) 121/50 101.4 °F (38.6 °C) Axillary 82 16 95 %   03/14/22 1206 -- -- -- -- -- 94 %   03/14/22 1205 117/60 103 °F (39.4 °C) Axillary 105 18 (!) 89 %       Oxygen Saturation Interpretation: Abnormal    ------------------------------------------ PROGRESS NOTES ------------------------------------------  Re-evaluation(s):  Time: 9406  Patients symptoms show no change  Repeat physical examination is not changed    Counseling:  I have spoken with the patient and discussed todays results, in addition to providing specific details for the plan of care and counseling regarding the diagnosis and prognosis. Their questions are answered at this time and they are agreeable with the plan of admission.    --------------------------------- ADDITIONAL PROVIDER NOTES ---------------------------------  Consultations:  Time: 1500. Spoke with Dr. Faustina Boo. Discussed case. They will admit the patient. This patient's ED course included: a personal history and physicial examination, multiple bedside re-evaluations, IV medications, cardiac monitoring and continuous pulse oximetry    This patient has remained hemodynamically stable during their ED course. Diagnosis:  1. Sepsis, due to unspecified organism, unspecified whether acute organ dysfunction present ([address]    2. Troponin level elevated    3. Hypoxia    4. Bigeminy    5. Open wound of knee, leg, and ankle, unspecified laterality, initial encounter        Disposition:  Patient's disposition: Admit to telemetry  Patient's condition is stable. Patient was seen and evaluated by both myself and Mau Wiley DO.          Opal Mullins DO  Resident  03/14/22 8159

## 2022-03-15 PROBLEM — L08.9 DIABETIC FOOT INFECTION (HCC): Status: ACTIVE | Noted: 2022-03-15

## 2022-03-15 PROBLEM — E11.628 DIABETIC FOOT INFECTION (HCC): Status: ACTIVE | Noted: 2022-03-15

## 2022-03-15 LAB
ANION GAP SERPL CALCULATED.3IONS-SCNC: 11 MMOL/L (ref 7–16)
ANISOCYTOSIS: ABNORMAL
BASOPHILS ABSOLUTE: 0.03 E9/L (ref 0–0.2)
BASOPHILS RELATIVE PERCENT: 0.2 % (ref 0–2)
BUN BLDV-MCNC: 52 MG/DL (ref 6–23)
CALCIUM SERPL-MCNC: 8.3 MG/DL (ref 8.6–10.2)
CHLORIDE BLD-SCNC: 100 MMOL/L (ref 98–107)
CO2: 26 MMOL/L (ref 22–29)
CREAT SERPL-MCNC: 1.6 MG/DL (ref 0.7–1.2)
EKG ATRIAL RATE: 91 BPM
EKG Q-T INTERVAL: 384 MS
EKG QRS DURATION: 90 MS
EKG QTC CALCULATION (BAZETT): 472 MS
EKG R AXIS: -11 DEGREES
EKG T AXIS: 92 DEGREES
EKG VENTRICULAR RATE: 91 BPM
EOSINOPHILS ABSOLUTE: 0 E9/L (ref 0.05–0.5)
EOSINOPHILS RELATIVE PERCENT: 0 % (ref 0–6)
GFR AFRICAN AMERICAN: 49
GFR NON-AFRICAN AMERICAN: 41 ML/MIN/1.73
GLUCOSE BLD-MCNC: 284 MG/DL (ref 74–99)
HCT VFR BLD CALC: 32.6 % (ref 37–54)
HEMOGLOBIN: 9.9 G/DL (ref 12.5–16.5)
IMMATURE GRANULOCYTES #: 0.12 E9/L
IMMATURE GRANULOCYTES %: 0.6 % (ref 0–5)
LV EF: 60 %
LVEF MODALITY: NORMAL
LYMPHOCYTES ABSOLUTE: 0.41 E9/L (ref 1.5–4)
LYMPHOCYTES RELATIVE PERCENT: 2.1 % (ref 20–42)
MCH RBC QN AUTO: 26.3 PG (ref 26–35)
MCHC RBC AUTO-ENTMCNC: 30.4 % (ref 32–34.5)
MCV RBC AUTO: 86.5 FL (ref 80–99.9)
METER GLUCOSE: 194 MG/DL (ref 74–99)
METER GLUCOSE: 210 MG/DL (ref 74–99)
METER GLUCOSE: 271 MG/DL (ref 74–99)
METER GLUCOSE: 284 MG/DL (ref 74–99)
METER GLUCOSE: 348 MG/DL (ref 74–99)
MONOCYTES ABSOLUTE: 0.45 E9/L (ref 0.1–0.95)
MONOCYTES RELATIVE PERCENT: 2.3 % (ref 2–12)
NEUTROPHILS ABSOLUTE: 18.4 E9/L (ref 1.8–7.3)
NEUTROPHILS RELATIVE PERCENT: 94.8 % (ref 43–80)
OVALOCYTES: ABNORMAL
PDW BLD-RTO: 17.2 FL (ref 11.5–15)
PLATELET # BLD: 149 E9/L (ref 130–450)
PMV BLD AUTO: 9.3 FL (ref 7–12)
POIKILOCYTES: ABNORMAL
POTASSIUM SERPL-SCNC: 4.4 MMOL/L (ref 3.5–5)
RBC # BLD: 3.77 E12/L (ref 3.8–5.8)
SODIUM BLD-SCNC: 137 MMOL/L (ref 132–146)
WBC # BLD: 19.4 E9/L (ref 4.5–11.5)

## 2022-03-15 PROCEDURE — 99233 SBSQ HOSP IP/OBS HIGH 50: CPT | Performed by: INTERNAL MEDICINE

## 2022-03-15 PROCEDURE — 6370000000 HC RX 637 (ALT 250 FOR IP): Performed by: INTERNAL MEDICINE

## 2022-03-15 PROCEDURE — 93306 TTE W/DOPPLER COMPLETE: CPT

## 2022-03-15 PROCEDURE — 36415 COLL VENOUS BLD VENIPUNCTURE: CPT

## 2022-03-15 PROCEDURE — 6360000002 HC RX W HCPCS: Performed by: SPECIALIST

## 2022-03-15 PROCEDURE — 6360000002 HC RX W HCPCS: Performed by: INTERNAL MEDICINE

## 2022-03-15 PROCEDURE — 82962 GLUCOSE BLOOD TEST: CPT

## 2022-03-15 PROCEDURE — 93010 ELECTROCARDIOGRAM REPORT: CPT | Performed by: INTERNAL MEDICINE

## 2022-03-15 PROCEDURE — 2060000000 HC ICU INTERMEDIATE R&B

## 2022-03-15 PROCEDURE — 80048 BASIC METABOLIC PNL TOTAL CA: CPT

## 2022-03-15 PROCEDURE — 2580000003 HC RX 258: Performed by: SPECIALIST

## 2022-03-15 PROCEDURE — 85025 COMPLETE CBC W/AUTO DIFF WBC: CPT

## 2022-03-15 PROCEDURE — 6360000004 HC RX CONTRAST MEDICATION: Performed by: INTERNAL MEDICINE

## 2022-03-15 RX ADMIN — DOCUSATE SODIUM 100 MG: 100 CAPSULE, LIQUID FILLED ORAL at 20:19

## 2022-03-15 RX ADMIN — Medication 2000 UNITS: at 08:25

## 2022-03-15 RX ADMIN — ONDANSETRON 4 MG: 2 INJECTION INTRAMUSCULAR; INTRAVENOUS at 11:54

## 2022-03-15 RX ADMIN — APIXABAN 5 MG: 5 TABLET, FILM COATED ORAL at 05:55

## 2022-03-15 RX ADMIN — APIXABAN 2.5 MG: 2.5 TABLET, FILM COATED ORAL at 16:02

## 2022-03-15 RX ADMIN — MICONAZOLE NITRATE: 2 POWDER TOPICAL at 20:19

## 2022-03-15 RX ADMIN — FERROUS SULFATE TAB 325 MG (65 MG ELEMENTAL FE) 325 MG: 325 (65 FE) TAB at 08:25

## 2022-03-15 RX ADMIN — GABAPENTIN 200 MG: 100 CAPSULE ORAL at 20:19

## 2022-03-15 RX ADMIN — INSULIN LISPRO 10 UNITS: 100 INJECTION, SOLUTION INTRAVENOUS; SUBCUTANEOUS at 06:03

## 2022-03-15 RX ADMIN — INSULIN GLARGINE 76 UNITS: 100 INJECTION, SOLUTION SUBCUTANEOUS at 20:20

## 2022-03-15 RX ADMIN — INSULIN LISPRO 10 UNITS: 100 INJECTION, SOLUTION INTRAVENOUS; SUBCUTANEOUS at 11:54

## 2022-03-15 RX ADMIN — GABAPENTIN 200 MG: 100 CAPSULE ORAL at 08:25

## 2022-03-15 RX ADMIN — TAMSULOSIN HYDROCHLORIDE 0.4 MG: 0.4 CAPSULE ORAL at 08:25

## 2022-03-15 RX ADMIN — OXYCODONE HYDROCHLORIDE AND ACETAMINOPHEN 500 MG: 500 TABLET ORAL at 08:25

## 2022-03-15 RX ADMIN — PANTOPRAZOLE SODIUM 40 MG: 40 TABLET, DELAYED RELEASE ORAL at 16:02

## 2022-03-15 RX ADMIN — ATORVASTATIN CALCIUM 80 MG: 40 TABLET, FILM COATED ORAL at 20:19

## 2022-03-15 RX ADMIN — PANTOPRAZOLE SODIUM 40 MG: 40 TABLET, DELAYED RELEASE ORAL at 05:55

## 2022-03-15 RX ADMIN — INSULIN LISPRO 10 UNITS: 100 INJECTION, SOLUTION INTRAVENOUS; SUBCUTANEOUS at 16:02

## 2022-03-15 RX ADMIN — Medication 1 TABLET: at 16:02

## 2022-03-15 RX ADMIN — PERFLUTREN 1.65 MG: 6.52 INJECTION, SUSPENSION INTRAVENOUS at 12:44

## 2022-03-15 RX ADMIN — WATER 2000 MG: 1 INJECTION INTRAMUSCULAR; INTRAVENOUS; SUBCUTANEOUS at 14:29

## 2022-03-15 RX ADMIN — ZINC SULFATE 220 MG (50 MG) CAPSULE 50 MG: CAPSULE at 08:25

## 2022-03-15 RX ADMIN — ACETAMINOPHEN 650 MG: 325 TABLET ORAL at 01:20

## 2022-03-15 RX ADMIN — MICONAZOLE NITRATE: 2 POWDER TOPICAL at 08:26

## 2022-03-15 RX ADMIN — OXYCODONE HYDROCHLORIDE AND ACETAMINOPHEN 500 MG: 500 TABLET ORAL at 20:19

## 2022-03-15 RX ADMIN — Medication 1 TABLET: at 08:25

## 2022-03-15 RX ADMIN — MULTIPLE VITAMINS W/ MINERALS TAB 1 TABLET: TAB at 08:25

## 2022-03-15 ASSESSMENT — PAIN SCALES - GENERAL
PAINLEVEL_OUTOF10: 0
PAINLEVEL_OUTOF10: 6
PAINLEVEL_OUTOF10: 0
PAINLEVEL_OUTOF10: 0

## 2022-03-15 NOTE — PROGRESS NOTES
Physical Therapy    Facility/Department: [address] INTERMEDIATE 1    NAME: Candice You. : 1932  MRN: [id number]    Date of Service: 3/15/2022    Order received for PT evaluation. Pt wear B heel offloading shoes due to heel wounds. Pt does not have shoes here and states they must be on for walking or transfers. Spoke with social work to see if shoes can be brought in from facility.      Saima PT [id number]

## 2022-03-15 NOTE — PLAN OF CARE
Problem: Falls - Risk of:  Goal: Will remain free from falls  Description: Will remain free from falls  3/14/2022 2337 by Munira Mccoy RN  Outcome: Ongoing  3/14/2022 1838 by Rosa Salas RN  Outcome: Met This Shift  Goal: Absence of physical injury  Description: Absence of physical injury  3/14/2022 2337 by Munira Mccoy RN  Outcome: Ongoing  3/14/2022 1838 by Rosa Salas RN  Outcome: Met This Shift     Problem: Skin Integrity:  Goal: Will show no infection signs and symptoms  Description: Will show no infection signs and symptoms  Outcome: Ongoing  Goal: Absence of new skin breakdown  Description: Absence of new skin breakdown  3/14/2022 2337 by Munira Mccoy RN  Outcome: Ongoing  3/14/2022 1838 by Rosa Salas RN  Outcome: Met This Shift

## 2022-03-15 NOTE — PROGRESS NOTES
Comprehensive Nutrition Assessment    Type and Reason for Visit:  Initial,Positive Nutrition Screen,Wound    Nutrition Recommendations/Plan: Continue Current Diet, Start Glucerna BID and Renny BID    Nutrition Assessment:  Pt w/ sepsis, noted L leg cellulitis. Hx DM/CKD/CHF/breast CA. Wounds noted, will add ONS & monitor. Malnutrition Assessment:  Malnutrition Status: At risk for malnutrition (Comment)    Context:  Acute Illness     Findings of the 6 clinical characteristics of malnutrition:  Energy Intake:  Mild decrease in energy intake (Comment)  Weight Loss:  No significant weight loss     Body Fat Loss:  No significant body fat loss     Muscle Mass Loss:  No significant muscle mass loss    Fluid Accumulation:  No significant fluid accumulation     Strength:  Not Performed    Estimated Daily Nutrient Needs:  Energy (kcal):  ; Weight Used for Energy Requirements:  Current     Protein (g):  105-120 (1.3-1.5 monitor renal labs); Weight Used for Protein Requirements:  Ideal        Fluid (ml/day):  ; Method Used for Fluid Requirements:  1 ml/kcal      Nutrition Related Findings:  +I&Os, BLE +2/BUE +1 edema, abd soft, +BS, abnormal renal labs      Wounds:  Multiple,Full Thickness,Diabetic Ulcer,Wound Consult Pending (coccyx- fragile)       Current Nutrition Therapies:    ADULT DIET; Regular    Anthropometric Measures:  · Height: 6' (182.9 cm)  · Current Body Weight: 255 lb (115.7 kg) (3/15 bed)   · Admission Body Weight: 255 lb (115.7 kg) (3/15 bed)    · Usual Body Weight: 245 lb (111.1 kg) (8/2021 actual per EMR)     · Ideal Body Weight: 178 lbs; % Ideal Body Weight 143.3 %   · BMI: 34.6  · BMI Categories: Obese Class 1 (BMI 30.0-34. 9)       Nutrition Diagnosis:   · Increased nutrient needs related to increase demand for energy/nutrients as evidenced by wounds    Nutrition Interventions:   Nutrition Education/Counseling:  Education initiated (discussed appetite/ONS w/ pt)   Coordination of Nutrition Care:  Continue to monitor while inpatient    Goals:  Pt to consume >25% of meals/ONS       Nutrition Monitoring and Evaluation:   Food/Nutrient Intake Outcomes:  Food and Nutrient Intake,Supplement Intake  Physical Signs/Symptoms Outcomes:  Biochemical Data,GI Status,Fluid Status or Edema,Nutrition Focused Physical Findings,Skin,Weight     Discharge Planning:     Too soon to determine     Electronically signed by Jaime Fenton RD, LD on 3/15/22 at 12:56 PM EDT  Contact: [phone]

## 2022-03-15 NOTE — CARE COORDINATION
Social Work/Discharge Planning:  Met with patient and completed initial assessment. Explained Social Work role and discussed transition of care/discharge planning. COVID negative 3/14. He admitted from [facility] where he was getting rehab. He states he lives at [facility]. He plans to return to [facility] at discharge. Called patient son Torrie Euceda (ph: [phone]) and confirmed patient plan to return to [place] at discharge. Called Justin Cruz with [facility] and confirmed patient is a bed hold and no pre-cert needed. He will need a negative covid result on day of discharge. Electronic N-17 in Epic and transport form in soft chart. Will continue to follow and assist with discharge planning.   Electronically signed by ELKE Wiseman on 3/15/2022 at 11:32 AM

## 2022-03-15 NOTE — PROGRESS NOTES
[address] [place] [address] [facility]  Progress Note    SUBJECTIVE:  Chief Complaint   Patient presents with    Fever     woke up with 101.4 fever this morning    Fatigue     feeling weak since yesterday     No new complaints. The patient is tolerating IV antibiotic. No nausea or vomiting. His left leg still hurting. He says it always hurts. Review of systems:  As stated above in the chief complaint, otherwise negative. Medications:  Scheduled Meds:   apixaban  2.5 mg Oral BID AC    cefTRIAXone (ROCEPHIN) IV  2,000 mg IntraVENous Q24H    ascorbic acid  500 mg Oral BID    atorvastatin  80 mg Oral Nightly    docusate sodium  100 mg Oral Nightly    tamsulosin  0.4 mg Oral Daily    ferrous sulfate  325 mg Oral Daily    [Held by provider] furosemide  20 mg Oral Daily    miconazole   Topical BID    therapeutic multivitamin-minerals  1 tablet Oral Daily    ocuvite-lutein  1 tablet Oral BID WC    pantoprazole  40 mg Oral BID AC    vitamin D  2,000 Units Oral Daily    zinc sulfate  50 mg Oral Daily    gabapentin  200 mg Oral BID    insulin glargine  76 Units SubCUTAneous Nightly    And    insulin lispro  10 Units SubCUTAneous TID WC     Continuous Infusions:  PRN Meds:perflutren lipid microspheres, acetaminophen, diphenhydrAMINE-zinc acetate, clotrimazole-betamethasone, bisacodyl, fleet, magnesium hydroxide, calcium carbonate, ondansetron    OBJECTIVE:  BP (!) 99/52   Pulse 78   Temp 98.3 °F (36.8 °C) (Axillary)   Resp 20   Ht 6' (1.829 m)   Wt 246 lb (111.6 kg)   SpO2 96%   BMI 33.36 kg/m²   Temp  Av °F (37.8 °C)  Min: 98.3 °F (36.8 °C)  Max: 103 °F (39.4 °C)  Constitutional: The patient is awake, alert, and oriented. Lying in bed. No distress  Skin: Warm and dry. No rashes were noted. HEENT: Round and reactive pupils. Moist mucous membranes. No ulcerations or thrush. Neck: Supple to movements. Chest: No use of accessory muscles to breathe.  Symmetrical expansion. No wheezing, crackles or rhonchi. Cardiovascular: S1 and S2 are rhythmic and regular. No murmurs appreciated. Abdomen: Positive bowel sounds to auscultation. Benign to palpation. Extremities: Bilateral lower extremity venous stasis dermatitis. Erythema and edema left leg. There is an open wound on lateral aspect of the left proximal leg and an ulcer on the left heel. Foot is wrapped.   Lines: peripheral    Laboratory and Tests Review:  Lab Results   Component Value Date    WBC 19.4 (H) 03/15/2022    WBC 27.4 (H) 03/14/2022    WBC 10.6 03/01/2022    HGB 9.9 (L) 03/15/2022    HCT 32.6 (L) 03/15/2022    MCV 86.5 03/15/2022     03/15/2022     Lab Results   Component Value Date    NEUTROABS 18.40 (H) 03/15/2022    NEUTROABS 25.82 (H) 03/14/2022    NEUTROABS 7.83 (H) 03/01/2022     No results found for: CRPHS  Lab Results   Component Value Date    ALT 9 03/14/2022    AST 23 03/14/2022    ALKPHOS 136 (H) 03/14/2022    BILITOT 0.6 03/14/2022     Lab Results   Component Value Date     03/15/2022    K 4.4 03/15/2022    K 4.6 03/14/2022     03/15/2022    CO2 26 03/15/2022    BUN 52 03/15/2022    CREATININE 1.6 03/15/2022    CREATININE 1.4 03/14/2022    CREATININE 1.1 03/01/2022    GFRAA 49 03/15/2022    LABGLOM 41 03/15/2022    GLUCOSE 284 03/15/2022    PROT 6.0 03/14/2022    LABALBU 3.1 03/14/2022    CALCIUM 8.3 03/15/2022    BILITOT 0.6 03/14/2022    ALKPHOS 136 03/14/2022    AST 23 03/14/2022    ALT 9 03/14/2022     Lab Results   Component Value Date    CRP 8.9 (H) 03/14/2022    CRP 1.5 (H) 08/29/2021    CRP 3.0 (H) 08/28/2021     Lab Results   Component Value Date    SEDRATE 44 (H) 03/14/2022    SEDRATE 23 (H) 08/29/2021     Radiology:      Microbiology:   Left leg ulcer 3/9/2022: Citrobacter werkmanii, Klebsiella oxytoca, Streptococcus dysgalactiae, Corynebacterium species, Klebsiella oxytoca  Left heel ulcer 3/14/2022: Group G Streptococcus  Rapid SARS-CoV-2 3/42/22: Negative so far  Blood cultures 3/20/2022: Negative so far  Urine culture 3/14/2022: Pending    ASSESSMENT:  · Cellulitis of the left leg  · 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

## 2022-03-15 NOTE — PROGRESS NOTES
Occupational Therapy    OT eval and treat orders received and chart reviewed. Attempt made but pt reports that he wears B heel offloading shoes and must have them on when transferring or walking. Social work aware and working with facility to see if offloading shoes can be brought to hospital. Will check back at another time/date as able/appropriate.     Rico Argueta, OTR/L

## 2022-03-15 NOTE — PROGRESS NOTES
[address] FOLLOW-UP    Name: Keegan Peres. Age: 80 y.o. Date of Admission: 3/14/2022 11:56 AM    Date of Service: 3/15/2022    Chief Complaint: Follow-up for abnormal troponin, diabetic wound infection, male breast carcinoma, thromboembolic disease acute kidney injury superimposed upon chronic kidney disease    Interim History: The patient was predominantly sleeping throughout assessment but upon awakening related no significant complaints. He is presently diaphoretic in the face of persistent low-grade fevers and leukocytosis. Incomplete maintenance of intake and output limit adequate assessment of present therapeutic response with evidence of a progressive prerenal azotemia and evidence of acute kidney injury superimposed upon chronic kidney disease. Assessment by the infectious disease service is pending. Review of Systems: The remainder of a complete multisystem review including consitutional, central nervous, respiratory, circulatory, gastrointestinal, genitourinary, endocrinologic, hematologic, musculoskeletal and psychiatric are negative. Problem List:  Patient Active Problem List   Diagnosis    HTN (hypertension)    Breast cancer, male ([address]    Obesity (BMI 30.0-34. 9)    Right hip pain    Diabetes mellitus type 2, uncontrolled ([address]    Essential hypertension, benign    Hyperlipidemia with target LDL less than 100    Dizzy spells    Troponin level elevated    Shortness of breath    Chest pressure    Acute respiratory insufficiency    Pneumonia    History of pulmonary embolus (PE)    Acute left-sided CHF (congestive heart failure) (HCC)    Type 1 diabetes with stage 3 chronic kidney disease moderate GFR 30-59 ([facility])    Cellulitis and abscess of right lower extremity    Cellulitis and abscess of left lower extremity    Chronic venous insufficiency of lower extremity    Acute hypoxemic respiratory failure due to COVID-19 ([facility])    Acute on chronic respiratory failure with hypoxia (HCC)    Acute respiratory failure with hypoxia ([address]    COVID-19 virus infection    Hyperlipidemia    Pneumonia due to COVID-19 virus    Orthostatic hypotension    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 (HCC)    PVD (peripheral vascular disease) (HCC)    PAD (peripheral artery disease) (HCC)    Diabetic ulcer of right heel associated with type 2 diabetes mellitus, with fat layer exposed ([address]    Diabetic ulcer of left heel ([address]    Sepsis ([address]    Diabetic foot infection ([address]       Allergies:   Allergies   Allergen Reactions    Clindamycin/Lincomycin     Sulfa Antibiotics        Current Medications:  Current Facility-Administered Medications   Medication Dose Route Frequency Provider Last Rate Last Admin    apixaban (ELIQUIS) tablet 2.5 mg  2.5 mg Oral BID AC Margret Coleman MD        cefTRIAXone (ROCEPHIN) 2,000 mg in sterile water 20 mL IV syringe  2,000 mg IntraVENous Q24H Matthew Billings MD   2,000 mg at 03/14/22 1447    perflutren lipid microspheres (DEFINITY) injection 1.65 mg  1.5 mL IntraVENous ONCE PRN Margret Coleman MD        acetaminophen (TYLENOL) tablet 650 mg  650 mg Oral Q4H PRN Fam Ayers MD   650 mg at 03/15/22 0120    ascorbic acid (VITAMIN C) tablet 500 mg  500 mg Oral BID Fam Ayers MD   500 mg at 03/14/22 2004    atorvastatin (LIPITOR) tablet 80 mg  80 mg Oral Nightly Fam Ayers MD   80 mg at 03/14/22 2004    diphenhydrAMINE-zinc acetate (BENADRYL) cream   Topical Q4H PRN Fam Ayers MD        clotrimazole-betamethasone (LOTRISONE) cream   Topical PRN Fam Ayers MD        docusate sodium (COLACE) capsule 100 mg  100 mg Oral Nightly Fam Ayers MD   100 mg at 03/14/22 2004    bisacodyl (DULCOLAX) suppository 10 mg  10 mg Rectal Daily PRN Fam Ayers MD        fleet rectal enema 1 enema  1 enema Rectal Daily PRN Fam Ayers MD        tamsulosin [facility]) capsule 0.4 mg  0.4 mg Oral Daily Denice Guzman MD   0.4 mg at 03/14/22 1821    ferrous sulfate (IRON 325) tablet 325 mg  325 mg Oral Daily Denice Guzman MD   325 mg at 03/14/22 1821    [Held by provider] furosemide (LASIX) tablet 20 mg  20 mg Oral Daily Denice Guzman MD        miconazole (MICOTIN) 2 % powder   Topical BID Denice Guzman MD   Given at 03/14/22 1955    magnesium hydroxide (MILK OF MAGNESIA) 400 MG/5ML suspension 30 mL  30 mL Oral Daily PRN Denice Guzman MD        therapeutic multivitamin-minerals 1 tablet  1 tablet Oral Daily Deince Guzman MD   1 tablet at 03/14/22 1821    antioxidant multivitamin (OCUVITE) tablet  1 tablet Oral BID  Denice Guzman MD   1 tablet at 03/14/22 1820    pantoprazole (PROTONIX) tablet 40 mg  40 mg Oral BID  Dneice Guzman MD   40 mg at 03/15/22 0555    calcium carbonate (TUMS) chewable tablet 500 mg  1 tablet Oral Q4H PRN Denice Guzman MD        vitamin D (CHOLECALCIFEROL) tablet 2,000 Units  2,000 Units Oral Daily Denice Guzman MD   2,000 Units at 03/14/22 1821    zinc sulfate (ZINCATE) capsule 50 mg  50 mg Oral Daily Denice Guzman MD   50 mg at 03/14/22 1821    gabapentin (NEURONTIN) capsule 200 mg  200 mg Oral BID Denice Guzman MD   200 mg at 03/14/22 2004    insulin glargine (LANTUS) injection vial 76 Units  76 Units SubCUTAneous Nightly Loulou Gallegos MD   55 Units at 03/14/22 1950    And    insulin lispro (HUMALOG) injection vial 10 Units  10 Units SubCUTAneous TID  Loulou Gallegos MD   10 Units at 03/15/22 0603    ondansetron (ZOFRAN) injection 4 mg  4 mg IntraVENous Q6H PRN Denice Guzman MD   4 mg at 03/14/22 2003         Physical Exam:  /65   Pulse 101   Temp 98.8 °F (37.1 °C) (Oral)   Resp 22   Ht 6' (1.829 m)   Wt 246 lb (111.6 kg)   SpO2 96%   BMI 33.36 kg/m²   Weight change:    Wt Readings from Last 3 Encounters:   03/14/22 246 lb (111.6 kg)   03/02/22 243 lb (110.2 kg)   02/16/22 243 lb (110.2 kg)     The patient is presently sleeping but awakens for short periods and is presently diaphoretic and in no discomfort or distress. No gross musculoskeletal deformity is present. No significant skin or nail changes are present beyond that of previously reported wounds of his lower extremities. Gross examination of head, eyes, nose and throat are negative. Jugular venous pressure is normal and no carotid bruits are present. Normal respiratory effort is noted with no accessory muscle usage present. Lung fields are clear to ascultation. Cardiac examination is notable for a regular rate and rhythm with no palpable thrill. No gallop rhythm or cardiac murmur are identified. A benign abdominal examination is present with the exception of obesity no masses or organomegaly. Intact pulses are present throughout all extremities and mild pretibial and pedal edema is present. No focal neurologic deficits are present. Intake/Output:    Intake/Output Summary (Last 24 hours) at 3/15/2022 0703  Last data filed at 3/14/2022 1408  Gross per 24 hour   Intake 1000 ml   Output --   Net 1000 ml     No intake/output data recorded. Laboratory Tests:  Lab Results   Component Value Date    CREATININE 1.6 (H) 03/15/2022    BUN 52 (H) 03/15/2022     03/15/2022    K 4.4 03/15/2022     03/15/2022    CO2 26 03/15/2022     No results for input(s): CKTOTAL, CKMB in the last 72 hours.     Invalid input(s): TROPONONI  Lab Results   Component Value Date    BNP 53 05/11/2013     Lab Results   Component Value Date    WBC 19.4 03/15/2022    RBC 3.77 03/15/2022    HGB 9.9 03/15/2022    HCT 32.6 03/15/2022    MCV 86.5 03/15/2022    MCH 26.3 03/15/2022    MCHC 30.4 03/15/2022    RDW 17.2 03/15/2022     03/15/2022    MPV 9.3 03/15/2022     Recent Labs     03/14/22  1217   ALKPHOS 136*   ALT 9   AST 23   PROT 6.0*   BILITOT 0.6   LABALBU 3.1*     Lab Results   Component Value Date    MG 2.4 08/28/2021     Lab Results Component Value Date    PROTIME 12.1 12/20/2021    INR 1.1 12/20/2021     No results found for: TSH  No components found for: CHLPL  Lab Results   Component Value Date    TRIG 124 07/21/2021    TRIG 144 01/20/2021    TRIG 97 10/23/2020     Lab Results   Component Value Date    HDL 33 07/21/2021    HDL 37 01/20/2021    HDL 43 10/23/2020     Lab Results   Component Value Date    LDLCALC 49 07/21/2021 [address] 53 01/20/2021 [address] 56 10/23/2020       Cardiac Tests:  Telemetry findings reviewed: sinus rhythm with supraventricular ectopy, no new tachy/bradyarrhythmias overnight      ASSESSMENT / PLAN: On a clinical basis, the patient presently appears compensated from a cardiovascular standpoint with no additional cardiovascular symptoms and persistent febrile episodes and a leukocytosis suggestive of ongoing infection. On the basis of his progressive prerenal azotemia with an associated increase of serum creatinine levels, diuretics will be withheld with need of careful monitoring of his volume status as well as that of renal function and electrolytes with nutritional support essential to fluid mobilization reducing risk of progressive debilitation. In addition based on present serum creatinine levels in conjunction with his age, modification of his oral anticoagulation dosage will be advisable with ongoing need of careful monitoring of his serum creatinine level to optimize future dosing. His echocardiogram is pending for evaluation of both of his abnormal troponin in the face of his infection to assess any component of endovascular involvement. Ongoing management will largely be deferred to the combination of primary care, the podiatry service and infectious disease. Continued aggressive risk factor modification of blood pressure, diabetes and serum lipids will remain essential to reducing risk of future atherosclerotic development.       Note: This report was completed utilizing computer voice recognition software. Every effort has been made to ensure accuracy, however; inadvertent computerized transcription errors may be present. Mateus Gaines.  [place], [address] [facility]

## 2022-03-15 NOTE — PROGRESS NOTES
Podiatry Progress Note  3/15/2022   Oralia Constantino. HISTORY OF PRESENT ILLNESS: Pt is a diabetic 80 y.o. male seen bedside for wounds to b/l LE. Pt is sleeping and awakens in between questions. Pt denies any current N/V/F/C but continues to experience pain to the wound on posterior left leg. Pt denies any other pain. Pt has no other pedal complaints at this time. Past Medical History:   Diagnosis Date    Arthritis     Cancer ([facility] [address]     breast    Cellulitis     [facility]est[facility]) [facility]     CKD (chronic kidney disease) stage 3, GFR 30-59 ml/min ([facility])     Diabetes mellitus ([address]     History of lumpectomy     Hx of blood clots     Hyperlipidemia     Hypertension     Left ventricular failure ([facility])     Macular degeneration     Obesity     Orthostatic hypotension     PE (pulmonary thromboembolism) ([facility])     Pressure injury of both heels, unstageable ([address]     Staph aureus infection     Venous insufficiency         Past Surgical History:   Procedure Laterality Date    [facility]  9/2/2021         MASTECTOMY      TOE AMPUTATION      TOE SURGERY      TONSILLECTOMY           History reviewed. No pertinent family history. Social History     Tobacco Use    Smoking status: Never Smoker    Smokeless tobacco: Never Used   Substance Use Topics    Alcohol use: Not Currently     Alcohol/week: 1.0 standard drink     Types: 1 Cans of beer per week        Prior to Admission medications    Medication Sig Start Date End Date Taking?  Authorizing Provider   docusate sodium (COLACE) 100 MG capsule Take 100 mg by mouth nightly   Yes Historical Provider, MD   tamsulosin (FLOMAX) 0.4 MG capsule Take 0.4 mg by mouth daily   Yes Historical Provider, MD   bisacodyl (DULCOLAX) 10 MG suppository Place 10 mg rectally daily as needed for Constipation   Yes Historical Provider, MD   diphenhydrAMINE-zinc acetate (BENADRYL) 1-0.1 % cream Apply topically every 4 hours as needed for Itching Apply topically 3 times daily as needed. Yes Historical Provider, MD   Sodium Phosphates (FLEET) 7-19 GM/118ML Place 1 enema rectally daily as needed   Yes Historical Provider, MD   magnesium hydroxide (MILK OF MAGNESIA) 400 MG/5ML suspension Take 30 mLs by mouth daily as needed for Constipation   Yes Historical Provider, MD   Polyethylene Glycol 400 1 % SOLN Apply 1 drop to eye every 4 hours as needed   Yes Historical Provider, MD   ferrous sulfate (IRON 325) 325 (65 Fe) MG tablet Take 325 mg by mouth daily    Yes Historical Provider, MD   zinc sulfate (ZINCATE) 220 (50 Zn) MG capsule Take 1 capsule by mouth daily for 14 days 9/4/21 3/14/22 Yes SHAHEEN Pisano   miconazole (MICOTIN) 2 % powder Apply topically 2 times daily.  9/2/21  Yes Stella Noonan MD   ascorbic acid (VITAMIN C) 500 MG tablet Take 1 tablet by mouth 2 times daily 9/2/21  Yes Stella Noonan MD   Vitamin D (CHOLECALCIFEROL) 50 MCG (2000 UT) TABS tablet Take 1 tablet by mouth daily 9/3/21  Yes Stella Noonan MD   furosemide (LASIX) 40 MG tablet Take 20 mg by mouth daily    Yes Historical Provider, MD   omeprazole (PRILOSEC) 20 MG delayed release capsule Take 20 mg by mouth daily   Yes Historical Provider, MD   Multiple Vitamins-Minerals (THERAPEUTIC MULTIVITAMIN-MINERALS) tablet Take 1 tablet by mouth daily   Yes Historical Provider, MD   Multiple Vitamins-Minerals (PRESERVISION AREDS PO) Take 1 tablet by mouth 2 times daily (with meals)   Yes Historical Provider, MD   acetaminophen (TYLENOL) 325 MG tablet Take 650 mg by mouth every 4 hours as needed for Pain or Fever    Yes Historical Provider, MD   calcium carbonate (TUMS) 500 MG chewable tablet Take 1 tablet by mouth every 4 hours as needed for Heartburn   Yes Historical Provider, MD   apixaban (ELIQUIS) 5 MG TABS tablet Take 5 mg by mouth 2 times daily (before meals)    Yes Historical Provider, MD   gabapentin (NEURONTIN) 100 MG capsule Take 200 mg by mouth Daily with supper. Yes Historical Provider, MD   atorvastatin (LIPITOR) 80 MG tablet Take 80 mg by mouth at bedtime    Yes Historical Provider, MD   INSULIN LISP PROT & LISP, HUM, (75-25) 100 UNIT/ML SUSP Inject 72 Units into the skin daily (with breakfast)    Yes Historical Provider, MD   INSULIN LISP PROT & LISP, HUM, (75-25) 100 UNIT/ML SUSP Inject 55 Units into the skin Daily with supper    Yes Historical Provider, MD   clotrimazole-betamethasone (LOTRISONE) cream Apply  topically as needed. Apply topically 2 times daily. Yes Historical Provider, MD   ipratropium-albuterol (DUONEB) 0.5-2.5 (3) MG/3ML SOLN nebulizer solution Inhale 3 mLs into the lungs every 4 hours (while awake) for 30 doses 12/21/17 12/29/17  Franklin Mcgill MD        Clindamycin/lincomycin and Sulfa antibiotics         OBJECTIVE:        Vitals:    03/15/22 0748   BP: (!) 99/52   Pulse: 78   Resp: 20   Temp: 98.3 °F (36.8 °C)   SpO2: 96%              EXAM:        Pt is AAOx3, NAD    Vascular Exam:  DP and PT pulses are +2 b/l. CFT is <3 seconds bilateral lower extremity. Edema is present. Neuro Exam:  Light touch sensation and protective sensation is absent. Dermatologic Exam:  Full thickness ulcers measuring roughly 2x2 cm present to b/l heels to level of subcutaneous tissue. No malodor, crepitus or fluctuance noted. Minor serosanguinous drainage. There is also a full thickness ulcer measuring roughly 1.5x1.5 cm to L posterior calf to level of muscle. No malodor, crepitus or fluctuance noted. Minor serosanguinous drainage. Erythema to b/l feet and ankles which pt states has been present for years. MSK: MMT deferred at this time. 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       Current Facility-Administered Medications   Medication Dose Route Frequency Provider Last Rate Last Admin    apixaban (ELIQUIS) tablet 2.5 mg  2.5 mg Oral BID PRAVEENA Bashir MD        cefTRIAXone (ROCEPHIN) 2,000 mg in sterile water 20 mL IV syringe  2,000 mg sulfate (ZINCATE) capsule 50 mg  50 mg Oral Daily Zachery Barrett MD   50 mg at 03/15/22 0825    gabapentin (NEURONTIN) capsule 200 mg  200 mg Oral BID Zachery Barrett MD   200 mg at 03/15/22 0825    insulin glargine (LANTUS) injection vial 76 Units  76 Units SubCUTAneous Nightly Marilee Hancock MD   55 Units at 03/14/22 1950    And    insulin lispro (HUMALOG) injection vial 10 Units  10 Units SubCUTAneous TID WC Marilee Hancock MD   10 Units at 03/15/22 1154    ondansetron (ZOFRAN) injection 4 mg  4 mg IntraVENous Q6H PRN Zachery Barrett MD   4 mg at 03/15/22 1154        Lab Results   Component Value Date    WBC 19.4 (H) 03/15/2022    HCT 32.6 (L) 03/15/2022    HGB 9.9 (L) 03/15/2022     03/15/2022     03/15/2022    K 4.4 03/15/2022     03/15/2022    CO2 26 03/15/2022    BUN 52 (H) 03/15/2022    CREATININE 1.6 (H) 03/15/2022    GLUCOSE 284 (H) 03/15/2022    CRP 8.9 (H) 03/14/2022         Radiographs:    ASSESSMENT:  Full thickness ulcers to b/l LE  DM with neuropathy  Sepsis ([facility] [address]        PLAN:  -Patient examined and evaluated at bedside  -All pertinent labs, charts, and imaging reviewed prior to encounter  - WBC: 19.4, down from yesterday  - x rays: Soft tissue defect without acute bony abnormality or radiopaque soft tissue foreign body. - Wound culture prelim: Mixed Gram positive organisms including Group G strep   - Previous wound cultures last week showed Citrobacter werkmanii, Klebsiella oxytoca,  Corynebacterium species, and Streptococcus dysgalactiae ssp equisimilis. - ID consulted- Continue Ceftriaxone  - NIVS- no DVT seen on 2/2/22. - Arterial US on 12/14/22 show Findings suggestive of small caliber arterial stenosis. No convincing evidence of large or medium caliber arterial stenosis in the lower extremities. - Wounds dressed in DSD. Will continue with local wound care  - No plan for surgical debridement at this time.  Will continue to follow pt while in house  - Pt discussed with Dr. Noble Littlejohn          Thank you for involving podiatry in this patients care.  Please do not hesitate to call with any questions or concerns       Hailey Dawkins DPM  PGY1 Podiatry Resident   3/15/2022   1:25 PM

## 2022-03-16 ENCOUNTER — HOSPITAL ENCOUNTER (OUTPATIENT)
Dept: WOUND CARE | Age: 87
Discharge: HOME | End: 2022-03-16

## 2022-03-16 LAB
ANION GAP SERPL CALCULATED.3IONS-SCNC: 7 MMOL/L (ref 7–16)
BUN BLDV-MCNC: 53 MG/DL (ref 6–23)
CALCIUM SERPL-MCNC: 8.1 MG/DL (ref 8.6–10.2)
CHLORIDE BLD-SCNC: 102 MMOL/L (ref 98–107)
CO2: 26 MMOL/L (ref 22–29)
CREAT SERPL-MCNC: 1.4 MG/DL (ref 0.7–1.2)
GFR AFRICAN AMERICAN: 58
GFR NON-AFRICAN AMERICAN: 48 ML/MIN/1.73
GLUCOSE BLD-MCNC: 255 MG/DL (ref 74–99)
METER GLUCOSE: 177 MG/DL (ref 74–99)
METER GLUCOSE: 202 MG/DL (ref 74–99)
METER GLUCOSE: 236 MG/DL (ref 74–99)
METER GLUCOSE: 245 MG/DL (ref 74–99)
POTASSIUM SERPL-SCNC: 4.3 MMOL/L (ref 3.5–5)
SODIUM BLD-SCNC: 135 MMOL/L (ref 132–146)

## 2022-03-16 PROCEDURE — 97165 OT EVAL LOW COMPLEX 30 MIN: CPT

## 2022-03-16 PROCEDURE — 97530 THERAPEUTIC ACTIVITIES: CPT

## 2022-03-16 PROCEDURE — 6370000000 HC RX 637 (ALT 250 FOR IP): Performed by: INTERNAL MEDICINE

## 2022-03-16 PROCEDURE — 82962 GLUCOSE BLOOD TEST: CPT

## 2022-03-16 PROCEDURE — 6360000002 HC RX W HCPCS: Performed by: INTERNAL MEDICINE

## 2022-03-16 PROCEDURE — 99233 SBSQ HOSP IP/OBS HIGH 50: CPT | Performed by: INTERNAL MEDICINE

## 2022-03-16 PROCEDURE — 6370000000 HC RX 637 (ALT 250 FOR IP): Performed by: SPECIALIST

## 2022-03-16 PROCEDURE — 97161 PT EVAL LOW COMPLEX 20 MIN: CPT

## 2022-03-16 PROCEDURE — 36415 COLL VENOUS BLD VENIPUNCTURE: CPT

## 2022-03-16 PROCEDURE — 80048 BASIC METABOLIC PNL TOTAL CA: CPT

## 2022-03-16 PROCEDURE — 2060000000 HC ICU INTERMEDIATE R&B

## 2022-03-16 RX ORDER — CEPHALEXIN 500 MG/1
1000 CAPSULE ORAL EVERY 8 HOURS SCHEDULED
Status: DISCONTINUED | OUTPATIENT
Start: 2022-03-16 | End: 2022-03-17 | Stop reason: HOSPADM

## 2022-03-16 RX ORDER — DEXTROSE MONOHYDRATE 50 MG/ML
100 INJECTION, SOLUTION INTRAVENOUS PRN
Status: DISCONTINUED | OUTPATIENT
Start: 2022-03-16 | End: 2022-03-17 | Stop reason: HOSPADM

## 2022-03-16 RX ORDER — CEPHALEXIN 500 MG/1
1000 CAPSULE ORAL EVERY 8 HOURS SCHEDULED
Qty: 60 CAPSULE | Refills: 0 | Status: SHIPPED | OUTPATIENT
Start: 2022-03-16 | End: 2022-03-17

## 2022-03-16 RX ORDER — NICOTINE POLACRILEX 4 MG
15 LOZENGE BUCCAL PRN
Status: DISCONTINUED | OUTPATIENT
Start: 2022-03-16 | End: 2022-03-17 | Stop reason: HOSPADM

## 2022-03-16 RX ORDER — DEXTROSE MONOHYDRATE 25 G/50ML
12.5 INJECTION, SOLUTION INTRAVENOUS PRN
Status: DISCONTINUED | OUTPATIENT
Start: 2022-03-16 | End: 2022-03-17 | Stop reason: HOSPADM

## 2022-03-16 RX ADMIN — GABAPENTIN 200 MG: 100 CAPSULE ORAL at 09:40

## 2022-03-16 RX ADMIN — OXYCODONE HYDROCHLORIDE AND ACETAMINOPHEN 500 MG: 500 TABLET ORAL at 20:31

## 2022-03-16 RX ADMIN — TAMSULOSIN HYDROCHLORIDE 0.4 MG: 0.4 CAPSULE ORAL at 09:41

## 2022-03-16 RX ADMIN — ACETAMINOPHEN 650 MG: 325 TABLET ORAL at 20:31

## 2022-03-16 RX ADMIN — INSULIN LISPRO 10 UNITS: 100 INJECTION, SOLUTION INTRAVENOUS; SUBCUTANEOUS at 11:16

## 2022-03-16 RX ADMIN — PANTOPRAZOLE SODIUM 40 MG: 40 TABLET, DELAYED RELEASE ORAL at 16:54

## 2022-03-16 RX ADMIN — INSULIN GLARGINE 76 UNITS: 100 INJECTION, SOLUTION SUBCUTANEOUS at 20:31

## 2022-03-16 RX ADMIN — Medication 1 TABLET: at 16:54

## 2022-03-16 RX ADMIN — APIXABAN 2.5 MG: 2.5 TABLET, FILM COATED ORAL at 16:54

## 2022-03-16 RX ADMIN — ZINC SULFATE 220 MG (50 MG) CAPSULE 50 MG: CAPSULE at 09:41

## 2022-03-16 RX ADMIN — FUROSEMIDE 20 MG: 20 TABLET ORAL at 09:41

## 2022-03-16 RX ADMIN — MICONAZOLE NITRATE: 2 POWDER TOPICAL at 20:31

## 2022-03-16 RX ADMIN — GABAPENTIN 200 MG: 100 CAPSULE ORAL at 20:31

## 2022-03-16 RX ADMIN — Medication 1 TABLET: at 09:43

## 2022-03-16 RX ADMIN — ONDANSETRON 4 MG: 2 INJECTION INTRAMUSCULAR; INTRAVENOUS at 11:16

## 2022-03-16 RX ADMIN — PANTOPRAZOLE SODIUM 40 MG: 40 TABLET, DELAYED RELEASE ORAL at 06:34

## 2022-03-16 RX ADMIN — INSULIN LISPRO 10 UNITS: 100 INJECTION, SOLUTION INTRAVENOUS; SUBCUTANEOUS at 06:33

## 2022-03-16 RX ADMIN — CEPHALEXIN 1000 MG: 500 CAPSULE ORAL at 20:30

## 2022-03-16 RX ADMIN — CEPHALEXIN 1000 MG: 500 CAPSULE ORAL at 14:00

## 2022-03-16 RX ADMIN — Medication 2000 UNITS: at 09:41

## 2022-03-16 RX ADMIN — MICONAZOLE NITRATE: 2 POWDER TOPICAL at 09:42

## 2022-03-16 RX ADMIN — FERROUS SULFATE TAB 325 MG (65 MG ELEMENTAL FE) 325 MG: 325 (65 FE) TAB at 09:41

## 2022-03-16 RX ADMIN — OXYCODONE HYDROCHLORIDE AND ACETAMINOPHEN 500 MG: 500 TABLET ORAL at 09:40

## 2022-03-16 RX ADMIN — INSULIN LISPRO 10 UNITS: 100 INJECTION, SOLUTION INTRAVENOUS; SUBCUTANEOUS at 16:54

## 2022-03-16 RX ADMIN — MULTIPLE VITAMINS W/ MINERALS TAB 1 TABLET: TAB at 09:41

## 2022-03-16 RX ADMIN — ATORVASTATIN CALCIUM 80 MG: 40 TABLET, FILM COATED ORAL at 20:31

## 2022-03-16 RX ADMIN — DOCUSATE SODIUM 100 MG: 100 CAPSULE, LIQUID FILLED ORAL at 20:31

## 2022-03-16 RX ADMIN — APIXABAN 2.5 MG: 2.5 TABLET, FILM COATED ORAL at 06:34

## 2022-03-16 ASSESSMENT — PAIN SCALES - GENERAL
PAINLEVEL_OUTOF10: 7
PAINLEVEL_OUTOF10: 0

## 2022-03-16 NOTE — PROGRESS NOTES
[address] [place] [address] [facility]  Progress Note    SUBJECTIVE:  Chief Complaint   Patient presents with    Fever     woke up with 101.4 fever this morning    Fatigue     feeling weak since yesterday     No new complaints. The patient is tolerating IV antibiotic. No nausea or vomiting. His left leg still hurting. He says it always hurts. Review of systems:  As stated above in the chief complaint, otherwise negative. Medications:  Scheduled Meds:   apixaban  2.5 mg Oral BID AC    cefTRIAXone (ROCEPHIN) IV  2,000 mg IntraVENous Q24H    ascorbic acid  500 mg Oral BID    atorvastatin  80 mg Oral Nightly    docusate sodium  100 mg Oral Nightly    tamsulosin  0.4 mg Oral Daily    ferrous sulfate  325 mg Oral Daily    furosemide  20 mg Oral Daily    miconazole   Topical BID    therapeutic multivitamin-minerals  1 tablet Oral Daily    ocuvite-lutein  1 tablet Oral BID WC    pantoprazole  40 mg Oral BID AC    vitamin D  2,000 Units Oral Daily    zinc sulfate  50 mg Oral Daily    gabapentin  200 mg Oral BID    insulin glargine  76 Units SubCUTAneous Nightly    And    insulin lispro  10 Units SubCUTAneous TID WC     Continuous Infusions:   dextrose       PRN Meds:glucose, dextrose, glucagon (rDNA), dextrose, acetaminophen, diphenhydrAMINE-zinc acetate, clotrimazole-betamethasone, bisacodyl, fleet, magnesium hydroxide, calcium carbonate, ondansetron    OBJECTIVE:  /66   Pulse 88   Temp 98 °F (36.7 °C) (Oral)   Resp 17   Ht 6' (1.829 m)   Wt 266 lb 12.1 oz (121 kg)   SpO2 100%   BMI 36.18 kg/m²   Temp  Av °F (36.7 °C)  Min: 97.8 °F (36.6 °C)  Max: 98.1 °F (36.7 °C)  Constitutional: The patient is awake, alert, and oriented. Lying in bed. No distress  Skin: Warm and dry. No rashes were noted. HEENT: Round and reactive pupils. Moist mucous membranes. No ulcerations or thrush. Neck: Supple to movements. Chest: No use of accessory muscles to breathe. Symmetrical expansion. No wheezing, crackles or rhonchi. Cardiovascular: S1 and S2 are rhythmic and regular. No murmurs appreciated. Abdomen: Positive bowel sounds to auscultation. Benign to palpation. Extremities: Bilateral lower extremity venous stasis dermatitis. Erythema and edema left leg. There is an open wound on lateral aspect of the left proximal leg and an ulcer on the left heel. Foot is wrapped.   Lines: peripheral    Laboratory and Tests Review:  Lab Results   Component Value Date    WBC 19.4 (H) 03/15/2022    WBC 27.4 (H) 03/14/2022    WBC 10.6 03/01/2022    HGB 9.9 (L) 03/15/2022    HCT 32.6 (L) 03/15/2022    MCV 86.5 03/15/2022     03/15/2022     Lab Results   Component Value Date    NEUTROABS 18.40 (H) 03/15/2022    NEUTROABS 25.82 (H) 03/14/2022    NEUTROABS 7.83 (H) 03/01/2022     No results found for: CRPHS  Lab Results   Component Value Date    ALT 9 03/14/2022    AST 23 03/14/2022    ALKPHOS 136 (H) 03/14/2022    BILITOT 0.6 03/14/2022     Lab Results   Component Value Date     03/16/2022    K 4.3 03/16/2022    K 4.6 03/14/2022     03/16/2022    CO2 26 03/16/2022    BUN 53 03/16/2022    CREATININE 1.4 03/16/2022    CREATININE 1.6 03/15/2022    CREATININE 1.4 03/14/2022    GFRAA 58 03/16/2022    LABGLOM 48 03/16/2022    GLUCOSE 255 03/16/2022    PROT 6.0 03/14/2022    LABALBU 3.1 03/14/2022    CALCIUM 8.1 03/16/2022    BILITOT 0.6 03/14/2022    ALKPHOS 136 03/14/2022    AST 23 03/14/2022    ALT 9 03/14/2022     Lab Results   Component Value Date    CRP 8.9 (H) 03/14/2022    CRP 1.5 (H) 08/29/2021    CRP 3.0 (H) 08/28/2021     Lab Results   Component Value Date    SEDRATE 44 (H) 03/14/2022    SEDRATE 23 (H) 08/29/2021     Radiology:      Microbiology:   Left leg ulcer 3/9/2022: Citrobacter werkmanii, Klebsiella oxytoca, Streptococcus dysgalactiae, Corynebacterium species, Klebsiella oxytoca  Left heel ulcer 3/14/2022: Group G Streptococcus  Rapid SARS-CoV-2 3/42/22: Negative so far  Blood cultures 3/20/2022: Negative so far  Urine culture 3/14/2022: Group G Streptococcus, CoNS  ASO titer: 42    ASSESSMENT:  · Cellulitis of the left leg, improving  · 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

## 2022-03-16 NOTE — PROGRESS NOTES
Podiatry Progress Note  3/16/2022   Vandanaon Doroteo. HISTORY OF PRESENT ILLNESS: Pt is a diabetic 80 y.o. male seen bedside for wounds to b/l LE. Pt is sleeping and awakens in between questions. Pt denies any current N/V/F/C but continues to experience pain to the wound on posterior left leg. Pt denies any other pain. Pt has no other pedal complaints at this time. Past Medical History:   Diagnosis Date    Arthritis     Cancer ([address]     breast    Cellulitis     [facility]     CKD (chronic kidney disease) stage 3, GFR 30-59 ml/min ([facility]     Diabetes mellitus ([address]     History of lumpectomy     Hx of blood clots     Hyperlipidemia     Hypertension     Left ventricular failure ([facility]     Macular degeneration     Obesity     Orthostatic hypotension     PE [facility]pulmonary thromboembolism) ([facility]     Pressure injury of both heels, unstageable ([address]     Staph aureus infection     Venous insufficiency         Past Surgical History:   Procedure Laterality Date    [facility]  9/2/2021         MASTECTOMY      TOE AMPUTATION      TOE SURGERY      TONSILLECTOMY           History reviewed. No pertinent family history. Social History     Tobacco Use    Smoking status: Never Smoker    Smokeless tobacco: Never Used   Substance Use Topics    Alcohol use: Not Currently     Alcohol/week: 1.0 standard drink     Types: 1 Cans of beer per week        Prior to Admission medications    Medication Sig Start Date End Date Taking?  Authorizing Provider   cephALEXin (KEFLEX) 500 MG capsule Take 2 capsules by mouth every 8 hours for 30 doses 3/16/22 3/26/22 Yes Sammye Denver, MD   docusate sodium (COLACE) 100 MG capsule Take 100 mg by mouth nightly   Yes Historical Provider, MD   tamsulosin (FLOMAX) 0.4 MG capsule Take 0.4 mg by mouth daily   Yes Historical Provider, MD   bisacodyl (DULCOLAX) 10 MG suppository Place 10 mg rectally daily as needed for Constipation   Yes Historical Provider, MD   diphenhydrAMINE-zinc acetate (BENADRYL) 1-0.1 % cream Apply topically every 4 hours as needed for Itching Apply topically 3 times daily as needed. Yes Historical Provider, MD   Sodium Phosphates (FLEET) 7-19 GM/118ML Place 1 enema rectally daily as needed   Yes Historical Provider, MD   magnesium hydroxide (MILK OF MAGNESIA) 400 MG/5ML suspension Take 30 mLs by mouth daily as needed for Constipation   Yes Historical Provider, MD   Polyethylene Glycol 400 1 % SOLN Apply 1 drop to eye every 4 hours as needed   Yes Historical Provider, MD   ferrous sulfate (IRON 325) 325 (65 Fe) MG tablet Take 325 mg by mouth daily    Yes Historical Provider, MD   zinc sulfate (ZINCATE) 220 (50 Zn) MG capsule Take 1 capsule by mouth daily for 14 days 9/4/21 3/14/22 Yes SHAHEEN Rankin   miconazole (MICOTIN) 2 % powder Apply topically 2 times daily.  9/2/21  Yes Jennifer Lorenzana MD   ascorbic acid (VITAMIN C) 500 MG tablet Take 1 tablet by mouth 2 times daily 9/2/21  Yes Jennifer Lorenzana MD   Vitamin D (CHOLECALCIFEROL) 50 MCG (2000 UT) TABS tablet Take 1 tablet by mouth daily 9/3/21  Yes Jennifer Lorenzana MD   furosemide (LASIX) 40 MG tablet Take 20 mg by mouth daily    Yes Historical Provider, MD   omeprazole (PRILOSEC) 20 MG delayed release capsule Take 20 mg by mouth daily   Yes Historical Provider, MD   Multiple Vitamins-Minerals (THERAPEUTIC MULTIVITAMIN-MINERALS) tablet Take 1 tablet by mouth daily   Yes Historical Provider, MD   Multiple Vitamins-Minerals (PRESERVISION AREDS PO) Take 1 tablet by mouth 2 times daily (with meals)   Yes Historical Provider, MD   acetaminophen (TYLENOL) 325 MG tablet Take 650 mg by mouth every 4 hours as needed for Pain or Fever    Yes Historical Provider, MD   calcium carbonate (TUMS) 500 MG chewable tablet Take 1 tablet by mouth every 4 hours as needed for Heartburn   Yes Historical Provider, MD   apixaban (ELIQUIS) 5 MG TABS tablet Take 5 mg by mouth 2 times daily (before meals)    Yes Historical Provider, MD   gabapentin (NEURONTIN) 100 MG capsule Take 200 mg by mouth Daily with supper. Yes Historical Provider, MD   atorvastatin (LIPITOR) 80 MG tablet Take 80 mg by mouth at bedtime    Yes Historical Provider, MD   INSULIN LISP PROT & LISP, HUM, (75-25) 100 UNIT/ML SUSP Inject 72 Units into the skin daily (with breakfast)    Yes Historical Provider, MD   INSULIN LISP PROT & LISP, HUM, (75-25) 100 UNIT/ML SUSP Inject 55 Units into the skin Daily with supper    Yes Historical Provider, MD   clotrimazole-betamethasone (LOTRISONE) cream Apply  topically as needed. Apply topically 2 times daily. Yes Historical Provider, MD   ipratropium-albuterol (DUONEB) 0.5-2.5 (3) MG/3ML SOLN nebulizer solution Inhale 3 mLs into the lungs every 4 hours (while awake) for 30 doses 12/21/17 12/29/17  Juan Davis MD        Clindamycin/lincomycin and Sulfa antibiotics         OBJECTIVE:        Vitals:    03/16/22 1300   BP: (!) 119/57   Pulse: 89   Resp: 18   Temp: 98.9 °F (37.2 °C)   SpO2: 96%              EXAM:        Pt is AAOx3, NAD    Vascular Exam:  DP and PT pulses are +2 b/l. CFT is <3 seconds bilateral lower extremity. Edema is present. Neuro Exam:  Light touch sensation and protective sensation is absent. Dermatologic Exam:  Full thickness ulcers measuring roughly 2x2 cm present to b/l heels to level of subcutaneous tissue. No malodor, crepitus or fluctuance noted. Minor serosanguinous drainage. There is also a full thickness ulcer measuring roughly 1.5x1.5 cm to L posterior calf to level of muscle. No malodor, crepitus or fluctuance noted. Minor serosanguinous drainage. Erythema to b/l feet and ankles which pt states has been present for years. MSK: MMT deferred at this time. 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       Current Facility-Administered Medications   Medication Dose Route Frequency Provider Last Rate Last Admin    glucose (GLUTOSE) 40 % oral gel 15 g  15 g Oral PRN Jocelyn Sanchze MD        dextrose 50 % IV solution  12.5 g IntraVENous PRN Jocelyn Sanchez MD        glucagon (rDNA) injection 1 mg  1 mg IntraMUSCular PRN Jocelyn Sanchez MD        dextrose 5 % solution  100 mL/hr IntraVENous PRN Jocelyn Sanchez MD        cephALEXin [facility]) capsule 1,000 mg  1,000 mg Oral 3 times per day Clint Segovia MD        apixaban [facility]) tablet 2.5 mg  2.5 mg Oral BID AC Jay Lin MD   2.5 mg at 03/16/22 2634    acetaminophen (TYLENOL) tablet 650 mg  650 mg Oral Q4H PRN Jocelyn Sanchez MD   650 mg at 03/15/22 0120    ascorbic acid (VITAMIN C) tablet 500 mg  500 mg Oral BID Jocelyn Sanchez MD   500 mg at 03/16/22 0940    atorvastatin (LIPITOR) tablet 80 mg  80 mg Oral Nightly Jocelyn Sanchez MD   80 mg at 03/15/22 2019    diphenhydrAMINE-zinc acetate (BENADRYL) cream   Topical Q4H PRN Jocelyn Sanchez MD        clotrimazole-betamethasone (LOTRISONE) cream   Topical PRN Jocelyn Sanchez MD        docusate sodium (COLACE) capsule 100 mg  100 mg Oral Nightly Jocelyn Sanchez MD   100 mg at 03/15/22 2019    bisacodyl (DULCOLAX) suppository 10 mg  10 mg Rectal Daily PRN Jocelyn Sanchez MD        fleet rectal enema 1 enema  1 enema Rectal Daily PRN Jocelyn Sanchez MD        tamsulosin [facility]) capsule 0.4 mg  0.4 mg Oral Daily Jocelyn Sanchez MD   0.4 mg at 03/16/22 0941    ferrous sulfate (IRON 325) tablet 325 mg  325 mg Oral Daily Jocelyn Sanchez MD   325 mg at 03/16/22 0941    furosemide (LASIX) tablet 20 mg  20 mg Oral Daily Jocelyn Sanchez MD   20 mg at 03/16/22 0941    miconazole (MICOTIN) 2 % powder   Topical BID Jocelyn Sanchez MD   Given at 03/16/22 9055    magnesium hydroxide (MILK OF MAGNESIA) 400 MG/5ML suspension 30 mL  30 mL Oral Daily PRN Jocelyn Sanchez MD        therapeutic multivitamin-minerals 1 tablet  1 tablet Oral Daily Jocelyn Sanchez MD   1 tablet at 03/16/22 0941    antioxidant multivitamin (Bailey Brooks) tablet  1 tablet Oral BID  Deirdre Ruggiero MD   1 tablet at 03/16/22 0943    pantoprazole (PROTONIX) tablet 40 mg  40 mg Oral BID AC Deirdre Ruggiero MD   40 mg at 03/16/22 0599    calcium carbonate (TUMS) chewable tablet 500 mg  1 tablet Oral Q4H PRN Deirder Ruggiero MD        vitamin D (CHOLECALCIFEROL) tablet 2,000 Units  2,000 Units Oral Daily Deirdre Ruggiero MD   2,000 Units at 03/16/22 0941    zinc sulfate (ZINCATE) capsule 50 mg  50 mg Oral Daily Deirdre Ruggiero MD   50 mg at 03/16/22 0941    gabapentin (NEURONTIN) capsule 200 mg  200 mg Oral BID Deirdre Ruggiero MD   200 mg at 03/16/22 0940    insulin glargine (LANTUS) injection vial 76 Units  76 Units SubCUTAneous Nightly Marilynn Montano MD   76 Units at 03/15/22 2020    And    insulin lispro (HUMALOG) injection vial 10 Units  10 Units SubCUTAneous TID  Marilynn Montano MD   10 Units at 03/16/22 1116    ondansetron (ZOFRAN) injection 4 mg  4 mg IntraVENous Q6H PRN Deirdre Ruggiero MD   4 mg at 03/16/22 1116        Lab Results   Component Value Date    WBC 19.4 (H) 03/15/2022    HCT 32.6 (L) 03/15/2022    HGB 9.9 (L) 03/15/2022     03/15/2022     03/16/2022    K 4.3 03/16/2022     03/16/2022    CO2 26 03/16/2022    BUN 53 (H) 03/16/2022    CREATININE 1.4 (H) 03/16/2022    GLUCOSE 255 (H) 03/16/2022    CRP 8.9 (H) 03/14/2022         Radiographs:    ASSESSMENT:  Full thickness ulcers to b/l LE  DM with neuropathy  Sepsis ([facility] [address]        PLAN:  -Patient examined and evaluated at bedside  -All pertinent labs, charts, and imaging reviewed prior to encounter  - WBC: CBC pending today   - x rays: Soft tissue defect without acute bony abnormality or radiopaque soft tissue foreign body. - Wound culture prelim: Mixed Gram positive organisms including Group G strep   - Previous wound cultures last week showed Citrobacter werkmanii, Klebsiella oxytoca,  Corynebacterium species, and Streptococcus dysgalactiae ssp equisimilis.   - ID consulted- Change Ceftriaxone to Cephalexin x10 days  - NIVS- no DVT seen on 2/2/22. - Arterial US on 12/14/22 show Findings suggestive of small caliber arterial stenosis. No convincing evidence of large or medium caliber arterial stenosis in the lower extremities. - leg wound packed and dressed in DSD. QOD dressing changes to heel wounds. Will continue with local wound care. - No plan for surgical debridement at this time. Will continue to follow pt while in house. Pt clear for DC from podiatry perspective.   -Pt to make follow up appt with Dr. Deanne Clarke within 1 week from [facility]  - Pt discussed with Dr. Alicea Her          Thank you for involving podiatry in this patients care.  Please do not hesitate to call with any questions or concerns       Fausto Tobin DPM  PGY1 Podiatry Resident   3/16/2022   1:21 PM

## 2022-03-16 NOTE — PROGRESS NOTES
Physician Progress Note      PATIENT:               Rick Fischer  [facility] #:                  [id number]  :                       1932  ADMIT DATE:       3/14/2022 11:56 AM  DISCH DATE:  RESPONDING  PROVIDER #:        Ghada Mcmillan MD          QUERY TEXT:    Dr. Cristine Beaulieu,    Patient admitted with sepsis and noted to have tachypnea and hypoxia. If   possible, please document in the progress notes and discharge summary if you   are evaluating and/or treating any of the following: The medical record reflects the following:  Risk Factors: Sepsis  Clinical Indicators: respirations up to 24, SpO2 87% on room air,   intermittently on 2L O2  Treatment: Supplemental O2    Thank you,  Janet Irvin RN  Clinical Documentation Improvement  [phone]  Options provided:  -- Acute respiratory failure with hypoxia  -- Hypoxia with out respiratory failure  -- Other - I will add my own diagnosis  -- Disagree - Not applicable / Not valid  -- Disagree - Clinically unable to determine / Unknown  -- Refer to Clinical Documentation Reviewer    PROVIDER RESPONSE TEXT:    This patient has hypoxia without respiratory failure.     Query created by: María Corona on 3/15/2022 3:11 PM      Electronically signed by:  Ghada Mcmillan MD 3/16/2022 10:28 AM

## 2022-03-16 NOTE — PROGRESS NOTES
[address] [place] [address] FOLLOW-UP    Name: Marry Dominguez. Age: 80 y.o. Date of Admission: 3/14/2022 11:56 AM    Date of Service: 3/16/2022    Chief Complaint: Follow-up for abnormal troponin, diabetic wound infection, male breast carcinoma, thromboembolic disease, acute kidney injury superimposed upon chronic kidney disease, moderate obesity    Interim History: The patient denies active cardiovascular symptoms with interim assessment of infectious disease and the podiatry service reviewed. Incomplete maintenance of intake and output limits adequate assessment of response to present management. His echocardiogram demonstrates left ventricular hypertrophy with no regional wall motion abnormalities and normal left ventricular systolic function. Review of Systems: The remainder of a complete multisystem review including consitutional, central nervous, respiratory, circulatory, gastrointestinal, genitourinary, endocrinologic, hematologic, musculoskeletal and psychiatric are negative. Problem List:  Patient Active Problem List   Diagnosis    HTN (hypertension)    Breast cancer, male ([facility]    Obesity (BMI 30.0-34. 9)    Right hip pain    Diabetes mellitus type 2, uncontrolled ([facility]    Essential hypertension, benign    Hyperlipidemia with target LDL less than 100    Dizzy spells    Troponin level elevated    Shortness of breath    Chest pressure    Acute respiratory insufficiency    Pneumonia    History of pulmonary embolus (PE)    Acute left-sided CHF (congestive heart failure) (HCC)    Type 1 diabetes with stage 3 chronic kidney disease moderate GFR 30-59 (HCC)    Cellulitis and abscess of right lower extremity    Cellulitis and abscess of left lower extremity    Chronic venous insufficiency of lower extremity    Acute hypoxemic respiratory failure due to COVID-19 [facility])    Acute on chronic respiratory failure with hypoxia (HCC)    Acute respiratory failure with hypoxia (HCC)  COVID-19 virus infection    Hyperlipidemia    Pneumonia due to COVID-19 virus    Orthostatic hypotension    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 (HCC)    PVD (peripheral vascular disease) (HCC)    PAD (peripheral artery disease) (HCC)    Diabetic ulcer of right heel associated with type 2 diabetes mellitus, with fat layer exposed ([facility]    Diabetic ulcer of left heel ([facility]    Sepsis ([facility]    Diabetic foot infection ([facility]       Allergies:   Allergies   Allergen Reactions    Clindamycin/Lincomycin     Sulfa Antibiotics        Current Medications:  Current Facility-Administered Medications   Medication Dose Route Frequency Provider Last Rate Last Admin    glucose (GLUTOSE) 40 % oral gel 15 g  15 g Oral PRN Masoud Olivier MD        dextrose 50 % IV solution  12.5 g IntraVENous PRN Masoud Olivier MD        glucagon (rDNA) injection 1 mg  1 mg IntraMUSCular PRN Masoud Olivier MD        dextrose 5 % solution  100 mL/hr IntraVENous PRN Masoud Olivier MD        apixaban [place]) tablet 2.5 mg  2.5 mg Oral BID AC Angely Taylor MD   2.5 mg at 03/16/22 9705    cefTRIAXone (ROCEPHIN) 2,000 mg in sterile water 20 mL IV syringe  2,000 mg IntraVENous Q24H Ashok Murray MD   2,000 mg at 03/15/22 1429    acetaminophen (TYLENOL) tablet 650 mg  650 mg Oral Q4H PRN Masoud Olivier MD   650 mg at 03/15/22 0120    ascorbic acid (VITAMIN C) tablet 500 mg  500 mg Oral BID Masoud Olivier MD   500 mg at 03/15/22 2019    atorvastatin (LIPITOR) tablet 80 mg  80 mg Oral Nightly Masoud Olivier MD   80 mg at 03/15/22 2019    diphenhydrAMINE-zinc acetate (BENADRYL) cream   Topical Q4H PRN Masoud Olivier MD        clotrimazole-betamethasone (LOTRISONE) cream   Topical PRN Masoud Olivier MD        docusate sodium (COLACE) capsule 100 mg  100 mg Oral Nightly Masoud Olivier MD   100 mg at 03/15/22 2019    bisacodyl (DULCOLAX) suppository 10 mg  10 mg Rectal Daily PRN Gris Serra MD        fleet rectal enema 1 enema  1 enema Rectal Daily PRN Gris Serra MD        [facility]) capsule 0.4 mg  0.4 mg Oral Daily Gris Serra MD   0.4 mg at 03/15/22 0825    ferrous sulfate (IRON 325) tablet 325 mg  325 mg Oral Daily Gris Serra MD   325 mg at 03/15/22 0825    [Held by provider] furosemide (LASIX) tablet 20 mg  20 mg Oral Daily Gris Serra MD        miconazole (MICOTIN) 2 % powder   Topical BID Gris Serra MD   Given at 03/15/22 2019    magnesium hydroxide (MILK OF MAGNESIA) 400 MG/5ML suspension 30 mL  30 mL Oral Daily PRN Gris Serra MD        therapeutic multivitamin-minerals 1 tablet  1 tablet Oral Daily Gris Serra MD   1 tablet at 03/15/22 0825    antioxidant multivitamin (OCUVITE) tablet  1 tablet Oral BID  Gris Serra MD   1 tablet at 03/15/22 1602    pantoprazole (PROTONIX) tablet 40 mg  40 mg Oral BID AC Gris Serra MD   40 mg at 03/16/22 0516    calcium carbonate (TUMS) chewable tablet 500 mg  1 tablet Oral Q4H PRN Gris Serra MD        vitamin D (CHOLECALCIFEROL) tablet 2,000 Units  2,000 Units Oral Daily Gris Serra MD   2,000 Units at 03/15/22 0825    zinc sulfate (ZINCATE) capsule 50 mg  50 mg Oral Daily Gris Serra MD   50 mg at 03/15/22 0825    gabapentin (NEURONTIN) capsule 200 mg  200 mg Oral BID Gris Serra MD   200 mg at 03/15/22 2019    insulin glargine (LANTUS) injection vial 76 Units  76 Units SubCUTAneous Nightly Terrance Hemphill MD   76 Units at 03/15/22 2020    And    insulin lispro (HUMALOG) injection vial 10 Units  10 Units SubCUTAneous TID  Terrance Hemphill MD   10 Units at 03/16/22 0633    ondansetron (ZOFRAN) injection 4 mg  4 mg IntraVENous Q6H PRN Gris Serra MD   4 mg at 03/15/22 1154      dextrose         Physical Exam:  /86   Pulse 102   Temp 98.1 °F (36.7 °C) (Oral)   Resp 18   Ht 6' (1.829 m)   Wt 266 lb 12.1 oz (121 kg)   SpO2 92%   BMI 36.18 kg/m²   Weight change: 9 lb (4.082 kg)  Wt Readings from Last 3 Encounters:   03/16/22 266 lb 12.1 oz (121 kg)   03/02/22 243 lb (110.2 kg)   02/16/22 243 lb (110.2 kg)     The patient is awake, alert and in no discomfort or distress. No gross musculoskeletal deformity is present. No significant skin or nail changes are present. Gross examination of head, eyes, nose and throat are negative. Jugular venous pressure is normal and no carotid bruits are present. Normal respiratory effort is noted with no accessory muscle usage present. Lung fields are clear to ascultation. Cardiac examination is notable for a regular rate and rhythm with occasional extrasystole and no palpable thrill. No gallop rhythm or cardiac murmur are identified. A benign abdominal examination is present with the exception of obesity and no masses or organomegaly. Intact pulses are present throughout all extremities and mild pretibial and pedal edema is present. No focal neurologic deficits are present. Intake/Output:    Intake/Output Summary (Last 24 hours) at 3/16/2022 0745  Last data filed at 3/15/2022 1807  Gross per 24 hour   Intake 600 ml   Output 200 ml   Net 400 ml     No intake/output data recorded. Laboratory Tests:  Lab Results   Component Value Date    CREATININE 1.4 (H) 03/16/2022    BUN 53 (H) 03/16/2022     03/16/2022    K 4.3 03/16/2022     03/16/2022    CO2 26 03/16/2022     No results for input(s): CKTOTAL, CKMB in the last 72 hours.     Invalid input(s): TROPONONI  Lab Results   Component Value Date    BNP 53 05/11/2013     Lab Results   Component Value Date    WBC 19.4 03/15/2022    RBC 3.77 03/15/2022    HGB 9.9 03/15/2022    HCT 32.6 03/15/2022    MCV 86.5 03/15/2022    MCH 26.3 03/15/2022    MCHC 30.4 03/15/2022    RDW 17.2 03/15/2022     03/15/2022    MPV 9.3 03/15/2022     Recent Labs     03/14/22  1217   ALKPHOS 136*   ALT 9   AST 23   PROT 6.0*   BILITOT 0.6   LABALBU 3.1*     Lab Results   Component Value Date    MG 2.4 08/28/2021     Lab Results   Component Value Date    PROTIME 12.1 12/20/2021    INR 1.1 12/20/2021     No results found for: TSH  No components found for: CHLPL  Lab Results   Component Value Date    TRIG 124 07/21/2021    TRIG 144 01/20/2021    TRIG 97 10/23/2020     Lab Results   Component Value Date    HDL 33 07/21/2021    HDL 37 01/20/2021    HDL 43 10/23/2020     Lab Results   Component Value Date    LDLCALC 49 07/21/2021 [address] 53 01/20/2021 [address] 56 10/23/2020       Cardiac Tests:  Telemetry findings reviewed: sinus rhythm with occasional supraventricular ectopy, no new tachy/bradyarrhythmias overnight  Last Echocardiogram: An echocardiogram while technically suboptimal demonstrated evidence of a normal-sized left ventricular chamber with moderate concentric left ventricular hypertrophy in the absence of regional wall motion abnormalities with normal left ventricular systolic function. Borderline right ventricular dilatation is present in the absence of right ventricular dysfunction with no evidence of significant valvular pathology      ASSESSMENT / PLAN: On a clinical basis, the patient appears compensated from cardiovascular standpoint with no active cardiovascular symptoms and echocardiographic evidence excluding regional wall motion abnormalities suggestive of an acute coronary syndrome in the face of his abnormal high-sensitivity troponin levels and no additional needs of cardiovascular evaluation. Continued appropriate monitoring of his volume status as well as that of renal function and electrolytes will be necessary. On the basis of based on the chronic administration of his apixaban for thromboembolic protection, present modified dosing is appropriate on a long-term basis. Ongoing nutritional support will remain essential to fluid mobilization and reducing risk of progressive debilitation.   Additional management will be deferred to the combination of primary care as well as that of the podiatry and infectious disease services. Ongoing aggressive risk factor modification of blood pressure, diabetes and serum lipids will remain essential to reducing risk of future atherosclerotic development. We will further evaluate him should additional cardiovascular difficulties or concerns arise. Note: This report was completed utilizing computer voice recognition software. Every effort has been made to ensure accuracy, however; inadvertent computerized transcription errors may be present. Brody Ruby.  Stella Hwang, [facility], [facility]

## 2022-03-16 NOTE — PROGRESS NOTES
Occupational Therapy  OCCUPATIONAL THERAPY INITIAL EVALUATION    [facility] [address]  [place] [facility], [place]        Date:3/16/2022                                                  Patient Name: Olga Sen.     MRN: [id number]    : 1932    Room: [address] [place] [address]      Evaluating OT: Emilio Huang, OTR/L [id number]      Referring Provider: Franck Velarde MD    Specific Provider Orders/Date: OT eval and treat 3/15/22      Diagnosis: Bigeminy [I49.8]  Hypoxia [R09.02]  Troponin level elevated [R77.8]  Open wound of knee, leg, and ankle, unspecified laterality, initial encounter [S81.009A, S81.809A, S91.009A]  Sepsis ([facility] [A41.9]  Sepsis, due to unspecified organism, unspecified whether acute organ dysfunction present [facility]) [A41.9]     Pertinent Medical History: arthritis, breast CA, CHF, CKD, DM, HTN, left ventricular failure, macular degeneration, orthostatic hypotension       Precautions:  Fall Risk, contact isolation, WBAT with B heel offloading shoes, skin integrity     Assessment of current deficits    [x] Functional mobility  [x]ADLs  [x] Strength               []Cognition    [x] Functional transfers   [x] IADLs         [x] Safety Awareness   [x]Endurance    [] Fine Coordination              [x] Balance      [] Vision/perception   []Sensation     []Gross Motor Coordination  [] ROM  [] Delirium                   [] Motor Control     OT PLAN OF CARE   OT POC based on physician orders, patient diagnosis and results of clinical assessment    Frequency/Duration 2-4 days/wk for 2 weeks PRN   Specific OT Treatment Interventions to include:   * Instruction/training on adapted ADL techniques and AE recommendations to increase functional independence within precautions       * Training on energy conservation strategies, correct breathing pattern and techniques to improve independence/tolerance for self-care routine  * Functional transfer/mobility training/DME recommendations for increased independence, safety, and fall prevention  * Patient/Family education to increase follow through with safety techniques and functional independence  * Recommendation of environmental modifications for increased safety with functional transfers/mobility and ADLs  * Therapeutic exercise to improve motor endurance, ROM, and functional strength for ADLs/functional transfers  * Therapeutic activities to facilitate/challenge dynamic balance, stand tolerance for increased safety and independence with ADLs  * Positioning to improve skin integrity, interaction with environment and functional independence        Recommended Adaptive Equipment: TBD     Home Living: Pt admitted from SNF. Prior to SNF, pt lived at [facility]. Pt uses wheeled walker for functional mobility and w/c for longer distances. Pt was receiving assistance with ADLs. Pain Level: Pt did not report pain this date; pt agreeable to therapy  Cognition: A&O: pt alert and oriented grossly; WFL command follow demonstrated. Pt pleasant, talkative, and motivated.    Memory:  fair   Sequencing:  fair   Problem solving:  fair   Judgement/safety:  fair     Functional Assessment:  AM-PAC Daily Activity Raw Score: 15/24   Initial Eval Status  Date: 3/16/22 Treatment Status  Date: STGs = LTGs  Time frame: 10-14 days   Feeding Set up     Grooming CGA   To complete hand hygiene standing at sink  Sup/Mod I   UB Dressing Min A   To don gown around back, seated  Sup/Mod I   LB Dressing Max A   To don socks and offloading shoes   Min A-with use of AD as appropriate/needed   Bathing Max A/Mod A  SBA -with use of AD as appropriate/needed   Toileting Max A  For pericare following a bowel movement, completed in standing  Min A    Bed Mobility  Supine to sit: Min A    Sup/I   Functional Transfers Min A with wheeled walker  Sit<>Stand from EOB  Sit<>Stand from [facility] over commode  Stand to sit to chair  SBA/Sup    Functional Mobility CGA with wheeled walker  To movement. Following bowel movement, pt was Max A for pericare completed in standing. Pt then walked to bathroom with CGA and cues for safe wheeled walker management and navigation. Pt sat to [facility] over commode with Min A for safe and controlled descent and cues for hand placement. Pt stood with Min A and cues for hand placement. Pericare performed with Max A. Pt walked to sink with CGA and washed hands at sink with CGA for safe standing balance. Pt then walked to chair with CGA and sat to chair with Min A and cues for hand placement and technique. Rehab Potential: Good for established goals     Patient / Family Goal: to return to [facility]    Patient and/or family were instructed on functional diagnosis, prognosis/goals and OT plan of care. Demonstrated fair understanding. Eval Complexity: Low    Time In: 0924  Time Out: 0950  Total Treatment Time: 11    Min Units   OT Eval Low 97165  x  1   OT Eval Medium 38521      OT Eval High 94354      OT Re-Eval [id number]       Therapeutic Ex 43045       Therapeutic Activities 63042  11  1   ADL/Self Care 16157       Orthotic Management 92441       Manual 10494     Neuro Re-Ed 38169       Non-Billable Time          Evaluation Time additionally includes thorough review of current medical information, gathering information on past medical history/social history and prior level of function, interpretation of standardized testing/informal observation of tasks, assessment of data and development of plan of care and goals.             Emilio Bennett, OTR/L, [id number]

## 2022-03-16 NOTE — PROGRESS NOTES
Internal Medicine  Progress Note  Brijesh Hester MD     Subjective:     Patient is alert. Patient reports OK. Tolerating diet well no other c/o.     Scheduled Meds:   apixaban  2.5 mg Oral BID AC    cefTRIAXone (ROCEPHIN) IV  2,000 mg IntraVENous Q24H    ascorbic acid  500 mg Oral BID    atorvastatin  80 mg Oral Nightly    docusate sodium  100 mg Oral Nightly    tamsulosin  0.4 mg Oral Daily    ferrous sulfate  325 mg Oral Daily    [Held by provider] furosemide  20 mg Oral Daily    miconazole   Topical BID    therapeutic multivitamin-minerals  1 tablet Oral Daily    ocuvite-lutein  1 tablet Oral BID WC    pantoprazole  40 mg Oral BID AC    vitamin D  2,000 Units Oral Daily    zinc sulfate  50 mg Oral Daily    gabapentin  200 mg Oral BID    insulin glargine  76 Units SubCUTAneous Nightly    And    insulin lispro  10 Units SubCUTAneous TID WC     Continuous Infusions:  PRN Meds:acetaminophen, diphenhydrAMINE-zinc acetate, clotrimazole-betamethasone, bisacodyl, fleet, magnesium hydroxide, calcium carbonate, ondansetron    Objective:      Physical Exam:  Vitals:   /86   Pulse 102   Temp 98.1 °F (36.7 °C) (Oral)   Resp 18   Ht 6' (1.829 m)   Wt 266 lb 12.1 oz (121 kg)   SpO2 92%   BMI 36.18 kg/m²   Orthostatic BPs and Heart Rates:     I/O's    Intake/Output Summary (Last 24 hours) at 3/16/2022 9208  Last data filed at 3/15/2022 1807  Gross per 24 hour   Intake 600 ml   Output 200 ml   Net 400 ml     Exam:  General appearance: alert, appears stated age and cooperative  Head: Normocephalic, without obvious abnormality, atraumatic  Eyes: negative  Throat: normal findings: lips normal without lesions  Neck: no adenopathy, no carotid bruit, no JVD and supple, symmetrical, trachea midline  Back: negative  Lungs: clear to auscultation bilaterally  Chest wall: no tenderness  Heart: regular rate and rhythm  Abdomen: soft, non-tender; bowel sounds normal; no masses,  no organomegaly  Pulses: 2+ and symmetric  Skin: Skin color, texture, turgor normal. No rashes or lesions  Lymph nodes: Cervical, supraclavicular, and axillary nodes normal.  Neurologic: Grossly normal      Imaging     Chest  Xray:  No results found for this or any previous visit. Results for orders placed during the hospital encounter of 03/14/22    XR CHEST PORTABLE    Narrative  EXAMINATION:  ONE X-RAY VIEW OF THE CHEST    3/14/2022 12:53 pm    COMPARISON:  August 25, 2021, August 31, 2021    HISTORY:  ORDERING SYSTEM PROVIDED HISTORY: Fever  TECHNOLOGIST PROVIDED HISTORY:  Reason for exam:->Fever    FINDINGS:  Heart size is unable to be accurately assessed on this single portable view  of the chest, but appears to be stable. Lung volumes are shallow with  bibasilar airspace opacities, likely on the basis of atelectasis. No  evidence of a pleural effusion or pneumothorax. No acute osseous abnormality. Impression  Shallow lung volumes with bibasilar atelectasis.       Additional Imaging:  none    Lab Review   Recent Results (from the past 24 hour(s))   POCT Glucose    Collection Time: 03/15/22  7:56 AM   Result Value Ref Range    Meter Glucose 210 (H) 74 - 99 mg/dL   POCT Glucose    Collection Time: 03/15/22 10:46 AM   Result Value Ref Range    Meter Glucose 194 (H) 74 - 99 mg/dL   POCT Glucose    Collection Time: 03/15/22  4:01 PM   Result Value Ref Range    Meter Glucose 271 (H) 74 - 99 mg/dL   POCT Glucose    Collection Time: 03/15/22  8:18 PM   Result Value Ref Range    Meter Glucose 284 (H) 74 - 99 mg/dL   Basic Metabolic Panel    Collection Time: 03/16/22  1:30 AM   Result Value Ref Range    Sodium 135 132 - 146 mmol/L    Potassium 4.3 3.5 - 5.0 mmol/L    Chloride 102 98 - 107 mmol/L    CO2 26 22 - 29 mmol/L    Anion Gap 7 7 - 16 mmol/L    Glucose 255 (H) 74 - 99 mg/dL    BUN 53 (H) 6 - 23 mg/dL    CREATININE 1.4 (H) 0.7 - 1.2 mg/dL    GFR Non-African American 48 >=60 mL/min/1.73    GFR African American 58     Calcium 8.1 (L) 8.6 - 10.2 mg/dL     Assessment:     Principal Problem:    Sepsis ([facility]  Active Problems:    HTN (hypertension)    Obesity (BMI 30.0-34. 9)    Diabetes mellitus type 2, uncontrolled ([facility]    Essential hypertension, benign    Hyperlipidemia with target LDL less than 100    Troponin level elevated    [facility]  Resolved Problems:    * No resolved hospital problems.  *      Plan:   Cont ATB  Follow cultures  Anticipate discharge soon to F  Franck Velarde MD  3/16/2022  6:14 AM

## 2022-03-16 NOTE — PROGRESS NOTES
Physical Therapy    Facility/Department: [address] INTERMEDIATE 1  Initial Assessment    NAME: Linh Pruitt. : 1932  MRN: [id number]    Date of Service: 3/16/2022      Patient Diagnosis(es): The primary encounter diagnosis was Sepsis, due to unspecified organism, unspecified whether acute organ dysfunction present [facility]). Diagnoses of Troponin level elevated, Hypoxia, Bigeminy, and Open wound of knee, leg, and ankle, unspecified laterality, initial encounter were also pertinent to this visit. has a past medical history of Arthritis, Cancer ([address] Cellulitis, CHF (congestive heart failure) ([address] CKD (chronic kidney disease) stage 3, GFR 30-59 ml/min ([address] Diabetes mellitus ([address] History of lumpectomy, Hx of blood clots, Hyperlipidemia, Hypertension, Left ventricular failure ([address] Macular degeneration, Obesity, Orthostatic hypotension, PE (pulmonary thromboembolism) ([address] Pressure injury of both heels, unstageable ([address] Staph aureus infection, and Venous insufficiency. has a past surgical history that includes Mastectomy; Toe Surgery; Toe amputation; Tonsillectomy; and hc  picc powerpic single (2021). Evaluating Therapist: Frantz Vazquez PT      Room #:  5708/3550-K  Diagnosis:  Bigeminy [I49.8]  Hypoxia [R09.02]  Troponin level elevated [R77.8]  Open wound of knee, leg, and ankle, unspecified laterality, initial encounter [S81.009A, S81.809A, S91.009A]  Sepsis ([facility] [A41.9]  Sepsis, due to unspecified organism, unspecified whether acute organ dysfunction present ([facility] [A41.9]  PMHx/PSHx:  CHF, B foot wounds, CKD, macular degeneration  Precautions:  WBAT with B heel offloading shoes, falls, contact isolation      Social:  Pt admitted from [place] [address]. \[facility]\"" ambulates with ww. Prior to LIZY lived at [facility] he was to go back to [facility] in April     Initial Evaluation  Date: 3/16/22 Treatment      Short Term/ Long Term   Goals   Was pt agreeable to Eval/treatment?  yes     Does pt have pain? no Bed Mobility  Rolling: min assist  Supine to sit: min assist  Sit to supine: NT  Scooting: min assist  independent   Transfers Sit to stand: min assist  Stand to sit: min assist  Stand pivot: min assist  SBA   Ambulation    25 feet x2 with ww and offloading shoeswith CGA  75 feet with ww with SBA   Stair Negotiation  Ascended and descended  NT   N/A    LE strength     3+/5    4-/5   balance      Fair with ww     AM-PAC Raw score               16/24         Pt is alert and Oriented   LE ROM: WFL  Edema: none  Endurance: fair  Chair alarm: yes     ASSESSMENT:    Pt displays functional ability as noted in the objective portion of this evaluation. Patient education  Pt educated on PT objectives    Patient response to education:   Pt verbalized understanding Pt demonstrated skill Pt requires further education in this area   yes           Comments/treatment:  Pt assisted to edge of bed min assist.  Sitting balance supervision. Applied B heel offloading shoes. Pt instructed in hand placement and safety with transfers. Upon standing pt incontinent of bowel. Dependent for hygiene. Pt ambulated to bathroom and back  min assist with transfer from commode. Standing balance at [facility]. Pt in chair at end of session. Pt's/ family goals   1. To get stronger to return to senior care    Conditions Requiring Skilled Therapeutic Intervention:    [x]Decreased strength     []Decreased ROM  [x]Decreased functional mobility  [x]Decreased balance   [x]Decreased endurance   []Decreased posture  []Decreased sensation  []Decreased coordination   []Decreased vision  []Decreased safety awareness   []Increased pain       Patient and or family understand(s) diagnosis, prognosis, and plan of care.     Prognosis is good for reaching above PT goals    PHYSICAL THERAPY PLAN OF CARE:    PT POC is established based on physician order and patient diagnosis     Referring provider/PT Order: Carol Arroyo MD/ PT eval and treat      Current Treatment Recommendations:     [x] Strengthening to improve independence with functional mobility   [] ROM to improve independence with functional mobility   [x] Balance Training to improve static/dynamic balance and to reduce fall risk  [x] Endurance Training to improve activity tolerance during functional mobility   [x] Transfer Training to improve safety and independence with all functional transfers   [x] Gait Training to improve gait mechanics, endurance and assess need for appropriate assistive device  [] Stair Training in preparation for safe discharge home and/or into the community   [] Positioning to prevent skin breakdown and contractures  [x] Safety and Education Training   [x] Patient/Caregiver Education   [] HEP  [] Other     PT long term treatment goals are located in above grid    Frequency of treatments: 2-5x/week x 5 days. Time in  0925  Time out  0953    Total Treatment Time  12 minutes     Evaluation Time includes thorough review of current medical information, gathering information on past medical history/social history and prior level of function, completion of standardized testing/informal observation of tasks, assessment of data and education on plan of care and goals.       CPT codes:  [x] Low Complexity PT evaluation 96649  [] Moderate Complexity PT evaluation 09165  [] High Complexity PT evaluation 48924  [] PT Re-evaluation 85859  [] Gait training 07735 minutes  [] Manual therapy 67619 minutes  [x] Therapeutic activities 55977 12 minutes  [] Therapeutic exercises 98872 minutes  [] Neuromuscular reeducation 64727 minutes     [facility] PSYCHIATRY PT [id number]

## 2022-03-16 NOTE — CARE COORDINATION
Social Work/Discharge Planning:  Chart reviewed. Patient admitted from [facility] and plans to return at discharge. No pre-cert needed, but he will need a negative covid result day of discharge. Carmencita Gomes with [facility] confirmed patient offloading shoes were delivered to hospital.  Will continue to follow.   Electronically signed by ELKE Fernandez on 3/16/2022 at 8:23 AM

## 2022-03-16 NOTE — PLAN OF CARE
Problem: Skin Integrity:  Goal: Will show no infection signs and symptoms  Description: Will show no infection signs and symptoms  Outcome: Ongoing  Goal: Absence of new skin breakdown  Description: Absence of new skin breakdown  3/16/2022 0800 by Paul Salmon RN  Outcome: Ongoing  3/15/2022 1809 by Ramon Schultz RN  Outcome: Met This Shift     Problem: Wound:  Goal: Will show signs of wound healing; wound closure and no evidence of infection  Description: Will show signs of wound healing; wound closure and no evidence of infection  Outcome: Ongoing     Problem: Pain:  Goal: Control of acute pain  Description: Control of acute pain  Outcome: Ongoing     Problem: Blood Glucose:  Goal: Ability to maintain appropriate glucose levels will improve  Description: Ability to maintain appropriate glucose levels will improve  Outcome: Ongoing     Problem: Venous:  Goal: Signs of wound healing will improve  Description: Signs of wound healing will improve  Outcome: Ongoing

## 2022-03-17 VITALS
BODY MASS INDEX: 36.13 KG/M2 | OXYGEN SATURATION: 95 % | DIASTOLIC BLOOD PRESSURE: 59 MMHG | HEIGHT: 72 IN | RESPIRATION RATE: 18 BRPM | HEART RATE: 91 BPM | SYSTOLIC BLOOD PRESSURE: 133 MMHG | TEMPERATURE: 98.5 F | WEIGHT: 266.76 LBS

## 2022-03-17 LAB
ANION GAP SERPL CALCULATED.3IONS-SCNC: 9 MMOL/L (ref 7–16)
BUN BLDV-MCNC: 55 MG/DL (ref 6–23)
CALCIUM SERPL-MCNC: 8.5 MG/DL (ref 8.6–10.2)
CHLORIDE BLD-SCNC: 101 MMOL/L (ref 98–107)
CO2: 27 MMOL/L (ref 22–29)
CREAT SERPL-MCNC: 1.4 MG/DL (ref 0.7–1.2)
GFR AFRICAN AMERICAN: 58
GFR NON-AFRICAN AMERICAN: 48 ML/MIN/1.73
GLUCOSE BLD-MCNC: 219 MG/DL (ref 74–99)
METER GLUCOSE: 174 MG/DL (ref 74–99)
METER GLUCOSE: 206 MG/DL (ref 74–99)
ORGANISM: ABNORMAL
ORGANISM: ABNORMAL
POTASSIUM SERPL-SCNC: 4.2 MMOL/L (ref 3.5–5)
SARS-COV-2, NAAT: NOT DETECTED
SODIUM BLD-SCNC: 137 MMOL/L (ref 132–146)
URINE CULTURE, ROUTINE: NORMAL
WOUND/ABSCESS: ABNORMAL
WOUND/ABSCESS: ABNORMAL

## 2022-03-17 PROCEDURE — 80048 BASIC METABOLIC PNL TOTAL CA: CPT

## 2022-03-17 PROCEDURE — 87635 SARS-COV-2 COVID-19 AMP PRB: CPT

## 2022-03-17 PROCEDURE — 6370000000 HC RX 637 (ALT 250 FOR IP): Performed by: INTERNAL MEDICINE

## 2022-03-17 PROCEDURE — 36415 COLL VENOUS BLD VENIPUNCTURE: CPT

## 2022-03-17 PROCEDURE — 82962 GLUCOSE BLOOD TEST: CPT

## 2022-03-17 PROCEDURE — 6370000000 HC RX 637 (ALT 250 FOR IP): Performed by: SPECIALIST

## 2022-03-17 RX ORDER — CEPHALEXIN 500 MG/1
1000 CAPSULE ORAL EVERY 8 HOURS SCHEDULED
Qty: 60 CAPSULE | Refills: 0 | DISCHARGE
Start: 2022-03-17 | End: 2022-03-27

## 2022-03-17 RX ADMIN — INSULIN LISPRO 10 UNITS: 100 INJECTION, SOLUTION INTRAVENOUS; SUBCUTANEOUS at 11:12

## 2022-03-17 RX ADMIN — APIXABAN 2.5 MG: 2.5 TABLET, FILM COATED ORAL at 06:29

## 2022-03-17 RX ADMIN — INSULIN LISPRO 10 UNITS: 100 INJECTION, SOLUTION INTRAVENOUS; SUBCUTANEOUS at 06:31

## 2022-03-17 RX ADMIN — ZINC SULFATE 220 MG (50 MG) CAPSULE 50 MG: CAPSULE at 09:30

## 2022-03-17 RX ADMIN — OXYCODONE HYDROCHLORIDE AND ACETAMINOPHEN 500 MG: 500 TABLET ORAL at 09:32

## 2022-03-17 RX ADMIN — Medication 2000 UNITS: at 09:31

## 2022-03-17 RX ADMIN — ACETAMINOPHEN 650 MG: 325 TABLET ORAL at 09:31

## 2022-03-17 RX ADMIN — PANTOPRAZOLE SODIUM 40 MG: 40 TABLET, DELAYED RELEASE ORAL at 06:29

## 2022-03-17 RX ADMIN — GABAPENTIN 200 MG: 100 CAPSULE ORAL at 09:31

## 2022-03-17 RX ADMIN — CEPHALEXIN 1000 MG: 500 CAPSULE ORAL at 06:28

## 2022-03-17 RX ADMIN — MULTIPLE VITAMINS W/ MINERALS TAB 1 TABLET: TAB at 09:32

## 2022-03-17 RX ADMIN — FERROUS SULFATE TAB 325 MG (65 MG ELEMENTAL FE) 325 MG: 325 (65 FE) TAB at 09:31

## 2022-03-17 RX ADMIN — MICONAZOLE NITRATE: 2 POWDER TOPICAL at 09:32

## 2022-03-17 RX ADMIN — TAMSULOSIN HYDROCHLORIDE 0.4 MG: 0.4 CAPSULE ORAL at 09:31

## 2022-03-17 RX ADMIN — Medication 1 TABLET: at 09:31

## 2022-03-17 RX ADMIN — FUROSEMIDE 20 MG: 20 TABLET ORAL at 09:30

## 2022-03-17 RX ADMIN — CEPHALEXIN 1000 MG: 500 CAPSULE ORAL at 13:30

## 2022-03-17 ASSESSMENT — PAIN SCALES - GENERAL: PAINLEVEL_OUTOF10: 6

## 2022-03-17 NOTE — PROGRESS NOTES
Internal Medicine  Progress Note  Willow Martin MD     Subjective:     Patient is alert. Patient reports OK. Tolerating diet well no other c/o. Scheduled Meds:   cephALEXin  1,000 mg Oral 3 times per day    apixaban  2.5 mg Oral BID AC    ascorbic acid  500 mg Oral BID    atorvastatin  80 mg Oral Nightly    docusate sodium  100 mg Oral Nightly    tamsulosin  0.4 mg Oral Daily    ferrous sulfate  325 mg Oral Daily    furosemide  20 mg Oral Daily    miconazole   Topical BID    therapeutic multivitamin-minerals  1 tablet Oral Daily    ocuvite-lutein  1 tablet Oral BID WC    pantoprazole  40 mg Oral BID AC    vitamin D  2,000 Units Oral Daily    zinc sulfate  50 mg Oral Daily    gabapentin  200 mg Oral BID    insulin glargine  76 Units SubCUTAneous Nightly    And    insulin lispro  10 Units SubCUTAneous TID WC     Continuous Infusions:   dextrose       PRN Meds:glucose, dextrose, glucagon (rDNA), dextrose, acetaminophen, diphenhydrAMINE-zinc acetate, clotrimazole-betamethasone, bisacodyl, fleet, magnesium hydroxide, calcium carbonate, ondansetron    Objective:      Physical Exam:  Vitals:   BP (!) 142/65   Pulse 83   Temp 98.4 °F (36.9 °C) (Oral)   Resp 18   Ht 6' (1.829 m)   Wt 266 lb 12.1 oz (121 kg)   SpO2 92%   BMI 36.18 kg/m²   Orthostatic BPs and Heart Rates:     I/O's    Intake/Output Summary (Last 24 hours) at 3/17/2022 8208  Last data filed at 3/17/2022 0214  Gross per 24 hour   Intake --   Output 1110 ml   Net -1110 ml     Exam:  General appearance: alert, appears stated age and cooperative  Head: Normocephalic, without obvious abnormality, atraumatic  Eyes: conjunctivae/corneas clear. PERRL, EOM's intact. Fundi benign.   Throat: normal findings: lips normal without lesions  Neck: no adenopathy, no carotid bruit, no JVD and supple, symmetrical, trachea midline  Back: negative  Lungs: clear to auscultation bilaterally  Chest wall: no tenderness  Heart: regular rate and rhythm  Abdomen: soft, non-tender; bowel sounds normal; no masses,  no organomegaly  Extremities: DSD in olace  Pulses: 2+ and symmetric  Skin: Skin color, texture, turgor normal. No rashes or lesions  Lymph nodes: Cervical, supraclavicular, and axillary nodes normal.  Neurologic: Grossly normal      Imaging     Chest  Xray:  No results found for this or any previous visit. Results for orders placed during the hospital encounter of 03/14/22    XR CHEST PORTABLE    Narrative  EXAMINATION:  ONE X-RAY VIEW OF THE CHEST    3/14/2022 12:53 pm    COMPARISON:  August 25, 2021, August 31, 2021    HISTORY:  ORDERING SYSTEM PROVIDED HISTORY: Fever  TECHNOLOGIST PROVIDED HISTORY:  Reason for exam:->Fever    FINDINGS:  Heart size is unable to be accurately assessed on this single portable view  of the chest, but appears to be stable. Lung volumes are shallow with  bibasilar airspace opacities, likely on the basis of atelectasis. No  evidence of a pleural effusion or pneumothorax. No acute osseous abnormality. Impression  Shallow lung volumes with bibasilar atelectasis.       Additional Imaging:  none    Lab Review   Recent Results (from the past 24 hour(s))   POCT Glucose    Collection Time: 03/16/22 10:46 AM   Result Value Ref Range    Meter Glucose 202 (H) 74 - 99 mg/dL   POCT Glucose    Collection Time: 03/16/22  4:52 PM   Result Value Ref Range    Meter Glucose 177 (H) 74 - 99 mg/dL   POCT Glucose    Collection Time: 03/16/22  8:29 PM   Result Value Ref Range    Meter Glucose 236 (H) 74 - 99 mg/dL   Basic Metabolic Panel    Collection Time: 03/17/22  4:41 AM   Result Value Ref Range    Sodium 137 132 - 146 mmol/L    Potassium 4.2 3.5 - 5.0 mmol/L    Chloride 101 98 - 107 mmol/L    CO2 27 22 - 29 mmol/L    Anion Gap 9 7 - 16 mmol/L    Glucose 219 (H) 74 - 99 mg/dL    BUN 55 (H) 6 - 23 mg/dL    CREATININE 1.4 (H) 0.7 - 1.2 mg/dL    GFR Non-African American 48 >=60 mL/min/1.73    GFR African American 58     Calcium 8.5 (L) 8.6 - 10.2 mg/dL     Assessment:     Principal Problem:    Sepsis ([facility]  Active Problems:    HTN (hypertension)    Obesity (BMI 30.0-34. 9)    Diabetes mellitus type 2, uncontrolled ([facility]    Essential hypertension, benign    Hyperlipidemia with target LDL less than 100    Troponin level elevated    Diabetic foot infection ([facility]  Resolved Problems:    * No resolved hospital problems.  *      Plan:   Discharge  Rashid Davila MD  3/17/2022  6:29 AM

## 2022-03-17 NOTE — DISCHARGE INSTR - COC
Continuity of Care Form    Patient Name: Arsh Youngblood. :  1932  MRN:  [id number]    Admit date:  3/14/2022  Discharge date:  3/17/2022    Code Status Order: DNR-CCA   Advance Directives:      Admitting Physician:  Sandy Colon MD  PCP: Herbert Rivera MD    Discharging Nurse: Gricelda Urbina Unit/Room#: 3977/1946-O  Discharging Unit Phone Number: [phone]    Emergency Contact:   Extended Emergency Contact Information  Primary Emergency Contact: [address] Phone: [phone]  Mobile Phone: [phone]  Relation: Child   needed? No  Secondary Emergency Contact: Claude Lizarraga [place] [address] Phone: [phone]  Relation: Child    Past Surgical History:  Past Surgical History:   Procedure Laterality Date    [facility]  PICC [address] SINGLE  2021         MASTECTOMY      TOE AMPUTATION      TOE SURGERY      TONSILLECTOMY         Immunization History:   Immunization History   Administered Date(s) Administered    Influenza Virus Vaccine 10/23/2016    Pneumococcal Conjugate Vaccine 2013    Td, unspecified formulation 2012       Active Problems:  Patient Active Problem List   Diagnosis Code    HTN (hypertension) I10    [facility], [facility] C50.929    Obesity (BMI 30.0-34. 9) E66.9    Right hip pain M25.551    Diabetes mellitus type 2, uncontrolled ([facility]) E11.65    Essential hypertension, benign I10    Hyperlipidemia with target LDL less than 100 E78.5    Dizzy spells R42    Troponin level elevated R77.8    Shortness of breath R06.02    Chest pressure R07.89    Acute respiratory insufficiency R06.89    Pneumonia J18.9    History of pulmonary embolus (PE) Z86.711    Acute left-sided CHF (congestive heart failure) ([facility]) I50.1    Type 1 diabetes with stage 3 chronic kidney disease moderate GFR 30-59 ([facility]) E10.22, N18.30    Cellulitis and abscess of right lower extremity L03.115, L02.415    Cellulitis and abscess of left lower extremity L03.116, L02.416    Chronic venous insufficiency of lower extremity I87.2    Acute hypoxemic respiratory failure due to COVID-19 ([facility]) U07.1, J96.01    Acute on chronic respiratory failure with hypoxia ([facility]) J96.21    Acute respiratory failure with hypoxia ([facility]) J96.01    COVID-19 virus infection U07.1    Hyperlipidemia E78.5    Pneumonia due to COVID-19 virus U07.1, J12.82    Orthostatic hypotension I95.1    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 ([facility]) I70.244    PVD (peripheral vascular disease) ([facility]) I73.9    PAD (peripheral artery disease) ([facility]) I73.9    Diabetic ulcer of right heel associated with type 2 diabetes mellitus, with fat layer exposed ([facility] E11.621, L97.412    Diabetic ulcer of left heel ([facility]) E11.621, L97.429    Sepsis ([facility] A41.9    Diabetic foot infection ([facility]) E11.628, L08.9       Isolation/Infection:   Isolation            Contact          Patient Infection Status       Infection Onset Added Last Indicated Last Indicated By Review Planned Expiration Resolved Resolved By    None active    Resolved    COVID-19 (Rule Out) 22 COVID-19, Rapid (Ordered)   22 Rule-Out Test Resulted    MRSA 21 Culture, Blood 2   03/15/22 Henna Chambers RN    MRSA blood 2021    COVID-19 21 Covid-19 Ambulatory   21     COVID-19 (Rule Out) 21 COVID-19, Rapid (Ordered)   21 Rule-Out Test Resulted    COVID-19 (Rule Out) 10/16/20 10/16/20 10/16/20 Covid-19 Ambulatory (Ordered)   10/17/20 Rule-Out Test Resulted    COVID-19 (Rule Out) 20 Covid-19 Ambulatory (Ordered)   20 Rule-Out Test Resulted    COVID-19 (Rule Out) 20 Covid-19 Ambulatory (Ordered)   20 Rule-Out Test Resulted    COVID-19 (Rule Out) 20 Covid-19 Ambulatory (Ordered)   20 Rule-Out Test Resulted Nurse Assessment:  Last Vital Signs: BP (!) 142/65   Pulse 83   Temp 98.4 °F (36.9 °C) (Oral)   Resp 18   Ht 6' (1.829 m)   Wt 266 lb 12.1 oz (121 kg)   SpO2 92%   BMI 36.18 kg/m²     Last documented pain score (0-10 scale): Pain Level: 7  Last Weight:   Wt Readings from Last 1 Encounters:   03/16/22 266 lb 12.1 oz (121 kg)     Mental Status:  oriented and alert    IV Access:  - None    Nursing Mobility/ADLs:  Walking   Dependent  Transfer  Dependent  Bathing  Dependent  Dressing  Dependent  Toileting  Dependent  Feeding  Dependent  Med Admin  Dependent  Med Delivery   whole    Wound Care Documentation and Therapy:  Wound 01/05/22 Heel Left #1 (Active)   Dressing Status Clean;Dry; Intact 03/16/22 2015   Wound Cleansed Cleansed with saline 03/16/22 0830   Dressing/Treatment ABD; Other (comment) 03/16/22 2015   Offloading for Diabetic Foot Ulcers Diabetic shoes/inserts 03/16/22 2015   Dressing Change Due 03/16/22 03/16/22 0830   Wound Length (cm) 5.3 cm 03/14/22 1615   Wound Width (cm) 3 cm 03/14/22 1615   Wound Depth (cm) 0.2 cm 03/09/22 0903   Wound Surface Area (cm^2) 15.9 cm^2 03/14/22 1615   Change in Wound Size % (l*w) -15.22 03/14/22 1615   Wound Volume (cm^3) 1.86 cm^3 03/09/22 0903   Wound Healing % 33 03/09/22 0903   Post-Procedure Length (cm) 3.2 cm 03/09/22 0921   Post-Procedure Width (cm) 3.1 cm 03/09/22 0921   Post-Procedure Depth (cm) 0.3 cm 03/09/22 0921   Post-Procedure Surface Area (cm^2) 9.92 cm^2 03/09/22 0921   Post-Procedure Volume (cm^3) 2.976 cm^3 03/09/22 0921   Wound Assessment Other (Comment) 03/16/22 0830   Drainage Amount None 03/16/22 0830   Drainage Description Sanguinous 03/16/22 0830   Odor None 03/16/22 0830   Rashmi-wound Assessment Other (Comment) 03/15/22 0748   Number of days: 70       Wound 01/05/22 Heel Right #2 (Active)   Dressing Status Clean;Dry; Intact 03/16/22 2015   Wound Cleansed Cleansed with saline 03/16/22 0830   Dressing/Treatment ABD; Other (comment) 03/16/22 2015   Offloading for Diabetic Foot Ulcers Offloading boot 03/16/22 2015   Dressing Change Due 03/16/22 03/16/22 0830   Wound Length (cm) 2.8 cm 03/14/22 1615   Wound Width (cm) 2 cm 03/14/22 1615   Wound Depth (cm) 0.2 cm 03/09/22 0903   Wound Surface Area (cm^2) 5.6 cm^2 03/14/22 1615   Change in Wound Size % (l*w) -31.76 03/14/22 1615   Wound Volume (cm^3) 0.972 cm^3 03/09/22 0903   Wound Healing % -14 03/09/22 0903   Post-Procedure Length (cm) 2.8 cm 03/09/22 0921   Post-Procedure Width (cm) 1.9 cm 03/09/22 0921   Post-Procedure Depth (cm) 0.3 cm 03/09/22 0921   Post-Procedure Surface Area (cm^2) 5.32 cm^2 03/09/22 0921   Post-Procedure Volume (cm^3) 1.596 cm^3 03/09/22 0921   Wound Assessment Other (Comment) 03/16/22 0830   Drainage Amount None 03/16/22 0830   Drainage Description Yellow;Brown 03/16/22 0830   Odor None 03/16/22 0830   Rashmi-wound Assessment Other (Comment) 03/15/22 0748   Number of days: 70       Wound 03/14/22 Tibial Left;Posterior (Active)   Dressing Status Clean;Dry; Intact 03/16/22 2015   Wound Cleansed Cleansed with saline 03/16/22 0830   Dressing/Treatment Iodoform gauze; Foam 03/16/22 2015   Dressing Change Due 03/16/22 03/16/22 0830   Wound Length (cm) 2.5 cm 03/14/22 1615   Wound Width (cm) 2.5 cm 03/14/22 1615   Wound Depth (cm) 1.2 cm 03/14/22 1615   Wound Surface Area (cm^2) 6.25 cm^2 03/14/22 1615   Wound Volume (cm^3) 7.5 cm^3 03/14/22 1615   Wound Assessment Other (Comment) 03/16/22 0830   Drainage Amount None 03/16/22 2015   Drainage Description Thin;Serosanguinous 03/16/22 0830   Odor None 03/16/22 2015   Rashmi-wound Assessment Other (Comment) 03/15/22 0748   Number of days: 2       Wound 03/14/22 Coccyx (Active)   Wound Cleansed Cleansed with saline 03/16/22 0830   Dressing/Treatment Open to air; Pharmaceutical agent (see MAR) 03/16/22 2015   Wound Length (cm) 7 cm 03/14/22 1755   Wound Width (cm) 12 cm 03/14/22 1755   Wound Surface Area (cm^2) 84 cm^2 03/14/22 1755 Wound Assessment Pink/red; Other (Comment) 03/16/22 2015   Drainage Amount Small 03/16/22 2015   Drainage Description Serosanguinous 03/16/22 2015   Odor None 03/16/22 2015   Rashmi-wound Assessment Fragile 03/16/22 2015   Number of days: 2        Elimination:  Continence: Bowel: Yes  Bladder: Yes  Urinary Catheter: None   Colostomy/Ileostomy/Ileal Conduit: No       Date of Last BM: 3/15/2022    Intake/Output Summary (Last 24 hours) at 3/17/2022 0630  Last data filed at 3/17/2022 0214  Gross per 24 hour   Intake --   Output 1110 ml   Net -1110 ml     I/O last 3 completed shifts: In: 600 [P.O.:600]  Out: 610 [Urine:610]    Safety Concerns: At Risk for Falls    Impairments/Disabilities:      None    Nutrition Therapy:  Current Nutrition Therapy:   - Oral Diet:  General    Routes of Feeding: Oral  Liquids: Thin Liquids  Daily Fluid Restriction: no  Last Modified Barium Swallow with Video (Video Swallowing Test): not done    Treatments at the Time of Hospital Discharge:   Respiratory Treatments:   Oxygen Therapy:  is not on home oxygen therapy.   Ventilator:    - No ventilator support    Rehab Therapies: Physical Therapy and Occupational Therapy  Weight Bearing Status/Restrictions: No weight bearing restrictions  Other Medical Equipment (for information only, NOT a DME order):  walker  Other Treatments:     Patient's personal belongings (please select all that are sent with patient):  None    RN SIGNATURE:  Electronically signed by Yarelis Lofton RN on 3/17/22 at 11:59 AM EDT    CASE MANAGEMENT/SOCIAL WORK SECTION    Inpatient Status Date: ***    Readmission Risk Assessment Score:  Readmission Risk              Risk of Unplanned Readmission:  28           Discharging to Facility/ Agency   Name:  [facility]  Address: [address] [place] [address]  Phone: [phone]  Fax:[phone]    Dialysis Facility (if applicable)   Name:  Address:  Dialysis Schedule:  Phone:  Fax:    /Social Worker signature: Electronically signed by Starr Forde RN on 3/17/22 at 2:00 PM EDT    PHYSICIAN SECTION    Prognosis: Good    Condition at Discharge: Stable    Rehab Potential (if transferring to Rehab): Good    Recommended Labs or Other Treatments After Discharge: po ATB with other meds    Physician Certification: I certify the above information and transfer of Lanny Ivey.  is necessary for the continuing treatment of the diagnosis listed and that he requires [place] for greater 30 days.      Update Admission H&P: No change in H&P    PHYSICIAN SIGNATURE:  Electronically signed by Munira Garcia MD on 3/17/22 at 6:31 AM EDT

## 2022-03-17 NOTE — PROGRESS NOTES
Attempted to call report to [facility], states they will call me back as they are unable to receive report at this time.

## 2022-03-17 NOTE — PLAN OF CARE
Problem: Falls - Risk of:  Goal: Will remain free from falls  Description: Will remain free from falls  3/17/2022 0852 by Ab Chi RN  Outcome: Completed     Problem: Skin Integrity:  Goal: Will show no infection signs and symptoms  Description: Will show no infection signs and symptoms  3/17/2022 0852 by Ab Chi RN  Outcome: Completed     Problem: Pain:  Goal: Pain level will decrease  Description: Pain level will decrease  Outcome: Met This Shift

## 2022-03-17 NOTE — CARE COORDINATION
CASE MANAGEMENT. ... Spoke with son, Guera Melissa, regarding discharge today. All questions answered. Also, met with Mr Arron Victor at the bedside and addressed his questions for discharge. He also had concerns that his antibiotic would not be ready for him at [address] [place] [address]. Spoke with Laxmi Almaraz from the facility and she will make sure Keflex will be ordered and ready for patient. Mr Arron Victor updated and very appreciative. He is requesting podiatry see him before he leaves today. Nursing notified. He is agreeable to the 2-3 transport time with [facility].

## 2022-03-17 NOTE — PROGRESS NOTES
[address] [place] [address] [facility]  Progress Note    SUBJECTIVE:  Chief Complaint   Patient presents with    Fever     woke up with 101.4 fever this morning    Fatigue     feeling weak since yesterday     The patient is feeling better. Tolerating oral antibiotics. His left ankle hurts slightly. Review of systems:  As stated above in the chief complaint, otherwise negative. Medications:  Scheduled Meds:   cephALEXin  1,000 mg Oral 3 times per day    apixaban  2.5 mg Oral BID AC    ascorbic acid  500 mg Oral BID    atorvastatin  80 mg Oral Nightly    docusate sodium  100 mg Oral Nightly    tamsulosin  0.4 mg Oral Daily    ferrous sulfate  325 mg Oral Daily    furosemide  20 mg Oral Daily    miconazole   Topical BID    therapeutic multivitamin-minerals  1 tablet Oral Daily    ocuvite-lutein  1 tablet Oral BID WC    pantoprazole  40 mg Oral BID AC    vitamin D  2,000 Units Oral Daily    zinc sulfate  50 mg Oral Daily    gabapentin  200 mg Oral BID    insulin glargine  76 Units SubCUTAneous Nightly    And    insulin lispro  10 Units SubCUTAneous TID WC     Continuous Infusions:   dextrose       PRN Meds:glucose, dextrose, glucagon (rDNA), dextrose, acetaminophen, diphenhydrAMINE-zinc acetate, clotrimazole-betamethasone, bisacodyl, fleet, magnesium hydroxide, calcium carbonate, ondansetron    OBJECTIVE:  BP (!) 133/59   Pulse 91   Temp 98.5 °F (36.9 °C) (Oral)   Resp 18   Ht 6' (1.829 m)   Wt 266 lb 12.1 oz (121 kg)   SpO2 95%   BMI 36.18 kg/m²   Temp  Av.2 °F (36.8 °C)  Min: 97 °F (36.1 °C)  Max: 98.9 °F (37.2 °C)  Constitutional: The patient is awake, alert, and oriented. Lying in bed. No distress  Skin: Warm and dry. No rashes were noted. HEENT: Round and reactive pupils. Moist mucous membranes. No ulcerations or thrush. Neck: Supple to movements. Chest: No respiratory distress. No crackles.   Cardiovascular:  heart sounds rhythmic and regular  Abdomen: Positive bowel sounds to auscultation. Benign to palpation. Extremities: Bilateral lower extremity venous stasis dermatitis. Erythema and edema left leg. There is an open wound on lateral aspect of the left proximal leg and an ulcer on the left heel. Foot is wrapped.   Lines: peripheral    Laboratory and Tests Review:  Lab Results   Component Value Date    WBC 19.4 (H) 03/15/2022    WBC 27.4 (H) 03/14/2022    WBC 10.6 03/01/2022    HGB 9.9 (L) 03/15/2022    HCT 32.6 (L) 03/15/2022    MCV 86.5 03/15/2022     03/15/2022     Lab Results   Component Value Date    NEUTROABS 18.40 (H) 03/15/2022    NEUTROABS 25.82 (H) 03/14/2022    NEUTROABS 7.83 (H) 03/01/2022     No results found for: CRPHS  Lab Results   Component Value Date    ALT 9 03/14/2022    AST 23 03/14/2022    ALKPHOS 136 (H) 03/14/2022    BILITOT 0.6 03/14/2022     Lab Results   Component Value Date     03/17/2022    K 4.2 03/17/2022    K 4.6 03/14/2022     03/17/2022    CO2 27 03/17/2022    BUN 55 03/17/2022    CREATININE 1.4 03/17/2022    CREATININE 1.4 03/16/2022    CREATININE 1.6 03/15/2022    GFRAA 58 03/17/2022    LABGLOM 48 03/17/2022    GLUCOSE 219 03/17/2022    PROT 6.0 03/14/2022    LABALBU 3.1 03/14/2022    CALCIUM 8.5 03/17/2022    BILITOT 0.6 03/14/2022    ALKPHOS 136 03/14/2022    AST 23 03/14/2022    ALT 9 03/14/2022     Lab Results   Component Value Date    CRP 8.9 (H) 03/14/2022    CRP 1.5 (H) 08/29/2021    CRP 3.0 (H) 08/28/2021     Lab Results   Component Value Date    SEDRATE 44 (H) 03/14/2022    SEDRATE 23 (H) 08/29/2021     Radiology:      Microbiology:   Left leg ulcer 3/9/2022: Citrobacter werkmanii, Klebsiella oxytoca, Streptococcus dysgalactiae, Corynebacterium species, Klebsiella oxytoca  Left heel ulcer 3/14/2022: Group G Streptococcus  Rapid SARS-CoV-2 3/42/22: Negative so far  Blood cultures 3/20/2022: Negative so far  Urine culture 3/14/2022: Group G Streptococcus, CoNS  ASO titer: 42    ASSESSMENT:  · Cellulitis of the left leg, improving  · 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 Reconciled  · The patient can be discharged from ID standpoint.   He will be going to [facility]  · Follow-up with podiatry    Spoke with nursing    Agata Abdul MD  12:22 PM  3/17/2022

## 2022-03-19 LAB
BLOOD CULTURE, ROUTINE: NORMAL
CULTURE, BLOOD 2: NORMAL

## 2022-03-22 ENCOUNTER — TELEPHONE (OUTPATIENT)
Dept: VASCULAR SURGERY | Age: 87
End: 2022-03-22

## 2022-03-22 NOTE — TELEPHONE ENCOUNTER
Spoke with Demian Saini at [facility], cancelled tomorrow's ov with Dr. Sandy Carr as pt is following at wound care. They will call to reschedule with Dr. Sandy Carr when [address] from wound care.

## 2022-03-23 ENCOUNTER — TELEPHONE (OUTPATIENT)
Dept: CARDIOLOGY CLINIC | Age: 87
End: 2022-03-23

## 2022-03-23 ENCOUNTER — HOSPITAL ENCOUNTER (OUTPATIENT)
Dept: WOUND CARE | Age: 87
Discharge: HOME | End: 2022-03-23
Payer: MEDICARE

## 2022-03-23 VITALS
TEMPERATURE: 97 F | SYSTOLIC BLOOD PRESSURE: 132 MMHG | HEART RATE: 88 BPM | DIASTOLIC BLOOD PRESSURE: 60 MMHG | RESPIRATION RATE: 18 BRPM

## 2022-03-23 DIAGNOSIS — L97.421 DIABETIC ULCER OF LEFT HEEL ASSOCIATED WITH DIABETES MELLITUS DUE TO UNDERLYING CONDITION, LIMITED TO BREAKDOWN OF SKIN (HCC): ICD-10-CM

## 2022-03-23 DIAGNOSIS — L97.412 DIABETIC ULCER OF RIGHT HEEL ASSOCIATED WITH TYPE 2 DIABETES MELLITUS, WITH FAT LAYER EXPOSED (HCC): Primary | ICD-10-CM

## 2022-03-23 DIAGNOSIS — E08.621 DIABETIC ULCER OF LEFT HEEL ASSOCIATED WITH DIABETES MELLITUS DUE TO UNDERLYING CONDITION, LIMITED TO BREAKDOWN OF SKIN (HCC): ICD-10-CM

## 2022-03-23 DIAGNOSIS — E11.621 DIABETIC ULCER OF RIGHT HEEL ASSOCIATED WITH TYPE 2 DIABETES MELLITUS, WITH FAT LAYER EXPOSED (HCC): Primary | ICD-10-CM

## 2022-03-23 PROCEDURE — 87075 CULTR BACTERIA EXCEPT BLOOD: CPT

## 2022-03-23 PROCEDURE — 11042 DBRDMT SUBQ TIS 1ST 20SQCM/<: CPT

## 2022-03-23 PROCEDURE — 6370000000 HC RX 637 (ALT 250 FOR IP): Performed by: PODIATRIST

## 2022-03-23 PROCEDURE — 87070 CULTURE OTHR SPECIMN AEROBIC: CPT

## 2022-03-23 RX ORDER — CLOBETASOL PROPIONATE 0.5 MG/G
OINTMENT TOPICAL ONCE
Status: CANCELLED | OUTPATIENT
Start: 2022-03-23 | End: 2022-03-23

## 2022-03-23 RX ORDER — LIDOCAINE HYDROCHLORIDE 40 MG/ML
SOLUTION TOPICAL ONCE
Status: CANCELLED | OUTPATIENT
Start: 2022-03-23 | End: 2022-03-23

## 2022-03-23 RX ORDER — LIDOCAINE 40 MG/G
CREAM TOPICAL ONCE
Status: CANCELLED | OUTPATIENT
Start: 2022-03-23 | End: 2022-03-23

## 2022-03-23 RX ORDER — BACITRACIN, NEOMYCIN, POLYMYXIN B 400; 3.5; 5 [USP'U]/G; MG/G; [USP'U]/G
OINTMENT TOPICAL ONCE
Status: CANCELLED | OUTPATIENT
Start: 2022-03-23 | End: 2022-03-23

## 2022-03-23 RX ORDER — LIDOCAINE 50 MG/G
OINTMENT TOPICAL ONCE
Status: CANCELLED | OUTPATIENT
Start: 2022-03-23 | End: 2022-03-23

## 2022-03-23 RX ORDER — BETAMETHASONE DIPROPIONATE 0.05 %
OINTMENT (GRAM) TOPICAL ONCE
Status: CANCELLED | OUTPATIENT
Start: 2022-03-23 | End: 2022-03-23

## 2022-03-23 RX ORDER — GENTAMICIN SULFATE 1 MG/G
OINTMENT TOPICAL ONCE
Status: CANCELLED | OUTPATIENT
Start: 2022-03-23 | End: 2022-03-23

## 2022-03-23 RX ORDER — GINSENG 100 MG
CAPSULE ORAL ONCE
Status: CANCELLED | OUTPATIENT
Start: 2022-03-23 | End: 2022-03-23

## 2022-03-23 RX ORDER — LIDOCAINE HYDROCHLORIDE 20 MG/ML
JELLY TOPICAL ONCE
Status: CANCELLED | OUTPATIENT
Start: 2022-03-23 | End: 2022-03-23

## 2022-03-23 RX ORDER — LIDOCAINE HYDROCHLORIDE 40 MG/ML
SOLUTION TOPICAL ONCE
Status: COMPLETED | OUTPATIENT
Start: 2022-03-23 | End: 2022-03-23

## 2022-03-23 RX ORDER — BACITRACIN ZINC AND POLYMYXIN B SULFATE 500; 1000 [USP'U]/G; [USP'U]/G
OINTMENT TOPICAL ONCE
Status: CANCELLED | OUTPATIENT
Start: 2022-03-23 | End: 2022-03-23

## 2022-03-23 RX ADMIN — LIDOCAINE HYDROCHLORIDE: 40 SOLUTION TOPICAL at 08:30

## 2022-03-23 ASSESSMENT — PAIN DESCRIPTION - FREQUENCY: FREQUENCY: INTERMITTENT

## 2022-03-23 ASSESSMENT — PAIN DESCRIPTION - LOCATION: LOCATION: FOOT

## 2022-03-23 ASSESSMENT — PAIN DESCRIPTION - DESCRIPTORS: DESCRIPTORS: ACHING

## 2022-03-23 ASSESSMENT — PAIN DESCRIPTION - PAIN TYPE: TYPE: CHRONIC PAIN

## 2022-03-23 ASSESSMENT — PAIN - FUNCTIONAL ASSESSMENT: PAIN_FUNCTIONAL_ASSESSMENT: PREVENTS OR INTERFERES SOME ACTIVE ACTIVITIES AND ADLS

## 2022-03-23 ASSESSMENT — PAIN DESCRIPTION - ORIENTATION: ORIENTATION: LEFT

## 2022-03-23 ASSESSMENT — PAIN DESCRIPTION - ONSET: ONSET: ON-GOING

## 2022-03-23 ASSESSMENT — PAIN DESCRIPTION - PROGRESSION: CLINICAL_PROGRESSION: NOT CHANGED

## 2022-03-23 ASSESSMENT — PAIN SCALES - GENERAL: PAINLEVEL_OUTOF10: 5

## 2022-03-23 NOTE — TELEPHONE ENCOUNTER
----- Message from Cyril Bashir MD sent at 3/23/2022  1:30 PM EDT -----  He does not need cardiovascular follow-up unless other cardiovascular issues develop  ----- Message -----  From: Audra Zaidi  Sent: 3/23/2022   1:18 PM EDT  To: Cyril Bashir MD    Nursing facility calling for hosp f/u.  Please advise      Called facility regarding above

## 2022-03-23 NOTE — PROGRESS NOTES
W[facility] Healing Center  History and Physical/Consultation  Podiatry    Referring Physician : Kimberly Daniels MD  Handy Diaz. MEDICAL RECORD NUMBER:  [id number]  AGE: 80 y.o. GENDER: male  : 1932  EPISODE DATE:  3/23/2022  Subjective:     No chief complaint on file. HISTORY of PRESENT ILLNESS HPI     Handy Romero is a 80 y.o. male who presents today for wound/ulcer evaluation. History of Wound Context:  The patient has had a wound of diabetic b/l heels,and right ankle which was first noted approximately months ago. This has been treated betadyne. On their initial visit to the wound healing center, 22 ,  the patient has noted that the wound has been improving. The patient has had similar previous wounds in the past.      Pt is not on abx at time of initial visit.       Wound/Ulcer Pain Timing/Severity: constant  Quality of pain: sharp  Severity:  3 / 10   Modifying Factors: Pain worsens with walking  Associated Signs/Symptoms: edema, erythema and drainage    Ulcer Identification:  Ulcer Type: diabetic  Contributing Factors: edema and diabetes    Diabetic/Pressure/Non Pressure Ulcers onl y:  Ulcer: Diabetic ulcer, fat layer exposed    If patient has diabetic lower extremity wounds  Campbell Classification of diabetic lower extremity wounds:    Grade Description   []  0 No open wound   []  1 Superficial ulcer involving the full skin thickness   [x]  2 Deep ulcer involves ligament, tendon, joint capsule, or fascia  No bone involvement or abscess presence   []  3 Deep Ulcer with abcess formation and/or osteomyelitis   []  4 Localized gangrene   []  5 Extensive gangrene of the foot     Wound: N/A          22  Debrided, cont tx and care     22  Debrided, cont tx and care, await graft approval    22  Debrided, cont tx and care, puraply applied    22  Debrided, dermagraft applied    22  Debrided, dermagraft applied    22  Debrided, graft applied    3-2-22  Debrided, graft applied    3-9-22  Debrided, graft applied     3-23-22  Just release from hospital care, new wounds noted from hospital, also trauma to left toe loosened nail that was removed without incident, debrided all wounds, culture taken  PAST MEDICAL HISTORY      Diagnosis Date    Arthritis     Cancer ([facility]     breast    Cellulitis     [facility]est[facility]     CKD (chronic kidney disease) stage 3, GFR 30-59 ml/min ([facility])     Diabetes mellitus ([facility]     History of lumpectomy     Hx of blood clots     Hyperlipidemia     Hypertension     Left ventricular failure ([facility])     Macular degeneration     Obesity     Orthostatic hypotension     PE (pulmonary thromboembolism) ([facility])     Pressure injury of both heels, unstageable ([facility]     Staph aureus infection     Venous insufficiency      Past Surgical History:   Procedure Laterality Date    [facility]  9/2/2021         MASTECTOMY      TOE AMPUTATION      TOE SURGERY      TONSILLECTOMY       History reviewed. No pertinent family history.   Social History     Tobacco Use    Smoking status: Never Smoker    Smokeless tobacco: Never Used   Vaping Use    Vaping Use: Never used   Substance Use Topics    Alcohol use: Not Currently     Alcohol/week: 1.0 standard drink     Types: 1 Cans of beer per week    Drug use: No     Allergies   Allergen Reactions    Clindamycin/Lincomycin     Sulfa Antibiotics      Current Outpatient Medications on File Prior to Encounter   Medication Sig Dispense Refill    apixaban (ELIQUIS) 2.5 MG TABS tablet Take 1 tablet by mouth 2 times daily (before meals) 60 tablet 2    cephALEXin (KEFLEX) 500 MG capsule Take 2 capsules by mouth every 8 hours for 30 doses 60 capsule 0    docusate sodium (COLACE) 100 MG capsule Take 100 mg by mouth nightly      tamsulosin (FLOMAX) 0.4 MG capsule Take 0.4 mg by mouth daily      bisacodyl (DULCOLAX) 10 MG suppository Place 10 mg rectally daily as needed for Constipation      diphenhydrAMINE-zinc acetate (BENADRYL) 1-0.1 % cream Apply topically every 4 hours as needed for Itching Apply topically 3 times daily as needed.  Sodium Phosphates (FLEET) 7-19 GM/118ML Place 1 enema rectally daily as needed      magnesium hydroxide (MILK OF MAGNESIA) 400 MG/5ML suspension Take 30 mLs by mouth daily as needed for Constipation      Polyethylene Glycol 400 1 % SOLN Apply 1 drop to eye every 4 hours as needed      ferrous sulfate (IRON 325) 325 (65 Fe) MG tablet Take 325 mg by mouth daily       zinc sulfate (ZINCATE) 220 (50 Zn) MG capsule Take 1 capsule by mouth daily for 14 days 14 capsule 0    miconazole (MICOTIN) 2 % powder Apply topically 2 times daily. 45 g 1    ascorbic acid (VITAMIN C) 500 MG tablet Take 1 tablet by mouth 2 times daily 30 tablet 0    Vitamin D (CHOLECALCIFEROL) 50 MCG (2000 UT) TABS tablet Take 1 tablet by mouth daily 60 tablet 0    furosemide (LASIX) 40 MG tablet Take 20 mg by mouth daily       omeprazole (PRILOSEC) 20 MG delayed release capsule Take 20 mg by mouth daily      Multiple Vitamins-Minerals (THERAPEUTIC MULTIVITAMIN-MINERALS) tablet Take 1 tablet by mouth daily      Multiple Vitamins-Minerals (PRESERVISION AREDS PO) Take 1 tablet by mouth 2 times daily (with meals)      acetaminophen (TYLENOL) 325 MG tablet Take 650 mg by mouth every 4 hours as needed for Pain or Fever       calcium carbonate (TUMS) 500 MG chewable tablet Take 1 tablet by mouth every 4 hours as needed for Heartburn      ipratropium-albuterol (DUONEB) 0.5-2.5 (3) MG/3ML SOLN nebulizer solution Inhale 3 mLs into the lungs every 4 hours (while awake) for 30 doses 90 mL 0    gabapentin (NEURONTIN) 100 MG capsule Take 200 mg by mouth Daily with supper.        atorvastatin (LIPITOR) 80 MG tablet Take 80 mg by mouth at bedtime       INSULIN LISP PROT & LISP, HUM, (75-25) 100 UNIT/ML SUSP Inject 72 Units into the skin daily (with breakfast)  INSULIN LISP PROT & LISP, HUM, (75-25) 100 UNIT/ML SUSP Inject 55 Units into the skin Daily with supper       clotrimazole-betamethasone (LOTRISONE) cream Apply  topically as needed. Apply topically 2 times daily. No current facility-administered medications on file prior to encounter.        REVIEW OF SYSTEMS   ROS : All others Negative if blank [], Positive if [x]  General Vascular   [] Fevers [] Claudication   [] Chills [] Rest Pain   Skin Neurologic   [x] Tissue Loss [x] Lower extremity neuropathy     Objective:    /60   Pulse 88   Temp 97 °F (36.1 °C) (Temporal)   Resp 18   Wt Readings from Last 3 Encounters:   03/16/22 266 lb 12.1 oz (121 kg)   03/02/22 243 lb (110.2 kg)   02/16/22 243 lb (110.2 kg)       PHYSICAL EXAM   CONSTITUTIONAL:   Awake, alert, cooperative   PSYCHIATRIC :  Oriented to time, place and person      normal insight to disease process  EXTREMITIES:   R LE Open wounds are noted   Skin color is abnormal with ulcers   Edema is  noted   Sensation deficit noted - protective   Palpation of the foot does cause pain   4/5 strength DF/PF  L LE Open wounds are noted   Skin color is abnormal with ulcers   Edema is  noted   Sensation deficit noted - protective   Palpation of the foot does cause pain   4/5 strength DF/PF  R dorsalis pedis 1 L dorsalis pedis 1   R posterior tibial 1 L posterior tibial 1     Assessment:     Problem List Items Addressed This Visit     Diabetic ulcer of right heel associated with type 2 diabetes mellitus, with fat layer exposed ([address] - Primary    Relevant Orders    Initiate Outpatient Wound Care Protocol    Diabetic ulcer of left heel [facility])    Relevant Orders    Initiate Outpatient Wound Care Protocol          Pre Debridement Measurements:  Are located in the [place]  Documentation Flow Sheet  Post Debridement Measurements:  Wound/Ulcer Descriptions are Pre Debridement except measurements:     Wound 01/05/22 Heel Left #1 (Active)   Wound Image 01/05/22 0902   Wound Etiology Diabetic Campbell 2 01/05/22 0943   Dressing Status Clean;Dry; Intact 03/17/22 0800   Wound Cleansed Cleansed with saline 03/16/22 0830   Dressing/Treatment ABD; Other (comment) 03/16/22 2015   Offloading for Diabetic Foot Ulcers Diabetic shoes/inserts 03/16/22 2015   Dressing Change Due 03/16/22 03/16/22 0830   Wound Length (cm) 4 cm 03/23/22 0818   Wound Width (cm) 2.4 cm 03/23/22 0818   Wound Depth (cm) 0.6 cm 03/23/22 0818   Wound Surface Area (cm^2) 9.6 cm^2 03/23/22 0818   Change in Wound Size % (l*w) 30.43 03/23/22 0818   Wound Volume (cm^3) 5.76 cm^3 03/23/22 0818   Wound Healing % -109 03/23/22 0818   Post-Procedure Length (cm) 4.1 cm 03/23/22 0847   Post-Procedure Width (cm) 2.5 cm 03/23/22 0847   Post-Procedure Depth (cm) 0.7 cm 03/23/22 0847   Post-Procedure Surface Area (cm^2) 10.25 cm^2 03/23/22 0847   Post-Procedure Volume (cm^3) 7.175 cm^3 03/23/22 0847   Wound Assessment Fibrin;Devitalized tissue 03/23/22 0818   Drainage Amount Moderate 03/23/22 0818   Drainage Description Serosanguinous 03/23/22 0818   Odor None 03/23/22 0818   Rashmi-wound Assessment Maceration 03/23/22 0818   Wound Thickness Description not for Pressure Injury Full thickness 01/19/22 0909   Number of days: 76       Wound 01/05/22 Heel Right #2 (Active)   Wound Image   01/05/22 0902   Wound Etiology Venous 01/05/22 0943   Dressing Status Clean;Dry; Intact 03/17/22 0800   Wound Cleansed Cleansed with saline 03/16/22 0830   Dressing/Treatment ABD; Other (comment) 03/16/22 2015   Offloading for Diabetic Foot Ulcers Offloading boot 03/16/22 2015   Dressing Change Due 03/16/22 03/16/22 0830   Wound Length (cm) 2.6 cm 03/23/22 0818   Wound Width (cm) 1.5 cm 03/23/22 0818   Wound Depth (cm) 0.3 cm 03/23/22 0818   Wound Surface Area (cm^2) 3.9 cm^2 03/23/22 0818   Change in Wound Size % (l*w) 8.24 03/23/22 0818   Wound Volume (cm^3) 1.17 cm^3 03/23/22 0818   Wound Healing % -38 03/23/22 0818   Post-Procedure Length (cm) 2.7 cm 03/23/22 0847   Post-Procedure Width (cm) 1.6 cm 03/23/22 0847   Post-Procedure Depth (cm) 0.4 cm 03/23/22 0847   Post-Procedure Surface Area (cm^2) 4.32 cm^2 03/23/22 0847   Post-Procedure Volume (cm^3) 1.728 cm^3 03/23/22 0847   Wound Assessment Fibrin;Devitalized tissue 03/23/22 0818   Drainage Amount Moderate 03/23/22 0818   Drainage Description Serosanguinous 03/23/22 0818   Odor None 03/23/22 0818   Rashmi-wound Assessment Maceration 03/23/22 0818   Wound Thickness Description not for Pressure Injury Full thickness 01/19/22 4495   Number of days: 76       Wound 03/14/22 Tibial Left;Posterior (Active)   Dressing Status Clean;Dry; Intact 03/17/22 0800   Wound Cleansed Cleansed with saline 03/16/22 0830   Dressing/Treatment Iodoform gauze; Foam 03/16/22 2015   Dressing Change Due 03/16/22 03/16/22 0830   Wound Length (cm) 2.8 cm 03/23/22 0818   Wound Width (cm) 2 cm 03/23/22 0818   Wound Depth (cm) 1.5 cm 03/23/22 0818   Wound Surface Area (cm^2) 5.6 cm^2 03/23/22 0818   Change in Wound Size % (l*w) 10.4 03/23/22 0818   Wound Volume (cm^3) 8.4 cm^3 03/23/22 0818   Wound Healing % -12 03/23/22 0818   Post-Procedure Length (cm) 2.9 cm 03/23/22 0847   Post-Procedure Width (cm) 2.1 cm 03/23/22 0847   Post-Procedure Depth (cm) 1.6 cm 03/23/22 0847   Post-Procedure Surface Area (cm^2) 6.09 cm^2 03/23/22 0847   Post-Procedure Volume (cm^3) 9.744 cm^3 03/23/22 0847   Wound Assessment Devitalized tissue;Pink/red 03/23/22 0818   Drainage Amount Moderate 03/23/22 0818   Drainage Description Serosanguinous 03/23/22 0818   Odor None 03/23/22 0818   Rashmi-wound Assessment Maceration;Blanchable erythema 03/23/22 0818   Number of days: 8       Wound 03/14/22 Coccyx (Active)   Wound Cleansed Cleansed with saline 03/16/22 0830   Dressing/Treatment Open to air; Pharmaceutical agent (see MAR) 03/17/22 0800   Wound Length (cm) 7 cm 03/14/22 1755   Wound Width (cm) 12 cm 03/14/22 1755   Wound Surface Area (cm^2) 84 cm^2 03/14/22 1755   Wound Assessment Pink/red; Other (Comment) 03/17/22 0800   Drainage Amount Small 03/17/22 0800   Drainage Description Serosanguinous 03/17/22 0800   Odor None 03/17/22 0800   Rashmi-wound Assessment Fragile 03/16/22 2015   Number of days: 8       Wound 03/23/22 Toe (Comment  which one) Left #10 Left Great Toe (Active)   Wound Image   03/23/22 0818   Wound Length (cm) 4 cm 03/23/22 0818   Wound Width (cm) 3 cm 03/23/22 0818   Wound Depth (cm) 0.1 cm 03/23/22 0818   Wound Surface Area (cm^2) 12 cm^2 03/23/22 0818   Wound Volume (cm^3) 1.2 cm^3 03/23/22 0818   Post-Procedure Length (cm) 4.1 cm 03/23/22 0847   Post-Procedure Width (cm) 3.1 cm 03/23/22 0847   Post-Procedure Depth (cm) 0.2 cm 03/23/22 0847   Post-Procedure Surface Area (cm^2) 12.71 cm^2 03/23/22 0847   Post-Procedure Volume (cm^3) 2.542 cm^3 03/23/22 0847   Wound Assessment Pink/red 03/23/22 0818   Drainage Amount Moderate 03/23/22 0818   Drainage Description Serosanguinous 03/23/22 0818   Odor None 03/23/22 0818   Rashmi-wound Assessment Fragile 03/23/22 0818   Number of days: 0       Wound 03/23/22 Foot Left;Dorsal #11 (Active)   Wound Image   03/23/22 0818   Wound Length (cm) 6 cm 03/23/22 0818   Wound Width (cm) 4.5 cm 03/23/22 0818   Wound Depth (cm) 0.1 cm 03/23/22 0818   Wound Surface Area (cm^2) 27 cm^2 03/23/22 0818   Wound Volume (cm^3) 2.7 cm^3 03/23/22 0818   Post-Procedure Length (cm) 6.1 cm 03/23/22 0847   Post-Procedure Width (cm) 4.6 cm 03/23/22 0847   Post-Procedure Depth (cm) 0.2 cm 03/23/22 0847   Post-Procedure Surface Area (cm^2) 28.06 cm^2 03/23/22 0847   Post-Procedure Volume (cm^3) 5.612 cm^3 03/23/22 0847   Wound Assessment Pink/red 03/23/22 0818   Drainage Amount Copious 03/23/22 0818   Drainage Description Serous 03/23/22 0818   Odor None 03/23/22 0818   Rashmi-wound Assessment Fragile 03/23/22 0818   Number of days: 0          Procedure Note  Indications:  Based on my examination of this patient's wound(s)/ulcer(s) today, debridement is required to promote healing and evaluate the wound base. Performed by: Cyril Chi DPM    Consent obtained:  Yes    Time out taken:  Yes    Pain Control: Anesthetic  Anesthetic: 4% Lidocaine Liquid Topical     Debridement:Excisional Debridement    Using #15 blade scalpel the wound(s)/ulcer(s) was/were sharply debrided down through and including the removal of subcutaneous tissue. Devitalized Tissue Debrided:  fibrin, biofilm, slough and necrotic/eschar to stimulate bleeding to promote healing, post debridement good bleeding base and wound edges noted    Wound/Ulcer #: 2, 4, 11, 10    Percent of Wound/Ulcer Debrided: 100%    Total Surface Area Debrided:  25 sq cm     Estimated Blood Loss:  Minimal  Hemostasis Achieved:  by pressure    Procedural Pain:  4  / 10   Post Procedural Pain:  5 / 10     Response to treatment:  Well tolerated by patient. A culture was done. Plan:     Pt is not a smoker   - Discussed relationship of smoking and negative affects on wound healing   - Emphasized importance of tobacco avoidace/cessation   - Script for nicotine patch given to patient   - Information regarding support groups for smoking cessation given    In my professional opinion and based off the information that is available at this time this patient has appropriate indication for HBO Therapy: Maybe    Treatment Note please see attached Discharge Instructions    Written patient dismissal instructions given to patient and signed by patient or POA.          Discharge Instructions       Visit Discharge/Physician Orders     Discharge condition: Stable     Assessment of pain at discharge:  LEVEL 4     Anesthetic used: 4% lidocaine solution     Discharge to: Home     Left via:Private automobile     Accompanied by: accompanied by SELF     ECF/[facility]:  [facility].      Dressing Orders:  Clean bilateral heel ulcers with NSS, apply santyl and dry dressing, change daily,  Apply betadine daily between and on toes of left foot. Apply lorin to left great toe and foot wounds and cover with 4 x4, kerlix and coban. Wrap right leg with kerlix and coban. LEAVE IN PLACE FOR ONE WEEK. MAKE SURE TO WRAP HEEL AND LEG ULCERS SEPARATELY.       TO LEFT LEG ULCER, CLEAN WITH SALINE AND PACK WITH IODOFORM AND DRY DRESSING. CHANGE EVERY DAY      LEFT  ANKLE, HEALED   bilateral heel offloading boot. Physical therapy for ambulating safely while wearing offloading boots as indicated.     Treatment Orders: wear prevalon boots     Wound measurements:                                                 [facility] followup visit _________one week with Dr. Gastelum___________________  (Please note your next appointment above and if you are unable to keep, kindly give a 24 hour notice.  Thank you.)     Physician signature:__________________________        If you experience any of the following, please call the [facility] during business hours:     * Increase in Pain  * Temperature over 101  * Increase in drainage from your wound  * Drainage with a foul odor  * Bleeding  * Increase in swelling  * Need for compression bandage changes due to slippage, breakthrough drainage.     If you need medical attention outside of the business hours of the [facility] please contact your PCP or go to the nearest emergency room.                 Electronically signed by Jacob Rmaires DPM on 3/23/2022 at 9:35 AM

## 2022-03-26 LAB
ANAEROBIC CULTURE: NORMAL
ORGANISM: ABNORMAL
WOUND/ABSCESS: ABNORMAL

## 2022-03-30 ENCOUNTER — HOSPITAL ENCOUNTER (OUTPATIENT)
Dept: WOUND CARE | Age: 87
Discharge: HOME | End: 2022-03-30
Payer: MEDICARE

## 2022-03-30 VITALS
RESPIRATION RATE: 20 BRPM | DIASTOLIC BLOOD PRESSURE: 64 MMHG | TEMPERATURE: 97.3 F | SYSTOLIC BLOOD PRESSURE: 138 MMHG | HEART RATE: 79 BPM

## 2022-03-30 DIAGNOSIS — L97.412 DIABETIC ULCER OF RIGHT HEEL ASSOCIATED WITH TYPE 2 DIABETES MELLITUS, WITH FAT LAYER EXPOSED (HCC): Primary | ICD-10-CM

## 2022-03-30 DIAGNOSIS — L97.421 DIABETIC ULCER OF LEFT HEEL ASSOCIATED WITH DIABETES MELLITUS DUE TO UNDERLYING CONDITION, LIMITED TO BREAKDOWN OF SKIN (HCC): ICD-10-CM

## 2022-03-30 DIAGNOSIS — E11.621 DIABETIC ULCER OF RIGHT HEEL ASSOCIATED WITH TYPE 2 DIABETES MELLITUS, WITH FAT LAYER EXPOSED (HCC): Primary | ICD-10-CM

## 2022-03-30 DIAGNOSIS — E08.621 DIABETIC ULCER OF LEFT HEEL ASSOCIATED WITH DIABETES MELLITUS DUE TO UNDERLYING CONDITION, LIMITED TO BREAKDOWN OF SKIN (HCC): ICD-10-CM

## 2022-03-30 PROCEDURE — 6370000000 HC RX 637 (ALT 250 FOR IP): Performed by: PODIATRIST

## 2022-03-30 PROCEDURE — 15275 SKIN SUB GRAFT FACE/NK/HF/G: CPT

## 2022-03-30 PROCEDURE — C5275 LOW COST SKIN SUBSTITUTE APP: HCPCS

## 2022-03-30 RX ORDER — BETAMETHASONE DIPROPIONATE 0.05 %
OINTMENT (GRAM) TOPICAL ONCE
Status: CANCELLED | OUTPATIENT
Start: 2022-03-30 | End: 2022-03-30

## 2022-03-30 RX ORDER — GENTAMICIN SULFATE 1 MG/G
OINTMENT TOPICAL ONCE
Status: CANCELLED | OUTPATIENT
Start: 2022-03-30 | End: 2022-03-30

## 2022-03-30 RX ORDER — LIDOCAINE 50 MG/G
OINTMENT TOPICAL ONCE
Status: CANCELLED | OUTPATIENT
Start: 2022-03-30 | End: 2022-03-30

## 2022-03-30 RX ORDER — GINSENG 100 MG
CAPSULE ORAL ONCE
Status: CANCELLED | OUTPATIENT
Start: 2022-03-30 | End: 2022-03-30

## 2022-03-30 RX ORDER — BACITRACIN ZINC AND POLYMYXIN B SULFATE 500; 1000 [USP'U]/G; [USP'U]/G
OINTMENT TOPICAL ONCE
Status: CANCELLED | OUTPATIENT
Start: 2022-03-30 | End: 2022-03-30

## 2022-03-30 RX ORDER — LIDOCAINE 40 MG/G
CREAM TOPICAL ONCE
Status: CANCELLED | OUTPATIENT
Start: 2022-03-30 | End: 2022-03-30

## 2022-03-30 RX ORDER — LIDOCAINE HYDROCHLORIDE 40 MG/ML
SOLUTION TOPICAL ONCE
Status: CANCELLED | OUTPATIENT
Start: 2022-03-30 | End: 2022-03-30

## 2022-03-30 RX ORDER — LIDOCAINE HYDROCHLORIDE 40 MG/ML
SOLUTION TOPICAL ONCE
Status: COMPLETED | OUTPATIENT
Start: 2022-03-30 | End: 2022-03-30

## 2022-03-30 RX ORDER — CLOBETASOL PROPIONATE 0.5 MG/G
OINTMENT TOPICAL ONCE
Status: CANCELLED | OUTPATIENT
Start: 2022-03-30 | End: 2022-03-30

## 2022-03-30 RX ORDER — BACITRACIN, NEOMYCIN, POLYMYXIN B 400; 3.5; 5 [USP'U]/G; MG/G; [USP'U]/G
OINTMENT TOPICAL ONCE
Status: CANCELLED | OUTPATIENT
Start: 2022-03-30 | End: 2022-03-30

## 2022-03-30 RX ORDER — LIDOCAINE HYDROCHLORIDE 20 MG/ML
JELLY TOPICAL ONCE
Status: CANCELLED | OUTPATIENT
Start: 2022-03-30 | End: 2022-03-30

## 2022-03-30 RX ADMIN — LIDOCAINE HYDROCHLORIDE: 40 SOLUTION TOPICAL at 09:11

## 2022-03-30 ASSESSMENT — PAIN DESCRIPTION - ORIENTATION: ORIENTATION: LEFT

## 2022-03-30 ASSESSMENT — PAIN DESCRIPTION - PAIN TYPE: TYPE: CHRONIC PAIN

## 2022-03-30 ASSESSMENT — PAIN - FUNCTIONAL ASSESSMENT: PAIN_FUNCTIONAL_ASSESSMENT: PREVENTS OR INTERFERES SOME ACTIVE ACTIVITIES AND ADLS

## 2022-03-30 ASSESSMENT — PAIN SCALES - GENERAL: PAINLEVEL_OUTOF10: 4

## 2022-03-30 ASSESSMENT — PAIN DESCRIPTION - FREQUENCY: FREQUENCY: INTERMITTENT

## 2022-03-30 ASSESSMENT — PAIN DESCRIPTION - LOCATION: LOCATION: FOOT

## 2022-03-30 ASSESSMENT — PAIN DESCRIPTION - ONSET: ONSET: ON-GOING

## 2022-03-30 ASSESSMENT — PAIN DESCRIPTION - PROGRESSION: CLINICAL_PROGRESSION: GRADUALLY IMPROVING

## 2022-03-30 ASSESSMENT — PAIN DESCRIPTION - DESCRIPTORS: DESCRIPTORS: ACHING

## 2022-03-30 NOTE — PROGRESS NOTES
[facility]  History and Physical/Consultation  Podiatry    Referring Physician : Alfonzo Caceres MD  [address] MEDICAL RECORD NUMBER:  [id number]  AGE: 80 y.o. GENDER: male  : 1932  EPISODE DATE:  3/30/2022  Subjective:     Chief Complaint   Patient presents with    Wound Check     BLE         HISTORY of PRESENT ILLNESS HPI     Diana Patel is a 80 y.o. male who presents today for wound/ulcer evaluation. History of Wound Context:  The patient has had a wound of diabetic b/l heels,and right ankle which was first noted approximately months ago. This has been treated betadyne. On their initial visit to the wound healing center, 22 ,  the patient has noted that the wound has been improving. The patient has had similar previous wounds in the past.      Pt is not on abx at time of initial visit.       Wound/Ulcer Pain Timing/Severity: constant  Quality of pain: sharp  Severity:  3 / 10   Modifying Factors: Pain worsens with walking  Associated Signs/Symptoms: edema, erythema and drainage    Ulcer Identification:  Ulcer Type: diabetic  Contributing Factors: edema and diabetes    Diabetic/Pressure/Non Pressure Ulcers onl y:  Ulcer: Diabetic ulcer, fat layer exposed    If patient has diabetic lower extremity wounds  Campbell Classification of diabetic lower extremity wounds:    Grade Description   []  0 No open wound   []  1 Superficial ulcer involving the full skin thickness   [x]  2 Deep ulcer involves ligament, tendon, joint capsule, or fascia  No bone involvement or abscess presence   []  3 Deep Ulcer with abcess formation and/or osteomyelitis   []  4 Localized gangrene   []  5 Extensive gangrene of the foot     Wound: N/A          22  Debrided, cont tx and care     22  Debrided, cont tx and care, await graft approval    22  Debrided, cont tx and care, puraply applied    22  Debrided, dermagraft applied    22  Debrided, dermagraft applied    2-23-22  Debrided, graft applied    3-2-22  Debrided, graft applied    3-9-22  Debrided, graft applied     3-23-22  Just release from hospital care, new wounds noted from hospital, also trauma to left toe loosened nail that was removed without incident, debrided all wounds, culture taken    3-30-22  Debrided, santyl to left leg, applied dermagraft b/l heels, prevelon boots wound vac left leg  PAST MEDICAL HISTORY      Diagnosis Date    Arthritis     Cancer ([address]     breast    Cellulitis     CH[facility]congestive heart failure) [facility]     CKD (chronic kidney disease) stage 3, GFR 30-59 ml/min ([address]     Diabetes mellitus ([address]     History of lumpectomy     Hx of blood clots     Hyperlipidemia     Hypertension     Left ventricular failure ([facility])     Macular degeneration     Obesity     Orthostatic hypotension     PE (pulmonary thromboembolism) ([facility])     Pressure injury of both heels, unstageable ([address]     Staph aureus infection     Venous insufficiency      Past Surgical History:   Procedure Laterality Date    [facility]  9/2/2021         MASTECTOMY      TOE AMPUTATION      TOE SURGERY      TONSILLECTOMY       History reviewed. No pertinent family history.   Social History     Tobacco Use    Smoking status: Never Smoker    Smokeless tobacco: Never Used   Vaping Use    Vaping Use: Never used   Substance Use Topics    Alcohol use: Not Currently     Alcohol/week: 1.0 standard drink     Types: 1 Cans of beer per week    Drug use: No     Allergies   Allergen Reactions    Clindamycin/Lincomycin     Sulfa Antibiotics      Current Outpatient Medications on File Prior to Encounter   Medication Sig Dispense Refill    apixaban (ELIQUIS) 2.5 MG TABS tablet Take 1 tablet by mouth 2 times daily (before meals) 60 tablet 2    docusate sodium (COLACE) 100 MG capsule Take 100 mg by mouth nightly      tamsulosin (FLOMAX) 0.4 MG capsule Take 0.4 mg by mouth daily      bisacodyl (DULCOLAX) 10 MG suppository Place 10 mg rectally daily as needed for Constipation      diphenhydrAMINE-zinc acetate (BENADRYL) 1-0.1 % cream Apply topically every 4 hours as needed for Itching Apply topically 3 times daily as needed.  Sodium Phosphates (FLEET) 7-19 GM/118ML Place 1 enema rectally daily as needed      magnesium hydroxide (MILK OF MAGNESIA) 400 MG/5ML suspension Take 30 mLs by mouth daily as needed for Constipation      Polyethylene Glycol 400 1 % SOLN Apply 1 drop to eye every 4 hours as needed      ferrous sulfate (IRON 325) 325 (65 Fe) MG tablet Take 325 mg by mouth daily       zinc sulfate (ZINCATE) 220 (50 Zn) MG capsule Take 1 capsule by mouth daily for 14 days 14 capsule 0    miconazole (MICOTIN) 2 % powder Apply topically 2 times daily. 45 g 1    ascorbic acid (VITAMIN C) 500 MG tablet Take 1 tablet by mouth 2 times daily 30 tablet 0    Vitamin D (CHOLECALCIFEROL) 50 MCG (2000 UT) TABS tablet Take 1 tablet by mouth daily 60 tablet 0    furosemide (LASIX) 40 MG tablet Take 20 mg by mouth daily       omeprazole (PRILOSEC) 20 MG delayed release capsule Take 20 mg by mouth daily      Multiple Vitamins-Minerals (THERAPEUTIC MULTIVITAMIN-MINERALS) tablet Take 1 tablet by mouth daily      Multiple Vitamins-Minerals (PRESERVISION AREDS PO) Take 1 tablet by mouth 2 times daily (with meals)      acetaminophen (TYLENOL) 325 MG tablet Take 650 mg by mouth every 4 hours as needed for Pain or Fever       calcium carbonate (TUMS) 500 MG chewable tablet Take 1 tablet by mouth every 4 hours as needed for Heartburn      ipratropium-albuterol (DUONEB) 0.5-2.5 (3) MG/3ML SOLN nebulizer solution Inhale 3 mLs into the lungs every 4 hours (while awake) for 30 doses 90 mL 0    gabapentin (NEURONTIN) 100 MG capsule Take 200 mg by mouth Daily with supper.        atorvastatin (LIPITOR) 80 MG tablet Take 80 mg by mouth at bedtime       INSULIN LISP PROT & LISP, HUM, (75-25) 100 UNIT/ML SUSP Inject 72 Units into the skin daily (with breakfast)       INSULIN LISP PROT & LISP, HUM, (75-25) 100 UNIT/ML SUSP Inject 55 Units into the skin Daily with supper       clotrimazole-betamethasone (LOTRISONE) cream Apply  topically as needed. Apply topically 2 times daily. No current facility-administered medications on file prior to encounter.        REVIEW OF SYSTEMS   ROS : All others Negative if blank [], Positive if [x]  General Vascular   [] Fevers [] Claudication   [] Chills [] Rest Pain   Skin Neurologic   [x] Tissue Loss [x] Lower extremity neuropathy     Objective:    /64   Pulse 79   Temp 97.3 °F (36.3 °C) (Temporal)   Resp 20   Wt Readings from Last 3 Encounters:   03/16/22 266 lb 12.1 oz (121 kg)   03/02/22 243 lb (110.2 kg)   02/16/22 243 lb (110.2 kg)       PHYSICAL EXAM   CONSTITUTIONAL:   Awake, alert, cooperative   PSYCHIATRIC :  Oriented to time, place and person      normal insight to disease process  EXTREMITIES:   R LE Open wounds are noted   Skin color is abnormal with ulcers   Edema is  noted   Sensation deficit noted - protective   Palpation of the foot does cause pain   4/5 strength DF/PF  L LE Open wounds are noted   Skin color is abnormal with ulcers   Edema is  noted   Sensation deficit noted - protective   Palpation of the foot does cause pain   4/5 strength DF/PF  R dorsalis pedis 1 L dorsalis pedis 1   R posterior tibial 1 L posterior tibial 1     Assessment:     Problem List Items Addressed This Visit     Diabetic ulcer of right heel associated with type 2 diabetes mellitus, with fat layer exposed ([address] - Primary    Relevant Orders    Initiate Outpatient Wound Care Protocol    Diabetic ulcer of left heel [facility])    Relevant Orders    Initiate Outpatient Wound Care Protocol          Pre Debridement Measurements:  Are located in the [facility]  Documentation Flow Sheet  Post Debridement Measurements:  Wound/Ulcer Descriptions are Pre Debridement except measurements:     Wound 01/05/22 Heel Left #1 (Active)   Wound Image   01/05/22 0902   Wound Etiology Diabetic Campbell 2 01/05/22 0943   Dressing Status Clean;Dry; Intact 03/17/22 0800   Wound Cleansed Cleansed with saline 03/16/22 0830   Dressing/Treatment ABD; Other (comment) 03/16/22 2015   Offloading for Diabetic Foot Ulcers Diabetic shoes/inserts 03/16/22 2015   Dressing Change Due 03/16/22 03/16/22 0830   Wound Length (cm) 3.4 cm 03/30/22 0859   Wound Width (cm) 2.1 cm 03/30/22 0859   Wound Depth (cm) 0.4 cm 03/30/22 0859   Wound Surface Area (cm^2) 7.14 cm^2 03/30/22 0859   Change in Wound Size % (l*w) 48.26 03/30/22 0859   Wound Volume (cm^3) 2. 856 cm^3 03/30/22 0859   Wound Healing % -3 03/30/22 0859   Post-Procedure Length (cm) 3.4 cm 03/30/22 0949   Post-Procedure Width (cm) 2.1 cm 03/30/22 0949   Post-Procedure Depth (cm) 0.5 cm 03/30/22 0949   Post-Procedure Surface Area (cm^2) 7.14 cm^2 03/30/22 0949   Post-Procedure Volume (cm^3) 3.57 cm^3 03/30/22 0949   Wound Assessment Fibrin;Devitalized tissue 03/30/22 0859   Drainage Amount Moderate 03/30/22 0859   Drainage Description Serosanguinous 03/30/22 0859   Odor None 03/30/22 0859   Rashmi-wound Assessment Intact 03/30/22 0859   Wound Thickness Description not for Pressure Injury Full thickness 01/19/22 0909   Number of days: 84       Wound 01/05/22 Heel Right #2 (Active)   Wound Image   01/05/22 0902   Wound Etiology Venous 01/05/22 0943   Dressing Status Clean;Dry; Intact 03/17/22 0800   Wound Cleansed Cleansed with saline 03/16/22 0830   Dressing/Treatment ABD; Other (comment) 03/16/22 2015   Offloading for Diabetic Foot Ulcers Offloading boot 03/16/22 2015   Dressing Change Due 03/16/22 03/16/22 0830   Wound Length (cm) 2.6 cm 03/30/22 0859   Wound Width (cm) 1.5 cm 03/30/22 0859   Wound Depth (cm) 0.3 cm 03/30/22 0859   Wound Surface Area (cm^2) 3.9 cm^2 03/30/22 0859   Change in Wound Size % (l*w) 8.24 03/30/22 0859   Wound Volume (cm^3) 1.17 cm^3 03/30/22 0859   Wound Healing % -38 03/30/22 0859   Post-Procedure Length (cm) 2.6 cm 03/30/22 0949   Post-Procedure Width (cm) 1.5 cm 03/30/22 0949   Post-Procedure Depth (cm) 0.4 cm 03/30/22 0949   Post-Procedure Surface Area (cm^2) 3.9 cm^2 03/30/22 0949   Post-Procedure Volume (cm^3) 1.56 cm^3 03/30/22 0949   Wound Assessment Fibrin;Devitalized tissue;Pink/red 03/30/22 0859   Drainage Amount Moderate 03/30/22 0859   Drainage Description Serosanguinous 03/30/22 0859   Odor None 03/30/22 0859   Rashmi-wound Assessment Intact 03/30/22 0859   Wound Thickness Description not for Pressure Injury Full thickness 01/19/22 5118   Number of days: 84       Wound 03/14/22 Tibial Left;Posterior (Active)   Dressing Status Clean;Dry; Intact 03/17/22 0800   Wound Cleansed Cleansed with saline 03/16/22 0830   Dressing/Treatment Iodoform gauze; Foam 03/16/22 2015   Dressing Change Due 03/16/22 03/16/22 0830   Wound Length (cm) 2.6 cm 03/30/22 0859   Wound Width (cm) 2.4 cm 03/30/22 0859   Wound Depth (cm) 1.2 cm 03/30/22 0859   Wound Surface Area (cm^2) 6.24 cm^2 03/30/22 0859   Change in Wound Size % (l*w) 0.16 03/30/22 0859   Wound Volume (cm^3) 7.488 cm^3 03/30/22 0859   Wound Healing % 0 03/30/22 0859   Post-Procedure Length (cm) 2.6 cm 03/30/22 0949   Post-Procedure Width (cm) 2.4 cm 03/30/22 0949   Post-Procedure Depth (cm) 1.3 cm 03/30/22 0949   Post-Procedure Surface Area (cm^2) 6.24 cm^2 03/30/22 0949   Post-Procedure Volume (cm^3) 8.112 cm^3 03/30/22 0949   Wound Assessment Devitalized tissue;Pink/red 03/30/22 0859   Drainage Amount Moderate 03/30/22 0859   Drainage Description Serosanguinous 03/30/22 0859   Odor None 03/30/22 0859   Rashmi-wound Assessment Blanchable erythema;Edematous 03/30/22 0859   Number of days: 15       Wound 03/14/22 Coccyx (Active)   Wound Cleansed Cleansed with saline 03/16/22 0830   Dressing/Treatment Open to air; Pharmaceutical agent (see MAR) 03/17/22 0800   Wound Length (cm) 7 cm 03/14/22 1755   Wound Width (cm) 12 cm 03/14/22 1755 Wound Surface Area (cm^2) 84 cm^2 03/14/22 1755   Wound Assessment Pink/red; Other (Comment) 03/17/22 0800   Drainage Amount Small 03/17/22 0800   Drainage Description Serosanguinous 03/17/22 0800   Odor None 03/17/22 0800   Rashmi-wound Assessment Fragile 03/16/22 2015   Number of days: 15       Wound 03/23/22 Toe (Comment  which one) Left #10 Left Great Toe (Active)   Wound Image   03/23/22 0818   Wound Length (cm) 1 cm 03/30/22 0859   Wound Width (cm) 1.2 cm 03/30/22 0859   Wound Depth (cm) 0.1 cm 03/30/22 0859   Wound Surface Area (cm^2) 1.2 cm^2 03/30/22 0859   Change in Wound Size % (l*w) 90 03/30/22 0859   Wound Volume (cm^3) 0.12 cm^3 03/30/22 0859   Wound Healing % 90 03/30/22 0859   Post-Procedure Length (cm) 1 cm 03/30/22 0949   Post-Procedure Width (cm) 1.2 cm 03/30/22 0949   Post-Procedure Depth (cm) 0.2 cm 03/30/22 0949   Post-Procedure Surface Area (cm^2) 1.2 cm^2 03/30/22 0949   Post-Procedure Volume (cm^3) 0.24 cm^3 03/30/22 0949   Wound Assessment Fibrin;Pink/red 03/30/22 0859   Drainage Amount Moderate 03/30/22 0859   Drainage Description Serosanguinous 03/30/22 0859   Odor None 03/30/22 0859   Rashmi-wound Assessment Fragile 03/30/22 0859   Number of days: 7       Wound 03/23/22 Foot Left;Dorsal #11 (Active)   Wound Image   03/23/22 0818   Wound Length (cm) 0.8 cm 03/30/22 0859   Wound Width (cm) 2.2 cm 03/30/22 0859   Wound Depth (cm) 0.1 cm 03/30/22 0859   Wound Surface Area (cm^2) 1.76 cm^2 03/30/22 0859   Change in Wound Size % (l*w) 93.48 03/30/22 0859   Wound Volume (cm^3) 0.176 cm^3 03/30/22 0859   Wound Healing % 93 03/30/22 0859   Post-Procedure Length (cm) 0.8 cm 03/30/22 0949   Post-Procedure Width (cm) 2.2 cm 03/30/22 0949   Post-Procedure Depth (cm) 0.1 cm 03/30/22 0949   Post-Procedure Surface Area (cm^2) 1.76 cm^2 03/30/22 0949   Post-Procedure Volume (cm^3) 0.176 cm^3 03/30/22 0949   Wound Assessment Pink/red 03/30/22 0859   Drainage Amount Moderate 03/30/22 0859   Drainage Description Serous 03/30/22 0859   Odor None 03/30/22 0859   Rashmi-wound Assessment Fragile;Edematous 03/30/22 0859   Number of days: 7          Procedure Note  Indications:  Based on my examination of this patient's wound(s)/ulcer(s) today, debridement is required to promote healing and evaluate the wound base. Performed by: Kerry Crawford DPM    Consent obtained:  Yes    Time out taken:  Yes    Pain Control: Anesthetic  Anesthetic: 4% Lidocaine Liquid Topical     Debridement:Excisional Debridement    Using #15 blade scalpel the wound(s)/ulcer(s) was/were sharply debrided down through and including the removal of subcutaneous tissue. Devitalized Tissue Debrided:  fibrin, biofilm, slough and necrotic/eschar to stimulate bleeding to promote healing, post debridement good bleeding base and wound edges noted    Wound/Ulcer #: 2, 4, 11, 10    Percent of Wound/Ulcer Debrided: 100%    Total Surface Area Debrided:  25 sq cm     Estimated Blood Loss:  Minimal  Hemostasis Achieved:  by pressure    Procedural Pain:  4  / 10   Post Procedural Pain:  5 / 10     Response to treatment:  Well tolerated by patient. A culture was done. Plan:     Pt is not a smoker   - Discussed relationship of smoking and negative affects on wound healing   - Emphasized importance of tobacco avoidace/cessation   - Script for nicotine patch given to patient   - Information regarding support groups for smoking cessation given    In my professional opinion and based off the information that is available at this time this patient has appropriate indication for HBO Therapy: Maybe    Treatment Note please see attached Discharge Instructions    Written patient dismissal instructions given to patient and signed by patient or POA.          Discharge Instructions        Visit Discharge/Physician Orders     Discharge condition: Stable     Assessment of pain at discharge:  LEVEL 4     Anesthetic used: 4% lidocaine solution     Discharge to: Home     Left via:Private automobile     Accompanied by: accompanied by SELF     ECF/[facility]:  [facility].      Dressing Orders:  Clean bilateral heel ulcers with NSS, graft applied in clinic apply adaptic and dry dressing leave in place x 1 week   Apply betadine daily between and on toes of left foot. Apply lorin to left great toe and foot wounds and cover with 4 x4, kerlix        TO LEFT LEG ULCER, CLEAN WITH SALINE AND APPLY SANTYL TO WOUND BED THEN  WOUND VAC AT 1255MMHG CHANGE Monday Wednesday Friday. IF VAC MALFUNCTIONS APPLY WET TO DRY DRESSING UNTIL VAC CAN BE REPLACED       APPLY PREVALON BOOT TO BILATERAL HEELS    Physical therapy for ambulating safely while wearing offloading boots as indicated.     Treatment Orders:      Wound measurements:                                                 [address] [place] [address],[address] followup visit _________one week with Dr. Gastelum___________________  (Please note your next appointment above and if you are unable to keep, kindly give a 24 hour notice.  Thank you.)     Physician signature:__________________________        If you experience any of the following, please call the [address] during business hours:     * Increase in Pain  * Temperature over 101  * Increase in drainage from your wound  * Drainage with a foul odor  * Bleeding  * Increase in swelling  * Need for compression bandage changes due to slippage, breakthrough drainage.     If you need medical attention outside of the business hours of the [address] please contact your PCP or go to the nearest emergency room.                                Electronically signed by Keenan Vital DPM on 3/30/2022 at 10:09 AM

## 2022-04-06 ENCOUNTER — HOSPITAL ENCOUNTER (OUTPATIENT)
Dept: WOUND CARE | Age: 87
Discharge: HOME | End: 2022-04-06
Payer: MEDICARE

## 2022-04-06 VITALS
RESPIRATION RATE: 18 BRPM | TEMPERATURE: 97 F | SYSTOLIC BLOOD PRESSURE: 128 MMHG | DIASTOLIC BLOOD PRESSURE: 70 MMHG | HEART RATE: 84 BPM

## 2022-04-06 DIAGNOSIS — E11.621 DIABETIC ULCER OF RIGHT HEEL ASSOCIATED WITH TYPE 2 DIABETES MELLITUS, WITH FAT LAYER EXPOSED (HCC): Primary | ICD-10-CM

## 2022-04-06 DIAGNOSIS — L97.421 DIABETIC ULCER OF LEFT HEEL ASSOCIATED WITH DIABETES MELLITUS DUE TO UNDERLYING CONDITION, LIMITED TO BREAKDOWN OF SKIN (HCC): ICD-10-CM

## 2022-04-06 DIAGNOSIS — L97.412 DIABETIC ULCER OF RIGHT HEEL ASSOCIATED WITH TYPE 2 DIABETES MELLITUS, WITH FAT LAYER EXPOSED (HCC): Primary | ICD-10-CM

## 2022-04-06 DIAGNOSIS — E08.621 DIABETIC ULCER OF LEFT HEEL ASSOCIATED WITH DIABETES MELLITUS DUE TO UNDERLYING CONDITION, LIMITED TO BREAKDOWN OF SKIN (HCC): ICD-10-CM

## 2022-04-06 PROCEDURE — 6370000000 HC RX 637 (ALT 250 FOR IP): Performed by: PODIATRIST

## 2022-04-06 PROCEDURE — 15275 SKIN SUB GRAFT FACE/NK/HF/G: CPT

## 2022-04-06 PROCEDURE — C5275 LOW COST SKIN SUBSTITUTE APP: HCPCS

## 2022-04-06 RX ORDER — LIDOCAINE HYDROCHLORIDE 20 MG/ML
JELLY TOPICAL ONCE
Status: CANCELLED | OUTPATIENT
Start: 2022-04-06 | End: 2022-04-06

## 2022-04-06 RX ORDER — GINSENG 100 MG
CAPSULE ORAL ONCE
Status: CANCELLED | OUTPATIENT
Start: 2022-04-06 | End: 2022-04-06

## 2022-04-06 RX ORDER — GENTAMICIN SULFATE 1 MG/G
OINTMENT TOPICAL ONCE
Status: CANCELLED | OUTPATIENT
Start: 2022-04-06 | End: 2022-04-06

## 2022-04-06 RX ORDER — BACITRACIN, NEOMYCIN, POLYMYXIN B 400; 3.5; 5 [USP'U]/G; MG/G; [USP'U]/G
OINTMENT TOPICAL ONCE
Status: CANCELLED | OUTPATIENT
Start: 2022-04-06 | End: 2022-04-06

## 2022-04-06 RX ORDER — CLOBETASOL PROPIONATE 0.5 MG/G
OINTMENT TOPICAL ONCE
Status: CANCELLED | OUTPATIENT
Start: 2022-04-06 | End: 2022-04-06

## 2022-04-06 RX ORDER — LIDOCAINE 50 MG/G
OINTMENT TOPICAL ONCE
Status: CANCELLED | OUTPATIENT
Start: 2022-04-06 | End: 2022-04-06

## 2022-04-06 RX ORDER — LIDOCAINE HYDROCHLORIDE 40 MG/ML
SOLUTION TOPICAL ONCE
Status: COMPLETED | OUTPATIENT
Start: 2022-04-06 | End: 2022-04-06

## 2022-04-06 RX ORDER — LIDOCAINE 40 MG/G
CREAM TOPICAL ONCE
Status: CANCELLED | OUTPATIENT
Start: 2022-04-06 | End: 2022-04-06

## 2022-04-06 RX ORDER — BETAMETHASONE DIPROPIONATE 0.05 %
OINTMENT (GRAM) TOPICAL ONCE
Status: CANCELLED | OUTPATIENT
Start: 2022-04-06 | End: 2022-04-06

## 2022-04-06 RX ORDER — LIDOCAINE HYDROCHLORIDE 40 MG/ML
SOLUTION TOPICAL ONCE
Status: CANCELLED | OUTPATIENT
Start: 2022-04-06 | End: 2022-04-06

## 2022-04-06 RX ORDER — BACITRACIN ZINC AND POLYMYXIN B SULFATE 500; 1000 [USP'U]/G; [USP'U]/G
OINTMENT TOPICAL ONCE
Status: CANCELLED | OUTPATIENT
Start: 2022-04-06 | End: 2022-04-06

## 2022-04-06 RX ADMIN — LIDOCAINE HYDROCHLORIDE 8 ML: 40 SOLUTION TOPICAL at 09:31

## 2022-04-06 ASSESSMENT — PAIN DESCRIPTION - LOCATION: LOCATION: FOOT

## 2022-04-06 ASSESSMENT — PAIN DESCRIPTION - PROGRESSION: CLINICAL_PROGRESSION: NOT CHANGED

## 2022-04-06 ASSESSMENT — PAIN DESCRIPTION - FREQUENCY: FREQUENCY: INTERMITTENT

## 2022-04-06 ASSESSMENT — PAIN SCALES - GENERAL: PAINLEVEL_OUTOF10: 5

## 2022-04-06 ASSESSMENT — PAIN DESCRIPTION - DESCRIPTORS: DESCRIPTORS: SHARP

## 2022-04-06 ASSESSMENT — PAIN DESCRIPTION - ORIENTATION: ORIENTATION: LEFT

## 2022-04-06 ASSESSMENT — PAIN - FUNCTIONAL ASSESSMENT: PAIN_FUNCTIONAL_ASSESSMENT: ACTIVITIES ARE NOT PREVENTED

## 2022-04-06 ASSESSMENT — PAIN DESCRIPTION - PAIN TYPE: TYPE: CHRONIC PAIN

## 2022-04-06 ASSESSMENT — PAIN DESCRIPTION - ONSET: ONSET: PROGRESSIVE

## 2022-04-06 NOTE — DISCHARGE INSTR - COC
Continuity of Care Form    Patient Name: Dimitris Acevedo. :  1932  MRN:  [id number]    Admit date:  2022  Discharge date:  ***    Code Status Order: Prior   Advance Directives:      Admitting Physician:  No admitting provider for patient encounter. PCP: Peyton Morley MD    Discharging Nurse: [facility] Unit/Room#: No information available for this encounter. Discharging Unit Phone Number: ***    Emergency Contact:   Extended Emergency Contact Information  Primary Emergency Contact: Pastor Davis [address] Phone: [phone]  Mobile Phone: [phone]  Relation: Child   needed? No  Secondary Emergency Contact: Claude Lizarraga Heidi [facility] Phone: [phone]  Relation: Child    Past Surgical History:  Past Surgical History:   Procedure Laterality Date    [facility]         MASTECTOMY      TOE AMPUTATION      TOE SURGERY      TONSILLECTOMY         Immunization History:   Immunization History   Administered Date(s) Administered    Influenza Virus Vaccine 10/23/2016    Pneumococcal Conjugate Vaccine 2013    Td, unspecified formulation 2012       Active Problems:  Patient Active Problem List   Diagnosis Code    HTN (hypertension) I10    Breast cancer, male ([facility] [address] C50.929    Obesity (BMI 30.0-34. 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 GFR 30-59 (HCC) E10.22, N18.30    Cellulitis and abscess of right lower extremity L03.115, L02.415    Cellulitis and abscess of left lower extremity L03.116, L02.416    Chronic venous insufficiency of lower extremity I87.2    Acute hypoxemic respiratory failure due to COVID-19 ([facility]) U07.1, J96.01    Acute on chronic respiratory failure with hypoxia ([facility]) J96.21    Acute respiratory failure with hypoxia ([facility]) J96.01    COVID-19 virus infection U07.1    Hyperlipidemia E78.5    Pneumonia due to COVID-19 virus U07.1, J12.82    Orthostatic hypotension I95.1    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 ([facility]) I70.244    PVD (peripheral vascular disease) ([facility]) I73.9    PAD (peripheral artery disease) ([facility]) I73.9    Diabetic ulcer of right heel associated with type 2 diabetes mellitus, with fat layer exposed ([facility] [address] E11.621, L97.412    Diabetic ulcer of left heel ([facility]) E11.621, L97.429    Sepsis ([facility] [address] A41.9    Diabetic foot infection ([facility]) E11.628, L08.9       Isolation/Infection:   Isolation            No Isolation          Patient Infection Status       Infection Onset Added Last Indicated Last Indicated By Review Planned Expiration Resolved Resolved By    None active    Resolved    COVID-19 (Rule Out) 22 COVID-19, Rapid (Ordered)   22 Rule-Out Test Resulted    MRSA 21 Culture, Blood 2   03/15/22 Chino Chacon RN    MRSA blood 2021    COVID-19 21 Covid-19 Ambulatory   21     COVID-19 (Rule Out) 21 COVID-19, Rapid (Ordered)   21 Rule-Out Test Resulted    COVID-19 (Rule Out) 10/16/20 10/16/20 10/16/20 Covid-19 Ambulatory (Ordered)   10/17/20 Rule-Out Test Resulted    COVID-19 (Rule Out) 20 Covid-19 Ambulatory (Ordered)   20 Rule-Out Test Resulted    COVID-19 (Rule Out) 20 Covid-19 Ambulatory (Ordered)   20 Rule-Out Test Resulted    COVID-19 (Rule Out) 20 Covid-19 Ambulatory (Ordered)   20 Rule-Out Test Resulted            Nurse Assessment:  Last Vital Signs: /70   Pulse 84   Temp 97 °F (36.1 °C) (Temporal)   Resp 18     Last documented pain score (0-10 scale): Pain Level: 5  Last Weight:   Wt Readings from Last 1 Encounters:   03/16/22 266 lb 12.1 oz (121 kg)     Mental Status:  {IP PT MENTAL STATUS:81324}    IV Access:  { AGUSTINA IV ACCESS:693835625}    Nursing Mobility/ADLs:  Walking   {CHP DME BLII:622785434}  Transfer  {CHP DME FLOA:042322064}  Bathing  {CHP DME SVEV:256488004}  Dressing  {CHP DME CTAN:047021602}  Toileting  {CHP DME ZYMF:840041429}  Feeding  {CHP DME JMEE:600378827}  Med Admin  {CHP DME FGWT:480786446}  Med Delivery   { AGUSTINA MED Delivery:972551102}    Wound Care Documentation and Therapy:  Wound 01/05/22 Heel Left #1 (Active)   Wound Image   01/05/22 0902   Wound Etiology Diabetic Campbell 2 01/05/22 0943   Dressing Status New dressing applied 03/30/22 1016   Wound Cleansed Cleansed with saline 03/30/22 1016   Dressing/Treatment ABD; Other (comment) 03/30/22 1016   Offloading for Diabetic Foot Ulcers Diabetic shoes/inserts 03/30/22 1016   Dressing Change Due 03/16/22 03/16/22 0830   Wound Length (cm) 2.6 cm 04/06/22 0925   Wound Width (cm) 2 cm 04/06/22 0925   Wound Depth (cm) 0.2 cm 04/06/22 0925   Wound Surface Area (cm^2) 5.2 cm^2 04/06/22 0925   Change in Wound Size % (l*w) 62.32 04/06/22 0925   Wound Volume (cm^3) 1.04 cm^3 04/06/22 0925   Wound Healing % 62 04/06/22 0925   Post-Procedure Length (cm) 2.6 cm 04/06/22 0933   Post-Procedure Width (cm) 2 cm 04/06/22 0933   Post-Procedure Depth (cm) 0.3 cm 04/06/22 0933   Post-Procedure Surface Area (cm^2) 5.2 cm^2 04/06/22 0933   Post-Procedure Volume (cm^3) 1.56 cm^3 04/06/22 0933   Wound Assessment Fibrin;Pink/red 04/06/22 0925   Drainage Amount Small 04/06/22 0925   Drainage Description Yellow 04/06/22 0925   Odor None 04/06/22 0925   Rashmi-wound Assessment Fragile 04/06/22 0925   Wound Thickness Description not for Pressure Injury Full thickness 01/19/22 0909   Number of days: 90       Wound 01/05/22 Heel Right #2 (Active)   Wound Image   01/05/22 0902   Wound Etiology Venous 01/05/22 0943   Dressing Status New dressing applied 03/30/22 1016   Wound Cleansed Cleansed with saline 03/30/22 1016   Dressing/Treatment ABD; Other (comment) 03/30/22 1016   Offloading for Diabetic Foot Ulcers Diabetic shoes/inserts 03/30/22 1016   Dressing Change Due 03/16/22 03/16/22 0830   Wound Length (cm) 1.6 cm 04/06/22 0925   Wound Width (cm) 1 cm 04/06/22 0925   Wound Depth (cm) 0.2 cm 04/06/22 0925   Wound Surface Area (cm^2) 1.6 cm^2 04/06/22 0925   Change in Wound Size % (l*w) 62.35 04/06/22 0925   Wound Volume (cm^3) 0.32 cm^3 04/06/22 0925   Wound Healing % 62 04/06/22 0925   Post-Procedure Length (cm) 1.7 cm 04/06/22 0933   Post-Procedure Width (cm) 1 cm 04/06/22 0933   Post-Procedure Depth (cm) 0.3 cm 04/06/22 0933   Post-Procedure Surface Area (cm^2) 1.7 cm^2 04/06/22 0933   Post-Procedure Volume (cm^3) 0.51 cm^3 04/06/22 0933   Wound Assessment Fibrin;Pink/red 04/06/22 0925   Drainage Amount Small 04/06/22 0925   Drainage Description Yellow 04/06/22 0925   Odor None 04/06/22 0925   Rashmi-wound Assessment Fragile 04/06/22 0925   Wound Thickness Description not for Pressure Injury Full thickness 01/19/22 0909   Number of days: 90       Wound 03/14/22 Tibial Left;Posterior (Active)   Dressing Status New dressing applied 03/30/22 1016   Wound Cleansed Cleansed with saline 03/30/22 1016   Dressing/Treatment Moist to dry 03/30/22 1016   Dressing Change Due 03/16/22 03/16/22 0830   Wound Length (cm) 3.7 cm 04/06/22 0925   Wound Width (cm) 1.7 cm 04/06/22 0925   Wound Depth (cm) 0.3 cm 04/06/22 0925   Wound Surface Area (cm^2) 6.29 cm^2 04/06/22 0925   Change in Wound Size % (l*w) -0.64 04/06/22 0925   Wound Volume (cm^3) 1.887 cm^3 04/06/22 0925   Wound Healing % 75 04/06/22 0925   Post-Procedure Length (cm) 4 cm 04/06/22 0933   Post-Procedure Width (cm) 1.8 cm 04/06/22 0933   Post-Procedure Depth (cm) 0.6 cm 04/06/22 0933   Post-Procedure Surface Area (cm^2) 7.2 cm^2 04/06/22 0933   Post-Procedure Volume (cm^3) 4.32 cm^3 04/06/22 0933   Wound Assessment Fibrin;Devitalized tissue;Pink/red 04/06/22 0925   Drainage Amount Large 04/06/22 0925   Drainage Description Yellow 04/06/22 0925   Odor None 04/06/22 0925   Rashmi-wound Assessment Edematous;Fragile 04/06/22 0925   Number of days: 22       Wound 03/14/22 Coccyx (Active)   Wound Cleansed Cleansed with saline 03/16/22 0830   Dressing/Treatment Open to air; Pharmaceutical agent (see MAR) 03/17/22 0800   Wound Length (cm) 7 cm 03/14/22 1755   Wound Width (cm) 12 cm 03/14/22 1755   Wound Surface Area (cm^2) 84 cm^2 03/14/22 1755   Wound Assessment Pink/red; Other (Comment) 03/17/22 0800   Drainage Amount Small 03/17/22 0800   Drainage Description Serosanguinous 03/17/22 0800   Odor None 03/17/22 0800   Rashmi-wound Assessment Fragile 03/16/22 2015   Number of days: 22       Wound 03/23/22 Toe (Comment  which one) Left #10 Left Great Toe (Active)   Wound Image   03/23/22 0818   Dressing Status New dressing applied 03/30/22 1016   Wound Cleansed Irrigated with saline 03/30/22 1016   Dressing/Treatment Collagen with Ag;Dry dressing 03/30/22 1016   Wound Length (cm) 0.2 cm 04/06/22 0925   Wound Width (cm) 0.2 cm 04/06/22 0925   Wound Depth (cm) 0.1 cm 04/06/22 0925   Wound Surface Area (cm^2) 0.04 cm^2 04/06/22 0925   Change in Wound Size % (l*w) 99.67 04/06/22 0925   Wound Volume (cm^3) 0.004 cm^3 04/06/22 0925   Wound Healing % 100 04/06/22 0925   Post-Procedure Length (cm) 0.3 cm 04/06/22 0933   Post-Procedure Width (cm) 0.3 cm 04/06/22 0933   Post-Procedure Depth (cm) 0.2 cm 04/06/22 0933   Post-Procedure Surface Area (cm^2) 0.09 cm^2 04/06/22 0933   Post-Procedure Volume (cm^3) 0.018 cm^3 04/06/22 0933   Wound Assessment Dry 04/06/22 0925   Drainage Amount None 04/06/22 0925   Drainage Description Serosanguinous 22 0859   Odor None 22 0925   Rashmi-wound Assessment Fragile 22 0859   Number of days: 14       Wound 22 Foot Left;Dorsal #11 (Active)   Wound Image   22 0818   Dressing Status New dressing applied 22 1016   Wound Cleansed Cleansed with saline 22 1016   Dressing/Treatment Collagen with Ag;Dry dressing 22 1016   Wound Length (cm) 0.5 cm 22 0925   Wound Width (cm) 0.5 cm 22 0925   Wound Depth (cm) 0.1 cm 22 0925   Wound Surface Area (cm^2) 0.25 cm^2 22   Change in Wound Size % (l*w) 99.07 22 09   Wound Volume (cm^3) 0.025 cm^3 22   Wound Healing % 99 22 09   Post-Procedure Length (cm) 0.6 cm 22   Post-Procedure Width (cm) 0.6 cm 22   Post-Procedure Depth (cm) 0.2 cm 22   Post-Procedure Surface Area (cm^2) 0.36 cm^2 22   Post-Procedure Volume (cm^3) 0.072 cm^3 22 0933   Wound Assessment Dry 22 0925   Drainage Amount None 22 0925   Drainage Description Serous 22 0859   Odor None 22 0925   Rashmi-wound Assessment Edematous 22 0925   Number of days: 14        Elimination:  Continence: Bowel: {YES / PN:35480}  Bladder: {YES / M}  Urinary Catheter: {Urinary Catheter:929906900}   Colostomy/Ileostomy/Ileal Conduit: {YES / AVILES:70556}       Date of Last BM: ***  No intake or output data in the 24 hours ending 22 0936  No intake/output data recorded.     Safety Concerns:     [employer] Safety Concerns:094130769}    Impairments/Disabilities:      [employer] Impairments/Disabilities:260391936}    Nutrition Therapy:  Current Nutrition Therapy:   [employer] Diet List:673534801}    Routes of Feeding: {CHP DME Other Feedings:045945715}  Liquids: {Slp liquid thickness:36408}  Daily Fluid Restriction: {CHP DME Yes amt example:717186654}  Last Modified Barium Swallow with Video (Video Swallowing Test): {Done Not Done HWQW:278257395}    Treatments at the Time of Hospital Discharge:   Respiratory Treatments: ***  Oxygen Therapy:  {Therapy; copd oxygen:00365}  Ventilator:    {MH CC Vent NVBI:427932354}    Rehab Therapies: {THERAPEUTIC INTERVENTION:9791039469}  Weight Bearing Status/Restrictions: 50Cel[employer] VACA Weight Bearin}  Other Medical Equipment (for information only, NOT a DME order):  {EQUIPMENT:561252193}  Other Treatments: ***    Patient's personal belongings (please select all that are sent with patient):  {P DME Belongings:508994229}    RN SIGNATURE:  {Esignature:207076638}    CASE MANAGEMENT/SOCIAL WORK SECTION    Inpatient Status Date: ***    Readmission Risk Assessment Score:  Readmission Risk              Risk of Unplanned Readmission:  0           Discharging to Facility/ Agency   Name:   Address:  Phone:  Fax:    Dialysis Facility (if applicable)   Name:  Address:  Dialysis Schedule:  Phone:  Fax:    / signature: {Esignature:945742025}    PHYSICIAN SECTION    Prognosis: {Prognosis:5244505044}    Condition at Discharge: 50Celio Gill Patient Condition:334790580}    Rehab Potential (if transferring to Rehab): {Prognosis:1014662723}    Recommended Labs or Other Treatments After Discharge: ***    Physician Certification: I certify the above information and transfer of Arian Bernstein.  is necessary for the continuing treatment of the diagnosis listed and that he requires {Admit to Appropriate Level of Care:80982} for {GREATER/LESS:537657151} 30 days.      Update Admission H&P: {CHP DME Changes in GMDVD:111385425}    PHYSICIAN SIGNATURE:  {Esignature:654364632}

## 2022-04-06 NOTE — FLOWSHEET NOTE
Dermagraft Treatment Note    NAME:  Luis Jones. YOB: 1932  MEDICAL RECORD NUMBER:  [id number]  DATE:  4/6/2022    Goal:  Patient will receive safe and proper application of skin substitute. Patient will comply with caring for dressing, offloading and reporting complications. Expiration date of Dermagraft checked immediately prior to use. Package intact prior to use and no damage noted. Transport temperature controlled and acceptable. Dermagraft was prepared for application by removing from box and within 1 minute placing in thaw tub at a temperature of 34 to 37 degrees celsius until ice crystals were no longer present but no longer than 3 minutes. Dermagraft was rinsed 3 times in room temperature normal saline for 5 seconds each time. A 4th saline rinse was left on the Dermagraft until the physician was ready to apply it within 30 minutes of thawing. Dermagraft was applied to bilateral heels and affixed with steri-strips by the provider. Dermagraft was covered with non-adherent dressing. adaptic was applied on top of non-adherent dressing. A foam plug cut to fit into ulcer was applied. Fluffed gauze was applied. Dressing was secured with cover roll type tape to stabilize graft. Coban or ace wrap was applied to secure graft and decrease edema. Patient/caregiver was instructed not to remove dressing and to keep it clean and dry. Pt/family/caregiver was instructed on signs and symptoms of complications to report such as draining through, falling down/slipping, getting wet, or severe pain or tingling in toes   Pt/family/caregiver was instructed on need for offloading and elevation of affected extremity and on use of prescribed offloading device.  Dermagraft may be applied a total of 8 times per wound over a 12 week period. Additionally Dermagraft may only be used every 12 months per wound.       Date of first application of Dermagraft for this current wound is February 2, 2022.                 Guidelines followed  Dermagraft 24 steps followed    Electronically signed by Bethany Peck RN on 4/6/2022 at 9:34 AM

## 2022-04-06 NOTE — PROGRESS NOTES
applied    2-23-22  Debrided, graft applied    3-2-22  Debrided, graft applied    3-9-22  Debrided, graft applied     3-23-22  Just release from hospital care, new wounds noted from hospital, also trauma to left toe loosened nail that was removed without incident, debrided all wounds, culture taken    3-30-22  Debrided, santyl to left leg, applied dermagraft b/l heels, prevelon boots wound vac left leg    4-6-22  Debrided, dermagraft b/l, wound care    PAST MEDICAL HISTORY      Diagnosis Date    Arthritis     Cancer ([address]     breast    Cellulitis     CH[facility]congestive [facility] failure) [facility]     CKD (chronic kidney disease) stage 3, GFR 30-59 ml/min ([facility])     Diabetes mellitus ([address]     History of lumpectomy     Hx of blood clots     Hyperlipidemia     Hypertension     Left ventricular failure ([facility])     Macular degeneration     Obesity     Orthostatic hypotension     PE (pulmonary thromboembolism) ([facility])     Pressure injury of both heels, unstageable ([address]     Staph aureus infection     Venous insufficiency      Past Surgical History:   Procedure Laterality Date    [facility]  9/2/2021         MASTECTOMY      TOE AMPUTATION      TOE SURGERY      TONSILLECTOMY       History reviewed. No pertinent family history.   Social History     Tobacco Use    Smoking status: Never Smoker    Smokeless tobacco: Never Used   Vaping Use    Vaping Use: Never used   Substance Use Topics    Alcohol use: Not Currently     Alcohol/week: 1.0 standard drink     Types: 1 Cans of beer per week    Drug use: No     Allergies   Allergen Reactions    Clindamycin/Lincomycin     Sulfa Antibiotics      Current Outpatient Medications on File Prior to Encounter   Medication Sig Dispense Refill    apixaban (ELIQUIS) 2.5 MG TABS tablet Take 1 tablet by mouth 2 times daily (before meals) 60 tablet 2    docusate sodium (COLACE) 100 MG capsule Take 100 mg by mouth nightly      tamsulosin (FLOMAX) 0.4 MG capsule Take 0.4 mg by mouth daily      bisacodyl (DULCOLAX) 10 MG suppository Place 10 mg rectally daily as needed for Constipation      diphenhydrAMINE-zinc acetate (BENADRYL) 1-0.1 % cream Apply topically every 4 hours as needed for Itching Apply topically 3 times daily as needed.  Sodium Phosphates (FLEET) 7-19 GM/118ML Place 1 enema rectally daily as needed      magnesium hydroxide (MILK OF MAGNESIA) 400 MG/5ML suspension Take 30 mLs by mouth daily as needed for Constipation      Polyethylene Glycol 400 1 % SOLN Apply 1 drop to eye every 4 hours as needed      ferrous sulfate (IRON 325) 325 (65 Fe) MG tablet Take 325 mg by mouth daily       zinc sulfate (ZINCATE) 220 (50 Zn) MG capsule Take 1 capsule by mouth daily for 14 days 14 capsule 0    miconazole (MICOTIN) 2 % powder Apply topically 2 times daily. 45 g 1    ascorbic acid (VITAMIN C) 500 MG tablet Take 1 tablet by mouth 2 times daily 30 tablet 0    Vitamin D (CHOLECALCIFEROL) 50 MCG (2000 UT) TABS tablet Take 1 tablet by mouth daily 60 tablet 0    furosemide (LASIX) 40 MG tablet Take 20 mg by mouth daily       omeprazole (PRILOSEC) 20 MG delayed release capsule Take 20 mg by mouth daily      Multiple Vitamins-Minerals (THERAPEUTIC MULTIVITAMIN-MINERALS) tablet Take 1 tablet by mouth daily      Multiple Vitamins-Minerals (PRESERVISION AREDS PO) Take 1 tablet by mouth 2 times daily (with meals)      acetaminophen (TYLENOL) 325 MG tablet Take 650 mg by mouth every 4 hours as needed for Pain or Fever       calcium carbonate (TUMS) 500 MG chewable tablet Take 1 tablet by mouth every 4 hours as needed for Heartburn      ipratropium-albuterol (DUONEB) 0.5-2.5 (3) MG/3ML SOLN nebulizer solution Inhale 3 mLs into the lungs every 4 hours (while awake) for 30 doses 90 mL 0    gabapentin (NEURONTIN) 100 MG capsule Take 200 mg by mouth Daily with supper.        atorvastatin (LIPITOR) 80 MG tablet Take 80 mg by mouth at bedtime       INSULIN LISP PROT & LISP, HUM, (75-25) 100 UNIT/ML SUSP Inject 72 Units into the skin daily (with breakfast)       INSULIN LISP PROT & LISP, HUM, (75-25) 100 UNIT/ML SUSP Inject 55 Units into the skin Daily with supper       clotrimazole-betamethasone (LOTRISONE) cream Apply  topically as needed. Apply topically 2 times daily. No current facility-administered medications on file prior to encounter.        REVIEW OF SYSTEMS   ROS : All others Negative if blank [], Positive if [x]  General Vascular   [] Fevers [] Claudication   [] Chills [] Rest Pain   Skin Neurologic   [x] Tissue Loss [x] Lower extremity neuropathy     Objective:    /70   Pulse 84   Temp 97 °F (36.1 °C) (Temporal)   Resp 18   Wt Readings from Last 3 Encounters:   03/16/22 266 lb 12.1 oz (121 kg)   03/02/22 243 lb (110.2 kg)   02/16/22 243 lb (110.2 kg)       PHYSICAL EXAM   CONSTITUTIONAL:   Awake, alert, cooperative   PSYCHIATRIC :  Oriented to time, place and person      normal insight to disease process  EXTREMITIES:   R LE Open wounds are noted   Skin color is abnormal with ulcers   Edema is  noted   Sensation deficit noted - protective   Palpation of the foot does cause pain   4/5 strength DF/PF  L LE Open wounds are noted   Skin color is abnormal with ulcers   Edema is  noted   Sensation deficit noted - protective   Palpation of the foot does cause pain   4/5 strength DF/PF  R dorsalis pedis 1 L dorsalis pedis 1   R posterior tibial 1 L posterior tibial 1     Assessment:     Problem List Items Addressed This Visit     Diabetic ulcer of right heel associated with type 2 diabetes mellitus, with fat layer exposed ([address] - Primary    Relevant Orders    Initiate Outpatient Wound Care Protocol    Diabetic ulcer of left heel [facility])    Relevant Orders    Initiate Outpatient Wound Care Protocol          Pre Debridement Measurements:  Are located in the [facility]  Documentation Flow Sheet  Post Debridement Measurements:  Wound/Ulcer Descriptions are Pre Debridement except measurements:     Wound 01/05/22 Heel Left #1 (Active)   Wound Image   01/05/22 0902   Wound Etiology Diabetic Campbell 2 01/05/22 0943   Dressing Status New dressing applied 03/30/22 1016   Wound Cleansed Cleansed with saline 03/30/22 1016   Dressing/Treatment ABD; Other (comment) 03/30/22 1016   Offloading for Diabetic Foot Ulcers Diabetic shoes/inserts 03/30/22 1016   Dressing Change Due 03/16/22 03/16/22 0830   Wound Length (cm) 2.6 cm 04/06/22 0925   Wound Width (cm) 2 cm 04/06/22 0925   Wound Depth (cm) 0.2 cm 04/06/22 0925   Wound Surface Area (cm^2) 5.2 cm^2 04/06/22 0925   Change in Wound Size % (l*w) 62.32 04/06/22 0925   Wound Volume (cm^3) 1.04 cm^3 04/06/22 0925   Wound Healing % 62 04/06/22 0925   Post-Procedure Length (cm) 2.6 cm 04/06/22 0933   Post-Procedure Width (cm) 2 cm 04/06/22 0933   Post-Procedure Depth (cm) 0.3 cm 04/06/22 0933   Post-Procedure Surface Area (cm^2) 5.2 cm^2 04/06/22 0933   Post-Procedure Volume (cm^3) 1.56 cm^3 04/06/22 0933   Wound Assessment Fibrin;Pink/red 04/06/22 0925   Drainage Amount Small 04/06/22 0925   Drainage Description Yellow 04/06/22 0925   Odor None 04/06/22 0925   Rashmi-wound Assessment Fragile 04/06/22 0925   Wound Thickness Description not for Pressure Injury Full thickness 01/19/22 0909   Number of days: 90       Wound 01/05/22 Heel Right #2 (Active)   Wound Image   01/05/22 0902   Wound Etiology Venous 01/05/22 0943   Dressing Status New dressing applied 03/30/22 1016   Wound Cleansed Cleansed with saline 03/30/22 1016   Dressing/Treatment ABD; Other (comment) 03/30/22 1016   Offloading for Diabetic Foot Ulcers Diabetic shoes/inserts 03/30/22 1016   Dressing Change Due 03/16/22 03/16/22 0830   Wound Length (cm) 1.6 cm 04/06/22 0925   Wound Width (cm) 1 cm 04/06/22 0925   Wound Depth (cm) 0.2 cm 04/06/22 0925   Wound Surface Area (cm^2) 1.6 cm^2 04/06/22 0925   Change in Wound Size % (l*w) 62.35 04/06/22 0925   Wound Volume (cm^3) 0.32 cm^3 04/06/22 6577 Wound Healing % 62 04/06/22 0925   Post-Procedure Length (cm) 1.7 cm 04/06/22 0933   Post-Procedure Width (cm) 1 cm 04/06/22 0933   Post-Procedure Depth (cm) 0.3 cm 04/06/22 0933   Post-Procedure Surface Area (cm^2) 1.7 cm^2 04/06/22 0933   Post-Procedure Volume (cm^3) 0.51 cm^3 04/06/22 0933   Wound Assessment Fibrin;Pink/red 04/06/22 0925   Drainage Amount Small 04/06/22 0925   Drainage Description Yellow 04/06/22 0925   Odor None 04/06/22 0925   Rashmi-wound Assessment Fragile 04/06/22 0925   Wound Thickness Description not for Pressure Injury Full thickness 01/19/22 0909   Number of days: 90       Wound 03/14/22 Tibial Left;Posterior (Active)   Dressing Status New dressing applied 03/30/22 1016   Wound Cleansed Cleansed with saline 03/30/22 1016   Dressing/Treatment Moist to dry 03/30/22 1016   Dressing Change Due 03/16/22 03/16/22 0830   Wound Length (cm) 3.7 cm 04/06/22 0925   Wound Width (cm) 1.7 cm 04/06/22 0925   Wound Depth (cm) 0.3 cm 04/06/22 0925   Wound Surface Area (cm^2) 6.29 cm^2 04/06/22 0925   Change in Wound Size % (l*w) -0.64 04/06/22 0925   Wound Volume (cm^3) 1.887 cm^3 04/06/22 0925   Wound Healing % 75 04/06/22 0925   Post-Procedure Length (cm) 4 cm 04/06/22 0933   Post-Procedure Width (cm) 1.8 cm 04/06/22 0933   Post-Procedure Depth (cm) 0.6 cm 04/06/22 0933   Post-Procedure Surface Area (cm^2) 7.2 cm^2 04/06/22 0933   Post-Procedure Volume (cm^3) 4.32 cm^3 04/06/22 0933   Wound Assessment Fibrin;Devitalized tissue;Pink/red 04/06/22 0925   Drainage Amount Large 04/06/22 0925   Drainage Description Yellow 04/06/22 0925   Odor None 04/06/22 0925   Rashmi-wound Assessment Edematous;Fragile 04/06/22 0925   Number of days: 22       Wound 03/14/22 Coccyx (Active)   Wound Cleansed Cleansed with saline 03/16/22 0830   Dressing/Treatment Open to air; Pharmaceutical agent (see MAR) 03/17/22 0800   Wound Length (cm) 7 cm 03/14/22 1755   Wound Width (cm) 12 cm 03/14/22 1755   Wound Surface Area (cm^2) 84 cm^2 03/14/22 1755   Wound Assessment Pink/red; Other (Comment) 03/17/22 0800   Drainage Amount Small 03/17/22 0800   Drainage Description Serosanguinous 03/17/22 0800   Odor None 03/17/22 0800   Rashmi-wound Assessment Fragile 03/16/22 2015   Number of days: 22       Wound 03/23/22 Toe (Comment  which one) Left #10 Left Great Toe (Active)   Wound Image   03/23/22 0818   Dressing Status New dressing applied 03/30/22 1016   Wound Cleansed Irrigated with saline 03/30/22 1016   Dressing/Treatment Collagen with Ag;Dry dressing 03/30/22 1016   Wound Length (cm) 0.2 cm 04/06/22 0925   Wound Width (cm) 0.2 cm 04/06/22 0925   Wound Depth (cm) 0.1 cm 04/06/22 0925   Wound Surface Area (cm^2) 0.04 cm^2 04/06/22 0925   Change in Wound Size % (l*w) 99.67 04/06/22 0925   Wound Volume (cm^3) 0.004 cm^3 04/06/22 0925   Wound Healing % 100 04/06/22 0925   Post-Procedure Length (cm) 0.3 cm 04/06/22 0933   Post-Procedure Width (cm) 0.3 cm 04/06/22 0933   Post-Procedure Depth (cm) 0.2 cm 04/06/22 0933   Post-Procedure Surface Area (cm^2) 0.09 cm^2 04/06/22 0933   Post-Procedure Volume (cm^3) 0.018 cm^3 04/06/22 0933   Wound Assessment Dry 04/06/22 0925   Drainage Amount None 04/06/22 0925   Drainage Description Serosanguinous 03/30/22 0859   Odor None 04/06/22 0925   Rashmi-wound Assessment Fragile 03/30/22 0859   Number of days: 14       Wound 03/23/22 Foot Left;Dorsal #11 (Active)   Wound Image   03/23/22 0818   Dressing Status New dressing applied 03/30/22 1016   Wound Cleansed Cleansed with saline 03/30/22 1016   Dressing/Treatment Collagen with Ag;Dry dressing 03/30/22 1016   Wound Length (cm) 0.5 cm 04/06/22 0925   Wound Width (cm) 0.5 cm 04/06/22 0925   Wound Depth (cm) 0.1 cm 04/06/22 0925   Wound Surface Area (cm^2) 0.25 cm^2 04/06/22 0925   Change in Wound Size % (l*w) 99.07 04/06/22 0925   Wound Volume (cm^3) 0.025 cm^3 04/06/22 0925   Wound Healing % 99 04/06/22 0925   Post-Procedure Length (cm) 0.6 cm 04/06/22 0933 Post-Procedure Width (cm) 0.6 cm 04/06/22 0933   Post-Procedure Depth (cm) 0.2 cm 04/06/22 0933   Post-Procedure Surface Area (cm^2) 0.36 cm^2 04/06/22 0933   Post-Procedure Volume (cm^3) 0.072 cm^3 04/06/22 0933   Wound Assessment Dry 04/06/22 0925   Drainage Amount None 04/06/22 0925   Drainage Description Serous 03/30/22 0859   Odor None 04/06/22 0925   Rashmi-wound Assessment Edematous 04/06/22 0925   Number of days: 14          Procedure Note  Indications:  Based on my examination of this patient's wound(s)/ulcer(s) today, debridement is required to promote healing and evaluate the wound base. Performed by: Dre Negron DPM    Consent obtained:  Yes    Time out taken:  Yes    Pain Control: Anesthetic  Anesthetic: 4% Lidocaine Liquid Topical     Debridement:Excisional Debridement    Using #15 blade scalpel the wound(s)/ulcer(s) was/were sharply debrided down through and including the removal of subcutaneous tissue. Devitalized Tissue Debrided:  fibrin, biofilm, slough and necrotic/eschar to stimulate bleeding to promote healing, post debridement good bleeding base and wound edges noted    Wound/Ulcer #: 2, 4, 10    Percent of Wound/Ulcer Debrided: 100%    Total Surface Area Debrided:  15 sq cm     Estimated Blood Loss:  Minimal  Hemostasis Achieved:  by pressure    Procedural Pain:  4  / 10   Post Procedural Pain:  5 / 10     Response to treatment:  Well tolerated by patient. A culture was done.     Skin Substitute Applied:         [] APLIGRAF   []44 sq/cm   []88 sq/cm    []132 sq/cm  []176  sq/cm           [x] DERMAGRAFT  [] 38sq/cm   [x]76 sq/cm    []114 sq/cm  []152 sq/cm         [] OASIS   [] 10.5 sq/cm   []21 sq/cm    []4 sq/cm PuraPly  []8 sq/cm PuraPly        [] OTHER     []        sq/cm       Performed by: Dre Negron DPM    Wound Type:venous    Consent obtained: Yes    Time out taken: Yes     Fenestrated: Yes    Instrument(s) curette      [] Mesher Utilized    Skin Substitute was Applied to Wound Number(s): Wound #: 2, 4 and 10      Wound 01/05/22 Heel Left #1 (Active)   Wound Image   01/05/22 0902   Wound Etiology Diabetic Campbell 2 01/05/22 0943   Dressing Status New dressing applied 03/30/22 1016   Wound Cleansed Cleansed with saline 03/30/22 1016   Dressing/Treatment ABD; Other (comment) 03/30/22 1016   Offloading for Diabetic Foot Ulcers Diabetic shoes/inserts 03/30/22 1016   Dressing Change Due 03/16/22 03/16/22 0830   Wound Length (cm) 2.6 cm 04/06/22 0925   Wound Width (cm) 2 cm 04/06/22 0925   Wound Depth (cm) 0.2 cm 04/06/22 0925   Wound Surface Area (cm^2) 5.2 cm^2 04/06/22 0925   Change in Wound Size % (l*w) 62.32 04/06/22 0925   Wound Volume (cm^3) 1.04 cm^3 04/06/22 0925   Wound Healing % 62 04/06/22 0925   Post-Procedure Length (cm) 2.6 cm 04/06/22 0933   Post-Procedure Width (cm) 2 cm 04/06/22 0933   Post-Procedure Depth (cm) 0.3 cm 04/06/22 0933   Post-Procedure Surface Area (cm^2) 5.2 cm^2 04/06/22 0933   Post-Procedure Volume (cm^3) 1.56 cm^3 04/06/22 0933   Wound Assessment Fibrin;Pink/red 04/06/22 0925   Drainage Amount Small 04/06/22 0925   Drainage Description Yellow 04/06/22 0925   Odor None 04/06/22 0925   Rashmi-wound Assessment Fragile 04/06/22 0925   Wound Thickness Description not for Pressure Injury Full thickness 01/19/22 0909   Number of days: 90       Wound 01/05/22 Heel Right #2 (Active)   Wound Image   01/05/22 0902   Wound Etiology Venous 01/05/22 0943   Dressing Status New dressing applied 03/30/22 1016   Wound Cleansed Cleansed with saline 03/30/22 1016   Dressing/Treatment ABD; Other (comment) 03/30/22 1016   Offloading for Diabetic Foot Ulcers Diabetic shoes/inserts 03/30/22 1016   Dressing Change Due 03/16/22 03/16/22 0830   Wound Length (cm) 1.6 cm 04/06/22 0925   Wound Width (cm) 1 cm 04/06/22 0925   Wound Depth (cm) 0.2 cm 04/06/22 0925   Wound Surface Area (cm^2) 1.6 cm^2 04/06/22 0925   Change in Wound Size % (l*w) 62.35 04/06/22 12 cm 03/14/22 1755   Wound Surface Area (cm^2) 84 cm^2 03/14/22 1755   Wound Assessment Pink/red; Other (Comment) 03/17/22 0800   Drainage Amount Small 03/17/22 0800   Drainage Description Serosanguinous 03/17/22 0800   Odor None 03/17/22 0800   Rashmi-wound Assessment Fragile 03/16/22 2015   Number of days: 22       Wound 03/23/22 Toe (Comment  which one) Left #10 Left Great Toe (Active)   Wound Image   03/23/22 0818   Dressing Status New dressing applied 03/30/22 1016   Wound Cleansed Irrigated with saline 03/30/22 1016   Dressing/Treatment Collagen with Ag;Dry dressing 03/30/22 1016   Wound Length (cm) 0.2 cm 04/06/22 0925   Wound Width (cm) 0.2 cm 04/06/22 0925   Wound Depth (cm) 0.1 cm 04/06/22 0925   Wound Surface Area (cm^2) 0.04 cm^2 04/06/22 0925   Change in Wound Size % (l*w) 99.67 04/06/22 0925   Wound Volume (cm^3) 0.004 cm^3 04/06/22 0925   Wound Healing % 100 04/06/22 0925   Post-Procedure Length (cm) 0.3 cm 04/06/22 0933   Post-Procedure Width (cm) 0.3 cm 04/06/22 0933   Post-Procedure Depth (cm) 0.2 cm 04/06/22 0933   Post-Procedure Surface Area (cm^2) 0.09 cm^2 04/06/22 0933   Post-Procedure Volume (cm^3) 0.018 cm^3 04/06/22 0933   Wound Assessment Dry 04/06/22 0925   Drainage Amount None 04/06/22 0925   Drainage Description Serosanguinous 03/30/22 0859   Odor None 04/06/22 0925   Rashmi-wound Assessment Fragile 03/30/22 0859   Number of days: 14       Wound 03/23/22 Foot Left;Dorsal #11 (Active)   Wound Image   03/23/22 0818   Dressing Status New dressing applied 03/30/22 1016   Wound Cleansed Cleansed with saline 03/30/22 1016   Dressing/Treatment Collagen with Ag;Dry dressing 03/30/22 1016   Wound Length (cm) 0.5 cm 04/06/22 0925   Wound Width (cm) 0.5 cm 04/06/22 0925   Wound Depth (cm) 0.1 cm 04/06/22 0925   Wound Surface Area (cm^2) 0.25 cm^2 04/06/22 0925   Change in Wound Size % (l*w) 99.07 04/06/22 0925   Wound Volume (cm^3) 0.025 cm^3 04/06/22 0925   Wound Healing % 99 04/06/22 0925 Post-Procedure Length (cm) 0.6 cm 04/06/22 0933   Post-Procedure Width (cm) 0.6 cm 04/06/22 0933   Post-Procedure Depth (cm) 0.2 cm 04/06/22 0933   Post-Procedure Surface Area (cm^2) 0.36 cm^2 04/06/22 0933   Post-Procedure Volume (cm^3) 0.072 cm^3 04/06/22 0933   Wound Assessment Dry 04/06/22 0925   Drainage Amount None 04/06/22 0925   Drainage Description Serous 03/30/22 0859   Odor None 04/06/22 0925   Rashmi-wound Assessment Edematous 04/06/22 0925   Number of days: 14         Total Surface Area Covered 15 sq/cm     Amount Wasted 0 sq/cm    Reason for Waste 0      Secured: Yes    Secured With:  [x]Steri Strips    []Sutures     []Staples []Other    Procedural Pain: 2/10     Post Procedural Pain: 3 / 10    Response to Treatment:  Well tolerated by patient. Plan:     Pt is not a smoker   - Discussed relationship of smoking and negative affects on wound healing   - Emphasized importance of tobacco avoidace/cessation   - Script for nicotine patch given to patient   - Information regarding support groups for smoking cessation given    In my professional opinion and based off the information that is available at this time this patient has appropriate indication for HBO Therapy: Maybe    Treatment Note please see attached Discharge Instructions    Written patient dismissal instructions given to patient and signed by patient or POA. Discharge Instructions         Visit Discharge/Physician Orders     Discharge condition: Stable     Assessment of pain at discharge:  minimal     Anesthetic used: 4% lidocaine solution     Discharge to: Home     Left via:Private automobile     Accompanied by: accompanied by SELF     ECF/[facility]:  [facility].      Dressing Orders:  Clean bilateral heel ulcers with NSS, graft applied in clinic apply adaptic and dry dressing leave in place x 1 week   Apply betadine daily between and on toes of left foot.  Apply lorin to left great toe and foot wounds and cover with 4 x4, for Diabetic Foot Ulcers Diabetic shoes/inserts 03/30/22 1016   Dressing Change Due 03/16/22 03/16/22 0830   Wound Length (cm) 1.6 cm 04/06/22 0925   Wound Width (cm) 1 cm 04/06/22 0925   Wound Depth (cm) 0.2 cm 04/06/22 0925   Wound Surface Area (cm^2) 1.6 cm^2 04/06/22 0925   Change in Wound Size % (l*w) 62.35 04/06/22 0925   Wound Volume (cm^3) 0.32 cm^3 04/06/22 0925   Wound Healing % 62 04/06/22 0925   Post-Procedure Length (cm) 1.7 cm 04/06/22 0933   Post-Procedure Width (cm) 1 cm 04/06/22 0933   Post-Procedure Depth (cm) 0.3 cm 04/06/22 0933   Post-Procedure Surface Area (cm^2) 1.7 cm^2 04/06/22 0933   Post-Procedure Volume (cm^3) 0.51 cm^3 04/06/22 0933   Wound Assessment Fibrin;Pink/red 04/06/22 0925   Drainage Amount Small 04/06/22 0925   Drainage Description Yellow 04/06/22 0925   Odor None 04/06/22 0925   Rashmi-wound Assessment Fragile 04/06/22 0925   Wound Thickness Description not for Pressure Injury Full thickness 01/19/22 0909   Number of days: 90       Wound 03/14/22 Tibial Left;Posterior (Active)   Dressing Status New dressing applied 03/30/22 1016   Wound Cleansed Cleansed with saline 03/30/22 1016   Dressing/Treatment Moist to dry 03/30/22 1016   Dressing Change Due 03/16/22 03/16/22 0830   Wound Length (cm) 3.7 cm 04/06/22 0925   Wound Width (cm) 1.7 cm 04/06/22 0925   Wound Depth (cm) 0.3 cm 04/06/22 0925   Wound Surface Area (cm^2) 6.29 cm^2 04/06/22 0925   Change in Wound Size % (l*w) -0.64 04/06/22 0925   Wound Volume (cm^3) 1.887 cm^3 04/06/22 0925   Wound Healing % 75 04/06/22 0925   Post-Procedure Length (cm) 4 cm 04/06/22 0933   Post-Procedure Width (cm) 1.8 cm 04/06/22 0933   Post-Procedure Depth (cm) 0.6 cm 04/06/22 0933   Post-Procedure Surface Area (cm^2) 7.2 cm^2 04/06/22 0933   Post-Procedure Volume (cm^3) 4.32 cm^3 04/06/22 0933   Wound Assessment Fibrin;Devitalized tissue;Pink/red 04/06/22 0925   Drainage Amount Large 04/06/22 0925   Drainage Description Yellow 04/06/22 0925 Odor None 04/06/22 0925   Rashmi-wound Assessment Edematous;Fragile 04/06/22 0925   Number of days: 22       Wound 03/14/22 Coccyx (Active)   Wound Cleansed Cleansed with saline 03/16/22 0830   Dressing/Treatment Open to air; Pharmaceutical agent (see MAR) 03/17/22 0800   Wound Length (cm) 7 cm 03/14/22 1755   Wound Width (cm) 12 cm 03/14/22 1755   Wound Surface Area (cm^2) 84 cm^2 03/14/22 1755   Wound Assessment Pink/red; Other (Comment) 03/17/22 0800   Drainage Amount Small 03/17/22 0800   Drainage Description Serosanguinous 03/17/22 0800   Odor None 03/17/22 0800   Rashmi-wound Assessment Fragile 03/16/22 2015   Number of days: 22       Wound 03/23/22 Toe (Comment  which one) Left #10 Left Great Toe (Active)   Wound Image   03/23/22 0818   Dressing Status New dressing applied 03/30/22 1016   Wound Cleansed Irrigated with saline 03/30/22 1016   Dressing/Treatment Collagen with Ag;Dry dressing 03/30/22 1016   Wound Length (cm) 0.2 cm 04/06/22 0925   Wound Width (cm) 0.2 cm 04/06/22 0925   Wound Depth (cm) 0.1 cm 04/06/22 0925   Wound Surface Area (cm^2) 0.04 cm^2 04/06/22 0925   Change in Wound Size % (l*w) 99.67 04/06/22 0925   Wound Volume (cm^3) 0.004 cm^3 04/06/22 0925   Wound Healing % 100 04/06/22 0925   Post-Procedure Length (cm) 0.3 cm 04/06/22 0933   Post-Procedure Width (cm) 0.3 cm 04/06/22 0933   Post-Procedure Depth (cm) 0.2 cm 04/06/22 0933   Post-Procedure Surface Area (cm^2) 0.09 cm^2 04/06/22 0933   Post-Procedure Volume (cm^3) 0.018 cm^3 04/06/22 0933   Wound Assessment Dry 04/06/22 0925   Drainage Amount None 04/06/22 0925   Drainage Description Serosanguinous 03/30/22 0859   Odor None 04/06/22 0925   Rashmi-wound Assessment Fragile 03/30/22 0859   Number of days: 14       Wound 03/23/22 Foot Left;Dorsal #11 (Active)   Wound Image   03/23/22 0818   Dressing Status New dressing applied 03/30/22 1016   Wound Cleansed Cleansed with saline 03/30/22 1016   Dressing/Treatment Collagen with Ag;Dry dressing 03/30/22 1016   Wound Length (cm) 0.5 cm 04/06/22 0925   Wound Width (cm) 0.5 cm 04/06/22 0925   Wound Depth (cm) 0.1 cm 04/06/22 0925   Wound Surface Area (cm^2) 0.25 cm^2 04/06/22 0925   Change in Wound Size % (l*w) 99.07 04/06/22 0925   Wound Volume (cm^3) 0.025 cm^3 04/06/22 0925   Wound Healing % 99 04/06/22 0925   Post-Procedure Length (cm) 0.6 cm 04/06/22 0933   Post-Procedure Width (cm) 0.6 cm 04/06/22 0933   Post-Procedure Depth (cm) 0.2 cm 04/06/22 0933   Post-Procedure Surface Area (cm^2) 0.36 cm^2 04/06/22 0933   Post-Procedure Volume (cm^3) 0.072 cm^3 04/06/22 0933   Wound Assessment Dry 04/06/22 0925   Drainage Amount None 04/06/22 0925   Drainage Description Serous 03/30/22 0859   Odor None 04/06/22 0925   Rashmi-wound Assessment Edematous 04/06/22 0925   Number of days: 14                                         [facility] followup visit _________one week with Dr. Gastelum___________________  (Please note your next appointment above and if you are unable to keep, kindly give a 24 hour notice.  Thank you.)     Physician signature:__________________________        If you experience any of the following, please call the [facility] during business hours:     * Increase in Pain  * Temperature over 101  * Increase in drainage from your wound  * Drainage with a foul odor  * Bleeding  * Increase in swelling  * Need for compression bandage changes due to slippage, breakthrough drainage.     If you need medical attention outside of the business hours of the [address] [place] [address] [facility] please contact your PCP or go to the nearest emergency room.                                                     Electronically signed by Sandra Urena DPM on 4/6/2022 at 9:37 AM

## 2022-04-13 ENCOUNTER — HOSPITAL ENCOUNTER (OUTPATIENT)
Dept: WOUND CARE | Age: 87
Discharge: HOME | End: 2022-04-13
Payer: MEDICARE

## 2022-04-13 VITALS
SYSTOLIC BLOOD PRESSURE: 132 MMHG | HEIGHT: 72 IN | DIASTOLIC BLOOD PRESSURE: 64 MMHG | BODY MASS INDEX: 36.03 KG/M2 | WEIGHT: 266 LBS | HEART RATE: 86 BPM | RESPIRATION RATE: 18 BRPM | TEMPERATURE: 96.7 F

## 2022-04-13 DIAGNOSIS — L97.421 DIABETIC ULCER OF LEFT HEEL ASSOCIATED WITH DIABETES MELLITUS DUE TO UNDERLYING CONDITION, LIMITED TO BREAKDOWN OF SKIN (HCC): ICD-10-CM

## 2022-04-13 DIAGNOSIS — E11.621 DIABETIC ULCER OF RIGHT HEEL ASSOCIATED WITH TYPE 2 DIABETES MELLITUS, WITH FAT LAYER EXPOSED (HCC): Primary | ICD-10-CM

## 2022-04-13 DIAGNOSIS — E08.621 DIABETIC ULCER OF LEFT HEEL ASSOCIATED WITH DIABETES MELLITUS DUE TO UNDERLYING CONDITION, LIMITED TO BREAKDOWN OF SKIN (HCC): ICD-10-CM

## 2022-04-13 DIAGNOSIS — L97.412 DIABETIC ULCER OF RIGHT HEEL ASSOCIATED WITH TYPE 2 DIABETES MELLITUS, WITH FAT LAYER EXPOSED (HCC): Primary | ICD-10-CM

## 2022-04-13 PROCEDURE — 15275 SKIN SUB GRAFT FACE/NK/HF/G: CPT

## 2022-04-13 PROCEDURE — 6370000000 HC RX 637 (ALT 250 FOR IP): Performed by: PODIATRIST

## 2022-04-13 RX ORDER — LIDOCAINE 50 MG/G
OINTMENT TOPICAL ONCE
Status: CANCELLED | OUTPATIENT
Start: 2022-04-13 | End: 2022-04-13

## 2022-04-13 RX ORDER — GINSENG 100 MG
CAPSULE ORAL ONCE
Status: CANCELLED | OUTPATIENT
Start: 2022-04-13 | End: 2022-04-13

## 2022-04-13 RX ORDER — CLOBETASOL PROPIONATE 0.5 MG/G
OINTMENT TOPICAL ONCE
Status: CANCELLED | OUTPATIENT
Start: 2022-04-13 | End: 2022-04-13

## 2022-04-13 RX ORDER — BETAMETHASONE DIPROPIONATE 0.05 %
OINTMENT (GRAM) TOPICAL ONCE
Status: CANCELLED | OUTPATIENT
Start: 2022-04-13 | End: 2022-04-13

## 2022-04-13 RX ORDER — LIDOCAINE HYDROCHLORIDE 20 MG/ML
JELLY TOPICAL ONCE
Status: CANCELLED | OUTPATIENT
Start: 2022-04-13 | End: 2022-04-13

## 2022-04-13 RX ORDER — LIDOCAINE HYDROCHLORIDE 40 MG/ML
SOLUTION TOPICAL ONCE
Status: CANCELLED | OUTPATIENT
Start: 2022-04-13 | End: 2022-04-13

## 2022-04-13 RX ORDER — LIDOCAINE HYDROCHLORIDE 40 MG/ML
SOLUTION TOPICAL ONCE
Status: COMPLETED | OUTPATIENT
Start: 2022-04-13 | End: 2022-04-13

## 2022-04-13 RX ORDER — GENTAMICIN SULFATE 1 MG/G
OINTMENT TOPICAL ONCE
Status: CANCELLED | OUTPATIENT
Start: 2022-04-13 | End: 2022-04-13

## 2022-04-13 RX ORDER — LIDOCAINE 40 MG/G
CREAM TOPICAL ONCE
Status: CANCELLED | OUTPATIENT
Start: 2022-04-13 | End: 2022-04-13

## 2022-04-13 RX ORDER — BACITRACIN, NEOMYCIN, POLYMYXIN B 400; 3.5; 5 [USP'U]/G; MG/G; [USP'U]/G
OINTMENT TOPICAL ONCE
Status: CANCELLED | OUTPATIENT
Start: 2022-04-13 | End: 2022-04-13

## 2022-04-13 RX ORDER — BACITRACIN ZINC AND POLYMYXIN B SULFATE 500; 1000 [USP'U]/G; [USP'U]/G
OINTMENT TOPICAL ONCE
Status: CANCELLED | OUTPATIENT
Start: 2022-04-13 | End: 2022-04-13

## 2022-04-13 RX ADMIN — LIDOCAINE HYDROCHLORIDE 5 ML: 40 SOLUTION TOPICAL at 09:46

## 2022-04-13 ASSESSMENT — PAIN DESCRIPTION - ONSET: ONSET: ON-GOING

## 2022-04-13 ASSESSMENT — PAIN DESCRIPTION - ORIENTATION: ORIENTATION: LEFT

## 2022-04-13 ASSESSMENT — PAIN DESCRIPTION - PAIN TYPE: TYPE: CHRONIC PAIN

## 2022-04-13 ASSESSMENT — PAIN DESCRIPTION - FREQUENCY: FREQUENCY: INTERMITTENT

## 2022-04-13 ASSESSMENT — PAIN DESCRIPTION - DESCRIPTORS: DESCRIPTORS: ACHING;DULL

## 2022-04-13 ASSESSMENT — PAIN - FUNCTIONAL ASSESSMENT: PAIN_FUNCTIONAL_ASSESSMENT: ACTIVITIES ARE NOT PREVENTED

## 2022-04-13 ASSESSMENT — PAIN DESCRIPTION - PROGRESSION: CLINICAL_PROGRESSION: NOT CHANGED

## 2022-04-13 ASSESSMENT — PAIN SCALES - GENERAL: PAINLEVEL_OUTOF10: 5

## 2022-04-13 ASSESSMENT — PAIN DESCRIPTION - LOCATION: LOCATION: FOOT

## 2022-04-13 NOTE — FLOWSHEET NOTE
Dermagraft Treatment Note    NAME:  Nimisha Tomas. YOB: 1932  MEDICAL RECORD NUMBER:  [id number]  DATE:  4/13/2022    Goal:  Patient will receive safe and proper application of skin substitute. Patient will comply with caring for dressing, offloading and reporting complications. Expiration date of Dermagraft checked immediately prior to use. Package intact prior to use and no damage noted. Transport temperature controlled and acceptable. Dermagraft was prepared for application by removing from box and within 1 minute placing in thaw tub at a temperature of 34 to 37 degrees celsius until ice crystals were no longer present but no longer than 3 minutes. Dermagraft was rinsed 3 times in room temperature normal saline for 5 seconds each time. A 4th saline rinse was left on the Dermagraft until the physician was ready to apply it within 30 minutes of thawing. Dermagraft was applied to BILATERAL HEELS and affixed with steri-strips by the provider. Dermagraft was covered with non-adherent dressing. ADAPTIC was applied on top of non-adherent dressing. A foam plug cut to fit into ulcer was applied. Fluffed gauze was applied. Dressing was secured with cover roll type tape to stabilize graft. Coban or ace wrap was applied to secure graft and decrease edema. Patient/caregiver was instructed not to remove dressing and to keep it clean and dry. Pt/family/caregiver was instructed on signs and symptoms of complications to report such as draining through, falling down/slipping, getting wet, or severe pain or tingling in toes   Pt/family/caregiver was instructed on need for offloading and elevation of affected extremity and on use of prescribed offloading device.  Dermagraft may be applied a total of 8 times per wound over a 12 week period. Additionally Dermagraft may only be used every 12 months per wound.       Date of first application of Dermagraft for this current wound is February 9, 2022.                 Guidelines followed  Dermagraft 24 steps followed    Electronically signed by Neftali Cuello RN on 4/13/2022 at 10:07 AM

## 2022-04-13 NOTE — PROGRESS NOTES
[facility]  History and Physical/Consultation  Podiatry    Referring Physician : Karen Nicholson MD  [facility]. MEDICAL RECORD NUMBER:  [id number]  AGE: 80 y.o. GENDER: male  : 1932  EPISODE DATE:  2022  Subjective:     Chief Complaint   Patient presents with    Wound Check     LEFT FOOT         HISTORY of PRESENT ILLNESS HPI     [facility]. is a 80 y.o. male who presents today for wound/ulcer evaluation. History of Wound Context:  The patient has had a wound of diabetic b/l heels,and right ankle which was first noted approximately months ago. This has been treated betadyne. On their initial visit to the wound healing center, 22 ,  the patient has noted that the wound has been improving. The patient has had similar previous wounds in the past.      Pt is not on abx at time of initial visit.       Wound/Ulcer Pain Timing/Severity: constant  Quality of pain: sharp  Severity:  3 / 10   Modifying Factors: Pain worsens with walking  Associated Signs/Symptoms: edema, erythema and drainage    Ulcer Identification:  Ulcer Type: diabetic  Contributing Factors: edema and diabetes    Diabetic/Pressure/Non Pressure Ulcers onl y:  Ulcer: Diabetic ulcer, fat layer exposed    If patient has diabetic lower extremity wounds  Campbell Classification of diabetic lower extremity wounds:    Grade Description   []  0 No open wound   []  1 Superficial ulcer involving the full skin thickness   [x]  2 Deep ulcer involves ligament, tendon, joint capsule, or fascia  No bone involvement or abscess presence   []  3 Deep Ulcer with abcess formation and/or osteomyelitis   []  4 Localized gangrene   []  5 Extensive gangrene of the foot     Wound: N/A          22  Debrided, cont tx and care     22  Debrided, cont tx and care, await graft approval    22  Debrided, cont tx and care, puraply applied    22  Debrided, dermagraft applied    22  Debrided, dermagraft applied    2-23-22  Debrided, graft applied    3-2-22  Debrided, graft applied    3-9-22  Debrided, graft applied     3-23-22  Just release from hospital care, new wounds noted from hospital, also trauma to left toe loosened nail that was removed without incident, debrided all wounds, culture taken    3-30-22  Debrided, santyl to left leg, applied dermagraft b/l heels, prevelon boots wound vac left leg    4-6-22  Debrided, dermagraft b/l, wound care    4-13-22  Debrided, wound vac, dermagraft left    PAST MEDICAL HISTORY      Diagnosis Date    Arthritis     Cancer ([address]     breast    Cellulitis     [facility]estive [facility] failure) [facility]     CKD (chronic kidney disease) stage 3, GFR 30-59 ml/min ([address]     Diabetes mellitus ([address]     History of lumpectomy     Hx of blood clots     Hyperlipidemia     Hypertension     Left ventricular failure ([facility])     Macular degeneration     Obesity     Orthostatic hypotension     PE (pulmonary thromboembolism) ([facility])     Pressure injury of both heels, unstageable ([address]     Staph aureus infection     Venous insufficiency      Past Surgical History:   Procedure Laterality Date    [facility] SINGLE  9/2/2021         MASTECTOMY      TOE AMPUTATION      TOE SURGERY      TONSILLECTOMY       History reviewed. No pertinent family history.   Social History     Tobacco Use    Smoking status: Never Smoker    Smokeless tobacco: Never Used   Vaping Use    Vaping Use: Never used   Substance Use Topics    Alcohol use: Not Currently     Alcohol/week: 1.0 standard drink     Types: 1 Cans of beer per week    Drug use: No     Allergies   Allergen Reactions    Clindamycin/Lincomycin     Sulfa Antibiotics      Current Outpatient Medications on File Prior to Encounter   Medication Sig Dispense Refill    apixaban (ELIQUIS) 2.5 MG TABS tablet Take 1 tablet by mouth 2 times daily (before meals) 60 tablet 2    docusate sodium (COLACE) 100 MG capsule Take 100 mg by mouth nightly      tamsulosin (FLOMAX) 0.4 MG capsule Take 0.4 mg by mouth daily      bisacodyl (DULCOLAX) 10 MG suppository Place 10 mg rectally daily as needed for Constipation      diphenhydrAMINE-zinc acetate (BENADRYL) 1-0.1 % cream Apply topically every 4 hours as needed for Itching Apply topically 3 times daily as needed.  Sodium Phosphates (FLEET) 7-19 GM/118ML Place 1 enema rectally daily as needed      magnesium hydroxide (MILK OF MAGNESIA) 400 MG/5ML suspension Take 30 mLs by mouth daily as needed for Constipation      Polyethylene Glycol 400 1 % SOLN Apply 1 drop to eye every 4 hours as needed      ferrous sulfate (IRON 325) 325 (65 Fe) MG tablet Take 325 mg by mouth daily       zinc sulfate (ZINCATE) 220 (50 Zn) MG capsule Take 1 capsule by mouth daily for 14 days 14 capsule 0    miconazole (MICOTIN) 2 % powder Apply topically 2 times daily. 45 g 1    ascorbic acid (VITAMIN C) 500 MG tablet Take 1 tablet by mouth 2 times daily 30 tablet 0    Vitamin D (CHOLECALCIFEROL) 50 MCG (2000 UT) TABS tablet Take 1 tablet by mouth daily 60 tablet 0    furosemide (LASIX) 40 MG tablet Take 20 mg by mouth daily       omeprazole (PRILOSEC) 20 MG delayed release capsule Take 20 mg by mouth daily      Multiple Vitamins-Minerals (THERAPEUTIC MULTIVITAMIN-MINERALS) tablet Take 1 tablet by mouth daily      Multiple Vitamins-Minerals (PRESERVISION AREDS PO) Take 1 tablet by mouth 2 times daily (with meals)      acetaminophen (TYLENOL) 325 MG tablet Take 650 mg by mouth every 4 hours as needed for Pain or Fever       calcium carbonate (TUMS) 500 MG chewable tablet Take 1 tablet by mouth every 4 hours as needed for Heartburn      ipratropium-albuterol (DUONEB) 0.5-2.5 (3) MG/3ML SOLN nebulizer solution Inhale 3 mLs into the lungs every 4 hours (while awake) for 30 doses 90 mL 0    gabapentin (NEURONTIN) 100 MG capsule Take 200 mg by mouth Daily with supper.        atorvastatin (LIPITOR) 80 MG tablet Take 80 mg by mouth at bedtime       INSULIN LISP PROT & LISP, HUM, (75-25) 100 UNIT/ML SUSP Inject 72 Units into the skin daily (with breakfast)       INSULIN LISP PROT & LISP, HUM, (75-25) 100 UNIT/ML SUSP Inject 55 Units into the skin Daily with supper       clotrimazole-betamethasone (LOTRISONE) cream Apply  topically as needed. Apply topically 2 times daily. No current facility-administered medications on file prior to encounter.        REVIEW OF SYSTEMS   ROS : All others Negative if blank [], Positive if [x]  General Vascular   [] Fevers [] Claudication   [] Chills [] Rest Pain   Skin Neurologic   [x] Tissue Loss [x] Lower extremity neuropathy     Objective:    /64   Pulse 86   Temp 96.7 °F (35.9 °C) (Temporal)   Resp 18   Ht 6' (1.829 m)   Wt 266 lb (120.7 kg)   BMI 36.08 kg/m²   Wt Readings from Last 3 Encounters:   04/13/22 266 lb (120.7 kg)   03/16/22 266 lb 12.1 oz (121 kg)   03/02/22 243 lb (110.2 kg)       PHYSICAL EXAM   CONSTITUTIONAL:   Awake, alert, cooperative   PSYCHIATRIC :  Oriented to time, place and person      normal insight to disease process  EXTREMITIES:   R LE Open wounds are noted   Skin color is abnormal with ulcers   Edema is  noted   Sensation deficit noted - protective   Palpation of the foot does cause pain   4/5 strength DF/PF  L LE Open wounds are noted   Skin color is abnormal with ulcers   Edema is  noted   Sensation deficit noted - protective   Palpation of the foot does cause pain   4/5 strength DF/PF  R dorsalis pedis 1 L dorsalis pedis 1   R posterior tibial 1 L posterior tibial 1     Assessment:     Problem List Items Addressed This Visit     Diabetic ulcer of right heel associated with type 2 diabetes mellitus, with fat layer exposed ([facility] [address] - Primary    Relevant Orders    Initiate Outpatient Wound Care Protocol    Diabetic ulcer of left heel [facility])    Relevant Orders    Initiate Outpatient Wound Care Protocol          Pre Debridement Measurements:  Are located in the [place]  Documentation Flow Sheet  Post Debridement Measurements:  Wound/Ulcer Descriptions are Pre Debridement except measurements:     Wound 01/05/22 Heel Left #1 (Active)   Wound Image   01/05/22 0902   Wound Etiology Diabetic Campbell 2 01/05/22 0943   Dressing Status New dressing applied 04/13/22 1026   Wound Cleansed Cleansed with saline 04/13/22 1026   Dressing/Treatment Dry dressing; Other (comment) 04/13/22 1026   Offloading for Diabetic Foot Ulcers Diabetic shoes/inserts 04/13/22 1026   Dressing Change Due 03/16/22 03/16/22 0830   Wound Length (cm) 2.7 cm 04/13/22 0934   Wound Width (cm) 2.1 cm 04/13/22 0934   Wound Depth (cm) 0.2 cm 04/13/22 0934   Wound Surface Area (cm^2) 5.67 cm^2 04/13/22 0934   Change in Wound Size % (l*w) 58.91 04/13/22 0934   Wound Volume (cm^3) 1.134 cm^3 04/13/22 0934   Wound Healing % 59 04/13/22 0934   Post-Procedure Length (cm) 2.7 cm 04/13/22 0959   Post-Procedure Width (cm) 2.2 cm 04/13/22 0959   Post-Procedure Depth (cm) 0.3 cm 04/13/22 0959   Post-Procedure Surface Area (cm^2) 5.94 cm^2 04/13/22 0959   Post-Procedure Volume (cm^3) 1.782 cm^3 04/13/22 0959   Wound Assessment Fibrin;Pink/red 04/13/22 0934   Drainage Amount Large 04/13/22 0934   Drainage Description Yellow 04/13/22 0934   Odor None 04/13/22 0934   Rashmi-wound Assessment Fragile 04/13/22 0934   Wound Thickness Description not for Pressure Injury Full thickness 01/19/22 0909   Number of days: 98       Wound 01/05/22 Heel Right #2 (Active)   Wound Image   01/05/22 0902   Wound Etiology Venous 01/05/22 0943   Dressing Status New dressing applied 04/13/22 1026   Wound Cleansed Cleansed with saline 04/13/22 1026   Dressing/Treatment ABD;Dry dressing; Other (comment) 04/13/22 1026   Offloading for Diabetic Foot Ulcers Diabetic shoes/inserts 04/13/22 1026   Dressing Change Due 03/16/22 03/16/22 0830   Wound Length (cm) 1.5 cm 04/13/22 0934   Wound Width (cm) 1 cm 04/13/22 0934   Wound Depth (cm) 0.2 cm 04/13/22 1707   Wound Surface Area (cm^2) 1.5 cm^2 04/13/22 0934   Change in Wound Size % (l*w) 64.71 04/13/22 0934   Wound Volume (cm^3) 0.3 cm^3 04/13/22 0934   Wound Healing % 65 04/13/22 0934   Post-Procedure Length (cm) 1.6 cm 04/13/22 0959   Post-Procedure Width (cm) 1.2 cm 04/13/22 0959   Post-Procedure Depth (cm) 0.3 cm 04/13/22 0959   Post-Procedure Surface Area (cm^2) 1.92 cm^2 04/13/22 0959   Post-Procedure Volume (cm^3) 0.576 cm^3 04/13/22 0959   Wound Assessment Fibrin;Pink/red 04/13/22 0934   Drainage Amount Small 04/13/22 0934   Drainage Description Yellow 04/13/22 0934   Odor None 04/13/22 0934   Rashmi-wound Assessment Fragile 04/13/22 0934   Wound Thickness Description not for Pressure Injury Full thickness 01/19/22 0909   Number of days: 98       Wound 03/14/22 Tibial Left;Posterior (Active)   Dressing Status New dressing applied 04/13/22 1026   Wound Cleansed Cleansed with saline 04/13/22 1026   Dressing/Treatment Moist to dry 04/13/22 1026   Offloading for Diabetic Foot Ulcers Back offloading shoe 04/13/22 1026   Dressing Change Due 03/16/22 03/16/22 0830   Wound Length (cm) 3.5 cm 04/13/22 0934   Wound Width (cm) 1.6 cm 04/13/22 0934   Wound Depth (cm) 0.3 cm 04/13/22 0934   Wound Surface Area (cm^2) 5.6 cm^2 04/13/22 0934   Change in Wound Size % (l*w) 10.4 04/13/22 0934   Wound Volume (cm^3) 1.68 cm^3 04/13/22 0934   Wound Healing % 78 04/13/22 0934   Post-Procedure Length (cm) 3.6 cm 04/13/22 0959   Post-Procedure Width (cm) 1.6 cm 04/13/22 0959   Post-Procedure Depth (cm) 0.4 cm 04/13/22 0959   Post-Procedure Surface Area (cm^2) 5.76 cm^2 04/13/22 0959   Post-Procedure Volume (cm^3) 2. 304 cm^3 04/13/22 0959   Wound Assessment Fibrin;Pink/red 04/13/22 0934   Drainage Amount Moderate 04/13/22 0934   Drainage Description Sanguinous 04/13/22 0934   Odor None 04/13/22 0934   Rashmi-wound Assessment Maceration 04/13/22 0934   Number of days: 29       Wound 03/14/22 Coccyx (Active)   Wound Cleansed Cleansed with saline 03/16/22 0830   Dressing/Treatment Open to air; Pharmaceutical agent (see MAR) 03/17/22 0800   Wound Length (cm) 7 cm 03/14/22 1755   Wound Width (cm) 12 cm 03/14/22 1755   Wound Surface Area (cm^2) 84 cm^2 03/14/22 1755   Wound Assessment Pink/red; Other (Comment) 03/17/22 0800   Drainage Amount Small 03/17/22 0800   Drainage Description Serosanguinous 03/17/22 0800   Odor None 03/17/22 0800   Rashmi-wound Assessment Fragile 03/16/22 2015   Number of days: 29       Wound 03/23/22 Toe (Comment  which one) Left #10 Left Great Toe (Active)   Wound Image   03/23/22 0818   Dressing Status New dressing applied 04/13/22 1026   Wound Cleansed Betadine/povidone iodine 04/13/22 1026   Dressing/Treatment Collagen with Ag;Dry dressing 04/13/22 1026   Offloading for Diabetic Foot Ulcers Forefoot offloading shoe 04/13/22 1026   Wound Length (cm) 0.3 cm 04/13/22 0934   Wound Width (cm) 0.4 cm 04/13/22 0934   Wound Depth (cm) 0.1 cm 04/13/22 0934   Wound Surface Area (cm^2) 0.12 cm^2 04/13/22 0934   Change in Wound Size % (l*w) 99 04/13/22 0934   Wound Volume (cm^3) 0.012 cm^3 04/13/22 0934   Wound Healing % 99 04/13/22 0934   Post-Procedure Length (cm) 0.3 cm 04/06/22 0933   Post-Procedure Width (cm) 0.3 cm 04/06/22 0933   Post-Procedure Depth (cm) 0.2 cm 04/06/22 0933   Post-Procedure Surface Area (cm^2) 0.09 cm^2 04/06/22 0933   Post-Procedure Volume (cm^3) 0.018 cm^3 04/06/22 0933   Wound Assessment Dry 04/13/22 0934   Drainage Amount None 04/13/22 0934   Drainage Description Serosanguinous 03/30/22 0859   Odor None 04/13/22 0934   Rashmi-wound Assessment Dry/flaky;Fragile 04/13/22 0934   Number of days: 21       Wound 03/23/22 Foot Left;Dorsal #11 (Active)   Wound Image   03/23/22 0818   Dressing Status New dressing applied 04/13/22 1026   Wound Cleansed Betadine/povidone iodine 04/13/22 1026   Dressing/Treatment Collagen with Ag 04/13/22 1026   Offloading for Diabetic Foot Ulcers Back offloading shoe 04/13/22 1026 Wound Length (cm) 0.1 cm 04/13/22 0934   Wound Width (cm) 0.1 cm 04/13/22 0934   Wound Depth (cm) 0.1 cm 04/13/22 0934   Wound Surface Area (cm^2) 0.01 cm^2 04/13/22 0934   Change in Wound Size % (l*w) 99.96 04/13/22 0934   Wound Volume (cm^3) 0.001 cm^3 04/13/22 0934   Wound Healing % 100 04/13/22 0934   Post-Procedure Length (cm) 0.6 cm 04/06/22 0933   Post-Procedure Width (cm) 0.6 cm 04/06/22 0933   Post-Procedure Depth (cm) 0.2 cm 04/06/22 0933   Post-Procedure Surface Area (cm^2) 0.36 cm^2 04/06/22 0933   Post-Procedure Volume (cm^3) 0.072 cm^3 04/06/22 0933   Wound Assessment Dry 04/13/22 0934   Drainage Amount None 04/13/22 0934   Drainage Description Serous 03/30/22 0859   Odor None 04/13/22 0934   Rashmi-wound Assessment Edematous;Dry/flaky 04/13/22 0934   Number of days: 21       Wound 04/13/22 Pretibial Distal;Left;Lateral #14 (Active)   Wound Image   04/13/22 0949   Dressing Status New dressing applied 04/13/22 1026   Wound Cleansed Cleansed with saline 04/13/22 1026   Dressing/Treatment Steri-strips; Hydrating gel;Dry dressing 04/13/22 1026   Wound Length (cm) 3.2 cm 04/13/22 0949   Wound Width (cm) 3 cm 04/13/22 0949   Wound Depth (cm) 0.1 cm 04/13/22 0949   Wound Surface Area (cm^2) 9.6 cm^2 04/13/22 0949   Wound Volume (cm^3) 0.96 cm^3 04/13/22 0949   Post-Procedure Length (cm) 3.2 cm 04/13/22 0959   Post-Procedure Width (cm) 3.3 cm 04/13/22 0959   Post-Procedure Depth (cm) 0.2 cm 04/13/22 0959   Post-Procedure Surface Area (cm^2) 10.56 cm^2 04/13/22 0959   Post-Procedure Volume (cm^3) 2.112 cm^3 04/13/22 0959   Wound Assessment Bleeding 04/13/22 0949   Drainage Amount Moderate 04/13/22 0949   Drainage Description Sanguinous 04/13/22 0949   Odor None 04/13/22 0949   Rashmi-wound Assessment Blanchable erythema 04/13/22 0949   Number of days: 0          Procedure Note  Indications:  Based on my examination of this patient's wound(s)/ulcer(s) today, debridement is required to promote healing and evaluate the wound base. Performed by: Tamiko Tee DPM    Consent obtained:  Yes    Time out taken:  Yes    Pain Control: Anesthetic  Anesthetic: 4% Lidocaine Liquid Topical     Debridement:Excisional Debridement    Using #15 blade scalpel the wound(s)/ulcer(s) was/were sharply debrided down through and including the removal of subcutaneous tissue. Devitalized Tissue Debrided:  fibrin, biofilm, slough and necrotic/eschar to stimulate bleeding to promote healing, post debridement good bleeding base and wound edges noted    Wound/Ulcer #: 2, 4, 10    Percent of Wound/Ulcer Debrided: 100%    Total Surface Area Debrided:  15 sq cm     Estimated Blood Loss:  Minimal  Hemostasis Achieved:  by pressure    Procedural Pain:  4  / 10   Post Procedural Pain:  5 / 10     Response to treatment:  Well tolerated by patient. A culture was done. Skin Substitute Applied:         [] APLIGRAF   []44 sq/cm   []88 sq/cm    []132 sq/cm  []176  sq/cm           [x] DERMAGRAFT  [x] 38sq/cm   []76 sq/cm    []114 sq/cm  []152 sq/cm         [] OASIS   [] 10.5 sq/cm   []21 sq/cm    []4 sq/cm PuraPly  []8 sq/cm PuraPly        [] OTHER     []        sq/cm       Performed by: Tamiko Tee DPM    Wound Type:venous    Consent obtained: Yes    Time out taken: Yes     Fenestrated: Yes    Instrument(s) curette      [] Mesher Utilized    Skin Substitute was Applied to Wound Number(s): Wound #: 2, 4 and 10      Wound 01/05/22 Heel Left #1 (Active)   Wound Image   01/05/22 0902   Wound Etiology Diabetic Campbell 2 01/05/22 0943   Dressing Status New dressing applied 03/30/22 1016   Wound Cleansed Cleansed with saline 03/30/22 1016   Dressing/Treatment ABD; Other (comment) 03/30/22 1016   Offloading for Diabetic Foot Ulcers Diabetic shoes/inserts 03/30/22 1016   Dressing Change Due 03/16/22 03/16/22 0830   Wound Length (cm) 2.6 cm 04/06/22 0925   Wound Width (cm) 2 cm 04/06/22 0925   Wound Depth (cm) 0.2 cm 04/06/22 0925   Wound Surface Area (cm^2) 5.2 cm^2 04/06/22 0925   Change in Wound Size % (l*w) 62.32 04/06/22 0925   Wound Volume (cm^3) 1.04 cm^3 04/06/22 0925   Wound Healing % 62 04/06/22 0925   Post-Procedure Length (cm) 2.6 cm 04/06/22 0933   Post-Procedure Width (cm) 2 cm 04/06/22 0933   Post-Procedure Depth (cm) 0.3 cm 04/06/22 0933   Post-Procedure Surface Area (cm^2) 5.2 cm^2 04/06/22 0933   Post-Procedure Volume (cm^3) 1.56 cm^3 04/06/22 0933   Wound Assessment Fibrin;Pink/red 04/06/22 0925   Drainage Amount Small 04/06/22 0925   Drainage Description Yellow 04/06/22 0925   Odor None 04/06/22 0925   Rashmi-wound Assessment Fragile 04/06/22 0925   Wound Thickness Description not for Pressure Injury Full thickness 01/19/22 0909   Number of days: 90       Wound 01/05/22 Heel Right #2 (Active)   Wound Image   01/05/22 0902   Wound Etiology Venous 01/05/22 0943   Dressing Status New dressing applied 03/30/22 1016   Wound Cleansed Cleansed with saline 03/30/22 1016   Dressing/Treatment ABD; Other (comment) 03/30/22 1016   Offloading for Diabetic Foot Ulcers Diabetic shoes/inserts 03/30/22 1016   Dressing Change Due 03/16/22 03/16/22 0830   Wound Length (cm) 1.6 cm 04/06/22 0925   Wound Width (cm) 1 cm 04/06/22 0925   Wound Depth (cm) 0.2 cm 04/06/22 0925   Wound Surface Area (cm^2) 1.6 cm^2 04/06/22 0925   Change in Wound Size % (l*w) 62.35 04/06/22 0925   Wound Volume (cm^3) 0.32 cm^3 04/06/22 0925   Wound Healing % 62 04/06/22 0925   Post-Procedure Length (cm) 1.7 cm 04/06/22 0933   Post-Procedure Width (cm) 1 cm 04/06/22 0933   Post-Procedure Depth (cm) 0.3 cm 04/06/22 0933   Post-Procedure Surface Area (cm^2) 1.7 cm^2 04/06/22 0933   Post-Procedure Volume (cm^3) 0.51 cm^3 04/06/22 0933   Wound Assessment Fibrin;Pink/red 04/06/22 0925   Drainage Amount Small 04/06/22 0925   Drainage Description Yellow 04/06/22 0925   Odor None 04/06/22 0925   Rashmi-wound Assessment Fragile 04/06/22 0925   Wound Thickness Description not for Pressure Injury Full thickness 01/19/22 0909   Number of days: 90       Wound 03/14/22 Tibial Left;Posterior (Active)   Dressing Status New dressing applied 03/30/22 1016   Wound Cleansed Cleansed with saline 03/30/22 1016   Dressing/Treatment Moist to dry 03/30/22 1016   Dressing Change Due 03/16/22 03/16/22 0830   Wound Length (cm) 3.7 cm 04/06/22 0925   Wound Width (cm) 1.7 cm 04/06/22 0925   Wound Depth (cm) 0.3 cm 04/06/22 0925   Wound Surface Area (cm^2) 6.29 cm^2 04/06/22 0925   Change in Wound Size % (l*w) -0.64 04/06/22 0925   Wound Volume (cm^3) 1.887 cm^3 04/06/22 0925   Wound Healing % 75 04/06/22 0925   Post-Procedure Length (cm) 4 cm 04/06/22 0933   Post-Procedure Width (cm) 1.8 cm 04/06/22 0933   Post-Procedure Depth (cm) 0.6 cm 04/06/22 0933   Post-Procedure Surface Area (cm^2) 7.2 cm^2 04/06/22 0933   Post-Procedure Volume (cm^3) 4.32 cm^3 04/06/22 0933   Wound Assessment Fibrin;Devitalized tissue;Pink/red 04/06/22 0925   Drainage Amount Large 04/06/22 0925   Drainage Description Yellow 04/06/22 0925   Odor None 04/06/22 0925   Rashmi-wound Assessment Edematous;Fragile 04/06/22 0925   Number of days: 22       Wound 03/14/22 Coccyx (Active)   Wound Cleansed Cleansed with saline 03/16/22 0830   Dressing/Treatment Open to air; Pharmaceutical agent (see MAR) 03/17/22 0800   Wound Length (cm) 7 cm 03/14/22 1755   Wound Width (cm) 12 cm 03/14/22 1755   Wound Surface Area (cm^2) 84 cm^2 03/14/22 1755   Wound Assessment Pink/red; Other (Comment) 03/17/22 0800   Drainage Amount Small 03/17/22 0800   Drainage Description Serosanguinous 03/17/22 0800   Odor None 03/17/22 0800   Rashmi-wound Assessment Fragile 03/16/22 2015   Number of days: 22       Wound 03/23/22 Toe (Comment  which one) Left #10 Left Great Toe (Active)   Wound Image   03/23/22 0818   Dressing Status New dressing applied 03/30/22 1016   Wound Cleansed Irrigated with saline 03/30/22 1016   Dressing/Treatment Collagen with Ag;Dry dressing 03/30/22 1016 Wound Length (cm) 0.2 cm 04/06/22 0925   Wound Width (cm) 0.2 cm 04/06/22 0925   Wound Depth (cm) 0.1 cm 04/06/22 0925   Wound Surface Area (cm^2) 0.04 cm^2 04/06/22 0925   Change in Wound Size % (l*w) 99.67 04/06/22 0925   Wound Volume (cm^3) 0.004 cm^3 04/06/22 0925   Wound Healing % 100 04/06/22 0925   Post-Procedure Length (cm) 0.3 cm 04/06/22 0933   Post-Procedure Width (cm) 0.3 cm 04/06/22 0933   Post-Procedure Depth (cm) 0.2 cm 04/06/22 0933   Post-Procedure Surface Area (cm^2) 0.09 cm^2 04/06/22 0933   Post-Procedure Volume (cm^3) 0.018 cm^3 04/06/22 0933   Wound Assessment Dry 04/06/22 0925   Drainage Amount None 04/06/22 0925   Drainage Description Serosanguinous 03/30/22 0859   Odor None 04/06/22 0925   Rashmi-wound Assessment Fragile 03/30/22 0859   Number of days: 14       Wound 03/23/22 Foot Left;Dorsal #11 (Active)   Wound Image   03/23/22 0818   Dressing Status New dressing applied 03/30/22 1016   Wound Cleansed Cleansed with saline 03/30/22 1016   Dressing/Treatment Collagen with Ag;Dry dressing 03/30/22 1016   Wound Length (cm) 0.5 cm 04/06/22 0925   Wound Width (cm) 0.5 cm 04/06/22 0925   Wound Depth (cm) 0.1 cm 04/06/22 0925   Wound Surface Area (cm^2) 0.25 cm^2 04/06/22 0925   Change in Wound Size % (l*w) 99.07 04/06/22 0925   Wound Volume (cm^3) 0.025 cm^3 04/06/22 0925   Wound Healing % 99 04/06/22 0925   Post-Procedure Length (cm) 0.6 cm 04/06/22 0933   Post-Procedure Width (cm) 0.6 cm 04/06/22 0933   Post-Procedure Depth (cm) 0.2 cm 04/06/22 0933   Post-Procedure Surface Area (cm^2) 0.36 cm^2 04/06/22 0933   Post-Procedure Volume (cm^3) 0.072 cm^3 04/06/22 0933   Wound Assessment Dry 04/06/22 0925   Drainage Amount None 04/06/22 0925   Drainage Description Serous 03/30/22 0859   Odor None 04/06/22 0925   Rashmi-wound Assessment Edematous 04/06/22 0925   Number of days: 14         Total Surface Area Covered 15 sq/cm     Amount Wasted 0 sq/cm    Reason for Waste 0      Secured: Yes    Secured With:  [x]Steri Strips    []Sutures     []Staples []Other    Procedural Pain: 2/10     Post Procedural Pain: 3 / 10    Response to Treatment:  Well tolerated by patient. Plan:     Pt is not a smoker   - Discussed relationship of smoking and negative affects on wound healing   - Emphasized importance of tobacco avoidace/cessation   - Script for nicotine patch given to patient   - Information regarding support groups for smoking cessation given    In my professional opinion and based off the information that is available at this time this patient has appropriate indication for HBO Therapy: Maybe    Treatment Note please see attached Discharge Instructions    Written patient dismissal instructions given to patient and signed by patient or POA. Discharge Instructions           Visit Discharge/Physician Orders     Discharge condition: Stable     Assessment of pain at discharge:  minimal     Anesthetic used: 4% lidocaine solution     Discharge to: Home     Left via:Private automobile     Accompanied by: accompanied by SELF     ECF/[facility]:  [facility].      Dressing Orders:  Clean bilateral heel ulcers with NSS, graft applied in clinic apply adaptic and dry dressing leave in place x 1 week   Apply betadine daily between and on toes of left foot. Apply lorin to left great toe and foot wounds and cover with 4 x4, kerlix         TO LEFT LEG ULCER, CLEAN WITH SALINE AND APPLY SANTYL TO WOUND BED THEN  WOUND VAC AT 1255MMHG CHANGE every 3rd day.  IF VAC MALFUNCTIONS APPLY WET TO DRY DRESSING UNTIL VAC CAN BE REPLACED        APPLY PREVALON BOOT TO BILATERAL HEELS     Physical therapy for ambulating safely while wearing offloading boots as indicated.     Treatment Orders:      Wound measurements:             Wound 01/05/22 Heel Left #1 (Active)   Wound Image   01/05/22 0902   Wound Etiology Diabetic Campbell 2 01/05/22 0943   Dressing Status New dressing applied 04/06/22 0951   Wound Cleansed Cleansed with saline 04/06/22 0951   Dressing/Treatment ABD;Dry dressing 04/06/22 0951   Offloading for Diabetic Foot Ulcers Diabetic shoes/inserts 04/06/22 0951   Dressing Change Due 03/16/22 03/16/22 0830   Wound Length (cm) 2.7 cm 04/13/22 0934   Wound Width (cm) 2.1 cm 04/13/22 0934   Wound Depth (cm) 0.2 cm 04/13/22 0934   Wound Surface Area (cm^2) 5.67 cm^2 04/13/22 0934   Change in Wound Size % (l*w) 58.91 04/13/22 0934   Wound Volume (cm^3) 1.134 cm^3 04/13/22 0934   Wound Healing % 59 04/13/22 0934   Post-Procedure Length (cm) 2.7 cm 04/13/22 0959   Post-Procedure Width (cm) 2.2 cm 04/13/22 0959   Post-Procedure Depth (cm) 0.3 cm 04/13/22 0959   Post-Procedure Surface Area (cm^2) 5.94 cm^2 04/13/22 0959   Post-Procedure Volume (cm^3) 1.782 cm^3 04/13/22 0959   Wound Assessment Fibrin;Pink/red 04/13/22 0934   Drainage Amount Large 04/13/22 0934   Drainage Description Yellow 04/13/22 0934   Odor None 04/13/22 0934   Rashmi-wound Assessment Fragile 04/13/22 0934   Wound Thickness Description not for Pressure Injury Full thickness 01/19/22 0909   Number of days: 97       Wound 01/05/22 Heel Right #2 (Active)   Wound Image   01/05/22 0902   Wound Etiology Venous 01/05/22 0943   Dressing Status New dressing applied 04/06/22 0951   Wound Cleansed Cleansed with saline 04/06/22 0951   Dressing/Treatment ABD;Dry dressing 04/06/22 0951   Offloading for Diabetic Foot Ulcers Diabetic shoes/inserts 04/06/22 0951   Dressing Change Due 03/16/22 03/16/22 0830   Wound Length (cm) 1.5 cm 04/13/22 0934   Wound Width (cm) 1 cm 04/13/22 0934   Wound Depth (cm) 0.2 cm 04/13/22 0934   Wound Surface Area (cm^2) 1.5 cm^2 04/13/22 0934   Change in Wound Size % (l*w) 64.71 04/13/22 0934   Wound Volume (cm^3) 0.3 cm^3 04/13/22 0934   Wound Healing % 65 04/13/22 0934   Post-Procedure Length (cm) 1.6 cm 04/13/22 0959   Post-Procedure Width (cm) 1.2 cm 04/13/22 0959   Post-Procedure Depth (cm) 0.3 cm 04/13/22 0959   Post-Procedure Surface Area (cm^2) 1.92 cm^2 04/13/22 0959   Post-Procedure Volume (cm^3) 0.576 cm^3 04/13/22 0959   Wound Assessment Fibrin;Pink/red 04/13/22 0934   Drainage Amount Small 04/13/22 0934   Drainage Description Yellow 04/13/22 0934   Odor None 04/13/22 0934   Rashmi-wound Assessment Fragile 04/13/22 0934   Wound Thickness Description not for Pressure Injury Full thickness 01/19/22 0909   Number of days: 97       Wound 03/14/22 Tibial Left;Posterior (Active)   Dressing Status New dressing applied 04/06/22 0951   Wound Cleansed Cleansed with saline 04/06/22 0951   Dressing/Treatment Moist to dry 04/06/22 0951   Offloading for Diabetic Foot Ulcers Other (comment) 04/06/22 0951   Dressing Change Due 03/16/22 03/16/22 0830   Wound Length (cm) 3.5 cm 04/13/22 0934   Wound Width (cm) 1.6 cm 04/13/22 0934   Wound Depth (cm) 0.3 cm 04/13/22 0934   Wound Surface Area (cm^2) 5.6 cm^2 04/13/22 0934   Change in Wound Size % (l*w) 10.4 04/13/22 0934   Wound Volume (cm^3) 1.68 cm^3 04/13/22 0934   Wound Healing % 78 04/13/22 0934   Post-Procedure Length (cm) 3.6 cm 04/13/22 0959   Post-Procedure Width (cm) 1.6 cm 04/13/22 0959   Post-Procedure Depth (cm) 0.4 cm 04/13/22 0959   Post-Procedure Surface Area (cm^2) 5.76 cm^2 04/13/22 0959   Post-Procedure Volume (cm^3) 2. 304 cm^3 04/13/22 0959   Wound Assessment Fibrin;Pink/red 04/13/22 0934   Drainage Amount Moderate 04/13/22 0934   Drainage Description Sanguinous 04/13/22 0934   Odor None 04/13/22 0934   Rashmi-wound Assessment Maceration 04/13/22 0934   Number of days: 29       Wound 03/14/22 Coccyx (Active)   Wound Cleansed Cleansed with saline 03/16/22 0830   Dressing/Treatment Open to air; Pharmaceutical agent (see MAR) 03/17/22 0800   Wound Length (cm) 7 cm 03/14/22 1755   Wound Width (cm) 12 cm 03/14/22 1755   Wound Surface Area (cm^2) 84 cm^2 03/14/22 1755   Wound Assessment Pink/red; Other (Comment) 03/17/22 0800   Drainage Amount Small 03/17/22 0800   Drainage Description Serosanguinous 03/17/22 0800 Odor None 03/17/22 0800   Rashmi-wound Assessment Fragile 03/16/22 2015   Number of days: 29       Wound 03/23/22 Toe (Comment  which one) Left #10 Left Great Toe (Active)   Wound Image   03/23/22 0818   Dressing Status New dressing applied 04/06/22 0951   Wound Cleansed Betadine/povidone iodine 04/06/22 0951   Dressing/Treatment Collagen with Ag;Dry dressing 04/06/22 0951   Offloading for Diabetic Foot Ulcers Offloading not required 04/06/22 0951   Wound Length (cm) 0.3 cm 04/13/22 0934   Wound Width (cm) 0.4 cm 04/13/22 0934   Wound Depth (cm) 0.1 cm 04/13/22 0934   Wound Surface Area (cm^2) 0.12 cm^2 04/13/22 0934   Change in Wound Size % (l*w) 99 04/13/22 0934   Wound Volume (cm^3) 0.012 cm^3 04/13/22 0934   Wound Healing % 99 04/13/22 0934   Post-Procedure Length (cm) 0.3 cm 04/06/22 0933   Post-Procedure Width (cm) 0.3 cm 04/06/22 0933   Post-Procedure Depth (cm) 0.2 cm 04/06/22 0933   Post-Procedure Surface Area (cm^2) 0.09 cm^2 04/06/22 0933   Post-Procedure Volume (cm^3) 0.018 cm^3 04/06/22 0933   Wound Assessment Dry 04/13/22 0934   Drainage Amount None 04/13/22 0934   Drainage Description Serosanguinous 03/30/22 0859   Odor None 04/13/22 0934   Rashmi-wound Assessment Dry/flaky;Fragile 04/13/22 0934   Number of days: 21       Wound 03/23/22 Foot Left;Dorsal #11 (Active)   Wound Image   03/23/22 0818   Dressing Status New dressing applied 04/06/22 0951   Wound Cleansed Cleansed with saline 04/06/22 0951   Dressing/Treatment Collagen with Ag;Dry dressing 04/06/22 0951   Offloading for Diabetic Foot Ulcers Other (comment) 04/06/22 0951   Wound Length (cm) 0.1 cm 04/13/22 0934   Wound Width (cm) 0.1 cm 04/13/22 0934   Wound Depth (cm) 0.1 cm 04/13/22 0934   Wound Surface Area (cm^2) 0.01 cm^2 04/13/22 0934   Change in Wound Size % (l*w) 99.96 04/13/22 0934   Wound Volume (cm^3) 0.001 cm^3 04/13/22 0934   Wound Healing % 100 04/13/22 0934   Post-Procedure Length (cm) 0.6 cm 04/06/22 0933   Post-Procedure Width (cm) 0.6 cm 04/06/22 0933   Post-Procedure Depth (cm) 0.2 cm 04/06/22 0933   Post-Procedure Surface Area (cm^2) 0.36 cm^2 04/06/22 0933   Post-Procedure Volume (cm^3) 0.072 cm^3 04/06/22 0933   Wound Assessment Dry 04/13/22 0934   Drainage Amount None 04/13/22 0934   Drainage Description Serous 03/30/22 0859   Odor None 04/13/22 0934   Rashmi-wound Assessment Edematous;Dry/flaky 04/13/22 0934   Number of days: 21       Wound 04/13/22 Pretibial Distal;Left;Lateral #14 (Active)   Wound Image   04/13/22 0949   Wound Length (cm) 3.2 cm 04/13/22 0949   Wound Width (cm) 3 cm 04/13/22 0949   Wound Depth (cm) 0.1 cm 04/13/22 0949   Wound Surface Area (cm^2) 9.6 cm^2 04/13/22 0949   Wound Volume (cm^3) 0.96 cm^3 04/13/22 0949   Post-Procedure Length (cm) 3.2 cm 04/13/22 0959   Post-Procedure Width (cm) 3.3 cm 04/13/22 0959   Post-Procedure Depth (cm) 0.2 cm 04/13/22 0959   Post-Procedure Surface Area (cm^2) 10.56 cm^2 04/13/22 0959   Post-Procedure Volume (cm^3) 2.112 cm^3 04/13/22 0959   Wound Assessment Bleeding 04/13/22 0949   Drainage Amount Moderate 04/13/22 0949   Drainage Description Sanguinous 04/13/22 0949   Odor None 04/13/22 0949   Rashmi-wound Assessment Blanchable erythema 04/13/22 0949   Number of days: 0                                           [facility] followup visit _________2 week with Dr. Gastelum___________________  (Please note your next appointment above and if you are unable to keep, kindly give a 24 hour notice.  Thank you.)     Physician signature:__________________________        If you experience any of the following, please call the [address] during business hours:     * Increase in Pain  * Temperature over 101  * Increase in drainage from your wound  * Drainage with a foul odor  * Bleeding  * Increase in swelling  * Need for compression bandage changes due to slippage, breakthrough drainage.     If you need medical attention outside of the business hours of the [address] please contact your

## 2022-04-13 NOTE — DISCHARGE INSTR - COC
Continuity of Care Form    Patient Name: Zebedee Krabbe. :  1932  MRN:  [id number]    Admit date:  2022  Discharge date:  ***    Code Status Order: Prior   Advance Directives:      Admitting Physician:  No admitting provider for patient encounter. PCP: Fernando Lindquist MD    Discharging Nurse: [facility] Unit/Room#: No information available for this encounter. Discharging Unit Phone Number: ***    Emergency Contact:   Extended Emergency Contact Information  Primary Emergency Contact: Trisha Delgadillo [address] Phone: [phone]  Mobile Phone: [phone]  Relation: Child   needed? No  Secondary Emergency Contact: Claude Lizarraga [facility] Phone: [phone]  Relation: Child    Past Surgical History:  Past Surgical History:   Procedure Laterality Date    [facility]  PICC [address] SINGLE  2021         MASTECTOMY      TOE AMPUTATION      TOE SURGERY      TONSILLECTOMY         Immunization History:   Immunization History   Administered Date(s) Administered    Influenza Virus Vaccine 10/23/2016    Pneumococcal Conjugate Vaccine 2013    Td, unspecified formulation 2012       Active Problems:  Patient Active Problem List   Diagnosis Code    HTN (hypertension) I10    Bre[facility] cancer, [facility] ([facility] C50.929    Obesity (BMI 30.0-34. 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 30-59 (HCC) E10.22, N18.30    Cellulitis and abscess of right lower extremity L03.115, L02.415    Cellulitis and abscess of left lower extremity L03.116, L02.416    Chronic venous insufficiency of lower extremity I87.2    Acute hypoxemic respiratory failure due to COVID-19 ([facility]) U07.1, J96.01    Acute on chronic respiratory failure with hypoxia ([facility]) J96.21    Acute respiratory failure with hypoxia ([facility]) J96.01    COVID-19 virus infection U07.1    Hyperlipidemia E78.5    Pneumonia due to COVID-19 virus U07.1, J12.82    Orthostatic hypotension I95.1    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 ([facility]) I70.244    PVD (peripheral vascular disease) ([facility]) I73.9    PAD (peripheral artery disease) ([facility]) I73.9    Diabetic ulcer of right heel associated with type 2 diabetes mellitus, with fat layer exposed ([facility] E11.621, L97.412    Diabetic ulcer of left heel ([facility]) E11.621, L97.429    Sepsis ([facility] A41.9    Diabetic foot infection ([facility]) E11.628, L08.9       Isolation/Infection:   Isolation            No Isolation          Patient Infection Status       Infection Onset Added Last Indicated Last Indicated By Review Planned Expiration Resolved Resolved By    None active    Resolved    COVID-19 (Rule Out) 22 COVID-19, Rapid (Ordered)   22 Rule-Out Test Resulted    MRSA 21 Culture, Blood 2   03/15/22 Derek Florez RN    MRSA blood 2021    COVID-19 21 Covid-19 Ambulatory   21     COVID-19 (Rule Out) 21 COVID-19, Rapid (Ordered)   21 Rule-Out Test Resulted    COVID-19 (Rule Out) 10/16/20 10/16/20 10/16/20 Covid-19 Ambulatory (Ordered)   10/17/20 Rule-Out Test Resulted    COVID-19 (Rule Out) 20 Covid-19 Ambulatory (Ordered)   20 Rule-Out Test Resulted    COVID-19 (Rule Out) 20 Covid-19 Ambulatory (Ordered)   20 Rule-Out Test Resulted    COVID-19 (Rule Out) 20 Covid-19 Ambulatory (Ordered)   20 Rule-Out Test Resulted            Nurse Assessment:  Last Vital Signs: /64   Pulse 86   Temp 96.7 °F (35.9 °C) (Temporal)   Resp 18   Ht 6' (1.829 m)   Wt 266 lb (120.7 kg)   BMI 36.08 kg/m²     Last documented pain score (0-10 scale): Pain Level: 5  Last Weight:   Wt Readings from Last 1 Encounters:   22 266 lb (120.7 kg)     Mental Status:  {IP PT MENTAL STATUS:35833}    IV Access:  { AGUSTINA IV ACCESS:204498271}    Nursing Mobility/ADLs:  Walking   {CHP DME GFO}  Transfer  {CHP DME THJM:213430511}  Bathing  {CHP DME WWYS:863784438}  Dressing  {CHP DME LFRQ:046500896}  Toileting  {CHP DME PWAS:020971163}  Feeding  {CHP DME BQFE:463935266}  Med Admin  {CHP DME FIWB:986761992}  Med Delivery   { AGUSTINA MED Delivery:203956303}    Wound Care Documentation and Therapy:  Wound 22 Heel Left #1 (Active)   Wound Image   22 0902   Wound Etiology Diabetic Campbell 2 22 0943   Dressing Status New dressing applied 22 0951   Wound Cleansed Cleansed with saline 22 0951   Dressing/Treatment ABD;Dry dressing 22 0951   Offloading for Diabetic Foot Ulcers Diabetic shoes/inserts 22 0951   Dressing Change Due 22 0830   Wound Length (cm) 2.7 cm 22 0934   Wound Width (cm) 2.1 cm 22 0934   Wound Depth (cm) 0.2 cm 22 0934   Wound Surface Area (cm^2) 5.67 cm^2 22 0934   Change in Wound Size % (l*w) 58.91 22 0934   Wound Volume (cm^3) 1.134 cm^3 22 0934   Wound Healing % 59 22 0934   Post-Procedure Length (cm) 2.7 cm 22 0959   Post-Procedure Width (cm) 2.2 cm 22   Post-Procedure Depth (cm) 0.3 cm 22   Post-Procedure Surface Area (cm^2) 5.94 cm^2 22   Post-Procedure Volume (cm^3) 1.782 cm^3 22   Wound Assessment Fibrin;Pink/red 22   Drainage Amount Large 22   Drainage Description Yellow 22   Odor None 22   Rashmi-wound Assessment Fragile 04/13/22 0934   Wound Thickness Description not for Pressure Injury Full thickness 01/19/22 0909   Number of days: 97       Wound 01/05/22 Heel Right #2 (Active)   Wound Image   01/05/22 0902   Wound Etiology Venous 01/05/22 0943   Dressing Status New dressing applied 04/06/22 0951   Wound Cleansed Cleansed with saline 04/06/22 0951   Dressing/Treatment ABD;Dry dressing 04/06/22 0951   Offloading for Diabetic Foot Ulcers Diabetic shoes/inserts 04/06/22 0951   Dressing Change Due 03/16/22 03/16/22 0830   Wound Length (cm) 1.5 cm 04/13/22 0934   Wound Width (cm) 1 cm 04/13/22 0934   Wound Depth (cm) 0.2 cm 04/13/22 0934   Wound Surface Area (cm^2) 1.5 cm^2 04/13/22 0934   Change in Wound Size % (l*w) 64.71 04/13/22 0934   Wound Volume (cm^3) 0.3 cm^3 04/13/22 0934   Wound Healing % 65 04/13/22 0934   Post-Procedure Length (cm) 1.6 cm 04/13/22 0959   Post-Procedure Width (cm) 1.2 cm 04/13/22 0959   Post-Procedure Depth (cm) 0.3 cm 04/13/22 0959   Post-Procedure Surface Area (cm^2) 1.92 cm^2 04/13/22 0959   Post-Procedure Volume (cm^3) 0.576 cm^3 04/13/22 0959   Wound Assessment Fibrin;Pink/red 04/13/22 0934   Drainage Amount Small 04/13/22 0934   Drainage Description Yellow 04/13/22 0934   Odor None 04/13/22 0934   Rashmi-wound Assessment Fragile 04/13/22 0934   Wound Thickness Description not for Pressure Injury Full thickness 01/19/22 0909   Number of days: 97       Wound 03/14/22 Tibial Left;Posterior (Active)   Dressing Status New dressing applied 04/06/22 0951   Wound Cleansed Cleansed with saline 04/06/22 0951   Dressing/Treatment Moist to dry 04/06/22 0951   Offloading for Diabetic Foot Ulcers Other (comment) 04/06/22 0951   Dressing Change Due 03/16/22 03/16/22 0830   Wound Length (cm) 3.5 cm 04/13/22 0934   Wound Width (cm) 1.6 cm 04/13/22 0934   Wound Depth (cm) 0.3 cm 04/13/22 0934   Wound Surface Area (cm^2) 5.6 cm^2 04/13/22 0934   Change in Wound Size % (l*w) 10.4 04/13/22 0934   Wound Volume (cm^3) 1.68 cm^3 04/13/22 0934   Wound Healing % 78 04/13/22 0934   Post-Procedure Length (cm) 3.6 cm 04/13/22 0959   Post-Procedure Width (cm) 1.6 cm 04/13/22 0959   Post-Procedure Depth (cm) 0.4 cm 04/13/22 0959   Post-Procedure Surface Area (cm^2) 5.76 cm^2 04/13/22 0959   Post-Procedure Volume (cm^3) 2. 304 cm^3 04/13/22 0959   Wound Assessment Fibrin;Pink/red 04/13/22 0934   Drainage Amount Moderate 04/13/22 0934   Drainage Description Sanguinous 04/13/22 0934   Odor None 04/13/22 0934   Rashmi-wound Assessment Maceration 04/13/22 0934   Number of days: 29       Wound 03/14/22 Coccyx (Active)   Wound Cleansed Cleansed with saline 03/16/22 0830   Dressing/Treatment Open to air; Pharmaceutical agent (see MAR) 03/17/22 0800   Wound Length (cm) 7 cm 03/14/22 1755   Wound Width (cm) 12 cm 03/14/22 1755   Wound Surface Area (cm^2) 84 cm^2 03/14/22 1755   Wound Assessment Pink/red; Other (Comment) 03/17/22 0800   Drainage Amount Small 03/17/22 0800   Drainage Description Serosanguinous 03/17/22 0800   Odor None 03/17/22 0800   Rashmi-wound Assessment Fragile 03/16/22 2015   Number of days: 29       Wound 03/23/22 Toe (Comment  which one) Left #10 Left Great Toe (Active)   Wound Image   03/23/22 0818   Dressing Status New dressing applied 04/06/22 0951   Wound Cleansed Betadine/povidone iodine 04/06/22 0951   Dressing/Treatment Collagen with Ag;Dry dressing 04/06/22 0951   Offloading for Diabetic Foot Ulcers Offloading not required 04/06/22 0951   Wound Length (cm) 0.3 cm 04/13/22 0934   Wound Width (cm) 0.4 cm 04/13/22 0934   Wound Depth (cm) 0.1 cm 04/13/22 0934   Wound Surface Area (cm^2) 0.12 cm^2 04/13/22 0934   Change in Wound Size % (l*w) 99 04/13/22 0934   Wound Volume (cm^3) 0.012 cm^3 04/13/22 0934   Wound Healing % 99 04/13/22 0934   Post-Procedure Length (cm) 0.3 cm 04/06/22 0933   Post-Procedure Width (cm) 0.3 cm 04/06/22 0933   Post-Procedure Depth (cm) 0.2 cm 04/06/22 0933   Post-Procedure Surface Area (cm^2) 0.09 cm^2 04/06/22 0933   Post-Procedure Volume (cm^3) 0.018 cm^3 04/06/22 0933   Wound Assessment Dry 04/13/22 0934   Drainage Amount None 04/13/22 0934   Drainage Description Serosanguinous 03/30/22 0859   Odor None 04/13/22 0934   Rashmi-wound Assessment Dry/flaky;Fragile 04/13/22 0934   Number of days: 21       Wound 03/23/22 Foot Left;Dorsal #11 (Active)   Wound Image   03/23/22 0818   Dressing Status New dressing applied 04/06/22 0951   Wound Cleansed Cleansed with saline 04/06/22 0951   Dressing/Treatment Collagen with Ag;Dry dressing 04/06/22 0951   Offloading for Diabetic Foot Ulcers Other (comment) 04/06/22 0951   Wound Length (cm) 0.1 cm 04/13/22 0934   Wound Width (cm) 0.1 cm 04/13/22 0934   Wound Depth (cm) 0.1 cm 04/13/22 0934   Wound Surface Area (cm^2) 0.01 cm^2 04/13/22 0934   Change in Wound Size % (l*w) 99.96 04/13/22 0934   Wound Volume (cm^3) 0.001 cm^3 04/13/22 0934   Wound Healing % 100 04/13/22 0934   Post-Procedure Length (cm) 0.6 cm 04/06/22 0933   Post-Procedure Width (cm) 0.6 cm 04/06/22 0933   Post-Procedure Depth (cm) 0.2 cm 04/06/22 0933   Post-Procedure Surface Area (cm^2) 0.36 cm^2 04/06/22 0933   Post-Procedure Volume (cm^3) 0.072 cm^3 04/06/22 0933   Wound Assessment Dry 04/13/22 0934   Drainage Amount None 04/13/22 0934   Drainage Description Serous 03/30/22 0859   Odor None 04/13/22 0934   Rashmi-wound Assessment Edematous;Dry/flaky 04/13/22 0934   Number of days: 21       Wound 04/13/22 Pretibial Distal;Left;Lateral #14 (Active)   Wound Image   04/13/22 0949   Wound Length (cm) 3.2 cm 04/13/22 0949   Wound Width (cm) 3 cm 04/13/22 0949   Wound Depth (cm) 0.1 cm 04/13/22 0949   Wound Surface Area (cm^2) 9.6 cm^2 04/13/22 0949   Wound Volume (cm^3) 0.96 cm^3 04/13/22 0949   Post-Procedure Length (cm) 3.2 cm 04/13/22 0959   Post-Procedure Width (cm) 3.3 cm 04/13/22 0959   Post-Procedure Depth (cm) 0.2 cm 04/13/22 0959   Post-Procedure Surface Area (cm^2) 10.56 cm^2 04/13/22 0959 Post-Procedure Volume (cm^3) 2.112 cm^3 22 0959   Wound Assessment Bleeding 2249   Drainage Amount Moderate 2249   Drainage Description Sanguinous 22 0949   Odor None 22   Rashmi-wound Assessment Blanchable erythema 22   Number of days: 0        Elimination:  Continence: Bowel: {YES / IY:20992}  Bladder: {YES / RA:42868}  Urinary Catheter: {Urinary Catheter:504286080}   Colostomy/Ileostomy/Ileal Conduit: {YES / ZH:08844}       Date of Last BM: ***  No intake or output data in the 24 hours ending 22 1002  No intake/output data recorded.     Safety Concerns:     [employer] Safety Concerns:064995038}    Impairments/Disabilities:      [employer] Impairments/Disabilities:655110143}    Nutrition Therapy:  Current Nutrition Therapy:   [employer] Diet List:039784818}    Routes of Feeding: {CHP DME Other Feedings:301001853}  Liquids: {Slp liquid thickness:25947}  Daily Fluid Restriction: {CHP DME Yes amt example:476648209}  Last Modified Barium Swallow with Video (Video Swallowing Test): {Done Not Done EZQ}    Treatments at the Time of Hospital Discharge:   Respiratory Treatments: ***  Oxygen Therapy:  {Therapy; copd oxygen:33921}  Ventilator:    { CC Vent AXNH:562401679}    Rehab Therapies: {THERAPEUTIC INTERVENTION:6370481345}  Weight Bearing Status/Restrictions: [employer]  Weight Bearin}  Other Medical Equipment (for information only, NOT a DME order):  {EQUIPMENT:521440215}  Other Treatments: ***    Patient's personal belongings (please select all that are sent with patient):  {P DME Belongings:703760124}    RN SIGNATURE:  {Esignature:837467283}    CASE MANAGEMENT/SOCIAL WORK SECTION    Inpatient Status Date: ***    Readmission Risk Assessment Score:  Readmission Risk              Risk of Unplanned Readmission:  0           Discharging to Facility/ Agency   Name:   Address:  Phone:  Fax:    Dialysis Facility (if applicable) Name:  Address:  Dialysis Schedule:  Phone:  Fax:    / signature: {Esignature:757977480}    PHYSICIAN SECTION    Prognosis: {Prognosis:9146900541}    Condition at Discharge: Tani Gill Patient Condition:009479723}    Rehab Potential (if transferring to Rehab): {Prognosis:5994366806}    Recommended Labs or Other Treatments After Discharge: ***    Physician Certification: I certify the above information and transfer of Digna Garcia.  is necessary for the continuing treatment of the diagnosis listed and that he requires {Admit to Appropriate Level of Care:76746} for {GREATER/LESS:633226947} 30 days.      Update Admission H&P: {CHP DME Changes in [facility]:889138959}    PHYSICIAN SIGNATURE:  {Esignature:297271823}

## 2022-04-24 NOTE — DISCHARGE SUMMARY
Patient ID:  Issac Collins  [id number]  80 y.o.  11/6/1932    Admit date: 3/14/2022    Discharge date and time: 3/17/2022  2:18 PM     Admission Diagnoses: [place] [I49.8]  Hypoxia [R09.02]  Troponin level elevated [R77.8]  Open wound of knee, leg, and ankle, unspecified laterality, initial encounter [S81.009A, S81.809A, S91.009A]  Sepsis ([facility] [A41.9]  Sepsis, due to unspecified organism, unspecified whether acute organ dysfunction present [facility]) [A41.9]    Discharge Diagnoses:   Patient Active Problem List   Diagnosis    HTN (hypertension)    [facility]    Obesity (BMI 30.0-34. 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    Cellulitis and abscess of right lower extremity    Cellulitis and abscess of left lower extremity    Chronic venous insufficiency of lower extremity    Acute hypoxemic respiratory failure due to COVID-19 [facility])    Acute on chronic respiratory failure with hypoxia (HCC)    Acute respiratory failure with hypoxia (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)    Diabetic ulcer of right heel associated with type 2 diabetes mellitus, with fat layer exposed ([facility]    Diabetic ulcer of left heel ([facility]    Sepsis ([facility]    Diabetic foot infection ([facility]       Consults: ID    Procedures:   none    Hospital Course:  Pt admitted with sepsis from [facility]    Discharge Exam:  See progress note from today    Disposition: SNF    Condition at Discharge:  stable    Patient Instructions:   Discharge Medication List as of 3/17/2022  2:09 PM      CONTINUE these medications which have CHANGED    Details   apixaban (ELIQUIS) 2.5 MG TABS tablet Take 1 tablet by mouth 2 times daily (before meals), Disp-60 tablet, R-2Normal      cephALEXin (KEFLEX) 500 MG capsule Take 2 capsules by mouth every 8 hours for 30 doses, Disp-60 capsule, R-0DC to SNF         CONTINUE these medications which have NOT CHANGED    Details   docusate sodium (COLACE) 100 MG capsule Take 100 mg by mouth nightlyHistorical Med      tamsulosin (FLOMAX) 0.4 MG capsule Take 0.4 mg by mouth dailyHistorical Med      bisacodyl (DULCOLAX) 10 MG suppository Place 10 mg rectally daily as needed for ConstipationHistorical Med      diphenhydrAMINE-zinc acetate (BENADRYL) 1-0.1 % cream Apply topically every 4 hours as needed for Itching Apply topically 3 times daily as needed., Topical, EVERY 4 HOURS PRN, Historical Med      Sodium Phosphates (FLEET) 7-19 GM/118ML Place 1 enema rectally daily as neededHistorical Med      magnesium hydroxide (MILK OF MAGNESIA) 400 MG/5ML suspension Take 30 mLs by mouth daily as needed for ConstipationHistorical Med      Polyethylene Glycol 400 1 % SOLN Apply 1 drop to eye every 4 hours as neededHistorical Med      ferrous sulfate (IRON 325) 325 (65 Fe) MG tablet Take 325 mg by mouth daily Historical Med      zinc sulfate (ZINCATE) 220 (50 Zn) MG capsule Take 1 capsule by mouth daily for 14 days, Disp-14 capsule, R-0DC to SNF      miconazole (MICOTIN) 2 % powder Apply topically 2 times daily. , Disp-45 g, R-1, DC to SNF      ascorbic acid (VITAMIN C) 500 MG tablet Take 1 tablet by mouth 2 times daily, Disp-30 tablet, R-0DC to SNF      Vitamin D (CHOLECALCIFEROL) 50 MCG (2000 UT) TABS tablet Take 1 tablet by mouth daily, Disp-60 tablet, R-0Labeling may look different. 25 mcg=1000 Units. Please double check dosages. DC to SNF      furosemide (LASIX) 40 MG tablet Take 20 mg by mouth daily Historical Med      omeprazole (PRILOSEC) 20 MG delayed release capsule Take 20 mg by mouth dailyHistorical Med      !! Multiple Vitamins-Minerals (THERAPEUTIC MULTIVITAMIN-MINERALS) tablet Take 1 tablet by mouth dailyHistorical Med      !! Multiple Vitamins-Minerals (PRESERVISION AREDS PO) Take 1 tablet by mouth 2 times daily (with meals)Historical Med      acetaminophen (TYLENOL) 325 MG tablet Take 650 mg by mouth every 4 hours as needed for Pain or Fever Historical Med      calcium carbonate (TUMS) 500 MG chewable tablet Take 1 tablet by mouth every 4 hours as needed for HeartburnHistorical Med      ipratropium-albuterol (DUONEB) 0.5-2.5 (3) MG/3ML SOLN nebulizer solution Inhale 3 mLs into the lungs every 4 hours (while awake) for 30 doses, Disp-90 mL, R-0Normal      gabapentin (NEURONTIN) 100 MG capsule Take 200 mg by mouth Daily with supper. Historical Med      atorvastatin (LIPITOR) 80 MG tablet Take 80 mg by mouth at bedtime Historical Med      !! INSULIN LISP PROT & LISP, HUM, (75-25) 100 UNIT/ML SUSP Inject 72 Units into the skin daily (with breakfast) Historical Med      !! INSULIN LISP PROT & LISP, HUM, (75-25) 100 UNIT/ML SUSP Inject 55 Units into the skin Daily with supper Historical Med      clotrimazole-betamethasone (LOTRISONE) cream Apply  topically as needed. Apply topically 2 times daily. , Topical, PRN, Until Discontinued, Historical Med       !! - Potential duplicate medications found. Please discuss with provider. Activity: activity as tolerated  Diet: regular diet    Follow-up with Dr Roselinda Bosworth in 1 week.     Note that over 30 minutes was spent in preparing discharge papers, discussing discharge with patient, medication review, etc.    Signed:  Rene Landry MD  4/24/2022  7:01 AM

## 2022-04-27 ENCOUNTER — HOSPITAL ENCOUNTER (OUTPATIENT)
Dept: WOUND CARE | Age: 87
Discharge: HOME | End: 2022-04-27
Payer: MEDICARE

## 2022-04-27 VITALS
RESPIRATION RATE: 18 BRPM | TEMPERATURE: 97 F | SYSTOLIC BLOOD PRESSURE: 132 MMHG | HEIGHT: 72 IN | WEIGHT: 266 LBS | HEART RATE: 89 BPM | DIASTOLIC BLOOD PRESSURE: 70 MMHG | BODY MASS INDEX: 36.03 KG/M2

## 2022-04-27 DIAGNOSIS — E11.621 DIABETIC ULCER OF RIGHT HEEL ASSOCIATED WITH TYPE 2 DIABETES MELLITUS, WITH FAT LAYER EXPOSED (HCC): Primary | ICD-10-CM

## 2022-04-27 DIAGNOSIS — L97.421 DIABETIC ULCER OF LEFT HEEL ASSOCIATED WITH DIABETES MELLITUS DUE TO UNDERLYING CONDITION, LIMITED TO BREAKDOWN OF SKIN (HCC): ICD-10-CM

## 2022-04-27 DIAGNOSIS — L97.412 DIABETIC ULCER OF RIGHT HEEL ASSOCIATED WITH TYPE 2 DIABETES MELLITUS, WITH FAT LAYER EXPOSED (HCC): Primary | ICD-10-CM

## 2022-04-27 DIAGNOSIS — E08.621 DIABETIC ULCER OF LEFT HEEL ASSOCIATED WITH DIABETES MELLITUS DUE TO UNDERLYING CONDITION, LIMITED TO BREAKDOWN OF SKIN (HCC): ICD-10-CM

## 2022-04-27 PROCEDURE — 11042 DBRDMT SUBQ TIS 1ST 20SQCM/<: CPT

## 2022-04-27 PROCEDURE — 6370000000 HC RX 637 (ALT 250 FOR IP): Performed by: PODIATRIST

## 2022-04-27 RX ORDER — LIDOCAINE HYDROCHLORIDE 40 MG/ML
SOLUTION TOPICAL ONCE
Status: COMPLETED | OUTPATIENT
Start: 2022-04-27 | End: 2022-04-27

## 2022-04-27 RX ORDER — BACITRACIN ZINC AND POLYMYXIN B SULFATE 500; 1000 [USP'U]/G; [USP'U]/G
OINTMENT TOPICAL ONCE
Status: CANCELLED | OUTPATIENT
Start: 2022-04-27 | End: 2022-04-27

## 2022-04-27 RX ORDER — LIDOCAINE HYDROCHLORIDE 40 MG/ML
SOLUTION TOPICAL ONCE
Status: CANCELLED | OUTPATIENT
Start: 2022-04-27 | End: 2022-04-27

## 2022-04-27 RX ORDER — LIDOCAINE 50 MG/G
OINTMENT TOPICAL ONCE
Status: CANCELLED | OUTPATIENT
Start: 2022-04-27 | End: 2022-04-27

## 2022-04-27 RX ORDER — BETAMETHASONE DIPROPIONATE 0.05 %
OINTMENT (GRAM) TOPICAL ONCE
Status: CANCELLED | OUTPATIENT
Start: 2022-04-27 | End: 2022-04-27

## 2022-04-27 RX ORDER — LIDOCAINE HYDROCHLORIDE 20 MG/ML
JELLY TOPICAL ONCE
Status: CANCELLED | OUTPATIENT
Start: 2022-04-27 | End: 2022-04-27

## 2022-04-27 RX ORDER — LIDOCAINE 40 MG/G
CREAM TOPICAL ONCE
Status: CANCELLED | OUTPATIENT
Start: 2022-04-27 | End: 2022-04-27

## 2022-04-27 RX ORDER — GENTAMICIN SULFATE 1 MG/G
OINTMENT TOPICAL ONCE
Status: CANCELLED | OUTPATIENT
Start: 2022-04-27 | End: 2022-04-27

## 2022-04-27 RX ORDER — GINSENG 100 MG
CAPSULE ORAL ONCE
Status: CANCELLED | OUTPATIENT
Start: 2022-04-27 | End: 2022-04-27

## 2022-04-27 RX ORDER — BACITRACIN, NEOMYCIN, POLYMYXIN B 400; 3.5; 5 [USP'U]/G; MG/G; [USP'U]/G
OINTMENT TOPICAL ONCE
Status: CANCELLED | OUTPATIENT
Start: 2022-04-27 | End: 2022-04-27

## 2022-04-27 RX ORDER — CLOBETASOL PROPIONATE 0.5 MG/G
OINTMENT TOPICAL ONCE
Status: CANCELLED | OUTPATIENT
Start: 2022-04-27 | End: 2022-04-27

## 2022-04-27 RX ADMIN — LIDOCAINE HYDROCHLORIDE 10 ML: 40 SOLUTION TOPICAL at 09:57

## 2022-04-27 ASSESSMENT — PAIN DESCRIPTION - ORIENTATION: ORIENTATION: LEFT

## 2022-04-27 ASSESSMENT — PAIN SCALES - GENERAL: PAINLEVEL_OUTOF10: 6

## 2022-04-27 ASSESSMENT — PAIN DESCRIPTION - LOCATION: LOCATION: FOOT

## 2022-04-27 ASSESSMENT — PAIN - FUNCTIONAL ASSESSMENT: PAIN_FUNCTIONAL_ASSESSMENT: ACTIVITIES ARE NOT PREVENTED

## 2022-04-27 ASSESSMENT — PAIN DESCRIPTION - DESCRIPTORS: DESCRIPTORS: ACHING;DULL

## 2022-04-27 ASSESSMENT — PAIN DESCRIPTION - ONSET: ONSET: ON-GOING

## 2022-04-27 ASSESSMENT — PAIN DESCRIPTION - FREQUENCY: FREQUENCY: INTERMITTENT

## 2022-04-27 ASSESSMENT — PAIN DESCRIPTION - PAIN TYPE: TYPE: CHRONIC PAIN

## 2022-04-27 NOTE — DISCHARGE INSTR - COC
Continuity of Care Form    Patient Name: Halina Velazquez. :  1932  MRN:  [id number]    Admit date:  2022  Discharge date:  ***    Code Status Order: Prior   Advance Directives:      Admitting Physician:  No admitting provider for patient encounter. PCP: Joi Courtney MD    Discharging Nurse: [facility] Unit/Room#: No information available for this encounter. Discharging Unit Phone Number: ***    Emergency Contact:   Extended Emergency Contact Information  Primary Emergency Contact: [address] Phone: [phone]  Mobile Phone: [phone]  Relation: Child   needed? No  Secondary Emergency Contact: Claude Lizarraga [place] [address] Phone: [phone]  Relation: Child    Past Surgical History:  Past Surgical History:   Procedure Laterality Date    [facility]  PICC [address] SINGLE  2021         MASTECTOMY      TOE AMPUTATION      TOE SURGERY      TONSILLECTOMY         Immunization History:   Immunization History   Administered Date(s) Administered    Influenza Virus Vaccine 10/23/2016    Pneumococcal Conjugate Vaccine 2013    Td, unspecified formulation 2012       Active Problems:  Patient Active Problem List   Diagnosis Code    HTN (hypertension) I10    [facility] C50.929    Obesity (BMI 30.0-34. 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 30-59 (HCC) E10.22, N18.30    Cellulitis and abscess of right lower extremity L03.115, L02.415    Cellulitis and abscess of left lower extremity L03.116, L02.416    Chronic venous insufficiency of lower extremity I87.2    Acute hypoxemic respiratory failure due to COVID-19 ([facility]) U07.1, J96.01    Acute on chronic respiratory failure with hypoxia ([facility]) J96.21    Acute respiratory failure with hypoxia ([facility]) J96.01    COVID-19 virus infection U07.1    Hyperlipidemia E78.5    Pneumonia due to COVID-19 virus U07.1, J12.82    Orthostatic hypotension I95.1    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 ([facility]) I70.244    PVD (peripheral vascular disease) ([facility]) I73.9    PAD (peripheral artery disease) ([facility]) I73.9    Diabetic ulcer of right heel associated with type 2 diabetes mellitus, with fat layer exposed ([facility] E11.621, L97.412    Diabetic ulcer of left heel ([facility]) E11.621, L97.429    Sepsis ([facility] A41.9    Diabetic foot infection ([facility]) E11.628, L08.9       Isolation/Infection:   Isolation            No Isolation          Patient Infection Status       Infection Onset Added Last Indicated Last Indicated By Review Planned Expiration Resolved Resolved By    None active    Resolved    COVID-19 (Rule Out) 22 COVID-19, Rapid (Ordered)   22 Rule-Out Test Resulted    MRSA 21 Culture, Blood 2   03/15/22 Rikki Delacruz RN    MRSA blood 2021    COVID-19 21 Covid-19 Ambulatory   21     COVID-19 (Rule Out) 21 COVID-19, Rapid (Ordered)   21 Rule-Out Test Resulted    COVID-19 (Rule Out) 10/16/20 10/16/20 10/16/20 Covid-19 Ambulatory (Ordered)   10/17/20 Rule-Out Test Resulted    COVID-19 (Rule Out) 20 Covid-19 Ambulatory (Ordered)   20 Rule-Out Test Resulted    COVID-19 (Rule Out) 20 Covid-19 Ambulatory (Ordered)   20 Rule-Out Test Resulted    COVID-19 (Rule Out) 20 Covid-19 Ambulatory (Ordered)   20 Rule-Out Test Resulted            Nurse Assessment:  Last Vital Signs: /70   Pulse 89   Temp 97 °F (36.1 °C) (Tympanic)   Resp 18   Ht 6' (1.829 m)   Wt 266 lb (120.7 kg)   BMI 36.08 kg/m²     Last documented pain score (0-10 scale): Pain Level: 6  Last Weight:   Wt Readings from Last 1 Encounters:   04/27/22 266 lb (120.7 kg)     Mental Status:  {IP PT MENTAL STATUS:78502}    IV Access:  { AGUSTINA IV ACCESS:442458633}    Nursing Mobility/ADLs:  Walking   {CHP DME FEFU:545484060}  Transfer  {CHP DME ZBFK:625597444}  Bathing  {CHP DME ITCE:686948302}  Dressing  {CHP DME QADK:205867265}  Toileting  {CHP DME DVRW:690752644}  Feeding  {CHP DME GCIY:300973079}  Med Admin  {CHP DME OLVN:053527077}  Med Delivery   { AGUSTINA MED Delivery:431322976}    Wound Care Documentation and Therapy:  Wound 01/05/22 Heel Left #1 (Active)   Wound Image   01/05/22 0902   Wound Etiology Diabetic Campbell 2 01/05/22 0943   Dressing Status New dressing applied 04/13/22 1026   Wound Cleansed Cleansed with saline 04/13/22 1026   Dressing/Treatment Dry dressing; Other (comment) 04/13/22 1026   Offloading for Diabetic Foot Ulcers Diabetic shoes/inserts 04/13/22 1026   Wound Length (cm) 3.8 cm 04/27/22 0925   Wound Width (cm) 2.6 cm 04/27/22 0925   Wound Depth (cm) 0.2 cm 04/27/22 0925   Wound Surface Area (cm^2) 9.88 cm^2 04/27/22 0925   Change in Wound Size % (l*w) 28.41 04/27/22 0925   Wound Volume (cm^3) 1.976 cm^3 04/27/22 0925   Wound Healing % 28 04/27/22 0925   Post-Procedure Length (cm) 3.9 cm 04/27/22 1010   Post-Procedure Width (cm) 2.7 cm 04/27/22 1010   Post-Procedure Depth (cm) 0.2 cm 04/27/22 1010   Post-Procedure Surface Area (cm^2) 10.53 cm^2 04/27/22 1010   Post-Procedure Volume (cm^3) 2. 106 cm^3 04/27/22 1010   Wound Assessment Fibrin;Pink/red 04/27/22 0925   Drainage Amount Small 04/27/22 0925   Drainage Description Yellow 04/27/22 0925   Odor None 04/27/22 0925   Rashmi-wound Assessment Maceration;Dry/flaky 04/27/22 4291 Wound Thickness Description not for Pressure Injury Full thickness 01/19/22 0909   Number of days: 112       Wound 01/05/22 Heel Right #2 (Active)   Wound Image   01/05/22 0902   Wound Etiology Venous 01/05/22 0943   Dressing Status New dressing applied 04/13/22 1026   Wound Cleansed Cleansed with saline 04/13/22 1026   Dressing/Treatment ABD;Dry dressing; Other (comment) 04/13/22 1026   Offloading for Diabetic Foot Ulcers Diabetic shoes/inserts 04/13/22 1026   Wound Length (cm) 2.7 cm 04/27/22 0925   Wound Width (cm) 1.5 cm 04/27/22 0925   Wound Depth (cm) 0.1 cm 04/27/22 0925   Wound Surface Area (cm^2) 4.05 cm^2 04/27/22 0925   Change in Wound Size % (l*w) 4.71 04/27/22 0925   Wound Volume (cm^3) 0.405 cm^3 04/27/22 0925   Wound Healing % 52 04/27/22 0925   Post-Procedure Length (cm) 2.8 cm 04/27/22 1010   Post-Procedure Width (cm) 1.5 cm 04/27/22 1010   Post-Procedure Depth (cm) 0.2 cm 04/27/22 1010   Post-Procedure Surface Area (cm^2) 4.2 cm^2 04/27/22 1010   Post-Procedure Volume (cm^3) 0.84 cm^3 04/27/22 1010   Wound Assessment Fibrin;Slough 04/27/22 0925   Drainage Amount Small 04/27/22 0925   Drainage Description Yellow 04/27/22 0925   Odor None 04/27/22 0925   Rashmi-wound Assessment Intact;Dry/flaky 04/27/22 0925   Wound Thickness Description not for Pressure Injury Full thickness 01/19/22 0909   Number of days: 112       Wound 03/14/22 Coccyx (Active)   Number of days: 43       Wound 03/23/22 Toe (Comment  which one) Left #10 Left Great Toe (Active)   Wound Image   03/23/22 0818   Dressing Status New dressing applied 04/13/22 1026   Wound Cleansed Betadine/povidone iodine 04/13/22 1026   Dressing/Treatment Collagen with Ag;Dry dressing 04/13/22 1026   Offloading for Diabetic Foot Ulcers Forefoot offloading shoe 04/13/22 1026   Wound Length (cm) 0.7 cm 04/27/22 0925   Wound Width (cm) 0.7 cm 04/27/22 0925   Wound Depth (cm) 0.1 cm 04/27/22 0925   Wound Surface Area (cm^2) 0.49 cm^2 04/27/22 0925   Change in Wound Size % (l*w) 95.92 04/27/22 0925   Wound Volume (cm^3) 0.049 cm^3 04/27/22 0925   Wound Healing % 96 04/27/22 0925   Post-Procedure Length (cm) 0.8 cm 04/27/22 1009   Post-Procedure Width (cm) 0.8 cm 04/27/22 1009   Post-Procedure Depth (cm) 0.2 cm 04/27/22 1009   Post-Procedure Surface Area (cm^2) 0.64 cm^2 04/27/22 1009   Post-Procedure Volume (cm^3) 0.128 cm^3 04/27/22 1009   Wound Assessment Pink/red 04/27/22 0925   Drainage Amount Small 04/27/22 0925   Drainage Description Yellow 04/27/22 0925   Odor None 04/27/22 0925   Rashmi-wound Assessment Dry/flaky 04/27/22 0925   Number of days: 35       Wound 03/23/22 Foot Left;Dorsal #11 (Active)   Wound Image   03/23/22 0818   Dressing Status New dressing applied 04/13/22 1026   Wound Cleansed Betadine/povidone iodine 04/13/22 1026   Dressing/Treatment Collagen with Ag 04/13/22 1026   Offloading for Diabetic Foot Ulcers Back offloading shoe 04/13/22 1026   Wound Length (cm) 0 cm 04/27/22 0925   Wound Width (cm) 0 cm 04/27/22 0925   Wound Depth (cm) 0 cm 04/27/22 0925   Wound Surface Area (cm^2) 0 cm^2 04/27/22 0925   Change in Wound Size % (l*w) 100 04/27/22 0925   Wound Volume (cm^3) 0 cm^3 04/27/22 0925   Wound Healing % 100 04/27/22 0925   Post-Procedure Length (cm) 0.6 cm 04/06/22 0933   Post-Procedure Width (cm) 0.6 cm 04/06/22 0933   Post-Procedure Depth (cm) 0.2 cm 04/06/22 0933   Post-Procedure Surface Area (cm^2) 0.36 cm^2 04/06/22 0933   Post-Procedure Volume (cm^3) 0.072 cm^3 04/06/22 0933   Wound Assessment Dry 04/13/22 0934   Drainage Amount None 04/13/22 0934   Drainage Description Serous 03/30/22 0859   Odor None 04/13/22 0934   Rashmi-wound Assessment Edematous;Dry/flaky 04/13/22 0934   Number of days: 35       Wound 04/13/22 Pretibial Distal;Left;Lateral #14 (Active)   Wound Image   04/13/22 0949   Dressing Status New dressing applied 04/13/22 1026   Wound Cleansed Cleansed with saline 04/13/22 1026   Dressing/Treatment Steri-strips; Hydrating gel;Dry dressing 04/13/22 1026   Wound Length (cm) 0.1 cm 04/27/22 0925   Wound Width (cm) 0.1 cm 04/27/22 0925   Wound Depth (cm) 0.1 cm 04/27/22 0925   Wound Surface Area (cm^2) 0.01 cm^2 04/27/22 0925   Change in Wound Size % (l*w) 99.9 04/27/22 0925   Wound Volume (cm^3) 0.001 cm^3 04/27/22 0925   Wound Healing % 100 04/27/22 0925   Post-Procedure Length (cm) 3.2 cm 04/13/22 0959   Post-Procedure Width (cm) 3.3 cm 04/13/22 0959   Post-Procedure Depth (cm) 0.2 cm 04/13/22 0959   Post-Procedure Surface Area (cm^2) 10.56 cm^2 04/13/22 0959   Post-Procedure Volume (cm^3) 2.112 cm^3 04/13/22 0959   Wound Assessment Pink/red 04/27/22 0925   Drainage Amount None 04/27/22 0925   Drainage Description Sanguinous 04/13/22 0949   Odor None 04/27/22 0925   Rashmi-wound Assessment Dry/flaky 04/27/22 0925   Number of days: 14       Wound 04/27/22 Tibial Left;Posterior #15 (Active)   Wound Length (cm) 3.9 cm 04/27/22 0946   Wound Width (cm) 3.6 cm 04/27/22 0946   Wound Depth (cm) 0.5 cm 04/27/22 0946   Wound Surface Area (cm^2) 14.04 cm^2 04/27/22 0946   Wound Volume (cm^3) 7.02 cm^3 04/27/22 0946   Post-Procedure Length (cm) 4 cm 04/27/22 1009   Post-Procedure Width (cm) 3.8 cm 04/27/22 1009   Post-Procedure Depth (cm) 0.7 cm 04/27/22 1009   Post-Procedure Surface Area (cm^2) 15.2 cm^2 04/27/22 1009   Post-Procedure Volume (cm^3) 10.64 cm^3 04/27/22 1009   Wound Assessment Fibrin;Pink/red 04/27/22 0946   Drainage Amount Moderate 04/27/22 0946   Drainage Description Serosanguinous 04/27/22 0946   Odor None 04/27/22 0946   Rashmi-wound Assessment Intact 04/27/22 0946   Number of days: 0        Elimination:  Continence: Bowel: {YES / OT:98067}  Bladder: {YES / OA:03293}  Urinary Catheter: {Urinary Catheter:269132927}   Colostomy/Ileostomy/Ileal Conduit: {YES / ED:54314}       Date of Last BM: ***  No intake or output data in the 24 hours ending 04/27/22 1012  No intake/output data recorded.     Safety Concerns:     Tani Gill [facility] Safety Concerns:386525444}    Impairments/Disabilities:      [address] BERRIOS Impairments/Disabilities:690225189}    Nutrition Therapy:  Current Nutrition Therapy:   [address] BERRIOS Diet List:255538914}    Routes of Feeding: {CHP DME Other Feedings:371513580}  Liquids: {Slp liquid thickness:76361}  Daily Fluid Restriction: {CHP DME Yes amt example:701920081}  Last Modified Barium Swallow with Video (Video Swallowing Test): {Done Not Done SYJJ:731119705}    Treatments at the Time of Hospital Discharge:   Respiratory Treatments: ***  Oxygen Therapy:  {Therapy; copd oxygen:78004}  Ventilator:    { CC Vent HFSL:426219245}    Rehab Therapies: {THERAPEUTIC INTERVENTION:7192843941}  Weight Bearing Status/Restrictions: [address] VACA Weight Bearin}  Other Medical Equipment (for information only, NOT a DME order):  {EQUIPMENT:033066207}  Other Treatments: ***    Patient's personal belongings (please select all that are sent with patient):  {CHP DME Belongings:170431374}    RN SIGNATURE:  {Esignature:003661515}    CASE MANAGEMENT/SOCIAL WORK SECTION    Inpatient Status Date: ***    Readmission Risk Assessment Score:  Readmission Risk              Risk of Unplanned Readmission:  0           Discharging to Facility/ Agency   Name:   Address:  Phone:  Fax:    Dialysis Facility (if applicable)   Name:  Address:  Dialysis Schedule:  Phone:  Fax:    / signature: {Esignature:506566961}    PHYSICIAN SECTION    Prognosis: {Prognosis:9170129066}    Condition at Discharge: [address] Patient Condition:458687922}    Rehab Potential (if transferring to Rehab): {Prognosis:2874633255}    Recommended Labs or Other Treatments After Discharge: ***    Physician Certification: I certify the above information and transfer of Rusty Kevin.  is necessary for the continuing treatment of the diagnosis listed and that he requires {Admit to Appropriate Level of Care:63389} for {GREATER/LESS:445850907} 30 days.      Update Admission H&P: {CHP DME Changes in ZQXNR:740338821}    PHYSICIAN SIGNATURE:  {Esignature:088437189}

## 2022-04-27 NOTE — PROGRESS NOTES
[facility]  History and Physical/Consultation  Podiatry    Referring Physician : Jena Weaver MD  Bhupinder Hilton. MEDICAL RECORD NUMBER:  [id number]  AGE: 80 y.o. GENDER: male  : 1932  EPISODE DATE:  2022  Subjective:     Chief Complaint   Patient presents with    Wound Check     bilateral feet         HISTORY of PRESENT ILLNESS HPI     Bhupinder Hilton. is a 80 y.o. male who presents today for wound/ulcer evaluation. History of Wound Context:  The patient has had a wound of diabetic b/l heels,and right ankle which was first noted approximately months ago. This has been treated betadyne. On their initial visit to the wound healing center, 22 ,  the patient has noted that the wound has been improving. The patient has had similar previous wounds in the past.      Pt is not on abx at time of initial visit.       Wound/Ulcer Pain Timing/Severity: constant  Quality of pain: sharp  Severity:  3 / 10   Modifying Factors: Pain worsens with walking  Associated Signs/Symptoms: edema, erythema and drainage    Ulcer Identification:  Ulcer Type: diabetic  Contributing Factors: edema and diabetes    Diabetic/Pressure/Non Pressure Ulcers onl y:  Ulcer: Diabetic ulcer, fat layer exposed    If patient has diabetic lower extremity wounds  Campbell Classification of diabetic lower extremity wounds:    Grade Description   []  0 No open wound   []  1 Superficial ulcer involving the full skin thickness   [x]  2 Deep ulcer involves ligament, tendon, joint capsule, or fascia  No bone involvement or abscess presence   []  3 Deep Ulcer with abcess formation and/or osteomyelitis   []  4 Localized gangrene   []  5 Extensive gangrene of the foot     Wound: N/A          22  Debrided, cont tx and care     22  Debrided, cont tx and care, await graft approval    22  Debrided, cont tx and care, puraply applied    22  Debrided, dermagraft applied    22  Debrided, dermagraft applied    2-23-22  Debrided, graft applied    3-2-22  Debrided, graft applied    3-9-22  Debrided, graft applied     3-23-22  Just release from hospital care, new wounds noted from hospital, also trauma to left toe loosened nail that was removed without incident, debrided all wounds, culture taken    3-30-22  Debrided, santyl to left leg, applied dermagraft b/l heels, prevelon boots wound vac left leg    4-6-22  Debrided, dermagraft b/l, wound care    4-13-22  Debrided, wound vac, dermagraft left    4-27-22  Debrided, cont tx and care   PAST MEDICAL HISTORY      Diagnosis Date    Arthritis     Cancer ([address]     breast    Cellulitis     [facility]     CKD (chronic kidney disease) stage 3, GFR 30-59 ml/min ([address]     Diabetes mellitus ([address]     History of lumpectomy     Hx of blood clots     Hyperlipidemia     Hypertension     Left ventricular failure ([address]     Macular degeneration     Obesity     Orthostatic hypotension     PE [facility]pulmonary thromboembolism) [facility]     Pressure injury of both heels, unstageable ([address]     Staph aureus infection     Venous insufficiency      Past Surgical History:   Procedure Laterality Date    [facility] [address] [facility] SINGLE  9/2/2021         MASTECTOMY      TOE AMPUTATION      TOE SURGERY      TONSILLECTOMY       History reviewed. No pertinent family history.   Social History     Tobacco Use    Smoking status: Never Smoker    Smokeless tobacco: Never Used   Vaping Use    Vaping Use: Never used   Substance Use Topics    Alcohol use: Not Currently     Alcohol/week: 1.0 standard drink     Types: 1 Cans of beer per week    Drug use: No     Allergies   Allergen Reactions    Clindamycin/Lincomycin     Sulfa Antibiotics      Current Outpatient Medications on File Prior to Encounter   Medication Sig Dispense Refill    apixaban (ELIQUIS) 2.5 MG TABS tablet Take 1 tablet by mouth 2 times daily (before meals) 60 tablet 2    docusate sodium (COLACE) 100 MG capsule Take 100 mg by mouth nightly      tamsulosin (FLOMAX) 0.4 MG capsule Take 0.4 mg by mouth daily      bisacodyl (DULCOLAX) 10 MG suppository Place 10 mg rectally daily as needed for Constipation      diphenhydrAMINE-zinc acetate (BENADRYL) 1-0.1 % cream Apply topically every 4 hours as needed for Itching Apply topically 3 times daily as needed.  Sodium Phosphates (FLEET) 7-19 GM/118ML Place 1 enema rectally daily as needed      magnesium hydroxide (MILK OF MAGNESIA) 400 MG/5ML suspension Take 30 mLs by mouth daily as needed for Constipation      Polyethylene Glycol 400 1 % SOLN Apply 1 drop to eye every 4 hours as needed      ferrous sulfate (IRON 325) 325 (65 Fe) MG tablet Take 325 mg by mouth daily       zinc sulfate (ZINCATE) 220 (50 Zn) MG capsule Take 1 capsule by mouth daily for 14 days 14 capsule 0    miconazole (MICOTIN) 2 % powder Apply topically 2 times daily. 45 g 1    ascorbic acid (VITAMIN C) 500 MG tablet Take 1 tablet by mouth 2 times daily 30 tablet 0    Vitamin D (CHOLECALCIFEROL) 50 MCG (2000 UT) TABS tablet Take 1 tablet by mouth daily 60 tablet 0    furosemide (LASIX) 40 MG tablet Take 20 mg by mouth daily       omeprazole (PRILOSEC) 20 MG delayed release capsule Take 20 mg by mouth daily      Multiple Vitamins-Minerals (THERAPEUTIC MULTIVITAMIN-MINERALS) tablet Take 1 tablet by mouth daily      Multiple Vitamins-Minerals (PRESERVISION AREDS PO) Take 1 tablet by mouth 2 times daily (with meals)      acetaminophen (TYLENOL) 325 MG tablet Take 650 mg by mouth every 4 hours as needed for Pain or Fever       calcium carbonate (TUMS) 500 MG chewable tablet Take 1 tablet by mouth every 4 hours as needed for Heartburn      ipratropium-albuterol (DUONEB) 0.5-2.5 (3) MG/3ML SOLN nebulizer solution Inhale 3 mLs into the lungs every 4 hours (while awake) for 30 doses 90 mL 0    gabapentin (NEURONTIN) 100 MG capsule Take 200 mg by mouth Daily with supper.        atorvastatin (LIPITOR) 80 MG tablet Take 80 mg by mouth at bedtime       INSULIN LISP PROT & LISP, HUM, (75-25) 100 UNIT/ML SUSP Inject 72 Units into the skin daily (with breakfast)       INSULIN LISP PROT & LISP, HUM, (75-25) 100 UNIT/ML SUSP Inject 55 Units into the skin Daily with supper       clotrimazole-betamethasone (LOTRISONE) cream Apply  topically as needed. Apply topically 2 times daily. No current facility-administered medications on file prior to encounter.        REVIEW OF SYSTEMS   ROS : All others Negative if blank [], Positive if [x]  General Vascular   [] Fevers [] Claudication   [] Chills [] Rest Pain   Skin Neurologic   [x] Tissue Loss [x] Lower extremity neuropathy     Objective:    /70   Pulse 89   Temp 97 °F (36.1 °C) (Tympanic)   Resp 18   Ht 6' (1.829 m)   Wt 266 lb (120.7 kg)   BMI 36.08 kg/m²   Wt Readings from Last 3 Encounters:   04/27/22 266 lb (120.7 kg)   04/13/22 266 lb (120.7 kg)   03/16/22 266 lb 12.1 oz (121 kg)       PHYSICAL EXAM   CONSTITUTIONAL:   Awake, alert, cooperative   PSYCHIATRIC :  Oriented to time, place and person      normal insight to disease process  EXTREMITIES:   R LE Open wounds are noted   Skin color is abnormal with ulcers   Edema is  noted   Sensation deficit noted - protective   Palpation of the foot does cause pain   4/5 strength DF/PF  L LE Open wounds are noted   Skin color is abnormal with ulcers   Edema is  noted   Sensation deficit noted - protective   Palpation of the foot does cause pain   4/5 strength DF/PF  R dorsalis pedis 1 L dorsalis pedis 1   R posterior tibial 1 L posterior tibial 1     Assessment:     Problem List Items Addressed This Visit     Diabetic ulcer of right heel associated with type 2 diabetes mellitus, with fat layer exposed ([facility] - Primary    Relevant Orders    Initiate Outpatient Wound Care Protocol    Diabetic ulcer of left heel [facility])    Relevant Orders    Initiate Outpatient Wound Care Protocol Pre Debridement Measurements:  Are located in the [facility]  Documentation Flow Sheet  Post Debridement Measurements:  Wound/Ulcer Descriptions are Pre Debridement except measurements:     Wound 01/05/22 Heel Left #1 (Active)   Wound Image   01/05/22 0902   Wound Etiology Diabetic Campbell 2 01/05/22 0943   Dressing Status New dressing applied 04/13/22 1026   Wound Cleansed Cleansed with saline 04/13/22 1026   Dressing/Treatment Dry dressing; Other (comment) 04/13/22 1026   Offloading for Diabetic Foot Ulcers Diabetic shoes/inserts 04/13/22 1026   Wound Length (cm) 3.8 cm 04/27/22 0925   Wound Width (cm) 2.6 cm 04/27/22 0925   Wound Depth (cm) 0.2 cm 04/27/22 0925   Wound Surface Area (cm^2) 9.88 cm^2 04/27/22 0925   Change in Wound Size % (l*w) 28.41 04/27/22 0925   Wound Volume (cm^3) 1.976 cm^3 04/27/22 0925   Wound Healing % 28 04/27/22 0925   Post-Procedure Length (cm) 3.9 cm 04/27/22 1010   Post-Procedure Width (cm) 2.7 cm 04/27/22 1010   Post-Procedure Depth (cm) 0.2 cm 04/27/22 1010   Post-Procedure Surface Area (cm^2) 10.53 cm^2 04/27/22 1010   Post-Procedure Volume (cm^3) 2. 106 cm^3 04/27/22 1010   Wound Assessment Fibrin;Pink/red 04/27/22 0925   Drainage Amount Small 04/27/22 0925   Drainage Description Yellow 04/27/22 0925   Odor None 04/27/22 0925   Rashmi-wound Assessment Maceration;Dry/flaky 04/27/22 0925   Wound Thickness Description not for Pressure Injury Full thickness 01/19/22 0909   Number of days: 112       Wound 01/05/22 Heel Right #2 (Active)   Wound Image   01/05/22 0902   Wound Etiology Venous 01/05/22 0943   Dressing Status New dressing applied 04/13/22 1026   Wound Cleansed Cleansed with saline 04/13/22 1026   Dressing/Treatment ABD;Dry dressing; Other (comment) 04/13/22 1026   Offloading for Diabetic Foot Ulcers Diabetic shoes/inserts 04/13/22 1026   Wound Length (cm) 2.7 cm 04/27/22 0925   Wound Width (cm) 1.5 cm 04/27/22 0925   Wound Depth (cm) 0.1 cm 04/27/22 0925   Wound Surface Area (cm^2) 4.05 cm^2 04/27/22 0925   Change in Wound Size % (l*w) 4.71 04/27/22 0925   Wound Volume (cm^3) 0.405 cm^3 04/27/22 0925   Wound Healing % 52 04/27/22 0925   Post-Procedure Length (cm) 2.8 cm 04/27/22 1010   Post-Procedure Width (cm) 1.5 cm 04/27/22 1010   Post-Procedure Depth (cm) 0.2 cm 04/27/22 1010   Post-Procedure Surface Area (cm^2) 4.2 cm^2 04/27/22 1010   Post-Procedure Volume (cm^3) 0.84 cm^3 04/27/22 1010   Wound Assessment Fibrin;Slough 04/27/22 0925   Drainage Amount Small 04/27/22 0925   Drainage Description Yellow 04/27/22 0925   Odor None 04/27/22 0925   Rashmi-wound Assessment Intact;Dry/flaky 04/27/22 0925   Wound Thickness Description not for Pressure Injury Full thickness 01/19/22 0909   Number of days: 112       Wound 03/14/22 Coccyx (Active)   Number of days: 43       Wound 03/23/22 Toe (Comment  which one) Left #10 Left Great Toe (Active)   Wound Image   03/23/22 0818   Dressing Status New dressing applied 04/13/22 1026   Wound Cleansed Betadine/povidone iodine 04/13/22 1026   Dressing/Treatment Collagen with Ag;Dry dressing 04/13/22 1026   Offloading for Diabetic Foot Ulcers Forefoot offloading shoe 04/13/22 1026   Wound Length (cm) 0.7 cm 04/27/22 0925   Wound Width (cm) 0.7 cm 04/27/22 0925   Wound Depth (cm) 0.1 cm 04/27/22 0925   Wound Surface Area (cm^2) 0.49 cm^2 04/27/22 0925   Change in Wound Size % (l*w) 95.92 04/27/22 0925   Wound Volume (cm^3) 0.049 cm^3 04/27/22 0925   Wound Healing % 96 04/27/22 0925   Post-Procedure Length (cm) 0.8 cm 04/27/22 1009   Post-Procedure Width (cm) 0.8 cm 04/27/22 1009   Post-Procedure Depth (cm) 0.2 cm 04/27/22 1009   Post-Procedure Surface Area (cm^2) 0.64 cm^2 04/27/22 1009   Post-Procedure Volume (cm^3) 0.128 cm^3 04/27/22 1009   Wound Assessment Pink/red 04/27/22 0925   Drainage Amount Small 04/27/22 0925   Drainage Description Yellow 04/27/22 0925   Odor None 04/27/22 0925   Rashmi-wound Assessment Dry/flaky 04/27/22 0925   Number of days: 35       Wound 03/23/22 Foot Left;Dorsal #11 (Active)   Wound Image   03/23/22 0818   Dressing Status New dressing applied 04/13/22 1026   Wound Cleansed Betadine/povidone iodine 04/13/22 1026   Dressing/Treatment Collagen with Ag 04/13/22 1026   Offloading for Diabetic Foot Ulcers Back offloading shoe 04/13/22 1026   Wound Length (cm) 0 cm 04/27/22 0925   Wound Width (cm) 0 cm 04/27/22 0925   Wound Depth (cm) 0 cm 04/27/22 0925   Wound Surface Area (cm^2) 0 cm^2 04/27/22 0925   Change in Wound Size % (l*w) 100 04/27/22 0925   Wound Volume (cm^3) 0 cm^3 04/27/22 0925   Wound Healing % 100 04/27/22 0925   Post-Procedure Length (cm) 0.6 cm 04/06/22 0933   Post-Procedure Width (cm) 0.6 cm 04/06/22 0933   Post-Procedure Depth (cm) 0.2 cm 04/06/22 0933   Post-Procedure Surface Area (cm^2) 0.36 cm^2 04/06/22 0933   Post-Procedure Volume (cm^3) 0.072 cm^3 04/06/22 0933   Wound Assessment Dry 04/13/22 0934   Drainage Amount None 04/13/22 0934   Drainage Description Serous 03/30/22 0859   Odor None 04/13/22 0934   Rashmi-wound Assessment Edematous;Dry/flaky 04/13/22 0934   Number of days: 35       Wound 04/13/22 Pretibial Distal;Left;Lateral #14 (Active)   Wound Image   04/13/22 0949   Dressing Status New dressing applied 04/13/22 1026   Wound Cleansed Cleansed with saline 04/13/22 1026   Dressing/Treatment Steri-strips; Hydrating gel;Dry dressing 04/13/22 1026   Wound Length (cm) 0.1 cm 04/27/22 0925   Wound Width (cm) 0.1 cm 04/27/22 0925   Wound Depth (cm) 0.1 cm 04/27/22 0925   Wound Surface Area (cm^2) 0.01 cm^2 04/27/22 0925   Change in Wound Size % (l*w) 99.9 04/27/22 0925   Wound Volume (cm^3) 0.001 cm^3 04/27/22 0925   Wound Healing % 100 04/27/22 0925   Post-Procedure Length (cm) 3.2 cm 04/13/22 0959   Post-Procedure Width (cm) 3.3 cm 04/13/22 0959   Post-Procedure Depth (cm) 0.2 cm 04/13/22 0959   Post-Procedure Surface Area (cm^2) 10.56 cm^2 04/13/22 0959   Post-Procedure Volume (cm^3) 2.112 cm^3 04/13/22 3959 Wound Assessment Pink/red 04/27/22 0925   Drainage Amount None 04/27/22 0925   Drainage Description Sanguinous 04/13/22 0949   Odor None 04/27/22 0925   Rasmhi-wound Assessment Dry/flaky 04/27/22 0925   Number of days: 14       Wound 04/27/22 Tibial Left;Posterior #15 (Active)   Wound Length (cm) 3.9 cm 04/27/22 0946   Wound Width (cm) 3.6 cm 04/27/22 0946   Wound Depth (cm) 0.5 cm 04/27/22 0946   Wound Surface Area (cm^2) 14.04 cm^2 04/27/22 0946   Wound Volume (cm^3) 7.02 cm^3 04/27/22 0946   Post-Procedure Length (cm) 4 cm 04/27/22 1009   Post-Procedure Width (cm) 3.8 cm 04/27/22 1009   Post-Procedure Depth (cm) 0.7 cm 04/27/22 1009   Post-Procedure Surface Area (cm^2) 15.2 cm^2 04/27/22 1009   Post-Procedure Volume (cm^3) 10.64 cm^3 04/27/22 1009   Wound Assessment Fibrin;Pink/red 04/27/22 0946   Drainage Amount Moderate 04/27/22 0946   Drainage Description Serosanguinous 04/27/22 0946   Odor None 04/27/22 0946   Rashmi-wound Assessment Intact 04/27/22 0946   Number of days: 0          Procedure Note  Indications:  Based on my examination of this patient's wound(s)/ulcer(s) today, debridement is required to promote healing and evaluate the wound base. Performed by: Chava Soler DPM    Consent obtained:  Yes    Time out taken:  Yes    Pain Control: Anesthetic  Anesthetic: 4% Lidocaine Liquid Topical     Debridement:Excisional Debridement    Using #15 blade scalpel the wound(s)/ulcer(s) was/were sharply debrided down through and including the removal of subcutaneous tissue.         Devitalized Tissue Debrided:  fibrin, biofilm, slough and necrotic/eschar to stimulate bleeding to promote healing, post debridement good bleeding base and wound edges noted    Wound/Ulcer #: 2, 4, 10    Percent of Wound/Ulcer Debrided: 100%    Total Surface Area Debrided:  15 sq cm     Estimated Blood Loss:  Minimal  Hemostasis Achieved:  by pressure    Procedural Pain:  4  / 10   Post Procedural Pain:  5 / 10     Response to treatment:  Well tolerated by patient. A culture was done. Plan:     Pt is not a smoker   - Discussed relationship of smoking and negative affects on wound healing   - Emphasized importance of tobacco avoidace/cessation   - Script for nicotine patch given to patient   - Information regarding support groups for smoking cessation given    In my professional opinion and based off the information that is available at this time this patient has appropriate indication for HBO Therapy: Maybe    Treatment Note please see attached Discharge Instructions    Written patient dismissal instructions given to patient and signed by patient or POA. Discharge Instructions         Visit Discharge/Physician Orders    Discharge condition: Stable     Assessment of pain at discharge:  minimal     Anesthetic used: 4% lidocaine solution     Discharge to: Home     Left via:Private automobile     Accompanied by: accompanied by SELF     ECF/[facility]:  [facility].      Dressing Orders:  Clean bilateral heel ulcers with NSS, apply hydrofera blue and dry dressing change daily   Apply betadine daily between and on toes of left foot. Apply lorin to left great toe and foot wounds and cover with 4 x4, kerlix         TO LEFT LEG ULCER, CLEAN WITH SALINE AND APPLY SANTYL TO WOUND BED THEN  WOUND VAC AT 1255MMHG CHANGE every 3rd day. IF VAC MALFUNCTIONS APPLY WET TO DRY DRESSING UNTIL VAC CAN BE REPLACED        APPLY PREVALON BOOT TO BILATERAL HEELS     Physical therapy for ambulating safely while wearing offloading boots as indicated.     Treatment Orders:      Wound measurements:  Wound 01/05/22 Heel Left #1 (Active)   Wound Image   01/05/22 0902   Wound Etiology Diabetic Campbell 2 01/05/22 0943   Dressing Status New dressing applied 04/13/22 1026   Wound Cleansed Cleansed with saline 04/13/22 1026   Dressing/Treatment Dry dressing; Other (comment) 04/13/22 1026   Offloading for Diabetic Foot Ulcers Diabetic shoes/inserts 04/13/22 1026   Wound Length (cm) 3.8 cm 04/27/22 0925   Wound Width (cm) 2.6 cm 04/27/22 0925   Wound Depth (cm) 0.2 cm 04/27/22 0925   Wound Surface Area (cm^2) 9.88 cm^2 04/27/22 0925   Change in Wound Size % (l*w) 28.41 04/27/22 0925   Wound Volume (cm^3) 1.976 cm^3 04/27/22 0925   Wound Healing % 28 04/27/22 0925   Post-Procedure Length (cm) 3.9 cm 04/27/22 1010   Post-Procedure Width (cm) 2.7 cm 04/27/22 1010   Post-Procedure Depth (cm) 0.2 cm 04/27/22 1010   Post-Procedure Surface Area (cm^2) 10.53 cm^2 04/27/22 1010   Post-Procedure Volume (cm^3) 2. 106 cm^3 04/27/22 1010   Wound Assessment Fibrin;Pink/red 04/27/22 0925   Drainage Amount Small 04/27/22 0925   Drainage Description Yellow 04/27/22 0925   Odor None 04/27/22 0925   Rashmi-wound Assessment Maceration;Dry/flaky 04/27/22 0925   Wound Thickness Description not for Pressure Injury Full thickness 01/19/22 0909   Number of days: 112       Wound 01/05/22 Heel Right #2 (Active)   Wound Image   01/05/22 0902   Wound Etiology Venous 01/05/22 0943   Dressing Status New dressing applied 04/13/22 1026   Wound Cleansed Cleansed with saline 04/13/22 1026   Dressing/Treatment ABD;Dry dressing; Other (comment) 04/13/22 1026   Offloading for Diabetic Foot Ulcers Diabetic shoes/inserts 04/13/22 1026   Wound Length (cm) 2.7 cm 04/27/22 0925   Wound Width (cm) 1.5 cm 04/27/22 0925   Wound Depth (cm) 0.1 cm 04/27/22 0925   Wound Surface Area (cm^2) 4.05 cm^2 04/27/22 0925   Change in Wound Size % (l*w) 4.71 04/27/22 0925   Wound Volume (cm^3) 0.405 cm^3 04/27/22 0925   Wound Healing % 52 04/27/22 0925   Post-Procedure Length (cm) 2.8 cm 04/27/22 1010   Post-Procedure Width (cm) 1.5 cm 04/27/22 1010   Post-Procedure Depth (cm) 0.2 cm 04/27/22 1010   Post-Procedure Surface Area (cm^2) 4.2 cm^2 04/27/22 1010   Post-Procedure Volume (cm^3) 0.84 cm^3 04/27/22 1010   Wound Assessment Fibrin;Slough 04/27/22 0925   Drainage Amount Small 04/27/22 0925   Drainage Description Yellow 04/27/22 0925   Odor None 04/27/22 0925   Rashmi-wound Assessment Intact;Dry/flaky 04/27/22 0925   Wound Thickness Description not for Pressure Injury Full thickness 01/19/22 0909   Number of days: 112       Wound 03/14/22 Coccyx (Active)   Number of days: 43       Wound 03/23/22 Toe (Comment  which one) Left #10 Left Great Toe (Active)   Wound Image   03/23/22 0818   Dressing Status New dressing applied 04/13/22 1026   Wound Cleansed Betadine/povidone iodine 04/13/22 1026   Dressing/Treatment Collagen with Ag;Dry dressing 04/13/22 1026   Offloading for Diabetic Foot Ulcers Forefoot offloading shoe 04/13/22 1026   Wound Length (cm) 0.7 cm 04/27/22 0925   Wound Width (cm) 0.7 cm 04/27/22 0925   Wound Depth (cm) 0.1 cm 04/27/22 0925   Wound Surface Area (cm^2) 0.49 cm^2 04/27/22 0925   Change in Wound Size % (l*w) 95.92 04/27/22 0925   Wound Volume (cm^3) 0.049 cm^3 04/27/22 0925   Wound Healing % 96 04/27/22 0925   Post-Procedure Length (cm) 0.8 cm 04/27/22 1009   Post-Procedure Width (cm) 0.8 cm 04/27/22 1009   Post-Procedure Depth (cm) 0.2 cm 04/27/22 1009   Post-Procedure Surface Area (cm^2) 0.64 cm^2 04/27/22 1009   Post-Procedure Volume (cm^3) 0.128 cm^3 04/27/22 1009   Wound Assessment Pink/red 04/27/22 0925   Drainage Amount Small 04/27/22 0925   Drainage Description Yellow 04/27/22 0925   Odor None 04/27/22 0925   Rashmi-wound Assessment Dry/flaky 04/27/22 0925   Number of days: 35       Wound 03/23/22 Foot Left;Dorsal #11 (Active)   Wound Image   03/23/22 0818   Dressing Status New dressing applied 04/13/22 1026   Wound Cleansed Betadine/povidone iodine 04/13/22 1026   Dressing/Treatment Collagen with Ag 04/13/22 1026   Offloading for Diabetic Foot Ulcers Back offloading shoe 04/13/22 1026   Wound Length (cm) 0 cm 04/27/22 0925   Wound Width (cm) 0 cm 04/27/22 0925   Wound Depth (cm) 0 cm 04/27/22 0925   Wound Surface Area (cm^2) 0 cm^2 04/27/22 0925   Change in Wound Size % (l*w) 100 04/27/22 0925   Wound Volume (cm^3) 0 cm^3 04/27/22 0925   Wound Healing % 100 04/27/22 0925   Post-Procedure Length (cm) 0.6 cm 04/06/22 0933   Post-Procedure Width (cm) 0.6 cm 04/06/22 0933   Post-Procedure Depth (cm) 0.2 cm 04/06/22 0933   Post-Procedure Surface Area (cm^2) 0.36 cm^2 04/06/22 0933   Post-Procedure Volume (cm^3) 0.072 cm^3 04/06/22 0933   Wound Assessment Dry 04/13/22 0934   Drainage Amount None 04/13/22 0934   Drainage Description Serous 03/30/22 0859   Odor None 04/13/22 0934   Rashmi-wound Assessment Edematous;Dry/flaky 04/13/22 0934   Number of days: 35       Wound 04/13/22 Pretibial Distal;Left;Lateral #14 (Active)   Wound Image   04/13/22 0949   Dressing Status New dressing applied 04/13/22 1026   Wound Cleansed Cleansed with saline 04/13/22 1026   Dressing/Treatment Steri-strips; Hydrating gel;Dry dressing 04/13/22 1026   Wound Length (cm) 0.1 cm 04/27/22 0925   Wound Width (cm) 0.1 cm 04/27/22 0925   Wound Depth (cm) 0.1 cm 04/27/22 0925   Wound Surface Area (cm^2) 0.01 cm^2 04/27/22 0925   Change in Wound Size % (l*w) 99.9 04/27/22 0925   Wound Volume (cm^3) 0.001 cm^3 04/27/22 0925   Wound Healing % 100 04/27/22 0925   Post-Procedure Length (cm) 3.2 cm 04/13/22 0959   Post-Procedure Width (cm) 3.3 cm 04/13/22 0959   Post-Procedure Depth (cm) 0.2 cm 04/13/22 0959   Post-Procedure Surface Area (cm^2) 10.56 cm^2 04/13/22 0959   Post-Procedure Volume (cm^3) 2.112 cm^3 04/13/22 0959   Wound Assessment Pink/red 04/27/22 0925   Drainage Amount None 04/27/22 0925   Drainage Description Sanguinous 04/13/22 0949   Odor None 04/27/22 0925   Rashmi-wound Assessment Dry/flaky 04/27/22 0925   Number of days: 14       Wound 04/27/22 Tibial Left;Posterior #15 (Active)   Wound Length (cm) 3.9 cm 04/27/22 0946   Wound Width (cm) 3.6 cm 04/27/22 0946   Wound Depth (cm) 0.5 cm 04/27/22 0946   Wound Surface Area (cm^2) 14.04 cm^2 04/27/22 0946   Wound Volume (cm^3) 7.02 cm^3 04/27/22 0946   Post-Procedure Length (cm) 4 cm 04/27/22 1009   Post-Procedure Width (cm) 3.8 cm 04/27/22 1009   Post-Procedure Depth (cm) 0.7 cm 04/27/22 1009   Post-Procedure Surface Area (cm^2) 15.2 cm^2 04/27/22 1009   Post-Procedure Volume (cm^3) 10.64 cm^3 04/27/22 1009   Wound Assessment Fibrin;Pink/red 04/27/22 0946   Drainage Amount Moderate 04/27/22 0946   Drainage Description Serosanguinous 04/27/22 0946   Odor None 04/27/22 0946   Rashmi-wound Assessment Intact 04/27/22 0946   Number of days: 0                    [place] [facility] followup visit ________1 weeks with Dr. Gastelum_____________________  (Please note your next appointment above and if you are unable to keep, kindly give a 24 hour notice. Thank you.)    Physician signature:__________________________      If you experience any of the following, please call the [facility] during business hours:    * Increase in Pain  * Temperature over 101  * Increase in drainage from your wound  * Drainage with a foul odor  * Bleeding  * Increase in swelling  * Need for compression bandage changes due to slippage, breakthrough drainage. If you need medical attention outside of the business hours of the [facility] please contact your PCP or go to the nearest emergency room.         Electronically signed by Amaris Shepherd DPM on 4/27/2022 at 10:38 AM

## 2022-05-04 ENCOUNTER — HOSPITAL ENCOUNTER (OUTPATIENT)
Dept: WOUND CARE | Age: 87
Discharge: HOME | End: 2022-05-04
Payer: MEDICARE

## 2022-05-04 VITALS
WEIGHT: 237 LBS | RESPIRATION RATE: 18 BRPM | DIASTOLIC BLOOD PRESSURE: 60 MMHG | HEIGHT: 72 IN | SYSTOLIC BLOOD PRESSURE: 118 MMHG | TEMPERATURE: 97.3 F | BODY MASS INDEX: 32.1 KG/M2 | HEART RATE: 88 BPM

## 2022-05-04 DIAGNOSIS — E11.621 DIABETIC ULCER OF RIGHT HEEL ASSOCIATED WITH TYPE 2 DIABETES MELLITUS, WITH FAT LAYER EXPOSED (HCC): Primary | ICD-10-CM

## 2022-05-04 DIAGNOSIS — L97.421 DIABETIC ULCER OF LEFT HEEL ASSOCIATED WITH DIABETES MELLITUS DUE TO UNDERLYING CONDITION, LIMITED TO BREAKDOWN OF SKIN (HCC): ICD-10-CM

## 2022-05-04 DIAGNOSIS — L97.412 DIABETIC ULCER OF RIGHT HEEL ASSOCIATED WITH TYPE 2 DIABETES MELLITUS, WITH FAT LAYER EXPOSED (HCC): Primary | ICD-10-CM

## 2022-05-04 DIAGNOSIS — E08.621 DIABETIC ULCER OF LEFT HEEL ASSOCIATED WITH DIABETES MELLITUS DUE TO UNDERLYING CONDITION, LIMITED TO BREAKDOWN OF SKIN (HCC): ICD-10-CM

## 2022-05-04 PROCEDURE — 11042 DBRDMT SUBQ TIS 1ST 20SQCM/<: CPT

## 2022-05-04 PROCEDURE — 11045 DBRDMT SUBQ TISS EACH ADDL: CPT

## 2022-05-04 RX ORDER — GENTAMICIN SULFATE 1 MG/G
OINTMENT TOPICAL ONCE
Status: CANCELLED | OUTPATIENT
Start: 2022-05-04 | End: 2022-05-04

## 2022-05-04 RX ORDER — LIDOCAINE HYDROCHLORIDE 20 MG/ML
JELLY TOPICAL ONCE
Status: CANCELLED | OUTPATIENT
Start: 2022-05-04 | End: 2022-05-04

## 2022-05-04 RX ORDER — BETAMETHASONE DIPROPIONATE 0.05 %
OINTMENT (GRAM) TOPICAL ONCE
Status: CANCELLED | OUTPATIENT
Start: 2022-05-04 | End: 2022-05-04

## 2022-05-04 RX ORDER — BACITRACIN, NEOMYCIN, POLYMYXIN B 400; 3.5; 5 [USP'U]/G; MG/G; [USP'U]/G
OINTMENT TOPICAL ONCE
Status: CANCELLED | OUTPATIENT
Start: 2022-05-04 | End: 2022-05-04

## 2022-05-04 RX ORDER — BACITRACIN ZINC AND POLYMYXIN B SULFATE 500; 1000 [USP'U]/G; [USP'U]/G
OINTMENT TOPICAL ONCE
Status: CANCELLED | OUTPATIENT
Start: 2022-05-04 | End: 2022-05-04

## 2022-05-04 RX ORDER — LIDOCAINE HYDROCHLORIDE 40 MG/ML
SOLUTION TOPICAL ONCE
Status: DISCONTINUED | OUTPATIENT
Start: 2022-05-04 | End: 2022-05-05 | Stop reason: HOSPADM

## 2022-05-04 RX ORDER — LIDOCAINE HYDROCHLORIDE 40 MG/ML
SOLUTION TOPICAL ONCE
Status: CANCELLED | OUTPATIENT
Start: 2022-05-04 | End: 2022-05-04

## 2022-05-04 RX ORDER — LIDOCAINE 50 MG/G
OINTMENT TOPICAL ONCE
Status: CANCELLED | OUTPATIENT
Start: 2022-05-04 | End: 2022-05-04

## 2022-05-04 RX ORDER — GINSENG 100 MG
CAPSULE ORAL ONCE
Status: CANCELLED | OUTPATIENT
Start: 2022-05-04 | End: 2022-05-04

## 2022-05-04 RX ORDER — LIDOCAINE 40 MG/G
CREAM TOPICAL ONCE
Status: CANCELLED | OUTPATIENT
Start: 2022-05-04 | End: 2022-05-04

## 2022-05-04 RX ORDER — CLOBETASOL PROPIONATE 0.5 MG/G
OINTMENT TOPICAL ONCE
Status: CANCELLED | OUTPATIENT
Start: 2022-05-04 | End: 2022-05-04

## 2022-05-04 NOTE — PLAN OF CARE
Problem: Chronic Conditions and Co-morbidities  Goal: Patient's chronic conditions and co-morbidity symptoms are monitored and maintained or improved  Outcome: Progressing     Problem: Wound:  Goal: Will show signs of wound healing; wound closure and no evidence of infection  Description: Will show signs of wound healing; wound closure and no evidence of infection  Outcome: Progressing     Problem: Venous:  Goal: Signs of wound healing will improve  Description: Signs of wound healing will improve  Outcome: Progressing     Problem: Blood Glucose:  Goal: Ability to maintain appropriate glucose levels will improve  Description: Ability to maintain appropriate glucose levels will improve  Outcome: Progressing

## 2022-05-04 NOTE — PROGRESS NOTES
[facility]  History and Physical/Consultation  Podiatry    Referring Physician : Amandeep Jensen MD  Rusty Kevin. MEDICAL RECORD NUMBER:  [id number]  AGE: 80 y.o. GENDER: male  : 1932  EPISODE DATE:  2022  Subjective:     Chief Complaint   Patient presents with    Wound Check     left leg, right heel         HISTORY of PRESENT ILLNESS HPI     Rusty Cortez is a 80 y.o. male who presents today for wound/ulcer evaluation. History of Wound Context:  The patient has had a wound of diabetic b/l heels,and right ankle which was first noted approximately months ago. This has been treated betadyne. On their initial visit to the wound healing center, 22 ,  the patient has noted that the wound has been improving. The patient has had similar previous wounds in the past.      Pt is not on abx at time of initial visit.       Wound/Ulcer Pain Timing/Severity: constant  Quality of pain: sharp  Severity:  3 / 10   Modifying Factors: Pain worsens with walking  Associated Signs/Symptoms: edema, erythema and drainage    Ulcer Identification:  Ulcer Type: diabetic  Contributing Factors: edema and diabetes    Diabetic/Pressure/Non Pressure Ulcers onl y:  Ulcer: Diabetic ulcer, fat layer exposed    If patient has diabetic lower extremity wounds  Campbell Classification of diabetic lower extremity wounds:    Grade Description   []  0 No open wound   []  1 Superficial ulcer involving the full skin thickness   [x]  2 Deep ulcer involves ligament, tendon, joint capsule, or fascia  No bone involvement or abscess presence   []  3 Deep Ulcer with abcess formation and/or osteomyelitis   []  4 Localized gangrene   []  5 Extensive gangrene of the foot     Wound: N/A          22  Debrided, cont tx and care     22  Debrided, cont tx and care, await graft approval    22  Debrided, cont tx and care, puraply applied    22  Debrided, dermagraft applied    22  Debrided, dermagraft applied    2-23-22  Debrided, graft applied    3-2-22  Debrided, graft applied    3-9-22  Debrided, graft applied     3-23-22  Just release from hospital care, new wounds noted from hospital, also trauma to left toe loosened nail that was removed without incident, debrided all wounds, culture taken    3-30-22  Debrided, santyl to left leg, applied dermagraft b/l heels, prevelon boots wound vac left leg    4-6-22  Debrided, dermagraft b/l, wound care    4-13-22  Debrided, wound vac, dermagraft left    4-27-22  Debrided, cont tx and care     5-4-22  Debrided, cont tx and care, wound vac to leg left, and hydrofera blue to all other wounds  PAST MEDICAL HISTORY      Diagnosis Date    Arthritis     Cancer ([address]     breast    Cellulitis     CHF (congestive heart failure) (HCC)     CKD (chronic kidney disease) stage 3, GFR 30-59 ml/min ([address]     Diabetes mellitus ([address]     History of lumpectomy     Hx of blood clots     Hyperlipidemia     Hypertension     Left ventricular failure ([address]     Macular degeneration     Obesity     Orthostatic hypotension     PE (pulmonary thromboembolism) ([address]     Pressure injury of both heels, unstageable ([address]     Staph aureus infection     Venous insufficiency      Past Surgical History:   Procedure Laterality Date    [facility]  9/2/2021         MASTECTOMY      TOE AMPUTATION      TOE SURGERY      TONSILLECTOMY       History reviewed. No pertinent family history.   Social History     Tobacco Use    Smoking status: Never Smoker    Smokeless tobacco: Never Used   Vaping Use    Vaping Use: Never used   Substance Use Topics    Alcohol use: Not Currently     Alcohol/week: 1.0 standard drink     Types: 1 Cans of beer per week    Drug use: No     Allergies   Allergen Reactions    Clindamycin/Lincomycin     Sulfa Antibiotics      Current Outpatient Medications on File Prior to Encounter   Medication Sig Dispense Refill    apixaban (ELIQUIS) 2.5 MG TABS tablet Take 1 tablet by mouth 2 times daily (before meals) 60 tablet 2    docusate sodium (COLACE) 100 MG capsule Take 100 mg by mouth nightly      tamsulosin (FLOMAX) 0.4 MG capsule Take 0.4 mg by mouth daily      bisacodyl (DULCOLAX) 10 MG suppository Place 10 mg rectally daily as needed for Constipation      diphenhydrAMINE-zinc acetate (BENADRYL) 1-0.1 % cream Apply topically every 4 hours as needed for Itching Apply topically 3 times daily as needed.  Sodium Phosphates (FLEET) 7-19 GM/118ML Place 1 enema rectally daily as needed      magnesium hydroxide (MILK OF MAGNESIA) 400 MG/5ML suspension Take 30 mLs by mouth daily as needed for Constipation      Polyethylene Glycol 400 1 % SOLN Apply 1 drop to eye every 4 hours as needed      ferrous sulfate (IRON 325) 325 (65 Fe) MG tablet Take 325 mg by mouth daily       zinc sulfate (ZINCATE) 220 (50 Zn) MG capsule Take 1 capsule by mouth daily for 14 days 14 capsule 0    miconazole (MICOTIN) 2 % powder Apply topically 2 times daily.  45 g 1    ascorbic acid (VITAMIN C) 500 MG tablet Take 1 tablet by mouth 2 times daily 30 tablet 0    Vitamin D (CHOLECALCIFEROL) 50 MCG (2000 UT) TABS tablet Take 1 tablet by mouth daily 60 tablet 0    furosemide (LASIX) 40 MG tablet Take 20 mg by mouth daily       omeprazole (PRILOSEC) 20 MG delayed release capsule Take 20 mg by mouth daily      Multiple Vitamins-Minerals (THERAPEUTIC MULTIVITAMIN-MINERALS) tablet Take 1 tablet by mouth daily      Multiple Vitamins-Minerals (PRESERVISION AREDS PO) Take 1 tablet by mouth 2 times daily (with meals)      acetaminophen (TYLENOL) 325 MG tablet Take 650 mg by mouth every 4 hours as needed for Pain or Fever       calcium carbonate (TUMS) 500 MG chewable tablet Take 1 tablet by mouth every 4 hours as needed for Heartburn      ipratropium-albuterol (DUONEB) 0.5-2.5 (3) MG/3ML SOLN nebulizer solution Inhale 3 mLs into the lungs every 4 hours (while awake) for 30 doses 90 mL 0    gabapentin (NEURONTIN) 100 MG capsule Take 200 mg by mouth Daily with supper.  atorvastatin (LIPITOR) 80 MG tablet Take 80 mg by mouth at bedtime       INSULIN LISP PROT & LISP, HUM, (75-25) 100 UNIT/ML SUSP Inject 72 Units into the skin daily (with breakfast)       INSULIN LISP PROT & LISP, HUM, (75-25) 100 UNIT/ML SUSP Inject 55 Units into the skin Daily with supper       clotrimazole-betamethasone (LOTRISONE) cream Apply  topically as needed. Apply topically 2 times daily. No current facility-administered medications on file prior to encounter.        REVIEW OF SYSTEMS   ROS : All others Negative if blank [], Positive if [x]  General Vascular   [] Fevers [] Claudication   [] Chills [] Rest Pain   Skin Neurologic   [x] Tissue Loss [x] Lower extremity neuropathy     Objective:    /60   Pulse 88   Temp 97.3 °F (36.3 °C)   Resp 18   Ht 6' (1.829 m)   Wt 237 lb (107.5 kg) Comment: patient stated he hasnt been 266 they weighted him monday at 237  BMI 32.14 kg/m²   Wt Readings from Last 3 Encounters:   05/04/22 237 lb (107.5 kg)   04/27/22 266 lb (120.7 kg)   04/13/22 266 lb (120.7 kg)       PHYSICAL EXAM   CONSTITUTIONAL:   Awake, alert, cooperative   PSYCHIATRIC :  Oriented to time, place and person      normal insight to disease process  EXTREMITIES:   R LE Open wounds are noted   Skin color is abnormal with ulcers   Edema is  noted   Sensation deficit noted - protective   Palpation of the foot does cause pain   4/5 strength DF/PF  L LE Open wounds are noted   Skin color is abnormal with ulcers   Edema is  noted   Sensation deficit noted - protective   Palpation of the foot does cause pain   4/5 strength DF/PF  R dorsalis pedis 1 L dorsalis pedis 1   R posterior tibial 1 L posterior tibial 1     Assessment:     Problem List Items Addressed This Visit     Diabetic ulcer of right heel associated with type 2 diabetes mellitus, with fat layer exposed ([facility] - Primary    Relevant Medications lidocaine (XYLOCAINE) 4 % external solution    Other Relevant Orders    Initiate Outpatient Wound Care Protocol    Diabetic ulcer of left heel (HCC)    Relevant Medications    lidocaine (XYLOCAINE) 4 % external solution    Other Relevant Orders    Initiate Outpatient Wound Care Protocol          Pre Debridement Measurements:  Are located in the [facility]  Documentation Flow Sheet  Post Debridement Measurements:  Wound/Ulcer Descriptions are Pre Debridement except measurements:     Wound 01/05/22 Heel Left #1 (Active)   Wound Image   05/04/22 0908   Wound Etiology Diabetic Campbell 2 01/05/22 0943   Dressing Status New dressing applied 04/27/22 1120   Wound Cleansed Cleansed with saline 04/27/22 1120   Dressing/Treatment Alginate;Dry dressing 04/27/22 1120   Offloading for Diabetic Foot Ulcers Post op shoe 04/27/22 1120   Wound Length (cm) 3.8 cm 05/04/22 0908   Wound Width (cm) 2.6 cm 05/04/22 0908   Wound Depth (cm) 0.2 cm 05/04/22 0908   Wound Surface Area (cm^2) 9.88 cm^2 05/04/22 0908   Change in Wound Size % (l*w) 28.41 05/04/22 0908   Wound Volume (cm^3) 1.976 cm^3 05/04/22 0908   Wound Healing % 28 05/04/22 0908   Post-Procedure Length (cm) 3.9 cm 05/04/22 0940   Post-Procedure Width (cm) 2.7 cm 05/04/22 0940   Post-Procedure Depth (cm) 0.3 cm 05/04/22 0940   Post-Procedure Surface Area (cm^2) 10.53 cm^2 05/04/22 0940   Post-Procedure Volume (cm^3) 3.159 cm^3 05/04/22 0940   Wound Assessment Fibrin;Pink/red 05/04/22 0908   Drainage Amount Small 05/04/22 0908   Drainage Description Yellow 05/04/22 0908   Odor None 05/04/22 0908   Rashmi-wound Assessment Maceration;Dry/flaky 05/04/22 0908   Wound Thickness Description not for Pressure Injury Full thickness 01/19/22 0909   Number of days: 118       Wound 01/05/22 Heel Right #2 (Active)   Wound Image   05/04/22 0908   Wound Etiology Venous 01/05/22 0943   Dressing Status New dressing applied 04/27/22 1120   Wound Cleansed Cleansed with saline 04/27/22 1120 Dressing/Treatment Alginate;Dry dressing 04/27/22 1120   Offloading for Diabetic Foot Ulcers Post op shoe 04/27/22 1120   Wound Length (cm) 2.5 cm 05/04/22 0908   Wound Width (cm) 1.6 cm 05/04/22 0908   Wound Depth (cm) 0.1 cm 05/04/22 0908   Wound Surface Area (cm^2) 4 cm^2 05/04/22 0908   Change in Wound Size % (l*w) 5.88 05/04/22 0908   Wound Volume (cm^3) 0.4 cm^3 05/04/22 0908   Wound Healing % 53 05/04/22 0908   Post-Procedure Length (cm) 2.8 cm 05/04/22 0940   Post-Procedure Width (cm) 1.6 cm 05/04/22 0940   Post-Procedure Depth (cm) 0.2 cm 05/04/22 0940   Post-Procedure Surface Area (cm^2) 4.48 cm^2 05/04/22 0940   Post-Procedure Volume (cm^3) 0.896 cm^3 05/04/22 0940   Wound Assessment Fibrin;Slough 05/04/22 0908   Drainage Amount Small 05/04/22 0908   Drainage Description Yellow 05/04/22 0908   Odor None 05/04/22 0908   Rashmi-wound Assessment Intact;Dry/flaky 05/04/22 0908   Wound Thickness Description not for Pressure Injury Full thickness 01/19/22 0909   Number of days: 118       Wound 03/14/22 Coccyx (Active)   Number of days: 50       Wound 03/23/22 Toe (Comment  which one) Left #10 Left Great Toe (Active)   Wound Image   05/04/22 0908   Dressing Status New dressing applied 04/27/22 1120   Wound Cleansed Betadine/povidone iodine 04/27/22 1120   Dressing/Treatment Collagen with Ag;Dry dressing 04/27/22 1120   Offloading for Diabetic Foot Ulcers Post op shoe 04/27/22 1120   Wound Length (cm) 0.6 cm 05/04/22 0908   Wound Width (cm) 0.5 cm 05/04/22 0908   Wound Depth (cm) 0.1 cm 05/04/22 0908   Wound Surface Area (cm^2) 0.3 cm^2 05/04/22 0908   Change in Wound Size % (l*w) 97.5 05/04/22 0908   Wound Volume (cm^3) 0.03 cm^3 05/04/22 0908   Wound Healing % 98 05/04/22 0908   Post-Procedure Length (cm) 0.8 cm 05/04/22 0940   Post-Procedure Width (cm) 0.6 cm 05/04/22 0940   Post-Procedure Depth (cm) 0.2 cm 05/04/22 0940   Post-Procedure Surface Area (cm^2) 0.48 cm^2 05/04/22 0940   Post-Procedure Volume (cm^3) 0.096 cm^3 05/04/22 0940   Wound Assessment Pink/red 05/04/22 0908   Drainage Amount Small 05/04/22 0908   Drainage Description Yellow 05/04/22 0908   Odor None 05/04/22 0908   Rashmi-wound Assessment Dry/flaky 05/04/22 0908   Number of days: 42       Wound 03/23/22 Foot Left;Dorsal #11 (Active)   Wound Image   05/04/22 0908   Dressing Status New dressing applied 04/13/22 1026   Wound Cleansed Betadine/povidone iodine 04/13/22 1026   Dressing/Treatment Collagen with Ag 04/13/22 1026   Offloading for Diabetic Foot Ulcers Back offloading shoe 04/13/22 1026   Wound Length (cm) 0 cm 05/04/22 0908   Wound Width (cm) 0 cm 05/04/22 0908   Wound Depth (cm) 0 cm 05/04/22 0908   Wound Surface Area (cm^2) 0 cm^2 05/04/22 0908   Change in Wound Size % (l*w) 100 05/04/22 0908   Wound Volume (cm^3) 0 cm^3 05/04/22 0908   Wound Healing % 100 05/04/22 0908   Post-Procedure Length (cm) 0.6 cm 04/06/22 0933   Post-Procedure Width (cm) 0.6 cm 04/06/22 0933   Post-Procedure Depth (cm) 0.2 cm 04/06/22 0933   Post-Procedure Surface Area (cm^2) 0.36 cm^2 04/06/22 0933   Post-Procedure Volume (cm^3) 0.072 cm^3 04/06/22 0933   Wound Assessment Dry 04/13/22 0934   Drainage Amount None 04/13/22 0934   Drainage Description Serous 03/30/22 0859   Odor None 04/13/22 0934   Rashmi-wound Assessment Edematous;Dry/flaky 04/13/22 0934   Number of days: 42       Wound 04/13/22 Pretibial Distal;Left;Lateral #14 (Active)   Wound Image   05/04/22 0908   Dressing Status New dressing applied 04/13/22 1026   Wound Cleansed Cleansed with saline 04/13/22 1026   Dressing/Treatment Steri-strips; Hydrating gel;Dry dressing 04/13/22 1026   Wound Length (cm) 1.1 cm 05/04/22 0908   Wound Width (cm) 2.3 cm 05/04/22 0908   Wound Depth (cm) 0.2 cm 05/04/22 0908   Wound Surface Area (cm^2) 2.53 cm^2 05/04/22 0908   Change in Wound Size % (l*w) 73.65 05/04/22 0908   Wound Volume (cm^3) 0.506 cm^3 05/04/22 0908   Wound Healing % 47 05/04/22 0908   Post-Procedure Length (cm) 1.1 cm 05/04/22 0940   Post-Procedure Width (cm) 2.3 cm 05/04/22 0940   Post-Procedure Depth (cm) 0.3 cm 05/04/22 0940   Post-Procedure Surface Area (cm^2) 2.53 cm^2 05/04/22 0940   Post-Procedure Volume (cm^3) 0.759 cm^3 05/04/22 0940   Wound Assessment Pink/red 04/27/22 0925   Drainage Amount None 04/27/22 0925   Drainage Description Sanguinous 04/13/22 0949   Odor None 04/27/22 0925   Rashmi-wound Assessment Dry/flaky 04/27/22 0925   Number of days: 20       Wound 04/27/22 Tibial Left;Posterior #15 (Active)   Wound Image   05/04/22 0908   Dressing Status New dressing applied 04/27/22 1120   Wound Cleansed Cleansed with saline 04/27/22 1120   Dressing/Treatment Moist to dry;ABD;Dry dressing 04/27/22 1120   Wound Length (cm) 3.8 cm 05/04/22 0908   Wound Width (cm) 3.7 cm 05/04/22 0908   Wound Depth (cm) 0.8 cm 05/04/22 0908   Wound Surface Area (cm^2) 14.06 cm^2 05/04/22 0908   Change in Wound Size % (l*w) -0.14 05/04/22 0908   Wound Volume (cm^3) 11.248 cm^3 05/04/22 0908   Wound Healing % -60 05/04/22 0908   Post-Procedure Length (cm) 3.8 cm 05/04/22 0940   Post-Procedure Width (cm) 3.7 cm 05/04/22 0940   Post-Procedure Depth (cm) 0.8 cm 05/04/22 0940   Post-Procedure Surface Area (cm^2) 14.06 cm^2 05/04/22 0940   Post-Procedure Volume (cm^3) 11.248 cm^3 05/04/22 0940   Wound Assessment Fibrin;Pink/red 05/04/22 0908   Drainage Amount Moderate 05/04/22 0908   Drainage Description Serosanguinous 05/04/22 0908   Odor None 05/04/22 0908   Rashmi-wound Assessment Fragile; Excoriated; Maceration 05/04/22 0908   Number of days: 7          Procedure Note  Indications:  Based on my examination of this patient's wound(s)/ulcer(s) today, debridement is required to promote healing and evaluate the wound base.     Performed by: Hugh William DPM    Consent obtained:  Yes    Time out taken:  Yes    Pain Control: Anesthetic  Anesthetic: 4% Lidocaine Liquid Topical     Debridement:Excisional Debridement    Using #15 blade scalpel the wound(s)/ulcer(s) was/were sharply debrided down through and including the removal of subcutaneous tissue. Devitalized Tissue Debrided:  fibrin, biofilm, slough and necrotic/eschar to stimulate bleeding to promote healing, post debridement good bleeding base and wound edges noted    Wound/Ulcer #: 2, 4, 10    Percent of Wound/Ulcer Debrided: 100%    Total Surface Area Debrided:  30 sq cm     Estimated Blood Loss:  Minimal  Hemostasis Achieved:  by pressure    Procedural Pain:  4  / 10   Post Procedural Pain:  5 / 10     Response to treatment:  Well tolerated by patient. A culture was done. Plan:     Pt is not a smoker   - Discussed relationship of smoking and negative affects on wound healing   - Emphasized importance of tobacco avoidace/cessation   - Script for nicotine patch given to patient   - Information regarding support groups for smoking cessation given    In my professional opinion and based off the information that is available at this time this patient has appropriate indication for HBO Therapy: Maybe    Treatment Note please see attached Discharge Instructions    Written patient dismissal instructions given to patient and signed by patient or POA. Discharge Instructions         Visit Discharge/Physician Orders     Discharge condition: Stable     Assessment of pain at discharge:  minimal     Anesthetic used: 4% lidocaine solution     Discharge to: Home     Left via:Private automobile     Accompanied by: accompanied by SELF     ECF/[facility]:  [facility].      Dressing Orders: Delia Soto bilateral heel/toe ulcers with NSS, apply hydrofera blue and dry dressing change daily    TO LEFT LEG ULCER, CLEAN WITH SALINE AND APPLY SANTYL TO WOUND BED  MAY APPLY ALGINATE TO LIGIA WOUND IF MACERATEDTHEN  WOUND VAC AT [facility]255MMHG CHANGE Monday Wednesday friday.  IF VAC MALFUNCTIONS APPLY WET TO DRY DRESSING UNTIL VAC CAN BE REPLACED        APPLY PREVALON BOOT TO BILATERAL HEELS     Physical therapy for ambulating safely while wearing offloading boots as indicated.          Wound measurements:    Wound 01/05/22 Heel Left #1 (Active)   Wound Image   05/04/22 0908   Wound Etiology Diabetic Campbell 2 01/05/22 0943   Dressing Status New dressing applied 04/27/22 1120   Wound Cleansed Cleansed with saline 04/27/22 1120   Dressing/Treatment Alginate;Dry dressing 04/27/22 1120   Offloading for Diabetic Foot Ulcers Post op shoe 04/27/22 1120   Wound Length (cm) 3.8 cm 05/04/22 0908   Wound Width (cm) 2.6 cm 05/04/22 0908   Wound Depth (cm) 0.2 cm 05/04/22 0908   Wound Surface Area (cm^2) 9.88 cm^2 05/04/22 0908   Change in Wound Size % (l*w) 28.41 05/04/22 0908   Wound Volume (cm^3) 1.976 cm^3 05/04/22 0908   Wound Healing % 28 05/04/22 0908   Post-Procedure Length (cm) 3.9 cm 04/27/22 1010   Post-Procedure Width (cm) 2.7 cm 04/27/22 1010   Post-Procedure Depth (cm) 0.2 cm 04/27/22 1010   Post-Procedure Surface Area (cm^2) 10.53 cm^2 04/27/22 1010   Post-Procedure Volume (cm^3) 2. 106 cm^3 04/27/22 1010   Wound Assessment Fibrin;Pink/red 05/04/22 0908   Drainage Amount Small 05/04/22 0908   Drainage Description Yellow 05/04/22 0908   Odor None 05/04/22 0908   Rashmi-wound Assessment Maceration;Dry/flaky 05/04/22 0908   Wound Thickness Description not for Pressure Injury Full thickness 01/19/22 0909   Number of days: 118       Wound 01/05/22 Heel Right #2 (Active)   Wound Image   05/04/22 0908   Wound Etiology Venous 01/05/22 0943   Dressing Status New dressing applied 04/27/22 1120   Wound Cleansed Cleansed with saline 04/27/22 1120   Dressing/Treatment Alginate;Dry dressing 04/27/22 1120   Offloading for Diabetic Foot Ulcers Post op shoe 04/27/22 1120   Wound Length (cm) 2.5 cm 05/04/22 0908   Wound Width (cm) 1.6 cm 05/04/22 0908   Wound Depth (cm) 0.1 cm 05/04/22 0908   Wound Surface Area (cm^2) 4 cm^2 05/04/22 0908   Change in Wound Size % (l*w) 5.88 05/04/22 0908   Wound Volume (cm^3) 0.4 cm^3 05/04/22 0908   Wound Healing % 53 05/04/22 0908   Post-Procedure Length (cm) 2.8 cm 04/27/22 1010   Post-Procedure Width (cm) 1.5 cm 04/27/22 1010   Post-Procedure Depth (cm) 0.2 cm 04/27/22 1010   Post-Procedure Surface Area (cm^2) 4.2 cm^2 04/27/22 1010   Post-Procedure Volume (cm^3) 0.84 cm^3 04/27/22 1010   Wound Assessment Fibrin;Slough 05/04/22 0908   Drainage Amount Small 05/04/22 0908   Drainage Description Yellow 05/04/22 0908   Odor None 05/04/22 0908   Rashmi-wound Assessment Intact;Dry/flaky 05/04/22 0908   Wound Thickness Description not for Pressure Injury Full thickness 01/19/22 0909   Number of days: 118       Wound 03/14/22 Coccyx (Active)   Number of days: 50       Wound 03/23/22 Toe (Comment  which one) Left #10 Left Great Toe (Active)   Wound Image   05/04/22 0908   Dressing Status New dressing applied 04/27/22 1120   Wound Cleansed Betadine/povidone iodine 04/27/22 1120   Dressing/Treatment Collagen with Ag;Dry dressing 04/27/22 1120   Offloading for Diabetic Foot Ulcers Post op shoe 04/27/22 1120   Wound Length (cm) 0.6 cm 05/04/22 0908   Wound Width (cm) 0.5 cm 05/04/22 0908   Wound Depth (cm) 0.1 cm 05/04/22 0908   Wound Surface Area (cm^2) 0.3 cm^2 05/04/22 0908   Change in Wound Size % (l*w) 97.5 05/04/22 0908   Wound Volume (cm^3) 0.03 cm^3 05/04/22 0908   Wound Healing % 98 05/04/22 0908   Post-Procedure Length (cm) 0.8 cm 04/27/22 1009   Post-Procedure Width (cm) 0.8 cm 04/27/22 1009   Post-Procedure Depth (cm) 0.2 cm 04/27/22 1009   Post-Procedure Surface Area (cm^2) 0.64 cm^2 04/27/22 1009   Post-Procedure Volume (cm^3) 0.128 cm^3 04/27/22 1009   Wound Assessment Pink/red 05/04/22 0908   Drainage Amount Small 05/04/22 0908   Drainage Description Yellow 05/04/22 0908   Odor None 05/04/22 0908   Rashmi-wound Assessment Dry/flaky 05/04/22 0908   Number of days: 42       Wound 03/23/22 Foot Left;Dorsal #11 (Active)   Wound Image   05/04/22 0908   Dressing Status New dressing applied 04/13/22 1026   Wound Cleansed Betadine/povidone iodine 04/13/22 1026   Dressing/Treatment Collagen with Ag 04/13/22 1026   Offloading for Diabetic Foot Ulcers Back offloading shoe 04/13/22 1026   Wound Length (cm) 0 cm 05/04/22 0908   Wound Width (cm) 0 cm 05/04/22 0908   Wound Depth (cm) 0 cm 05/04/22 0908   Wound Surface Area (cm^2) 0 cm^2 05/04/22 0908   Change in Wound Size % (l*w) 100 05/04/22 0908   Wound Volume (cm^3) 0 cm^3 05/04/22 0908   Wound Healing % 100 05/04/22 0908   Post-Procedure Length (cm) 0.6 cm 04/06/22 0933   Post-Procedure Width (cm) 0.6 cm 04/06/22 0933   Post-Procedure Depth (cm) 0.2 cm 04/06/22 0933   Post-Procedure Surface Area (cm^2) 0.36 cm^2 04/06/22 0933   Post-Procedure Volume (cm^3) 0.072 cm^3 04/06/22 0933   Wound Assessment Dry 04/13/22 0934   Drainage Amount None 04/13/22 0934   Drainage Description Serous 03/30/22 0859   Odor None 04/13/22 0934   Rashmi-wound Assessment Edematous;Dry/flaky 04/13/22 0934   Number of days: 42       Wound 04/13/22 Pretibial Distal;Left;Lateral #14 (Active)   Wound Image   05/04/22 0908   Dressing Status New dressing applied 04/13/22 1026   Wound Cleansed Cleansed with saline 04/13/22 1026   Dressing/Treatment Steri-strips; Hydrating gel;Dry dressing 04/13/22 1026   Wound Length (cm) 1.1 cm 05/04/22 0908   Wound Width (cm) 2.3 cm 05/04/22 0908   Wound Depth (cm) 0.2 cm 05/04/22 0908   Wound Surface Area (cm^2) 2.53 cm^2 05/04/22 0908   Change in Wound Size % (l*w) 73.65 05/04/22 0908   Wound Volume (cm^3) 0.506 cm^3 05/04/22 0908   Wound Healing % 47 05/04/22 0908   Post-Procedure Length (cm) 3.2 cm 04/13/22 0959   Post-Procedure Width (cm) 3.3 cm 04/13/22 0959   Post-Procedure Depth (cm) 0.2 cm 04/13/22 0959   Post-Procedure Surface Area (cm^2) 10.56 cm^2 04/13/22 0959   Post-Procedure Volume (cm^3) 2.112 cm^3 04/13/22 0959   Wound Assessment Pink/red 04/27/22 0925   Drainage Amount None 04/27/22 0925   Drainage Description Sanguinous 04/13/22 0949 Odor None 04/27/22 0925   Rashmi-wound Assessment Dry/flaky 04/27/22 0925   Number of days: 20       Wound 04/27/22 Tibial Left;Posterior #15 (Active)   Wound Image   05/04/22 0908   Dressing Status New dressing applied 04/27/22 1120   Wound Cleansed Cleansed with saline 04/27/22 1120   Dressing/Treatment Moist to dry;ABD;Dry dressing 04/27/22 1120   Wound Length (cm) 3.8 cm 05/04/22 0908   Wound Width (cm) 3.7 cm 05/04/22 0908   Wound Depth (cm) 0.8 cm 05/04/22 0908   Wound Surface Area (cm^2) 14.06 cm^2 05/04/22 0908   Change in Wound Size % (l*w) -0.14 05/04/22 0908   Wound Volume (cm^3) 11.248 cm^3 05/04/22 0908   Wound Healing % -60 05/04/22 0908   Post-Procedure Length (cm) 4 cm 04/27/22 1009   Post-Procedure Width (cm) 3.8 cm 04/27/22 1009   Post-Procedure Depth (cm) 0.7 cm 04/27/22 1009   Post-Procedure Surface Area (cm^2) 15.2 cm^2 04/27/22 1009   Post-Procedure Volume (cm^3) 10.64 cm^3 04/27/22 1009   Wound Assessment Fibrin;Pink/red 05/04/22 0908   Drainage Amount Moderate 05/04/22 0908   Drainage Description Serosanguinous 05/04/22 0908   Odor None 05/04/22 0908   Rashmi-wound Assessment Fragile; Excoriated; Maceration 05/04/22 0908   Number of days: 6                                        [facility] followup visit ________1 weeks with Dr. Gastelum_____________________  (Please note your next appointment above and if you are unable to keep, kindly give a 24 hour notice.  Thank you.)     Physician signature:__________________________        If you experience any of the following, please call the [address] during business hours:     * Increase in Pain  * Temperature over 101  * Increase in drainage from your wound  * Drainage with a foul odor  * Bleeding  * Increase in swelling  * Need for compression bandage changes due to slippage, breakthrough drainage.     If you need medical attention outside of the business hours of the [address] [place] [address] Road please contact your PCP or go to the nearest emergency room.                 Electronically signed by Kelly London DPM on 5/4/2022 at 9:46 AM

## 2022-05-04 NOTE — DISCHARGE INSTR - COC
Continuity of Care Form    Patient Name: Sanjuana Gamino. :  1932  MRN:  [id number]    Admit date:  2022  Discharge date:  ***    Code Status Order: Prior   Advance Directives:      Admitting Physician:  No admitting provider for patient encounter. PCP: Liane Hughes MD    Discharging Nurse: [facility] Unit/Room#: No information available for this encounter. Discharging Unit Phone Number: ***    Emergency Contact:   Extended Emergency Contact Information  Primary Emergency Contact: Ale Michaud [address] Phone: [phone]  Mobile Phone: [phone]  Relation: Child   needed? No  Secondary Emergency Contact: Claude Lizarraga [place] [address] Phone: [phone]  Relation: Child    Past Surgical History:  Past Surgical History:   Procedure Laterality Date    [facility]  PICC [address] SINGLE  2021         MASTECTOMY      TOE AMPUTATION      TOE SURGERY      TONSILLECTOMY         Immunization History:   Immunization History   Administered Date(s) Administered    Influenza Virus Vaccine 10/23/2016    Pneumococcal Conjugate Vaccine 2013    Td, unspecified formulation 2012       Active Problems:  Patient Active Problem List   Diagnosis Code    HTN (hypertension) I10    Breast cancer, male ([facility] [address] C50.929    Obesity (BMI 30.0-34. 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 30-59 (HCC) E10.22, N18.30    Cellulitis and abscess of right lower extremity L03.115, L02.415    Cellulitis and abscess of left lower extremity L03.116, L02.416    Chronic venous insufficiency of lower extremity I87.2    Acute hypoxemic respiratory failure due to COVID-19 ([facility]) U07.1, J96.01    Acute on chronic respiratory failure with hypoxia ([facility]) J96.21    Acute respiratory failure with hypoxia ([facility]) J96.01    COVID-19 virus infection U07.1    Hyperlipidemia E78.5    Pneumonia due to COVID-19 virus U07.1, J12.82    Orthostatic hypotension I95.1    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 ([facility]) I70.244    PVD (peripheral vascular disease) ([facility]) I73.9    PAD (peripheral artery disease) ([facility]) I73.9    Diabetic ulcer of right heel associated with type 2 diabetes mellitus, with fat layer exposed ([facility] [address] E11.621, L97.412    Diabetic ulcer of left heel ([facility]) E11.621, L97.429    Sepsis ([facility] [address] A41.9    Diabetic foot infection ([facility]) E11.628, L08.9       Isolation/Infection:   Isolation            No Isolation          Patient Infection Status       Infection Onset Added Last Indicated Last Indicated By Review Planned Expiration Resolved Resolved By    None active    Resolved    COVID-19 (Rule Out) 22 COVID-19, Rapid (Ordered)   22 Rule-Out Test Resulted    MRSA 21 Culture, Blood 2   03/15/22 Yuliana Vargas RN    MRSA blood 2021    COVID-19 21 Covid-19 Ambulatory   21     COVID-19 (Rule Out) 21 COVID-19, Rapid (Ordered)   21 Rule-Out Test Resulted    COVID-19 (Rule Out) 10/16/20 10/16/20 10/16/20 Covid-19 Ambulatory (Ordered)   10/17/20 Rule-Out Test Resulted    COVID-19 (Rule Out) 20 Covid-19 Ambulatory (Ordered)   20 Rule-Out Test Resulted    COVID-19 (Rule Out) 20 Covid-19 Ambulatory (Ordered)   20 Rule-Out Test Resulted    COVID-19 (Rule Out) 20 Covid-19 Ambulatory (Ordered)   20 Rule-Out Test Resulted            Nurse Assessment:  Last Vital Signs: /60   Pulse 88   Temp 97.3 °F (36.3 °C)   Resp 18   Ht 6' (1.829 m)   Wt 237 lb (107.5 kg) Comment: patient stated he hasnt been 266 they weighted him monday at 237  BMI 32.14 kg/m²     Last documented pain score (0-10 scale):    Last Weight:   Wt Readings from Last 1 Encounters:   05/04/22 237 lb (107.5 kg)     Mental Status:  {IP PT MENTAL STATUS:20030}    IV Access:  { AGUSTINA IV ACCESS:894542655}    Nursing Mobility/ADLs:  Walking   {CHP DME GVYN:523252475}  Transfer  {CHP DME KPAS:508803907}  Bathing  {CHP DME DGNN:024493635}  Dressing  {CHP DME DAYN:388243787}  Toileting  {CHP DME YEJU:844695198}  Feeding  {CHP DME ESGB:580882166}  Med Admin  {CHP DME DJKW:535053262}  Med Delivery   { AGUSTINA MED Delivery:819750477}    Wound Care Documentation and Therapy:  Wound 01/05/22 Heel Left #1 (Active)   Wound Image   05/04/22 0908   Wound Etiology Diabetic [place] 2 01/05/22 0943   Dressing Status New dressing applied 04/27/22 1120   Wound Cleansed Cleansed with saline 04/27/22 1120   Dressing/Treatment Alginate;Dry dressing 04/27/22 1120   Offloading for Diabetic Foot Ulcers Post op shoe 04/27/22 1120   Wound Length (cm) 3.8 cm 05/04/22 0908   Wound Width (cm) 2.6 cm 05/04/22 0908   Wound Depth (cm) 0.2 cm 05/04/22 0908   Wound Surface Area (cm^2) 9.88 cm^2 05/04/22 0908   Change in Wound Size % (l*w) 28.41 05/04/22 0908   Wound Volume (cm^3) 1.976 cm^3 05/04/22 0908   Wound Healing % 28 05/04/22 0908   Post-Procedure Length (cm) 3.9 cm 04/27/22 1010   Post-Procedure Width (cm) 2.7 cm 04/27/22 1010   Post-Procedure Depth (cm) 0.2 cm 04/27/22 1010   Post-Procedure Surface Area (cm^2) 10.53 cm^2 04/27/22 1010   Post-Procedure Volume (cm^3) 2. 106 cm^3 04/27/22 1010   Wound Assessment Fibrin;Pink/red 05/04/22 0908   Drainage Amount Small 05/04/22 0908   Drainage Description Yellow 05/04/22 0908   Odor None 05/04/22 0908   Rashmi-wound Assessment Maceration;Dry/flaky 05/04/22 0908   Wound Thickness Description not for Pressure Injury Full thickness 01/19/22 0909   Number of days: 118       Wound 01/05/22 Heel Right #2 (Active)   Wound Image   05/04/22 0908   Wound Etiology Venous 01/05/22 0943   Dressing Status New dressing applied 04/27/22 1120   Wound Cleansed Cleansed with saline 04/27/22 1120   Dressing/Treatment Alginate;Dry dressing 04/27/22 1120   Offloading for Diabetic Foot Ulcers Post op shoe 04/27/22 1120   Wound Length (cm) 2.5 cm 05/04/22 0908   Wound Width (cm) 1.6 cm 05/04/22 0908   Wound Depth (cm) 0.1 cm 05/04/22 0908   Wound Surface Area (cm^2) 4 cm^2 05/04/22 0908   Change in Wound Size % (l*w) 5.88 05/04/22 0908   Wound Volume (cm^3) 0.4 cm^3 05/04/22 0908   Wound Healing % 53 05/04/22 0908   Post-Procedure Length (cm) 2.8 cm 04/27/22 1010   Post-Procedure Width (cm) 1.5 cm 04/27/22 1010   Post-Procedure Depth (cm) 0.2 cm 04/27/22 1010   Post-Procedure Surface Area (cm^2) 4.2 cm^2 04/27/22 1010   Post-Procedure Volume (cm^3) 0.84 cm^3 04/27/22 1010   Wound Assessment Fibrin;Slough 05/04/22 0908   Drainage Amount Small 05/04/22 0908   Drainage Description Yellow 05/04/22 0908   Odor None 05/04/22 0908   Rashmi-wound Assessment Intact;Dry/flaky 05/04/22 0908   Wound Thickness Description not for Pressure Injury Full thickness 01/19/22 0909   Number of days: 118       Wound 03/14/22 Coccyx (Active)   Number of days: 50       Wound 03/23/22 Toe (Comment  which one) Left #10 Left Great Toe (Active)   Wound Image   05/04/22 0908   Dressing Status New dressing applied 04/27/22 1120   Wound Cleansed Betadine/povidone iodine 04/27/22 1120   Dressing/Treatment Collagen with Ag;Dry dressing 04/27/22 1120   Offloading for Diabetic Foot Ulcers Post op shoe 04/27/22 1120   Wound Length (cm) 0.6 cm 05/04/22 0908   Wound Width (cm) 0.5 cm 05/04/22 0908   Wound Depth (cm) 0.1 cm 05/04/22 0908   Wound Surface Area (cm^2) 0.3 cm^2 05/04/22 0908   Change in Wound Size % (l*w) 97.5 05/04/22 0908   Wound Volume (cm^3) 0.03 cm^3 05/04/22 0908   Wound Healing % 98 05/04/22 0908   Post-Procedure Length (cm) 0.8 cm 04/27/22 1009   Post-Procedure Width (cm) 0.8 cm 04/27/22 1009   Post-Procedure Depth (cm) 0.2 cm 04/27/22 1009   Post-Procedure Surface Area (cm^2) 0.64 cm^2 04/27/22 1009   Post-Procedure Volume (cm^3) 0.128 cm^3 04/27/22 1009   Wound Assessment Pink/red 05/04/22 0908   Drainage Amount Small 05/04/22 0908   Drainage Description Yellow 05/04/22 0908   Odor None 05/04/22 0908   Rashmi-wound Assessment Dry/flaky 05/04/22 0908   Number of days: 42       Wound 03/23/22 Foot Left;Dorsal #11 (Active)   Wound Image   05/04/22 0908   Dressing Status New dressing applied 04/13/22 1026   Wound Cleansed Betadine/povidone iodine 04/13/22 1026   Dressing/Treatment Collagen with Ag 04/13/22 1026   Offloading for Diabetic Foot Ulcers Back offloading shoe 04/13/22 1026   Wound Length (cm) 0 cm 05/04/22 0908   Wound Width (cm) 0 cm 05/04/22 0908   Wound Depth (cm) 0 cm 05/04/22 0908   Wound Surface Area (cm^2) 0 cm^2 05/04/22 0908   Change in Wound Size % (l*w) 100 05/04/22 0908   Wound Volume (cm^3) 0 cm^3 05/04/22 0908   Wound Healing % 100 05/04/22 0908   Post-Procedure Length (cm) 0.6 cm 04/06/22 0933   Post-Procedure Width (cm) 0.6 cm 04/06/22 0933   Post-Procedure Depth (cm) 0.2 cm 04/06/22 0933   Post-Procedure Surface Area (cm^2) 0.36 cm^2 04/06/22 0933   Post-Procedure Volume (cm^3) 0.072 cm^3 04/06/22 0933   Wound Assessment Dry 04/13/22 0934   Drainage Amount None 04/13/22 0934   Drainage Description Serous 03/30/22 0859   Odor None 04/13/22 0934   Rashmi-wound Assessment Edematous;Dry/flaky 04/13/22 0934   Number of days: 42       Wound 04/13/22 Pretibial Distal;Left;Lateral #14 (Active)   Wound Image   05/04/22 0908   Dressing Status New dressing applied 04/13/22 1026   Wound Cleansed Cleansed with saline 04/13/22 1026   Dressing/Treatment Steri-strips; Hydrating gel;Dry dressing 04/13/22 1026   Wound Length (cm) 1.1 cm 05/04/22 0908   Wound Width (cm) 2.3 cm 05/04/22 0908   Wound Depth (cm) 0.2 cm 05/04/22 0908   Wound Surface Area (cm^2) 2.53 cm^2 05/04/22 0908   Change in Wound Size % (l*w) 73.65 05/04/22 0908   Wound Volume (cm^3) 0.506 cm^3 05/04/22 0908   Wound Healing % 47 05/04/22 0908   Post-Procedure Length (cm) 3.2 cm 04/13/22 0959   Post-Procedure Width (cm) 3.3 cm 04/13/22 0959   Post-Procedure Depth (cm) 0.2 cm 04/13/22 0959   Post-Procedure Surface Area (cm^2) 10.56 cm^2 04/13/22 0959   Post-Procedure Volume (cm^3) 2.112 cm^3 04/13/22 0959   Wound Assessment Pink/red 04/27/22 0925   Drainage Amount None 04/27/22 0925   Drainage Description Sanguinous 04/13/22 0949   Odor None 04/27/22 0925   Rashmi-wound Assessment Dry/flaky 04/27/22 0925   Number of days: 20       Wound 04/27/22 Tibial Left;Posterior #15 (Active)   Wound Image   05/04/22 0908   Dressing Status New dressing applied 04/27/22 1120   Wound Cleansed Cleansed with saline 04/27/22 1120   Dressing/Treatment Moist to dry;ABD;Dry dressing 04/27/22 1120   Wound Length (cm) 3.8 cm 05/04/22 0908   Wound Width (cm) 3.7 cm 05/04/22 0908   Wound Depth (cm) 0.8 cm 05/04/22 0908   Wound Surface Area (cm^2) 14.06 cm^2 05/04/22 0908   Change in Wound Size % (l*w) -0.14 05/04/22 0908   Wound Volume (cm^3) 11.248 cm^3 05/04/22 0908   Wound Healing % -60 05/04/22 0908   Post-Procedure Length (cm) 4 cm 04/27/22 1009   Post-Procedure Width (cm) 3.8 cm 04/27/22 1009   Post-Procedure Depth (cm) 0.7 cm 04/27/22 1009   Post-Procedure Surface Area (cm^2) 15.2 cm^2 04/27/22 1009   Post-Procedure Volume (cm^3) 10.64 cm^3 04/27/22 1009   Wound Assessment Fibrin;Pink/red 05/04/22 0908   Drainage Amount Moderate 05/04/22 0908   Drainage Description Serosanguinous 05/04/22 0908   Odor None 05/04/22 0908   Rashmi-wound Assessment Fragile; Excoriated; Maceration 05/04/22 0908   Number of days: 6        Elimination:  Continence:    Bowel: {YES / QS:11369}  Bladder: {YES / CS:48295}  Urinary Catheter: {Urinary Catheter:132009998}   Colostomy/Ileostomy/Ileal Conduit: {YES / AY:15460}       Date of Last BM: ***  No intake or output data in the 24 hours ending 22 0935  No intake/output data recorded.     Safety Concerns:     [address] Safety Concerns:528456316}    Impairments/Disabilities:      [address] [facility] Impairments/Disabilities:769592715}    Nutrition Therapy:  Current Nutrition Therapy:   [address] [facility] Diet List:292557156}    Routes of Feeding: {CHP DME Other Feedings:164787560}  Liquids: {Slp liquid thickness:22733}  Daily Fluid Restriction: {CHP DME Yes amt example:127531286}  Last Modified Barium Swallow with Video (Video Swallowing Test): {Done Not Done DIVA:402077419}    Treatments at the Time of Hospital Discharge:   Respiratory Treatments: ***  Oxygen Therapy:  {Therapy; copd oxygen:83834}  Ventilator:    {[facility] Vent VQCX:915931804}    Rehab Therapies: {THERAPEUTIC INTERVENTION:6876757166}  Weight Bearing Status/Restrictions: [address]  Weight Bearin}  Other Medical Equipment (for information only, NOT a DME order):  {EQUIPMENT:405777052}  Other Treatments: ***    Patient's personal belongings (please select all that are sent with patient):  {[facility] DME Belongings:601115856}    RN SIGNATURE:  {Esignature:709074582}    CASE MANAGEMENT/SOCIAL WORK SECTION    Inpatient Status Date: ***    Readmission Risk Assessment Score:  Readmission Risk              Risk of Unplanned Readmission:  0           Discharging to Facility/ Agency   Name:   Address:  Phone:  Fax:    Dialysis Facility (if applicable)   Name:  Address:  Dialysis Schedule:  Phone:  Fax:    / signature: {Esignature:553499001}    PHYSICIAN SECTION    Prognosis: {Prognosis:8805123737}    Condition at Discharge: [address] Patient Condition:642909636}    Rehab Potential (if transferring to Rehab): {Prognosis:1947607433}    Recommended Labs or Other Treatments After Discharge: ***    Physician Certification: I certify the above information and transfer of Dimitris Acevedo.  is necessary for the continuing treatment of the diagnosis listed and that he requires {Admit to Appropriate Level of Care:93472} for {GREATER/LESS:440976997} 30 days.      Update Admission H&P: {CHP DME Changes in [facility]:466326089}    PHYSICIAN SIGNATURE:  {Esignature:713566212}

## 2022-05-11 ENCOUNTER — HOSPITAL ENCOUNTER (OUTPATIENT)
Dept: WOUND CARE | Age: 87
Discharge: HOME | End: 2022-05-11
Payer: MEDICARE

## 2022-05-11 VITALS
TEMPERATURE: 96.5 F | SYSTOLIC BLOOD PRESSURE: 142 MMHG | RESPIRATION RATE: 18 BRPM | HEART RATE: 97 BPM | DIASTOLIC BLOOD PRESSURE: 68 MMHG

## 2022-05-11 DIAGNOSIS — E11.621 DIABETIC ULCER OF RIGHT HEEL ASSOCIATED WITH TYPE 2 DIABETES MELLITUS, WITH FAT LAYER EXPOSED (HCC): Primary | ICD-10-CM

## 2022-05-11 DIAGNOSIS — L97.421 DIABETIC ULCER OF LEFT HEEL ASSOCIATED WITH DIABETES MELLITUS DUE TO UNDERLYING CONDITION, LIMITED TO BREAKDOWN OF SKIN (HCC): ICD-10-CM

## 2022-05-11 DIAGNOSIS — E08.621 DIABETIC ULCER OF LEFT HEEL ASSOCIATED WITH DIABETES MELLITUS DUE TO UNDERLYING CONDITION, LIMITED TO BREAKDOWN OF SKIN (HCC): ICD-10-CM

## 2022-05-11 DIAGNOSIS — L97.412 DIABETIC ULCER OF RIGHT HEEL ASSOCIATED WITH TYPE 2 DIABETES MELLITUS, WITH FAT LAYER EXPOSED (HCC): Primary | ICD-10-CM

## 2022-05-11 PROCEDURE — 6370000000 HC RX 637 (ALT 250 FOR IP): Performed by: PODIATRIST

## 2022-05-11 PROCEDURE — 11042 DBRDMT SUBQ TIS 1ST 20SQCM/<: CPT

## 2022-05-11 PROCEDURE — 11045 DBRDMT SUBQ TISS EACH ADDL: CPT

## 2022-05-11 RX ORDER — GENTAMICIN SULFATE 1 MG/G
OINTMENT TOPICAL ONCE
Status: CANCELLED | OUTPATIENT
Start: 2022-05-11 | End: 2022-05-11

## 2022-05-11 RX ORDER — BACITRACIN, NEOMYCIN, POLYMYXIN B 400; 3.5; 5 [USP'U]/G; MG/G; [USP'U]/G
OINTMENT TOPICAL ONCE
Status: CANCELLED | OUTPATIENT
Start: 2022-05-11 | End: 2022-05-11

## 2022-05-11 RX ORDER — LIDOCAINE HYDROCHLORIDE 20 MG/ML
JELLY TOPICAL ONCE
Status: CANCELLED | OUTPATIENT
Start: 2022-05-11 | End: 2022-05-11

## 2022-05-11 RX ORDER — GINSENG 100 MG
CAPSULE ORAL ONCE
Status: CANCELLED | OUTPATIENT
Start: 2022-05-11 | End: 2022-05-11

## 2022-05-11 RX ORDER — LIDOCAINE 50 MG/G
OINTMENT TOPICAL ONCE
Status: CANCELLED | OUTPATIENT
Start: 2022-05-11 | End: 2022-05-11

## 2022-05-11 RX ORDER — BACITRACIN ZINC AND POLYMYXIN B SULFATE 500; 1000 [USP'U]/G; [USP'U]/G
OINTMENT TOPICAL ONCE
Status: CANCELLED | OUTPATIENT
Start: 2022-05-11 | End: 2022-05-11

## 2022-05-11 RX ORDER — LIDOCAINE HYDROCHLORIDE 40 MG/ML
SOLUTION TOPICAL ONCE
Status: COMPLETED | OUTPATIENT
Start: 2022-05-11 | End: 2022-05-11

## 2022-05-11 RX ORDER — CLOBETASOL PROPIONATE 0.5 MG/G
OINTMENT TOPICAL ONCE
Status: CANCELLED | OUTPATIENT
Start: 2022-05-11 | End: 2022-05-11

## 2022-05-11 RX ORDER — BETAMETHASONE DIPROPIONATE 0.05 %
OINTMENT (GRAM) TOPICAL ONCE
Status: CANCELLED | OUTPATIENT
Start: 2022-05-11 | End: 2022-05-11

## 2022-05-11 RX ORDER — LIDOCAINE 40 MG/G
CREAM TOPICAL ONCE
Status: CANCELLED | OUTPATIENT
Start: 2022-05-11 | End: 2022-05-11

## 2022-05-11 RX ORDER — LIDOCAINE HYDROCHLORIDE 40 MG/ML
SOLUTION TOPICAL ONCE
Status: CANCELLED | OUTPATIENT
Start: 2022-05-11 | End: 2022-05-11

## 2022-05-11 RX ADMIN — LIDOCAINE HYDROCHLORIDE: 40 SOLUTION TOPICAL at 09:23

## 2022-05-11 ASSESSMENT — PAIN DESCRIPTION - PAIN TYPE: TYPE: CHRONIC PAIN

## 2022-05-11 ASSESSMENT — PAIN DESCRIPTION - FREQUENCY: FREQUENCY: INTERMITTENT

## 2022-05-11 ASSESSMENT — PAIN DESCRIPTION - DESCRIPTORS: DESCRIPTORS: SHARP

## 2022-05-11 ASSESSMENT — PAIN DESCRIPTION - LOCATION: LOCATION: LEG;FOOT

## 2022-05-11 ASSESSMENT — PAIN DESCRIPTION - ONSET: ONSET: ON-GOING

## 2022-05-11 ASSESSMENT — PAIN - FUNCTIONAL ASSESSMENT: PAIN_FUNCTIONAL_ASSESSMENT: PREVENTS OR INTERFERES SOME ACTIVE ACTIVITIES AND ADLS

## 2022-05-11 ASSESSMENT — PAIN DESCRIPTION - ORIENTATION: ORIENTATION: RIGHT;LEFT

## 2022-05-11 ASSESSMENT — PAIN SCALES - GENERAL: PAINLEVEL_OUTOF10: 7

## 2022-05-11 NOTE — PROGRESS NOTES
[facility]  History and Physical/Consultation  Podiatry    Referring Physician : Zakia Mancini MD  Toby Ireland. MEDICAL RECORD NUMBER:  [id number]  AGE: 80 y.o. GENDER: male  : 1932  EPISODE DATE:  2022  Subjective:     Chief Complaint   Patient presents with    Wound Check     bilateral lower extremities         HISTORY of PRESENT ILLNESS HPI     Toby Bowden is a 80 y.o. male who presents today for wound/ulcer evaluation. History of Wound Context:  The patient has had a wound of diabetic b/l heels,and right ankle which was first noted approximately months ago. This has been treated betadyne. On their initial visit to the wound healing center, 22 ,  the patient has noted that the wound has been improving. The patient has had similar previous wounds in the past.      Pt is not on abx at time of initial visit.       Wound/Ulcer Pain Timing/Severity: constant  Quality of pain: sharp  Severity:  3 / 10   Modifying Factors: Pain worsens with walking  Associated Signs/Symptoms: edema, erythema and drainage    Ulcer Identification:  Ulcer Type: diabetic  Contributing Factors: edema and diabetes    Diabetic/Pressure/Non Pressure Ulcers onl y:  Ulcer: Diabetic ulcer, fat layer exposed    If patient has diabetic lower extremity wounds  Campbell Classification of diabetic lower extremity wounds:    Grade Description   []  0 No open wound   []  1 Superficial ulcer involving the full skin thickness   [x]  2 Deep ulcer involves ligament, tendon, joint capsule, or fascia  No bone involvement or abscess presence   []  3 Deep Ulcer with abcess formation and/or osteomyelitis   []  4 Localized gangrene   []  5 Extensive gangrene of the foot     Wound: N/A          22  Debrided, cont tx and care     22  Debrided, cont tx and care, await graft approval    22  Debrided, cont tx and care, puraply applied    22  Debrided, dermagraft applied    22  Debrided, dermagraft applied    2-23-22  Debrided, graft applied    3-2-22  Debrided, graft applied    3-9-22  Debrided, graft applied     3-23-22  Just release from hospital care, new wounds noted from hospital, also trauma to left toe loosened nail that was removed without incident, debrided all wounds, culture taken    3-30-22  Debrided, santyl to left leg, applied dermagraft b/l heels, prevelon boots wound vac left leg    4-6-22  Debrided, dermagraft b/l, wound care    4-13-22  Debrided, wound vac, dermagraft left    4-27-22  Debrided, cont tx and care     5-4-22  Debrided, cont tx and care, wound vac to leg left, and hydrofera blue to all other wounds    5-11-22  Debrided, cont tx and care  PAST MEDICAL HISTORY      Diagnosis Date    Arthritis     Cancer ([address]     breast    Cellulitis     CHF (congestive heart failure) (HCC)     CKD (chronic kidney disease) stage 3, GFR 30-59 ml/min ([address]     Diabetes mellitus ([address]     History of lumpectomy     Hx of blood clots     Hyperlipidemia     Hypertension     Left ventricular failure ([address]     Macular degeneration     Obesity     Orthostatic hypotension     PE (pulmonary thromboembolism) ([address]     Pressure injury of both heels, unstageable ([address]     Staph aureus infection     Venous insufficiency      Past Surgical History:   Procedure Laterality Date    [facility]  9/2/[facility]21         MASTECTOMY      TOE AMPUTATION      TOE SURGERY      TONSILLECTOMY       History reviewed. No pertinent family history.   Social History     Tobacco Use    Smoking status: Never Smoker    Smokeless tobacco: Never Used   Vaping Use    Vaping Use: Never used   Substance Use Topics    Alcohol use: Not Currently     Alcohol/week: 1.0 standard drink     Types: 1 Cans of beer per week    Drug use: No     Allergies   Allergen Reactions    Clindamycin/Lincomycin     Sulfa Antibiotics      Current Outpatient Medications on File Prior to Encounter   Medication Sig Dispense Refill    apixaban (ELIQUIS) 2.5 MG TABS tablet Take 1 tablet by mouth 2 times daily (before meals) 60 tablet 2    docusate sodium (COLACE) 100 MG capsule Take 100 mg by mouth nightly      tamsulosin (FLOMAX) 0.4 MG capsule Take 0.4 mg by mouth daily      bisacodyl (DULCOLAX) 10 MG suppository Place 10 mg rectally daily as needed for Constipation      diphenhydrAMINE-zinc acetate (BENADRYL) 1-0.1 % cream Apply topically every 4 hours as needed for Itching Apply topically 3 times daily as needed.  Sodium Phosphates (FLEET) 7-19 GM/118ML Place 1 enema rectally daily as needed      magnesium hydroxide (MILK OF MAGNESIA) 400 MG/5ML suspension Take 30 mLs by mouth daily as needed for Constipation      Polyethylene Glycol 400 1 % SOLN Apply 1 drop to eye every 4 hours as needed      ferrous sulfate (IRON 325) 325 (65 Fe) MG tablet Take 325 mg by mouth daily       zinc sulfate (ZINCATE) 220 (50 Zn) MG capsule Take 1 capsule by mouth daily for 14 days 14 capsule 0    miconazole (MICOTIN) 2 % powder Apply topically 2 times daily.  45 g 1    ascorbic acid (VITAMIN C) 500 MG tablet Take 1 tablet by mouth 2 times daily 30 tablet 0    Vitamin D (CHOLECALCIFEROL) 50 MCG (2000 UT) TABS tablet Take 1 tablet by mouth daily 60 tablet 0    furosemide (LASIX) 40 MG tablet Take 20 mg by mouth daily       omeprazole (PRILOSEC) 20 MG delayed release capsule Take 20 mg by mouth daily      Multiple Vitamins-Minerals (THERAPEUTIC MULTIVITAMIN-MINERALS) tablet Take 1 tablet by mouth daily      Multiple Vitamins-Minerals (PRESERVISION AREDS PO) Take 1 tablet by mouth 2 times daily (with meals)      acetaminophen (TYLENOL) 325 MG tablet Take 650 mg by mouth every 4 hours as needed for Pain or Fever       calcium carbonate (TUMS) 500 MG chewable tablet Take 1 tablet by mouth every 4 hours as needed for Heartburn      ipratropium-albuterol (DUONEB) 0.5-2.5 (3) MG/3ML SOLN nebulizer solution Inhale 3 mLs into the lungs every 4 hours (while awake) for 30 doses 90 mL 0    gabapentin (NEURONTIN) 100 MG capsule Take 200 mg by mouth Daily with supper.  atorvastatin (LIPITOR) 80 MG tablet Take 80 mg by mouth at bedtime       INSULIN LISP PROT & LISP, HUM, (75-25) 100 UNIT/ML SUSP Inject 72 Units into the skin daily (with breakfast)       INSULIN LISP PROT & LISP, HUM, (75-25) 100 UNIT/ML SUSP Inject 55 Units into the skin Daily with supper       clotrimazole-betamethasone (LOTRISONE) cream Apply  topically as needed. Apply topically 2 times daily. No current facility-administered medications on file prior to encounter.        REVIEW OF SYSTEMS   ROS : All others Negative if blank [], Positive if [x]  General Vascular   [] Fevers [] Claudication   [] Chills [] Rest Pain   Skin Neurologic   [x] Tissue Loss [x] Lower extremity neuropathy     Objective:    BP (!) 142/68   Pulse 97   Temp 96.5 °F (35.8 °C) (Temporal)   Resp 18   Wt Readings from Last 3 Encounters:   05/04/22 237 lb (107.5 kg)   04/27/22 266 lb (120.7 kg)   04/13/22 266 lb (120.7 kg)       PHYSICAL EXAM   CONSTITUTIONAL:   Awake, alert, cooperative   PSYCHIATRIC :  Oriented to time, place and person      normal insight to disease process  EXTREMITIES:   R LE Open wounds are noted   Skin color is abnormal with ulcers   Edema is  noted   Sensation deficit noted - protective   Palpation of the foot does cause pain   4/5 strength DF/PF  L LE Open wounds are noted   Skin color is abnormal with ulcers   Edema is  noted   Sensation deficit noted - protective   Palpation of the foot does cause pain   4/5 strength DF/PF  R dorsalis pedis 1 L dorsalis pedis 1   R posterior tibial 1 L posterior tibial 1     Assessment:     Problem List Items Addressed This Visit     Diabetic ulcer of right heel associated with type 2 diabetes mellitus, with fat layer exposed ([address] - Primary    Relevant Orders    Initiate Outpatient Wound Care Protocol    Diabetic ulcer of left heel [facility])    Relevant Orders    Initiate Outpatient Wound Care Protocol          Pre Debridement Measurements:  Are located in the [place]  Documentation Flow Sheet  Post Debridement Measurements:  Wound/Ulcer Descriptions are Pre Debridement except measurements:     Wound 01/05/22 Heel Left #1 (Active)   Wound Image   05/11/22 0908   Wound Etiology Diabetic Campbell 2 01/05/22 0943   Dressing Status New dressing applied 05/04/22 1051   Wound Cleansed Cleansed with saline 05/04/22 1051   Dressing/Treatment Alginate;Dry dressing;ABD 05/04/22 1051   Offloading for Diabetic Foot Ulcers Post op shoe 05/04/22 1051   Wound Length (cm) 2.8 cm 05/11/22 0908   Wound Width (cm) 2.5 cm 05/11/22 0908   Wound Depth (cm) 0.2 cm 05/11/22 0908   Wound Surface Area (cm^2) 7 cm^2 05/11/22 0908   Change in Wound Size % (l*w) 49.28 05/11/22 0908   Wound Volume (cm^3) 1.4 cm^3 05/11/22 0908   Wound Healing % 49 05/11/22 0908   Post-Procedure Length (cm) 3 cm 05/11/22 0932   Post-Procedure Width (cm) 2.5 cm 05/11/22 0932   Post-Procedure Depth (cm) 0.3 cm 05/11/22 0932   Post-Procedure Surface Area (cm^2) 7.5 cm^2 05/11/22 0932   Post-Procedure Volume (cm^3) 2.25 cm^3 05/11/22 0932   Wound Assessment Fibrin;Pink/red 05/11/22 0908   Drainage Amount Small 05/11/22 0908   Drainage Description Yellow 05/11/22 0908   Odor None 05/11/22 0908   Rashmi-wound Assessment Dry/flaky 05/11/22 0908   Wound Thickness Description not for Pressure Injury Full thickness 01/19/22 0909   Number of days: 125       Wound 01/05/22 Heel Right #2 (Active)   Wound Image   05/11/22 0908   Wound Etiology Venous 01/05/22 0943   Dressing Status New dressing applied 05/04/22 1051   Wound Cleansed Cleansed with saline 05/04/22 1051   Dressing/Treatment Alginate;Dry dressing;ABD 05/04/22 1051   Offloading for Diabetic Foot Ulcers Post op shoe 05/04/22 1051   Wound Length (cm) 2.2 cm 05/11/22 0908   Wound Width (cm) 1.5 cm 05/11/22 0908   Wound Depth (cm) 0.2 cm 05/11/22 0908   Wound Surface Area (cm^2) 3.3 cm^2 05/11/22 0908   Change in Wound Size % (l*w) 22.35 05/11/22 0908   Wound Volume (cm^3) 0.66 cm^3 05/11/22 0908   Wound Healing % 22 05/11/22 0908   Post-Procedure Length (cm) 2.3 cm 05/11/22 0932   Post-Procedure Width (cm) 1.6 cm 05/11/22 0932   Post-Procedure Depth (cm) 0.3 cm 05/11/22 0932   Post-Procedure Surface Area (cm^2) 3.68 cm^2 05/11/22 0932   Post-Procedure Volume (cm^3) 1.104 cm^3 05/11/22 0932   Wound Assessment Pink/red 05/11/22 0908   Drainage Amount Small 05/11/22 0908   Drainage Description Yellow 05/11/22 0908   Odor None 05/11/22 0908   Rashmi-wound Assessment Intact;Dry/flaky 05/11/22 0908   Wound Thickness Description not for Pressure Injury Full thickness 01/19/22 0909   Number of days: 125       Wound 03/14/22 Coccyx (Active)   Number of days: 57       Wound 03/23/22 Toe (Comment  which one) Left #10 Left Great Toe (Active)   Wound Image   05/11/22 0908   Dressing Status New dressing applied 05/04/22 1051   Wound Cleansed Cleansed with saline 05/04/22 1051   Dressing/Treatment Alginate;Dry dressing 05/04/22 1051   Offloading for Diabetic Foot Ulcers Post op shoe 05/04/22 1051   Wound Length (cm) 0.6 cm 05/11/22 0908   Wound Width (cm) 0.6 cm 05/11/22 0908   Wound Depth (cm) 0.1 cm 05/11/22 0908   Wound Surface Area (cm^2) 0.36 cm^2 05/11/22 0908   Change in Wound Size % (l*w) 97 05/11/22 0908   Wound Volume (cm^3) 0.036 cm^3 05/11/22 0908   Wound Healing % 97 05/11/22 0908   Post-Procedure Length (cm) 0.7 cm 05/11/22 0932   Post-Procedure Width (cm) 0.7 cm 05/11/22 0932   Post-Procedure Depth (cm) 0.2 cm 05/11/22 0932   Post-Procedure Surface Area (cm^2) 0.49 cm^2 05/11/22 0932   Post-Procedure Volume (cm^3) 0.098 cm^3 05/11/22 0932   Wound Assessment Devitalized tissue;Pink/red 05/11/22 0908   Drainage Amount Scant 05/11/22 0908   Drainage Description Yellow 05/11/22 0908   Odor None 05/11/22 0908   Rashmi-wound Assessment Dry/flaky 05/11/22 0908 Number of days: 49       Wound 03/23/22 Foot Left;Dorsal #11 (Active)   Wound Image   05/04/22 0908   Dressing Status New dressing applied 04/13/22 1026   Wound Cleansed Betadine/povidone iodine 04/13/22 1026   Dressing/Treatment Collagen with Ag 04/13/22 1026   Offloading for Diabetic Foot Ulcers Back offloading shoe 04/13/22 1026   Wound Length (cm) 0 cm 05/04/22 0908   Wound Width (cm) 0 cm 05/04/22 0908   Wound Depth (cm) 0 cm 05/04/22 0908   Wound Surface Area (cm^2) 0 cm^2 05/04/22 0908   Change in Wound Size % (l*w) 100 05/04/22 0908   Wound Volume (cm^3) 0 cm^3 05/04/22 0908   Wound Healing % 100 05/04/22 0908   Post-Procedure Length (cm) 0.6 cm 04/06/22 0933   Post-Procedure Width (cm) 0.6 cm 04/06/22 0933   Post-Procedure Depth (cm) 0.2 cm 04/06/22 0933   Post-Procedure Surface Area (cm^2) 0.36 cm^2 04/06/22 0933   Post-Procedure Volume (cm^3) 0.072 cm^3 04/06/22 0933   Wound Assessment Dry 04/13/22 0934   Drainage Amount None 04/13/22 0934   Drainage Description Serous 03/30/22 0859   Odor None 04/13/22 0934   Rashmi-wound Assessment Edematous;Dry/flaky 04/13/22 0934   Number of days: 49       Wound 04/13/22 Pretibial Distal;Left;Lateral #14 (Active)   Wound Image   05/11/22 0908   Dressing Status New dressing applied 05/04/22 1051   Wound Cleansed Cleansed with saline 05/04/22 1051   Dressing/Treatment Alginate;Dry dressing 05/04/22 1051   Offloading for Diabetic Foot Ulcers Post op shoe 05/04/22 1051   Wound Length (cm) 1.2 cm 05/11/22 0908   Wound Width (cm) 0.6 cm 05/11/22 0908   Wound Depth (cm) 0.2 cm 05/11/22 0908   Wound Surface Area (cm^2) 0.72 cm^2 05/11/22 0908   Change in Wound Size % (l*w) 92.5 05/11/22 0908   Wound Volume (cm^3) 0.144 cm^3 05/11/22 0908   Wound Healing % 85 05/11/22 0908   Post-Procedure Length (cm) 1.3 cm 05/11/22 0932   Post-Procedure Width (cm) 0.5 cm 05/11/22 0932   Post-Procedure Depth (cm) 0.2 cm 05/11/22 0932   Post-Procedure Surface Area (cm^2) 0.65 cm^2 05/11/22 0932   Post-Procedure Volume (cm^3) 0.13 cm^3 05/11/22 0932   Wound Assessment Fibrin 05/11/22 0908   Drainage Amount Small 05/11/22 0908   Drainage Description Yellow 05/11/22 0908   Odor None 05/11/22 0908   Rashmi-wound Assessment Dry/flaky; Intact 05/11/22 0908   Number of days: 27       Wound 04/27/22 Tibial Left;Posterior #15 (Active)   Wound Image   05/11/22 0908   Dressing Status New dressing applied 05/04/22 1051   Wound Cleansed Cleansed with saline 05/04/22 1051   Dressing/Treatment Alginate;ABD;Dry dressing 05/04/22 1051   Offloading for Diabetic Foot Ulcers Post op shoe 05/04/22 1051   Wound Length (cm) 3.8 cm 05/11/22 0908   Wound Width (cm) 3.3 cm 05/11/22 0908   Wound Depth (cm) 0.8 cm 05/11/22 0908   Wound Surface Area (cm^2) 12.54 cm^2 05/11/22 0908   Change in Wound Size % (l*w) 10.68 05/11/22 0908   Wound Volume (cm^3) 10.032 cm^3 05/11/22 0908   Wound Healing % -43 05/11/22 0908   Post-Procedure Length (cm) 3.9 cm 05/11/22 0932   Post-Procedure Width (cm) 3.5 cm 05/11/22 0932   Post-Procedure Depth (cm) 0.2 cm 05/11/22 0932   Post-Procedure Surface Area (cm^2) 13.65 cm^2 05/11/22 0932   Post-Procedure Volume (cm^3) 2.73 cm^3 05/11/22 0932   Wound Assessment Fibrin;Pink/red;Devitalized tissue 05/11/22 0908   Drainage Amount Moderate 05/11/22 0908   Drainage Description Serosanguinous 05/11/22 0908   Odor None 05/11/22 0908   Rashmi-wound Assessment Fragile;Edematous 05/11/22 0908   Number of days: 13       Wound 05/11/22 Leg Left;Medial;Lower #16 (Active)   Wound Image   05/11/22 0908   Wound Length (cm) 5 cm 05/11/22 0908   Wound Width (cm) 5 cm 05/11/22 0908   Wound Depth (cm) 0.1 cm 05/11/22 0908   Wound Surface Area (cm^2) 25 cm^2 05/11/22 0908   Wound Volume (cm^3) 2.5 cm^3 05/11/22 0908   Post-Procedure Length (cm) 5 cm 05/11/22 0932   Post-Procedure Width (cm) 5 cm 05/11/22 0932   Post-Procedure Depth (cm) 0.2 cm 05/11/22 0932   Post-Procedure Surface Area (cm^2) 25 cm^2 05/11/22 0932 Post-Procedure Volume (cm^3) 5 cm^3 05/11/22 0932   Wound Assessment Pink/red 05/11/22 0908   Drainage Amount Small 05/11/22 0908   Drainage Description Serosanguinous 05/11/22 0908   Odor None 05/11/22 0908   Rashmi-wound Assessment Edematous;Dry/flaky 05/11/22 0908   Number of days: 0          Procedure Note  Indications:  Based on my examination of this patient's wound(s)/ulcer(s) today, debridement is required to promote healing and evaluate the wound base. Performed by: Stephan Nicolas DPM    Consent obtained:  Yes    Time out taken:  Yes    Pain Control: Anesthetic  Anesthetic: 4% Lidocaine Liquid Topical     Debridement:Excisional Debridement    Using #15 blade scalpel the wound(s)/ulcer(s) was/were sharply debrided down through and including the removal of subcutaneous tissue. Devitalized Tissue Debrided:  fibrin, biofilm, slough and necrotic/eschar to stimulate bleeding to promote healing, post debridement good bleeding base and wound edges noted    Wound/Ulcer #: 2, 4, 10    Percent of Wound/Ulcer Debrided: 100%    Total Surface Area Debrided:  30 sq cm     Estimated Blood Loss:  Minimal  Hemostasis Achieved:  by pressure    Procedural Pain:  4  / 10   Post Procedural Pain:  5 / 10     Response to treatment:  Well tolerated by patient. A culture was done. Plan:     Pt is not a smoker   - Discussed relationship of smoking and negative affects on wound healing   - Emphasized importance of tobacco avoidace/cessation   - Script for nicotine patch given to patient   - Information regarding support groups for smoking cessation given    In my professional opinion and based off the information that is available at this time this patient has appropriate indication for HBO Therapy: Maybe    Treatment Note please see attached Discharge Instructions    Written patient dismissal instructions given to patient and signed by patient or POA.          Discharge Instructions         Visit Discharge/Physician Orders     Discharge condition: Stable     Assessment of pain at discharge:  minimal     Anesthetic used: 4% lidocaine solution     Discharge to: Home     Left via:Private automobile     Accompanied by: accompanied by SELF     [facility]F/[facility]:  [facility].      Dressing Orders: Murel Blonder bilateral heel/toe ulcers with NSS, apply hydrofera blue and dry dressing change daily     TO LEFT LEG ULCER, CLEAN WITH SALINE AND APPLY SANTYL TO WOUND BED  MAY APPLY ALGINATE TO LIGIA WOUND IF MACERATEDTHEN  WOUND VAC AT 1255MMHG CHANGE Monday Wednesday friday.  IF VAC MALFUNCTIONS APPLY WET TO DRY DRESSING UNTIL VAC CAN BE REPLACED        APPLY PREVALON BOOT TO BILATERAL HEELS     Physical therapy for ambulating safely while wearing offloading boots as indicated.           Wound measurements:        Wound 01/05/22 Heel Left #1 (Active)   Wound Image   05/11/22 0908   Wound Etiology Diabetic Campbell 2 01/05/22 0943   Dressing Status New dressing applied 05/04/22 1051   Wound Cleansed Cleansed with saline 05/04/22 1051   Dressing/Treatment Alginate;Dry dressing;ABD 05/04/22 1051   Offloading for Diabetic Foot Ulcers Post op shoe 05/04/22 1051   Wound Length (cm) 2.8 cm 05/11/22 0908   Wound Width (cm) 2.5 cm 05/11/22 0908   Wound Depth (cm) 0.2 cm 05/11/22 0908   Wound Surface Area (cm^2) 7 cm^2 05/11/22 0908   Change in Wound Size % (l*w) 49.28 05/11/22 0908   Wound Volume (cm^3) 1.4 cm^3 05/11/22 0908   Wound Healing % 49 05/11/22 0908   Post-Procedure Length (cm) 3.9 cm 05/04/22 0940   Post-Procedure Width (cm) 2.7 cm 05/04/22 0940   Post-Procedure Depth (cm) 0.3 cm 05/04/22 0940   Post-Procedure Surface Area (cm^2) 10.53 cm^2 05/04/22 0940   Post-Procedure Volume (cm^3) 3.159 cm^3 05/04/22 0940   Wound Assessment Fibrin;Pink/red 05/11/22 0908   Drainage Amount Small 05/11/22 0908   Drainage Description Yellow 05/11/22 0908   Odor None 05/11/22 0908   Ligia-wound Assessment Dry/flaky 05/11/22 0908   Wound Thickness Description not for Pressure Injury Full thickness 01/19/22 0909   Number of days: 125       Wound 01/05/22 Heel Right #2 (Active)   Wound Image   05/11/22 0908   Wound Etiology Venous 01/05/22 0943   Dressing Status New dressing applied 05/04/22 1051   Wound Cleansed Cleansed with saline 05/04/22 1051   Dressing/Treatment Alginate;Dry dressing;ABD 05/04/22 1051   Offloading for Diabetic Foot Ulcers Post op shoe 05/04/22 1051   Wound Length (cm) 2.2 cm 05/11/22 0908   Wound Width (cm) 1.5 cm 05/11/22 0908   Wound Depth (cm) 0.2 cm 05/11/22 0908   Wound Surface Area (cm^2) 3.3 cm^2 05/11/22 0908   Change in Wound Size % (l*w) 22.35 05/11/22 0908   Wound Volume (cm^3) 0.66 cm^3 05/11/22 0908   Wound Healing % 22 05/11/22 0908   Post-Procedure Length (cm) 2.8 cm 05/04/22 0940   Post-Procedure Width (cm) 1.6 cm 05/04/22 0940   Post-Procedure Depth (cm) 0.2 cm 05/04/22 0940   Post-Procedure Surface Area (cm^2) 4.48 cm^2 05/04/22 0940   Post-Procedure Volume (cm^3) 0.896 cm^3 05/04/22 0940   Wound Assessment Pink/red 05/11/22 0908   Drainage Amount Small 05/11/22 0908   Drainage Description Yellow 05/11/22 0908   Odor None 05/11/22 0908   Rashmi-wound Assessment Intact;Dry/flaky 05/11/22 0908   Wound Thickness Description not for Pressure Injury Full thickness 01/19/22 0909   Number of days: 125       Wound 03/14/22 Coccyx (Active)   Number of days: 57       Wound 03/23/22 Toe (Comment  which one) Left #10 Left Great Toe (Active)   Wound Image   05/11/22 0908   Dressing Status New dressing applied 05/04/22 1051   Wound Cleansed Cleansed with saline 05/04/22 1051   Dressing/Treatment Alginate;Dry dressing 05/04/22 1051   Offloading for Diabetic Foot Ulcers Post op shoe 05/04/22 1051   Wound Length (cm) 0.6 cm 05/11/22 0908   Wound Width (cm) 0.6 cm 05/11/22 0908   Wound Depth (cm) 0.1 cm 05/11/22 0908   Wound Surface Area (cm^2) 0.36 cm^2 05/11/22 0908   Change in Wound Size % (l*w) 97 05/11/22 0908   Wound Volume (cm^3) 0.036 cm^3 05/11/22 0908   Wound Healing % 97 05/11/22 0908   Post-Procedure Length (cm) 0.8 cm 05/04/22 0940   Post-Procedure Width (cm) 0.6 cm 05/04/22 0940   Post-Procedure Depth (cm) 0.2 cm 05/04/22 0940   Post-Procedure Surface Area (cm^2) 0.48 cm^2 05/04/22 0940   Post-Procedure Volume (cm^3) 0.096 cm^3 05/04/22 0940   Wound Assessment Devitalized tissue;Pink/red 05/11/22 0908   Drainage Amount Scant 05/11/22 0908   Drainage Description Yellow 05/11/22 0908   Odor None 05/11/22 0908   Rashmi-wound Assessment Dry/flaky 05/11/22 0908   Number of days: 49       Wound 03/23/22 Foot Left;Dorsal #11 (Active)   Wound Image   05/04/22 0908   Dressing Status New dressing applied 04/13/22 1026   Wound Cleansed Betadine/povidone iodine 04/13/22 1026   Dressing/Treatment Collagen with Ag 04/13/22 1026   Offloading for Diabetic Foot Ulcers Back offloading shoe 04/13/22 1026   Wound Length (cm) 0 cm 05/04/22 0908   Wound Width (cm) 0 cm 05/04/22 0908   Wound Depth (cm) 0 cm 05/04/22 0908   Wound Surface Area (cm^2) 0 cm^2 05/04/22 0908   Change in Wound Size % (l*w) 100 05/04/22 0908   Wound Volume (cm^3) 0 cm^3 05/04/22 0908   Wound Healing % 100 05/04/22 0908   Post-Procedure Length (cm) 0.6 cm 04/06/22 0933   Post-Procedure Width (cm) 0.6 cm 04/06/22 0933   Post-Procedure Depth (cm) 0.2 cm 04/06/22 0933   Post-Procedure Surface Area (cm^2) 0.36 cm^2 04/06/22 0933   Post-Procedure Volume (cm^3) 0.072 cm^3 04/06/22 0933   Wound Assessment Dry 04/13/22 0934   Drainage Amount None 04/13/22 0934   Drainage Description Serous 03/30/22 0859   Odor None 04/13/22 0934   Rashmi-wound Assessment Edematous;Dry/flaky 04/13/22 0934   Number of days: 49       Wound 04/13/22 Pretibial Distal;Left;Lateral #14 (Active)   Wound Image   05/11/22 0908   Dressing Status New dressing applied 05/04/22 1051   Wound Cleansed Cleansed with saline 05/04/22 1051   Dressing/Treatment Alginate;Dry dressing 05/04/22 1051   Offloading for Diabetic Foot Ulcers Post op shoe 05/04/22 1051   Wound Length (cm) 1.2 cm 05/11/22 0908   Wound Width (cm) 0.6 cm 05/11/22 0908   Wound Depth (cm) 0.2 cm 05/11/22 0908   Wound Surface Area (cm^2) 0.72 cm^2 05/11/22 0908   Change in Wound Size % (l*w) 92.5 05/11/22 0908   Wound Volume (cm^3) 0.144 cm^3 05/11/22 0908   Wound Healing % 85 05/11/22 0908   Post-Procedure Length (cm) 1.1 cm 05/04/22 0940   Post-Procedure Width (cm) 2.3 cm 05/04/22 0940   Post-Procedure Depth (cm) 0.3 cm 05/04/22 0940   Post-Procedure Surface Area (cm^2) 2.53 cm^2 05/04/22 0940   Post-Procedure Volume (cm^3) 0.759 cm^3 05/04/22 0940   Wound Assessment Fibrin 05/11/22 0908   Drainage Amount Small 05/11/22 0908   Drainage Description Yellow 05/11/22 0908   Odor None 05/11/22 0908   Rashmi-wound Assessment Dry/flaky; Intact 05/11/22 0908   Number of days: 27       Wound 04/27/22 Tibial Left;Posterior #15 (Active)   Wound Image   05/11/22 0908   Dressing Status New dressing applied 05/04/22 1051   Wound Cleansed Cleansed with saline 05/04/22 1051   Dressing/Treatment Alginate;ABD;Dry dressing 05/04/22 1051   Offloading for Diabetic Foot Ulcers Post op shoe 05/04/22 1051   Wound Length (cm) 3.8 cm 05/11/22 0908   Wound Width (cm) 3.3 cm 05/11/22 0908   Wound Depth (cm) 0.8 cm 05/11/22 0908   Wound Surface Area (cm^2) 12.54 cm^2 05/11/22 0908   Change in Wound Size % (l*w) 10.68 05/11/22 0908   Wound Volume (cm^3) 10.032 cm^3 05/11/22 0908   Wound Healing % -43 05/11/22 0908   Post-Procedure Length (cm) 3.8 cm 05/04/22 0940   Post-Procedure Width (cm) 3.7 cm 05/04/22 0940   Post-Procedure Depth (cm) 0.8 cm 05/04/22 0940   Post-Procedure Surface Area (cm^2) 14.06 cm^2 05/04/22 0940   Post-Procedure Volume (cm^3) 11.248 cm^3 05/04/22 0940   Wound Assessment Fibrin;Pink/red;Devitalized tissue 05/11/22 0908   Drainage Amount Moderate 05/11/22 0908   Drainage Description Serosanguinous 05/11/22 0908   Odor None 05/11/22 0908   Rashmi-wound Assessment Fragile;Edematous 05/11/22 0908   Number of days: 13       Wound 05/11/22 Leg Left;Medial;Lower #16 (Active)   Wound Image   05/11/22 0908   Wound Length (cm) 5 cm 05/11/22 0908   Wound Width (cm) 5 cm 05/11/22 0908   Wound Depth (cm) 0.1 cm 05/11/22 0908   Wound Surface Area (cm^2) 25 cm^2 05/11/22 0908   Wound Volume (cm^3) 2.5 cm^3 05/11/22 0908   Wound Assessment Pink/red 05/11/22 0908   Drainage Amount Small 05/11/22 0908   Drainage Description Serosanguinous 05/11/22 0908   Odor None 05/11/22 0908   Rashmi-wound Assessment Edematous;Dry/flaky 05/11/22 0908   Number of days: 0                      [facility] followup visit ________1 weeks with Dr. Gastelum_____________________  (Please note your next appointment above and if you are unable to keep, kindly give a 24 hour notice.  Thank you.)     Physician signature:__________________________        If you experience any of the following, please call the [facility] during business hours:     * Increase in Pain  * Temperature over 101  * Increase in drainage from your wound  * Drainage with a foul odor  * Bleeding  * Increase in swelling  * Need for compression bandage changes due to slippage, breakthrough drainage.     If you need medical attention outside of the business hours of the [facility] please contact your PCP or go to the nearest emergency room.                                Electronically signed by Kamlesh Hagen DPM on 5/11/2022 at 9:43 AM

## 2022-05-11 NOTE — PLAN OF CARE
Problem: Chronic Conditions and Co-morbidities  Goal: Patient's chronic conditions and co-morbidity symptoms are monitored and maintained or improved  Outcome: Progressing     Problem: Pain  Goal: Verbalizes/displays adequate comfort level or baseline comfort level  Outcome: Progressing     Problem: Wound:  Goal: Will show signs of wound healing; wound closure and no evidence of infection  Description: Will show signs of wound healing; wound closure and no evidence of infection  Outcome: Progressing     Problem: Venous:  Goal: Signs of wound healing will improve  Description: Signs of wound healing will improve  Outcome: Progressing     Problem: Blood Glucose:  Goal: Ability to maintain appropriate glucose levels will improve  Description: Ability to maintain appropriate glucose levels will improve  Outcome: Progressing

## 2022-05-11 NOTE — DISCHARGE INSTR - COC
Continuity of Care Form    Patient Name: Fe Villarreal :  1932  MRN:  [id number]    Admit date:  2022  Discharge date:  ***    Code Status Order: Prior   Advance Directives:      Admitting Physician:  No admitting provider for patient encounter. PCP: Elba Bach MD    Discharging Nurse: [facility] Unit/Room#: No information available for this encounter. Discharging Unit Phone Number: ***    Emergency Contact:   Extended Emergency Contact Information  Primary Emergency Contact: Ngalda Hodgkin [place] [address] Phone: [phone]  Mobile Phone: [phone]  Relation: Child   needed? No  Secondary Emergency Contact: Claude Lizarraga [place] [address] Phone: [phone]  Relation: Child    Past Surgical History:  Past Surgical History:   Procedure Laterality Date    [facility]  PICC [address] SINGLE  2021         MASTECTOMY      TOE AMPUTATION      TOE SURGERY      TONSILLECTOMY         Immunization History:   Immunization History   Administered Date(s) Administered    Influenza Virus Vaccine 10/23/2016    Pneumococcal Conjugate Vaccine 2013    Td, unspecified formulation 2012       Active Problems:  Patient Active Problem List   Diagnosis Code    HTN (hypertension) I10    Breast cancer, male ([facility] C50.929    Obesity (BMI 30.0-34. 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 30-59 (HCC) E10.22, N18.30    Cellulitis and abscess of right lower extremity L03.115, L02.415    Cellulitis and abscess of left lower extremity L03.116, L02.416    Chronic venous insufficiency of lower extremity I87.2    Acute hypoxemic respiratory failure due to COVID-19 ([facility]) U07.1, J96.01    Acute on chronic respiratory failure with hypoxia ([facility]) J96.21    Acute respiratory failure with hypoxia ([facility]) J96.01    COVID-19 virus infection U07.1    Hyperlipidemia E78.5    Pneumonia due to COVID-19 virus U07.1, J12.82    Orthostatic hypotension I95.1    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 ([facility]) I70.244    PVD (peripheral vascular disease) ([facility]) I73.9    PAD (peripheral artery disease) ([facility]) I73.9    Diabetic ulcer of right heel associated with type 2 diabetes mellitus, with fat layer exposed ([facility] E11.621, L97.412    Diabetic ulcer of left heel ([facility]) E11.621, L97.429    Sepsis ([facility] A41.9    Diabetic foot infection ([facility]) E11.628, L08.9       Isolation/Infection:   Isolation            No Isolation          Patient Infection Status       Infection Onset Added Last Indicated Last Indicated By Review Planned Expiration Resolved Resolved By    None active    Resolved    COVID-19 (Rule Out) 22 COVID-19, Rapid (Ordered)   22 Rule-Out Test Resulted    MRSA 21 Culture, Blood 2   03/15/22 Lexa Romero RN    MRSA blood 2021    COVID-19 21 Covid-19 Ambulatory   21     COVID-19 (Rule Out) 21 COVID-19, Rapid (Ordered)   21 Rule-Out Test Resulted    COVID-19 (Rule Out) 10/16/20 10/16/20 10/16/20 Covid-19 Ambulatory (Ordered)   10/17/20 Rule-Out Test Resulted    COVID-19 (Rule Out) 20 Covid-19 Ambulatory (Ordered)   20 Rule-Out Test Resulted    COVID-19 (Rule Out) 20 Covid-19 Ambulatory (Ordered)   20 Rule-Out Test Resulted    COVID-19 (Rule Out) 20 Covid-19 Ambulatory (Ordered)   20 Rule-Out Test Resulted            Nurse Assessment:  Last Vital Signs: BP (!) 142/68   Pulse 97   Temp 96.5 °F (35.8 °C) (Temporal)   Resp 18     Last documented pain score (0-10 scale): Pain Level: 7  Last Weight:   Wt Readings from Last 1 Encounters:   22 237 lb (107.5 kg)     Mental Status:  {IP PT MENTAL STATUS:28311}    IV Access:  { AGUSTINA IV ACCESS:726685354}    Nursing Mobility/ADLs:  Walking   {CHP DME NISQ:216082088}  Transfer  {CHP DME BLWC:978907784}  Bathing  {CHP DME LNWT:546386373}  Dressing  {CHP DME EVNC:732185967}  Toileting  {CHP DME WECI:270668553}  Feeding  {CHP DME PBMP:082480239}  Med Admin  {CHP DME VUB}  Med Delivery   { AGUSTINA MED Delivery:448476537}    Wound Care Documentation and Therapy:  Wound 22 Heel Left #1 (Active)   Wound Image   22 0908   Wound Etiology Diabetic Campbell 2 22 0943   Dressing Status New dressing applied 22 1051   Wound Cleansed Cleansed with saline 22 1051   Dressing/Treatment Alginate;Dry dressing;ABD 22 1051   Offloading for Diabetic Foot Ulcers Post op shoe 22 1051   Wound Length (cm) 2.8 cm 22 0908   Wound Width (cm) 2.5 cm 22 0908   Wound Depth (cm) 0.2 cm 22 0908   Wound Surface Area (cm^2) 7 cm^2 22 0908   Change in Wound Size % (l*w) 49.28 22 0908   Wound Volume (cm^3) 1.4 cm^3 22 0908   Wound Healing % 49 22 0908   Post-Procedure Length (cm) 3.9 cm 22 0940   Post-Procedure Width (cm) 2.7 cm 22 0940   Post-Procedure Depth (cm) 0.3 cm 22 0940   Post-Procedure Surface Area (cm^2) 10.53 cm^2 2240   Post-Procedure Volume (cm^3) 3.159 cm^3 22   Wound Assessment Fibrin;Pink/red 22   Drainage Amount Small 22   Drainage Description Yellow 22   Odor None 22   Rashmi-wound Assessment Dry/flaky 22   Wound Thickness Description not for Pressure Injury Full thickness 22 Number of days: 125       Wound 01/05/22 Heel Right #2 (Active)   Wound Image   05/11/22 0908   Wound Etiology Venous 01/05/22 0943   Dressing Status New dressing applied 05/04/22 1051   Wound Cleansed Cleansed with saline 05/04/22 1051   Dressing/Treatment Alginate;Dry dressing;ABD 05/04/22 1051   Offloading for Diabetic Foot Ulcers Post op shoe 05/04/22 1051   Wound Length (cm) 2.2 cm 05/11/22 0908   Wound Width (cm) 1.5 cm 05/11/22 0908   Wound Depth (cm) 0.2 cm 05/11/22 0908   Wound Surface Area (cm^2) 3.3 cm^2 05/11/22 0908   Change in Wound Size % (l*w) 22.35 05/11/22 0908   Wound Volume (cm^3) 0.66 cm^3 05/11/22 0908   Wound Healing % 22 05/11/22 0908   Post-Procedure Length (cm) 2.8 cm 05/04/22 0940   Post-Procedure Width (cm) 1.6 cm 05/04/22 0940   Post-Procedure Depth (cm) 0.2 cm 05/04/22 0940   Post-Procedure Surface Area (cm^2) 4.48 cm^2 05/04/22 0940   Post-Procedure Volume (cm^3) 0.896 cm^3 05/04/22 0940   Wound Assessment Pink/red 05/11/22 0908   Drainage Amount Small 05/11/22 0908   Drainage Description Yellow 05/11/22 0908   Odor None 05/11/22 0908   Rashmi-wound Assessment Intact;Dry/flaky 05/11/22 0908   Wound Thickness Description not for Pressure Injury Full thickness 01/19/22 0909   Number of days: 125       Wound 03/14/22 Coccyx (Active)   Number of days: 57       Wound 03/23/22 Toe (Comment  which one) Left #10 Left Great Toe (Active)   Wound Image   05/11/22 0908   Dressing Status New dressing applied 05/04/22 1051   Wound Cleansed Cleansed with saline 05/04/22 1051   Dressing/Treatment Alginate;Dry dressing 05/04/22 1051   Offloading for Diabetic Foot Ulcers Post op shoe 05/04/22 1051   Wound Length (cm) 0.6 cm 05/11/22 0908   Wound Width (cm) 0.6 cm 05/11/22 0908   Wound Depth (cm) 0.1 cm 05/11/22 0908   Wound Surface Area (cm^2) 0.36 cm^2 05/11/22 0908   Change in Wound Size % (l*w) 97 05/11/22 0908   Wound Volume (cm^3) 0.036 cm^3 05/11/22 0908   Wound Healing % 97 05/11/22 0908 Post-Procedure Length (cm) 0.8 cm 05/04/22 0940   Post-Procedure Width (cm) 0.6 cm 05/04/22 0940   Post-Procedure Depth (cm) 0.2 cm 05/04/22 0940   Post-Procedure Surface Area (cm^2) 0.48 cm^2 05/04/22 0940   Post-Procedure Volume (cm^3) 0.096 cm^3 05/04/22 0940   Wound Assessment Devitalized tissue;Pink/red 05/11/22 0908   Drainage Amount Scant 05/11/22 0908   Drainage Description Yellow 05/11/22 0908   Odor None 05/11/22 0908   Rashmi-wound Assessment Dry/flaky 05/11/22 0908   Number of days: 49       Wound 03/23/22 Foot Left;Dorsal #11 (Active)   Wound Image   05/04/22 0908   Dressing Status New dressing applied 04/13/22 1026   Wound Cleansed Betadine/povidone iodine 04/13/22 1026   Dressing/Treatment Collagen with Ag 04/13/22 1026   Offloading for Diabetic Foot Ulcers Back offloading shoe 04/13/22 1026   Wound Length (cm) 0 cm 05/04/22 0908   Wound Width (cm) 0 cm 05/04/22 0908   Wound Depth (cm) 0 cm 05/04/22 0908   Wound Surface Area (cm^2) 0 cm^2 05/04/22 0908   Change in Wound Size % (l*w) 100 05/04/22 0908   Wound Volume (cm^3) 0 cm^3 05/04/22 0908   Wound Healing % 100 05/04/22 0908   Post-Procedure Length (cm) 0.6 cm 04/06/22 0933   Post-Procedure Width (cm) 0.6 cm 04/06/22 0933   Post-Procedure Depth (cm) 0.2 cm 04/06/22 0933   Post-Procedure Surface Area (cm^2) 0.36 cm^2 04/06/22 0933   Post-Procedure Volume (cm^3) 0.072 cm^3 04/06/22 0933   Wound Assessment Dry 04/13/22 0934   Drainage Amount None 04/13/22 0934   Drainage Description Serous 03/30/22 0859   Odor None 04/13/22 0934   Rashmi-wound Assessment Edematous;Dry/flaky 04/13/22 0934   Number of days: 49       Wound 04/13/22 Pretibial Distal;Left;Lateral #14 (Active)   Wound Image   05/11/22 0908   Dressing Status New dressing applied 05/04/22 1051   Wound Cleansed Cleansed with saline 05/04/22 1051   Dressing/Treatment Alginate;Dry dressing 05/04/22 1051   Offloading for Diabetic Foot Ulcers Post op shoe 05/04/22 1051   Wound Length (cm) 1.2 cm 05/11/22 0908   Wound Width (cm) 0.6 cm 05/11/22 0908   Wound Depth (cm) 0.2 cm 05/11/22 0908   Wound Surface Area (cm^2) 0.72 cm^2 05/11/22 0908   Change in Wound Size % (l*w) 92.5 05/11/22 0908   Wound Volume (cm^3) 0.144 cm^3 05/11/22 0908   Wound Healing % 85 05/11/22 0908   Post-Procedure Length (cm) 1.1 cm 05/04/22 0940   Post-Procedure Width (cm) 2.3 cm 05/04/22 0940   Post-Procedure Depth (cm) 0.3 cm 05/04/22 0940   Post-Procedure Surface Area (cm^2) 2.53 cm^2 05/04/22 0940   Post-Procedure Volume (cm^3) 0.759 cm^3 05/04/22 0940   Wound Assessment Fibrin 05/11/22 0908   Drainage Amount Small 05/11/22 0908   Drainage Description Yellow 05/11/22 0908   Odor None 05/11/22 0908   Rashmi-wound Assessment Dry/flaky; Intact 05/11/22 0908   Number of days: 27       Wound 04/27/22 Tibial Left;Posterior #15 (Active)   Wound Image   05/11/22 0908   Dressing Status New dressing applied 05/04/22 1051   Wound Cleansed Cleansed with saline 05/04/22 1051   Dressing/Treatment Alginate;ABD;Dry dressing 05/04/22 1051   Offloading for Diabetic Foot Ulcers Post op shoe 05/04/22 1051   Wound Length (cm) 3.8 cm 05/11/22 0908   Wound Width (cm) 3.3 cm 05/11/22 0908   Wound Depth (cm) 0.8 cm 05/11/22 0908   Wound Surface Area (cm^2) 12.54 cm^2 05/11/22 0908   Change in Wound Size % (l*w) 10.68 05/11/22 0908   Wound Volume (cm^3) 10.032 cm^3 05/11/22 0908   Wound Healing % -43 05/11/22 0908   Post-Procedure Length (cm) 3.8 cm 05/04/22 0940   Post-Procedure Width (cm) 3.7 cm 05/04/22 0940   Post-Procedure Depth (cm) 0.8 cm 05/04/22 0940   Post-Procedure Surface Area (cm^2) 14.06 cm^2 05/04/22 0940   Post-Procedure Volume (cm^3) 11.248 cm^3 05/04/22 0940   Wound Assessment Fibrin;Pink/red;Devitalized tissue 05/11/22 0908   Drainage Amount Moderate 05/11/22 0908   Drainage Description Serosanguinous 05/11/22 0908   Odor None 05/11/22 0908   Rashmi-wound Assessment Fragile;Edematous 05/11/22 0908   Number of days: 13       Wound 05/11/22 Leg Left;Medial;Lower #16 (Active)   Wound Image   22 09   Wound Length (cm) 5 cm 22 09   Wound Width (cm) 5 cm 22 09   Wound Depth (cm) 0.1 cm 22 09   Wound Surface Area (cm^2) 25 cm^2 22 0908   Wound Volume (cm^3) 2.5 cm^3 22 09   Wound Assessment Pink/red 22   Drainage Amount Small 22   Drainage Description Serosanguinous 22   Odor None 22   Rashmi-wound Assessment Edematous;Dry/flaky 22   Number of days: 0        Elimination:  Continence: Bowel: {YES / PX:49765}  Bladder: {YES / ES:20089}  Urinary Catheter: {Urinary Catheter:538618009}   Colostomy/Ileostomy/Ileal Conduit: {YES / T}       Date of Last BM: ***  No intake or output data in the 24 hours ending 22  No intake/output data recorded.     Safety Concerns:     [employer] Safety Concerns:681588147}    Impairments/Disabilities:      [employer] Impairments/Disabilities:236510405}    Nutrition Therapy:  Current Nutrition Therapy:   [employer] Diet List:769116355}    Routes of Feeding: {CHP DME Other Feedings:501545780}  Liquids: {Slp liquid thickness:82910}  Daily Fluid Restriction: {CHP DME Yes amt example:336151103}  Last Modified Barium Swallow with Video (Video Swallowing Test): {Done Not Done QCFV:553674560}    Treatments at the Time of Hospital Discharge:   Respiratory Treatments: ***  Oxygen Therapy:  {Therapy; copd oxygen:44070}  Ventilator:    { CC Vent HTPA:976319919}    Rehab Therapies: {THERAPEUTIC INTERVENTION:6858646366}  Weight Bearing Status/Restrictions: [employer] Weight Bearin}  Other Medical Equipment (for information only, NOT a DME order):  {EQUIPMENT:424909430}  Other Treatments: ***    Patient's personal belongings (please select all that are sent with patient):  {CHP DME Belongings:118664537}    RN SIGNATURE:  {Esignature:109473552}    CASE MANAGEMENT/SOCIAL WORK SECTION    Inpatient Status Date: ***    Readmission Risk Assessment Score:  Readmission Risk              Risk of Unplanned Readmission:  0           Discharging to Facility/ Agency   Name:   Address:  Phone:  Fax:    Dialysis Facility (if applicable)   Name:  Address:  Dialysis Schedule:  Phone:  Fax:    / signature: {Esignature:907396138}    PHYSICIAN SECTION    Prognosis: {Prognosis:8261333165}    Condition at Discharge: Tani Gill Patient Condition:557054419}    Rehab Potential (if transferring to Rehab): {Prognosis:4155325995}    Recommended Labs or Other Treatments After Discharge: ***    Physician Certification: I certify the above information and transfer of Joanna Grimm.  is necessary for the continuing treatment of the diagnosis listed and that he requires {Admit to Appropriate Level of Care:51801} for {GREATER/LESS:051590006} 30 days.      Update Admission H&P: {CHP DME Changes in UNZEQ:337957935}    PHYSICIAN SIGNATURE:  {Esignature:550680068}

## 2022-05-15 ENCOUNTER — APPOINTMENT (OUTPATIENT)
Dept: GENERAL RADIOLOGY | Age: 87
DRG: 638 | End: 2022-05-15
Payer: MEDICARE

## 2022-05-15 ENCOUNTER — HOSPITAL ENCOUNTER (INPATIENT)
Age: 87
LOS: 2 days | Discharge: NURSING, REHAB OR LONG-TERM CARE | DRG: 638 | End: 2022-05-17
Attending: EMERGENCY MEDICINE | Admitting: INTERNAL MEDICINE
Payer: MEDICARE

## 2022-05-15 DIAGNOSIS — D72.829 LEUKOCYTOSIS, UNSPECIFIED TYPE: ICD-10-CM

## 2022-05-15 DIAGNOSIS — B87.9 INFESTATION BY MAGGOTS: Primary | ICD-10-CM

## 2022-05-15 PROBLEM — E13.621 FOOT ULCER DUE TO SECONDARY DM (HCC): Status: ACTIVE | Noted: 2022-05-15

## 2022-05-15 PROBLEM — L97.509 FOOT ULCER DUE TO SECONDARY DM (HCC): Status: ACTIVE | Noted: 2022-05-15

## 2022-05-15 LAB
ANION GAP SERPL CALCULATED.3IONS-SCNC: 8 MMOL/L (ref 7–16)
BASOPHILS ABSOLUTE: 0.03 E9/L (ref 0–0.2)
BASOPHILS RELATIVE PERCENT: 0.2 % (ref 0–2)
BUN BLDV-MCNC: 41 MG/DL (ref 6–23)
CALCIUM SERPL-MCNC: 8.6 MG/DL (ref 8.6–10.2)
CHLORIDE BLD-SCNC: 102 MMOL/L (ref 98–107)
CO2: 29 MMOL/L (ref 22–29)
CREAT SERPL-MCNC: 1 MG/DL (ref 0.7–1.2)
EOSINOPHILS ABSOLUTE: 0.79 E9/L (ref 0.05–0.5)
EOSINOPHILS RELATIVE PERCENT: 5.5 % (ref 0–6)
GFR AFRICAN AMERICAN: >60
GFR NON-AFRICAN AMERICAN: >60 ML/MIN/1.73
GLUCOSE BLD-MCNC: 58 MG/DL (ref 74–99)
HCT VFR BLD CALC: 30.9 % (ref 37–54)
HEMOGLOBIN: 9.5 G/DL (ref 12.5–16.5)
IMMATURE GRANULOCYTES #: 0.07 E9/L
IMMATURE GRANULOCYTES %: 0.5 % (ref 0–5)
LACTIC ACID, SEPSIS: 0.9 MMOL/L (ref 0.5–1.9)
LYMPHOCYTES ABSOLUTE: 2.58 E9/L (ref 1.5–4)
LYMPHOCYTES RELATIVE PERCENT: 17.9 % (ref 20–42)
MCH RBC QN AUTO: 25.5 PG (ref 26–35)
MCHC RBC AUTO-ENTMCNC: 30.7 % (ref 32–34.5)
MCV RBC AUTO: 82.8 FL (ref 80–99.9)
METER GLUCOSE: 133 MG/DL (ref 74–99)
MONOCYTES ABSOLUTE: 1.03 E9/L (ref 0.1–0.95)
MONOCYTES RELATIVE PERCENT: 7.2 % (ref 2–12)
NEUTROPHILS ABSOLUTE: 9.88 E9/L (ref 1.8–7.3)
NEUTROPHILS RELATIVE PERCENT: 68.7 % (ref 43–80)
PDW BLD-RTO: 16.6 FL (ref 11.5–15)
PLATELET # BLD: 237 E9/L (ref 130–450)
PMV BLD AUTO: 9 FL (ref 7–12)
POTASSIUM REFLEX MAGNESIUM: 4.1 MMOL/L (ref 3.5–5)
RBC # BLD: 3.73 E12/L (ref 3.8–5.8)
SEDIMENTATION RATE, ERYTHROCYTE: 63 MM/HR (ref 0–15)
SODIUM BLD-SCNC: 139 MMOL/L (ref 132–146)
WBC # BLD: 14.4 E9/L (ref 4.5–11.5)

## 2022-05-15 PROCEDURE — 2060000000 HC ICU INTERMEDIATE R&B

## 2022-05-15 PROCEDURE — 99285 EMERGENCY DEPT VISIT HI MDM: CPT

## 2022-05-15 PROCEDURE — 6370000000 HC RX 637 (ALT 250 FOR IP): Performed by: INTERNAL MEDICINE

## 2022-05-15 PROCEDURE — 82962 GLUCOSE BLOOD TEST: CPT

## 2022-05-15 PROCEDURE — 83605 ASSAY OF LACTIC ACID: CPT

## 2022-05-15 PROCEDURE — 73590 X-RAY EXAM OF LOWER LEG: CPT

## 2022-05-15 PROCEDURE — 80048 BASIC METABOLIC PNL TOTAL CA: CPT

## 2022-05-15 PROCEDURE — 87070 CULTURE OTHR SPECIMN AEROBIC: CPT

## 2022-05-15 PROCEDURE — 87040 BLOOD CULTURE FOR BACTERIA: CPT

## 2022-05-15 PROCEDURE — 85651 RBC SED RATE NONAUTOMATED: CPT

## 2022-05-15 PROCEDURE — 85025 COMPLETE CBC W/AUTO DIFF WBC: CPT

## 2022-05-15 PROCEDURE — 2580000003 HC RX 258: Performed by: INTERNAL MEDICINE

## 2022-05-15 RX ORDER — SENNA PLUS 8.6 MG/1
1 TABLET ORAL DAILY PRN
Status: DISCONTINUED | OUTPATIENT
Start: 2022-05-15 | End: 2022-05-17 | Stop reason: HOSPADM

## 2022-05-15 RX ORDER — GABAPENTIN 100 MG/1
200 CAPSULE ORAL
Status: DISCONTINUED | OUTPATIENT
Start: 2022-05-15 | End: 2022-05-17 | Stop reason: HOSPADM

## 2022-05-15 RX ORDER — SODIUM HYPOCHLORITE 5 MG/ML
SOLUTION TOPICAL DAILY
Status: DISCONTINUED | OUTPATIENT
Start: 2022-05-16 | End: 2022-05-17 | Stop reason: HOSPADM

## 2022-05-15 RX ORDER — SODIUM CHLORIDE 0.9 % (FLUSH) 0.9 %
10 SYRINGE (ML) INJECTION EVERY 12 HOURS SCHEDULED
Status: DISCONTINUED | OUTPATIENT
Start: 2022-05-15 | End: 2022-05-17 | Stop reason: HOSPADM

## 2022-05-15 RX ORDER — SODIUM CHLORIDE 0.9 % (FLUSH) 0.9 %
10 SYRINGE (ML) INJECTION PRN
Status: DISCONTINUED | OUTPATIENT
Start: 2022-05-15 | End: 2022-05-17 | Stop reason: HOSPADM

## 2022-05-15 RX ORDER — DEXTROSE MONOHYDRATE 50 MG/ML
100 INJECTION, SOLUTION INTRAVENOUS PRN
Status: DISCONTINUED | OUTPATIENT
Start: 2022-05-15 | End: 2022-05-17 | Stop reason: HOSPADM

## 2022-05-15 RX ORDER — INSULIN LISPRO 100 [IU]/ML
0-6 INJECTION, SOLUTION INTRAVENOUS; SUBCUTANEOUS NIGHTLY
Status: DISCONTINUED | OUTPATIENT
Start: 2022-05-15 | End: 2022-05-17 | Stop reason: HOSPADM

## 2022-05-15 RX ORDER — ENOXAPARIN SODIUM 100 MG/ML
30 INJECTION SUBCUTANEOUS 2 TIMES DAILY
Status: DISCONTINUED | OUTPATIENT
Start: 2022-05-15 | End: 2022-05-17

## 2022-05-15 RX ORDER — SODIUM CHLORIDE 9 MG/ML
INJECTION, SOLUTION INTRAVENOUS PRN
Status: DISCONTINUED | OUTPATIENT
Start: 2022-05-15 | End: 2022-05-17 | Stop reason: HOSPADM

## 2022-05-15 RX ORDER — ACETAMINOPHEN 325 MG/1
650 TABLET ORAL EVERY 6 HOURS PRN
Status: DISCONTINUED | OUTPATIENT
Start: 2022-05-15 | End: 2022-05-17 | Stop reason: HOSPADM

## 2022-05-15 RX ORDER — INSULIN LISPRO 100 [IU]/ML
0-12 INJECTION, SOLUTION INTRAVENOUS; SUBCUTANEOUS
Status: DISCONTINUED | OUTPATIENT
Start: 2022-05-16 | End: 2022-05-17 | Stop reason: HOSPADM

## 2022-05-15 RX ORDER — POTASSIUM CHLORIDE 20 MEQ/1
40 TABLET, EXTENDED RELEASE ORAL PRN
Status: DISCONTINUED | OUTPATIENT
Start: 2022-05-15 | End: 2022-05-17 | Stop reason: HOSPADM

## 2022-05-15 RX ORDER — ACETAMINOPHEN 650 MG/1
650 SUPPOSITORY RECTAL EVERY 6 HOURS PRN
Status: DISCONTINUED | OUTPATIENT
Start: 2022-05-15 | End: 2022-05-17 | Stop reason: HOSPADM

## 2022-05-15 RX ORDER — POTASSIUM CHLORIDE 7.45 MG/ML
10 INJECTION INTRAVENOUS PRN
Status: DISCONTINUED | OUTPATIENT
Start: 2022-05-15 | End: 2022-05-17 | Stop reason: HOSPADM

## 2022-05-15 RX ADMIN — Medication 10 ML: at 22:58

## 2022-05-15 RX ADMIN — ACETAMINOPHEN 650 MG: 325 TABLET ORAL at 21:37

## 2022-05-15 ASSESSMENT — ENCOUNTER SYMPTOMS
NAUSEA: 0
BACK PAIN: 0
SHORTNESS OF BREATH: 0
VOMITING: 0
EYE PAIN: 0
EYE REDNESS: 0
DIARRHEA: 0
EYE DISCHARGE: 0
COUGH: 0
SINUS PRESSURE: 0
ABDOMINAL PAIN: 0
WHEEZING: 0
SORE THROAT: 0

## 2022-05-15 ASSESSMENT — PAIN DESCRIPTION - ORIENTATION: ORIENTATION: RIGHT;LEFT

## 2022-05-15 ASSESSMENT — PAIN DESCRIPTION - DESCRIPTORS: DESCRIPTORS: SHARP

## 2022-05-15 ASSESSMENT — PAIN - FUNCTIONAL ASSESSMENT: PAIN_FUNCTIONAL_ASSESSMENT: NONE - DENIES PAIN

## 2022-05-15 ASSESSMENT — PAIN DESCRIPTION - LOCATION: LOCATION: HIP

## 2022-05-15 ASSESSMENT — PAIN SCALES - GENERAL: PAINLEVEL_OUTOF10: 8

## 2022-05-15 NOTE — ED PROVIDER NOTES
The history is provided by the patient and medical records. 60-year-old male presents emergency department complaint of maggots to his left lower leg wound per EMS. Patient states last seen wound care on Wednesday approximately 5 days ago. Was with stable wound with no signs infection at that time. States he has had several family members visit MRI the past couple days and has been outside more frequently. Was noted today by nursing staff it is nursing home to have maggots in his wound. Due to this I sent him into the emergency department. He otherwise denies any fever chills cough just runny nose denies any chest pain shortness of breath abdominal pain change in bowel bladder habits no other acute complaints. The patient presents with maggots in wound that has been going on for 1 day. These symptoms are moderate in severity. Symptoms are made better by nothnig. Symptoms are made worse by nothng. Associated symptoms include none. Review of Systems   Constitutional: Negative for chills and fever. HENT: Negative for ear pain, sinus pressure and sore throat. Eyes: Negative for pain, discharge and redness. Respiratory: Negative for cough, shortness of breath and wheezing. Cardiovascular: Negative for chest pain. Gastrointestinal: Negative for abdominal pain, diarrhea, nausea and vomiting. Genitourinary: Negative for dysuria and frequency. Musculoskeletal: Negative for arthralgias and back pain. Skin: Positive for wound. Negative for rash. Neurological: Negative for weakness and headaches. Hematological: Negative for adenopathy. All other systems reviewed and are negative. Physical Exam  Vitals and nursing note reviewed. Constitutional:       General: He is not in acute distress. Appearance: Normal appearance. He is well-developed. He is not toxic-appearing. HENT:      Head: Normocephalic and atraumatic.    Eyes:      Conjunctiva/sclera: Conjunctivae normal. Cardiovascular:      Rate and Rhythm: Normal rate and regular rhythm. Heart sounds: Normal heart sounds. No murmur heard. Pulmonary:      Effort: Pulmonary effort is normal. No respiratory distress. Breath sounds: Normal breath sounds. No wheezing or rales. Abdominal:      General: Bowel sounds are normal.      Palpations: Abdomen is soft. Tenderness: There is no abdominal tenderness. There is no guarding or rebound. Musculoskeletal:         General: No tenderness or deformity. Cervical back: Normal range of motion and neck supple. Comments: Wound to the left lower extremity at the left lower heel does have stable pulses sensation, does have a few small maggots noted to the left leg wound. Skin:     General: Skin is warm and dry. Coloration: Skin is not jaundiced. Neurological:      General: No focal deficit present. Mental Status: He is alert and oriented to person, place, and time. Psychiatric:         Mood and Affect: Mood normal.         Thought Content: Thought content normal.          Procedures     MDM     89-year male present emerged part complaint of maggots to his left lower extremity wounds. He does have maggots noted on examination, laboratory Significant for slight leukocytosis as well as slight elevation in sed rate, is not febrile while here. Did speak with podiatry they are agreeable with patient to be admitted here. Did speak with admitting physician was agreeable to patient to be admitted to the floor.   Patient is also agreeable this plan.                   --------------------------------------------- PAST HISTORY ---------------------------------------------  Past Medical History:  has a past medical history of Arthritis, Cancer ([facility] Cellulitis, CHF (congestive heart failure) ([facility] CKD (chronic kidney disease) stage 3, GFR 30-59 ml/min ([facility] Diabetes mellitus ([facility] History of lumpectomy, Hx of blood clots, Hyperlipidemia, Hypertension, Left ventricular failure ([facility] Macular degeneration, Obesity, Orthostatic hypotension, PE (pulmonary thromboembolism) ([facility] Pressure injury of both heels, unstageable ([facility] Staph aureus infection, and Venous insufficiency. Past Surgical History:  has a past surgical history that includes Mastectomy; Toe Surgery; Toe amputation; Tonsillectomy; and hc  picc powerpic single (9/2/2021). Social History:  reports that he has never smoked. He has never used smokeless tobacco. He reports previous alcohol use of about 1.0 standard drink of alcohol per week. He reports that he does not use drugs. Family History: family history is not on file. The patients home medications have been reviewed.     Allergies: Clindamycin/lincomycin and Sulfa antibiotics    -------------------------------------------------- RESULTS -------------------------------------------------    LABS:  Results for orders placed or performed during the hospital encounter of 05/15/22   CBC with Auto Differential   Result Value Ref Range    WBC 14.4 (H) 4.5 - 11.5 E9/L    RBC 3.73 (L) 3.80 - 5.80 E12/L    Hemoglobin 9.5 (L) 12.5 - 16.5 g/dL    Hematocrit 30.9 (L) 37.0 - 54.0 %    MCV 82.8 80.0 - 99.9 fL    MCH 25.5 (L) 26.0 - 35.0 pg    MCHC 30.7 (L) 32.0 - 34.5 %    RDW 16.6 (H) 11.5 - 15.0 fL    Platelets 708 772 - 881 E9/L    MPV 9.0 7.0 - 12.0 fL    Neutrophils % 68.7 43.0 - 80.0 %    Immature Granulocytes % 0.5 0.0 - 5.0 %    Lymphocytes % 17.9 (L) 20.0 - 42.0 %    Monocytes % 7.2 2.0 - 12.0 %    Eosinophils % 5.5 0.0 - 6.0 %    Basophils % 0.2 0.0 - 2.0 %    Neutrophils Absolute 9.88 (H) 1.80 - 7.30 E9/L    Immature Granulocytes # 0.07 E9/L    Lymphocytes Absolute 2.58 1.50 - 4.00 E9/L    Monocytes Absolute 1.03 (H) 0.10 - 0.95 E9/L    Eosinophils Absolute 0.79 (H) 0.05 - 0.50 E9/L    Basophils Absolute 0.03 0.00 - 0.20 T4/R   Basic Metabolic Panel w/ Reflex to MG   Result Value Ref Range    Sodium 139 132 - 146 mmol/L    Potassium reflex ---------------------------------  Consultations:  . Spoke with Dr. Erin Antonio. Discussed case. They will admit the patient. This patient's ED course included: a personal history and physicial examination, re-evaluation prior to disposition, multiple bedside re-evaluations, cardiac monitoring and continuous pulse oximetry    This patient has remained hemodynamically stable during their ED course. Diagnosis:  1. Infestation by aditi        Disposition:  Patient's disposition: Admit to telemetry  Patient's condition is stable.          Srikanth Damico MD  Resident  05/15/22 [id number]

## 2022-05-16 ENCOUNTER — APPOINTMENT (OUTPATIENT)
Dept: GENERAL RADIOLOGY | Age: 87
DRG: 638 | End: 2022-05-16
Payer: MEDICARE

## 2022-05-16 ENCOUNTER — APPOINTMENT (OUTPATIENT)
Dept: ULTRASOUND IMAGING | Age: 87
DRG: 638 | End: 2022-05-16
Payer: MEDICARE

## 2022-05-16 LAB
ALBUMIN SERPL-MCNC: 2.9 G/DL (ref 3.5–5.2)
ALP BLD-CCNC: 133 U/L (ref 40–129)
ALT SERPL-CCNC: 8 U/L (ref 0–40)
ANION GAP SERPL CALCULATED.3IONS-SCNC: 6 MMOL/L (ref 7–16)
AST SERPL-CCNC: 19 U/L (ref 0–39)
BASOPHILS ABSOLUTE: 0.03 E9/L (ref 0–0.2)
BASOPHILS RELATIVE PERCENT: 0.2 % (ref 0–2)
BILIRUB SERPL-MCNC: 0.4 MG/DL (ref 0–1.2)
BUN BLDV-MCNC: 35 MG/DL (ref 6–23)
C-REACTIVE PROTEIN: 4.9 MG/DL (ref 0–0.4)
CALCIUM SERPL-MCNC: 8.8 MG/DL (ref 8.6–10.2)
CHLORIDE BLD-SCNC: 104 MMOL/L (ref 98–107)
CO2: 31 MMOL/L (ref 22–29)
CREAT SERPL-MCNC: 1 MG/DL (ref 0.7–1.2)
EOSINOPHILS ABSOLUTE: 0.67 E9/L (ref 0.05–0.5)
EOSINOPHILS RELATIVE PERCENT: 4.6 % (ref 0–6)
GFR AFRICAN AMERICAN: >60
GFR NON-AFRICAN AMERICAN: >60 ML/MIN/1.73
GLUCOSE BLD-MCNC: 95 MG/DL (ref 74–99)
HCT VFR BLD CALC: 34 % (ref 37–54)
HEMOGLOBIN: 10 G/DL (ref 12.5–16.5)
IMMATURE GRANULOCYTES #: 0.08 E9/L
IMMATURE GRANULOCYTES %: 0.5 % (ref 0–5)
LYMPHOCYTES ABSOLUTE: 1.25 E9/L (ref 1.5–4)
LYMPHOCYTES RELATIVE PERCENT: 8.5 % (ref 20–42)
MCH RBC QN AUTO: 25.1 PG (ref 26–35)
MCHC RBC AUTO-ENTMCNC: 29.4 % (ref 32–34.5)
MCV RBC AUTO: 85.2 FL (ref 80–99.9)
METER GLUCOSE: 101 MG/DL (ref 74–99)
METER GLUCOSE: 108 MG/DL (ref 74–99)
METER GLUCOSE: 141 MG/DL (ref 74–99)
METER GLUCOSE: 207 MG/DL (ref 74–99)
METER GLUCOSE: 274 MG/DL (ref 74–99)
MONOCYTES ABSOLUTE: 0.7 E9/L (ref 0.1–0.95)
MONOCYTES RELATIVE PERCENT: 4.8 % (ref 2–12)
NEUTROPHILS ABSOLUTE: 11.96 E9/L (ref 1.8–7.3)
NEUTROPHILS RELATIVE PERCENT: 81.4 % (ref 43–80)
PDW BLD-RTO: 16.6 FL (ref 11.5–15)
PLATELET # BLD: 252 E9/L (ref 130–450)
PMV BLD AUTO: 9.2 FL (ref 7–12)
POTASSIUM REFLEX MAGNESIUM: 4.6 MMOL/L (ref 3.5–5)
RBC # BLD: 3.99 E12/L (ref 3.8–5.8)
SODIUM BLD-SCNC: 141 MMOL/L (ref 132–146)
TOTAL PROTEIN: 6.7 G/DL (ref 6.4–8.3)
WBC # BLD: 14.7 E9/L (ref 4.5–11.5)

## 2022-05-16 PROCEDURE — 82962 GLUCOSE BLOOD TEST: CPT

## 2022-05-16 PROCEDURE — 97161 PT EVAL LOW COMPLEX 20 MIN: CPT

## 2022-05-16 PROCEDURE — 36415 COLL VENOUS BLD VENIPUNCTURE: CPT

## 2022-05-16 PROCEDURE — 6370000000 HC RX 637 (ALT 250 FOR IP): Performed by: PODIATRIST

## 2022-05-16 PROCEDURE — 73630 X-RAY EXAM OF FOOT: CPT

## 2022-05-16 PROCEDURE — 2580000003 HC RX 258: Performed by: INTERNAL MEDICINE

## 2022-05-16 PROCEDURE — 6370000000 HC RX 637 (ALT 250 FOR IP): Performed by: SPECIALIST

## 2022-05-16 PROCEDURE — 80053 COMPREHEN METABOLIC PANEL: CPT

## 2022-05-16 PROCEDURE — 97530 THERAPEUTIC ACTIVITIES: CPT

## 2022-05-16 PROCEDURE — 6370000000 HC RX 637 (ALT 250 FOR IP): Performed by: INTERNAL MEDICINE

## 2022-05-16 PROCEDURE — 93970 EXTREMITY STUDY: CPT

## 2022-05-16 PROCEDURE — 86140 C-REACTIVE PROTEIN: CPT

## 2022-05-16 PROCEDURE — 97165 OT EVAL LOW COMPLEX 30 MIN: CPT

## 2022-05-16 PROCEDURE — 6360000002 HC RX W HCPCS: Performed by: INTERNAL MEDICINE

## 2022-05-16 PROCEDURE — 2060000000 HC ICU INTERMEDIATE R&B

## 2022-05-16 PROCEDURE — 85025 COMPLETE CBC W/AUTO DIFF WBC: CPT

## 2022-05-16 RX ORDER — DIPHENHYDRAMINE HCL 25 MG
25 TABLET ORAL EVERY 6 HOURS PRN
Status: DISCONTINUED | OUTPATIENT
Start: 2022-05-16 | End: 2022-05-17 | Stop reason: HOSPADM

## 2022-05-16 RX ORDER — CEFDINIR 300 MG/1
300 CAPSULE ORAL EVERY 12 HOURS SCHEDULED
Status: DISCONTINUED | OUTPATIENT
Start: 2022-05-16 | End: 2022-05-17 | Stop reason: HOSPADM

## 2022-05-16 RX ORDER — LEVOFLOXACIN 500 MG/1
500 TABLET, FILM COATED ORAL DAILY
Status: DISCONTINUED | OUTPATIENT
Start: 2022-05-16 | End: 2022-05-17 | Stop reason: HOSPADM

## 2022-05-16 RX ADMIN — Medication 10 ML: at 08:55

## 2022-05-16 RX ADMIN — ACETAMINOPHEN 650 MG: 325 TABLET ORAL at 18:32

## 2022-05-16 RX ADMIN — GABAPENTIN 200 MG: 100 CAPSULE ORAL at 18:32

## 2022-05-16 RX ADMIN — LEVOFLOXACIN 500 MG: 500 TABLET, FILM COATED ORAL at 14:40

## 2022-05-16 RX ADMIN — COLLAGENASE SANTYL: 250 OINTMENT TOPICAL at 12:55

## 2022-05-16 RX ADMIN — CEFDINIR 300 MG: 300 CAPSULE ORAL at 20:19

## 2022-05-16 RX ADMIN — ENOXAPARIN SODIUM 30 MG: 100 INJECTION SUBCUTANEOUS at 20:19

## 2022-05-16 RX ADMIN — Medication 10 ML: at 20:20

## 2022-05-16 RX ADMIN — INSULIN LISPRO 3 UNITS: 100 INJECTION, SOLUTION INTRAVENOUS; SUBCUTANEOUS at 20:20

## 2022-05-16 RX ADMIN — DIPHENHYDRAMINE HCL 25 MG: 25 TABLET ORAL at 20:34

## 2022-05-16 RX ADMIN — DIPHENHYDRAMINE HCL 25 MG: 25 TABLET ORAL at 14:40

## 2022-05-16 ASSESSMENT — PAIN DESCRIPTION - DESCRIPTORS: DESCRIPTORS: DISCOMFORT;SPASM

## 2022-05-16 ASSESSMENT — PAIN DESCRIPTION - ORIENTATION: ORIENTATION: RIGHT;LEFT

## 2022-05-16 ASSESSMENT — PAIN SCALES - GENERAL: PAINLEVEL_OUTOF10: 2

## 2022-05-16 ASSESSMENT — PAIN DESCRIPTION - LOCATION: LOCATION: FOOT

## 2022-05-16 NOTE — ED NOTES
Informed Dr. Derek Mercer of pts glucose of 58. Informed to feed patient. Pt given sandwich, cookie, and orange juice with added sugar.              Madison Mcgregor RN  05/15/22 2014

## 2022-05-16 NOTE — PROGRESS NOTES
Wound / ostomy dept consulted for lower extremity wound. Podiatry is consulted. Will defer treatment to them.

## 2022-05-16 NOTE — CONSULTS
[address] [place] [address] [facility]  Consultation Note     Admit Date: 5/15/2022  4:04 PM    Reason for Consult:   Diabetic foot infection    Attending Physician:  Kulwinder Briceño MD    HISTORY OF PRESENT ILLNESS:             The history is obtained from extensive review of available past medical records. The patient is a 80 y.o. male who is previously known to the ID service. The patient has chronic wounds on his left lower extremity. He has a wound VAC on the left lateral leg and follows by podiatry at the wound care center. The patient was sent to the ED at [facility] from [address] [place] [address] on 5/15/2022 after the facility has found maggots on his left leg wound. He was afebrile and slightly tachycardic. White count was 14.4. Past Medical History:        Diagnosis Date    Arthritis     Cancer ([facility]     breast    Cellulitis     [facility]est[facility]) [facility]     CKD (chronic kidney disease) stage 3, GFR 30-59 ml/min ([facility])     Diabetes mellitus ([facility]     History of lumpectomy     Hx of blood clots     Hyperlipidemia     Hypertension     Left ventricular failure ([facility])     Macular degeneration     Obesity     Orthostatic hypotension     PE (pulmonary thromboembolism) ([facility])     Pressure injury of both heels, unstageable ([facility]     Staph aureus infection     Venous insufficiency      March 2022. Admitted to [facility] with weakness, pain and redness in the left leg associated with fever. Seen by ID for sepsis. He had cellulitis of the left leg. Cultures taken the week prior to the admission grew Citrobacter, Klebsiella and Streptococcus. He was treated with Ceftriaxone. Wound culture grew group G Streptococcus. He was discharged on oral Cephalexin. August 2021. Admitted to [facility] with shortness of breath and flank pain. Tested positive for SARS-CoV-2 and treated with Remdesivir. Seen by ID for positive blood cultures with MRSA.   Blood cultures also grew Staph epidermidis. It was thought this could possibly be a contaminant. Treated with Vancomycin for possible superimposed bacterial pneumonia. Repeat blood cultures were negative. He was discharged on Vancomycin for a minimum of 2 weeks. Seen in the office in follow-up. He was off antibiotics and doing well. Past Surgical History:        Procedure Laterality Date    [facility] [address] SINGLE  9/2/2021         MASTECTOMY      TOE AMPUTATION      TOE SURGERY      TONSILLECTOMY       Current Medications:   Scheduled Meds:   collagenase   Topical Daily    sodium chloride flush  10 mL IntraVENous 2 times per day    enoxaparin  30 mg SubCUTAneous BID    gabapentin  200 mg Oral Dinner    insulin lispro  0-12 Units SubCUTAneous TID WC    insulin lispro  0-6 Units SubCUTAneous Nightly    sodium hypochlorite   Irrigation Daily     Continuous Infusions:   sodium chloride      dextrose       PRN Meds:sodium chloride flush, sodium chloride, potassium chloride **OR** potassium alternative oral replacement **OR** potassium chloride, senna, acetaminophen **OR** acetaminophen, glucose, dextrose bolus **OR** dextrose bolus, glucagon (rDNA), dextrose    Allergies:  Clindamycin/lincomycin and Sulfa antibiotics    Social History:   Social History     Socioeconomic History    Marital status:       Spouse name: None    Number of children: None    Years of education: None    Highest education level: None   Occupational History    None   Tobacco Use    Smoking status: Never Smoker    Smokeless tobacco: Never Used   Vaping Use    Vaping Use: Never used   Substance and Sexual Activity    Alcohol use: Not Currently     Alcohol/week: 1.0 standard drink     Types: 1 Cans of beer per week    Drug use: No    Sexual activity: Not Currently     Partners: Female   Other Topics Concern    None   Social History Narrative    None     Social Determinants of Health     Financial Resource Strain:     Difficulty of Paying Living Expenses: Not on file   Food Insecurity:     Worried About Running Out of Food in the Last Year: Not on file    Ran Out of Food in the Last Year: Not on file   Transportation Needs:     Lack of Transportation (Medical): Not on file    Lack of Transportation (Non-Medical): Not on file   Physical Activity:     Days of Exercise per Week: Not on file    Minutes of Exercise per Session: Not on file   Stress:     Feeling of Stress : Not on file   Social Connections:     Frequency of Communication with Friends and Family: Not on file    Frequency of Social Gatherings with Friends and Family: Not on file    Attends Church Services: Not on file    Active Member of [address] [place] [address] or Organizations: Not on file    Attends Club or Organization Meetings: Not on file    Marital Status: Not on file   Intimate Partner Violence:     Fear of Current or Ex-Partner: Not on file    Emotionally Abused: Not on file    Physically Abused: Not on file    Sexually Abused: Not on file   Housing Stability:     Unable to Pay for Housing in the Last Year: Not on file    Number of [place] in the Last Year: Not on file    Unstable Housing in the Last Year: Not on file      [address] [facility] resident. He retired from operating heavy machinery. Family History:   History reviewed. No pertinent family history. . Otherwise non-pertinent to the chief complaint. REVIEW OF SYSTEMS:    Constitutional: Negative for fevers, chills, diaphoresis  Neurologic: Negative   Psychiatric: Negative  Rheumatologic: Negative   Endocrine: Negative  Hematologic: Negative  Immunologic: Negative  ENT: Negative  Respiratory: Negative   Cardiovascular: Negative  GI: Negative  : Negative  Musculoskeletal: Negative  Skin: No rashes.      PHYSICAL EXAM:    Vitals:   /70   Pulse 88   Temp 98 °F (36.7 °C) (Oral)   Resp 18   Ht 6' (1.829 m)   Wt 241 lb (109.3 kg)   SpO2 97%   BMI 32.69 kg/m²   Constitutional: The patient is awake, alert, and oriented. Sitting up in a chair. Awake and alert. He did remember me from 2 months ago. Skin: Warm and dry. No rashes were noted. HEENT: Eyes show round, and reactive pupils. No jaundice. Moist mucous membranes, no ulcerations, no thrush. Neck: Supple to movements. No lymphadenopathy. Chest: No use of accessory muscles to breathe. Symmetrical expansion. Auscultation reveals no wheezing, crackles, or rhonchi. Cardiovascular: S1 and S2 are rhythmic and regular. No murmurs appreciated. Abdomen: Positive bowel sounds to auscultation. Benign to palpation. No masses felt. No hepatosplenomegaly. Extremities: Moderate edema both legs. Decubitus ulcer right heel. It is clean. Bilateral leg venous stasis dermatitis. Decubitus ulcer left heel. It is also clean. There is another wound on the posterior aspect of the left distal leg. It does look clean. Minimal surrounding erythema. Wound VAC left lateral medial leg. Lines: Peripheral.      CBC+dif:  Recent Labs     05/15/22  1636 05/15/22  1636 05/16/22  1001   WBC 14.4*  --  14.7*   HGB 9.5*   < > 10.0*   HCT 30.9*   < > 34.0*   MCV 82.8   < > 85.2      < > 252   NEUTROABS 9.88*   < > 11.96*    < > = values in this interval not displayed.      Lab Results   Component Value Date    CRP 8.9 (H) 03/14/2022    CRP 1.5 (H) 08/29/2021    CRP 3.0 (H) 08/28/2021      No results found for: CRPHS  Lab Results   Component Value Date    SEDRATE 63 (H) 05/15/2022    SEDRATE 44 (H) 03/14/2022    SEDRATE 23 (H) 08/29/2021     Lab Results   Component Value Date    ALT 8 05/16/2022    AST 19 05/16/2022    ALKPHOS 133 (H) 05/16/2022    BILITOT 0.4 05/16/2022     Lab Results   Component Value Date     05/16/2022    K 4.6 05/16/2022     05/16/2022    CO2 31 05/16/2022    BUN 35 05/16/2022    CREATININE 1.0 05/16/2022    GFRAA >60 05/16/2022    LABGLOM >60 05/16/2022    GLUCOSE 95 05/16/2022    PROT 6.7 05/16/2022 LABALBU 2.9 05/16/2022    CALCIUM 8.8 05/16/2022    BILITOT 0.4 05/16/2022    ALKPHOS 133 05/16/2022    AST 19 05/16/2022    ALT 8 05/16/2022       Lab Results   Component Value Date    PROTIME 12.1 12/20/2021    INR 1.1 12/20/2021       No results found for: TSH    Lab Results   Component Value Date    COLORU Yellow 03/14/2022    PHUR 5.5 03/14/2022    WBCUA NONE 03/14/2022    RBCUA 0-1 03/14/2022    BACTERIA RARE 03/14/2022    CLARITYU Clear 03/14/2022    SPECGRAV 1.015 03/14/2022    LEUKOCYTESUR Negative 03/14/2022    UROBILINOGEN 0.2 03/14/2022    BILIRUBINUR Negative 03/14/2022    BLOODU Negative 03/14/2022    GLUCOSEU Negative 03/14/2022       Lab Results   Component Value Date    HCO3 26.9 08/25/2021    BE 1.8 08/25/2021    O2SAT 92.2 08/25/2021    PH 7.399 08/25/2021    PCO2 44.6 08/25/2021    PO2 67.1 08/25/2021     Radiology:  Noted    Microbiology:  Pending  No results for input(s): BC in the last 72 hours. No results for input(s): ORG in the last 72 hours. No results for input(s): Winda Willis in the last 72 hours. No results for input(s): STREPNEUMAGU in the last 72 hours. No results for input(s): LP1UAG in the last 72 hours. No results for input(s): ASO in the last 72 hours. No results for input(s): CULTRESP in the last 72 hours. No results for input(s): PROCAL in the last 72 hours. Assessment:  · Probable left leg wound infection  · Maggot infestation  · Leukocytosis    Plan:    · Start oral Levofloxacin and Cefdinir  · Check cultures, baseline ESR, CRP  · Monitor labs  · Will follow with you    Thank you for having us see this patient in consultation. I will be discussing this case with the treating physicians. Spoke with nursing.     Elsy Ledesma MD  12:17 PM  5/16/2022

## 2022-05-16 NOTE — H&P
CHIEF COMPLAINT:  Diabetic foot infection/PVD      HISTORY OF PRESENT ILLNESS:      The patient is a 80 y.o. male patient of Dr. Jose Alberto Carmen who presents with the above. Sees Wound Care Q 1 week. Foot infections/heeling ulcers/remote Covid. Worsening with maggot noted [facility] at [facility]. Past Medical History:    Past Medical History:   Diagnosis Date    Arthritis     Cancer ([facility]     breast    Cellulitis     [facility]     CKD (chronic kidney disease) stage 3, GFR 30-59 ml/min ([facility])     Diabetes mellitus ([facility]     History of lumpectomy     Hx of blood clots     Hyperlipidemia     Hypertension     Left ventricular failure ([facility])     Macular degeneration     Obesity     Orthostatic hypotension     PE (pulmonary thromboembolism) ([facility])     Pressure injury of both heels, unstageable ([facility]     Staph aureus infection     Venous insufficiency        Past Surgical History:    Past Surgical History:   Procedure Laterality Date    [facility]  PICC POWERPIC SINGLE  9/2/2021         MASTECTOMY      TOE AMPUTATION      TOE SURGERY      TONSILLECTOMY         Medications Prior to Admission:    Medications Prior to Admission: apixaban (ELIQUIS) 2.5 MG TABS tablet, Take 1 tablet by mouth 2 times daily (before meals)  docusate sodium (COLACE) 100 MG capsule, Take 100 mg by mouth nightly  tamsulosin (FLOMAX) 0.4 MG capsule, Take 0.4 mg by mouth daily  bisacodyl (DULCOLAX) 10 MG suppository, Place 10 mg rectally daily as needed for Constipation  diphenhydrAMINE-zinc acetate (BENADRYL) 1-0.1 % cream, Apply topically every 4 hours as needed for Itching Apply topically 3 times daily as needed.   Sodium Phosphates (FLEET) 7-19 GM/118ML, Place 1 enema rectally daily as needed  magnesium hydroxide (MILK OF MAGNESIA) 400 MG/5ML suspension, Take 30 mLs by mouth daily as needed for Constipation  Polyethylene Glycol 400 1 % SOLN, Apply 1 drop to eye every 4 hours as needed  ferrous sulfate (IRON 325) 325 (65 Fe) MG tablet, Take 325 mg by mouth daily   zinc sulfate (ZINCATE) 220 (50 Zn) MG capsule, Take 1 capsule by mouth daily for 14 days  miconazole (MICOTIN) 2 % powder, Apply topically 2 times daily. ascorbic acid (VITAMIN C) 500 MG tablet, Take 1 tablet by mouth 2 times daily  Vitamin D (CHOLECALCIFEROL) 50 MCG (2000 UT) TABS tablet, Take 1 tablet by mouth daily  furosemide (LASIX) 40 MG tablet, Take 20 mg by mouth daily   omeprazole (PRILOSEC) 20 MG delayed release capsule, Take 20 mg by mouth daily  Multiple Vitamins-Minerals (THERAPEUTIC MULTIVITAMIN-MINERALS) tablet, Take 1 tablet by mouth daily  Multiple Vitamins-Minerals (PRESERVISION AREDS PO), Take 1 tablet by mouth 2 times daily (with meals)  acetaminophen (TYLENOL) 325 MG tablet, Take 650 mg by mouth every 4 hours as needed for Pain or Fever   calcium carbonate (TUMS) 500 MG chewable tablet, Take 1 tablet by mouth every 4 hours as needed for Heartburn  ipratropium-albuterol (DUONEB) 0.5-2.5 (3) MG/3ML SOLN nebulizer solution, Inhale 3 mLs into the lungs every 4 hours (while awake) for 30 doses  gabapentin (NEURONTIN) 100 MG capsule, Take 200 mg by mouth Daily with supper. atorvastatin (LIPITOR) 80 MG tablet, Take 80 mg by mouth at bedtime   INSULIN LISP PROT & LISP, HUM, (75-25) 100 UNIT/ML SUSP, Inject 72 Units into the skin daily (with breakfast)   INSULIN LISP PROT & LISP, HUM, (75-25) 100 UNIT/ML SUSP, Inject 55 Units into the skin Daily with supper   clotrimazole-betamethasone (LOTRISONE) cream, Apply  topically as needed. Apply topically 2 times daily. Allergies:    Clindamycin/lincomycin and Sulfa antibiotics    Social History:    reports that he has never smoked. He has never used smokeless tobacco. He reports previous alcohol use of about 1.0 standard drink of alcohol per week. He reports that he does not use drugs. Family History:   family history is not on file.     REVIEW OF SYSTEMS:  As above in the HPI, otherwise negative    PHYSICAL EXAM:    Vitals:  BP (!) 177/74   Pulse 94   Temp 98.3 °F (36.8 °C) (Oral)   Resp 20   Ht 6' (1.829 m)   Wt 241 lb (109.3 kg)   SpO2 98%   BMI 32.69 kg/m²     General:  Awake, alert, oriented X 3. Well developed, well nourished, well groomed. No apparent distress. HEENT:  Normocephalic, atraumatic. Pupils equal, round, reactive to light. No scleral icterus. No conjunctival injection. Normal lips, teeth, and gums. No nasal discharge. Neck:  Supple  Heart:  RRR, no murmurs, gallops, rubs  Lungs:  CTA bilaterally, bilat symmetrical expansion, no wheeze, rales, or rhonchi  Abdomen: Bowel sounds present, soft, nontender, no masses, no organomegaly, no peritoneal signs  Extremities:  No clubbing, cyanosis, or edema  Skin:  Sees pic descriptions. 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 Ask ID to see as well. PLAN:    See orders.     Suman Jay MD  6:29 AM  5/16/2022

## 2022-05-16 NOTE — PROGRESS NOTES
Physical Therapy  Facility/Department: [address] INTERNAL MEDICINE 2  Physical Therapy Initial Assessment    Name: Ramírez Willis. : 1932  MRN: [id number]  Date of Service: 2022        Patient Diagnosis(es): The primary encounter diagnosis was Infestation by maggots. A diagnosis of Leukocytosis, unspecified type was also pertinent to this visit. Past Medical History:  has a past medical history of Arthritis, Cancer ([address] Cellulitis, CHF (congestive heart failure) ([address] CKD (chronic kidney disease) stage 3, GFR 30-59 ml/min ([address] Diabetes mellitus ([address] History of lumpectomy, Hx of blood clots, Hyperlipidemia, Hypertension, Left ventricular failure ([address] Macular degeneration, Obesity, Orthostatic hypotension, PE (pulmonary thromboembolism) ([address] Pressure injury of both heels, unstageable ([address] Staph aureus infection, and Venous insufficiency. Past Surgical History:  has a past surgical history that includes Mastectomy; Toe Surgery; Toe amputation; Tonsillectomy; and hc  picc powerpic single (2021). Evaluating Therapist: Georgina Garcia PT      Room #:  0688/7989-C  Diagnosis:  Infestation by maggots [B87.9]  Foot ulcer due to secondary DM ([facility] [X06.761, L97.509]  PMHx/PSHx:  Mastectomy, Toe amputation, CHF  Precautions:  Falls, alarm, wound vac L LE, Per pt needs offloading shoes for ambulation      Social:  Pt admitted from SNF. States he ambulates with ww in [place] once a day with w/c follow. Does walk to bathroom, otherwise uses w/c for longer distance. States he needs to have his offloading shoes for ambulation but does not have them with him. Initial Evaluation  Date: 22 Treatment      Short Term/ Long Term   Goals   Was pt agreeable to Eval/treatment? yes     Does pt have pain?        Bed Mobility  Rolling: SBA  Supine to sit: SBA  Sit to supine: NT  Scooting: SBA  independent   Transfers Sit to stand: CGA  Stand to sit: CGA  Stand pivot: CGA  supervison Ambulation    3 feet with ww with CGA  (limited secondary to pt not having his offloading shoes)  100 feet with ww with supervision (it pt gets shoes)   Stair Negotiation  Ascended and descended  NT   N/A   LE strength     3+/5    4-/5   balance      Fair     AM-PAC Raw score               16/24         Pt is alert and Oriented, poor recall, some confusion noted  LE ROM: WFL  Edema: B LE's  Endurance: fair  Chair alarm: yes     ASSESSMENT:    Pt displays functional ability as noted in the objective portion of this evaluation. Patient education  Pt educated on PT objectives    Patient response to education:   Pt verbalized understanding Pt demonstrated skill Pt requires further education in this area   yes           Comments/treatment:  Pt with increased time to complete all mobility. Pt transferred bed to commode with ww CGA. CGA for standing balance at . Transferred back to edge of bed and then to recliner chair. Did not ambulate farther that a few feet bed to chair secondary to pt no having his offloading shoes here. Conditions Requiring Skilled Therapeutic Intervention:    [x]Decreased strength     []Decreased ROM  [x]Decreased functional mobility  [x]Decreased balance   [x]Decreased endurance   []Decreased posture  []Decreased sensation  []Decreased coordination   []Decreased vision  []Decreased safety awareness   []Increased pain       Patient and or family understand(s) diagnosis, prognosis, and plan of care.     Prognosis is good for reaching above PT goals    PHYSICAL THERAPY PLAN OF CARE:    PT POC is established based on physician order and patient diagnosis     Referring provider/PT Order: Junito Cuello MD/ PT eval and treat      Current Treatment Recommendations:     [x] Strengthening to improve independence with functional mobility   [] ROM to improve independence with functional mobility   [x] Balance Training to improve static/dynamic balance and to reduce fall risk  [x] Endurance Training to improve activity tolerance during functional mobility   [x] Transfer Training to improve safety and independence with all functional transfers   [x] Gait Training to improve gait mechanics, endurance and assess need for appropriate assistive device  [] Stair Training in preparation for safe discharge home and/or into the community   [] Positioning to prevent skin breakdown and contractures  [x] Safety and Education Training   [x] Patient/Caregiver Education   [] HEP  [] Other     PT long term treatment goals are located in above grid    Frequency of treatments: 2-5x/week x 5 days. Time in  900  Time out  0927    Total Treatment Time  15 minutes     Evaluation Time includes thorough review of current medical information, gathering information on past medical history/social history and prior level of function, completion of standardized testing/informal observation of tasks, assessment of data and education on plan of care and goals.       CPT codes:  [x] Low Complexity PT evaluation 70060  [] Moderate Complexity PT evaluation 48090  [] High Complexity PT evaluation 92871  [] PT Re-evaluation 21995  [] Gait training 72067 minutes  [] Manual therapy 63794 minutes  [x] Therapeutic activities 79714 15 minutes  [] Therapeutic exercises 50733 minutes  [] Neuromuscular reeducation 88033 minutes     Marcia Bradley PT [id number]

## 2022-05-16 NOTE — CONSULTS
Podiatry Consult H&P  5/16/2022   [address] [place] SUBJECTIVE: This is an 80 y.o. male with past medical history significant for DM type II, hyperlipidemia and chronic venous insufficiency who is being seen at bedside for b/l lower extremity ulcers. States that he follows with Dr. Mary Vitale regularly in wound care clinic where they manage the wounds with local wound care. States that the wound on his left leg has been present for approximately 2 months now. The facility he is currently residing at noted maggots coming from around the wound yesterday and sent him to the ED to be further evaluated. Denies any acute pedal pain-endorses long standing peripheral neuropathy. Currently has wound vac to LLE which was placed yesterday at the facility. Nursing does daily wound care at facility. Denies any n/f/v/c. No additional pedal complaints. Past Medical History:   Diagnosis Date    Arthritis     Cancer ([facility]     breast    Cellulitis     CHF (congestive heart failure) ([facility])     CKD (chronic kidney disease) stage 3, GFR 30-59 ml/min ([facility])     Diabetes mellitus ([facility]     History of lumpectomy     Hx of blood clots     Hyperlipidemia     Hypertension     Left ventricular failure ([facility])     Macular degeneration     Obesity     Orthostatic hypotension     PE (pulmonary thromboembolism) ([facility])     Pressure injury of both heels, unstageable ([facility]     Staph aureus infection     Venous insufficiency         Past Surgical History:   Procedure Laterality Date    [facility] [address] SINGLE  9/2/2021         MASTECTOMY      TOE AMPUTATION      TOE SURGERY      TONSILLECTOMY           History reviewed. No pertinent family history.      Social History     Tobacco Use    Smoking status: Never Smoker    Smokeless tobacco: Never Used   Substance Use Topics    Alcohol use: Not Currently     Alcohol/week: 1.0 standard drink     Types: 1 Cans of beer per week        Prior to Admission medications    Medication Sig Start Date End Date Taking? Authorizing Provider   apixaban (ELIQUIS) 2.5 MG TABS tablet Take 1 tablet by mouth 2 times daily (before meals) 3/17/22   Marco Antonio Pina MD   docusate sodium (COLACE) 100 MG capsule Take 100 mg by mouth nightly    Historical Provider, MD   tamsulosin (FLOMAX) 0.4 MG capsule Take 0.4 mg by mouth daily    Historical Provider, MD   bisacodyl (DULCOLAX) 10 MG suppository Place 10 mg rectally daily as needed for Constipation    Historical Provider, MD   diphenhydrAMINE-zinc acetate (BENADRYL) 1-0.1 % cream Apply topically every 4 hours as needed for Itching Apply topically 3 times daily as needed. Historical Provider, MD   Sodium Phosphates (FLEET) 7-19 GM/118ML Place 1 enema rectally daily as needed    Historical Provider, MD   magnesium hydroxide (MILK OF MAGNESIA) 400 MG/5ML suspension Take 30 mLs by mouth daily as needed for Constipation    Historical Provider, MD   Polyethylene Glycol 400 1 % SOLN Apply 1 drop to eye every 4 hours as needed    Historical Provider, MD   ferrous sulfate (IRON 325) 325 (65 Fe) MG tablet Take 325 mg by mouth daily     Historical Provider, MD   zinc sulfate (ZINCATE) 220 (50 Zn) MG capsule Take 1 capsule by mouth daily for 14 days 9/4/21 3/14/22  SHAHEEN oMhan   miconazole (MICOTIN) 2 % powder Apply topically 2 times daily.  9/2/21   Jorge Wong MD   ascorbic acid (VITAMIN C) 500 MG tablet Take 1 tablet by mouth 2 times daily 9/2/21   Jorge Wong MD   Vitamin D (CHOLECALCIFEROL) 50 MCG (2000 UT) TABS tablet Take 1 tablet by mouth daily 9/3/21   Jorge Wong MD   furosemide (LASIX) 40 MG tablet Take 20 mg by mouth daily     Historical Provider, MD   omeprazole (PRILOSEC) 20 MG delayed release capsule Take 20 mg by mouth daily    Historical Provider, MD   Multiple Vitamins-Minerals (THERAPEUTIC MULTIVITAMIN-MINERALS) tablet Take 1 tablet by mouth daily    Historical Provider, MD   Multiple Vitamins-Minerals (PRESERVISION AREDS PO) Take 1 tablet by mouth 2 times daily (with meals)    Historical Provider, MD   acetaminophen (TYLENOL) 325 MG tablet Take 650 mg by mouth every 4 hours as needed for Pain or Fever     Historical Provider, MD   calcium carbonate (TUMS) 500 MG chewable tablet Take 1 tablet by mouth every 4 hours as needed for Heartburn    Historical Provider, MD   ipratropium-albuterol (DUONEB) 0.5-2.5 (3) MG/3ML SOLN nebulizer solution Inhale 3 mLs into the lungs every 4 hours (while awake) for 30 doses 12/21/17 12/29/17  Janessa Chu MD   gabapentin (NEURONTIN) 100 MG capsule Take 200 mg by mouth Daily with supper. Historical Provider, MD   atorvastatin (LIPITOR) 80 MG tablet Take 80 mg by mouth at bedtime     Historical Provider, MD   INSULIN LISP PROT & LISP, HUM, (75-25) 100 UNIT/ML SUSP Inject 72 Units into the skin daily (with breakfast)     Historical Provider, MD   INSULIN LISP PROT & LISP, HUM, (75-25) 100 UNIT/ML SUSP Inject 55 Units into the skin Daily with supper     Historical Provider, MD   clotrimazole-betamethasone (LOTRISONE) cream Apply  topically as needed. Apply topically 2 times daily. Historical Provider, MD        Clindamycin/lincomycin and Sulfa antibiotics         OBJECTIVE:        Vitals:    05/16/22 0915   BP: 100/70   Pulse: 88   Resp: 18   Temp: 98 °F (36.7 °C)   SpO2: 97%              EXAM:        Pt is AAOx3    Vascular Exam: Pedal pulses nonpalpable secondary to diffuse non pitting edema. Lower extremity warm to warm from proximal tibial tuberosity to distal digits dorsally. No pedal hair growth noted. Cap refill brisk to remaining digits. Mild jose wound erythema and edema noted. Neuro Exam: Light touch sensation is absent    Dermatologic Exam:    RIGHT: Full thickness ulceration noted to proximal heel with largely fibrotic wound bed. Jose wound erythema and edema noted. No purulence. No malodor. No lymphangitis.  No probing.   -Chronic venous stasis dermatitis    LEFT: Full thickness ulceration noted to heel with jose ulcer erythema and edema. Wound bed is largely fibrotic with minimal granulation. No purulence. No malodor. No lymphangitis. No probing.  -Pre ulcerative lesion noted to distal tip of hallux with coagulated blood. No drainage. No purulence. No malodor. No lymphangitis.   -Wound vac intact to left leg ulcer  -Chronic venous stasis dermatitis    MSK: ROM testing deferred. Soft, compressible posterior muscle groups b/l. No tenderness to palpation of wounds.  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                     Current Facility-Administered Medications   Medication Dose Route Frequency Provider Last Rate Last Admin    sodium chloride flush 0.9 % injection 10 mL  10 mL IntraVENous 2 times per day Ami E Yair, DO   10 mL at 05/16/22 0855    sodium chloride flush 0.9 % injection 10 mL  10 mL IntraVENous PRN Ami E Yair, DO        0.9 % sodium chloride infusion   IntraVENous PRN Ami E Yair, DO        potassium chloride (KLOR-CON M) extended release tablet 40 mEq  40 mEq Oral PRN Ami E Yair, DO        Or    potassium bicarb-citric acid (EFFER-K) effervescent tablet 40 mEq  40 mEq Oral PRN Ami E Yair, DO        Or    potassium chloride 10 mEq/100 mL IVPB (Peripheral Line)  10 mEq IntraVENous PRN Ami E Yair, DO        enoxaparin Sodium (LOVENOX) injection 30 mg  30 mg SubCUTAneous BID Ami E Yair, DO        senna (SENOKOT) tablet 8.6 mg  1 tablet Oral Daily PRN Ami E Yair, DO        acetaminophen (TYLENOL) tablet 650 mg  650 mg Oral Q6H PRN Ami E Yair, DO   650 mg at 05/15/22 2137    Or    acetaminophen (TYLENOL) suppository 650 mg  650 mg Rectal Q6H PRN Ami E Yair, DO        glucose chewable tablet 16 g  4 tablet Oral PRN Ami E Yair, DO        dextrose bolus 10% 125 mL  125 mL IntraVENous PRN Ami E Yair, DO        Or    dextrose bolus 10% 250 mL  250 mL IntraVENous PRN Ami E Yair, DO        glucagon (rDNA) injection 1 mg  1 mg IntraMUSCular PRN Ami Mazariegos, DO        dextrose 5 % solution  100 mL/hr IntraVENous PRN Ami Elleriter, DO        gabapentin (NEURONTIN) capsule 200 mg  200 mg Oral Dinner Ami Mazariegos, DO        insulin lispro (HUMALOG) injection vial 0-12 Units  0-12 Units SubCUTAneous TID WC Ami Mazariegos, DO        insulin lispro (HUMALOG) injection vial 0-6 Units  0-6 Units SubCUTAneous Nightly Ami Mazariegos, DO        sodium hypochlorite (DAKINS) external solution   Irrigation Daily Davon Larose DPM            Lab Results   Component Value Date    WBC 14.4 (H) 05/15/2022    HCT 30.9 (L) 05/15/2022    HGB 9.5 (L) 05/15/2022     05/15/2022     05/15/2022    K 4.1 05/15/2022     05/15/2022    CO2 29 05/15/2022    BUN 41 (H) 05/15/2022    CREATININE 1.0 05/15/2022    GLUCOSE 58 (L) 05/15/2022    CRP 8.9 (H) 03/14/2022         Radiographs:    ASSESEMENT:  -Full thickness ulceration, b/l heel diabetic  -Full thickness ulceration, left leg  -Pre ulcerative lesion, left distal great toe diabetic  -Soft tissue edema, b/l lower extremity  -Chronic venous stasis dermatitis, b/l lower extremity  -DM II with peripheral neuropathy         PLAN:  - Examined and evaluated  - All labs, imaging, and charts reviewed  -ID consulted. Appreciate input  -Left tib fib xrays: Diffuse soft tissue edema. No acute osseous abnormality. -B/L foot xrays ordered. Results pending  -NIVS ordered. Results pending  -Continue offloading of b/l lower extremities  -Will proceed with conservative local wound care: santyl DSD for left toe.  Xeroform DSD for b/l heel ulcers.   -Wound vac to be changed on Weds if patient still in house  -Will continue to monitor   -Discussed with: Dr. Tyson Jackson DPM   5/16/2022   10:25 AM

## 2022-05-16 NOTE — CARE COORDINATION
Met w/ patient. Explained role of  and plan of care. Has wound vac present to L foot. Follows @ [facility] q Wednesday w/ Dr. Claudia Lunsford- MAYTE Guti[facility] 23 transport Charles Sweet from the facility provides transportation. From [facility] snf PTA- per pt, plan is to return on discharge. Per Grace Caldwell @ [facility], pt is a private pay bedhold- no precert to return- will need COVID test on day of discharge. AGUSTINA in [facility], ambulette transport form on chart.  Will follow Brea Wilson, RN case manager

## 2022-05-16 NOTE — PROGRESS NOTES
Occupational Therapy  OCCUPATIONAL THERAPY INITIAL EVALUATION     [facility] - BE[facility]AVIORAL HE[facility], [facility]                                                  Patient Name: Chau Suh.     MRN: [id number]    : 1932    Room: [address] [place] [address]      Evaluating OT: Josefa Ford, OTR/L [id number]      Referring Provider: Emerald Jaramillo MD    Specific Provider Orders/Date: OT eval and treat 22      Diagnosis:  Infestation by maggots [B87.9]  Foot ulcer due to secondary DM ([facility] [Y46.604, L97.509]     Pertinent Medical History: arthritis, CHF, CKD, DM, HTN, macular degeneration       Precautions:  Fall Risk, alarm, wound vac, skin integrity, pt reports he needs B offloading boots for functional mobility     Assessment of current deficits    [x] Functional mobility  [x]ADLs  [x] Strength               []Cognition    [x] Functional transfers   [x] IADLs         [x] Safety Awareness   [x]Endurance    [] Fine Coordination              [x] Balance      [] Vision/perception   [x]Sensation     []Gross Motor Coordination  [] ROM  [] Delirium                   [] Motor Control     OT PLAN OF CARE   OT POC based on physician orders, patient diagnosis and results of clinical assessment    Frequency/Duration 2-4 days/wk for 2 weeks PRN   Specific OT Treatment Interventions to include:   * Instruction/training on adapted ADL techniques and AE recommendations to increase functional independence within precautions       * Training on energy conservation strategies, correct breathing pattern and techniques to improve independence/tolerance for self-care routine  * Functional transfer/mobility training/DME recommendations for increased independence, safety, and fall prevention  * Patient/Family education to increase follow through with safety techniques and functional independence  * Recommendation of environmental modifications for increased safety with functional transfers/mobility and ADLs  * Therapeutic exercise to improve motor endurance, ROM, and functional strength for ADLs/functional transfers  * Therapeutic activities to facilitate/challenge dynamic balance, stand tolerance for increased safety and independence with ADLs  * Positioning to improve skin integrity, interaction with environment and functional independence        Recommended Adaptive Equipment: TBD     Home Living: Pt admitted from SNF. Pt reports he uses wheeled walker for short distances and w/c for longer distances for functional mobility. Pt receives assistance with ADLs. Pt reports that he needs to wear offloading boots for functional mobility but does not have them with him currently. Pain Level: pt reported pain in B heels but did not rate; pt agreeable to therapy  Cognition: A&O: pt alert and oriented grossly; WFL command follow demonstrated.    Memory:  Fair+   Sequencing:  Fair    Problem solving:  Fair    Judgement/safety:  Fair      Functional Assessment:  AM-PAC Daily Activity Raw Score: 16/24   Initial Eval Status  Date: 5/16/22 Treatment Status  Date: STGs = LTGs  Time frame: 10-14 days   Feeding Set up     Grooming Min A  Sup   UB Dressing Min A  For gown management  Sup   LB Dressing Max A  To don socks  Min A-with use of AD as appropriate/needed   Bathing Mod A  SBA -with use of AD as appropriate/needed   Toileting Min A  For clothing management and thorough pericare  Sup   Bed Mobility  Supine to sit: SBA    Independent    Functional Transfers CGA with wheeled walker  Sit<>stand from EOB  Sit<>Stand from [facility]  Stand to sit to chair  Stand pivot from bed to BSC  Sup    Functional Mobility CGA with wheeled walker  Short distance between bed and chair (unable to walk further as pt did not have offloading boots)  Sup -with device as needed to maximize independence with ADLs and functional task completion   Balance Sitting:     Static:  Sup    Dynamic:SBA  Standing: CGA with wheeled walker  I for static/dynamic sitting balance to maximize independence with ADLs and functional task completion    Sup for standing balance to maximize independence with ADLs and functional task completion   Activity Tolerance Fair+ with light activity  Good with ADL activity. Pt will demonstrate good understanding of education provided on EC/WS techniques    Visual/  Perceptual Glasses: none at bedside                Additional long-term goal: Pt will increase functional independence to PLOF to allow pt to live in least restrictive environment. Hand Dominance R   AROM (PROM) Strength Additional Info:    RUE  WFL Grossly WFL Grossly good  and wfl FMC/dexterity noted during ADL tasks       LUE WFL Grossly WFL Grossly good  and wfl FMC/dexterity noted during ADL tasks       Hearing: WFL   Sensation:  No c/o numbness or tingling   Tone: WFL   Edema: BLE    Comments: Upon arrival patient lying in bed. At end of session, patient sitting in chair with call light and phone within reach, all lines and tubes intact. Overall patient demonstrated decreased independence and safety during completion of ADL/functional transfer/mobility tasks. Pt would benefit from continued skilled OT to increase safety and independence with completion of ADL/IADL tasks for functional independence and quality of life. Treatment: OT treatment provided this date includes:    Instruction/training on safety and adapted techniques for completion of ADLs and instruction/training on safe functional mobility/transfer techniques: Pt SBA to sit to EOB with cues for hand placement and safe technique. Increased time needed. Pt donned socks with Max A. Decreased functional reach noted. Increased difficulty d/t swelling of BLE. Pt stood with CGA and cues for hand placement and safe technique to maximize safety and independence with ADLs and functional tasks. Pt pivoted to [facility] with cues for safety and CGA.  Pt Min A for clothing management and thorough pericare in standing. Pt walked short distance to bed with CGA and sat with CGA. Cues for safe wheeled walker management during functional mobility. Chair positioned near bed and pt walked short distance from bed to chair with CGA. Pt positioned for comfort in chair. Rehab Potential: Good for established goals     Patient / Family Goal: none stated    Patient and/or family were instructed on functional diagnosis, prognosis/goals and OT plan of care. Demonstrated fair understanding. Eval Complexity: Low    Time In: 0859  Time Out: 1150  Total Treatment Time: 10    Min Units   OT Eval Low 97165  x  1   OT Eval Medium 36163      OT Eval High 65351      OT Re-Eval [id number]       Therapeutic Ex 35082       Therapeutic Activities 52234  10  1   ADL/Self Care 88380       Orthotic Management 12536       Manual 03757     Neuro Re-Ed 66634       Non-Billable Time          Evaluation Time additionally includes thorough review of current medical information, gathering information on past medical history/social history and prior level of function, interpretation of standardized testing/informal observation of tasks, assessment of data and development of plan of care and goals.             Luciana Mtz, OTR/L, [id number]

## 2022-05-16 NOTE — DISCHARGE INSTR - COC
Continuity of Care Form    Patient Name: General Coop. :  1932  MRN:  [id number]    Admit date:  5/15/2022  Discharge date:  ***    Code Status Order: DNR-CCA   Advance Directives:      Admitting Physician:  Kaylyn Sosa MD  PCP: Karen Nicholson MD    Discharging Nurse: [facility] Unit/Room#: 0096/3311-C  Discharging Unit Phone Number: ***    Emergency Contact:   Extended Emergency Contact Information  Primary Emergency Contact: [address] Phone: [phone]  Mobile Phone: [phone]  Relation: Child   needed? No  Secondary Emergency Contact: Claude Lizarraga [place] [address] Phone: [phone]  Relation: Child    Past Surgical History:  Past Surgical History:   Procedure Laterality Date    [facility]  PICC [address] SINGLE  2021         MASTECTOMY      TOE AMPUTATION      TOE SURGERY      TONSILLECTOMY         Immunization History:   Immunization History   Administered Date(s) Administered    Influenza Virus Vaccine 10/23/2016    Pneumococcal Conjugate Vaccine 2013    Td, unspecified formulation 2012       Active Problems:  Patient Active Problem List   Diagnosis Code    HTN (hypertension) I10    Breast cancer, male ([facility] C50.929    Obesity (BMI 30.0-34. 9) E66.9    Right hip pain M25.551    Diabetes mellitus type 2, uncontrolled (HCC) E11.65    Essential hypertension, benign I10    Hyperlipidemia with target LDL less than 100 E78.5    Dizzy spells R42    Troponin level elevated R77.8    Shortness of breath R06.02    Chest pressure R07.89    Acute respiratory insufficiency R06.89    Pneumonia J18.9    History of pulmonary embolus (PE) Z86.711    Acute left-sided CHF (congestive heart failure) (HCC) I50.1    Type I diabetes mellitus, uncontrolled (HCC) E10.65    Cellulitis and abscess of right lower extremity L03.115, L02.415    Cellulitis and abscess of left lower extremity L03.116, L02.416    Chronic venous insufficiency of lower extremity I87.2    Acute hypoxemic respiratory failure due to COVID-19 ([facility]) U07.1, J96.01    Acute on chronic respiratory failure with hypoxia ([facility]) J96.21    Acute respiratory failure with hypoxia ([facility]) J96.01    COVID-19 virus infection U07.1    Hyperlipidemia E78.5    Pneumonia due to COVID-19 virus U07.1, J12.82    Orthostatic hypotension I95.1    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 ([facility]) I70.244    PVD (peripheral vascular disease) ([facility]) I73.9    PAD (peripheral artery disease) ([facility]) I73.9    Diabetic ulcer of right heel associated with type 2 diabetes mellitus, with fat layer exposed ([facility] E11.621, L97.412    Diabetic ulcer of left heel ([facility]) E11.621, L97.429    Sepsis ([facility] A41.9    Diabetic foot infection ([facility]) E11.628, L08.9    Foot ulcer due to secondary DM ([facility] E13.621, L97.509       Isolation/Infection:   Isolation            No Isolation          Patient Infection Status       Infection Onset Added Last Indicated Last Indicated By Review Planned Expiration Resolved Resolved By    None active    Resolved    COVID-19 (Rule Out) 22 COVID-19, Rapid (Ordered)   22 Rule-Out Test Resulted    MRSA 21 Culture, Blood 2   03/15/22 Rikki Delacruz RN    MRSA blood 2021    COVID-19 21 Covid-19 Ambulatory   21     COVID-19 (Rule Out) 21 COVID-19, Rapid (Ordered)   21 Rule-Out Test Resulted    COVID-19 (Rule Out) 10/16/20 10/16/20 10/16/20 Covid-19 Ambulatory (Ordered)   10/17/20 Rule-Out Test Resulted    COVID-19 (Rule Out) 20 Covid-19 Ambulatory (Ordered)   20 Rule-Out Test Resulted    COVID-19 (Rule Out) 20 Covid-19 Ambulatory (Ordered)   20 Rule-Out Test Resulted    COVID-19 (Rule Out) 20 Covid-19 Ambulatory (Ordered)   20 Rule-Out Test Resulted            Nurse Assessment:  Last Vital Signs: /70   Pulse 88   Temp 98 °F (36.7 °C) (Oral)   Resp 18   Ht 6' (1.829 m)   Wt 241 lb (109.3 kg)   SpO2 97%   BMI 32.69 kg/m²     Last documented pain score (0-10 scale): Pain Level: 8  Last Weight:   Wt Readings from Last 1 Encounters:   05/16/22 241 lb (109.3 kg)     Mental Status:  alert and orientedx3    IV Access:  Removed prior to d/c    Nursing Mobility/ADLs:  Walking   {CHP DME IVBB:835590856}  Transfer  Assist of 1  Bathing  assist  Dressing  assist  Toileting  assist  Feeding  Set up  Med Admin  oral  Med Delivery   n/a    Wound Care Documentation and Therapy:  Wound 01/05/22 Heel Left #1 (Active)   Wound Image   05/11/22 0908   Dressing Status New dressing applied 05/11/22 0959   Wound Cleansed Cleansed with saline 05/11/22 0959   Dressing/Treatment Alginate;Dry dressing;ABD 05/11/22 0959   Offloading for Diabetic Foot Ulcers Post op shoe 05/11/22 0959   Wound Length (cm) 2.8 cm 05/11/22 0908   Wound Width (cm) 2.5 cm 05/11/22 0908   Wound Depth (cm) 0.2 cm 05/11/22 0908   Wound Surface Area (cm^2) 7 cm^2 05/11/22 0908   Change in Wound Size % (l*w) 49.28 05/11/22 0908   Wound Volume (cm^3) 1.4 cm^3 05/11/22 0908   Wound Healing % 49 05/11/22 0908   Post-Procedure Length (cm) 3 cm 05/11/22 0932   Post-Procedure Width (cm) 2.5 cm 05/11/22 0932   Post-Procedure Depth (cm) 0.3 cm 05/11/22 0932   Post-Procedure Surface Area (cm^2) 7.5 cm^2 05/11/22 0932   Post-Procedure Volume (cm^3) 2.25 cm^3 05/11/22 0932   Wound Assessment Fibrin;Pink/red 05/11/22 0908   Drainage Amount Small 05/11/22 0908   Drainage Description Yellow 05/11/22 0908   Odor None 05/11/22 0908   Rashmi-wound Assessment Dry/flaky 05/11/22 0908   Number of days: 131       Wound 01/05/22 Heel Right #2 (Active)   Wound Image   05/11/22 0908   Dressing Status New dressing applied 05/11/22 0959   Wound Cleansed Cleansed with saline 05/11/22 0959   Dressing/Treatment Alginate;Dry dressing;ABD 05/11/22 8904 Offloading for Diabetic Foot Ulcers Post op shoe 05/11/22 0959   Wound Length (cm) 2.2 cm 05/11/22 0908   Wound Width (cm) 1.5 cm 05/11/22 0908   Wound Depth (cm) 0.2 cm 05/11/22 0908   Wound Surface Area (cm^2) 3.3 cm^2 05/11/22 0908   Change in Wound Size % (l*w) 22.35 05/11/22 0908   Wound Volume (cm^3) 0.66 cm^3 05/11/22 0908   Wound Healing % 22 05/11/22 0908   Post-Procedure Length (cm) 2.3 cm 05/11/22 0932   Post-Procedure Width (cm) 1.6 cm 05/11/22 0932   Post-Procedure Depth (cm) 0.3 cm 05/11/22 0932   Post-Procedure Surface Area (cm^2) 3.68 cm^2 05/11/22 0932   Post-Procedure Volume (cm^3) 1.104 cm^3 05/11/22 0932   Wound Assessment Pink/red 05/11/22 0908   Drainage Amount Small 05/11/22 0908   Drainage Description Yellow 05/11/22 0908   Odor None 05/11/22 0908   Rashmi-wound Assessment Intact;Dry/flaky 05/11/22 0908   Number of days: 131       Wound 03/14/22 Coccyx (Active)   Number of days: 62       Wound 03/23/22 Toe (Comment  which one) Left #10 Left Great Toe (Active)   Wound Image   05/11/22 0908   Dressing Status New dressing applied 05/11/22 0959   Wound Cleansed Cleansed with saline 05/11/22 0959   Dressing/Treatment Alginate;Dry dressing 05/11/22 0959   Offloading for Diabetic Foot Ulcers Post op shoe 05/11/22 0959   Wound Length (cm) 0.6 cm 05/11/22 0908   Wound Width (cm) 0.6 cm 05/11/22 0908   Wound Depth (cm) 0.1 cm 05/11/22 0908   Wound Surface Area (cm^2) 0.36 cm^2 05/11/22 0908   Change in Wound Size % (l*w) 97 05/11/22 0908   Wound Volume (cm^3) 0.036 cm^3 05/11/22 0908   Wound Healing % 97 05/11/22 0908   Post-Procedure Length (cm) 0.7 cm 05/11/22 0932   Post-Procedure Width (cm) 0.7 cm 05/11/22 0932   Post-Procedure Depth (cm) 0.2 cm 05/11/22 0932   Post-Procedure Surface Area (cm^2) 0.49 cm^2 05/11/22 0932   Post-Procedure Volume (cm^3) 0.098 cm^3 05/11/22 0932   Wound Assessment Devitalized tissue;Pink/red 05/11/22 0908   Drainage Amount Scant 05/11/22 0908   Drainage Description Yellow 05/11/22 0908   Odor None 05/11/22 0908   Rashmi-wound Assessment Dry/flaky 05/11/22 0908   Number of days: 54       Wound 03/23/22 Foot Left;Dorsal #11 (Active)   Wound Image   05/04/22 0908   Wound Length (cm) 0 cm 05/04/22 0908   Wound Width (cm) 0 cm 05/04/22 0908   Wound Depth (cm) 0 cm 05/04/22 0908   Wound Surface Area (cm^2) 0 cm^2 05/04/22 0908   Change in Wound Size % (l*w) 100 05/04/22 0908   Wound Volume (cm^3) 0 cm^3 05/04/22 0908   Wound Healing % 100 05/04/22 0908   Number of days: 54       Wound 04/13/22 Pretibial Distal;Left;Lateral #14 (Active)   Wound Image   05/11/22 0908   Dressing Status New dressing applied 05/11/22 0959   Wound Cleansed Cleansed with saline 05/11/22 0959   Dressing/Treatment Alginate;Dry dressing 05/11/22 0959   Offloading for Diabetic Foot Ulcers Post op shoe 05/11/22 0959   Wound Length (cm) 1.2 cm 05/11/22 0908   Wound Width (cm) 0.6 cm 05/11/22 0908   Wound Depth (cm) 0.2 cm 05/11/22 0908   Wound Surface Area (cm^2) 0.72 cm^2 05/11/22 0908   Change in Wound Size % (l*w) 92.5 05/11/22 0908   Wound Volume (cm^3) 0.144 cm^3 05/11/22 0908   Wound Healing % 85 05/11/22 0908   Post-Procedure Length (cm) 1.3 cm 05/11/22 0932   Post-Procedure Width (cm) 0.5 cm 05/11/22 0932   Post-Procedure Depth (cm) 0.2 cm 05/11/22 0932   Post-Procedure Surface Area (cm^2) 0.65 cm^2 05/11/22 0932   Post-Procedure Volume (cm^3) 0.13 cm^3 05/11/22 0932   Wound Assessment Fibrin 05/11/22 0908   Drainage Amount Small 05/11/22 0908   Drainage Description Yellow 05/11/22 0908   Odor None 05/11/22 0908   Rashmi-wound Assessment Dry/flaky; Intact 05/11/22 0908   Number of days: 33       Wound 04/27/22 Tibial Left;Posterior #15 (Active)   Wound Image   05/11/22 0908   Dressing Status New dressing applied 05/11/22 0959   Wound Cleansed Cleansed with saline 05/11/22 0959   Dressing/Treatment ABD;Dry dressing; Pharmaceutical agent (see MAR) 05/11/22 0959   Offloading for Diabetic Foot Ulcers Post op shoe 05/11/22 0959   Wound Length (cm) 3.8 cm 05/11/22 0908   Wound Width (cm) 3.3 cm 05/11/22 0908   Wound Depth (cm) 0.8 cm 05/11/22 0908   Wound Surface Area (cm^2) 12.54 cm^2 05/11/22 0908   Change in Wound Size % (l*w) 10.68 05/11/22 0908   Wound Volume (cm^3) 10.032 cm^3 05/11/22 0908   Wound Healing % -43 05/11/22 0908   Post-Procedure Length (cm) 3.9 cm 05/11/22 0932   Post-Procedure Width (cm) 3.5 cm 05/11/22 0932   Post-Procedure Depth (cm) 0.2 cm 05/11/22 0932   Post-Procedure Surface Area (cm^2) 13.65 cm^2 05/11/22 0932   Post-Procedure Volume (cm^3) 2.73 cm^3 05/11/22 0932   Wound Assessment Fibrin;Pink/red;Devitalized tissue 05/11/22 0908   Drainage Amount Moderate 05/11/22 0908   Drainage Description Serosanguinous 05/11/22 0908   Odor None 05/11/22 0908   Rashmi-wound Assessment Fragile;Edematous 05/11/22 0908   Number of days: 19       Wound 05/11/22 Leg Left;Medial;Lower #16 (Active)   Wound Image   05/11/22 0908   Dressing Status New dressing applied 05/11/22 0959   Wound Cleansed Cleansed with saline 05/11/22 0959   Dressing/Treatment Alginate;Dry dressing 05/11/22 0959   Offloading for Diabetic Foot Ulcers Post op shoe 05/11/22 0959   Wound Length (cm) 5 cm 05/11/22 0908   Wound Width (cm) 5 cm 05/11/22 0908   Wound Depth (cm) 0.1 cm 05/11/22 0908   Wound Surface Area (cm^2) 25 cm^2 05/11/22 0908   Wound Volume (cm^3) 2.5 cm^3 05/11/22 0908   Post-Procedure Length (cm) 5 cm 05/11/22 0932   Post-Procedure Width (cm) 5 cm 05/11/22 0932   Post-Procedure Depth (cm) 0.2 cm 05/11/22 0932   Post-Procedure Surface Area (cm^2) 25 cm^2 05/11/22 0932   Post-Procedure Volume (cm^3) 5 cm^3 05/11/22 0932   Wound Assessment Pink/red 05/11/22 0908   Drainage Amount Small 05/11/22 0908   Drainage Description Serosanguinous 05/11/22 0908   Odor None 05/11/22 0908   Rashmi-wound Assessment Edematous;Dry/flaky 05/11/22 0908   Number of days: 5        Elimination:  Continence:    Bowel:y  Bladder:y  Urinary Catheter: no  Colostomy/Ileostomy/Ileal Conduit: no       Date of Last BM: 05/16/22    Intake/Output Summary (Last 24 hours) at 5/16/2022 1012  Last data filed at 5/16/2022 0347  Gross per 24 hour   Intake --   Output 150 ml   Net -150 ml     I/O last 3 completed shifts:  In: -   Out: 150 [Urine:150]    Safety Concerns:     Weakness ble    Impairments/Disabilities:      mobility    Nutrition Therapy:  Current Nutrition Therapy:   4 carb per meal    Routes of Feeding: oral  Liquids: thin  Daily Fluid Restriction: n/a  Last Modified Barium Swallow with Video (Video Swallowing Test): n/a    Treatments at the Time of Hospital Discharge:   Respiratory Treatments: ***  Oxygen Therapy:  room air  Ventilator:    N/a    Rehab Therapies: {THERAPEUTIC INTERVENTION:3690107117}  Weight Bearing Status/Restrictions:    Other Medical Equipment (for information only, NOT a DME order):  {EQUIPMENT:935574334}  Other Treatments: ***    Patient's personal belongings (please select all that are sent with patient):  {CHP DME Belongings:965579205}    RN SIGNATURE:  {Esignature:[id number]}    CASE MANAGEMENT/SOCIAL WORK SECTION    Inpatient Status Date: 5/15/2022    Readmission Risk Assessment Score:  Readmission Risk              Risk of Unplanned Readmission:  22           Discharging to Facility/ Agency   Name: [facility]  Address: [address] [place] [address]  Phone: [phone]  Fax: [phone]    Dialysis Facility (if applicable)   Name:  Address:  Dialysis Schedule:  Phone:  Fax:    / signature: Electronically signed by Franklyn Uribe RN on 5/16/2022 at 10:13 AM      PHYSICIAN SECTION    Prognosis: Good    Condition at Discharge: Stable    Rehab Potential (if transferring to Rehab): Good    Recommended Labs or Other Treatments After Discharge: ***    Physician Certification: I certify the above information and transfer of Bhupinder Scott  is necessary for the continuing treatment of the diagnosis listed and that he requires [place] for less 30 days.      Update Admission H&P: {CHP DME Changes in BDBGV:506411880}    PHYSICIAN SIGNATURE:  {Esignature:[id number]}

## 2022-05-17 ENCOUNTER — APPOINTMENT (OUTPATIENT)
Dept: INTERVENTIONAL RADIOLOGY/VASCULAR | Age: 87
DRG: 638 | End: 2022-05-17
Payer: MEDICARE

## 2022-05-17 VITALS
RESPIRATION RATE: 18 BRPM | TEMPERATURE: 98.3 F | BODY MASS INDEX: 32.64 KG/M2 | SYSTOLIC BLOOD PRESSURE: 152 MMHG | OXYGEN SATURATION: 97 % | WEIGHT: 241 LBS | DIASTOLIC BLOOD PRESSURE: 75 MMHG | HEART RATE: 98 BPM | HEIGHT: 72 IN

## 2022-05-17 LAB
ALBUMIN SERPL-MCNC: 2.4 G/DL (ref 3.5–5.2)
ALP BLD-CCNC: 113 U/L (ref 40–129)
ALT SERPL-CCNC: 7 U/L (ref 0–40)
ANION GAP SERPL CALCULATED.3IONS-SCNC: 7 MMOL/L (ref 7–16)
AST SERPL-CCNC: 12 U/L (ref 0–39)
BASOPHILS ABSOLUTE: 0.02 E9/L (ref 0–0.2)
BASOPHILS RELATIVE PERCENT: 0.2 % (ref 0–2)
BILIRUB SERPL-MCNC: 0.2 MG/DL (ref 0–1.2)
BUN BLDV-MCNC: 35 MG/DL (ref 6–23)
CALCIUM SERPL-MCNC: 8.2 MG/DL (ref 8.6–10.2)
CHLORIDE BLD-SCNC: 103 MMOL/L (ref 98–107)
CO2: 29 MMOL/L (ref 22–29)
CREAT SERPL-MCNC: 1.1 MG/DL (ref 0.7–1.2)
EOSINOPHILS ABSOLUTE: 0.7 E9/L (ref 0.05–0.5)
EOSINOPHILS RELATIVE PERCENT: 6.1 % (ref 0–6)
GFR AFRICAN AMERICAN: >60
GFR NON-AFRICAN AMERICAN: >60 ML/MIN/1.73
GLUCOSE BLD-MCNC: 197 MG/DL (ref 74–99)
HCT VFR BLD CALC: 29.4 % (ref 37–54)
HEMOGLOBIN: 8.9 G/DL (ref 12.5–16.5)
IMMATURE GRANULOCYTES #: 0.06 E9/L
IMMATURE GRANULOCYTES %: 0.5 % (ref 0–5)
LYMPHOCYTES ABSOLUTE: 1.62 E9/L (ref 1.5–4)
LYMPHOCYTES RELATIVE PERCENT: 14.1 % (ref 20–42)
MCH RBC QN AUTO: 25.8 PG (ref 26–35)
MCHC RBC AUTO-ENTMCNC: 30.3 % (ref 32–34.5)
MCV RBC AUTO: 85.2 FL (ref 80–99.9)
METER GLUCOSE: 148 MG/DL (ref 74–99)
METER GLUCOSE: 187 MG/DL (ref 74–99)
MONOCYTES ABSOLUTE: 0.78 E9/L (ref 0.1–0.95)
MONOCYTES RELATIVE PERCENT: 6.8 % (ref 2–12)
NEUTROPHILS ABSOLUTE: 8.29 E9/L (ref 1.8–7.3)
NEUTROPHILS RELATIVE PERCENT: 72.3 % (ref 43–80)
PDW BLD-RTO: 16.7 FL (ref 11.5–15)
PLATELET # BLD: 232 E9/L (ref 130–450)
PMV BLD AUTO: 9.4 FL (ref 7–12)
POTASSIUM REFLEX MAGNESIUM: 4.3 MMOL/L (ref 3.5–5)
RBC # BLD: 3.45 E12/L (ref 3.8–5.8)
SARS-COV-2, NAAT: NOT DETECTED
SODIUM BLD-SCNC: 139 MMOL/L (ref 132–146)
TOTAL PROTEIN: 5.6 G/DL (ref 6.4–8.3)
WBC # BLD: 11.5 E9/L (ref 4.5–11.5)
WOUND/ABSCESS: NORMAL

## 2022-05-17 PROCEDURE — 87635 SARS-COV-2 COVID-19 AMP PRB: CPT

## 2022-05-17 PROCEDURE — 36415 COLL VENOUS BLD VENIPUNCTURE: CPT

## 2022-05-17 PROCEDURE — 6370000000 HC RX 637 (ALT 250 FOR IP): Performed by: INTERNAL MEDICINE

## 2022-05-17 PROCEDURE — 80053 COMPREHEN METABOLIC PANEL: CPT

## 2022-05-17 PROCEDURE — 85025 COMPLETE CBC W/AUTO DIFF WBC: CPT

## 2022-05-17 PROCEDURE — 93923 UPR/LXTR ART STDY 3+ LVLS: CPT

## 2022-05-17 PROCEDURE — 82962 GLUCOSE BLOOD TEST: CPT

## 2022-05-17 PROCEDURE — 6370000000 HC RX 637 (ALT 250 FOR IP): Performed by: SPECIALIST

## 2022-05-17 PROCEDURE — 6360000002 HC RX W HCPCS: Performed by: INTERNAL MEDICINE

## 2022-05-17 RX ORDER — CALCIUM CARBONATE 200(500)MG
500 TABLET,CHEWABLE ORAL 3 TIMES DAILY PRN
Status: DISCONTINUED | OUTPATIENT
Start: 2022-05-17 | End: 2022-05-17 | Stop reason: HOSPADM

## 2022-05-17 RX ORDER — CEFDINIR 300 MG/1
300 CAPSULE ORAL EVERY 12 HOURS SCHEDULED
Qty: 12 CAPSULE | Refills: 0 | DISCHARGE
Start: 2022-05-17 | End: 2022-05-23

## 2022-05-17 RX ORDER — LEVOFLOXACIN 500 MG/1
500 TABLET, FILM COATED ORAL DAILY
Qty: 5 TABLET | Refills: 0 | DISCHARGE
Start: 2022-05-18 | End: 2022-05-23

## 2022-05-17 RX ADMIN — CEFDINIR 300 MG: 300 CAPSULE ORAL at 10:41

## 2022-05-17 RX ADMIN — INSULIN LISPRO 2 UNITS: 100 INJECTION, SOLUTION INTRAVENOUS; SUBCUTANEOUS at 06:14

## 2022-05-17 RX ADMIN — CALCIUM CARBONATE 500 MG: 500 TABLET, CHEWABLE ORAL at 15:17

## 2022-05-17 RX ADMIN — LEVOFLOXACIN 500 MG: 500 TABLET, FILM COATED ORAL at 10:41

## 2022-05-17 RX ADMIN — DIPHENHYDRAMINE HCL 25 MG: 25 TABLET ORAL at 02:52

## 2022-05-17 RX ADMIN — ENOXAPARIN SODIUM 30 MG: 100 INJECTION SUBCUTANEOUS at 10:41

## 2022-05-17 NOTE — PROGRESS NOTES
Subjective: The patient is awake and alert. No problems overnight. Denies chest pain, angina, and dyspnea. Denies abdominal pain. Tolerating diet. No nausea or vomiting. Objective:  Wound as per DPM(see pics)    BP (!) 127/52   Pulse 84   Temp 96.6 °F (35.9 °C) (Oral)   Resp 18   Ht 6' (1.829 m)   Wt 241 lb (109.3 kg)   SpO2 100%   BMI 32.69 kg/m²     Heart:  RRR, no murmurs, gallops, or rubs. Lungs:  CTA bilaterally, no wheeze, rales or rhonchi  Abd: bowel sounds present, nontender, nondistended, no masses  Extrem:  No clubbing, cyanosis, or edema. See pics. Neuro:  No focal deficits. Oral mucosa nl    CBC:   Lab Results   Component Value Date    WBC 11.5 05/17/2022    RBC 3.45 05/17/2022    HGB 8.9 05/17/2022    HCT 29.4 05/17/2022    MCV 85.2 05/17/2022    MCH 25.8 05/17/2022    MCHC 30.3 05/17/2022    RDW 16.7 05/17/2022     05/17/2022    MPV 9.4 05/17/2022     CMP:    Lab Results   Component Value Date     05/17/2022    K 4.3 05/17/2022     05/17/2022    CO2 29 05/17/2022    BUN 35 05/17/2022    CREATININE 1.1 05/17/2022    GFRAA >60 05/17/2022    LABGLOM >60 05/17/2022    GLUCOSE 197 05/17/2022    PROT 5.6 05/17/2022    LABALBU 2.4 05/17/2022    CALCIUM 8.2 05/17/2022    BILITOT 0.2 05/17/2022    ALKPHOS 113 05/17/2022    AST 12 05/17/2022    ALT 7 05/17/2022     BMP:    Lab Results   Component Value Date     05/17/2022    K 4.3 05/17/2022     05/17/2022    CO2 29 05/17/2022    BUN 35 05/17/2022    LABALBU 2.4 05/17/2022    CREATININE 1.1 05/17/2022    CALCIUM 8.2 05/17/2022    GFRAA >60 05/17/2022    LABGLOM >60 05/17/2022    GLUCOSE 197 05/17/2022        Assessment:    Patient Active Problem List   Diagnosis    HTN (hypertension)    [facility]    Obesity (BMI 30.0-34. 9)    Right hip pain    Diabetes mellitus type 2, uncontrolled ([facility]    Essential hypertension, benign    Hyperlipidemia with target LDL less than 100    Dizzy spells  Troponin level elevated    Shortness of breath    Chest pressure    Acute respiratory insufficiency    Pneumonia    History of pulmonary embolus (PE)    Acute left-sided CHF (congestive heart failure) (HCC)    Type I diabetes mellitus, uncontrolled (HCC)    Cellulitis and abscess of right lower extremity    Cellulitis and abscess of left lower extremity    Chronic venous insufficiency of lower extremity    Acute hypoxemic respiratory failure due to COVID-19 [facility])    Acute on chronic respiratory failure with hypoxia (HCC)    Acute respiratory failure with hypoxia ([address]    COVID-19 virus infection    Hyperlipidemia    Pneumonia due to COVID-19 virus    Orthostatic hypotension    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 (HCC)    [facility]VD [facility]peripheral vascular disease) ([facility]    PAD (peripheral artery disease) ([facility])    Diabetic ulcer of right heel associated with type 2 diabetes mellitus, with fat layer exposed ([address]    Diabetic ulcer of left heel ([address]    Sepsis ([address]    Diabetic foot infection ([address]    Foot ulcer due to secondary DM ([address]       Plan:  ATBs. Per ID/DPM.  F/u Wound Care.             Suman Jay MD  7:03 AM  5/17/2022

## 2022-05-17 NOTE — PROGRESS NOTES
Comprehensive Nutrition Assessment    Type and Reason for Visit:  Initial,Wound    Nutrition Recommendations/Plan:   1. Will add Glucerna BID & Renny BID  2. Continue current diet     Malnutrition Assessment:  Malnutrition Status: At risk for malnutrition (Comment) (05/17/22 1120)    Context:  Acute Illness     Findings of the 6 clinical characteristics of malnutrition:  Energy Intake:  Mild decrease in energy intake (Comment)  Weight Loss:  No significant weight loss     Body Fat Loss:  No significant body fat loss     Muscle Mass Loss:  No significant muscle mass loss    Fluid Accumulation:  No significant fluid accumulation     Strength:  Not Performed    Nutrition Assessment:    pt admit w/ L leg wound infection/maggot infestation. PMH PVD/CHF/CKD/breast CA/T2DM. pt at nutritional risk d/t multiple wounds. will add ONS    Nutrition Related Findings:    A/Ox4, abd WDL, elevated renal labs, I/Os WNL Wound Type: Multiple,Full Thickness,Diabetic Ulcer,Venous Stasis       Current Nutrition Intake & Therapies:    Average Meal Intake: 26-50%  Average Supplements Intake: None Ordered  ADULT DIET; Regular; 4 carb choices (60 gm/meal)    Anthropometric Measures:  Height: 6' (182.9 cm)  Ideal Body Weight (IBW): 178 lbs (81 kg)    Admission Body Weight: 241 lb (109.3 kg) (5/16 bedscale - 1st measured wt recorded)  Current Body Weight: 241 lb (109.3 kg) (5/16), 135.4 % IBW. Weight Source: Bed Scale  Current BMI (kg/m2): 32.7  Usual Body Weight:  (MASSIMO d/t CHF (244-266# per EMR))     Weight Adjustment For: No Adjustment                 BMI Categories: Obese Class 1 (BMI 30.0-34. 9)    Estimated Daily Nutrient Needs:  Energy Requirements Based On: Formula  Weight Used for Energy Requirements: Current  Energy (kcal/day): 6036-1798 kcal/day  Weight Used for Protein Requirements: Ideal  Protein (g/day): 105-125g/day  Method Used for Fluid Requirements: 1 ml/kcal  Fluid (ml/day): 2100-2200ml/day    Nutrition Diagnosis: · Increased nutrient needs related to increase demand for energy/nutrients as evidenced by wounds    Nutrition Interventions:   Food and/or Nutrient Delivery: Continue Current Diet,Start Oral Nutrition Supplement  Nutrition Education/Counseling: No recommendation at this time  Coordination of Nutrition Care: Continue to monitor while inpatient     Goals:     Goals: PO intake 50% or greater     Nutrition Monitoring and Evaluation:      Food/Nutrient Intake Outcomes: Food and Nutrient Intake,Supplement Intake  Physical Signs/Symptoms Outcomes: Biochemical Data,Nutrition Focused Physical Findings,Skin,Weight,GI Status,Fluid Status or Edema    Discharge Planning:     Too soon to determine     [address]: [address]

## 2022-05-17 NOTE — CARE COORDINATION
Wound vac off at [address] [facility]. For arterial pressure studies. Plan remains to return to [place] on discharge- private pay bedhold- no precert required. Will need COVID test on day of discharge. On po abxs. AGUSTINA in epic, ambulette transport form on chart.  Will follow Syed Mcintyre RN case manager

## 2022-05-17 NOTE — PROGRESS NOTES
Transfer report given to symone jerome at [facility]. Discharge orderes faxed to [phone].

## 2022-05-17 NOTE — CARE COORDINATION
COVID pending. Discharge back to [facility] today. Wheelchair transportation set up w/ [facility]- facility will pay for transport as per Astrid Archibald @ [facility]-  time 4 pm. Facility, nursing, patient, VM left w/ son Ibeth Jacob [phone] notifying of discharge destination and time. AGUSTINA in [facility], trasnport form on chart.  Himanshu Marcano RN case manager

## 2022-05-17 NOTE — PROGRESS NOTES
[address] [place] [address] [facility]  ANAL[facility]  Progress Note    SUBJECTIVE:  Chief Complaint   Patient presents with    Wound Check     patient has wounds to bilateral lower extremities and left leg noted to have maggots during wound vac dressing change today. The patient is feeling better. No pain. She wants to go back to the facility since he has an appointment with his podiatrist tomorrow. Review of systems:  As stated above in the chief complaint, otherwise negative. Medications:  Scheduled Meds:   collagenase   Topical Daily    cefdinir  300 mg Oral 2 times per day    levoFLOXacin  500 mg Oral Daily    sodium chloride flush  10 mL IntraVENous 2 times per day    enoxaparin  30 mg SubCUTAneous BID    gabapentin  200 mg Oral Dinner    insulin lispro  0-12 Units SubCUTAneous TID WC    insulin lispro  0-6 Units SubCUTAneous Nightly    sodium hypochlorite   Irrigation Daily     Continuous Infusions:   sodium chloride      dextrose       PRN Meds:diphenhydrAMINE, sodium chloride flush, sodium chloride, potassium chloride **OR** potassium alternative oral replacement **OR** potassium chloride, senna, acetaminophen **OR** acetaminophen, glucose, dextrose bolus **OR** dextrose bolus, glucagon (rDNA), dextrose    OBJECTIVE:  BP (!) 127/52   Pulse 84   Temp 96.6 °F (35.9 °C) (Oral)   Resp 18   Ht 6' (1.829 m)   Wt 241 lb (109.3 kg)   SpO2 100%   BMI 32.69 kg/m²   Temp  Av.7 °F (36.5 °C)  Min: 96.6 °F (35.9 °C)  Max: 98.2 °F (36.8 °C)  Constitutional: The patient is awake, alert, and oriented. Skin: Warm and dry. No rashes were noted. HEENT: Round and reactive pupils. Moist mucous membranes. No ulcerations or thrush. Neck: Supple to movements. Chest: No respiratory distress. No crackles. Cardiovascular: Heart sounds rhythmic and regular. Abdomen: Positive bowel sounds to auscultation. Benign to palpation. Extremities: Minimal edema.   Bilateral venous stasis dermatitis. Left leg is wrapped. Lines: peripheral    Laboratory and Tests Review:  Lab Results   Component Value Date    WBC 11.5 05/17/2022    WBC 14.7 (H) 05/16/2022    WBC 14.4 (H) 05/15/2022    HGB 8.9 (L) 05/17/2022    HCT 29.4 (L) 05/17/2022    MCV 85.2 05/17/2022     05/17/2022     Lab Results   Component Value Date    NEUTROABS 8.29 (H) 05/17/2022    NEUTROABS 11.96 (H) 05/16/2022    NEUTROABS 9.88 (H) 05/15/2022     No results found for: CRPHS  Lab Results   Component Value Date    ALT 7 05/17/2022    AST 12 05/17/2022    ALKPHOS 113 05/17/2022    BILITOT 0.2 05/17/2022     Lab Results   Component Value Date     05/17/2022    K 4.3 05/17/2022     05/17/2022    CO2 29 05/17/2022    BUN 35 05/17/2022    CREATININE 1.1 05/17/2022    CREATININE 1.0 05/16/2022    CREATININE 1.0 05/15/2022    GFRAA >60 05/17/2022    LABGLOM >60 05/17/2022    GLUCOSE 197 05/17/2022    PROT 5.6 05/17/2022    LABALBU 2.4 05/17/2022    CALCIUM 8.2 05/17/2022    BILITOT 0.2 05/17/2022    ALKPHOS 113 05/17/2022    AST 12 05/17/2022    ALT 7 05/17/2022     Lab Results   Component Value Date    CRP 4.9 (H) 05/16/2022    CRP 8.9 (H) 03/14/2022    CRP 1.5 (H) 08/29/2021     Lab Results   Component Value Date    SEDRATE 63 (H) 05/15/2022    SEDRATE 44 (H) 03/14/2022    SEDRATE 23 (H) 08/29/2021     Radiology:      Microbiology:   Left leg culture 5/15/2022: CoNS, GNR, Corynebacteria  Blood cultures 5/50/22: Negative so far    ASSESSMENT:  · Left leg wound infection, improving  · Maggot infestation left leg  · Leukocytosis, improved    PLAN:  · Continue Levofloxacin and Cefdinir x10 days. Reconciled  · The patient can be discharged from ID standpoint  · Follow-up with podiatry.   Spoke with nursing    Bill Samaniego MD  10:36 AM  5/17/2022

## 2022-05-17 NOTE — PROGRESS NOTES
Left message for Dr. Adeola Wright office to notify of consultant sign off and check discharge to [address] [place] [address].

## 2022-05-17 NOTE — PROGRESS NOTES
Podiatry Progress Note  5/17/2022   [facility] Patient. SUBJECTIVE: This patient is being seen for follow up of b/l lower extremity ulcers. States that he is scheduled to see Dr. Elen Hanna in clinic tomorrow morning and is anxious to be discharged. Home wound vac was malfunctioning so santyl dsd was placed to left lower extremity. Denies any pedal complaints. Past Medical History:   Diagnosis Date    Arthritis     Cancer ([facility]     breast    Cellulitis     CH[facility]congestive [facility] failure) ([facility]     CKD (chronic kidney disease) stage 3, GFR 30-59 ml/min ([facility])     Diabetes mellitus ([address]     History of lumpectomy     Hx of blood clots     Hyperlipidemia     Hypertension     Left ventricular failure ([facility])     Macular degeneration     Obesity     Orthostatic hypotension     PE (pulmonary thromboembolism) ([facility])     Pressure injury of both heels, unstageable ([facility] [address] [facility]     Staph aureus infection     Venous insufficiency         Past Surgical History:   Procedure Laterality Date    [facility]  9/2/2021         MASTECTOMY      TOE AMPUTATION      TOE SURGERY      TONSILLECTOMY           History reviewed. No pertinent family history. Social History     Tobacco Use    Smoking status: Never Smoker    Smokeless tobacco: Never Used   Substance Use Topics    Alcohol use: Not Currently     Alcohol/week: 1.0 standard drink     Types: 1 Cans of beer per week        Prior to Admission medications    Medication Sig Start Date End Date Taking?  Authorizing Provider   apixaban (ELIQUIS) 2.5 MG TABS tablet Take 1 tablet by mouth 2 times daily (before meals) 3/17/22   Renetta Clarke MD   docusate sodium (COLACE) 100 MG capsule Take 100 mg by mouth nightly    Historical Provider, MD   tamsulosin (FLOMAX) 0.4 MG capsule Take 0.4 mg by mouth daily    Historical Provider, MD   bisacodyl (DULCOLAX) 10 MG suppository Place 10 mg rectally daily as needed for Constipation    Historical Provider, doses 12/21/17 12/29/17  Sophie Augustine MD   gabapentin (NEURONTIN) 100 MG capsule Take 200 mg by mouth Daily with supper. Historical Provider, MD   atorvastatin (LIPITOR) 80 MG tablet Take 80 mg by mouth at bedtime     Historical Provider, MD   INSULIN LISP PROT & LISP, HUM, (75-25) 100 UNIT/ML SUSP Inject 72 Units into the skin daily (with breakfast)     Historical Provider, MD   INSULIN LISP PROT & LISP, HUM, (75-25) 100 UNIT/ML SUSP Inject 55 Units into the skin Daily with supper     Historical Provider, MD   clotrimazole-betamethasone (LOTRISONE) cream Apply  topically as needed. Apply topically 2 times daily. Historical Provider, MD        Clindamycin/lincomycin and Sulfa antibiotics         OBJECTIVE:        Vitals:    05/17/22 0245   BP: (!) 127/52   Pulse: 84   Resp: 18   Temp: 96.6 °F (35.9 °C)   SpO2: 100%              EXAM:        Pt is AAOx3    Vascular Exam: Pedal pulses nonpalpable secondary to diffuse non pitting edema. Lower extremity warm to warm from proximal tibial tuberosity to distal digits dorsally. No pedal hair growth noted. Cap refill brisk to remaining digits. Mild jose wound erythema and edema noted. Neuro Exam: Light touch sensation is absent    Dermatologic Exam:    RIGHT: Full thickness ulceration noted to proximal heel with largely fibrotic wound bed. Jose wound erythema and edema noted. No purulence. No malodor. No lymphangitis. No probing.   -Chronic venous stasis dermatitis    LEFT: Full thickness ulceration noted to heel with jose ulcer erythema and edema. Wound bed is largely fibrotic with minimal granulation. No purulence. No malodor. No lymphangitis. No probing.  -Pre ulcerative lesion noted to distal tip of hallux with coagulated blood. No drainage. No purulence. No malodor. No lymphangitis. -Full thickness ulceration with mainly granular wound base noted to posterior leg. No purulence. No malodor. No lymphangitis.  No tunneling/burrowing.  -Chronic venous stasis dermatitis    MSK: ROM testing deferred. Soft, compressible posterior muscle groups b/l. No tenderness to palpation of wounds.  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                               Current Facility-Administered Medications   Medication Dose Route Frequency Provider Last Rate Last Admin    collagenase ointment   Topical Daily Mary Sam DPM   Given at 05/16/22 1255    cefdinir (OMNICEF) capsule 300 mg  300 mg Oral 2 times per day Jose C Browning MD   300 mg at 05/16/22 2019    levoFLOXacin (LEVAQUIN) tablet 500 mg  500 mg Oral Daily Jose C Browning MD   500 mg at 05/16/22 1440    diphenhydrAMINE (BENADRYL) tablet 25 mg  25 mg Oral Q6H PRN Jackie Delgado MD   25 mg at 05/17/22 0252    sodium chloride flush 0.9 % injection 10 mL  10 mL IntraVENous 2 times per day Ami E Yair, DO   10 mL at 05/16/22 2020    sodium chloride flush 0.9 % injection 10 mL  10 mL IntraVENous PRN Ami E Yair, DO        0.9 % sodium chloride infusion   IntraVENous PRN Ami E Yair, DO        potassium chloride (KLOR-CON M) extended release tablet 40 mEq  40 mEq Oral PRN Ami E Yair, DO        Or    potassium bicarb-citric acid (EFFER-K) effervescent tablet 40 mEq  40 mEq Oral PRN Ami E Yair, DO        Or    potassium chloride 10 mEq/100 mL IVPB (Peripheral Line)  10 mEq IntraVENous PRN Ami E Yair, DO        enoxaparin Sodium (LOVENOX) injection 30 mg  30 mg SubCUTAneous BID Ami E Yair, DO   30 mg at 05/16/22 2019    senna (SENOKOT) tablet 8.6 mg  1 tablet Oral Daily PRN Ami E Yair, DO        acetaminophen (TYLENOL) tablet 650 mg  650 mg Oral Q6H PRN Ami E Yair, DO   650 mg at 05/16/22 1832    Or    acetaminophen (TYLENOL) suppository 650 mg  650 mg Rectal Q6H PRN Ami E Yair, DO        glucose chewable tablet 16 g  4 tablet Oral PRN Ami E Yair, DO        dextrose bolus 10% 125 mL  125 mL IntraVENous PRN Ami E Yair, DO        Or    dextrose bolus 10% 250 mL  250 mL IntraVENous PRN Ami Elleriter, DO        glucagon (rDNA) injection 1 mg  1 mg IntraMUSCular PRN Ami Elleriter, DO        dextrose 5 % solution  100 mL/hr IntraVENous PRN Ami ELDER Yair, DO        gabapentin (NEURONTIN) capsule 200 mg  200 mg Oral Dinner Ami Elleriter, DO   200 mg at 05/16/22 1832    insulin lispro (HUMALOG) injection vial 0-12 Units  0-12 Units SubCUTAneous TID WC Ami Mazariegos, DO   2 Units at 05/17/22 4163    insulin lispro (HUMALOG) injection vial 0-6 Units  0-6 Units SubCUTAneous Nightly Ami Mazariegos, DO   3 Units at 05/16/22 2020    sodium hypochlorite (DAKINS) external solution   Irrigation Daily Conner Sargent DPM            Lab Results   Component Value Date    WBC 11.5 05/17/2022    HCT 29.4 (L) 05/17/2022    HGB 8.9 (L) 05/17/2022     05/17/2022     05/17/2022    K 4.3 05/17/2022     05/17/2022    CO2 29 05/17/2022    BUN 35 (H) 05/17/2022    CREATININE 1.1 05/17/2022    GLUCOSE 197 (H) 05/17/2022    CRP 4.9 (H) 05/16/2022         Radiographs:    ASSESEMENT:  -Full thickness ulceration, b/l heel diabetic  -Full thickness ulceration, left posterior leg  -Pre ulcerative lesion, left distal great toe diabetic  -Soft tissue edema, b/l lower extremity  -Chronic venous stasis dermatitis, b/l lower extremity  -DM II with peripheral neuropathy         PLAN:  - Examined and evaluated  - All labs, imaging, and charts reviewed  -ID: levofloxacin and cefdinir ongoing  -Left tib fib xrays: Diffuse soft tissue edema. No acute osseous abnormality. -B/L foot xrays: diffuse soft tissue swelling. No evidence of OM  -Venous US:No DVT  -Vascular studies:pending  -Continue offloading of b/l lower extremities  -Will proceed with conservative local wound care: santyl DSD for left toe and posterior leg Xeroform DSD for b/l heel ulcers.    -Stable for d/c from Podiatric standpoint and once cleared by all other teams on board  -Will follow up on outpatient basis for wound care   -Wound vac to be changed tomorrow if patient still in house.  Please place wound vacuum machine in pt room  -Will continue to monitor   -Discussed with: Dr. Marty Basilio DPM   5/17/2022   9:12 AM

## 2022-05-18 ENCOUNTER — HOSPITAL ENCOUNTER (OUTPATIENT)
Dept: WOUND CARE | Age: 87
Discharge: HOME | End: 2022-05-18
Payer: MEDICARE

## 2022-05-18 VITALS
RESPIRATION RATE: 18 BRPM | WEIGHT: 234 LBS | BODY MASS INDEX: 31.69 KG/M2 | TEMPERATURE: 97.6 F | HEIGHT: 72 IN | DIASTOLIC BLOOD PRESSURE: 70 MMHG | SYSTOLIC BLOOD PRESSURE: 128 MMHG | HEART RATE: 84 BPM

## 2022-05-18 DIAGNOSIS — L97.412 DIABETIC ULCER OF RIGHT HEEL ASSOCIATED WITH TYPE 2 DIABETES MELLITUS, WITH FAT LAYER EXPOSED (HCC): Primary | ICD-10-CM

## 2022-05-18 DIAGNOSIS — E11.621 DIABETIC ULCER OF RIGHT HEEL ASSOCIATED WITH TYPE 2 DIABETES MELLITUS, WITH FAT LAYER EXPOSED (HCC): Primary | ICD-10-CM

## 2022-05-18 DIAGNOSIS — E08.621 DIABETIC ULCER OF LEFT HEEL ASSOCIATED WITH DIABETES MELLITUS DUE TO UNDERLYING CONDITION, LIMITED TO BREAKDOWN OF SKIN (HCC): ICD-10-CM

## 2022-05-18 DIAGNOSIS — L97.421 DIABETIC ULCER OF LEFT HEEL ASSOCIATED WITH DIABETES MELLITUS DUE TO UNDERLYING CONDITION, LIMITED TO BREAKDOWN OF SKIN (HCC): ICD-10-CM

## 2022-05-18 PROCEDURE — 6370000000 HC RX 637 (ALT 250 FOR IP): Performed by: PODIATRIST

## 2022-05-18 PROCEDURE — 11042 DBRDMT SUBQ TIS 1ST 20SQCM/<: CPT

## 2022-05-18 PROCEDURE — 11045 DBRDMT SUBQ TISS EACH ADDL: CPT

## 2022-05-18 RX ORDER — GINSENG 100 MG
CAPSULE ORAL ONCE
Status: CANCELLED | OUTPATIENT
Start: 2022-05-18 | End: 2022-05-18

## 2022-05-18 RX ORDER — LIDOCAINE 40 MG/G
CREAM TOPICAL ONCE
Status: CANCELLED | OUTPATIENT
Start: 2022-05-18 | End: 2022-05-18

## 2022-05-18 RX ORDER — LIDOCAINE 50 MG/G
OINTMENT TOPICAL ONCE
Status: CANCELLED | OUTPATIENT
Start: 2022-05-18 | End: 2022-05-18

## 2022-05-18 RX ORDER — CLOBETASOL PROPIONATE 0.5 MG/G
OINTMENT TOPICAL ONCE
Status: CANCELLED | OUTPATIENT
Start: 2022-05-18 | End: 2022-05-18

## 2022-05-18 RX ORDER — LIDOCAINE HYDROCHLORIDE 40 MG/ML
SOLUTION TOPICAL ONCE
Status: CANCELLED | OUTPATIENT
Start: 2022-05-18 | End: 2022-05-18

## 2022-05-18 RX ORDER — BACITRACIN ZINC AND POLYMYXIN B SULFATE 500; 1000 [USP'U]/G; [USP'U]/G
OINTMENT TOPICAL ONCE
Status: CANCELLED | OUTPATIENT
Start: 2022-05-18 | End: 2022-05-18

## 2022-05-18 RX ORDER — BETAMETHASONE DIPROPIONATE 0.05 %
OINTMENT (GRAM) TOPICAL ONCE
Status: CANCELLED | OUTPATIENT
Start: 2022-05-18 | End: 2022-05-18

## 2022-05-18 RX ORDER — LIDOCAINE HYDROCHLORIDE 40 MG/ML
SOLUTION TOPICAL ONCE
Status: COMPLETED | OUTPATIENT
Start: 2022-05-18 | End: 2022-05-18

## 2022-05-18 RX ORDER — LIDOCAINE HYDROCHLORIDE 20 MG/ML
JELLY TOPICAL ONCE
Status: CANCELLED | OUTPATIENT
Start: 2022-05-18 | End: 2022-05-18

## 2022-05-18 RX ORDER — BACITRACIN, NEOMYCIN, POLYMYXIN B 400; 3.5; 5 [USP'U]/G; MG/G; [USP'U]/G
OINTMENT TOPICAL ONCE
Status: CANCELLED | OUTPATIENT
Start: 2022-05-18 | End: 2022-05-18

## 2022-05-18 RX ORDER — GENTAMICIN SULFATE 1 MG/G
OINTMENT TOPICAL ONCE
Status: CANCELLED | OUTPATIENT
Start: 2022-05-18 | End: 2022-05-18

## 2022-05-18 RX ADMIN — LIDOCAINE HYDROCHLORIDE 8 ML: 40 SOLUTION TOPICAL at 09:58

## 2022-05-18 ASSESSMENT — PAIN DESCRIPTION - DESCRIPTORS: DESCRIPTORS: DULL

## 2022-05-18 ASSESSMENT — PAIN DESCRIPTION - LOCATION: LOCATION: FOOT

## 2022-05-18 ASSESSMENT — PAIN DESCRIPTION - ORIENTATION: ORIENTATION: LEFT;RIGHT

## 2022-05-18 ASSESSMENT — PAIN SCALES - GENERAL: PAINLEVEL_OUTOF10: 2

## 2022-05-18 NOTE — PROGRESS NOTES
[facility] Center  History and Physical/Consultation  Podiatry    Referring Physician : Peyton Morley MD  Dimitris Acevedo. MEDICAL RECORD NUMBER:  [id number]  AGE: 80 y.o. GENDER: male  : 1932  EPISODE DATE:  2022  Subjective:     Chief Complaint   Patient presents with    Wound Check     bilateral heels, left great toe, left lower leg         HISTORY of PRESENT ILLNESS HPI     Dimitris Boothe is a 80 y.o. male who presents today for wound/ulcer evaluation. History of Wound Context:  The patient has had a wound of diabetic b/l heels,and right ankle which was first noted approximately months ago. This has been treated betadyne. On their initial visit to the wound healing center, 22 ,  the patient has noted that the wound has been improving. The patient has had similar previous wounds in the past.      Pt is not on abx at time of initial visit.       Wound/Ulcer Pain Timing/Severity: constant  Quality of pain: sharp  Severity:  3 / 10   Modifying Factors: Pain worsens with walking  Associated Signs/Symptoms: edema, erythema and drainage    Ulcer Identification:  Ulcer Type: diabetic  Contributing Factors: edema and diabetes    Diabetic/Pressure/Non Pressure Ulcers onl y:  Ulcer: Diabetic ulcer, fat layer exposed    If patient has diabetic lower extremity wounds  Campbell Classification of diabetic lower extremity wounds:    Grade Description   []  0 No open wound   []  1 Superficial ulcer involving the full skin thickness   [x]  2 Deep ulcer involves ligament, tendon, joint capsule, or fascia  No bone involvement or abscess presence   []  3 Deep Ulcer with abcess formation and/or osteomyelitis   []  4 Localized gangrene   []  5 Extensive gangrene of the foot     Wound: N/A          22  Debrided, cont tx and care     22  Debrided, cont tx and care, await graft approval    22  Debrided, cont tx and care, puraply applied    22  Debrided, dermagraft applied    2-16-22  Debrided, dermagraft applied    2-23-22  Debrided, graft applied    3-2-22  Debrided, graft applied    3-9-22  Debrided, graft applied     3-23-22  Just release from hospital care, new wounds noted from hospital, also trauma to left toe loosened nail that was removed without incident, debrided all wounds, culture taken    3-30-22  Debrided, santyl to left leg, applied dermagraft b/l heels, prevelon boots wound vac left leg    4-6-22  Debrided, dermagraft b/l, wound care    4-13-22  Debrided, wound vac, dermagraft left    4-27-22  Debrided, cont tx and care     5-4-22  Debrided, cont tx and care, wound vac to leg left, and hydrofera blue to all other wounds    5-11-22  Debrided, cont tx and care    5-18-22  Debrided, cont tx and care  PAST MEDICAL HISTORY      Diagnosis Date    Arthritis     Cancer [facility])     breast    Cellulitis     CHF (congestive heart failure) (HCC)     CKD (chronic kidney disease) stage 3, GFR 30-59 ml/min ([address]     Diabetes mellitus ([address]     History of lumpectomy     Hx of blood clots     Hyperlipidemia     Hypertension     Left ventricular failure ([address]     Macular degeneration     Obesity     Orthostatic hypotension     PE (pulmonary thromboembolism) ([address]     Pressure injury of both heels, unstageable ([address]     Staph aureus infection     Venous insufficiency      Past Surgical History:   Procedure Laterality Date    [facility]  9/2/2021         MASTECTOMY      TOE AMPUTATION      TOE SURGERY      TONSILLECTOMY       No family history on file.   Social History     Tobacco Use    Smoking status: Never Smoker    Smokeless tobacco: Never Used   Vaping Use    Vaping Use: Never used   Substance Use Topics    Alcohol use: Not Currently     Alcohol/week: 1.0 standard drink     Types: 1 Cans of beer per week    Drug use: No     Allergies   Allergen Reactions    Clindamycin/Lincomycin     Sulfa Antibiotics      Current Outpatient Medications on File Prior to Encounter   Medication Sig Dispense Refill    cefdinir (OMNICEF) 300 MG capsule Take 1 capsule by mouth every 12 hours for 12 doses 12 capsule 0    levoFLOXacin (LEVAQUIN) 500 MG tablet Take 1 tablet by mouth daily for 5 doses 5 tablet 0    apixaban (ELIQUIS) 2.5 MG TABS tablet Take 1 tablet by mouth 2 times daily (before meals) 60 tablet 2    docusate sodium (COLACE) 100 MG capsule Take 100 mg by mouth nightly      tamsulosin (FLOMAX) 0.4 MG capsule Take 0.4 mg by mouth daily      bisacodyl (DULCOLAX) 10 MG suppository Place 10 mg rectally daily as needed for Constipation      diphenhydrAMINE-zinc acetate (BENADRYL) 1-0.1 % cream Apply topically every 4 hours as needed for Itching Apply topically 3 times daily as needed.  Sodium Phosphates (FLEET) 7-19 GM/118ML Place 1 enema rectally daily as needed      magnesium hydroxide (MILK OF MAGNESIA) 400 MG/5ML suspension Take 30 mLs by mouth daily as needed for Constipation      Polyethylene Glycol 400 1 % SOLN Apply 1 drop to eye every 4 hours as needed      ferrous sulfate (IRON 325) 325 (65 Fe) MG tablet Take 325 mg by mouth daily       zinc sulfate (ZINCATE) 220 (50 Zn) MG capsule Take 1 capsule by mouth daily for 14 days 14 capsule 0    miconazole (MICOTIN) 2 % powder Apply topically 2 times daily.  45 g 1    ascorbic acid (VITAMIN C) 500 MG tablet Take 1 tablet by mouth 2 times daily 30 tablet 0    Vitamin D (CHOLECALCIFEROL) 50 MCG (2000 UT) TABS tablet Take 1 tablet by mouth daily 60 tablet 0    furosemide (LASIX) 40 MG tablet Take 20 mg by mouth daily       omeprazole (PRILOSEC) 20 MG delayed release capsule Take 20 mg by mouth daily      Multiple Vitamins-Minerals (THERAPEUTIC MULTIVITAMIN-MINERALS) tablet Take 1 tablet by mouth daily      Multiple Vitamins-Minerals (PRESERVISION AREDS PO) Take 1 tablet by mouth 2 times daily (with meals)      acetaminophen (TYLENOL) 325 MG tablet Take 650 mg by mouth every 4 hours as needed for Pain or Fever       calcium carbonate (TUMS) 500 MG chewable tablet Take 1 tablet by mouth every 4 hours as needed for Heartburn      ipratropium-albuterol (DUONEB) 0.5-2.5 (3) MG/3ML SOLN nebulizer solution Inhale 3 mLs into the lungs every 4 hours (while awake) for 30 doses 90 mL 0    gabapentin (NEURONTIN) 100 MG capsule Take 200 mg by mouth Daily with supper.  atorvastatin (LIPITOR) 80 MG tablet Take 80 mg by mouth at bedtime       INSULIN LISP PROT & LISP, HUM, (75-25) 100 UNIT/ML SUSP Inject 72 Units into the skin daily (with breakfast)       INSULIN LISP PROT & LISP, HUM, (75-25) 100 UNIT/ML SUSP Inject 55 Units into the skin Daily with supper       clotrimazole-betamethasone (LOTRISONE) cream Apply  topically as needed. Apply topically 2 times daily. No current facility-administered medications on file prior to encounter.        REVIEW OF SYSTEMS   ROS : All others Negative if blank [], Positive if [x]  General Vascular   [] Fevers [] Claudication   [] Chills [] Rest Pain   Skin Neurologic   [x] Tissue Loss [x] Lower extremity neuropathy     Objective:    /70   Pulse 84   Temp 97.6 °F (36.4 °C) (Temporal)   Resp 18   Ht 6' (1.829 m)   Wt 234 lb (106.1 kg)   BMI 31.74 kg/m²   Wt Readings from Last 3 Encounters:   05/18/22 234 lb (106.1 kg)   05/16/22 241 lb (109.3 kg)   05/04/22 237 lb (107.5 kg)       PHYSICAL EXAM   CONSTITUTIONAL:   Awake, alert, cooperative   PSYCHIATRIC :  Oriented to time, place and person      normal insight to disease process  EXTREMITIES:   R LE Open wounds are noted   Skin color is abnormal with ulcers   Edema is  noted   Sensation deficit noted - protective   Palpation of the foot does cause pain   4/5 strength DF/PF  L LE Open wounds are noted   Skin color is abnormal with ulcers   Edema is  noted   Sensation deficit noted - protective   Palpation of the foot does cause pain   4/5 strength DF/PF  R dorsalis pedis 1 L dorsalis pedis 1   R posterior tibial 1 L posterior tibial 1     Assessment:     Problem List Items Addressed This Visit     Diabetic ulcer of right heel associated with type 2 diabetes mellitus, with fat layer exposed ([facility] - Primary    Relevant Orders    Initiate Outpatient Wound Care Protocol    Diabetic ulcer of left heel [facility])    Relevant Orders    Initiate Outpatient Wound Care Protocol          Pre Debridement Measurements:  Are located in the [place]  Documentation Flow Sheet  Post Debridement Measurements:  Wound/Ulcer Descriptions are Pre Debridement except measurements:     Wound 01/05/22 Heel Left #1 (Active)   Wound Image   05/11/22 0908   Wound Etiology Diabetic Campbell 2 01/05/22 0943   Dressing Status Clean;Dry; Intact 05/16/22 2000   Wound Cleansed Cleansed with saline 05/11/22 0959   Dressing/Treatment Alginate;Dry dressing;ABD 05/11/22 0959   Offloading for Diabetic Foot Ulcers Felt or foam 05/16/22 2000   Wound Length (cm) 3.5 cm 05/18/22 0947   Wound Width (cm) 2.3 cm 05/18/22 0947   Wound Depth (cm) 0.3 cm 05/18/22 0947   Wound Surface Area (cm^2) 8.05 cm^2 05/18/22 0947   Change in Wound Size % (l*w) 41.67 05/18/22 0947   Wound Volume (cm^3) 2.415 cm^3 05/18/22 0947   Wound Healing % 12 05/18/22 0947   Post-Procedure Length (cm) 3.6 cm 05/18/22 1011   Post-Procedure Width (cm) 2.3 cm 05/18/22 1011   Post-Procedure Depth (cm) 0.4 cm 05/18/22 1011   Post-Procedure Surface Area (cm^2) 8.28 cm^2 05/18/22 1011   Post-Procedure Volume (cm^3) 3.312 cm^3 05/18/22 1011   Wound Assessment Fibrin;Pink/red 05/18/22 0947   Drainage Amount Moderate 05/18/22 0947   Drainage Description Yellow;Serosanguinous 05/18/22 0947   Odor None 05/18/22 0947   Rashmi-wound Assessment Fragile; Intact 05/18/22 0947   Wound Thickness Description not for Pressure Injury Full thickness 01/19/22 0909   Number of days: 133       Wound 01/05/22 Heel Right #2 (Active)   Wound Image   05/11/22 0908   Wound Etiology Venous 01/05/22 0943   Dressing Status Clean;Dry; Intact 05/16/22 2000   Wound Cleansed Cleansed with saline 05/11/22 0959   Dressing/Treatment Alginate;Dry dressing;ABD 05/11/22 0959   Offloading for Diabetic Foot Ulcers Felt or foam 05/16/22 2000   Wound Length (cm) 2.4 cm 05/18/22 0947   Wound Width (cm) 1.9 cm 05/18/22 0947   Wound Depth (cm) 0.2 cm 05/18/22 0947   Wound Surface Area (cm^2) 4.56 cm^2 05/18/22 0947   Change in Wound Size % (l*w) -7.29 05/18/22 0947   Wound Volume (cm^3) 0.912 cm^3 05/18/22 0947   Wound Healing % -7 05/18/22 0947   Post-Procedure Length (cm) 2.5 cm 05/18/22 1011   Post-Procedure Width (cm) 2 cm 05/18/22 1011   Post-Procedure Depth (cm) 0.3 cm 05/18/22 1011   Post-Procedure Surface Area (cm^2) 5 cm^2 05/18/22 1011   Post-Procedure Volume (cm^3) 1.5 cm^3 05/18/22 1011   Wound Assessment Pink/red 05/18/22 0947   Drainage Amount Small 05/18/22 0947   Drainage Description Yellow 05/18/22 0947   Odor None 05/18/22 0947   Rashmi-wound Assessment Intact 05/18/22 0947   Wound Thickness Description not for Pressure Injury Full thickness 01/19/22 8110   Number of days: 133       Wound 03/14/22 Coccyx (Active)   Number of days: 64       Wound 03/23/22 Toe (Comment  which one) Left #10 Left Great Toe (Active)   Wound Image   05/11/22 0908   Dressing Status Clean;Dry; Intact 05/16/22 2000   Wound Cleansed Cleansed with saline 05/11/22 0959   Dressing/Treatment Alginate;Dry dressing 05/11/22 0959   Offloading for Diabetic Foot Ulcers Post op shoe 05/11/22 0959   Wound Length (cm) 1.1 cm 05/18/22 0947   Wound Width (cm) 1 cm 05/18/22 0947   Wound Depth (cm) 0.1 cm 05/18/22 0947   Wound Surface Area (cm^2) 1.1 cm^2 05/18/22 0947   Change in Wound Size % (l*w) 90.83 05/18/22 0947   Wound Volume (cm^3) 0.11 cm^3 05/18/22 0947   Wound Healing % 91 05/18/22 0947   Post-Procedure Length (cm) 1.2 cm 05/18/22 1011   Post-Procedure Width (cm) 1 cm 05/18/22 1011   Post-Procedure Depth (cm) 0.3 cm 05/18/22 1011   Post-Procedure Surface Area (cm^2) 1.2 cm^2 05/18/22 1011   Post-Procedure Volume (cm^3) 0.36 cm^3 05/18/22 1011   Wound Assessment Eschar dry;Pink/red;Fibrin 05/18/22 0947   Drainage Amount Small 05/18/22 0947   Drainage Description Yellow 05/18/22 0947   Odor None 05/18/22 0947   Rashmi-wound Assessment Intact; Induration 05/18/22 0947   Number of days: 56       Wound 03/23/22 Foot Left;Dorsal #11 (Active)   Wound Image   05/04/22 0908   Dressing Status Clean;Dry; Intact 05/16/22 2000   Wound Cleansed Betadine/povidone iodine 04/13/22 1026   Dressing/Treatment Collagen with Ag 04/13/22 1026   Offloading for Diabetic Foot Ulcers Felt or foam 05/16/22 2000   Wound Length (cm) 0 cm 05/04/22 0908   Wound Width (cm) 0 cm 05/04/22 0908   Wound Depth (cm) 0 cm 05/04/22 0908   Wound Surface Area (cm^2) 0 cm^2 05/04/22 0908   Change in Wound Size % (l*w) 100 05/04/22 0908   Wound Volume (cm^3) 0 cm^3 05/04/22 0908   Wound Healing % 100 05/04/22 0908   Post-Procedure Length (cm) 0.6 cm 04/06/22 0933   Post-Procedure Width (cm) 0.6 cm 04/06/22 0933   Post-Procedure Depth (cm) 0.2 cm 04/06/22 0933   Post-Procedure Surface Area (cm^2) 0.36 cm^2 04/06/22 0933   Post-Procedure Volume (cm^3) 0.072 cm^3 04/06/22 0933   Wound Assessment Dry 04/13/22 0934   Drainage Amount None 04/13/22 0934   Drainage Description Serous 03/30/22 0859   Odor None 04/13/22 0934   Rashmi-wound Assessment Edematous;Dry/flaky 04/13/22 0934   Number of days: 56       Wound 04/13/22 Pretibial Distal;Left;Lateral #14 (Active)   Wound Image   05/11/22 0908   Dressing Status Clean;Dry; Intact 05/16/22 2000   Wound Cleansed Cleansed with saline 05/11/22 0959   Dressing/Treatment Alginate;Dry dressing 05/11/22 0959   Offloading for Diabetic Foot Ulcers Post op shoe 05/11/22 0959   Wound Length (cm) 1 cm 05/18/22 0947   Wound Width (cm) 0.3 cm 05/18/22 0947   Wound Depth (cm) 0.1 cm 05/18/22 0947   Wound Surface Area (cm^2) 0.3 cm^2 05/18/22 0947   Change in Wound Size % (l*w) 96.88 05/18/22 3042   Wound Volume (cm^3) 0.03 cm^3 05/18/22 0947   Wound Healing % 97 05/18/22 0947   Post-Procedure Length (cm) 1 cm 05/18/22 1011   Post-Procedure Width (cm) 0.5 cm 05/18/22 1011   Post-Procedure Depth (cm) 0.3 cm 05/18/22 1011   Post-Procedure Surface Area (cm^2) 0.5 cm^2 05/18/22 1011   Post-Procedure Volume (cm^3) 0.15 cm^3 05/18/22 1011   Wound Assessment Pink/red 05/18/22 0947   Drainage Amount Scant 05/18/22 0947   Drainage Description Serosanguinous 05/18/22 0947   Odor None 05/18/22 0947   Rashmi-wound Assessment Dry/flaky; Intact 05/11/22 0908   Number of days: 35       Wound 04/27/22 Tibial Left;Posterior #15 (Active)   Wound Image   05/11/22 0908   Dressing Status Clean;Dry; Intact 05/16/22 2000   Wound Cleansed Cleansed with saline 05/11/22 0959   Dressing/Treatment ABD;Dry dressing; Pharmaceutical agent (see MAR) 05/11/22 0959   Offloading for Diabetic Foot Ulcers Post op shoe 05/11/22 0959   Wound Length (cm) 3.5 cm 05/18/22 0947   Wound Width (cm) 2.9 cm 05/18/22 0947   Wound Depth (cm) 0.4 cm 05/18/22 0947   Wound Surface Area (cm^2) 10.15 cm^2 05/18/22 0947   Change in Wound Size % (l*w) 27.71 05/18/22 0947   Wound Volume (cm^3) 4.06 cm^3 05/18/22 0947   Wound Healing % 42 05/18/22 0947   Post-Procedure Length (cm) 3.6 cm 05/18/22 1011   Post-Procedure Width (cm) 2.9 cm 05/18/22 1011   Post-Procedure Depth (cm) 0.5 cm 05/18/22 1011   Post-Procedure Surface Area (cm^2) 10.44 cm^2 05/18/22 1011   Post-Procedure Volume (cm^3) 5.22 cm^3 05/18/22 1011   Wound Assessment Fibrin;Pink/red 05/18/22 0947   Drainage Amount Moderate 05/18/22 0947   Drainage Description Serosanguinous 05/18/22 0947   Odor None 05/18/22 0947   Rashmi-wound Assessment Dry/flaky 05/18/22 0947   Number of days: 21       Wound 05/11/22 Leg Left;Medial;Lower #16 (Active)   Wound Image   05/11/22 0908   Dressing Status Clean;Dry; Intact 05/16/22 2000   Wound Cleansed Cleansed with saline 05/11/22 0959   Dressing/Treatment Alginate;Dry dressing 05/11/22 0959   Offloading for Diabetic Foot Ulcers Post op shoe 05/11/22 0959   Wound Length (cm) 2 cm 05/18/22 0947   Wound Width (cm) 1 cm 05/18/22 0947   Wound Depth (cm) 0.1 cm 05/18/22 0947   Wound Surface Area (cm^2) 2 cm^2 05/18/22 0947   Change in Wound Size % (l*w) 92 05/18/22 0947   Wound Volume (cm^3) 0.2 cm^3 05/18/22 0947   Wound Healing % 92 05/18/22 0947   Post-Procedure Length (cm) 2 cm 05/18/22 1011   Post-Procedure Width (cm) 1 cm 05/18/22 1011   Post-Procedure Depth (cm) 0.3 cm 05/18/22 1011   Post-Procedure Surface Area (cm^2) 2 cm^2 05/18/22 1011   Post-Procedure Volume (cm^3) 0.6 cm^3 05/18/22 1011   Wound Assessment Pink/red 05/18/22 0947   Drainage Amount Scant 05/18/22 0947   Drainage Description Serosanguinous 05/18/22 0947   Odor None 05/18/22 0947   Rashmi-wound Assessment Edematous;Dry/flaky 05/18/22 0947   Number of days: 7          Procedure Note  Indications:  Based on my examination of this patient's wound(s)/ulcer(s) today, debridement is required to promote healing and evaluate the wound base. Performed by: Sandra Urena DPM    Consent obtained:  Yes    Time out taken:  Yes    Pain Control: Anesthetic  Anesthetic: 4% Lidocaine Liquid Topical     Debridement:Excisional Debridement    Using #15 blade scalpel the wound(s)/ulcer(s) was/were sharply debrided down through and including the removal of subcutaneous tissue. Devitalized Tissue Debrided:  fibrin, biofilm, slough and necrotic/eschar to stimulate bleeding to promote healing, post debridement good bleeding base and wound edges noted    Wound/Ulcer #: 2, 4, 10    Percent of Wound/Ulcer Debrided: 100%    Total Surface Area Debrided:  30 sq cm     Estimated Blood Loss:  Minimal  Hemostasis Achieved:  by pressure    Procedural Pain:  4  / 10   Post Procedural Pain:  5 / 10     Response to treatment:  Well tolerated by patient. A culture was done.         Plan:     Pt is not a smoker   - Discussed relationship of smoking and negative affects on wound healing   - Emphasized importance of tobacco avoidace/cessation   - Script for nicotine patch given to patient   - Information regarding support groups for smoking cessation given    In my professional opinion and based off the information that is available at this time this patient has appropriate indication for HBO Therapy: Maybe    Treatment Note please see attached Discharge Instructions    Written patient dismissal instructions given to patient and signed by patient or POA. Discharge Instructions         Visit Discharge/Physician Orders     Discharge condition: Stable     Assessment of pain at discharge:  minimal     Anesthetic used: 4% lidocaine solution     Discharge to: Home     Left via:Private automobile     Accompanied by: accompanied by SELF     ECF/[facility]:  [facility].      Dressing Orders: Mary Gravely bilateral heel ulcers with NSS, apply hydrofera blue and dry dressing change daily   left great toe apply santyl and dry dressing daily    TO LEFT LEG ULCER, CLEAN WITH SALINE AND APPLY SANTYL TO WOUND BED  MAY APPLY ALGINATE TO LIGIA WOUND IF MACERATEDTHEN  WOUND VAC AT 1255MMHG CHANGE Monday Wednesday friday. IF VAC MALFUNCTIONS APPLY WET TO DRY DRESSING UNTIL VAC CAN BE REPLACED        APPLY PREVALON BOOT TO BILATERAL HEELS     Physical therapy for ambulating safely while wearing offloading boots as indicated.     [facility] followup visit ________1 weeks with Dr. Gastelum_____________________  (Please note your next appointment above and if you are unable to keep, kindly give a 24 hour notice.  Thank you.)     Physician signature:__________________________        If you experience any of the following, please call the [address] [place] [address] Road during business hours:     * Increase in Pain  * Temperature over 101  * Increase in drainage from your wound  * Drainage with a foul odor  * Bleeding  * Increase in swelling  * Need for compression bandage changes due to slippage, breakthrough drainage.     If you need medical attention outside of the business hours of the [address] [place] [address] Road please contact your PCP or go to the nearest emergency room.                                                       Electronically signed by Nel Zamora DPM on 5/18/2022 at 10:22 AM

## 2022-05-18 NOTE — PLAN OF CARE
Problem: Chronic Conditions and Co-morbidities  Goal: Patient's chronic conditions and co-morbidity symptoms are monitored and maintained or improved  Outcome: Progressing     Problem: Wound:  Goal: Will show signs of wound healing; wound closure and no evidence of infection  Description: Will show signs of wound healing; wound closure and no evidence of infection  Outcome: Progressing     Problem: Venous:  Goal: Signs of wound healing will improve  Description: Signs of wound healing will improve  Outcome: Progressing     Problem: Blood Glucose:  Goal: Ability to maintain appropriate glucose levels will improve  Description: Ability to maintain appropriate glucose levels will improve  Outcome: Progressing     Problem: Pain  Goal: Verbalizes/displays adequate comfort level or baseline comfort level  Outcome: Progressing

## 2022-05-20 LAB
BLOOD CULTURE, ROUTINE: NORMAL
CULTURE, BLOOD 2: NORMAL

## 2022-05-25 ENCOUNTER — HOSPITAL ENCOUNTER (OUTPATIENT)
Dept: WOUND CARE | Age: 87
Discharge: HOME | End: 2022-05-25
Payer: MEDICARE

## 2022-05-25 VITALS — DIASTOLIC BLOOD PRESSURE: 70 MMHG | SYSTOLIC BLOOD PRESSURE: 128 MMHG | TEMPERATURE: 97 F

## 2022-05-25 DIAGNOSIS — E08.621 DIABETIC ULCER OF LEFT HEEL ASSOCIATED WITH DIABETES MELLITUS DUE TO UNDERLYING CONDITION, LIMITED TO BREAKDOWN OF SKIN (HCC): ICD-10-CM

## 2022-05-25 DIAGNOSIS — L97.421 DIABETIC ULCER OF LEFT HEEL ASSOCIATED WITH DIABETES MELLITUS DUE TO UNDERLYING CONDITION, LIMITED TO BREAKDOWN OF SKIN (HCC): ICD-10-CM

## 2022-05-25 DIAGNOSIS — E11.621 DIABETIC ULCER OF RIGHT HEEL ASSOCIATED WITH TYPE 2 DIABETES MELLITUS, WITH FAT LAYER EXPOSED (HCC): ICD-10-CM

## 2022-05-25 DIAGNOSIS — L97.412 DIABETIC ULCER OF RIGHT HEEL ASSOCIATED WITH TYPE 2 DIABETES MELLITUS, WITH FAT LAYER EXPOSED (HCC): ICD-10-CM

## 2022-05-25 PROCEDURE — 11045 DBRDMT SUBQ TISS EACH ADDL: CPT

## 2022-05-25 PROCEDURE — 11042 DBRDMT SUBQ TIS 1ST 20SQCM/<: CPT

## 2022-05-25 RX ORDER — CLOBETASOL PROPIONATE 0.5 MG/G
OINTMENT TOPICAL ONCE
Status: CANCELLED | OUTPATIENT
Start: 2022-05-25 | End: 2022-05-25

## 2022-05-25 RX ORDER — LIDOCAINE HYDROCHLORIDE 20 MG/ML
JELLY TOPICAL ONCE
Status: CANCELLED | OUTPATIENT
Start: 2022-05-25 | End: 2022-05-25

## 2022-05-25 RX ORDER — LIDOCAINE 40 MG/G
CREAM TOPICAL ONCE
Status: CANCELLED | OUTPATIENT
Start: 2022-05-25 | End: 2022-05-25

## 2022-05-25 RX ORDER — DIPHENHYDRAMINE HCL 25 MG
25 CAPSULE ORAL EVERY 6 HOURS PRN
COMMUNITY
End: 2022-06-08

## 2022-05-25 RX ORDER — BACITRACIN, NEOMYCIN, POLYMYXIN B 400; 3.5; 5 [USP'U]/G; MG/G; [USP'U]/G
OINTMENT TOPICAL ONCE
Status: CANCELLED | OUTPATIENT
Start: 2022-05-25 | End: 2022-05-25

## 2022-05-25 RX ORDER — BETAMETHASONE DIPROPIONATE 0.05 %
OINTMENT (GRAM) TOPICAL ONCE
Status: CANCELLED | OUTPATIENT
Start: 2022-05-25 | End: 2022-05-25

## 2022-05-25 RX ORDER — LIDOCAINE HYDROCHLORIDE 40 MG/ML
SOLUTION TOPICAL ONCE
Status: CANCELLED | OUTPATIENT
Start: 2022-05-25 | End: 2022-05-25

## 2022-05-25 RX ORDER — GINSENG 100 MG
CAPSULE ORAL ONCE
Status: CANCELLED | OUTPATIENT
Start: 2022-05-25 | End: 2022-05-25

## 2022-05-25 RX ORDER — LIDOCAINE 50 MG/G
OINTMENT TOPICAL ONCE
Status: CANCELLED | OUTPATIENT
Start: 2022-05-25 | End: 2022-05-25

## 2022-05-25 RX ORDER — GENTAMICIN SULFATE 1 MG/G
OINTMENT TOPICAL ONCE
Status: CANCELLED | OUTPATIENT
Start: 2022-05-25 | End: 2022-05-25

## 2022-05-25 RX ORDER — BACITRACIN ZINC AND POLYMYXIN B SULFATE 500; 1000 [USP'U]/G; [USP'U]/G
OINTMENT TOPICAL ONCE
Status: CANCELLED | OUTPATIENT
Start: 2022-05-25 | End: 2022-05-25

## 2022-05-25 ASSESSMENT — PAIN - FUNCTIONAL ASSESSMENT: PAIN_FUNCTIONAL_ASSESSMENT: PREVENTS OR INTERFERES SOME ACTIVE ACTIVITIES AND ADLS

## 2022-05-25 ASSESSMENT — PAIN DESCRIPTION - DESCRIPTORS: DESCRIPTORS: SHARP

## 2022-05-25 ASSESSMENT — PAIN SCALES - GENERAL: PAINLEVEL_OUTOF10: 7

## 2022-05-25 ASSESSMENT — PAIN DESCRIPTION - ORIENTATION: ORIENTATION: LEFT

## 2022-05-25 ASSESSMENT — PAIN DESCRIPTION - PAIN TYPE: TYPE: CHRONIC PAIN

## 2022-05-25 ASSESSMENT — PAIN DESCRIPTION - LOCATION: LOCATION: LEG

## 2022-05-25 ASSESSMENT — PAIN DESCRIPTION - FREQUENCY: FREQUENCY: INTERMITTENT

## 2022-05-25 ASSESSMENT — PAIN DESCRIPTION - ONSET: ONSET: PROGRESSIVE

## 2022-05-25 NOTE — PROGRESS NOTES
[facility]  History and Physical/Consultation  Podiatry    Referring Physician : Randalyn Scheuermann, MD  Taran Joseph. MEDICAL RECORD NUMBER:  [id number]  AGE: 80 y.o. GENDER: male  : 1932  EPISODE DATE:  2022  Subjective:     Chief Complaint   Patient presents with    Wound Check     wounds bilateral heels         HISTORY of PRESENT ILLNESS HPI     Taran Joseph. is a 80 y.o. male who presents today for wound/ulcer evaluation. History of Wound Context:  The patient has had a wound of diabetic b/l heels,and right ankle which was first noted approximately months ago. This has been treated betadyne. On their initial visit to the wound healing center, 22 ,  the patient has noted that the wound has been improving. The patient has had similar previous wounds in the past.      Pt is not on abx at time of initial visit.       Wound/Ulcer Pain Timing/Severity: constant  Quality of pain: sharp  Severity:  3 / 10   Modifying Factors: Pain worsens with walking  Associated Signs/Symptoms: edema, erythema and drainage    Ulcer Identification:  Ulcer Type: diabetic  Contributing Factors: edema and diabetes    Diabetic/Pressure/Non Pressure Ulcers onl y:  Ulcer: Diabetic ulcer, fat layer exposed    If patient has diabetic lower extremity wounds  Campbell Classification of diabetic lower extremity wounds:    Grade Description   []  0 No open wound   []  1 Superficial ulcer involving the full skin thickness   [x]  2 Deep ulcer involves ligament, tendon, joint capsule, or fascia  No bone involvement or abscess presence   []  3 Deep Ulcer with abcess formation and/or osteomyelitis   []  4 Localized gangrene   []  5 Extensive gangrene of the foot     Wound: N/A          22  Debrided, cont tx and care     22  Debrided, cont tx and care, await graft approval    22  Debrided, cont tx and care, puraply applied    22  Debrided, dermagraft applied    22  Debrided, dermagraft applied    2-23-22  Debrided, graft applied    3-2-22  Debrided, graft applied    3-9-22  Debrided, graft applied     3-23-22  Just release from hospital care, new wounds noted from hospital, also trauma to left toe loosened nail that was removed without incident, debrided all wounds, culture taken    3-30-22  Debrided, santyl to left leg, applied dermagraft b/l heels, prevelon boots wound vac left leg    4-6-22  Debrided, dermagraft b/l, wound care    4-13-22  Debrided, wound vac, dermagraft left    4-27-22  Debrided, cont tx and care     5-4-22  Debrided, cont tx and care, wound vac to leg left, and hydrofera blue to all other wounds    5-11-22  Debrided, cont tx and care    5-18-22  Debrided, cont tx and care    5-25-22  Cont wound vac, cont previous tx, offload  PAST MEDICAL HISTORY      Diagnosis Date    Arthritis     Cancer ([address]     breast    Cellulitis     [facility]est[facility]     CKD (chronic kidney disease) stage 3, GFR 30-59 ml/min ([address]     Diabetes mellitus ([address]     History of lumpectomy     Hx of blood clots     Hyperlipidemia     Hypertension     Left ventricular failure ([address]     Macular degeneration     Obesity     Orthostatic hypotension     PE (pulmonary thromboembolism) ([address]     Pressure injury of both heels, unstageable ([address]     Staph aureus infection     Venous insufficiency      Past Surgical History:   Procedure Laterality Date    [facility]  9/2/2021         MASTECTOMY      TOE AMPUTATION      TOE SURGERY      TONSILLECTOMY       History reviewed. No pertinent family history.   Social History     Tobacco Use    Smoking status: Never Smoker    Smokeless tobacco: Never Used   Vaping Use    Vaping Use: Never used   Substance Use Topics    Alcohol use: Not Currently     Alcohol/week: 1.0 standard drink     Types: 1 Cans of beer per week    Drug use: No     Allergies   Allergen Reactions    Clindamycin/Lincomycin     Sulfa Antibiotics Current Outpatient Medications on File Prior to Encounter   Medication Sig Dispense Refill    diphenhydrAMINE (BENADRYL) 25 MG capsule Take 25 mg by mouth every 6 hours as needed for Itching      apixaban (ELIQUIS) 2.5 MG TABS tablet Take 1 tablet by mouth 2 times daily (before meals) 60 tablet 2    docusate sodium (COLACE) 100 MG capsule Take 100 mg by mouth nightly      tamsulosin (FLOMAX) 0.4 MG capsule Take 0.4 mg by mouth daily      bisacodyl (DULCOLAX) 10 MG suppository Place 10 mg rectally daily as needed for Constipation      diphenhydrAMINE-zinc acetate (BENADRYL) 1-0.1 % cream Apply topically every 4 hours as needed for Itching Apply topically 3 times daily as needed.  Sodium Phosphates (FLEET) 7-19 GM/118ML Place 1 enema rectally daily as needed      magnesium hydroxide (MILK OF MAGNESIA) 400 MG/5ML suspension Take 30 mLs by mouth daily as needed for Constipation      Polyethylene Glycol 400 1 % SOLN Apply 1 drop to eye every 4 hours as needed      ferrous sulfate (IRON 325) 325 (65 Fe) MG tablet Take 325 mg by mouth daily       zinc sulfate (ZINCATE) 220 (50 Zn) MG capsule Take 1 capsule by mouth daily for 14 days 14 capsule 0    miconazole (MICOTIN) 2 % powder Apply topically 2 times daily.  45 g 1    ascorbic acid (VITAMIN C) 500 MG tablet Take 1 tablet by mouth 2 times daily 30 tablet 0    Vitamin D (CHOLECALCIFEROL) 50 MCG (2000 UT) TABS tablet Take 1 tablet by mouth daily 60 tablet 0    furosemide (LASIX) 40 MG tablet Take 20 mg by mouth daily       omeprazole (PRILOSEC) 20 MG delayed release capsule Take 20 mg by mouth daily      Multiple Vitamins-Minerals (THERAPEUTIC MULTIVITAMIN-MINERALS) tablet Take 1 tablet by mouth daily      Multiple Vitamins-Minerals (PRESERVISION AREDS PO) Take 1 tablet by mouth 2 times daily (with meals)      acetaminophen (TYLENOL) 325 MG tablet Take 650 mg by mouth every 4 hours as needed for Pain or Fever       calcium carbonate (TUMS) 500 MG chewable tablet Take 1 tablet by mouth every 4 hours as needed for Heartburn      ipratropium-albuterol (DUONEB) 0.5-2.5 (3) MG/3ML SOLN nebulizer solution Inhale 3 mLs into the lungs every 4 hours (while awake) for 30 doses 90 mL 0    gabapentin (NEURONTIN) 100 MG capsule Take 200 mg by mouth Daily with supper.  atorvastatin (LIPITOR) 80 MG tablet Take 80 mg by mouth at bedtime       INSULIN LISP PROT & LISP, HUM, (75-25) 100 UNIT/ML SUSP Inject 72 Units into the skin daily (with breakfast)       INSULIN LISP PROT & LISP, HUM, (75-25) 100 UNIT/ML SUSP Inject 55 Units into the skin Daily with supper       clotrimazole-betamethasone (LOTRISONE) cream Apply  topically as needed. Apply topically 2 times daily. No current facility-administered medications on file prior to encounter.        REVIEW OF SYSTEMS   ROS : All others Negative if blank [], Positive if [x]  General Vascular   [] Fevers [] Claudication   [] Chills [] Rest Pain   Skin Neurologic   [x] Tissue Loss [x] Lower extremity neuropathy     Objective:    /70   Temp 97 °F (36.1 °C) (Temporal)   Wt Readings from Last 3 Encounters:   05/18/22 234 lb (106.1 kg)   05/16/22 241 lb (109.3 kg)   05/04/22 237 lb (107.5 kg)       PHYSICAL EXAM   CONSTITUTIONAL:   Awake, alert, cooperative   PSYCHIATRIC :  Oriented to time, place and person      normal insight to disease process  EXTREMITIES:   R LE Open wounds are noted   Skin color is abnormal with ulcers   Edema is  noted   Sensation deficit noted - protective   Palpation of the foot does cause pain   4/5 strength DF/PF  L LE Open wounds are noted   Skin color is abnormal with ulcers   Edema is  noted   Sensation deficit noted - protective   Palpation of the foot does cause pain   4/5 strength DF/PF  R dorsalis pedis 1 L dorsalis pedis 1   R posterior tibial 1 L posterior tibial 1     Assessment:     Problem List Items Addressed This Visit     Diabetic ulcer of right heel associated with type 2 diabetes mellitus, with fat layer exposed ([address]    Diabetic ulcer of left heel ([address]          Pre Debridement Measurements:  Are located in the [facility]  Documentation Flow Sheet  Post Debridement Measurements:  Wound/Ulcer Descriptions are Pre Debridement except measurements:     Wound 01/05/22 Heel Left #1 (Active)   Wound Image   05/11/22 0908   Wound Etiology Diabetic Campbell 2 01/05/22 0943   Dressing Status New dressing applied 05/18/22 1011   Wound Cleansed Cleansed with saline 05/18/22 1011   Dressing/Treatment ABD; Alginate 05/18/22 1011   Offloading for Diabetic Foot Ulcers Post op shoe 05/18/22 1011   Wound Length (cm) 3.6 cm 05/25/22 0950   Wound Width (cm) 2.2 cm 05/25/22 0950   Wound Depth (cm) 0.4 cm 05/25/22 0950   Wound Surface Area (cm^2) 7.92 cm^2 05/25/22 0950   Change in Wound Size % (l*w) 42.61 05/25/22 0950   Wound Volume (cm^3) 3.168 cm^3 05/25/22 0950   Wound Healing % -15 05/25/22 0950   Post-Procedure Length (cm) 3.6 cm 05/25/22 1005   Post-Procedure Width (cm) 2.3 cm 05/25/22 1005   Post-Procedure Depth (cm) 0.5 cm 05/25/22 1005   Post-Procedure Surface Area (cm^2) 8.28 cm^2 05/25/22 1005   Post-Procedure Volume (cm^3) 4.14 cm^3 05/25/22 1005   Wound Assessment Fibrin;Pink/red 05/25/22 0950   Drainage Amount Moderate 05/25/22 0950   Drainage Description Serosanguinous 05/25/22 0950   Odor None 05/25/22 0950   Rashmi-wound Assessment Intact;Fragile 05/25/22 0950   Wound Thickness Description not for Pressure Injury Full thickness 01/19/22 0909   Number of days: 140       Wound 01/05/22 Heel Right #2 (Active)   Wound Image   05/11/22 0908   Wound Etiology Venous 01/05/22 0943   Dressing Status New dressing applied 05/18/22 1011   Wound Cleansed Cleansed with saline 05/18/22 1011   Dressing/Treatment ABD; Alginate 05/18/22 1011   Offloading for Diabetic Foot Ulcers Post op shoe 05/18/22 1011   Wound Length (cm) 2.1 cm 05/25/22 0950   Wound Width (cm) 1.7 cm 05/25/22 0950   Wound Depth (cm) 0.1 cm 05/25/22 0950   Wound Surface Area (cm^2) 3.57 cm^2 05/25/22 0950   Change in Wound Size % (l*w) 16 05/25/22 0950   Wound Volume (cm^3) 0.357 cm^3 05/25/22 0950   Wound Healing % 58 05/25/22 0950   Post-Procedure Length (cm) 2.2 cm 05/25/22 1005   Post-Procedure Width (cm) 1.7 cm 05/25/22 1005   Post-Procedure Depth (cm) 0.2 cm 05/25/22 1005   Post-Procedure Surface Area (cm^2) 3.74 cm^2 05/25/22 1005   Post-Procedure Volume (cm^3) 0.748 cm^3 05/25/22 1005   Wound Assessment Pink/red 05/25/22 0950   Drainage Amount Small 05/25/22 0950   Drainage Description Serosanguinous 05/25/22 0950   Odor None 05/25/22 0950   Rashmi-wound Assessment Intact;Fragile 05/25/22 0950   Wound Thickness Description not for Pressure Injury Full thickness 01/19/22 4045   Number of days: 140       Wound 03/14/22 Coccyx (Active)   Number of days: 71       Wound 03/23/22 Toe (Comment  which one) Left #10 Left Great Toe (Active)   Wound Image   05/11/22 0908   Dressing Status Clean;Dry; Intact 05/16/22 2000   Wound Cleansed Cleansed with saline 05/11/22 0959   Dressing/Treatment Alginate;Dry dressing 05/11/22 0959   Offloading for Diabetic Foot Ulcers Post op shoe 05/11/22 0959   Wound Length (cm) 1.6 cm 05/25/22 0950   Wound Width (cm) 1 cm 05/25/22 0950   Wound Depth (cm) 0.1 cm 05/25/22 0950   Wound Surface Area (cm^2) 1.6 cm^2 05/25/22 0950   Change in Wound Size % (l*w) 86.67 05/25/22 0950   Wound Volume (cm^3) 0.16 cm^3 05/25/22 0950   Wound Healing % 87 05/25/22 0950   Post-Procedure Length (cm) 1.6 cm 05/25/22 1005   Post-Procedure Width (cm) 1 cm 05/25/22 1005   Post-Procedure Depth (cm) 0.2 cm 05/25/22 1005   Post-Procedure Surface Area (cm^2) 1.6 cm^2 05/25/22 1005   Post-Procedure Volume (cm^3) 0.32 cm^3 05/25/22 1005   Wound Assessment Eschar dry;Pink/red;Fibrin 05/18/22 0947   Drainage Amount Small 05/18/22 0947   Drainage Description Yellow 05/18/22 0947   Odor None 05/18/22 0947   Rashmi-wound Assessment Intact; Induration 05/18/22 0947   Number of days: 63       Wound 03/23/22 Foot Left;Dorsal #11 (Active)   Wound Image   05/04/22 0908   Dressing Status Clean;Dry; Intact 05/16/22 2000   Wound Cleansed Betadine/povidone iodine 04/13/22 1026   Dressing/Treatment Collagen with Ag 04/13/22 1026   Offloading for Diabetic Foot Ulcers Felt or foam 05/16/22 2000   Wound Length (cm) 0 cm 05/04/22 0908   Wound Width (cm) 0 cm 05/04/22 0908   Wound Depth (cm) 0 cm 05/04/22 0908   Wound Surface Area (cm^2) 0 cm^2 05/04/22 0908   Change in Wound Size % (l*w) 100 05/04/22 0908   Wound Volume (cm^3) 0 cm^3 05/04/22 0908   Wound Healing % 100 05/04/22 0908   Post-Procedure Length (cm) 0.6 cm 04/06/22 0933   Post-Procedure Width (cm) 0.6 cm 04/06/22 0933   Post-Procedure Depth (cm) 0.2 cm 04/06/22 0933   Post-Procedure Surface Area (cm^2) 0.36 cm^2 04/06/22 0933   Post-Procedure Volume (cm^3) 0.072 cm^3 04/06/22 0933   Wound Assessment Dry 04/13/22 0934   Drainage Amount None 04/13/22 0934   Drainage Description Serous 03/30/22 0859   Odor None 04/13/22 0934   Rashmi-wound Assessment Edematous;Dry/flaky 04/13/22 0934   Number of days: 63       Wound 04/13/22 Pretibial Distal;Left;Lateral #14 (blocked may 25)  (Active)   Wound Image   05/11/22 0908   Dressing Status Clean;Dry; Intact 05/16/22 2000   Wound Cleansed Cleansed with saline 05/11/22 0959   Dressing/Treatment Alginate;Dry dressing 05/11/22 0959   Offloading for Diabetic Foot Ulcers Post op shoe 05/11/22 0959   Wound Length (cm) 1.2 cm 05/25/22 0950   Wound Width (cm) 2.2 cm 05/25/22 0950   Wound Depth (cm) 0.2 cm 05/25/22 0950   Wound Surface Area (cm^2) 2.64 cm^2 05/25/22 0950   Change in Wound Size % (l*w) 72.5 05/25/22 0950   Wound Volume (cm^3) 0.528 cm^3 05/25/22 0950   Wound Healing % 45 05/25/22 0950   Post-Procedure Length (cm) 1.3 cm 05/25/22 1005   Post-Procedure Width (cm) 2.2 cm 05/25/22 1005   Post-Procedure Depth (cm) 0.3 cm 05/25/22 1005   Post-Procedure Surface Area (cm^2) 2.86 cm^2 05/25/22 1005   Post-Procedure Volume (cm^3) 0.858 cm^3 05/25/22 1005   Wound Assessment Fibrinous 05/25/22 0950   Drainage Amount Moderate 05/25/22 0950   Drainage Description Yellow 05/25/22 0950   Odor None 05/18/22 0947   Rashmi-wound Assessment Dry/flaky; Intact 05/11/22 0908   Number of days: 42       Wound 04/27/22 Tibial Left;Posterior #15 (Active)   Wound Image   05/11/22 0908   Dressing Status Clean;Dry; Intact 05/16/22 2000   Wound Cleansed Cleansed with saline 05/11/22 0959   Dressing/Treatment ABD;Dry dressing; Pharmaceutical agent (see MAR) 05/11/22 0959   Offloading for Diabetic Foot Ulcers Post op shoe 05/11/22 0959   Wound Length (cm) 4.3 cm 05/25/22 0950   Wound Width (cm) 4 cm 05/25/22 0950   Wound Depth (cm) 0.6 cm 05/25/22 0950   Wound Surface Area (cm^2) 17.2 cm^2 05/25/22 0950   Change in Wound Size % (l*w) -22.51 05/25/22 0950   Wound Volume (cm^3) 10.32 cm^3 05/25/22 0950   Wound Healing % -47 05/25/22 0950   Post-Procedure Length (cm) 4.4 cm 05/25/22 1005   Post-Procedure Width (cm) 4 cm 05/25/22 1005   Post-Procedure Depth (cm) 0.6 cm 05/25/22 1005   Post-Procedure Surface Area (cm^2) 17.6 cm^2 05/25/22 1005   Post-Procedure Volume (cm^3) 10.56 cm^3 05/25/22 1005   Wound Assessment Pink/red 05/25/22 0950   Drainage Amount Large 05/25/22 0950   Drainage Description Yellow;Serous 05/25/22 0950   Odor None 05/25/22 0950   Rashmi-wound Assessment Fragile;Edematous 05/25/22 0950   Number of days: 28       Wound 05/11/22 Leg Left;Medial;Lower #16 (Active)   Wound Image   05/11/22 0908   Dressing Status New dressing applied 05/18/22 1011   Wound Cleansed Cleansed with saline 05/18/22 1011   Dressing/Treatment Alginate;Dry dressing 05/18/22 1011   Offloading for Diabetic Foot Ulcers Post op shoe 05/18/22 1011   Wound Length (cm) 0 cm 05/25/22 0946   Wound Width (cm) 0 cm 05/25/22 0946   Wound Depth (cm) 0 cm 05/25/22 0946   Wound Surface Area (cm^2) 0 cm^2 05/25/22 0946   Change in Wound Size % (l*w) 100 05/25/22 0946   Wound Volume (cm^3) 0 cm^3 05/25/22 0946   Wound Healing % 100 05/25/22 0946   Post-Procedure Length (cm) 2 cm 05/18/22 1011   Post-Procedure Width (cm) 1 cm 05/18/22 1011   Post-Procedure Depth (cm) 0.3 cm 05/18/22 1011   Post-Procedure Surface Area (cm^2) 2 cm^2 05/18/22 1011   Post-Procedure Volume (cm^3) 0.6 cm^3 05/18/22 1011   Wound Assessment Epithelialization 05/25/22 0946   Drainage Amount None 05/25/22 0946   Drainage Description Serosanguinous 05/18/22 0947   Odor None 05/25/22 0946   Rashmi-wound Assessment Edematous 05/25/22 0946   Number of days: 14          Procedure Note  Indications:  Based on my examination of this patient's wound(s)/ulcer(s) today, debridement is required to promote healing and evaluate the wound base. Performed by: Hardik Newman DPM    Consent obtained:  Yes    Time out taken:  Yes    Pain Control: Anesthetic  Anesthetic: 4% Lidocaine Liquid Topical     Debridement:Excisional Debridement    Using #15 blade scalpel the wound(s)/ulcer(s) was/were sharply debrided down through and including the removal of subcutaneous tissue. Devitalized Tissue Debrided:  fibrin, biofilm, slough and necrotic/eschar to stimulate bleeding to promote healing, post debridement good bleeding base and wound edges noted    Wound/Ulcer #: 2, 4, 10    Percent of Wound/Ulcer Debrided: 100%    Total Surface Area Debrided:  30 sq cm     Estimated Blood Loss:  Minimal  Hemostasis Achieved:  by pressure    Procedural Pain:  4  / 10   Post Procedural Pain:  5 / 10     Response to treatment:  Well tolerated by patient. A culture was done.         Plan:     Pt is not a smoker   - Discussed relationship of smoking and negative affects on wound healing   - Emphasized importance of tobacco avoidace/cessation   - Script for nicotine patch given to patient   - Information regarding support groups for smoking cessation given    In my professional opinion and based off the information that is available at this time this patient has appropriate indication for HBO Therapy: Maybe    Treatment Note please see attached Discharge Instructions    Written patient dismissal instructions given to patient and signed by patient or POA. Discharge Instructions         Visit Discharge/Physician Orders     Discharge condition: Stable     Assessment of pain at discharge:  minimal     Anesthetic used: 4% lidocaine solution     Discharge to: Home     Left via:Private automobile     Accompanied by: accompanied by SELF     ECF/[facility]:  [facility].      Dressing Orders: Taryn Kee bilateral heel ulcers with NSS, apply hydrofera blue and dry dressing change daily   left great toe apply santyl and dry dressing daily     TO LEFT LEG ULCER, CLEAN WITH SALINE AND APPLY SANTYL TO WOUND BED  MAY APPLY ALGINATE TO LIGIA WOUND IF MACERATEDTHEN  WOUND VAC AT 1255MMHG CHANGE Monday Wednesday friday. IF VAC MALFUNCTIONS APPLY WET TO DRY DRESSING UNTIL VAC CAN BE REPLACED        APPLY PREVALON BOOT TO BILATERAL HEELS     Physical therapy for ambulating safely while wearing offloading boots as indicated.     [facility] followup visit ________1 weeks with Dr. Gastelum_____________________  (Please note your next appointment above and if you are unable to keep, kindly give a 24 hour notice.  Thank you.)     Physician signature:__________________________        If you experience any of the following, please call the [facility] during business hours:     * Increase in Pain  * Temperature over 101  * Increase in drainage from your wound  * Drainage with a foul odor  * Bleeding  * Increase in swelling  * Need for compression bandage changes due to slippage, breakthrough drainage.     If you need medical attention outside of the business hours of the [facility] please contact your PCP or go to the nearest emergency room.

## 2022-06-01 ENCOUNTER — HOSPITAL ENCOUNTER (OUTPATIENT)
Dept: WOUND CARE | Age: 87
Discharge: HOME | End: 2022-06-01
Payer: MEDICARE

## 2022-06-01 VITALS
TEMPERATURE: 96.7 F | RESPIRATION RATE: 18 BRPM | HEART RATE: 105 BPM | SYSTOLIC BLOOD PRESSURE: 158 MMHG | DIASTOLIC BLOOD PRESSURE: 62 MMHG

## 2022-06-01 DIAGNOSIS — E08.621 DIABETIC ULCER OF LEFT HEEL ASSOCIATED WITH DIABETES MELLITUS DUE TO UNDERLYING CONDITION, LIMITED TO BREAKDOWN OF SKIN (HCC): ICD-10-CM

## 2022-06-01 DIAGNOSIS — L97.412 DIABETIC ULCER OF RIGHT HEEL ASSOCIATED WITH TYPE 2 DIABETES MELLITUS, WITH FAT LAYER EXPOSED (HCC): Primary | ICD-10-CM

## 2022-06-01 DIAGNOSIS — E11.621 DIABETIC ULCER OF RIGHT HEEL ASSOCIATED WITH TYPE 2 DIABETES MELLITUS, WITH FAT LAYER EXPOSED (HCC): Primary | ICD-10-CM

## 2022-06-01 DIAGNOSIS — L97.421 DIABETIC ULCER OF LEFT HEEL ASSOCIATED WITH DIABETES MELLITUS DUE TO UNDERLYING CONDITION, LIMITED TO BREAKDOWN OF SKIN (HCC): ICD-10-CM

## 2022-06-01 PROCEDURE — 11045 DBRDMT SUBQ TISS EACH ADDL: CPT

## 2022-06-01 PROCEDURE — 11042 DBRDMT SUBQ TIS 1ST 20SQCM/<: CPT

## 2022-06-01 PROCEDURE — 6370000000 HC RX 637 (ALT 250 FOR IP): Performed by: PODIATRIST

## 2022-06-01 RX ORDER — GENTAMICIN SULFATE 1 MG/G
OINTMENT TOPICAL ONCE
Status: CANCELLED | OUTPATIENT
Start: 2022-06-01 | End: 2022-06-01

## 2022-06-01 RX ORDER — LIDOCAINE HYDROCHLORIDE 40 MG/ML
SOLUTION TOPICAL ONCE
Status: COMPLETED | OUTPATIENT
Start: 2022-06-01 | End: 2022-06-01

## 2022-06-01 RX ORDER — GINSENG 100 MG
CAPSULE ORAL ONCE
Status: CANCELLED | OUTPATIENT
Start: 2022-06-01 | End: 2022-06-01

## 2022-06-01 RX ORDER — LIDOCAINE HYDROCHLORIDE 40 MG/ML
SOLUTION TOPICAL ONCE
Status: CANCELLED | OUTPATIENT
Start: 2022-06-01 | End: 2022-06-01

## 2022-06-01 RX ORDER — LIDOCAINE 50 MG/G
OINTMENT TOPICAL ONCE
Status: CANCELLED | OUTPATIENT
Start: 2022-06-01 | End: 2022-06-01

## 2022-06-01 RX ORDER — BACITRACIN ZINC AND POLYMYXIN B SULFATE 500; 1000 [USP'U]/G; [USP'U]/G
OINTMENT TOPICAL ONCE
Status: CANCELLED | OUTPATIENT
Start: 2022-06-01 | End: 2022-06-01

## 2022-06-01 RX ORDER — LIDOCAINE HYDROCHLORIDE 20 MG/ML
JELLY TOPICAL ONCE
Status: CANCELLED | OUTPATIENT
Start: 2022-06-01 | End: 2022-06-01

## 2022-06-01 RX ORDER — LIDOCAINE 40 MG/G
CREAM TOPICAL ONCE
Status: CANCELLED | OUTPATIENT
Start: 2022-06-01 | End: 2022-06-01

## 2022-06-01 RX ORDER — BACITRACIN, NEOMYCIN, POLYMYXIN B 400; 3.5; 5 [USP'U]/G; MG/G; [USP'U]/G
OINTMENT TOPICAL ONCE
Status: CANCELLED | OUTPATIENT
Start: 2022-06-01 | End: 2022-06-01

## 2022-06-01 RX ORDER — CLOBETASOL PROPIONATE 0.5 MG/G
OINTMENT TOPICAL ONCE
Status: CANCELLED | OUTPATIENT
Start: 2022-06-01 | End: 2022-06-01

## 2022-06-01 RX ORDER — BETAMETHASONE DIPROPIONATE 0.05 %
OINTMENT (GRAM) TOPICAL ONCE
Status: CANCELLED | OUTPATIENT
Start: 2022-06-01 | End: 2022-06-01

## 2022-06-01 RX ADMIN — LIDOCAINE HYDROCHLORIDE: 40 SOLUTION TOPICAL at 09:30

## 2022-06-01 ASSESSMENT — PAIN DESCRIPTION - LOCATION: LOCATION: LEG;FOOT

## 2022-06-01 ASSESSMENT — PAIN DESCRIPTION - ONSET: ONSET: ON-GOING

## 2022-06-01 ASSESSMENT — PAIN DESCRIPTION - PAIN TYPE: TYPE: CHRONIC PAIN

## 2022-06-01 ASSESSMENT — PAIN DESCRIPTION - ORIENTATION: ORIENTATION: LEFT

## 2022-06-01 ASSESSMENT — PAIN - FUNCTIONAL ASSESSMENT: PAIN_FUNCTIONAL_ASSESSMENT: PREVENTS OR INTERFERES SOME ACTIVE ACTIVITIES AND ADLS

## 2022-06-01 ASSESSMENT — PAIN DESCRIPTION - DESCRIPTORS: DESCRIPTORS: SHOOTING;DULL

## 2022-06-01 ASSESSMENT — PAIN DESCRIPTION - FREQUENCY: FREQUENCY: INTERMITTENT

## 2022-06-01 ASSESSMENT — PAIN SCALES - GENERAL: PAINLEVEL_OUTOF10: 5

## 2022-06-01 NOTE — PROGRESS NOTES
[facility]  History and Physical/Consultation  Podiatry    Referring Physician : Dewayne Monroe MD  Darrick Paz. MEDICAL RECORD NUMBER:  [id number]  AGE: 80 y.o. GENDER: male  : 1932  EPISODE DATE:  2022  Subjective:     Chief Complaint   Patient presents with    Wound Check     bilateral lower extremities         HISTORY of PRESENT ILLNESS HPI     Darrick Ferrera is a 80 y.o. male who presents today for wound/ulcer evaluation. History of Wound Context:  The patient has had a wound of diabetic b/l heels,and right ankle which was first noted approximately months ago. This has been treated betadyne. On their initial visit to the wound healing center, 22 ,  the patient has noted that the wound has been improving. The patient has had similar previous wounds in the past.      Pt is not on abx at time of initial visit.       Wound/Ulcer Pain Timing/Severity: constant  Quality of pain: sharp  Severity:  3 / 10   Modifying Factors: Pain worsens with walking  Associated Signs/Symptoms: edema, erythema and drainage    Ulcer Identification:  Ulcer Type: diabetic  Contributing Factors: edema and diabetes    Diabetic/Pressure/Non Pressure Ulcers onl y:  Ulcer: Diabetic ulcer, fat layer exposed    If patient has diabetic lower extremity wounds  Campbell Classification of diabetic lower extremity wounds:    Grade Description   []  0 No open wound   []  1 Superficial ulcer involving the full skin thickness   [x]  2 Deep ulcer involves ligament, tendon, joint capsule, or fascia  No bone involvement or abscess presence   []  3 Deep Ulcer with abcess formation and/or osteomyelitis   []  4 Localized gangrene   []  5 Extensive gangrene of the foot     Wound: N/A          22  Debrided, cont tx and care     22  Debrided, cont tx and care, await graft approval    22  Debrided, cont tx and care, puraply applied    22  Debrided, dermagraft applied    22  Debrided, dermagraft applied    2-23-22  Debrided, graft applied    3-2-22  Debrided, graft applied    3-9-22  Debrided, graft applied     3-23-22  Just release from hospital care, new wounds noted from hospital, also trauma to left toe loosened nail that was removed without incident, debrided all wounds, culture taken    3-30-22  Debrided, santyl to left leg, applied dermagraft b/l heels, prevelon boots wound vac left leg    4-6-22  Debrided, dermagraft b/l, wound care    4-13-22  Debrided, wound vac, dermagraft left    4-27-22  Debrided, cont tx and care     5-4-22  Debrided, cont tx and care, wound vac to leg left, and hydrofera blue to all other wounds    5-11-22  Debrided, cont tx and care    5-18-22  Debrided, cont tx and care    5-25-22  Cont wound vac, cont previous tx, offload    6-1-22  Debrided, offload  PAST MEDICAL HISTORY      Diagnosis Date    Arthritis     Cancer ([address]     breast    Cellulitis     CHF (congestive heart failure) (HCC)     CKD (chronic kidney disease) stage 3, GFR 30-59 ml/min ([address]     Diabetes mellitus ([address]     History of lumpectomy     Hx of blood clots     Hyperlipidemia     Hypertension     Left ventricular failure ([address]     Macular degeneration     Obesity     Orthostatic hypotension     PE (pulmonary thromboembolism) ([address]     Pressure injury of both heels, unstageable ([address]     Staph aureus infection     Venous insufficiency      Past Surgical History:   Procedure Laterality Date    [facility]  9/2/2021         MASTECTOMY      TOE AMPUTATION      TOE SURGERY      TONSILLECTOMY       History reviewed. No pertinent family history.   Social History     Tobacco Use    Smoking status: Never Smoker    Smokeless tobacco: Never Used   Vaping Use    Vaping Use: Never used   Substance Use Topics    Alcohol use: Not Currently     Alcohol/week: 1.0 standard drink     Types: 1 Cans of beer per week    Drug use: No     Allergies   Allergen Reactions    Clindamycin/Lincomycin     Sulfa Antibiotics      Current Outpatient Medications on File Prior to Encounter   Medication Sig Dispense Refill    diphenhydrAMINE (BENADRYL) 25 MG capsule Take 25 mg by mouth every 6 hours as needed for Itching      apixaban (ELIQUIS) 2.5 MG TABS tablet Take 1 tablet by mouth 2 times daily (before meals) 60 tablet 2    docusate sodium (COLACE) 100 MG capsule Take 100 mg by mouth nightly      tamsulosin (FLOMAX) 0.4 MG capsule Take 0.4 mg by mouth daily      bisacodyl (DULCOLAX) 10 MG suppository Place 10 mg rectally daily as needed for Constipation      diphenhydrAMINE-zinc acetate (BENADRYL) 1-0.1 % cream Apply topically every 4 hours as needed for Itching Apply topically 3 times daily as needed.  Sodium Phosphates (FLEET) 7-19 GM/118ML Place 1 enema rectally daily as needed      magnesium hydroxide (MILK OF MAGNESIA) 400 MG/5ML suspension Take 30 mLs by mouth daily as needed for Constipation      Polyethylene Glycol 400 1 % SOLN Apply 1 drop to eye every 4 hours as needed      ferrous sulfate (IRON 325) 325 (65 Fe) MG tablet Take 325 mg by mouth daily       zinc sulfate (ZINCATE) 220 (50 Zn) MG capsule Take 1 capsule by mouth daily for 14 days 14 capsule 0    miconazole (MICOTIN) 2 % powder Apply topically 2 times daily.  45 g 1    ascorbic acid (VITAMIN C) 500 MG tablet Take 1 tablet by mouth 2 times daily 30 tablet 0    Vitamin D (CHOLECALCIFEROL) 50 MCG (2000 UT) TABS tablet Take 1 tablet by mouth daily 60 tablet 0    furosemide (LASIX) 40 MG tablet Take 20 mg by mouth daily       omeprazole (PRILOSEC) 20 MG delayed release capsule Take 20 mg by mouth daily      Multiple Vitamins-Minerals (THERAPEUTIC MULTIVITAMIN-MINERALS) tablet Take 1 tablet by mouth daily      Multiple Vitamins-Minerals (PRESERVISION AREDS PO) Take 1 tablet by mouth 2 times daily (with meals)      acetaminophen (TYLENOL) 325 MG tablet Take 650 mg by mouth every 4 hours as needed for Pain or Fever       calcium carbonate (TUMS) 500 MG chewable tablet Take 1 tablet by mouth every 4 hours as needed for Heartburn      ipratropium-albuterol (DUONEB) 0.5-2.5 (3) MG/3ML SOLN nebulizer solution Inhale 3 mLs into the lungs every 4 hours (while awake) for 30 doses 90 mL 0    gabapentin (NEURONTIN) 100 MG capsule Take 200 mg by mouth Daily with supper.  atorvastatin (LIPITOR) 80 MG tablet Take 80 mg by mouth at bedtime       INSULIN LISP PROT & LISP, HUM, (75-25) 100 UNIT/ML SUSP Inject 72 Units into the skin daily (with breakfast)       INSULIN LISP PROT & LISP, HUM, (75-25) 100 UNIT/ML SUSP Inject 55 Units into the skin Daily with supper       clotrimazole-betamethasone (LOTRISONE) cream Apply  topically as needed. Apply topically 2 times daily. No current facility-administered medications on file prior to encounter.        REVIEW OF SYSTEMS   ROS : All others Negative if blank [], Positive if [x]  General Vascular   [] Fevers [] Claudication   [] Chills [] Rest Pain   Skin Neurologic   [x] Tissue Loss [x] Lower extremity neuropathy     Objective:    BP (!) 158/62   Pulse (!) 105   Temp (!) 96.7 °F (35.9 °C) (Temporal)   Resp 18   Wt Readings from Last 3 Encounters:   05/18/22 234 lb (106.1 kg)   05/16/22 241 lb (109.3 kg)   05/04/22 237 lb (107.5 kg)       PHYSICAL EXAM   CONSTITUTIONAL:   Awake, alert, cooperative   PSYCHIATRIC :  Oriented to time, place and person      normal insight to disease process  EXTREMITIES:   R LE Open wounds are noted   Skin color is abnormal with ulcers   Edema is  noted   Sensation deficit noted - protective   Palpation of the foot does cause pain   4/5 strength DF/PF  L LE Open wounds are noted   Skin color is abnormal with ulcers   Edema is  noted   Sensation deficit noted - protective   Palpation of the foot does cause pain   4/5 strength DF/PF  R dorsalis pedis 1 L dorsalis pedis 1   R posterior tibial 1 L posterior tibial 1     Assessment:     Problem Cleansed Cleansed with saline 05/25/22 1033   Dressing/Treatment ABD;Dry dressing 05/25/22 1033   Offloading for Diabetic Foot Ulcers Post op shoe 05/25/22 1033   Wound Length (cm) 2.4 cm 06/01/22 0919   Wound Width (cm) 1.6 cm 06/01/22 0919   Wound Depth (cm) 0.2 cm 06/01/22 0919   Wound Surface Area (cm^2) 3.84 cm^2 06/01/22 0919   Change in Wound Size % (l*w) 9.65 06/01/22 0919   Wound Volume (cm^3) 0.768 cm^3 06/01/22 0919   Wound Healing % 10 06/01/22 0919   Post-Procedure Length (cm) 2.4 cm 06/01/22 0945   Post-Procedure Width (cm) 1.7 cm 06/01/22 0945   Post-Procedure Depth (cm) 0.3 cm 06/01/22 0945   Post-Procedure Surface Area (cm^2) 4.08 cm^2 06/01/22 0945   Post-Procedure Volume (cm^3) 1.224 cm^3 06/01/22 0945   Wound Assessment Pink/red;Fibrin 06/01/22 0919   Drainage Amount Small 06/01/22 0919   Drainage Description Serosanguinous 06/01/22 0919   Odor None 06/01/22 0919   Rashmi-wound Assessment Intact;Fragile 06/01/22 0919   Wound Thickness Description not for Pressure Injury Full thickness 01/19/22 4101   Number of days: 146       Wound 03/14/22 Coccyx (Active)   Number of days: 78       Wound 03/23/22 Toe (Comment  which one) Left #10 Left Great Toe (Active)   Wound Image   05/11/22 0908   Dressing Status New dressing applied;Clean;Dry; Intact 05/25/22 1033   Wound Cleansed Cleansed with saline 05/25/22 1033   Dressing/Treatment Pharmaceutical agent (see MAR); Dry dressing 05/25/22 1033   Offloading for Diabetic Foot Ulcers Post op shoe 05/25/22 1033   Wound Length (cm) 1.2 cm 06/01/22 0919   Wound Width (cm) 0.9 cm 06/01/22 0919   Wound Depth (cm) 0.1 cm 06/01/22 0919   Wound Surface Area (cm^2) 1.08 cm^2 06/01/22 0919   Change in Wound Size % (l*w) 91 06/01/22 0919   Wound Volume (cm^3) 0.108 cm^3 06/01/22 0919   Wound Healing % 91 06/01/22 0919   Post-Procedure Length (cm) 1.3 cm 06/01/22 0945   Post-Procedure Width (cm) 1 cm 06/01/22 0945   Post-Procedure Depth (cm) 0.2 cm 06/01/22 0945 Post-Procedure Surface Area (cm^2) 1.3 cm^2 06/01/22 0945   Post-Procedure Volume (cm^3) 0.26 cm^3 06/01/22 0945   Wound Assessment Pink/red;Fibrin 06/01/22 0919   Drainage Amount Small 06/01/22 0919   Drainage Description Yellow 06/01/22 0919   Odor None 06/01/22 0919   Rashmi-wound Assessment Intact; Hyperkeratosis (callous) 06/01/22 0919   Number of days: 70       Wound 03/23/22 Foot Left;Dorsal #11 (Active)   Wound Image   05/04/22 0908   Dressing Status Clean;Dry; Intact 05/16/22 2000   Wound Cleansed Betadine/povidone iodine 04/13/22 1026   Dressing/Treatment Collagen with Ag 04/13/22 1026   Offloading for Diabetic Foot Ulcers Felt or foam 05/16/22 2000   Wound Length (cm) 0 cm 05/04/22 0908   Wound Width (cm) 0 cm 05/04/22 0908   Wound Depth (cm) 0 cm 05/04/22 0908   Wound Surface Area (cm^2) 0 cm^2 05/04/22 0908   Change in Wound Size % (l*w) 100 05/04/22 0908   Wound Volume (cm^3) 0 cm^3 05/04/22 0908   Wound Healing % 100 05/04/22 0908   Post-Procedure Length (cm) 0.6 cm 04/06/22 0933   Post-Procedure Width (cm) 0.6 cm 04/06/22 0933   Post-Procedure Depth (cm) 0.2 cm 04/06/22 0933   Post-Procedure Surface Area (cm^2) 0.36 cm^2 04/06/22 0933   Post-Procedure Volume (cm^3) 0.072 cm^3 04/06/22 0933   Wound Assessment Dry 04/13/22 0934   Drainage Amount None 04/13/22 0934   Drainage Description Serous 03/30/22 0859   Odor None 04/13/22 0934   Rashmi-wound Assessment Edematous;Dry/flaky 04/13/22 0934   Number of days: 70       Wound 04/13/22 Pretibial Distal;Left;Lateral #14 (blocked may 25)  (Active)   Wound Image   05/11/22 0908   Dressing Status New dressing applied;Clean; Intact;Dry 05/25/22 1033   Wound Cleansed Cleansed with saline 05/25/22 1033   Dressing/Treatment Dry dressing;ABD 05/25/22 1033   Offloading for Diabetic Foot Ulcers Post op shoe 05/25/22 1033   Wound Length (cm) 1.4 cm 06/01/22 0919   Wound Width (cm) 2 cm 06/01/22 0919   Wound Depth (cm) 0.2 cm 06/01/22 0919   Wound Surface Area (cm^2) 2.8 cm^2 06/01/22 0919   Change in Wound Size % (l*w) 70.83 06/01/22 0919   Wound Volume (cm^3) 0.56 cm^3 06/01/22 0919   Wound Healing % 42 06/01/22 0919   Post-Procedure Length (cm) 1.5 cm 06/01/22 0945   Post-Procedure Width (cm) 2 cm 06/01/22 0945   Post-Procedure Depth (cm) 0.3 cm 06/01/22 0945   Post-Procedure Surface Area (cm^2) 3 cm^2 06/01/22 0945   Post-Procedure Volume (cm^3) 0.9 cm^3 06/01/22 0945   Wound Assessment Fibrinous 06/01/22 0919   Drainage Amount Moderate 06/01/22 0919   Drainage Description Yellow 06/01/22 0919   Odor None 06/01/22 0919   Rashmi-wound Assessment Dry/flaky; Intact 06/01/22 0919   Number of days: 49       Wound 04/27/22 Tibial Left;Posterior #15 (Active)   Wound Image   05/11/22 0908   Dressing Status New dressing applied;Clean;Dry; Intact 05/25/22 1033   Wound Cleansed Cleansed with saline 05/25/22 1033   Dressing/Treatment ABD;Dry dressing; Pharmaceutical agent (see MAR) 05/25/22 1033   Offloading for Diabetic Foot Ulcers Post op shoe 05/25/22 1033   Wound Length (cm) 4.1 cm 06/01/22 0919   Wound Width (cm) 3.9 cm 06/01/22 0919   Wound Depth (cm) 0.6 cm 06/01/22 0919   Wound Surface Area (cm^2) 15.99 cm^2 06/01/22 0919   Change in Wound Size % (l*w) -13.89 06/01/22 0919   Wound Volume (cm^3) 9.594 cm^3 06/01/22 0919   Wound Healing % -37 06/01/22 0919   Post-Procedure Length (cm) 4.2 cm 06/01/22 0945   Post-Procedure Width (cm) 3.9 cm 06/01/22 0945   Post-Procedure Depth (cm) 0.7 cm 06/01/22 0945   Post-Procedure Surface Area (cm^2) 16.38 cm^2 06/01/22 0945   Post-Procedure Volume (cm^3) 11.466 cm^3 06/01/22 0945   Wound Assessment Fibrin;Pink/red 06/01/22 0919   Drainage Amount Moderate 06/01/22 0919   Drainage Description Serosanguinous; Yellow 06/01/22 0919   Odor None 06/01/22 0919   Rashmi-wound Assessment Intact 06/01/22 0919   Number of days: 35       Wound 05/11/22 Leg Left;Medial;Lower #16 (Active)   Wound Image   05/11/22 0908   Dressing Status New dressing applied 05/18/22 1011   Wound Cleansed Cleansed with saline 05/18/22 1011   Dressing/Treatment Alginate;Dry dressing 05/18/22 1011   Offloading for Diabetic Foot Ulcers Post op shoe 05/18/22 1011   Wound Length (cm) 0 cm 05/25/22 0946   Wound Width (cm) 0 cm 05/25/22 0946   Wound Depth (cm) 0 cm 05/25/22 0946   Wound Surface Area (cm^2) 0 cm^2 05/25/22 0946   Change in Wound Size % (l*w) 100 05/25/22 0946   Wound Volume (cm^3) 0 cm^3 05/25/22 0946   Wound Healing % 100 05/25/22 0946   Post-Procedure Length (cm) 2 cm 05/18/22 1011   Post-Procedure Width (cm) 1 cm 05/18/22 1011   Post-Procedure Depth (cm) 0.3 cm 05/18/22 1011   Post-Procedure Surface Area (cm^2) 2 cm^2 05/18/22 1011   Post-Procedure Volume (cm^3) 0.6 cm^3 05/18/22 1011   Wound Assessment Epithelialization 05/25/22 0946   Drainage Amount None 05/25/22 0946   Drainage Description Serosanguinous 05/18/22 0947   Odor None 05/25/22 0946   Rashmi-wound Assessment Edematous 05/25/22 0946   Number of days: 21          Procedure Note  Indications:  Based on my examination of this patient's wound(s)/ulcer(s) today, debridement is required to promote healing and evaluate the wound base. Performed by: Tim Amezcua DPM    Consent obtained:  Yes    Time out taken:  Yes    Pain Control: Anesthetic  Anesthetic: 4% Lidocaine Liquid Topical     Debridement:Excisional Debridement    Using #15 blade scalpel the wound(s)/ulcer(s) was/were sharply debrided down through and including the removal of subcutaneous tissue.         Devitalized Tissue Debrided:  fibrin, biofilm, slough and necrotic/eschar to stimulate bleeding to promote healing, post debridement good bleeding base and wound edges noted    Wound/Ulcer #: 2, 4, 10    Percent of Wound/Ulcer Debrided: 100%    Total Surface Area Debrided:  30 sq cm     Estimated Blood Loss:  Minimal  Hemostasis Achieved:  by pressure    Procedural Pain:  4  / 10   Post Procedural Pain:  5 / 10     Response to treatment:  Well tolerated by patient. A culture was done. Plan:     Pt is not a smoker   - Discussed relationship of smoking and negative affects on wound healing   - Emphasized importance of tobacco avoidace/cessation   - Script for nicotine patch given to patient   - Information regarding support groups for smoking cessation given    In my professional opinion and based off the information that is available at this time this patient has appropriate indication for HBO Therapy: Maybe    Treatment Note please see attached Discharge Instructions    Written patient dismissal instructions given to patient and signed by patient or POA. Discharge Instructions       Visit Discharge/Physician Orders     Discharge condition: Stable     Assessment of pain at discharge:  minimal     Anesthetic used: 4% lidocaine solution     Discharge to: Home     Left via:Private automobile     Accompanied by: accompanied by SELF     ECF/[facility]:  [facility].      Dressing Orders: Jorge Boggs bilateral heel ulcers with NSS, apply hydrofera blue and dry dressing change daily   left great toe apply santyl/bactroban and dry dressing daily     TO LEFT LEG ULCER, CLEAN WITH SALINE AND APPLY SANTYL/bactrobn TO WOUND BED  MAY APPLY ALGINATE TO LIGIA WOUND IF MACERATEDTHEN  WOUND VAC AT 1255MMHG CHANGE Monday Wednesday friday. IF VAC MALFUNCTIONS APPLY WET TO DRY DRESSING UNTIL VAC CAN BE REPLACED        APPLY PREVALON BOOT TO BILATERAL HEELS     Physical therapy for ambulating safely while wearing offloading boots as indicated.     [facility] followup visit ________1 weeks with Dr. Gastelum_____________________  (Please note your next appointment above and if you are unable to keep, kindly give a 24 hour notice.  Thank you.)     Physician signature:__________________________        If you experience any of the following, please call the [address] [place] [address] during business hours:     * Increase in Pain  * Temperature over 101  * Increase in drainage from your wound  * Drainage with a foul odor  * Bleeding  * Increase in swelling  * Need for compression bandage changes due to slippage, breakthrough drainage.     If you need medical attention outside of the business hours of the [address] [place] [address] Road please contact your PCP or go to the nearest emergency room.                                                                                                  Electronically signed by Ivan Poon DPM on 6/1/2022 at 9:52 AM

## 2022-06-08 ENCOUNTER — HOSPITAL ENCOUNTER (OUTPATIENT)
Dept: WOUND CARE | Age: 87
Discharge: HOME | End: 2022-06-08
Payer: MEDICARE

## 2022-06-08 VITALS
BODY MASS INDEX: 31.69 KG/M2 | HEART RATE: 84 BPM | DIASTOLIC BLOOD PRESSURE: 60 MMHG | HEIGHT: 72 IN | SYSTOLIC BLOOD PRESSURE: 124 MMHG | RESPIRATION RATE: 16 BRPM | TEMPERATURE: 97.2 F | WEIGHT: 234 LBS

## 2022-06-08 DIAGNOSIS — L97.421 DIABETIC ULCER OF LEFT HEEL ASSOCIATED WITH DIABETES MELLITUS DUE TO UNDERLYING CONDITION, LIMITED TO BREAKDOWN OF SKIN (HCC): ICD-10-CM

## 2022-06-08 DIAGNOSIS — E08.621 DIABETIC ULCER OF LEFT HEEL ASSOCIATED WITH DIABETES MELLITUS DUE TO UNDERLYING CONDITION, LIMITED TO BREAKDOWN OF SKIN (HCC): ICD-10-CM

## 2022-06-08 DIAGNOSIS — L97.412 DIABETIC ULCER OF RIGHT HEEL ASSOCIATED WITH TYPE 2 DIABETES MELLITUS, WITH FAT LAYER EXPOSED (HCC): Primary | ICD-10-CM

## 2022-06-08 DIAGNOSIS — E11.621 DIABETIC ULCER OF RIGHT HEEL ASSOCIATED WITH TYPE 2 DIABETES MELLITUS, WITH FAT LAYER EXPOSED (HCC): Primary | ICD-10-CM

## 2022-06-08 PROCEDURE — C5271 LOW COST SKIN SUBSTITUTE APP: HCPCS

## 2022-06-08 PROCEDURE — 15271 SKIN SUB GRAFT TRNK/ARM/LEG: CPT

## 2022-06-08 RX ORDER — LIDOCAINE HYDROCHLORIDE 20 MG/ML
JELLY TOPICAL ONCE
Status: CANCELLED | OUTPATIENT
Start: 2022-06-08 | End: 2022-06-08

## 2022-06-08 RX ORDER — LIDOCAINE HYDROCHLORIDE 40 MG/ML
SOLUTION TOPICAL ONCE
Status: DISCONTINUED | OUTPATIENT
Start: 2022-06-08 | End: 2022-06-09 | Stop reason: HOSPADM

## 2022-06-08 RX ORDER — BACITRACIN ZINC AND POLYMYXIN B SULFATE 500; 1000 [USP'U]/G; [USP'U]/G
OINTMENT TOPICAL ONCE
Status: CANCELLED | OUTPATIENT
Start: 2022-06-08 | End: 2022-06-08

## 2022-06-08 RX ORDER — LIDOCAINE 50 MG/G
OINTMENT TOPICAL ONCE
Status: CANCELLED | OUTPATIENT
Start: 2022-06-08 | End: 2022-06-08

## 2022-06-08 RX ORDER — CLOBETASOL PROPIONATE 0.5 MG/G
OINTMENT TOPICAL ONCE
Status: CANCELLED | OUTPATIENT
Start: 2022-06-08 | End: 2022-06-08

## 2022-06-08 RX ORDER — BETAMETHASONE DIPROPIONATE 0.05 %
OINTMENT (GRAM) TOPICAL ONCE
Status: CANCELLED | OUTPATIENT
Start: 2022-06-08 | End: 2022-06-08

## 2022-06-08 RX ORDER — GINSENG 100 MG
CAPSULE ORAL ONCE
Status: CANCELLED | OUTPATIENT
Start: 2022-06-08 | End: 2022-06-08

## 2022-06-08 RX ORDER — LIDOCAINE 40 MG/G
CREAM TOPICAL ONCE
Status: CANCELLED | OUTPATIENT
Start: 2022-06-08 | End: 2022-06-08

## 2022-06-08 RX ORDER — BACITRACIN, NEOMYCIN, POLYMYXIN B 400; 3.5; 5 [USP'U]/G; MG/G; [USP'U]/G
OINTMENT TOPICAL ONCE
Status: CANCELLED | OUTPATIENT
Start: 2022-06-08 | End: 2022-06-08

## 2022-06-08 RX ORDER — LIDOCAINE HYDROCHLORIDE 40 MG/ML
SOLUTION TOPICAL ONCE
Status: CANCELLED | OUTPATIENT
Start: 2022-06-08 | End: 2022-06-08

## 2022-06-08 RX ORDER — GENTAMICIN SULFATE 1 MG/G
OINTMENT TOPICAL ONCE
Status: CANCELLED | OUTPATIENT
Start: 2022-06-08 | End: 2022-06-08

## 2022-06-08 ASSESSMENT — PAIN DESCRIPTION - PAIN TYPE: TYPE: CHRONIC PAIN

## 2022-06-08 ASSESSMENT — PAIN DESCRIPTION - ORIENTATION: ORIENTATION: LEFT

## 2022-06-08 ASSESSMENT — PAIN DESCRIPTION - DESCRIPTORS: DESCRIPTORS: DULL;SHOOTING

## 2022-06-08 ASSESSMENT — PAIN DESCRIPTION - FREQUENCY: FREQUENCY: INTERMITTENT

## 2022-06-08 ASSESSMENT — PAIN DESCRIPTION - LOCATION: LOCATION: LEG

## 2022-06-08 ASSESSMENT — PAIN SCALES - GENERAL: PAINLEVEL_OUTOF10: 8

## 2022-06-08 ASSESSMENT — PAIN - FUNCTIONAL ASSESSMENT: PAIN_FUNCTIONAL_ASSESSMENT: PREVENTS OR INTERFERES SOME ACTIVE ACTIVITIES AND ADLS

## 2022-06-08 ASSESSMENT — PAIN DESCRIPTION - ONSET: ONSET: ON-GOING

## 2022-06-08 NOTE — PLAN OF CARE
Problem: Chronic Conditions and Co-morbidities  Goal: Patient's chronic conditions and co-morbidity symptoms are monitored and maintained or improved  Outcome: Progressing     Problem: Pain  Goal: Verbalizes/displays adequate comfort level or baseline comfort level  Outcome: Progressing     Problem: Skin/Tissue Integrity  Goal: Absence of new skin breakdown  Description: 1. Monitor for areas of redness and/or skin breakdown  2. Assess vascular access sites hourly  3. Every 4-6 hours minimum:  Change oxygen saturation probe site  4. Every 4-6 hours:  If on nasal continuous positive airway pressure, respiratory therapy assess nares and determine need for appliance change or resting period.   Outcome: Progressing     Problem: Wound:  Goal: Will show signs of wound healing; wound closure and no evidence of infection  Description: Will show signs of wound healing; wound closure and no evidence of infection  Outcome: Progressing     Problem: Venous:  Goal: Signs of wound healing will improve  Description: Signs of wound healing will improve  Outcome: Progressing     Problem: Blood Glucose:  Goal: Ability to maintain appropriate glucose levels will improve  Description: Ability to maintain appropriate glucose levels will improve  Outcome: Progressing

## 2022-06-08 NOTE — FLOWSHEET NOTE
PuraPly AMTreatment Note    NAME:  Hakeem Carlson. YOB: 1932  MEDICAL RECORD NUMBER:  [id number]  DATE:  6/8/2022    Goal:  Patient will receive safe and proper application of skin substitute. Patient will comply with caring for dressing, and reporting complications. Expiration date checked immediately prior to use. Package intact prior to use and no damage noted. Transport temperature controlled and acceptable. PuraPly AM was removed from protective sterile packaging by provider and applied to prepared ulcer bed. PuraPly AM was hydrated with sterile normal saline per provider. PuraPly AM was applied to left leg ulcer and affixed with steri-strips by the provider. PuraPly AM was covered with non-adherent ulcer dressing. Applied mepitel over non-adherent. Applied dry gauze and/or roll gauze. Patient/caregiver was instructed not to remove dressing and to keep it clean and dry. Pt/family/caregiver was instructed on signs and symptoms of complications to report such as draining through dressing, dressing falling down/slipping, getting wet, or severe pain or tingling. PuraPly AM may be applied a total of 10 times per wound over a 12 week period. Date of first application of PuraPly for this current wound is June 8, 2022.     Guidelines followed    Electronically signed by Tash Logan RN on 6/8/2022 at 10:14 AM

## 2022-06-08 NOTE — PROGRESS NOTES
[facility]  History and Physical/Consultation  Podiatry    Referring Physician : Elba Bach MD  Fe Muir. MEDICAL RECORD NUMBER:  [id number]  AGE: 80 y.o. GENDER: male  : 1932  EPISODE DATE:  2022  Subjective:     Chief Complaint   Patient presents with    Wound Check     left leg, bilateral heels, left great toe         HISTORY of PRESENT ILLNESS HPI     Fe Villarreal is a 80 y.o. male who presents today for wound/ulcer evaluation. History of Wound Context:  The patient has had a wound of diabetic b/l heels,and right ankle which was first noted approximately months ago. This has been treated betadyne. On their initial visit to the wound healing center, 22 ,  the patient has noted that the wound has been improving. The patient has had similar previous wounds in the past.      Pt is not on abx at time of initial visit.       Wound/Ulcer Pain Timing/Severity: constant  Quality of pain: sharp  Severity:  3 / 10   Modifying Factors: Pain worsens with walking  Associated Signs/Symptoms: edema, erythema and drainage    Ulcer Identification:  Ulcer Type: diabetic  Contributing Factors: edema and diabetes    Diabetic/Pressure/Non Pressure Ulcers onl y:  Ulcer: Diabetic ulcer, fat layer exposed    If patient has diabetic lower extremity wounds  Campbell Classification of diabetic lower extremity wounds:    Grade Description   []  0 No open wound   []  1 Superficial ulcer involving the full skin thickness   [x]  2 Deep ulcer involves ligament, tendon, joint capsule, or fascia  No bone involvement or abscess presence   []  3 Deep Ulcer with abcess formation and/or osteomyelitis   []  4 Localized gangrene   []  5 Extensive gangrene of the foot     Wound: N/A          22  Debrided, cont tx and care     22  Debrided, cont tx and care, await graft approval    22  Debrided, cont tx and care, puraply applied    22  Debrided, dermagraft applied    2-16-22  Debrided, dermagraft applied    2-23-22  Debrided, graft applied    3-2-22  Debrided, graft applied    3-9-22  Debrided, graft applied     3-23-22  Just release from hospital care, new wounds noted from hospital, also trauma to left toe loosened nail that was removed without incident, debrided all wounds, culture taken    3-30-22  Debrided, santyl to left leg, applied dermagraft b/l heels, prevelon boots wound vac left leg    4-6-22  Debrided, dermagraft b/l, wound care    4-13-22  Debrided, wound vac, dermagraft left    4-27-22  Debrided, cont tx and care     5-4-22  Debrided, cont tx and care, wound vac to leg left, and hydrofera blue to all other wounds    5-11-22  Debrided, cont tx and care    5-18-22  Debrided, cont tx and care    5-25-22  Cont wound vac, cont previous tx, offload    6-1-22  Debrided, offload    6-8-22  Debrided, puraply to left leg, b/l heels medihoney patches  PAST MEDICAL HISTORY      Diagnosis Date    Arthritis     Cancer ([address]     breast    Cellulitis     CHF (congestive heart failure) (HCC)     CKD (chronic kidney disease) stage 3, GFR 30-59 ml/min ([address]     Diabetes mellitus ([address]     History of lumpectomy     Hx of blood clots     Hyperlipidemia     Hypertension     Left ventricular failure ([address]     Macular degeneration     Obesity     Orthostatic hypotension     PE (pulmonary thromboembolism) ([address]     Pressure injury of both heels, unstageable ([address]     Staph aureus infection     Venous insufficiency      Past Surgical History:   Procedure Laterality Date    [facility] SINGLE  9/2/2021         MASTECTOMY      TOE AMPUTATION      TOE SURGERY      TONSILLECTOMY       History reviewed. No pertinent family history.   Social History     Tobacco Use    Smoking status: Never Smoker    Smokeless tobacco: Never Used   Vaping Use    Vaping Use: Never used   Substance Use Topics    Alcohol use: Not Currently     Alcohol/week: 1.0 standard drink     Types: 1 Cans of beer per week    Drug use: No     Allergies   Allergen Reactions    Clindamycin/Lincomycin     Sulfa Antibiotics      Current Outpatient Medications on File Prior to Encounter   Medication Sig Dispense Refill    apixaban (ELIQUIS) 2.5 MG TABS tablet Take 1 tablet by mouth 2 times daily (before meals) 60 tablet 2    docusate sodium (COLACE) 100 MG capsule Take 100 mg by mouth nightly      tamsulosin (FLOMAX) 0.4 MG capsule Take 0.4 mg by mouth daily      bisacodyl (DULCOLAX) 10 MG suppository Place 10 mg rectally daily as needed for Constipation      diphenhydrAMINE-zinc acetate (BENADRYL) 1-0.1 % cream Apply topically every 4 hours as needed for Itching Apply topically 3 times daily as needed.  Sodium Phosphates (FLEET) 7-19 GM/118ML Place 1 enema rectally daily as needed      magnesium hydroxide (MILK OF MAGNESIA) 400 MG/5ML suspension Take 30 mLs by mouth daily as needed for Constipation      Polyethylene Glycol 400 1 % SOLN Apply 1 drop to eye every 4 hours as needed      ferrous sulfate (IRON 325) 325 (65 Fe) MG tablet Take 325 mg by mouth daily       zinc sulfate (ZINCATE) 220 (50 Zn) MG capsule Take 1 capsule by mouth daily for 14 days 14 capsule 0    miconazole (MICOTIN) 2 % powder Apply topically 2 times daily.  45 g 1    ascorbic acid (VITAMIN C) 500 MG tablet Take 1 tablet by mouth 2 times daily 30 tablet 0    Vitamin D (CHOLECALCIFEROL) 50 MCG (2000 UT) TABS tablet Take 1 tablet by mouth daily 60 tablet 0    furosemide (LASIX) 40 MG tablet Take 20 mg by mouth daily       omeprazole (PRILOSEC) 20 MG delayed release capsule Take 20 mg by mouth daily      Multiple Vitamins-Minerals (THERAPEUTIC MULTIVITAMIN-MINERALS) tablet Take 1 tablet by mouth daily      Multiple Vitamins-Minerals (PRESERVISION AREDS PO) Take 1 tablet by mouth 2 times daily (with meals)      acetaminophen (TYLENOL) 325 MG tablet Take 650 mg by mouth every 4 hours as needed for Pain or Fever       calcium carbonate (TUMS) 500 MG chewable tablet Take 1 tablet by mouth every 4 hours as needed for Heartburn      ipratropium-albuterol (DUONEB) 0.5-2.5 (3) MG/3ML SOLN nebulizer solution Inhale 3 mLs into the lungs every 4 hours (while awake) for 30 doses 90 mL 0    gabapentin (NEURONTIN) 100 MG capsule Take 200 mg by mouth Daily with supper.  atorvastatin (LIPITOR) 80 MG tablet Take 80 mg by mouth at bedtime       INSULIN LISP PROT & LISP, HUM, (75-25) 100 UNIT/ML SUSP Inject 72 Units into the skin daily (with breakfast)       INSULIN LISP PROT & LISP, HUM, (75-25) 100 UNIT/ML SUSP Inject 55 Units into the skin Daily with supper       clotrimazole-betamethasone (LOTRISONE) cream Apply  topically as needed. Apply topically 2 times daily. No current facility-administered medications on file prior to encounter.        REVIEW OF SYSTEMS   ROS : All others Negative if blank [], Positive if [x]  General Vascular   [] Fevers [] Claudication   [] Chills [] Rest Pain   Skin Neurologic   [x] Tissue Loss [x] Lower extremity neuropathy     Objective:    /60   Pulse 84   Temp 97.2 °F (36.2 °C) (Temporal)   Resp 16   Ht 6' (1.829 m)   Wt 234 lb (106.1 kg)   BMI 31.74 kg/m²   Wt Readings from Last 3 Encounters:   06/08/22 234 lb (106.1 kg)   05/18/22 234 lb (106.1 kg)   05/16/22 241 lb (109.3 kg)       PHYSICAL EXAM   CONSTITUTIONAL:   Awake, alert, cooperative   PSYCHIATRIC :  Oriented to time, place and person      normal insight to disease process  EXTREMITIES:   R LE Open wounds are noted   Skin color is abnormal with ulcers   Edema is  noted   Sensation deficit noted - protective   Palpation of the foot does cause pain   4/5 strength DF/PF  L LE Open wounds are noted   Skin color is abnormal with ulcers   Edema is  noted   Sensation deficit noted - protective   Palpation of the foot does cause pain   4/5 strength DF/PF  R dorsalis pedis 1 L dorsalis pedis 1   R posterior tibial 1 L posterior tibial 1     Assessment:     Problem List Items Addressed This Visit     Diabetic ulcer of right heel associated with type 2 diabetes mellitus, with fat layer exposed ([facility] [facility] - Primary    Relevant Medications    lidocaine (XYLOCAINE) 4 % external solution    Other Relevant Orders    Initiate Outpatient Wound Care Protocol    Diabetic ulcer of left heel (HCC)    Relevant Medications    lidocaine (XYLOCAINE) 4 % external solution    Other Relevant Orders    Initiate Outpatient Wound Care Protocol          Pre Debridement Measurements:  Are located in the [place]  Documentation Flow Sheet  Post Debridement Measurements:  Wound/Ulcer Descriptions are Pre Debridement except measurements:     Wound 01/05/22 Heel Left #1 (Active)   Wound Image   06/08/22 0916   Wound Etiology Diabetic Campbell 2 01/05/22 0943   Dressing Status New dressing applied 06/01/22 1052   Wound Cleansed Cleansed with saline 06/01/22 1052   Dressing/Treatment ABD;Pharmaceutical agent (see MAR); Roll gauze 06/01/22 1052   Offloading for Diabetic Foot Ulcers Post op shoe 06/01/22 1052   Wound Length (cm) 4 cm 06/08/22 0916   Wound Width (cm) 3.5 cm 06/08/22 0916   Wound Depth (cm) 0.2 cm 06/08/22 0916   Wound Surface Area (cm^2) 14 cm^2 06/08/22 0916   Change in Wound Size % (l*w) -1.45 06/08/22 0916   Wound Volume (cm^3) 2.8 cm^3 06/08/22 0916   Wound Healing % -1 06/08/22 0916   Post-Procedure Length (cm) 4 cm 06/08/22 0951   Post-Procedure Width (cm) 3.5 cm 06/08/22 0951   Post-Procedure Depth (cm) 0.2 cm 06/08/22 0951   Post-Procedure Surface Area (cm^2) 14 cm^2 06/08/22 0951   Post-Procedure Volume (cm^3) 2.8 cm^3 06/08/22 0951   Wound Assessment Fibrin;Pink/red 06/08/22 0916   Drainage Amount Moderate 06/08/22 0916   Drainage Description Serosanguinous 06/08/22 0916   Odor None 06/08/22 0916   Rashmi-wound Assessment Maceration;Fragile 06/08/22 0916   Wound Thickness Description not for Pressure Injury Full thickness 01/19/22 0909   Number of days: 154       Wound 01/05/22 Heel Right #2 (Active)   Wound Image   06/08/22 0916   Wound Etiology Venous 01/05/22 0943   Dressing Status New dressing applied 06/01/22 1052   Wound Cleansed Cleansed with saline 06/01/22 1052   Dressing/Treatment ABD;Pharmaceutical agent (see MAR); Roll gauze 06/01/22 1052   Offloading for Diabetic Foot Ulcers Post op shoe 06/01/22 1052   Wound Length (cm) 2.5 cm 06/08/22 0916   Wound Width (cm) 1.6 cm 06/08/22 0916   Wound Depth (cm) 0.2 cm 06/08/22 0916   Wound Surface Area (cm^2) 4 cm^2 06/08/22 0916   Change in Wound Size % (l*w) 5.88 06/08/22 0916   Wound Volume (cm^3) 0.8 cm^3 06/08/22 0916   Wound Healing % 6 06/08/22 0916   Post-Procedure Length (cm) 2.5 cm 06/08/22 0951   Post-Procedure Width (cm) 1.8 cm 06/08/22 0951   Post-Procedure Depth (cm) 0.3 cm 06/08/22 0951   Post-Procedure Surface Area (cm^2) 4.5 cm^2 06/08/22 0951   Post-Procedure Volume (cm^3) 1.35 cm^3 06/08/22 0951   Wound Assessment Pink/red;Fibrin 06/08/22 0916   Drainage Amount Small 06/08/22 0916   Drainage Description Serosanguinous 06/08/22 0916   Odor None 06/08/22 0916   Rashmi-wound Assessment Intact;Fragile 06/08/22 0916   Wound Thickness Description not for Pressure Injury Full thickness 01/19/22 0909   Number of days: 154       Wound 03/14/22 Coccyx (Active)   Number of days: 85       Wound 03/23/22 Toe (Comment  which one) Left #10 Left Great Toe (Active)   Wound Image   06/08/22 0916   Dressing Status New dressing applied 06/01/22 1052   Wound Cleansed Cleansed with saline 06/01/22 1052   Dressing/Treatment Pharmaceutical agent (see MAR); Dry dressing 06/01/22 1052   Offloading for Diabetic Foot Ulcers Post op shoe 06/01/22 1052   Wound Length (cm) 1 cm 06/08/22 0916   Wound Width (cm) 0.8 cm 06/08/22 0916   Wound Depth (cm) 0.1 cm 06/08/22 0916   Wound Surface Area (cm^2) 0.8 cm^2 06/08/22 0916   Change in Wound Size % (l*w) 93.33 06/08/22 0916   Wound Volume (cm^3) 0.08 cm^3 06/08/22 5369 Offloading for Diabetic Foot Ulcers Post op shoe 06/01/22 1052   Wound Length (cm) 3.8 cm 06/08/22 0916   Wound Width (cm) 4 cm 06/08/22 0916   Wound Depth (cm) 0.6 cm 06/08/22 0916   Wound Surface Area (cm^2) 15.2 cm^2 06/08/22 0916   Change in Wound Size % (l*w) -8.26 06/08/22 0916   Wound Volume (cm^3) 9.12 cm^3 06/08/22 0916   Wound Healing % -30 06/08/22 0916   Post-Procedure Length (cm) 3.8 cm 06/08/22 0951   Post-Procedure Width (cm) 4 cm 06/08/22 0951   Post-Procedure Depth (cm) 0.7 cm 06/08/22 0951   Post-Procedure Surface Area (cm^2) 15.2 cm^2 06/08/22 0951   Post-Procedure Volume (cm^3) 10.64 cm^3 06/08/22 0951   Wound Assessment Fibrin;Pink/red 06/08/22 0916   Drainage Amount Moderate 06/08/22 0916   Drainage Description Serosanguinous; Yellow 06/08/22 0916   Odor None 06/08/22 0916   Rashmi-wound Assessment Intact;Fragile 06/08/22 0916   Number of days: 42          Procedure Note  Indications:  Based on my examination of this patient's wound(s)/ulcer(s) today, debridement is required to promote healing and evaluate the wound base. Performed by: Ivan Poon DPM    Consent obtained:  Yes    Time out taken:  Yes    Pain Control: Anesthetic  Anesthetic: 4% Lidocaine Liquid Topical     Debridement:Excisional Debridement    Using #15 blade scalpel the wound(s)/ulcer(s) was/were sharply debrided down through and including the removal of subcutaneous tissue. Devitalized Tissue Debrided:  fibrin, biofilm, slough and necrotic/eschar to stimulate bleeding to promote healing, post debridement good bleeding base and wound edges noted    Wound/Ulcer #: 2, 4, 10    Percent of Wound/Ulcer Debrided: 100%    Total Surface Area Debrided:  30 sq cm     Estimated Blood Loss:  Minimal  Hemostasis Achieved:  by pressure    Procedural Pain:  4  / 10   Post Procedural Pain:  5 / 10     Response to treatment:  Well tolerated by patient. A culture was done.     Skin Substitute Applied:         [] APLIGRAF []44 sq/cm   []88 sq/cm    []132 sq/cm  []176  sq/cm           [] DERMAGRAFT  [] 38sq/cm   []76 sq/cm    []114 sq/cm  []152 sq/cm         [] OASIS   [] 10.5 sq/cm   []21 sq/cm    []4 sq/cm PuraPly  []8 sq/cm PuraPly        [x] OTHER puraply 16    []        sq/cm       Performed by: Kelly London DPM    Wound Type:venous    Consent obtained: Yes    Time out taken: Yes     Fenestrated: Yes    Instrument(s) curette and #15 blade scalpel      [] Mesher Utilized    Skin Substitute was Applied to Wound Number(s): Wound #: 5      Wound 01/05/22 Heel Left #1 (Active)   Wound Image   06/08/22 0916   Wound Etiology Diabetic Campbell 2 01/05/22 0943   Dressing Status New dressing applied 06/01/22 1052   Wound Cleansed Cleansed with saline 06/01/22 1052   Dressing/Treatment ABD;Pharmaceutical agent (see MAR); Roll gauze 06/01/22 1052   Offloading for Diabetic Foot Ulcers Post op shoe 06/01/22 1052   Wound Length (cm) 4 cm 06/08/22 0916   Wound Width (cm) 3.5 cm 06/08/22 0916   Wound Depth (cm) 0.2 cm 06/08/22 0916   Wound Surface Area (cm^2) 14 cm^2 06/08/22 0916   Change in Wound Size % (l*w) -1.45 06/08/22 0916   Wound Volume (cm^3) 2.8 cm^3 06/08/22 0916   Wound Healing % -1 06/08/22 0916   Post-Procedure Length (cm) 4 cm 06/08/22 0951   Post-Procedure Width (cm) 3.5 cm 06/08/22 0951   Post-Procedure Depth (cm) 0.2 cm 06/08/22 0951   Post-Procedure Surface Area (cm^2) 14 cm^2 06/08/22 0951   Post-Procedure Volume (cm^3) 2.8 cm^3 06/08/22 0951   Wound Assessment Fibrin;Pink/red 06/08/22 0916   Drainage Amount Moderate 06/08/22 0916   Drainage Description Serosanguinous 06/08/22 0916   Odor None 06/08/22 0916   Rashmi-wound Assessment Maceration;Fragile 06/08/22 0916   Wound Thickness Description not for Pressure Injury Full thickness 01/19/22 0909   Number of days: 154       Wound 01/05/22 Heel Right #2 (Active)   Wound Image   06/08/22 0916   Wound Etiology Venous 01/05/22 0943   Dressing Status New dressing applied 06/01/22 1052   Wound Cleansed Cleansed with saline 06/01/22 1052   Dressing/Treatment ABD;Pharmaceutical agent (see MAR); Roll gauze 06/01/22 1052   Offloading for Diabetic Foot Ulcers Post op shoe 06/01/22 1052   Wound Length (cm) 2.5 cm 06/08/22 0916   Wound Width (cm) 1.6 cm 06/08/22 0916   Wound Depth (cm) 0.2 cm 06/08/22 0916   Wound Surface Area (cm^2) 4 cm^2 06/08/22 0916   Change in Wound Size % (l*w) 5.88 06/08/22 0916   Wound Volume (cm^3) 0.8 cm^3 06/08/22 0916   Wound Healing % 6 06/08/22 0916   Post-Procedure Length (cm) 2.5 cm 06/08/22 0951   Post-Procedure Width (cm) 1.8 cm 06/08/22 0951   Post-Procedure Depth (cm) 0.3 cm 06/08/22 0951   Post-Procedure Surface Area (cm^2) 4.5 cm^2 06/08/22 0951   Post-Procedure Volume (cm^3) 1.35 cm^3 06/08/22 0951   Wound Assessment Pink/red;Fibrin 06/08/22 0916   Drainage Amount Small 06/08/22 0916   Drainage Description Serosanguinous 06/08/22 0916   Odor None 06/08/22 0916   Rashmi-wound Assessment Intact;Fragile 06/08/22 0916   Wound Thickness Description not for Pressure Injury Full thickness 01/19/22 0909   Number of days: 154       Wound 03/14/22 Coccyx (Active)   Number of days: 85       Wound 03/23/22 Toe (Comment  which one) Left #10 Left Great Toe (Active)   Wound Image   06/08/22 0916   Dressing Status New dressing applied 06/01/22 1052   Wound Cleansed Cleansed with saline 06/01/22 1052   Dressing/Treatment Pharmaceutical agent (see MAR); Dry dressing 06/01/22 1052   Offloading for Diabetic Foot Ulcers Post op shoe 06/01/22 1052   Wound Length (cm) 1 cm 06/08/22 0916   Wound Width (cm) 0.8 cm 06/08/22 0916   Wound Depth (cm) 0.1 cm 06/08/22 0916   Wound Surface Area (cm^2) 0.8 cm^2 06/08/22 0916   Change in Wound Size % (l*w) 93.33 06/08/22 0916   Wound Volume (cm^3) 0.08 cm^3 06/08/22 0916   Wound Healing % 93 06/08/22 0916   Post-Procedure Length (cm) 1 cm 06/08/22 0951   Post-Procedure Width (cm) 1 cm 06/08/22 0951   Post-Procedure Depth (cm) 0.2 cm 06/08/22 0951   Post-Procedure Surface Area (cm^2) 1 cm^2 06/08/22 0951   Post-Procedure Volume (cm^3) 0.2 cm^3 06/08/22 0951   Wound Assessment Dry 06/08/22 0916   Drainage Amount Scant 06/08/22 0916   Drainage Description Thin;Yellow 06/08/22 0916   Odor None 06/08/22 0916   Rashmi-wound Assessment Intact; Hyperkeratosis (callous) 06/08/22 0916   Number of days: 77       Wound 04/13/22 Pretibial Distal;Left;Lateral #14 (blocked may 25)  (Active)   Wound Image   06/08/22 0916   Dressing Status New dressing applied 06/01/22 1052   Wound Cleansed Cleansed with saline 06/01/22 1052   Dressing/Treatment Dry dressing;ABD 06/01/22 1052   Offloading for Diabetic Foot Ulcers Post op shoe 06/01/22 1052   Wound Length (cm) 0.6 cm 06/08/22 0916   Wound Width (cm) 1.8 cm 06/08/22 0916   Wound Depth (cm) 0.2 cm 06/08/22 0916   Wound Surface Area (cm^2) 1.08 cm^2 06/08/22 0916   Change in Wound Size % (l*w) 88.75 06/08/22 0916   Wound Volume (cm^3) 0.216 cm^3 06/08/22 0916   Wound Healing % 78 06/08/22 0916   Post-Procedure Length (cm) 0.7 cm 06/08/22 0951   Post-Procedure Width (cm) 1.9 cm 06/08/22 0951   Post-Procedure Depth (cm) 0.2 cm 06/08/22 0951   Post-Procedure Surface Area (cm^2) 1.33 cm^2 06/08/22 0951   Post-Procedure Volume (cm^3) 0.266 cm^3 06/08/22 0951   Wound Assessment Fibrinous 06/08/22 0916   Drainage Amount Moderate 06/08/22 0916   Drainage Description Yellow 06/08/22 0916   Odor None 06/08/22 0916   Rashmi-wound Assessment Dry/flaky; Intact 06/08/22 0916   Number of days: 56       Wound 04/27/22 Tibial Left;Posterior #15 (Active)   Wound Image   06/08/22 0916   Dressing Status New dressing applied 06/01/22 1052   Wound Cleansed Cleansed with saline 06/01/22 1052   Dressing/Treatment ABD;Pharmaceutical agent (see MAR); Roll gauze 06/01/22 1052   Offloading for Diabetic Foot Ulcers Post op shoe 06/01/22 1052   Wound Length (cm) 3.8 cm 06/08/22 0916   Wound Width (cm) 4 cm 06/08/22 0916   Wound Depth (cm) 0.6 cm 06/08/22 0916   Wound Surface Area (cm^2) 15.2 cm^2 06/08/22 0916   Change in Wound Size % (l*w) -8.26 06/08/22 0916   Wound Volume (cm^3) 9.12 cm^3 06/08/22 0916   Wound Healing % -30 06/08/22 0916   Post-Procedure Length (cm) 3.8 cm 06/08/22 0951   Post-Procedure Width (cm) 4 cm 06/08/22 0951   Post-Procedure Depth (cm) 0.7 cm 06/08/22 0951   Post-Procedure Surface Area (cm^2) 15.2 cm^2 06/08/22 0951   Post-Procedure Volume (cm^3) 10.64 cm^3 06/08/22 0951   Wound Assessment Fibrin;Pink/red 06/08/22 0916   Drainage Amount Moderate 06/08/22 0916   Drainage Description Serosanguinous; Yellow 06/08/22 0916   Odor None 06/08/22 0916   Rashmi-wound Assessment Intact;Fragile 06/08/22 0916   Number of days: 42         Total Surface Area Covered 16 sq/cm     Amount Wasted 0 sq/cm    Reason for Waste none      Secured: Yes    Secured With:  [x]Steri Strips    []Sutures     []Staples []Other    Procedural Pain: 1/10     Post Procedural Pain: 2 / 10    Response to Treatment:  Well tolerated by patient. Plan:     Pt is not a smoker   - Discussed relationship of smoking and negative affects on wound healing   - Emphasized importance of tobacco avoidace/cessation   - Script for nicotine patch given to patient   - Information regarding support groups for smoking cessation given    In my professional opinion and based off the information that is available at this time this patient has appropriate indication for HBO Therapy: Maybe    Treatment Note please see attached Discharge Instructions    Written patient dismissal instructions given to patient and signed by patient or POA.          Discharge Instructions         Visit Discharge/Physician Orders     Discharge condition: Stable     Assessment of pain at discharge:  minimal     Anesthetic used: 4% lidocaine solution     Discharge to: Home     Left via:Private automobile     Accompanied by: accompanied by SELF     [facility]F/[facility]:  [facility].      Dressing Orders: Raymond Finch bilateral heel ulcers/left great toe with NSS, apply medi-honey patch  Change every other day        TO LEFT LEG ULCER, graft applied , mepitel in place  MAY APPLY ALGINATE TO LIGIA WOUND IF MACERATEDTHEN  WOUND VAC AT 1255MMHG CHANGE Monday Wednesday friday. IF VAC MALFUNCTIONS APPLY WET TO DRY DRESSING UNTIL VAC CAN BE REPLACED- be careful not to disrupt graft         APPLY PREVALON BOOT TO BILATERAL HEELS     Physical therapy for ambulating safely while wearing offloading boots as indicated.     [facility] followup visit ________1 weeks with Dr. Gastelum_____________________  (Please note your next appointment above and if you are unable to keep, kindly give a 24 hour notice.  Thank you.)     Physician signature:__________________________        If you experience any of the following, please call the [facility] during business hours:     * Increase in Pain  * Temperature over 101  * Increase in drainage from your wound  * Drainage with a foul odor  * Bleeding  * Increase in swelling  * Need for compression bandage changes due to slippage, breakthrough drainage.     If you need medical attention outside of the business hours of the [address] please contact your PCP or go to the nearest emergency room.                                                                                                                       Electronically signed by David Padilla DPM on 6/8/2022 at 10:10 AM

## 2022-06-15 ENCOUNTER — HOSPITAL ENCOUNTER (OUTPATIENT)
Dept: WOUND CARE | Age: 87
Discharge: HOME | End: 2022-06-15
Payer: MEDICARE

## 2022-06-15 VITALS
DIASTOLIC BLOOD PRESSURE: 68 MMHG | TEMPERATURE: 97.7 F | HEART RATE: 81 BPM | HEIGHT: 72 IN | WEIGHT: 234 LBS | SYSTOLIC BLOOD PRESSURE: 126 MMHG | BODY MASS INDEX: 31.69 KG/M2 | RESPIRATION RATE: 20 BRPM

## 2022-06-15 PROCEDURE — C5271 LOW COST SKIN SUBSTITUTE APP: HCPCS

## 2022-06-15 PROCEDURE — 15271 SKIN SUB GRAFT TRNK/ARM/LEG: CPT

## 2022-06-15 ASSESSMENT — PAIN DESCRIPTION - ONSET: ONSET: ON-GOING

## 2022-06-15 ASSESSMENT — PAIN - FUNCTIONAL ASSESSMENT: PAIN_FUNCTIONAL_ASSESSMENT: PREVENTS OR INTERFERES SOME ACTIVE ACTIVITIES AND ADLS

## 2022-06-15 ASSESSMENT — PAIN DESCRIPTION - PAIN TYPE: TYPE: CHRONIC PAIN

## 2022-06-15 ASSESSMENT — PAIN DESCRIPTION - ORIENTATION: ORIENTATION: LEFT

## 2022-06-15 ASSESSMENT — PAIN DESCRIPTION - FREQUENCY: FREQUENCY: INTERMITTENT

## 2022-06-15 ASSESSMENT — PAIN DESCRIPTION - DESCRIPTORS: DESCRIPTORS: DULL;ACHING;SHOOTING

## 2022-06-15 ASSESSMENT — PAIN SCALES - GENERAL: PAINLEVEL_OUTOF10: 3

## 2022-06-15 ASSESSMENT — PAIN DESCRIPTION - LOCATION: LOCATION: LEG

## 2022-06-15 NOTE — PROGRESS NOTES
[facility]  History and Physical/Consultation  Podiatry    Referring Physician : Josiah Girard MD  Fouzia Mcnally. MEDICAL RECORD NUMBER:  [id number]  AGE: 80 y.o. GENDER: male  : 1932  EPISODE DATE:  6/15/2022  Subjective:     Chief Complaint   Patient presents with    Wound Check     BLE         HISTORY of PRESENT ILLNESS HPI     Fouzia Robb is a 80 y.o. male who presents today for wound/ulcer evaluation. History of Wound Context:  The patient has had a wound of diabetic b/l heels,and right ankle which was first noted approximately months ago. This has been treated betadyne. On their initial visit to the wound healing center, 22 ,  the patient has noted that the wound has been improving. The patient has had similar previous wounds in the past.      Pt is not on abx at time of initial visit.       Wound/Ulcer Pain Timing/Severity: constant  Quality of pain: sharp  Severity:  3 / 10   Modifying Factors: Pain worsens with walking  Associated Signs/Symptoms: edema, erythema and drainage    Ulcer Identification:  Ulcer Type: diabetic  Contributing Factors: edema and diabetes    Diabetic/Pressure/Non Pressure Ulcers onl y:  Ulcer: Diabetic ulcer, fat layer exposed    If patient has diabetic lower extremity wounds  Campbell Classification of diabetic lower extremity wounds:    Grade Description   []  0 No open wound   []  1 Superficial ulcer involving the full skin thickness   [x]  2 Deep ulcer involves ligament, tendon, joint capsule, or fascia  No bone involvement or abscess presence   []  3 Deep Ulcer with abcess formation and/or osteomyelitis   []  4 Localized gangrene   []  5 Extensive gangrene of the foot     Wound: N/A          22  Debrided, cont tx and care     22  Debrided, cont tx and care, await graft approval    22  Debrided, cont tx and care, puraply applied    22  Debrided, dermagraft applied    22  Debrided, dermagraft applied    2-23-22  Debrided, graft applied    3-2-22  Debrided, graft applied    3-9-22  Debrided, graft applied     3-23-22  Just release from hospital care, new wounds noted from hospital, also trauma to left toe loosened nail that was removed without incident, debrided all wounds, culture taken    3-30-22  Debrided, santyl to left leg, applied dermagraft b/l heels, prevelon boots wound vac left leg    4-6-22  Debrided, dermagraft b/l, wound care    4-13-22  Debrided, wound vac, dermagraft left    4-27-22  Debrided, cont tx and care     5-4-22  Debrided, cont tx and care, wound vac to leg left, and hydrofera blue to all other wounds    5-11-22  Debrided, cont tx and care    5-18-22  Debrided, cont tx and care    5-25-22  Cont wound vac, cont previous tx, offload    6-1-22  Debrided, offload    6-8-22  Debrided, puraply to left leg, b/l heels medihoney patches    6-15-22  Debrided, wound vac with puraply left leg, medihoney b/l heels and left 1st digit  PAST MEDICAL HISTORY      Diagnosis Date    Arthritis     Cancer ([address]     breast    Cellulitis     CHF (congestive heart failure) (HCC)     CKD (chronic kidney disease) stage 3, GFR 30-59 ml/min ([address]     Diabetes mellitus ([address]     History of lumpectomy     Hx of blood clots     Hyperlipidemia     Hypertension     Left ventricular failure ([address]     Macular degeneration     Obesity     Orthostatic hypotension     PE (pulmonary thromboembolism) ([address]     Pressure injury of both heels, unstageable ([address]     Staph aureus infection     Venous insufficiency      Past Surgical History:   Procedure Laterality Date    [facility] SINGLE  9/2/2021         MASTECTOMY      TOE AMPUTATION      TOE SURGERY      TONSILLECTOMY       History reviewed. No pertinent family history.   Social History     Tobacco Use    Smoking status: Never Smoker    Smokeless tobacco: Never Used   Vaping Use    Vaping Use: Never used   Substance Use Topics    Alcohol use: Not Currently     Alcohol/week: 1.0 standard drink     Types: 1 Cans of beer per week    Drug use: No     Allergies   Allergen Reactions    Clindamycin/Lincomycin     Sulfa Antibiotics      Current Outpatient Medications on File Prior to Encounter   Medication Sig Dispense Refill    apixaban (ELIQUIS) 2.5 MG TABS tablet Take 1 tablet by mouth 2 times daily (before meals) 60 tablet 2    docusate sodium (COLACE) 100 MG capsule Take 100 mg by mouth nightly      tamsulosin (FLOMAX) 0.4 MG capsule Take 0.4 mg by mouth daily      bisacodyl (DULCOLAX) 10 MG suppository Place 10 mg rectally daily as needed for Constipation      diphenhydrAMINE-zinc acetate (BENADRYL) 1-0.1 % cream Apply topically every 4 hours as needed for Itching Apply topically 3 times daily as needed.  Sodium Phosphates (FLEET) 7-19 GM/118ML Place 1 enema rectally daily as needed      magnesium hydroxide (MILK OF MAGNESIA) 400 MG/5ML suspension Take 30 mLs by mouth daily as needed for Constipation      Polyethylene Glycol 400 1 % SOLN Apply 1 drop to eye every 4 hours as needed      ferrous sulfate (IRON 325) 325 (65 Fe) MG tablet Take 325 mg by mouth daily       zinc sulfate (ZINCATE) 220 (50 Zn) MG capsule Take 1 capsule by mouth daily for 14 days 14 capsule 0    miconazole (MICOTIN) 2 % powder Apply topically 2 times daily.  45 g 1    ascorbic acid (VITAMIN C) 500 MG tablet Take 1 tablet by mouth 2 times daily 30 tablet 0    Vitamin D (CHOLECALCIFEROL) 50 MCG (2000 UT) TABS tablet Take 1 tablet by mouth daily 60 tablet 0    furosemide (LASIX) 40 MG tablet Take 20 mg by mouth daily       omeprazole (PRILOSEC) 20 MG delayed release capsule Take 20 mg by mouth daily      Multiple Vitamins-Minerals (THERAPEUTIC MULTIVITAMIN-MINERALS) tablet Take 1 tablet by mouth daily      Multiple Vitamins-Minerals (PRESERVISION AREDS PO) Take 1 tablet by mouth 2 times daily (with meals)      acetaminophen (TYLENOL) 325 MG tablet Take 650 mg by mouth every 4 hours as needed for Pain or Fever       calcium carbonate (TUMS) 500 MG chewable tablet Take 1 tablet by mouth every 4 hours as needed for Heartburn      ipratropium-albuterol (DUONEB) 0.5-2.5 (3) MG/3ML SOLN nebulizer solution Inhale 3 mLs into the lungs every 4 hours (while awake) for 30 doses 90 mL 0    gabapentin (NEURONTIN) 100 MG capsule Take 200 mg by mouth Daily with supper.  atorvastatin (LIPITOR) 80 MG tablet Take 80 mg by mouth at bedtime       INSULIN LISP PROT & LISP, HUM, (75-25) 100 UNIT/ML SUSP Inject 72 Units into the skin daily (with breakfast)       INSULIN LISP PROT & LISP, HUM, (75-25) 100 UNIT/ML SUSP Inject 55 Units into the skin Daily with supper       clotrimazole-betamethasone (LOTRISONE) cream Apply  topically as needed. Apply topically 2 times daily. No current facility-administered medications on file prior to encounter.        REVIEW OF SYSTEMS   ROS : All others Negative if blank [], Positive if [x]  General Vascular   [] Fevers [] Claudication   [] Chills [] Rest Pain   Skin Neurologic   [x] Tissue Loss [x] Lower extremity neuropathy     Objective:    /68   Pulse 81   Temp 97.7 °F (36.5 °C) (Temporal)   Resp 20   Ht 6' (1.829 m)   Wt 234 lb (106.1 kg)   BMI 31.74 kg/m²   Wt Readings from Last 3 Encounters:   06/15/22 234 lb (106.1 kg)   06/08/22 234 lb (106.1 kg)   05/18/22 234 lb (106.1 kg)       PHYSICAL EXAM   CONSTITUTIONAL:   Awake, alert, cooperative   PSYCHIATRIC :  Oriented to time, place and person      normal insight to disease process  EXTREMITIES:   R LE Open wounds are noted   Skin color is abnormal with ulcers   Edema is  noted   Sensation deficit noted - protective   Palpation of the foot does cause pain   4/5 strength DF/PF  L LE Open wounds are noted   Skin color is abnormal with ulcers   Edema is  noted   Sensation deficit noted - protective   Palpation of the foot does cause pain   4/5 strength DF/PF  R dorsalis pedis 1 L dorsalis pedis 1   R posterior tibial 1 L posterior tibial 1     Assessment:     Problem List Items Addressed This Visit     None          Pre Debridement Measurements:  Are located in the [place]  Documentation Flow Sheet  Post Debridement Measurements:  Wound/Ulcer Descriptions are Pre Debridement except measurements:     Wound 01/05/22 Heel Left #1 (Active)   Wound Image   06/08/22 0916   Wound Etiology Diabetic Campbell 2 01/05/22 0943   Dressing Status New dressing applied;Clean;Dry; Intact 06/08/22 1021   Wound Cleansed Cleansed with saline 06/08/22 1021   Dressing/Treatment ABD;Dry dressing; Pharmaceutical agent (see MAR) 06/08/22 1021   Offloading for Diabetic Foot Ulcers Post op shoe 06/08/22 1021   Wound Length (cm) 4 cm 06/15/22 0918   Wound Width (cm) 3.4 cm 06/15/22 0918   Wound Depth (cm) 0.2 cm 06/15/22 0918   Wound Surface Area (cm^2) 13.6 cm^2 06/15/22 0918   Change in Wound Size % (l*w) 1.45 06/15/22 0918   Wound Volume (cm^3) 2.72 cm^3 06/15/22 0918   Wound Healing % 1 06/15/22 0918   Post-Procedure Length (cm) 4 cm 06/15/22 0932   Post-Procedure Width (cm) 3.5 cm 06/15/22 0932   Post-Procedure Depth (cm) 0.3 cm 06/15/22 0932   Post-Procedure Surface Area (cm^2) 14 cm^2 06/15/22 0932   Post-Procedure Volume (cm^3) 4.2 cm^3 06/15/22 0932   Wound Assessment Fibrin;Pink/red 06/15/22 0918   Drainage Amount Moderate 06/15/22 0918   Drainage Description Serosanguinous 06/15/22 0918   Odor None 06/15/22 0918   Rashmi-wound Assessment Maceration;Fragile 06/15/22 0918   Wound Thickness Description not for Pressure Injury Full thickness 01/19/22 0909   Number of days: 160       Wound 01/05/22 Heel Right #2 (Active)   Wound Image   06/08/22 0916   Wound Etiology Venous 01/05/22 0943   Dressing Status New dressing applied;Clean;Dry; Intact 06/08/22 1021   Wound Cleansed Cleansed with saline 06/08/22 1021   Dressing/Treatment ABD;Dry dressing; Pharmaceutical agent (see MAR) 06/08/22 1021   Offloading for Diabetic Foot Ulcers Post op shoe 06/08/22 1021   Wound Length (cm) 2.2 cm 06/15/22 0918   Wound Width (cm) 1.5 cm 06/15/22 0918   Wound Depth (cm) 0.2 cm 06/15/22 0918   Wound Surface Area (cm^2) 3.3 cm^2 06/15/22 0918   Change in Wound Size % (l*w) 22.35 06/15/22 0918   Wound Volume (cm^3) 0.66 cm^3 06/15/22 0918   Wound Healing % 22 06/15/22 0918   Post-Procedure Length (cm) 2.3 cm 06/15/22 0932   Post-Procedure Width (cm) 1.6 cm 06/15/22 0932   Post-Procedure Depth (cm) 0.3 cm 06/15/22 0932   Post-Procedure Surface Area (cm^2) 3.68 cm^2 06/15/22 0932   Post-Procedure Volume (cm^3) 1.104 cm^3 06/15/22 0932   Wound Assessment Pink/red;Fibrin 06/15/22 0918   Drainage Amount Small 06/15/22 0918   Drainage Description Serosanguinous 06/15/22 0918   Odor None 06/15/22 0918   Rashmi-wound Assessment Intact;Fragile 06/15/22 0918   Wound Thickness Description not for Pressure Injury Full thickness 01/19/22 9235   Number of days: 160       Wound 03/14/22 Coccyx (Active)   Number of days: 92       Wound 03/23/22 Toe (Comment  which one) Left #10 Left Great Toe (Active)   Wound Image   06/08/22 0916   Dressing Status New dressing applied;Clean;Dry; Intact 06/08/22 1021   Wound Cleansed Cleansed with saline 06/08/22 1021   Dressing/Treatment Dry dressing; Pharmaceutical agent (see MAR) 06/08/22 1021   Offloading for Diabetic Foot Ulcers Post op shoe 06/08/22 1021   Wound Length (cm) 0.8 cm 06/15/22 0918   Wound Width (cm) 0.8 cm 06/15/22 0918   Wound Depth (cm) 0.1 cm 06/15/22 0918   Wound Surface Area (cm^2) 0.64 cm^2 06/15/22 0918   Change in Wound Size % (l*w) 94.67 06/15/22 0918   Wound Volume (cm^3) 0.064 cm^3 06/15/22 0918   Wound Healing % 95 06/15/22 0918   Post-Procedure Length (cm) 0.9 cm 06/15/22 0932   Post-Procedure Width (cm) 0.9 cm 06/15/22 0932   Post-Procedure Depth (cm) 0.2 cm 06/15/22 0932   Post-Procedure Surface Area (cm^2) 0.81 cm^2 06/15/22 0932   Post-Procedure Volume (cm^3) 0.162 cm^3 06/15/22 0932 Wound Assessment Fibrinous 06/15/22 0918   Drainage Amount Small 06/15/22 0918   Drainage Description Thin;Yellow 06/15/22 0918   Odor None 06/15/22 0918   Rashmi-wound Assessment Intact; Hyperkeratosis (callous) 06/15/22 0918   Number of days: 84       Wound 04/13/22 Pretibial Distal;Left;Lateral #14 (blocked may 25)  (Active)   Wound Image   06/08/22 0916   Dressing Status New dressing applied;Clean;Dry; Intact 06/08/22 1021   Wound Cleansed Cleansed with saline 06/08/22 1021   Dressing/Treatment ABD;Dry dressing 06/08/22 1021   Offloading for Diabetic Foot Ulcers Post op shoe 06/08/22 1021   Wound Length (cm) 0.6 cm 06/15/22 0918   Wound Width (cm) 1.5 cm 06/15/22 0918   Wound Depth (cm) 0.2 cm 06/15/22 0918   Wound Surface Area (cm^2) 0.9 cm^2 06/15/22 0918   Change in Wound Size % (l*w) 90.62 06/15/22 0918   Wound Volume (cm^3) 0.18 cm^3 06/15/22 0918   Wound Healing % 81 06/15/22 0918   Post-Procedure Length (cm) 0.8 cm 06/15/22 0932   Post-Procedure Width (cm) 1.5 cm 06/15/22 0932   Post-Procedure Depth (cm) 0.3 cm 06/15/22 0932   Post-Procedure Surface Area (cm^2) 1.2 cm^2 06/15/22 0932   Post-Procedure Volume (cm^3) 0.36 cm^3 06/15/22 0932   Wound Assessment Fibrinous 06/15/22 0918   Drainage Amount Moderate 06/15/22 0918   Drainage Description Yellow 06/15/22 0918   Odor None 06/15/22 0918   Rashmi-wound Assessment Dry/flaky; Intact 06/15/22 0918   Number of days: 62       Wound 04/27/22 Tibial Left;Posterior #15 (Active)   Wound Image   06/08/22 0916   Dressing Status New dressing applied;Clean;Dry; Intact 06/08/22 1021   Wound Cleansed Cleansed with saline 06/08/22 1021   Dressing/Treatment ABD;Dry dressing 06/08/22 1021   Offloading for Diabetic Foot Ulcers Post op shoe 06/08/22 1021   Wound Length (cm) 4 cm 06/15/22 0918   Wound Width (cm) 4.1 cm 06/15/22 0918   Wound Depth (cm) 0.6 cm 06/15/22 0918   Wound Surface Area (cm^2) 16.4 cm^2 06/15/22 0918   Change in Wound Size % (l*w) -16.81 06/15/22 9141 Jarrod Restrepo DPM    Wound Type:venous    Consent obtained: Yes    Time out taken: Yes     Fenestrated: Yes    Instrument(s) curette and #15 blade scalpel      [] Mesher Utilized    Skin Substitute was Applied to Wound Number(s): Wound #: 5      Wound 01/05/22 Heel Left #1 (Active)   Wound Image   06/08/22 0916   Wound Etiology Diabetic Campbell 2 01/05/22 0943   Dressing Status New dressing applied 06/01/22 1052   Wound Cleansed Cleansed with saline 06/01/22 1052   Dressing/Treatment ABD;Pharmaceutical agent (see MAR); Roll gauze 06/01/22 1052   Offloading for Diabetic Foot Ulcers Post op shoe 06/01/22 1052   Wound Length (cm) 4 cm 06/08/22 0916   Wound Width (cm) 3.5 cm 06/08/22 0916   Wound Depth (cm) 0.2 cm 06/08/22 0916   Wound Surface Area (cm^2) 14 cm^2 06/08/22 0916   Change in Wound Size % (l*w) -1.45 06/08/22 0916   Wound Volume (cm^3) 2.8 cm^3 06/08/22 0916   Wound Healing % -1 06/08/22 0916   Post-Procedure Length (cm) 4 cm 06/08/22 0951   Post-Procedure Width (cm) 3.5 cm 06/08/22 0951   Post-Procedure Depth (cm) 0.2 cm 06/08/22 0951   Post-Procedure Surface Area (cm^2) 14 cm^2 06/08/22 0951   Post-Procedure Volume (cm^3) 2.8 cm^3 06/08/22 0951   Wound Assessment Fibrin;Pink/red 06/08/22 0916   Drainage Amount Moderate 06/08/22 0916   Drainage Description Serosanguinous 06/08/22 0916   Odor None 06/08/22 0916   Rashmi-wound Assessment Maceration;Fragile 06/08/22 0916   Wound Thickness Description not for Pressure Injury Full thickness 01/19/22 0909   Number of days: 154       Wound 01/05/22 Heel Right #2 (Active)   Wound Image   06/08/22 0916   Wound Etiology Venous 01/05/22 0943   Dressing Status New dressing applied 06/01/22 1052   Wound Cleansed Cleansed with saline 06/01/22 1052   Dressing/Treatment ABD;Pharmaceutical agent (see MAR); Roll gauze 06/01/22 1052   Offloading for Diabetic Foot Ulcers Post op shoe 06/01/22 1052   Wound Length (cm) 2.5 cm 06/08/22 0916   Wound Width (cm) 1.6 cm 06/08/22 0916   Wound Depth (cm) 0.2 cm 06/08/22 0916   Wound Surface Area (cm^2) 4 cm^2 06/08/22 0916   Change in Wound Size % (l*w) 5.88 06/08/22 0916   Wound Volume (cm^3) 0.8 cm^3 06/08/22 0916   Wound Healing % 6 06/08/22 0916   Post-Procedure Length (cm) 2.5 cm 06/08/22 0951   Post-Procedure Width (cm) 1.8 cm 06/08/22 0951   Post-Procedure Depth (cm) 0.3 cm 06/08/22 0951   Post-Procedure Surface Area (cm^2) 4.5 cm^2 06/08/22 0951   Post-Procedure Volume (cm^3) 1.35 cm^3 06/08/22 0951   Wound Assessment Pink/red;Fibrin 06/08/22 0916   Drainage Amount Small 06/08/22 0916   Drainage Description Serosanguinous 06/08/22 0916   Odor None 06/08/22 0916   Rashmi-wound Assessment Intact;Fragile 06/08/22 0916   Wound Thickness Description not for Pressure Injury Full thickness 01/19/22 0909   Number of days: 154       Wound 03/14/22 Coccyx (Active)   Number of days: 85       Wound 03/23/22 Toe (Comment  which one) Left #10 Left Great Toe (Active)   Wound Image   06/08/22 0916   Dressing Status New dressing applied 06/01/22 1052   Wound Cleansed Cleansed with saline 06/01/22 1052   Dressing/Treatment Pharmaceutical agent (see MAR); Dry dressing 06/01/22 1052   Offloading for Diabetic Foot Ulcers Post op shoe 06/01/22 1052   Wound Length (cm) 1 cm 06/08/22 0916   Wound Width (cm) 0.8 cm 06/08/22 0916   Wound Depth (cm) 0.1 cm 06/08/22 0916   Wound Surface Area (cm^2) 0.8 cm^2 06/08/22 0916   Change in Wound Size % (l*w) 93.33 06/08/22 0916   Wound Volume (cm^3) 0.08 cm^3 06/08/22 0916   Wound Healing % 93 06/08/22 0916   Post-Procedure Length (cm) 1 cm 06/08/22 0951   Post-Procedure Width (cm) 1 cm 06/08/22 0951   Post-Procedure Depth (cm) 0.2 cm 06/08/22 0951   Post-Procedure Surface Area (cm^2) 1 cm^2 06/08/22 0951   Post-Procedure Volume (cm^3) 0.2 cm^3 06/08/22 0951   Wound Assessment Dry 06/08/22 0916   Drainage Amount Scant 06/08/22 0916   Drainage Description Thin;Yellow 06/08/22 0916   Odor None 06/08/22 0916 Rashmi-wound Assessment Intact; Hyperkeratosis (callous) 06/08/22 0916   Number of days: 77       Wound 04/13/22 Pretibial Distal;Left;Lateral #14 (blocked may 25)  (Active)   Wound Image   06/08/22 0916   Dressing Status New dressing applied 06/01/22 1052   Wound Cleansed Cleansed with saline 06/01/22 1052   Dressing/Treatment Dry dressing;ABD 06/01/22 1052   Offloading for Diabetic Foot Ulcers Post op shoe 06/01/22 1052   Wound Length (cm) 0.6 cm 06/08/22 0916   Wound Width (cm) 1.8 cm 06/08/22 0916   Wound Depth (cm) 0.2 cm 06/08/22 0916   Wound Surface Area (cm^2) 1.08 cm^2 06/08/22 0916   Change in Wound Size % (l*w) 88.75 06/08/22 0916   Wound Volume (cm^3) 0.216 cm^3 06/08/22 0916   Wound Healing % 78 06/08/22 0916   Post-Procedure Length (cm) 0.7 cm 06/08/22 0951   Post-Procedure Width (cm) 1.9 cm 06/08/22 0951   Post-Procedure Depth (cm) 0.2 cm 06/08/22 0951   Post-Procedure Surface Area (cm^2) 1.33 cm^2 06/08/22 0951   Post-Procedure Volume (cm^3) 0.266 cm^3 06/08/22 0951   Wound Assessment Fibrinous 06/08/22 0916   Drainage Amount Moderate 06/08/22 0916   Drainage Description Yellow 06/08/22 0916   Odor None 06/08/22 0916   Rashmi-wound Assessment Dry/flaky; Intact 06/08/22 0916   Number of days: 56       Wound 04/27/22 Tibial Left;Posterior #15 (Active)   Wound Image   06/08/22 0916   Dressing Status New dressing applied 06/01/22 1052   Wound Cleansed Cleansed with saline 06/01/22 1052   Dressing/Treatment ABD;Pharmaceutical agent (see MAR); Roll gauze 06/01/22 1052   Offloading for Diabetic Foot Ulcers Post op shoe 06/01/22 1052   Wound Length (cm) 3.8 cm 06/08/22 0916   Wound Width (cm) 4 cm 06/08/22 0916   Wound Depth (cm) 0.6 cm 06/08/22 0916   Wound Surface Area (cm^2) 15.2 cm^2 06/08/22 0916   Change in Wound Size % (l*w) -8.26 06/08/22 0916   Wound Volume (cm^3) 9.12 cm^3 06/08/22 0916   Wound Healing % -30 06/08/22 0916   Post-Procedure Length (cm) 3.8 cm 06/08/22 0951   Post-Procedure Width (cm) 4 cm 06/08/22 0951   Post-Procedure Depth (cm) 0.7 cm 06/08/22 0951   Post-Procedure Surface Area (cm^2) 15.2 cm^2 06/08/22 0951   Post-Procedure Volume (cm^3) 10.64 cm^3 06/08/22 0951   Wound Assessment Fibrin;Pink/red 06/08/22 0916   Drainage Amount Moderate 06/08/22 0916   Drainage Description Serosanguinous; Yellow 06/08/22 0916   Odor None 06/08/22 0916   Ligia-wound Assessment Intact;Fragile 06/08/22 0916   Number of days: 42         Total Surface Area Covered 16 sq/cm     Amount Wasted 0 sq/cm    Reason for Waste none      Secured: Yes    Secured With:  [x]Steri Strips    []Sutures     []Staples []Other    Procedural Pain: 1/10     Post Procedural Pain: 2 / 10    Response to Treatment:  Well tolerated by patient. Plan:     Pt is not a smoker   - Discussed relationship of smoking and negative affects on wound healing   - Emphasized importance of tobacco avoidace/cessation   - Script for nicotine patch given to patient   - Information regarding support groups for smoking cessation given    In my professional opinion and based off the information that is available at this time this patient has appropriate indication for HBO Therapy: Maybe    Treatment Note please see attached Discharge Instructions    Written patient dismissal instructions given to patient and signed by patient or POA. Discharge Instructions       Visit Discharge/Physician Orders     Discharge condition: Stable     Assessment of pain at discharge:  minimal     Anesthetic used: 4% lidocaine solution     Discharge to: Home     Left via:Private automobile     Accompanied by: accompanied by SELF     ECF/[facility]:  [facility].      Dressing Orders: Beth Sandhu bilateral heel ulcers/left great toe with NSS, apply medi-honey patch  Change every other day        TO LEFT LEG ULCER, graft applied , mepitel in place  MAY APPLY ALGINATE TO LIGIA WOUND IF MACERATEDTHEN  WOUND VAC AT 1255MMHG CHANGE Monday Wednesday friday.  IF VAC MALFUNCTIONS APPLY WET TO DRY DRESSING UNTIL VAC CAN BE REPLACED- be careful not to disrupt graft         APPLY PREVALON BOOT TO BILATERAL HEELS     Physical therapy for ambulating safely while wearing offloading boots as indicated.     [facility] followup visit ________1 weeks with Dr. Gastelum_____________________  (Please note your next appointment above and if you are unable to keep, kindly give a 24 hour notice.  Thank you.)     Physician signature:__________________________        If you experience any of the following, please call the [facility] during business hours:     * Increase in Pain  * Temperature over 101  * Increase in drainage from your wound  * Drainage with a foul odor  * Bleeding  * Increase in swelling  * Need for compression bandage changes due to slippage, breakthrough drainage.     If you need medical attention outside of the business hours of the [facility] please contact your PCP or go to the nearest emergency room.                                                                                                                                            Electronically signed by Kamlesh Hagen DPM on 6/15/2022 at 9:41 AM

## 2022-06-15 NOTE — FLOWSHEET NOTE
PuraPly AMTreatment Note    NAME:  Toby Ireland. YOB: 1932  MEDICAL RECORD NUMBER:  [id number]  DATE:  6/15/2022    Goal:  Patient will receive safe and proper application of skin substitute. Patient will comply with caring for dressing, and reporting complications. Expiration date checked immediately prior to use. Package intact prior to use and no damage noted. Transport temperature controlled and acceptable. PuraPly AM was removed from protective sterile packaging by provider and applied to prepared ulcer bed. PuraPly AM was hydrated with sterile normal saline per provider. PuraPly AM was applied to left leg ulcer and affixed with steri-strips by the provider. PuraPly AM was covered with non-adherent ulcer dressing. Applied mepitel over non-adherent. Applied dry gauze and/or roll gauze. Patient/caregiver was instructed not to remove dressing and to keep it clean and dry. Pt/family/caregiver was instructed on signs and symptoms of complications to report such as draining through dressing, dressing falling down/slipping, getting wet, or severe pain or tingling. PuraPly AM may be applied a total of 10 times per wound over a 12 week period. Date of first application of PuraPly for this current wound is June 6, 2022.     Guidelines followed    Electronically signed by José Luis Rhodes RN on 6/15/2022 at 9:43 AM

## 2022-06-22 ENCOUNTER — HOSPITAL ENCOUNTER (OUTPATIENT)
Dept: WOUND CARE | Age: 87
Discharge: HOME | End: 2022-06-22
Payer: MEDICARE

## 2022-06-22 VITALS
DIASTOLIC BLOOD PRESSURE: 68 MMHG | WEIGHT: 246 LBS | TEMPERATURE: 96.8 F | HEIGHT: 72 IN | SYSTOLIC BLOOD PRESSURE: 138 MMHG | BODY MASS INDEX: 33.32 KG/M2 | RESPIRATION RATE: 18 BRPM | HEART RATE: 81 BPM

## 2022-06-22 DIAGNOSIS — L97.412 DIABETIC ULCER OF RIGHT HEEL ASSOCIATED WITH TYPE 2 DIABETES MELLITUS, WITH FAT LAYER EXPOSED (HCC): ICD-10-CM

## 2022-06-22 DIAGNOSIS — E11.621 DIABETIC ULCER OF RIGHT HEEL ASSOCIATED WITH TYPE 2 DIABETES MELLITUS, WITH FAT LAYER EXPOSED (HCC): ICD-10-CM

## 2022-06-22 PROCEDURE — C5271 LOW COST SKIN SUBSTITUTE APP: HCPCS

## 2022-06-22 PROCEDURE — 15271 SKIN SUB GRAFT TRNK/ARM/LEG: CPT

## 2022-06-22 NOTE — PLAN OF CARE
Problem: Chronic Conditions and Co-morbidities  Goal: Patient's chronic conditions and co-morbidity symptoms are monitored and maintained or improved  Outcome: Progressing     Problem: ABCDS Injury Assessment  Goal: Absence of physical injury  Outcome: Progressing     Problem: Wound:  Goal: Will show signs of wound healing; wound closure and no evidence of infection  Description: Will show signs of wound healing; wound closure and no evidence of infection  Outcome: Progressing     Problem: Venous:  Goal: Signs of wound healing will improve  Description: Signs of wound healing will improve  Outcome: Progressing     Problem: Blood Glucose:  Goal: Ability to maintain appropriate glucose levels will improve  Description: Ability to maintain appropriate glucose levels will improve  Outcome: Progressing

## 2022-06-22 NOTE — FLOWSHEET NOTE
PuraPly AMTreatment Note    NAME:  Tessa Manzanares YOB: 1932  MEDICAL RECORD NUMBER:  [id number]  DATE:  6/22/2022    Goal:  Patient will receive safe and proper application of skin substitute. Patient will comply with caring for dressing, and reporting complications. Expiration date checked immediately prior to use. Package intact prior to use and no damage noted. Transport temperature controlled and acceptable. PuraPly AM was removed from protective sterile packaging by provider and applied to prepared ulcer bed. PuraPly AM was hydrated with sterile normal saline per provider. PuraPly AM was applied to left leg ulcer and affixed with steri-strips by the provider. PuraPly AM was covered with non-adherent ulcer dressing. Applied adaptic over non-adherent. Applied dry gauze and/or roll gauze. Patient/caregiver was instructed not to remove dressing and to keep it clean and dry. Pt/family/caregiver was instructed on signs and symptoms of complications to report such as draining through dressing, dressing falling down/slipping, getting wet, or severe pain or tingling. PuraPly AM may be applied a total of 10 times per wound over a 12 week period. Date of first application of PuraPly for this current wound is Angelita 10, 2022.     Guidelines followed    Electronically signed by Philip Spears RN on 6/22/2022 at 10:19 AM

## 2022-06-22 NOTE — PROGRESS NOTES
[facility]  History and Physical/Consultation  Podiatry    Referring Physician : Daniel Chaudhary MD  Odalys Hernandez. MEDICAL RECORD NUMBER:  [id number]  AGE: 80 y.o. GENDER: male  : 1932  EPISODE DATE:  2022  Subjective:     Chief Complaint   Patient presents with    Wound Check     bilateral heels left calf         HISTORY of PRESENT ILLNESS HPI     Odalys Hernandez. is a 80 y.o. male who presents today for wound/ulcer evaluation. History of Wound Context:  The patient has had a wound of diabetic b/l heels,and right ankle which was first noted approximately months ago. This has been treated betadyne. On their initial visit to the wound healing center, 22 ,  the patient has noted that the wound has been improving. The patient has had similar previous wounds in the past.      Pt is not on abx at time of initial visit.       Wound/Ulcer Pain Timing/Severity: constant  Quality of pain: sharp  Severity:  3 / 10   Modifying Factors: Pain worsens with walking  Associated Signs/Symptoms: edema, erythema and drainage    Ulcer Identification:  Ulcer Type: diabetic  Contributing Factors: edema and diabetes    Diabetic/Pressure/Non Pressure Ulcers onl y:  Ulcer: Diabetic ulcer, fat layer exposed    If patient has diabetic lower extremity wounds  Campbell Classification of diabetic lower extremity wounds:    Grade Description   []  0 No open wound   []  1 Superficial ulcer involving the full skin thickness   [x]  2 Deep ulcer involves ligament, tendon, joint capsule, or fascia  No bone involvement or abscess presence   []  3 Deep Ulcer with abcess formation and/or osteomyelitis   []  4 Localized gangrene   []  5 Extensive gangrene of the foot     Wound: N/A          22  Debrided, cont tx and care     22  Debrided, cont tx and care, await graft approval    22  Debrided, cont tx and care, puraply applied    22  Debrided, dermagraft applied    22  Debrided, dermagraft applied    2-23-22  Debrided, graft applied    3-2-22  Debrided, graft applied    3-9-22  Debrided, graft applied     3-23-22  Just release from hospital care, new wounds noted from hospital, also trauma to left toe loosened nail that was removed without incident, debrided all wounds, culture taken    3-30-22  Debrided, santyl to left leg, applied dermagraft b/l heels, prevelon boots wound vac left leg    4-6-22  Debrided, dermagraft b/l, wound care    4-13-22  Debrided, wound vac, dermagraft left    4-27-22  Debrided, cont tx and care     5-4-22  Debrided, cont tx and care, wound vac to leg left, and hydrofera blue to all other wounds    5-11-22  Debrided, cont tx and care    5-18-22  Debrided, cont tx and care    5-25-22  Cont wound vac, cont previous tx, offload    6-1-22  Debrided, offload    6-8-22  Debrided, puraply to left leg, b/l heels medihoney patches    6-15-22  Debrided, wound vac with puraply left leg, medihoney b/l heels and left 1st digit    6-22-22  Debrided, graft applied   PAST MEDICAL HISTORY      Diagnosis Date    Arthritis     Cancer ([address]     breast    Cellulitis     CHF (congestive heart failure) ([address]     CKD (chronic kidney disease) stage 3, GFR 30-59 ml/min ([address]     Diabetes mellitus ([address]     History of lumpectomy     Hx of blood clots     Hyperlipidemia     Hypertension     Left ventricular failure ([address]     Macular degeneration     Obesity     Orthostatic hypotension     PE (pulmonary thromboembolism) ([address]     Pressure injury of both heels, unstageable ([address]     Staph aureus infection     Venous insufficiency      Past Surgical History:   Procedure Laterality Date    [facility] SINGLE  9/2/2021         MASTECTOMY      TOE AMPUTATION      TOE SURGERY      TONSILLECTOMY       History reviewed. No pertinent family history.   Social History     Tobacco Use    Smoking status: Never Smoker    Smokeless tobacco: Never Used   Vaping Use    Vaping Use: Never used   Substance Use Topics    Alcohol use: Not Currently     Alcohol/week: 1.0 standard drink     Types: 1 Cans of beer per week    Drug use: No     Allergies   Allergen Reactions    Clindamycin/Lincomycin     Sulfa Antibiotics      Current Outpatient Medications on File Prior to Encounter   Medication Sig Dispense Refill    apixaban (ELIQUIS) 2.5 MG TABS tablet Take 1 tablet by mouth 2 times daily (before meals) 60 tablet 2    docusate sodium (COLACE) 100 MG capsule Take 100 mg by mouth nightly      tamsulosin (FLOMAX) 0.4 MG capsule Take 0.4 mg by mouth daily      bisacodyl (DULCOLAX) 10 MG suppository Place 10 mg rectally daily as needed for Constipation      diphenhydrAMINE-zinc acetate (BENADRYL) 1-0.1 % cream Apply topically every 4 hours as needed for Itching Apply topically 3 times daily as needed.  Sodium Phosphates (FLEET) 7-19 GM/118ML Place 1 enema rectally daily as needed      magnesium hydroxide (MILK OF MAGNESIA) 400 MG/5ML suspension Take 30 mLs by mouth daily as needed for Constipation      Polyethylene Glycol 400 1 % SOLN Apply 1 drop to eye every 4 hours as needed      ferrous sulfate (IRON 325) 325 (65 Fe) MG tablet Take 325 mg by mouth daily       zinc sulfate (ZINCATE) 220 (50 Zn) MG capsule Take 1 capsule by mouth daily for 14 days 14 capsule 0    miconazole (MICOTIN) 2 % powder Apply topically 2 times daily.  45 g 1    ascorbic acid (VITAMIN C) 500 MG tablet Take 1 tablet by mouth 2 times daily 30 tablet 0    Vitamin D (CHOLECALCIFEROL) 50 MCG (2000 UT) TABS tablet Take 1 tablet by mouth daily 60 tablet 0    furosemide (LASIX) 40 MG tablet Take 20 mg by mouth daily       omeprazole (PRILOSEC) 20 MG delayed release capsule Take 20 mg by mouth daily      Multiple Vitamins-Minerals (THERAPEUTIC MULTIVITAMIN-MINERALS) tablet Take 1 tablet by mouth daily      Multiple Vitamins-Minerals (PRESERVISION AREDS PO) Take 1 tablet by mouth 2 times daily (with meals)      cause pain   4/5 strength DF/PF  R dorsalis pedis 1 L dorsalis pedis 1   R posterior tibial 1 L posterior tibial 1     Assessment:     Problem List Items Addressed This Visit     Diabetic ulcer of right heel associated with type 2 diabetes mellitus, with fat layer exposed ([address]          Pre Debridement Measurements:  Are located in the [place]  Documentation Flow Sheet  Post Debridement Measurements:  Wound/Ulcer Descriptions are Pre Debridement except measurements:     Wound 01/05/22 Heel Left #1 (Active)   Wound Image   06/08/22 0916   Wound Etiology Diabetic Campbell 2 01/05/22 0943   Dressing Status New dressing applied;Clean;Dry; Intact 06/08/22 1021   Wound Cleansed Cleansed with saline 06/08/22 1021   Dressing/Treatment ABD;Dry dressing; Pharmaceutical agent (see MAR) 06/08/22 1021   Offloading for Diabetic Foot Ulcers Post op shoe 06/08/22 1021   Wound Length (cm) 4 cm 06/22/22 0946   Wound Width (cm) 2.7 cm 06/22/22 0946   Wound Depth (cm) 0.2 cm 06/22/22 0946   Wound Surface Area (cm^2) 10.8 cm^2 06/22/22 0946   Change in Wound Size % (l*w) 21.74 06/22/22 0946   Wound Volume (cm^3) 2.16 cm^3 06/22/22 0946   Wound Healing % 22 06/22/22 0946   Post-Procedure Length (cm) 4 cm 06/15/22 0932   Post-Procedure Width (cm) 3.5 cm 06/15/22 0932   Post-Procedure Depth (cm) 0.3 cm 06/15/22 0932   Post-Procedure Surface Area (cm^2) 14 cm^2 06/15/22 0932   Post-Procedure Volume (cm^3) 4.2 cm^3 06/15/22 0932   Wound Assessment Fibrin;Pink/red 06/22/22 0946   Drainage Amount Moderate 06/22/22 0946   Drainage Description Serosanguinous 06/22/22 0946   Odor None 06/22/22 0946   Rashmi-wound Assessment Maceration;Fragile 06/22/22 0946   Wound Thickness Description not for Pressure Injury Full thickness 01/19/22 0909   Number of days: 168       Wound 01/05/22 Heel Right #2 (Active)   Wound Image   06/08/22 0916   Wound Etiology Venous 01/05/22 0943   Dressing Status New dressing applied;Clean;Dry; Intact 06/08/22 1021   Wound Cleansed Cleansed with saline 06/08/22 1021   Dressing/Treatment ABD;Dry dressing; Pharmaceutical agent (see MAR) 06/08/22 1021   Offloading for Diabetic Foot Ulcers Post op shoe 06/08/22 1021   Wound Length (cm) 1.8 cm 06/22/22 0946   Wound Width (cm) 1.7 cm 06/22/22 0946   Wound Depth (cm) 0.1 cm 06/22/22 0946   Wound Surface Area (cm^2) 3.06 cm^2 06/22/22 0946   Change in Wound Size % (l*w) 28 06/22/22 0946   Wound Volume (cm^3) 0.306 cm^3 06/22/22 0946   Wound Healing % 64 06/22/22 0946   Post-Procedure Length (cm) 2.3 cm 06/15/22 0932   Post-Procedure Width (cm) 1.6 cm 06/15/22 0932   Post-Procedure Depth (cm) 0.3 cm 06/15/22 0932   Post-Procedure Surface Area (cm^2) 3.68 cm^2 06/15/22 0932   Post-Procedure Volume (cm^3) 1.104 cm^3 06/15/22 0932   Wound Assessment Pink/red;Fibrin 06/22/22 0946   Drainage Amount Small 06/22/22 0946   Drainage Description Serosanguinous 06/22/22 0946   Odor None 06/22/22 0946   Rashmi-wound Assessment Intact;Fragile 06/22/22 0946   Wound Thickness Description not for Pressure Injury Full thickness 01/19/22 8140   Number of days: 168       Wound 03/14/22 Coccyx (Active)   Number of days: 99       Wound 03/23/22 Toe (Comment  which one) Left #10 Left Great Toe (Active)   Wound Image   06/08/22 0916   Dressing Status New dressing applied;Clean;Dry; Intact 06/08/22 1021   Wound Cleansed Cleansed with saline 06/08/22 1021   Dressing/Treatment Dry dressing; Pharmaceutical agent (see MAR) 06/08/22 1021   Offloading for Diabetic Foot Ulcers Post op shoe 06/08/22 1021   Wound Length (cm) 0.8 cm 06/22/22 0946   Wound Width (cm) 0.8 cm 06/22/22 0946   Wound Depth (cm) 0.1 cm 06/22/22 0946   Wound Surface Area (cm^2) 0.64 cm^2 06/22/22 0946   Change in Wound Size % (l*w) 94.67 06/22/22 0946   Wound Volume (cm^3) 0.064 cm^3 06/22/22 0946   Wound Healing % 95 06/22/22 0946   Post-Procedure Length (cm) 0.9 cm 06/15/22 0932   Post-Procedure Width (cm) 0.9 cm 06/15/22 0932   Post-Procedure Depth (cm) 0.2 cm 06/15/22 0932   Post-Procedure Surface Area (cm^2) 0.81 cm^2 06/15/22 0932   Post-Procedure Volume (cm^3) 0.162 cm^3 06/15/22 0932   Wound Assessment Fibrinous 06/22/22 0946   Drainage Amount Small 06/22/22 0946   Drainage Description Thin;Yellow 06/22/22 0946   Odor None 06/22/22 0946   Rashmi-wound Assessment Intact; Hyperkeratosis (callous) 06/22/22 0946   Number of days: 91       Wound 04/13/22 Pretibial Distal;Left;Lateral #14 (blocked may 25)  (Active)   Wound Image   06/08/22 0916   Dressing Status New dressing applied;Clean;Dry; Intact 06/08/22 1021   Wound Cleansed Cleansed with saline 06/08/22 1021   Dressing/Treatment ABD;Dry dressing 06/08/22 1021   Offloading for Diabetic Foot Ulcers Post op shoe 06/08/22 1021   Wound Length (cm) 1 cm 06/22/22 0946   Wound Width (cm) 2 cm 06/22/22 0946   Wound Depth (cm) 0.2 cm 06/22/22 0946   Wound Surface Area (cm^2) 2 cm^2 06/22/22 0946   Change in Wound Size % (l*w) 79.17 06/22/22 0946   Wound Volume (cm^3) 0.4 cm^3 06/22/22 0946   Wound Healing % 58 06/22/22 0946   Post-Procedure Length (cm) 0.8 cm 06/15/22 0932   Post-Procedure Width (cm) 1.5 cm 06/15/22 0932   Post-Procedure Depth (cm) 0.3 cm 06/15/22 0932   Post-Procedure Surface Area (cm^2) 1.2 cm^2 06/15/22 0932   Post-Procedure Volume (cm^3) 0.36 cm^3 06/15/22 0932   Wound Assessment Fibrinous 06/22/22 0946   Drainage Amount Moderate 06/22/22 0946   Drainage Description Yellow 06/22/22 0946   Odor None 06/22/22 0946   Rashmi-wound Assessment Maceration 06/22/22 0946   Number of days: 70       Wound 04/27/22 Tibial Left;Posterior #15 (Active)   Wound Image   06/08/22 0916   Dressing Status New dressing applied;Clean;Dry; Intact 06/08/22 1021   Wound Cleansed Cleansed with saline 06/08/22 1021   Dressing/Treatment ABD;Dry dressing 06/08/22 1021   Offloading for Diabetic Foot Ulcers Post op shoe 06/08/22 1021   Wound Length (cm) 4 cm 06/22/22 0946   Wound Width (cm) 4.2 cm 06/22/22 0946   Wound Depth (cm) 0.5 cm 06/22/22 0946   Wound Surface Area (cm^2) 16.8 cm^2 06/22/22 0946   Change in Wound Size % (l*w) -19.66 06/22/22 0946   Wound Volume (cm^3) 8.4 cm^3 06/22/22 0946   Wound Healing % -20 06/22/22 0946   Post-Procedure Length (cm) 4 cm 06/15/22 0932   Post-Procedure Width (cm) 4.1 cm 06/15/22 0932   Post-Procedure Depth (cm) 0.7 cm 06/15/22 0932   Post-Procedure Surface Area (cm^2) 16.4 cm^2 06/15/22 0932   Post-Procedure Volume (cm^3) 11.48 cm^3 06/15/22 0932   Wound Assessment Fibrin;Pink/red 06/22/22 0946   Drainage Amount Moderate 06/22/22 0946   Drainage Description Serosanguinous; Yellow 06/22/22 0946   Odor None 06/22/22 0946   Rashmi-wound Assessment Intact;Fragile 06/22/22 0946   Number of days: 56          Procedure Note  Indications:  Based on my examination of this patient's wound(s)/ulcer(s) today, debridement is required to promote healing and evaluate the wound base. Performed by: Camron Zhou DPM    Consent obtained:  Yes    Time out taken:  Yes    Pain Control: Anesthetic  Anesthetic: 4% Lidocaine Liquid Topical     Debridement:Excisional Debridement    Using #15 blade scalpel the wound(s)/ulcer(s) was/were sharply debrided down through and including the removal of subcutaneous tissue. Devitalized Tissue Debrided:  fibrin, biofilm, slough and necrotic/eschar to stimulate bleeding to promote healing, post debridement good bleeding base and wound edges noted    Wound/Ulcer #: 2, 4, 10    Percent of Wound/Ulcer Debrided: 100%    Total Surface Area Debrided:  30 sq cm     Estimated Blood Loss:  Minimal  Hemostasis Achieved:  by pressure    Procedural Pain:  4  / 10   Post Procedural Pain:  5 / 10     Response to treatment:  Well tolerated by patient. A culture was done.     Skin Substitute Applied:         [] APLIGRAF   []44 sq/cm   []88 sq/cm    []132 sq/cm  []176  sq/cm           [] DERMAGRAFT  [] 38sq/cm   []76 sq/cm    []114 sq/cm  []152 sq/cm         [] OASIS   [] 10.5 sq/cm   []21 sq/cm    []4 sq/cm PuraPly  []8 sq/cm PuraPly        [x] OTHER puraply 16    []        sq/cm       Performed by: Adalberto Byers DPM    Wound Type:venous    Consent obtained: Yes    Time out taken: Yes     Fenestrated: Yes    Instrument(s) curette and #15 blade scalpel      [] Mesher Utilized    Skin Substitute was Applied to Wound Number(s): Wound #: 5      Wound 01/05/22 Heel Left #1 (Active)   Wound Image   06/08/22 0916   Wound Etiology Diabetic Campbell 2 01/05/22 0943   Dressing Status New dressing applied 06/01/22 1052   Wound Cleansed Cleansed with saline 06/01/22 1052   Dressing/Treatment ABD;Pharmaceutical agent (see MAR); Roll gauze 06/01/22 1052   Offloading for Diabetic Foot Ulcers Post op shoe 06/01/22 1052   Wound Length (cm) 4 cm 06/08/22 0916   Wound Width (cm) 3.5 cm 06/08/22 0916   Wound Depth (cm) 0.2 cm 06/08/22 0916   Wound Surface Area (cm^2) 14 cm^2 06/08/22 0916   Change in Wound Size % (l*w) -1.45 06/08/22 0916   Wound Volume (cm^3) 2.8 cm^3 06/08/22 0916   Wound Healing % -1 06/08/22 0916   Post-Procedure Length (cm) 4 cm 06/08/22 0951   Post-Procedure Width (cm) 3.5 cm 06/08/22 0951   Post-Procedure Depth (cm) 0.2 cm 06/08/22 0951   Post-Procedure Surface Area (cm^2) 14 cm^2 06/08/22 0951   Post-Procedure Volume (cm^3) 2.8 cm^3 06/08/22 0951   Wound Assessment Fibrin;Pink/red 06/08/22 0916   Drainage Amount Moderate 06/08/22 0916   Drainage Description Serosanguinous 06/08/22 0916   Odor None 06/08/22 0916   Rashmi-wound Assessment Maceration;Fragile 06/08/22 0916   Wound Thickness Description not for Pressure Injury Full thickness 01/19/22 0909   Number of days: 154       Wound 01/05/22 Heel Right #2 (Active)   Wound Image   06/08/22 0916   Wound Etiology Venous 01/05/22 0943   Dressing Status New dressing applied 06/01/22 1052   Wound Cleansed Cleansed with saline 06/01/22 1052   Dressing/Treatment ABD;Pharmaceutical agent (see MAR); Roll gauze 06/01/22 1052   Offloading for Diabetic Foot Ulcers Post op shoe 06/01/22 1052   Wound Length (cm) 2.5 cm 06/08/22 0916   Wound Width (cm) 1.6 cm 06/08/22 0916   Wound Depth (cm) 0.2 cm 06/08/22 0916   Wound Surface Area (cm^2) 4 cm^2 06/08/22 0916   Change in Wound Size % (l*w) 5.88 06/08/22 0916   Wound Volume (cm^3) 0.8 cm^3 06/08/22 0916   Wound Healing % 6 06/08/22 0916   Post-Procedure Length (cm) 2.5 cm 06/08/22 0951   Post-Procedure Width (cm) 1.8 cm 06/08/22 0951   Post-Procedure Depth (cm) 0.3 cm 06/08/22 0951   Post-Procedure Surface Area (cm^2) 4.5 cm^2 06/08/22 0951   Post-Procedure Volume (cm^3) 1.35 cm^3 06/08/22 0951   Wound Assessment Pink/red;Fibrin 06/08/22 0916   Drainage Amount Small 06/08/22 0916   Drainage Description Serosanguinous 06/08/22 0916   Odor None 06/08/22 0916   Rashmi-wound Assessment Intact;Fragile 06/08/22 0916   Wound Thickness Description not for Pressure Injury Full thickness 01/19/22 0909   Number of days: 154       Wound 03/14/22 Coccyx (Active)   Number of days: 85       Wound 03/23/22 Toe (Comment  which one) Left #10 Left Great Toe (Active)   Wound Image   06/08/22 0916   Dressing Status New dressing applied 06/01/22 1052   Wound Cleansed Cleansed with saline 06/01/22 1052   Dressing/Treatment Pharmaceutical agent (see MAR); Dry dressing 06/01/22 1052   Offloading for Diabetic Foot Ulcers Post op shoe 06/01/22 1052   Wound Length (cm) 1 cm 06/08/22 0916   Wound Width (cm) 0.8 cm 06/08/22 0916   Wound Depth (cm) 0.1 cm 06/08/22 0916   Wound Surface Area (cm^2) 0.8 cm^2 06/08/22 0916   Change in Wound Size % (l*w) 93.33 06/08/22 0916   Wound Volume (cm^3) 0.08 cm^3 06/08/22 0916   Wound Healing % 93 06/08/22 0916   Post-Procedure Length (cm) 1 cm 06/08/22 0951   Post-Procedure Width (cm) 1 cm 06/08/22 0951   Post-Procedure Depth (cm) 0.2 cm 06/08/22 0951   Post-Procedure Surface Area (cm^2) 1 cm^2 06/08/22 0951   Post-Procedure Volume (cm^3) 0.2 cm^3 06/08/22 0951   Wound Assessment Dry 06/08/22 0916   Drainage Amount Scant 06/08/22 0916   Drainage Description Thin;Yellow 06/08/22 0916   Odor None 06/08/22 0916   Rashmi-wound Assessment Intact; Hyperkeratosis (callous) 06/08/22 0916   Number of days: 77       Wound 04/13/22 Pretibial Distal;Left;Lateral #14 (blocked may 25)  (Active)   Wound Image   06/08/22 0916   Dressing Status New dressing applied 06/01/22 1052   Wound Cleansed Cleansed with saline 06/01/22 1052   Dressing/Treatment Dry dressing;ABD 06/01/22 1052   Offloading for Diabetic Foot Ulcers Post op shoe 06/01/22 1052   Wound Length (cm) 0.6 cm 06/08/22 0916   Wound Width (cm) 1.8 cm 06/08/22 0916   Wound Depth (cm) 0.2 cm 06/08/22 0916   Wound Surface Area (cm^2) 1.08 cm^2 06/08/22 0916   Change in Wound Size % (l*w) 88.75 06/08/22 0916   Wound Volume (cm^3) 0.216 cm^3 06/08/22 0916   Wound Healing % 78 06/08/22 0916   Post-Procedure Length (cm) 0.7 cm 06/08/22 0951   Post-Procedure Width (cm) 1.9 cm 06/08/22 0951   Post-Procedure Depth (cm) 0.2 cm 06/08/22 0951   Post-Procedure Surface Area (cm^2) 1.33 cm^2 06/08/22 0951   Post-Procedure Volume (cm^3) 0.266 cm^3 06/08/22 0951   Wound Assessment Fibrinous 06/08/22 0916   Drainage Amount Moderate 06/08/22 0916   Drainage Description Yellow 06/08/22 0916   Odor None 06/08/22 0916   Rashmi-wound Assessment Dry/flaky; Intact 06/08/22 0916   Number of days: 56       Wound 04/27/22 Tibial Left;Posterior #15 (Active)   Wound Image   06/08/22 0916   Dressing Status New dressing applied 06/01/22 1052   Wound Cleansed Cleansed with saline 06/01/22 1052   Dressing/Treatment ABD;Pharmaceutical agent (see MAR); Roll gauze 06/01/22 1052   Offloading for Diabetic Foot Ulcers Post op shoe 06/01/22 1052   Wound Length (cm) 3.8 cm 06/08/22 0916   Wound Width (cm) 4 cm 06/08/22 0916   Wound Depth (cm) 0.6 cm 06/08/22 0916   Wound Surface Area (cm^2) 15.2 cm^2 06/08/22 0916   Change in Wound Size % (l*w) -8.26 06/08/22 0916   Wound Volume (cm^3) 9.12 cm^3 06/08/22 0916   Wound Healing % -30 06/08/22 0916   Post-Procedure Length (cm) 3.8 cm 06/08/22 0951   Post-Procedure Width (cm) 4 cm 06/08/22 0951   Post-Procedure Depth (cm) 0.7 cm 06/08/22 0951   Post-Procedure Surface Area (cm^2) 15.2 cm^2 06/08/22 0951   Post-Procedure Volume (cm^3) 10.64 cm^3 06/08/22 0951   Wound Assessment Fibrin;Pink/red 06/08/22 0916   Drainage Amount Moderate 06/08/22 0916   Drainage Description Serosanguinous; Yellow 06/08/22 0916   Odor None 06/08/22 0916   Ligia-wound Assessment Intact;Fragile 06/08/22 0916   Number of days: 42         Total Surface Area Covered 16 sq/cm     Amount Wasted 0 sq/cm    Reason for Waste none      Secured: Yes    Secured With:  [x]Steri Strips    []Sutures     []Staples []Other    Procedural Pain: 1/10     Post Procedural Pain: 2 / 10    Response to Treatment:  Well tolerated by patient. Plan:     Pt is not a smoker   - Discussed relationship of smoking and negative affects on wound healing   - Emphasized importance of tobacco avoidace/cessation   - Script for nicotine patch given to patient   - Information regarding support groups for smoking cessation given    In my professional opinion and based off the information that is available at this time this patient has appropriate indication for HBO Therapy: Maybe    Treatment Note please see attached Discharge Instructions    Written patient dismissal instructions given to patient and signed by patient or POA.          Discharge Instructions         Visit Discharge/Physician Orders     Discharge condition: Stable     Assessment of pain at discharge:  minimal     Anesthetic used: 4% lidocaine solution     Discharge to: Home     Left via:Private automobile     Accompanied by: accompanied by SELF     ECF/[facility]:  [facility].      Dressing Orders: Beth Sandhu bilateral heel ulcers/left great toe with NSS, apply medi-honey patch  Change every other day        TO LEFT LEG ULCER, graft applied , mepitel in place  MAY APPLY ALGINATE TO LIGIA WOUND IF MACERATEDTHEN  WOUND VAC AT 1255MMHG CHANGE Monday Wednesday friday. IF VAC MALFUNCTIONS APPLY WET TO DRY DRESSING UNTIL VAC CAN BE REPLACED- be careful not to disrupt graft         APPLY PREVALON BOOT TO BILATERAL HEELS     Physical therapy for ambulating safely while wearing offloading boots as indicated.     [facility] followup visit ________1 weeks with Dr. Gastelum_____________________  (Please note your next appointment above and if you are unable to keep, kindly give a 24 hour notice.  Thank you.)     Physician signature:__________________________        If you experience any of the following, please call the [facility] during business hours:     * Increase in Pain  * Temperature over 101  * Increase in drainage from your wound  * Drainage with a foul odor  * Bleeding  * Increase in swelling  * Need for compression bandage changes due to slippage, breakthrough drainage.     If you need medical attention outside of the business hours of the [facility] please contact your PCP or go to the nearest emergency room.                                                                                                                                                                 Electronically signed by Hugh William DPM on 6/22/2022 at 10:21 AM

## 2022-06-29 ENCOUNTER — HOSPITAL ENCOUNTER (OUTPATIENT)
Dept: WOUND CARE | Age: 87
Discharge: HOME | End: 2022-06-29
Payer: MEDICARE

## 2022-06-29 VITALS
HEART RATE: 84 BPM | RESPIRATION RATE: 16 BRPM | TEMPERATURE: 97.4 F | DIASTOLIC BLOOD PRESSURE: 60 MMHG | SYSTOLIC BLOOD PRESSURE: 148 MMHG

## 2022-06-29 DIAGNOSIS — L97.412 DIABETIC ULCER OF RIGHT HEEL ASSOCIATED WITH TYPE 2 DIABETES MELLITUS, WITH FAT LAYER EXPOSED (HCC): Primary | ICD-10-CM

## 2022-06-29 DIAGNOSIS — L97.421 DIABETIC ULCER OF LEFT HEEL ASSOCIATED WITH DIABETES MELLITUS DUE TO UNDERLYING CONDITION, LIMITED TO BREAKDOWN OF SKIN (HCC): ICD-10-CM

## 2022-06-29 DIAGNOSIS — E08.621 DIABETIC ULCER OF LEFT HEEL ASSOCIATED WITH DIABETES MELLITUS DUE TO UNDERLYING CONDITION, LIMITED TO BREAKDOWN OF SKIN (HCC): ICD-10-CM

## 2022-06-29 DIAGNOSIS — E11.621 DIABETIC ULCER OF RIGHT HEEL ASSOCIATED WITH TYPE 2 DIABETES MELLITUS, WITH FAT LAYER EXPOSED (HCC): Primary | ICD-10-CM

## 2022-06-29 PROCEDURE — 6370000000 HC RX 637 (ALT 250 FOR IP): Performed by: PODIATRIST

## 2022-06-29 PROCEDURE — C5271 LOW COST SKIN SUBSTITUTE APP: HCPCS

## 2022-06-29 PROCEDURE — 15271 SKIN SUB GRAFT TRNK/ARM/LEG: CPT

## 2022-06-29 RX ORDER — LIDOCAINE 50 MG/G
OINTMENT TOPICAL ONCE
Status: CANCELLED | OUTPATIENT
Start: 2022-06-29 | End: 2022-06-29

## 2022-06-29 RX ORDER — BACITRACIN ZINC AND POLYMYXIN B SULFATE 500; 1000 [USP'U]/G; [USP'U]/G
OINTMENT TOPICAL ONCE
Status: CANCELLED | OUTPATIENT
Start: 2022-06-29 | End: 2022-06-29

## 2022-06-29 RX ORDER — LIDOCAINE HYDROCHLORIDE 40 MG/ML
SOLUTION TOPICAL ONCE
Status: COMPLETED | OUTPATIENT
Start: 2022-06-29 | End: 2022-06-29

## 2022-06-29 RX ORDER — GINSENG 100 MG
CAPSULE ORAL ONCE
Status: CANCELLED | OUTPATIENT
Start: 2022-06-29 | End: 2022-06-29

## 2022-06-29 RX ORDER — LIDOCAINE HYDROCHLORIDE 40 MG/ML
SOLUTION TOPICAL ONCE
Status: CANCELLED | OUTPATIENT
Start: 2022-06-29 | End: 2022-06-29

## 2022-06-29 RX ORDER — LIDOCAINE HYDROCHLORIDE 20 MG/ML
JELLY TOPICAL ONCE
Status: CANCELLED | OUTPATIENT
Start: 2022-06-29 | End: 2022-06-29

## 2022-06-29 RX ORDER — GENTAMICIN SULFATE 1 MG/G
OINTMENT TOPICAL ONCE
Status: CANCELLED | OUTPATIENT
Start: 2022-06-29 | End: 2022-06-29

## 2022-06-29 RX ORDER — CLOBETASOL PROPIONATE 0.5 MG/G
OINTMENT TOPICAL ONCE
Status: CANCELLED | OUTPATIENT
Start: 2022-06-29 | End: 2022-06-29

## 2022-06-29 RX ORDER — BACITRACIN, NEOMYCIN, POLYMYXIN B 400; 3.5; 5 [USP'U]/G; MG/G; [USP'U]/G
OINTMENT TOPICAL ONCE
Status: CANCELLED | OUTPATIENT
Start: 2022-06-29 | End: 2022-06-29

## 2022-06-29 RX ORDER — LIDOCAINE 40 MG/G
CREAM TOPICAL ONCE
Status: CANCELLED | OUTPATIENT
Start: 2022-06-29 | End: 2022-06-29

## 2022-06-29 RX ORDER — BETAMETHASONE DIPROPIONATE 0.05 %
OINTMENT (GRAM) TOPICAL ONCE
Status: CANCELLED | OUTPATIENT
Start: 2022-06-29 | End: 2022-06-29

## 2022-06-29 RX ADMIN — LIDOCAINE HYDROCHLORIDE 10 ML: 40 SOLUTION TOPICAL at 09:22

## 2022-06-29 ASSESSMENT — PAIN DESCRIPTION - ORIENTATION: ORIENTATION: RIGHT;LEFT

## 2022-06-29 ASSESSMENT — PAIN DESCRIPTION - FREQUENCY: FREQUENCY: INTERMITTENT

## 2022-06-29 ASSESSMENT — PAIN DESCRIPTION - LOCATION: LOCATION: FOOT;LEG

## 2022-06-29 ASSESSMENT — PAIN DESCRIPTION - ONSET: ONSET: PROGRESSIVE

## 2022-06-29 ASSESSMENT — PAIN - FUNCTIONAL ASSESSMENT: PAIN_FUNCTIONAL_ASSESSMENT: ACTIVITIES ARE NOT PREVENTED

## 2022-06-29 ASSESSMENT — PAIN DESCRIPTION - PAIN TYPE: TYPE: CHRONIC PAIN

## 2022-06-29 ASSESSMENT — PAIN SCALES - GENERAL: PAINLEVEL_OUTOF10: 5

## 2022-06-29 ASSESSMENT — PAIN DESCRIPTION - DESCRIPTORS: DESCRIPTORS: ACHING

## 2022-06-29 NOTE — DISCHARGE INSTR - COC
Continuity of Care Form    Patient Name: Lisabeth Spurling. :  1932  MRN:  [id number]    Admit date:  2022  Discharge date:  ***    Code Status Order: Prior   Advance Directives:      Admitting Physician:  No admitting provider for patient encounter. PCP: Patricia Pope MD    Discharging Nurse: [facility] Unit/Room#: No information available for this encounter. Discharging Unit Phone Number: ***    Emergency Contact:   Extended Emergency Contact Information  Primary Emergency Contact: Hernesto Jefferson [address] Phone: [phone]  Mobile Phone: [phone]  Relation: Child   needed? No  Secondary Emergency Contact: Claude Lizarraga [place] [address] Phone: [phone]  Relation: Child    Past Surgical History:  Past Surgical History:   Procedure Laterality Date    [facility]  PICC [address] SINGLE  2021         MASTECTOMY      TOE AMPUTATION      TOE SURGERY      TONSILLECTOMY         Immunization History:   Immunization History   Administered Date(s) Administered    Influenza Virus Vaccine 10/23/2016    Pneumococcal Conjugate Vaccine 2013    Td, unspecified formulation 2012       Active Problems:  Patient Active Problem List   Diagnosis Code    HTN (hypertension) I10    Breast cancer, male ([facility] [address] C50.929    Obesity (BMI 30.0-34. 9) E66.9    Right hip pain M25.551    Diabetes mellitus type 2, uncontrolled (HCC) E11.65    Essential hypertension, benign I10    Hyperlipidemia with target LDL less than 100 E78.5    Dizzy spells R42    Troponin level elevated R77.8    Shortness of breath R06.02    Chest pressure R07.89    Acute respiratory insufficiency R06.89    Pneumonia J18.9    History of pulmonary embolus (PE) Z86.711    Acute left-sided CHF (congestive heart failure) (HCC) I50.1    Type I diabetes mellitus, uncontrolled (HCC) E10.65    Cellulitis and abscess of right lower extremity L03.115, L02.415    Cellulitis and abscess of left lower extremity L03.116, L02.416    Chronic venous insufficiency of lower extremity I87.2    Acute hypoxemic respiratory failure due to COVID-19 ([facility] [address] U07.1, J96.01    Acute on chronic respiratory failure with hypoxia ([facility]) J96.21    Acute respiratory failure with hypoxia ([facility]) J96.01    COVID-19 virus infection U07.1    Hyperlipidemia E78.5    Pneumonia due to COVID-19 virus U07.1, J12.82    Orthostatic hypotension I95.1    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 ([facility]) I70.244    PVD (peripheral vascular disease) ([facility]) I73.9    PAD (peripheral artery disease) ([facility]) I73.9    Diabetic ulcer of right heel associated with type 2 diabetes mellitus, with fat layer exposed ([facility] [address] E11.621, L97.412    Diabetic ulcer of left heel ([facility]) E11.621, L97.429    Sepsis ([facility] A41.9    Diabetic foot infection ([facility]) E11.628, L08.9    Foot ulcer due to secondary DM ([facility] E13.621, L97.509       Isolation/Infection:   Isolation            No Isolation          Patient Infection Status       Infection Onset Added Last Indicated Last Indicated By Review Planned Expiration Resolved Resolved By    None active    Resolved    COVID-19 (Rule Out) 22 COVID-19, Rapid (Ordered)   22 Rule-Out Test Resulted    MRSA 21 Culture, Blood 2   03/15/22 Litzy Coffey RN    MRSA blood 2021    COVID-19 21 Covid-19 Ambulatory   21     COVID-19 (Rule Out) 21 COVID-19, Rapid (Ordered)   21 Rule-Out Test Resulted    COVID-19 (Rule Out) 10/16/20 10/16/20 10/16/20 Covid-19 Ambulatory (Ordered)   10/17/20 Rule-Out Test Resulted    COVID-19 (Rule Out) 20 Covid-19 Ambulatory (Ordered)   20 Rule-Out Test Resulted    COVID-19 (Rule Out) 20 Covid-19 Ambulatory (Ordered)   20 Rule-Out Test Resulted    COVID-19 (Rule Out) 20 Covid-19 Ambulatory (Ordered)   20 Rule-Out Test Resulted            Nurse Assessment:  Last Vital Signs: BP (!) 148/60   Pulse 84   Temp 97.4 °F (36.3 °C) (Temporal)   Resp 16     Last documented pain score (0-10 scale): Pain Level: 5  Last Weight:   Wt Readings from Last 1 Encounters:   22 246 lb (111.6 kg)     Mental Status:  {IP PT MENTAL STATUS:13172}    IV Access:  { AGUSTINA IV ACCESS:158859638}    Nursing Mobility/ADLs:  Walking   {CHP DME JZVC:862149260}  Transfer  {CHP DME RZJV:918835150}  Bathing  {CHP DME REXA:942770065}  Dressing  {CHP DME HFBY:234559313}  Toileting  {CHP DME VRVK:775641718}  Feeding  {CHP DME CCGH:632707973}  Med Admin  {CHP DME YTO}  Med Delivery   { AGUSTINA MED Delivery:732352632}    Wound Care Documentation and Therapy:  Wound 22 Heel Left #1 (Active)   Wound Image   22 0916   Wound Etiology Diabetic Campbell 2 22 7111   Dressing Status New dressing applied;Clean;Dry; Intact 22 1021   Wound Cleansed Cleansed with saline 22 1021   Dressing/Treatment ABD;Dry dressing; Pharmaceutical agent (see MAR) 22 1021   Offloading for Diabetic Foot Ulcers Post op shoe 22 1021   Wound Length (cm) 4 cm 22 0918   Wound Width (cm) 2.4 cm 22 0918   Wound Depth (cm) 0.3 cm 22 0918   Wound Surface Area (cm^2) 9.6 cm^2 22 0918   Change in Wound Size % (l*w) 30.43 22 0918   Wound Volume (cm^3) 2.88 cm^3 22 0918   Wound Healing % -4 22 0918   Post-Procedure Length (cm) 4 cm 22 0918   Post-Procedure Width (cm) 2.8 cm 22 0918   Post-Procedure Depth (cm) 0.3 cm 22   Post-Procedure Surface Area (cm^2) 11.2 cm^2 22   Post-Procedure Volume (cm^3) 3.36 cm^3 22   Wound Assessment Pink/red 22   Drainage Amount Moderate 22   Drainage Description Yellow;Brown 22   Odor None 22   Rashmi-wound Assessment Intact 22   Wound Thickness Description not for Pressure Injury Full thickness 01/19/22 0909   Number of days: 174       Wound 01/05/22 Heel Right #2 (Active)   Wound Image   06/08/22 0916   Wound Etiology Venous 01/05/22 0943   Dressing Status New dressing applied;Clean;Dry; Intact 06/08/22 1021   Wound Cleansed Cleansed with saline 06/08/22 1021   Dressing/Treatment ABD;Dry dressing; Pharmaceutical agent (see MAR) 06/08/22 1021   Offloading for Diabetic Foot Ulcers Post op shoe 06/08/22 1021   Wound Length (cm) 1.8 cm 06/29/22 0918   Wound Width (cm) 1.6 cm 06/29/22 0918   Wound Depth (cm) 0.3 cm 06/29/22 0918   Wound Surface Area (cm^2) 2.88 cm^2 06/29/22 0918   Change in Wound Size % (l*w) 32.24 06/29/22 0918   Wound Volume (cm^3) 0.864 cm^3 06/29/22 0918   Wound Healing % -2 06/29/22 0918   Post-Procedure Length (cm) 2 cm 06/29/22 0918   Post-Procedure Width (cm) 1.7 cm 06/29/22 0918   Post-Procedure Depth (cm) 0.2 cm 06/29/22 0918   Post-Procedure Surface Area (cm^2) 3.4 cm^2 06/29/22 0918   Post-Procedure Volume (cm^3) 0.68 cm^3 06/29/22 0918   Wound Assessment Pink/red;Fibrin 06/29/22 0918   Drainage Amount Moderate 06/29/22 0918   Drainage Description Yellow 06/29/22 0918   Odor None 06/29/22 0918   Rashmi-wound Assessment Intact 06/29/22 0918   Wound Thickness Description not for Pressure Injury Full thickness 01/19/22 0414   Number of days: 174       Wound 03/14/22 Coccyx (Active)   Number of days: 106       Wound 03/23/22 Toe (Comment  which one) Left #10 Left Great Toe (Active)   Wound Image   06/08/22 0916   Dressing Status New dressing applied;Clean;Dry; Intact 06/08/22 1021   Wound Cleansed Cleansed with saline 06/08/22 1021   Dressing/Treatment Dry dressing; Pharmaceutical agent (see MAR) 06/08/22 1021   Offloading for Diabetic Foot Ulcers Post op shoe 06/08/22 1021   Wound Length (cm) 0.8 cm 06/29/22 0918   Wound Width (cm) 0.7 cm 06/29/22 0918   Wound Depth (cm) 0.1 cm 06/29/22 0918   Wound Surface Area (cm^2) 0.56 cm^2 06/29/22 with saline 22 1021   Dressing/Treatment ABD;Dry dressing 22 1021   Offloading for Diabetic Foot Ulcers Post op shoe 22 1021   Wound Length (cm) 3.5 cm 2218   Wound Width (cm) 3.5 cm 22   Wound Depth (cm) 0.6 cm 22   Wound Surface Area (cm^2) 12.25 cm^2 22   Change in Wound Size % (l*w) 12.75 22   Wound Volume (cm^3) 7.35 cm^3 22   Wound Healing % -5 22   Post-Procedure Length (cm) 4 cm 22   Post-Procedure Width (cm) 4.2 cm 22   Post-Procedure Depth (cm) 0.8 cm 22   Post-Procedure Surface Area (cm^2) 16.8 cm^2 22   Post-Procedure Volume (cm^3) 13.44 cm^3 22   Wound Assessment Pink/red 2218   Drainage Amount Moderate 22   Drainage Description Yellow 22   Odor None 22   Rashmi-wound Assessment Fragile 22   Number of days: 62        Elimination:  Continence: Bowel: {YES / XY:21065}  Bladder: {YES / RT:90909}  Urinary Catheter: {Urinary Catheter:983254495}   Colostomy/Ileostomy/Ileal Conduit: {YES / OL:87503}       Date of Last BM: ***  No intake or output data in the 24 hours ending 22  No intake/output data recorded.     Safety Concerns:     [employer] Safety Concerns:524043721}    Impairments/Disabilities:      [employer] Impairments/Disabilities:096439876}    Nutrition Therapy:  Current Nutrition Therapy:   [employer] Diet List:718492301}    Routes of Feeding: {CHP DME Other Feedings:694948973}  Liquids: {Slp liquid thickness:76725}  Daily Fluid Restriction: {CHP DME Yes amt example:238918234}  Last Modified Barium Swallow with Video (Video Swallowing Test): {Done Not Done XIDD:638883269}    Treatments at the Time of Hospital Discharge:   Respiratory Treatments: ***  Oxygen Therapy:  {Therapy; copd oxygen:22870}  Ventilator:    {MH CC Vent RR}    Rehab Therapies: {THERAPEUTIC INTERVENTION:5028238201}  Weight Bearing Status/Restrictions: 50Celio Gill CC Weight Bearin}  Other Medical Equipment (for information only, NOT a DME order):  {EQUIPMENT:191536648}  Other Treatments: ***    Patient's personal belongings (please select all that are sent with patient):  {CHP DME Belongings:811207920}    RN SIGNATURE:  {Esignature:442720613}    CASE MANAGEMENT/SOCIAL WORK SECTION    Inpatient Status Date: ***    Readmission Risk Assessment Score:  Readmission Risk              Risk of Unplanned Readmission:  0           Discharging to Facility/ Agency   Name:   Address:  Phone:  Fax:    Dialysis Facility (if applicable)   Name:  Address:  Dialysis Schedule:  Phone:  Fax:    / signature: {Esignature:053592598}    PHYSICIAN SECTION    Prognosis: {Prognosis:8547021627}    Condition at Discharge: Tani Gill Patient Condition:320768434}    Rehab Potential (if transferring to Rehab): {Prognosis:2990370338}    Recommended Labs or Other Treatments After Discharge: ***    Physician Certification: I certify the above information and transfer of Maddison Merritt.  is necessary for the continuing treatment of the diagnosis listed and that he requires {Admit to Appropriate Level of Care:88734} for {GREATER/LESS:210379005} 30 days.      Update Admission H&P: [facility]}    PHYSICIAN SIGNATURE:  {Esignature:505645776}

## 2022-06-29 NOTE — FLOWSHEET NOTE
PuraPly AMTreatment Note    NAME:  Odalys Hernandez. YOB: 1932  MEDICAL RECORD NUMBER:  [id number]  DATE:  6/29/2022    Goal:  Patient will receive safe and proper application of skin substitute. Patient will comply with caring for dressing, and reporting complications. Expiration date checked immediately prior to use. Package intact prior to use and no damage noted. Transport temperature controlled and acceptable. PuraPly AM was removed from protective sterile packaging by provider and applied to prepared ulcer bed. PuraPly AM was hydrated with sterile normal saline per provider. PuraPly AM was applied to left leg ulcer and affixed with steri-strips by the provider. PuraPly AM was covered with non-adherent ulcer dressing. Applied versitile over non-adherent. Applied dry gauze and/or roll gauze. Patient/caregiver was instructed not to remove dressing and to keep it clean and dry. Pt/family/caregiver was instructed on signs and symptoms of complications to report such as draining through dressing, dressing falling down/slipping, getting wet, or severe pain or tingling. PuraPly AM may be applied a total of 10 times per wound over a 12 week period. Date of first application of PuraPly for this current wound is Angelita 10, 2022.     Guidelines followed    Electronically signed by Nikole Arriaza RN on 6/29/2022 at 9:23 AM

## 2022-06-29 NOTE — PROGRESS NOTES
[facility]  History and Physical/Consultation  Podiatry    Referring Physician : Weber Kins, MD Lisabeth Spurling. MEDICAL RECORD NUMBER:  [id number]  AGE: 80 y.o. GENDER: male  : 1932  EPISODE DATE:  2022  Subjective:     Chief Complaint   Patient presents with    Wound Check     wounds bilateral heels, left leg         HISTORY of PRESENT ILLNESS HPI     Lisabeth Spurling. is a 80 y.o. male who presents today for wound/ulcer evaluation. History of Wound Context:  The patient has had a wound of diabetic b/l heels,and right ankle which was first noted approximately months ago. This has been treated betadyne. On their initial visit to the wound healing center, 22 ,  the patient has noted that the wound has been improving. The patient has had similar previous wounds in the past.      Pt is not on abx at time of initial visit.       Wound/Ulcer Pain Timing/Severity: constant  Quality of pain: sharp  Severity:  3 / 10   Modifying Factors: Pain worsens with walking  Associated Signs/Symptoms: edema, erythema and drainage    Ulcer Identification:  Ulcer Type: diabetic  Contributing Factors: edema and diabetes    Diabetic/Pressure/Non Pressure Ulcers onl y:  Ulcer: Diabetic ulcer, fat layer exposed    If patient has diabetic lower extremity wounds  Campbell Classification of diabetic lower extremity wounds:    Grade Description   []  0 No open wound   []  1 Superficial ulcer involving the full skin thickness   [x]  2 Deep ulcer involves ligament, tendon, joint capsule, or fascia  No bone involvement or abscess presence   []  3 Deep Ulcer with abcess formation and/or osteomyelitis   []  4 Localized gangrene   []  5 Extensive gangrene of the foot     Wound: N/A          22  Debrided, cont tx and care     22  Debrided, cont tx and care, await graft approval    22  Debrided, cont tx and care, puraply applied    22  Debrided, dermagraft applied    22  Debrided, dermagraft applied    2-23-22  Debrided, graft applied    3-2-22  Debrided, graft applied    3-9-22  Debrided, graft applied     3-23-22  Just release from hospital care, new wounds noted from hospital, also trauma to left toe loosened nail that was removed without incident, debrided all wounds, culture taken    3-30-22  Debrided, santyl to left leg, applied dermagraft b/l heels, prevelon boots wound vac left leg    4-6-22  Debrided, dermagraft b/l, wound care    4-13-22  Debrided, wound vac, dermagraft left    4-27-22  Debrided, cont tx and care     5-4-22  Debrided, cont tx and care, wound vac to leg left, and hydrofera blue to all other wounds    5-11-22  Debrided, cont tx and care    5-18-22  Debrided, cont tx and care    5-25-22  Cont wound vac, cont previous tx, offload    6-1-22  Debrided, offload    6-8-22  Debrided, puraply to left leg, b/l heels medihoney patches    6-15-22  Debrided, wound vac with puraply left leg, medihoney b/l heels and left 1st digit    6-22-22  Debrided, graft applied     6-29-22  Debrided, graft applied  PAST MEDICAL HISTORY      Diagnosis Date    Arthritis     Cancer ([address]     breast    Cellulitis     [facility]est[facility]     CKD (chronic kidney disease) stage 3, GFR 30-59 ml/min ([address]     Diabetes mellitus ([address]     History of lumpectomy     Hx of blood clots     Hyperlipidemia     Hypertension     Left ventricular failure ([address]     Macular degeneration     Obesity     Orthostatic hypotension     PE (pulmonary thromboembolism) ([address]     Pressure injury of both heels, unstageable ([address]     Staph aureus infection     Venous insufficiency      Past Surgical History:   Procedure Laterality Date    [facility] SINGLE  9/2/2021         MASTECTOMY      TOE AMPUTATION      TOE SURGERY      TONSILLECTOMY       History reviewed. No pertinent family history.   Social History     Tobacco Use    Smoking status: Never Smoker    Smokeless tobacco: Never Used   Vaping Use    Vaping Use: Never used   Substance Use Topics    Alcohol use: Not Currently     Alcohol/week: 1.0 standard drink     Types: 1 Cans of beer per week    Drug use: No     Allergies   Allergen Reactions    Clindamycin/Lincomycin     Sulfa Antibiotics      Current Outpatient Medications on File Prior to Encounter   Medication Sig Dispense Refill    apixaban (ELIQUIS) 2.5 MG TABS tablet Take 1 tablet by mouth 2 times daily (before meals) 60 tablet 2    docusate sodium (COLACE) 100 MG capsule Take 100 mg by mouth nightly      tamsulosin (FLOMAX) 0.4 MG capsule Take 0.4 mg by mouth daily      bisacodyl (DULCOLAX) 10 MG suppository Place 10 mg rectally daily as needed for Constipation      diphenhydrAMINE-zinc acetate (BENADRYL) 1-0.1 % cream Apply topically every 4 hours as needed for Itching Apply topically 3 times daily as needed.  Sodium Phosphates (FLEET) 7-19 GM/118ML Place 1 enema rectally daily as needed      magnesium hydroxide (MILK OF MAGNESIA) 400 MG/5ML suspension Take 30 mLs by mouth daily as needed for Constipation      Polyethylene Glycol 400 1 % SOLN Apply 1 drop to eye every 4 hours as needed      ferrous sulfate (IRON 325) 325 (65 Fe) MG tablet Take 325 mg by mouth daily       zinc sulfate (ZINCATE) 220 (50 Zn) MG capsule Take 1 capsule by mouth daily for 14 days 14 capsule 0    miconazole (MICOTIN) 2 % powder Apply topically 2 times daily.  45 g 1    ascorbic acid (VITAMIN C) 500 MG tablet Take 1 tablet by mouth 2 times daily 30 tablet 0    Vitamin D (CHOLECALCIFEROL) 50 MCG (2000 UT) TABS tablet Take 1 tablet by mouth daily 60 tablet 0    furosemide (LASIX) 40 MG tablet Take 20 mg by mouth daily       omeprazole (PRILOSEC) 20 MG delayed release capsule Take 20 mg by mouth daily      Multiple Vitamins-Minerals (THERAPEUTIC MULTIVITAMIN-MINERALS) tablet Take 1 tablet by mouth daily      Multiple Vitamins-Minerals (PRESERVISION AREDS PO) Take 1 tablet by mouth 2 times daily (with meals)      acetaminophen (TYLENOL) 325 MG tablet Take 650 mg by mouth every 4 hours as needed for Pain or Fever       calcium carbonate (TUMS) 500 MG chewable tablet Take 1 tablet by mouth every 4 hours as needed for Heartburn      ipratropium-albuterol (DUONEB) 0.5-2.5 (3) MG/3ML SOLN nebulizer solution Inhale 3 mLs into the lungs every 4 hours (while awake) for 30 doses 90 mL 0    gabapentin (NEURONTIN) 100 MG capsule Take 200 mg by mouth Daily with supper.  atorvastatin (LIPITOR) 80 MG tablet Take 80 mg by mouth at bedtime       INSULIN LISP PROT & LISP, HUM, (75-25) 100 UNIT/ML SUSP Inject 72 Units into the skin daily (with breakfast)       INSULIN LISP PROT & LISP, HUM, (75-25) 100 UNIT/ML SUSP Inject 55 Units into the skin Daily with supper       clotrimazole-betamethasone (LOTRISONE) cream Apply  topically as needed. Apply topically 2 times daily. No current facility-administered medications on file prior to encounter.        REVIEW OF SYSTEMS   ROS : All others Negative if blank [], Positive if [x]  General Vascular   [] Fevers [] Claudication   [] Chills [] Rest Pain   Skin Neurologic   [x] Tissue Loss [x] Lower extremity neuropathy     Objective:    BP (!) 148/60   Pulse 84   Temp 97.4 °F (36.3 °C) (Temporal)   Resp 16   Wt Readings from Last 3 Encounters:   06/22/22 246 lb (111.6 kg)   06/15/22 234 lb (106.1 kg)   06/08/22 234 lb (106.1 kg)       PHYSICAL EXAM   CONSTITUTIONAL:   Awake, alert, cooperative   PSYCHIATRIC :  Oriented to time, place and person      normal insight to disease process  EXTREMITIES:   R LE Open wounds are noted   Skin color is abnormal with ulcers   Edema is  noted   Sensation deficit noted - protective   Palpation of the foot does cause pain   4/5 strength DF/PF  L LE Open wounds are noted   Skin color is abnormal with ulcers   Edema is  noted   Sensation deficit noted - protective   Palpation of the foot does cause pain   4/5 strength DF/PF  R dorsalis pedis 1 L dorsalis pedis 1   R posterior tibial 1 L posterior tibial 1     Assessment:     Problem List Items Addressed This Visit     Diabetic ulcer of right heel associated with type 2 diabetes mellitus, with fat layer exposed ([address] - Primary    Relevant Orders    Initiate Outpatient Wound Care Protocol    Diabetic ulcer of left heel [facility])    Relevant Orders    Initiate Outpatient Wound Care Protocol          Pre Debridement Measurements:  Are located in the [place]  Documentation Flow Sheet  Post Debridement Measurements:  Wound/Ulcer Descriptions are Pre Debridement except measurements:     Wound 01/05/22 Heel Left #1 (Active)   Wound Image   06/08/22 0916   Wound Etiology Diabetic Campbell 2 01/05/22 0943   Dressing Status New dressing applied;Clean;Dry; Intact 06/08/22 1021   Wound Cleansed Cleansed with saline 06/08/22 1021   Dressing/Treatment ABD;Dry dressing; Pharmaceutical agent (see MAR) 06/08/22 1021   Offloading for Diabetic Foot Ulcers Post op shoe 06/08/22 1021   Wound Length (cm) 4 cm 06/29/22 0918   Wound Width (cm) 2.4 cm 06/29/22 0918   Wound Depth (cm) 0.3 cm 06/29/22 0918   Wound Surface Area (cm^2) 9.6 cm^2 06/29/22 0918   Change in Wound Size % (l*w) 30.43 06/29/22 0918   Wound Volume (cm^3) 2.88 cm^3 06/29/22 0918   Wound Healing % -4 06/29/22 0918   Post-Procedure Length (cm) 4 cm 06/29/22 0918   Post-Procedure Width (cm) 2.8 cm 06/29/22 0918   Post-Procedure Depth (cm) 0.3 cm 06/29/22 0918   Post-Procedure Surface Area (cm^2) 11.2 cm^2 06/29/22 0918   Post-Procedure Volume (cm^3) 3.36 cm^3 06/29/22 0918   Wound Assessment Pink/red 06/29/22 0918   Drainage Amount Moderate 06/29/22 0918   Drainage Description Yellow;Brown 06/29/22 0918   Odor None 06/29/22 0918   Rashmi-wound Assessment Intact 06/29/22 0918   Wound Thickness Description not for Pressure Injury Full thickness 01/19/22 0909   Number of days: 174       Wound 01/05/22 Heel Right #2 (Active)   Wound Image 06/08/22 0916   Wound Etiology Venous 01/05/22 0943   Dressing Status New dressing applied;Clean;Dry; Intact 06/08/22 1021   Wound Cleansed Cleansed with saline 06/08/22 1021   Dressing/Treatment ABD;Dry dressing; Pharmaceutical agent (see MAR) 06/08/22 1021   Offloading for Diabetic Foot Ulcers Post op shoe 06/08/22 1021   Wound Length (cm) 1.8 cm 06/29/22 0918   Wound Width (cm) 1.6 cm 06/29/22 0918   Wound Depth (cm) 0.3 cm 06/29/22 0918   Wound Surface Area (cm^2) 2.88 cm^2 06/29/22 0918   Change in Wound Size % (l*w) 32.24 06/29/22 0918   Wound Volume (cm^3) 0.864 cm^3 06/29/22 0918   Wound Healing % -2 06/29/22 0918   Post-Procedure Length (cm) 2 cm 06/29/22 0918   Post-Procedure Width (cm) 1.7 cm 06/29/22 0918   Post-Procedure Depth (cm) 0.2 cm 06/29/22 0918   Post-Procedure Surface Area (cm^2) 3.4 cm^2 06/29/22 0918   Post-Procedure Volume (cm^3) 0.68 cm^3 06/29/22 0918   Wound Assessment Pink/red;Fibrin 06/29/22 0918   Drainage Amount Moderate 06/29/22 0918   Drainage Description Yellow 06/29/22 0918   Odor None 06/29/22 0918   Rashmi-wound Assessment Intact 06/29/22 0918   Wound Thickness Description not for Pressure Injury Full thickness 01/19/22 3936   Number of days: 174       Wound 03/14/22 Coccyx (Active)   Number of days: 106       Wound 03/23/22 Toe (Comment  which one) Left #10 Left Great Toe (Active)   Wound Image   06/08/22 0916   Dressing Status New dressing applied;Clean;Dry; Intact 06/08/22 1021   Wound Cleansed Cleansed with saline 06/08/22 1021   Dressing/Treatment Dry dressing; Pharmaceutical agent (see MAR) 06/08/22 1021   Offloading for Diabetic Foot Ulcers Post op shoe 06/08/22 1021   Wound Length (cm) 0.8 cm 06/29/22 0918   Wound Width (cm) 0.7 cm 06/29/22 0918   Wound Depth (cm) 0.1 cm 06/29/22 0918   Wound Surface Area (cm^2) 0.56 cm^2 06/29/22 0918   Change in Wound Size % (l*w) 95.33 06/29/22 0918   Wound Volume (cm^3) 0.056 cm^3 06/29/22 0918   Wound Healing % 95 06/29/22 0918 Post-Procedure Length (cm) 1 cm 06/29/22 0918   Post-Procedure Width (cm) 0.9 cm 06/29/22 0918   Post-Procedure Depth (cm) 0.2 cm 06/29/22 0918   Post-Procedure Surface Area (cm^2) 0.9 cm^2 06/29/22 0918   Post-Procedure Volume (cm^3) 0.18 cm^3 06/29/22 0918   Wound Assessment Fibrin;Pink/red 06/29/22 0918   Drainage Amount Moderate 06/29/22 0918   Drainage Description Yellow;Brown 06/29/22 0918   Odor None 06/29/22 0918   Rashmi-wound Assessment Intact 06/29/22 0918   Number of days: 98       Wound 04/13/22 Pretibial Distal;Left;Lateral #14 (blocked may 25)  (Active)   Wound Image   06/08/22 0916   Dressing Status New dressing applied;Clean;Dry; Intact 06/08/22 1021   Wound Cleansed Cleansed with saline 06/08/22 1021   Dressing/Treatment ABD;Dry dressing 06/08/22 1021   Offloading for Diabetic Foot Ulcers Post op shoe 06/08/22 1021   Wound Length (cm) 0.5 cm 06/29/22 0918   Wound Width (cm) 0.5 cm 06/29/22 0918   Wound Depth (cm) 0.2 cm 06/29/22 0918   Wound Surface Area (cm^2) 0.25 cm^2 06/29/22 0918   Change in Wound Size % (l*w) 97.4 06/29/22 0918   Wound Volume (cm^3) 0.05 cm^3 06/29/22 0918   Wound Healing % 95 06/29/22 0918   Post-Procedure Length (cm) 1 cm 06/29/22 0918   Post-Procedure Width (cm) 2 cm 06/29/22 0918   Post-Procedure Depth (cm) 0.3 cm 06/29/22 0918   Post-Procedure Surface Area (cm^2) 2 cm^2 06/29/22 0918   Post-Procedure Volume (cm^3) 0.6 cm^3 06/29/22 0918   Wound Assessment Fibrin;Pink/red 06/29/22 0918   Drainage Amount Small 06/29/22 0918   Drainage Description Yellow 06/29/22 0918   Odor None 06/29/22 0918   Rashmi-wound Assessment Intact 06/29/22 0918   Number of days: 76       Wound 04/27/22 Tibial Left;Posterior #15 (Active)   Wound Image   06/08/22 0916   Dressing Status New dressing applied;Clean;Dry; Intact 06/08/22 1021   Wound Cleansed Cleansed with saline 06/08/22 1021   Dressing/Treatment ABD;Dry dressing 06/08/22 1021   Offloading for Diabetic Foot Ulcers Post op shoe 06/08/22 38sq/cm   []76 sq/cm    []114 sq/cm  []152 sq/cm         [] OASIS   [] 10.5 sq/cm   []21 sq/cm    []4 sq/cm PuraPly  []8 sq/cm PuraPly        [x] OTHER puraply 16    []        sq/cm       Performed by: Liam Diaz DPM    Wound Type:venous    Consent obtained: Yes    Time out taken: Yes     Fenestrated: Yes    Instrument(s) curette and #15 blade scalpel      [] Mesher Utilized    Skin Substitute was Applied to Wound Number(s): Wound #: 5      Wound 01/05/22 Heel Left #1 (Active)   Wound Image   06/08/22 0916   Wound Etiology Diabetic Campbell 2 01/05/22 0943   Dressing Status New dressing applied 06/01/22 1052   Wound Cleansed Cleansed with saline 06/01/22 1052   Dressing/Treatment ABD;Pharmaceutical agent (see MAR); Roll gauze 06/01/22 1052   Offloading for Diabetic Foot Ulcers Post op shoe 06/01/22 1052   Wound Length (cm) 4 cm 06/08/22 0916   Wound Width (cm) 3.5 cm 06/08/22 0916   Wound Depth (cm) 0.2 cm 06/08/22 0916   Wound Surface Area (cm^2) 14 cm^2 06/08/22 0916   Change in Wound Size % (l*w) -1.45 06/08/22 0916   Wound Volume (cm^3) 2.8 cm^3 06/08/22 0916   Wound Healing % -1 06/08/22 0916   Post-Procedure Length (cm) 4 cm 06/08/22 0951   Post-Procedure Width (cm) 3.5 cm 06/08/22 0951   Post-Procedure Depth (cm) 0.2 cm 06/08/22 0951   Post-Procedure Surface Area (cm^2) 14 cm^2 06/08/22 0951   Post-Procedure Volume (cm^3) 2.8 cm^3 06/08/22 0951   Wound Assessment Fibrin;Pink/red 06/08/22 0916   Drainage Amount Moderate 06/08/22 0916   Drainage Description Serosanguinous 06/08/22 0916   Odor None 06/08/22 0916   Rashmi-wound Assessment Maceration;Fragile 06/08/22 0916   Wound Thickness Description not for Pressure Injury Full thickness 01/19/22 0909   Number of days: 154       Wound 01/05/22 Heel Right #2 (Active)   Wound Image   06/08/22 0916   Wound Etiology Venous 01/05/22 0943   Dressing Status New dressing applied 06/01/22 1052   Wound Cleansed Cleansed with saline 06/01/22 1052 Dressing/Treatment ABD;Pharmaceutical agent (see MAR); Roll gauze 06/01/22 1052   Offloading for Diabetic Foot Ulcers Post op shoe 06/01/22 1052   Wound Length (cm) 2.5 cm 06/08/22 0916   Wound Width (cm) 1.6 cm 06/08/22 0916   Wound Depth (cm) 0.2 cm 06/08/22 0916   Wound Surface Area (cm^2) 4 cm^2 06/08/22 0916   Change in Wound Size % (l*w) 5.88 06/08/22 0916   Wound Volume (cm^3) 0.8 cm^3 06/08/22 0916   Wound Healing % 6 06/08/22 0916   Post-Procedure Length (cm) 2.5 cm 06/08/22 0951   Post-Procedure Width (cm) 1.8 cm 06/08/22 0951   Post-Procedure Depth (cm) 0.3 cm 06/08/22 0951   Post-Procedure Surface Area (cm^2) 4.5 cm^2 06/08/22 0951   Post-Procedure Volume (cm^3) 1.35 cm^3 06/08/22 0951   Wound Assessment Pink/red;Fibrin 06/08/22 0916   Drainage Amount Small 06/08/22 0916   Drainage Description Serosanguinous 06/08/22 0916   Odor None 06/08/22 0916   Rashmi-wound Assessment Intact;Fragile 06/08/22 0916   Wound Thickness Description not for Pressure Injury Full thickness 01/19/22 0909   Number of days: 154       Wound 03/14/22 Coccyx (Active)   Number of days: 85       Wound 03/23/22 Toe (Comment  which one) Left #10 Left Great Toe (Active)   Wound Image   06/08/22 0916   Dressing Status New dressing applied 06/01/22 1052   Wound Cleansed Cleansed with saline 06/01/22 1052   Dressing/Treatment Pharmaceutical agent (see MAR); Dry dressing 06/01/22 1052   Offloading for Diabetic Foot Ulcers Post op shoe 06/01/22 1052   Wound Length (cm) 1 cm 06/08/22 0916   Wound Width (cm) 0.8 cm 06/08/22 0916   Wound Depth (cm) 0.1 cm 06/08/22 0916   Wound Surface Area (cm^2) 0.8 cm^2 06/08/22 0916   Change in Wound Size % (l*w) 93.33 06/08/22 0916   Wound Volume (cm^3) 0.08 cm^3 06/08/22 0916   Wound Healing % 93 06/08/22 0916   Post-Procedure Length (cm) 1 cm 06/08/22 0951   Post-Procedure Width (cm) 1 cm 06/08/22 0951   Post-Procedure Depth (cm) 0.2 cm 06/08/22 0951   Post-Procedure Surface Area (cm^2) 1 cm^2 06/08/22 4489   Post-Procedure Volume (cm^3) 0.2 cm^3 06/08/22 0951   Wound Assessment Dry 06/08/22 0916   Drainage Amount Scant 06/08/22 0916   Drainage Description Thin;Yellow 06/08/22 0916   Odor None 06/08/22 0916   Rashmi-wound Assessment Intact; Hyperkeratosis (callous) 06/08/22 0916   Number of days: 77       Wound 04/13/22 Pretibial Distal;Left;Lateral #14 (blocked may 25)  (Active)   Wound Image   06/08/22 0916   Dressing Status New dressing applied 06/01/22 1052   Wound Cleansed Cleansed with saline 06/01/22 1052   Dressing/Treatment Dry dressing;ABD 06/01/22 1052   Offloading for Diabetic Foot Ulcers Post op shoe 06/01/22 1052   Wound Length (cm) 0.6 cm 06/08/22 0916   Wound Width (cm) 1.8 cm 06/08/22 0916   Wound Depth (cm) 0.2 cm 06/08/22 0916   Wound Surface Area (cm^2) 1.08 cm^2 06/08/22 0916   Change in Wound Size % (l*w) 88.75 06/08/22 0916   Wound Volume (cm^3) 0.216 cm^3 06/08/22 0916   Wound Healing % 78 06/08/22 0916   Post-Procedure Length (cm) 0.7 cm 06/08/22 0951   Post-Procedure Width (cm) 1.9 cm 06/08/22 0951   Post-Procedure Depth (cm) 0.2 cm 06/08/22 0951   Post-Procedure Surface Area (cm^2) 1.33 cm^2 06/08/22 0951   Post-Procedure Volume (cm^3) 0.266 cm^3 06/08/22 0951   Wound Assessment Fibrinous 06/08/22 0916   Drainage Amount Moderate 06/08/22 0916   Drainage Description Yellow 06/08/22 0916   Odor None 06/08/22 0916   Rashmi-wound Assessment Dry/flaky; Intact 06/08/22 0916   Number of days: 56       Wound 04/27/22 Tibial Left;Posterior #15 (Active)   Wound Image   06/08/22 0916   Dressing Status New dressing applied 06/01/22 1052   Wound Cleansed Cleansed with saline 06/01/22 1052   Dressing/Treatment ABD;Pharmaceutical agent (see MAR); Roll gauze 06/01/22 1052   Offloading for Diabetic Foot Ulcers Post op shoe 06/01/22 1052   Wound Length (cm) 3.8 cm 06/08/22 0916   Wound Width (cm) 4 cm 06/08/22 0916   Wound Depth (cm) 0.6 cm 06/08/22 0916   Wound Surface Area (cm^2) 15.2 cm^2 06/08/22 0916 Change in Wound Size % (l*w) -8.26 06/08/22 0916   Wound Volume (cm^3) 9.12 cm^3 06/08/22 0916   Wound Healing % -30 06/08/22 0916   Post-Procedure Length (cm) 3.8 cm 06/08/22 0951   Post-Procedure Width (cm) 4 cm 06/08/22 0951   Post-Procedure Depth (cm) 0.7 cm 06/08/22 0951   Post-Procedure Surface Area (cm^2) 15.2 cm^2 06/08/22 0951   Post-Procedure Volume (cm^3) 10.64 cm^3 06/08/22 0951   Wound Assessment Fibrin;Pink/red 06/08/22 0916   Drainage Amount Moderate 06/08/22 0916   Drainage Description Serosanguinous; Yellow 06/08/22 0916   Odor None 06/08/22 0916   Rashmi-wound Assessment Intact;Fragile 06/08/22 0916   Number of days: 42         Total Surface Area Covered 16 sq/cm     Amount Wasted 0 sq/cm    Reason for Waste none      Secured: Yes    Secured With:  [x]Steri Strips    []Sutures     []Staples []Other    Procedural Pain: 1/10     Post Procedural Pain: 2 / 10    Response to Treatment:  Well tolerated by patient. Plan:     Pt is not a smoker   - Discussed relationship of smoking and negative affects on wound healing   - Emphasized importance of tobacco avoidace/cessation   - Script for nicotine patch given to patient   - Information regarding support groups for smoking cessation given    In my professional opinion and based off the information that is available at this time this patient has appropriate indication for HBO Therapy: Maybe    Treatment Note please see attached Discharge Instructions    Written patient dismissal instructions given to patient and signed by patient or POA.          Discharge Instructions          Visit Discharge/Physician Orders     Discharge condition: Stable     Assessment of pain at discharge:  minimal     Anesthetic used: 4% lidocaine solution     Discharge to: Home     Left via:Private automobile     Accompanied by: accompanied by SELF     [facility]:  [facility]      Dressing Orders: Ansley Jerez bilateral heel ulcers/left great toe with NSS, apply medi-honey Change every other day        TO LEFT LEG ULCER, graft applied , mepitel in place  MAY APPLY ALGINATE TO LIGIA WOUND IF MACERATEDTHEN  WOUND VAC AT 1255MMHG CHANGE Monday Wednesday friday. IF VAC MALFUNCTIONS APPLY WET TO DRY DRESSING UNTIL VAC CAN BE REPLACED- be careful not to disrupt graft         APPLY PREVALON BOOT TO BILATERAL HEELS     Physical therapy for ambulating safely while wearing offloading boots as indicated.     [facility] followup visit ________1 weeks with Dr. Gastelum__wednesday___________________  (Please note your next appointment above and if you are unable to keep, kindly give a 24 hour notice. Thank you.)     Physician signature:__________________________        If you experience any of the following, please call the [address] [place] [address] during business hours:     * Increase in Pain  * Temperature over 101  * Increase in drainage from your wound  * Drainage with a foul odor  * Bleeding  * Increase in swelling  * Need for compression bandage changes due to slippage, breakthrough drainage.     If you   Wound 01/05/22 Heel Left #1 (Active)   Wound Image   06/08/22 0916   Wound Etiology Diabetic Campbell 2 01/05/22 0943   Dressing Status New dressing applied;Clean;Dry; Intact 06/08/22 1021   Wound Cleansed Cleansed with saline 06/08/22 1021   Dressing/Treatment ABD;Dry dressing; Pharmaceutical agent (see MAR) 06/08/22 1021   Offloading for Diabetic Foot Ulcers Post op shoe 06/08/22 1021   Wound Length (cm) 4 cm 06/29/22 0918   Wound Width (cm) 2.4 cm 06/29/22 0918   Wound Depth (cm) 0.3 cm 06/29/22 0918   Wound Surface Area (cm^2) 9.6 cm^2 06/29/22 0918   Change in Wound Size % (l*w) 30.43 06/29/22 0918   Wound Volume (cm^3) 2.88 cm^3 06/29/22 0918   Wound Healing % -4 06/29/22 0918   Post-Procedure Length (cm) 4 cm 06/29/22 0918   Post-Procedure Width (cm) 2.8 cm 06/29/22 0918   Post-Procedure Depth (cm) 0.3 cm 06/29/22 0918   Post-Procedure Surface Area (cm^2) 11.2 cm^2 06/29/22 0918   Post-Procedure Volume (cm^3) 3.36 cm^3 06/29/22 0918   Wound Assessment Pink/red 06/29/22 0918   Drainage Amount Moderate 06/29/22 0918   Drainage Description Yellow;Brown 06/29/22 0918   Odor None 06/29/22 0918   Rashmi-wound Assessment Intact 06/29/22 0918   Wound Thickness Description not for Pressure Injury Full thickness 01/19/22 0909   Number of days: 174       Wound 01/05/22 Heel Right #2 (Active)   Wound Image   06/08/22 0916   Wound Etiology Venous 01/05/22 0943   Dressing Status New dressing applied;Clean;Dry; Intact 06/08/22 1021   Wound Cleansed Cleansed with saline 06/08/22 1021   Dressing/Treatment ABD;Dry dressing; Pharmaceutical agent (see MAR) 06/08/22 1021   Offloading for Diabetic Foot Ulcers Post op shoe 06/08/22 1021   Wound Length (cm) 1.8 cm 06/29/22 0918   Wound Width (cm) 1.6 cm 06/29/22 0918   Wound Depth (cm) 0.3 cm 06/29/22 0918   Wound Surface Area (cm^2) 2.88 cm^2 06/29/22 0918   Change in Wound Size % (l*w) 32.24 06/29/22 0918   Wound Volume (cm^3) 0.864 cm^3 06/29/22 0918   Wound Healing % -2 06/29/22 0918   Post-Procedure Length (cm) 2 cm 06/29/22 0918   Post-Procedure Width (cm) 1.7 cm 06/29/22 0918   Post-Procedure Depth (cm) 0.2 cm 06/29/22 0918   Post-Procedure Surface Area (cm^2) 3.4 cm^2 06/29/22 0918   Post-Procedure Volume (cm^3) 0.68 cm^3 06/29/22 0918   Wound Assessment Pink/red;Fibrin 06/29/22 0918   Drainage Amount Moderate 06/29/22 0918   Drainage Description Yellow 06/29/22 0918   Odor None 06/29/22 0918   Rashmi-wound Assessment Intact 06/29/22 0918   Wound Thickness Description not for Pressure Injury Full thickness 01/19/22 6836   Number of days: 174       Wound 03/14/22 Coccyx (Active)   Number of days: 106       Wound 03/23/22 Toe (Comment  which one) Left #10 Left Great Toe (Active)   Wound Image   06/08/22 0916   Dressing Status New dressing applied;Clean;Dry; Intact 06/08/22 1021   Wound Cleansed Cleansed with saline 06/08/22 1021   Dressing/Treatment Dry dressing; Pharmaceutical agent (see MAR) 06/08/22 1021   Offloading for Diabetic Foot Ulcers Post op shoe 06/08/22 1021   Wound Length (cm) 0.8 cm 06/29/22 0918   Wound Width (cm) 0.7 cm 06/29/22 0918   Wound Depth (cm) 0.1 cm 06/29/22 0918   Wound Surface Area (cm^2) 0.56 cm^2 06/29/22 0918   Change in Wound Size % (l*w) 95.33 06/29/22 0918   Wound Volume (cm^3) 0.056 cm^3 06/29/22 0918   Wound Healing % 95 06/29/22 0918   Post-Procedure Length (cm) 1 cm 06/29/22 0918   Post-Procedure Width (cm) 0.9 cm 06/29/22 0918   Post-Procedure Depth (cm) 0.2 cm 06/29/22 0918   Post-Procedure Surface Area (cm^2) 0.9 cm^2 06/29/22 0918   Post-Procedure Volume (cm^3) 0.18 cm^3 06/29/22 0918   Wound Assessment Fibrin;Pink/red 06/29/22 0918   Drainage Amount Moderate 06/29/22 0918   Drainage Description Yellow;Brown 06/29/22 0918   Odor None 06/29/22 0918   Rashmi-wound Assessment Intact 06/29/22 0918   Number of days: 98       Wound 04/13/22 Pretibial Distal;Left;Lateral #14 (blocked may 25)  (Active)   Wound Image   06/08/22 0916   Dressing Status New dressing applied;Clean;Dry; Intact 06/08/22 1021   Wound Cleansed Cleansed with saline 06/08/22 1021   Dressing/Treatment ABD;Dry dressing 06/08/22 1021   Offloading for Diabetic Foot Ulcers Post op shoe 06/08/22 1021   Wound Length (cm) 0.5 cm 06/29/22 0918   Wound Width (cm) 0.5 cm 06/29/22 0918   Wound Depth (cm) 0.2 cm 06/29/22 0918   Wound Surface Area (cm^2) 0.25 cm^2 06/29/22 0918   Change in Wound Size % (l*w) 97.4 06/29/22 0918   Wound Volume (cm^3) 0.05 cm^3 06/29/22 0918   Wound Healing % 95 06/29/22 0918   Post-Procedure Length (cm) 1 cm 06/29/22 0918   Post-Procedure Width (cm) 2 cm 06/29/22 0918   Post-Procedure Depth (cm) 0.3 cm 06/29/22 0918   Post-Procedure Surface Area (cm^2) 2 cm^2 06/29/22 0918   Post-Procedure Volume (cm^3) 0.6 cm^3 06/29/22 0918   Wound Assessment Fibrin;Pink/red 06/29/22 0918   Drainage Amount Small 06/29/22 0918   Drainage Description Yellow 06/29/22 0918   Odor None 06/29/22 7526   Rashmi-wound Assessment Intact 06/29/22 0918   Number of days: 76       Wound 04/27/22 Tibial Left;Posterior #15 (Active)   Wound Image   06/08/22 0916   Dressing Status New dressing applied;Clean;Dry; Intact 06/08/22 1021   Wound Cleansed Cleansed with saline 06/08/22 1021   Dressing/Treatment ABD;Dry dressing 06/08/22 1021   Offloading for Diabetic Foot Ulcers Post op shoe 06/08/22 1021   Wound Length (cm) 3.5 cm 06/29/22 0918   Wound Width (cm) 3.5 cm 06/29/22 0918   Wound Depth (cm) 0.6 cm 06/29/22 0918   Wound Surface Area (cm^2) 12.25 cm^2 06/29/22 0918   Change in Wound Size % (l*w) 12.75 06/29/22 0918   Wound Volume (cm^3) 7.35 cm^3 06/29/22 0918   Wound Healing % -5 06/29/22 0918   Post-Procedure Length (cm) 4 cm 06/29/22 0918   Post-Procedure Width (cm) 4.2 cm 06/29/22 0918   Post-Procedure Depth (cm) 0.8 cm 06/29/22 0918   Post-Procedure Surface Area (cm^2) 16.8 cm^2 06/29/22 0918   Post-Procedure Volume (cm^3) 13.44 cm^3 06/29/22 0918   Wound Assessment Pink/red 06/29/22 0918   Drainage Amount Moderate 06/29/22 0918   Drainage Description Yellow 06/29/22 0918   Odor None 06/29/22 0918   Rashmi-wound Assessment Fragile 06/29/22 0918   Number of days: 62   need medical attention outside of the business hours of the [address] [place] [address] Road please contact your PCP or go to the nearest emergency room.                                                                                                                                                                                      Electronically signed by Christie Connolly DPM on 6/29/2022 at 9:29 AM

## 2022-07-06 ENCOUNTER — HOSPITAL ENCOUNTER (OUTPATIENT)
Dept: WOUND CARE | Age: 87
Discharge: HOME | End: 2022-07-06
Payer: MEDICARE

## 2022-07-06 VITALS
DIASTOLIC BLOOD PRESSURE: 70 MMHG | RESPIRATION RATE: 18 BRPM | HEART RATE: 86 BPM | SYSTOLIC BLOOD PRESSURE: 148 MMHG | TEMPERATURE: 97.6 F

## 2022-07-06 DIAGNOSIS — E08.621 DIABETIC ULCER OF LEFT HEEL ASSOCIATED WITH DIABETES MELLITUS DUE TO UNDERLYING CONDITION, LIMITED TO BREAKDOWN OF SKIN (HCC): ICD-10-CM

## 2022-07-06 DIAGNOSIS — E11.621 DIABETIC ULCER OF RIGHT HEEL ASSOCIATED WITH TYPE 2 DIABETES MELLITUS, WITH FAT LAYER EXPOSED (HCC): Primary | ICD-10-CM

## 2022-07-06 DIAGNOSIS — L97.421 DIABETIC ULCER OF LEFT HEEL ASSOCIATED WITH DIABETES MELLITUS DUE TO UNDERLYING CONDITION, LIMITED TO BREAKDOWN OF SKIN (HCC): ICD-10-CM

## 2022-07-06 DIAGNOSIS — L97.412 DIABETIC ULCER OF RIGHT HEEL ASSOCIATED WITH TYPE 2 DIABETES MELLITUS, WITH FAT LAYER EXPOSED (HCC): Primary | ICD-10-CM

## 2022-07-06 PROCEDURE — C5271 LOW COST SKIN SUBSTITUTE APP: HCPCS

## 2022-07-06 PROCEDURE — 15271 SKIN SUB GRAFT TRNK/ARM/LEG: CPT

## 2022-07-06 PROCEDURE — 6370000000 HC RX 637 (ALT 250 FOR IP): Performed by: PODIATRIST

## 2022-07-06 RX ORDER — LIDOCAINE HYDROCHLORIDE 40 MG/ML
SOLUTION TOPICAL ONCE
Status: COMPLETED | OUTPATIENT
Start: 2022-07-06 | End: 2022-07-06

## 2022-07-06 RX ORDER — GENTAMICIN SULFATE 1 MG/G
OINTMENT TOPICAL ONCE
Status: CANCELLED | OUTPATIENT
Start: 2022-07-06 | End: 2022-07-06

## 2022-07-06 RX ORDER — LIDOCAINE 40 MG/G
CREAM TOPICAL ONCE
Status: CANCELLED | OUTPATIENT
Start: 2022-07-06 | End: 2022-07-06

## 2022-07-06 RX ORDER — BETAMETHASONE DIPROPIONATE 0.05 %
OINTMENT (GRAM) TOPICAL ONCE
Status: CANCELLED | OUTPATIENT
Start: 2022-07-06 | End: 2022-07-06

## 2022-07-06 RX ORDER — CLOBETASOL PROPIONATE 0.5 MG/G
OINTMENT TOPICAL ONCE
Status: CANCELLED | OUTPATIENT
Start: 2022-07-06 | End: 2022-07-06

## 2022-07-06 RX ORDER — LIDOCAINE HYDROCHLORIDE 40 MG/ML
SOLUTION TOPICAL ONCE
Status: CANCELLED | OUTPATIENT
Start: 2022-07-06 | End: 2022-07-06

## 2022-07-06 RX ORDER — LIDOCAINE HYDROCHLORIDE 20 MG/ML
JELLY TOPICAL ONCE
Status: CANCELLED | OUTPATIENT
Start: 2022-07-06 | End: 2022-07-06

## 2022-07-06 RX ORDER — BACITRACIN, NEOMYCIN, POLYMYXIN B 400; 3.5; 5 [USP'U]/G; MG/G; [USP'U]/G
OINTMENT TOPICAL ONCE
Status: CANCELLED | OUTPATIENT
Start: 2022-07-06 | End: 2022-07-06

## 2022-07-06 RX ORDER — LIDOCAINE 50 MG/G
OINTMENT TOPICAL ONCE
Status: CANCELLED | OUTPATIENT
Start: 2022-07-06 | End: 2022-07-06

## 2022-07-06 RX ORDER — BACITRACIN ZINC AND POLYMYXIN B SULFATE 500; 1000 [USP'U]/G; [USP'U]/G
OINTMENT TOPICAL ONCE
Status: CANCELLED | OUTPATIENT
Start: 2022-07-06 | End: 2022-07-06

## 2022-07-06 RX ORDER — GINSENG 100 MG
CAPSULE ORAL ONCE
Status: CANCELLED | OUTPATIENT
Start: 2022-07-06 | End: 2022-07-06

## 2022-07-06 RX ADMIN — LIDOCAINE HYDROCHLORIDE 10 ML: 40 SOLUTION TOPICAL at 09:22

## 2022-07-06 ASSESSMENT — PAIN SCALES - GENERAL: PAINLEVEL_OUTOF10: 4

## 2022-07-06 ASSESSMENT — PAIN DESCRIPTION - FREQUENCY: FREQUENCY: INTERMITTENT

## 2022-07-06 ASSESSMENT — PAIN - FUNCTIONAL ASSESSMENT: PAIN_FUNCTIONAL_ASSESSMENT: PREVENTS OR INTERFERES SOME ACTIVE ACTIVITIES AND ADLS

## 2022-07-06 ASSESSMENT — PAIN DESCRIPTION - DESCRIPTORS: DESCRIPTORS: ACHING;BURNING

## 2022-07-06 ASSESSMENT — PAIN DESCRIPTION - PAIN TYPE: TYPE: CHRONIC PAIN

## 2022-07-06 ASSESSMENT — PAIN DESCRIPTION - ONSET: ONSET: PROGRESSIVE

## 2022-07-06 ASSESSMENT — PAIN DESCRIPTION - LOCATION: LOCATION: FOOT

## 2022-07-06 ASSESSMENT — PAIN DESCRIPTION - ORIENTATION: ORIENTATION: LEFT

## 2022-07-13 ENCOUNTER — HOSPITAL ENCOUNTER (OUTPATIENT)
Dept: WOUND CARE | Age: 87
Discharge: HOME | End: 2022-07-13
Payer: MEDICARE

## 2022-07-13 VITALS
DIASTOLIC BLOOD PRESSURE: 74 MMHG | HEART RATE: 77 BPM | RESPIRATION RATE: 20 BRPM | SYSTOLIC BLOOD PRESSURE: 126 MMHG | TEMPERATURE: 97.1 F

## 2022-07-13 DIAGNOSIS — E08.621 DIABETIC ULCER OF LEFT HEEL ASSOCIATED WITH DIABETES MELLITUS DUE TO UNDERLYING CONDITION, LIMITED TO BREAKDOWN OF SKIN (HCC): ICD-10-CM

## 2022-07-13 DIAGNOSIS — E11.621 DIABETIC ULCER OF RIGHT HEEL ASSOCIATED WITH TYPE 2 DIABETES MELLITUS, WITH FAT LAYER EXPOSED (HCC): Primary | ICD-10-CM

## 2022-07-13 DIAGNOSIS — L97.412 DIABETIC ULCER OF RIGHT HEEL ASSOCIATED WITH TYPE 2 DIABETES MELLITUS, WITH FAT LAYER EXPOSED (HCC): Primary | ICD-10-CM

## 2022-07-13 DIAGNOSIS — L97.421 DIABETIC ULCER OF LEFT HEEL ASSOCIATED WITH DIABETES MELLITUS DUE TO UNDERLYING CONDITION, LIMITED TO BREAKDOWN OF SKIN (HCC): ICD-10-CM

## 2022-07-13 PROCEDURE — C5271 LOW COST SKIN SUBSTITUTE APP: HCPCS

## 2022-07-13 PROCEDURE — 15271 SKIN SUB GRAFT TRNK/ARM/LEG: CPT

## 2022-07-13 RX ORDER — GENTAMICIN SULFATE 1 MG/G
OINTMENT TOPICAL ONCE
Status: CANCELLED | OUTPATIENT
Start: 2022-07-13 | End: 2022-07-13

## 2022-07-13 RX ORDER — GINSENG 100 MG
CAPSULE ORAL ONCE
Status: CANCELLED | OUTPATIENT
Start: 2022-07-13 | End: 2022-07-13

## 2022-07-13 RX ORDER — BACITRACIN, NEOMYCIN, POLYMYXIN B 400; 3.5; 5 [USP'U]/G; MG/G; [USP'U]/G
OINTMENT TOPICAL ONCE
Status: CANCELLED | OUTPATIENT
Start: 2022-07-13 | End: 2022-07-13

## 2022-07-13 RX ORDER — CLOBETASOL PROPIONATE 0.5 MG/G
OINTMENT TOPICAL ONCE
Status: CANCELLED | OUTPATIENT
Start: 2022-07-13 | End: 2022-07-13

## 2022-07-13 RX ORDER — LIDOCAINE HYDROCHLORIDE 40 MG/ML
SOLUTION TOPICAL ONCE
Status: CANCELLED | OUTPATIENT
Start: 2022-07-13 | End: 2022-07-13

## 2022-07-13 RX ORDER — LIDOCAINE 40 MG/G
CREAM TOPICAL ONCE
Status: CANCELLED | OUTPATIENT
Start: 2022-07-13 | End: 2022-07-13

## 2022-07-13 RX ORDER — LIDOCAINE HYDROCHLORIDE 20 MG/ML
JELLY TOPICAL ONCE
Status: CANCELLED | OUTPATIENT
Start: 2022-07-13 | End: 2022-07-13

## 2022-07-13 RX ORDER — LIDOCAINE HYDROCHLORIDE 40 MG/ML
SOLUTION TOPICAL ONCE
Status: DISCONTINUED | OUTPATIENT
Start: 2022-07-13 | End: 2022-07-14 | Stop reason: HOSPADM

## 2022-07-13 RX ORDER — BACITRACIN ZINC AND POLYMYXIN B SULFATE 500; 1000 [USP'U]/G; [USP'U]/G
OINTMENT TOPICAL ONCE
Status: CANCELLED | OUTPATIENT
Start: 2022-07-13 | End: 2022-07-13

## 2022-07-13 RX ORDER — LIDOCAINE 50 MG/G
OINTMENT TOPICAL ONCE
Status: CANCELLED | OUTPATIENT
Start: 2022-07-13 | End: 2022-07-13

## 2022-07-13 RX ORDER — BETAMETHASONE DIPROPIONATE 0.05 %
OINTMENT (GRAM) TOPICAL ONCE
Status: CANCELLED | OUTPATIENT
Start: 2022-07-13 | End: 2022-07-13

## 2022-07-13 ASSESSMENT — PAIN SCALES - GENERAL: PAINLEVEL_OUTOF10: 0

## 2022-07-13 NOTE — FLOWSHEET NOTE
NuShield Treatment Note    NAME:  Abigail Caba. YOB: 1932  MEDICAL RECORD NUMBER:  [id number]  DATE:  7/13/2022    Goal:  Patient will receive safe and proper application of skin substitute. Patient will comply with caring for dressing, offloading and reporting complications. Expiration date checked immediately prior to use. Package intact prior to use and no damage noted. Nushield was removed from protective sterile packaging by provider and applied to prepared ulcer bed. NuShield applied with notch to Top Upper Right Corner. NuShield was hydrated with sterile normal saline per provider. NuShield was applied to left leg ulcer and affixed with steri-strips by the provider. Applied nonadherent and adaptic (Secondary Dressing)   Coban or ace wrap was applied to secure graft and decrease edema. Patient/caregiver was instructed not to remove dressing and to keep it clean and dry. NuShield may be applied a total of 10 times per wound over a 12 week period. Additionally NuShield may only be used every 12 months per wound. Date of first application of NuShield for this current wound is June 8, 2022.       Application # 6 out of 10           Guidelines followed    Electronically signed by Ginna Albert RN on 7/13/2022 at 9:44 AM

## 2022-07-13 NOTE — PROGRESS NOTES
[facility]  History and Physical/Consultation  Podiatry    Referring Physician : Arnoldo Morales MD  Noah Christianson. MEDICAL RECORD NUMBER:  [id number]  AGE: 80 y.o. GENDER: male  : 1932  EPISODE DATE:  2022  Subjective:     Chief Complaint   Patient presents with    Wound Check         HISTORY of PRESENT ILLNESS HPI     Noah Fernandes is a 80 y.o. male who presents today for wound/ulcer evaluation. History of Wound Context:  The patient has had a wound of diabetic b/l heels,and right ankle which was first noted approximately months ago. This has been treated betadyne. On their initial visit to the wound healing center, 22 ,  the patient has noted that the wound has been improving. The patient has had similar previous wounds in the past.      Pt is not on abx at time of initial visit.       Wound/Ulcer Pain Timing/Severity: constant  Quality of pain: sharp  Severity:  3 / 10   Modifying Factors: Pain worsens with walking  Associated Signs/Symptoms: edema, erythema and drainage    Ulcer Identification:  Ulcer Type: diabetic  Contributing Factors: edema and diabetes    Diabetic/Pressure/Non Pressure Ulcers onl y:  Ulcer: Diabetic ulcer, fat layer exposed    If patient has diabetic lower extremity wounds  Campbell Classification of diabetic lower extremity wounds:    Grade Description   []  0 No open wound   []  1 Superficial ulcer involving the full skin thickness   [x]  2 Deep ulcer involves ligament, tendon, joint capsule, or fascia  No bone involvement or abscess presence   []  3 Deep Ulcer with abcess formation and/or osteomyelitis   []  4 Localized gangrene   []  5 Extensive gangrene of the foot     Wound: N/A          22  Debrided, cont tx and care     22  Debrided, cont tx and care, await graft approval    22  Debrided, cont tx and care, puraply applied    22  Debrided, dermagraft applied    22  Debrided, dermagraft applied    22  Debrided, graft applied    3-2-22  Debrided, graft applied    3-9-22  Debrided, graft applied     3-23-22  Just release from hospital care, new wounds noted from hospital, also trauma to left toe loosened nail that was removed without incident, debrided all wounds, culture taken    3-30-22  Debrided, santyl to left leg, applied dermagraft b/l heels, prevelon boots wound vac left leg    4-6-22  Debrided, dermagraft b/l, wound care    4-13-22  Debrided, wound vac, dermagraft left    4-27-22  Debrided, cont tx and care     5-4-22  Debrided, cont tx and care, wound vac to leg left, and hydrofera blue to all other wounds    5-11-22  Debrided, cont tx and care    5-18-22  Debrided, cont tx and care    5-25-22  Cont wound vac, cont previous tx, offload    6-1-22  Debrided, offload    6-8-22  Debrided, puraply to left leg, b/l heels medihoney patches    6-15-22  Debrided, wound vac with puraply left leg, medihoney b/l heels and left 1st digit    6-22-22  Debrided, graft applied     6-29-22  Debrided, graft applied    7-13-22  Debrided, cont tx and care   PAST MEDICAL HISTORY      Diagnosis Date    Arthritis     Cancer ([address]     breast    Cellulitis     [facility]est[facility]     CKD (chronic kidney disease) stage 3, GFR 30-59 ml/min ([address]     Diabetes mellitus ([address]     History of lumpectomy     Hx of blood clots     Hyperlipidemia     Hypertension     Left ventricular failure ([address]     Macular degeneration     Obesity     Orthostatic hypotension     PE (pulmonary thromboembolism) ([address]     Pressure injury of both heels, unstageable ([address]     Staph aureus infection     Venous insufficiency      Past Surgical History:   Procedure Laterality Date    [facility] SING[facility]  9/2/2021         MASTECTOMY      TOE AMPUTATION      TOE SURGERY      TONSILLECTOMY       History reviewed. No pertinent family history.   Social History     Tobacco Use    Smoking status: Never Smoker    Smokeless tobacco: Never Used   Vaping Use    Vaping Use: Never used   Substance Use Topics    Alcohol use: Not Currently     Alcohol/week: 1.0 standard drink     Types: 1 Cans of beer per week    Drug use: No     Allergies   Allergen Reactions    Clindamycin/Lincomycin     Sulfa Antibiotics      Current Outpatient Medications on File Prior to Encounter   Medication Sig Dispense Refill    apixaban (ELIQUIS) 2.5 MG TABS tablet Take 1 tablet by mouth 2 times daily (before meals) 60 tablet 2    docusate sodium (COLACE) 100 MG capsule Take 200 mg by mouth nightly       tamsulosin (FLOMAX) 0.4 MG capsule Take 0.4 mg by mouth daily      bisacodyl (DULCOLAX) 10 MG suppository Place 10 mg rectally daily as needed for Constipation      diphenhydrAMINE-zinc acetate (BENADRYL) 1-0.1 % cream Apply topically every 4 hours as needed for Itching Apply topically 3 times daily as needed.  Sodium Phosphates (FLEET) 7-19 GM/118ML Place 1 enema rectally daily as needed      magnesium hydroxide (MILK OF MAGNESIA) 400 MG/5ML suspension Take 30 mLs by mouth daily as needed for Constipation      Polyethylene Glycol 400 1 % SOLN Apply 1 drop to eye every 4 hours as needed      ferrous sulfate (IRON 325) 325 (65 Fe) MG tablet Take 325 mg by mouth daily       zinc sulfate (ZINCATE) 220 (50 Zn) MG capsule Take 1 capsule by mouth daily for 14 days 14 capsule 0    miconazole (MICOTIN) 2 % powder Apply topically 2 times daily.  45 g 1    ascorbic acid (VITAMIN C) 500 MG tablet Take 1 tablet by mouth 2 times daily 30 tablet 0    Vitamin D (CHOLECALCIFEROL) 50 MCG (2000 UT) TABS tablet Take 1 tablet by mouth daily 60 tablet 0    furosemide (LASIX) 40 MG tablet Take 20 mg by mouth daily       omeprazole (PRILOSEC) 20 MG delayed release capsule Take 20 mg by mouth daily      Multiple Vitamins-Minerals (THERAPEUTIC MULTIVITAMIN-MINERALS) tablet Take 1 tablet by mouth daily      Multiple Vitamins-Minerals (PRESERVISION AREDS PO) Take 1 tablet by mouth 2 times daily (with meals)      acetaminophen (TYLENOL) 325 MG tablet Take 650 mg by mouth every 4 hours as needed for Pain or Fever       calcium carbonate (TUMS) 500 MG chewable tablet Take 1 tablet by mouth every 4 hours as needed for Heartburn      ipratropium-albuterol (DUONEB) 0.5-2.5 (3) MG/3ML SOLN nebulizer solution Inhale 3 mLs into the lungs every 4 hours (while awake) for 30 doses 90 mL 0    gabapentin (NEURONTIN) 100 MG capsule Take 200 mg by mouth Daily with supper.  atorvastatin (LIPITOR) 80 MG tablet Take 80 mg by mouth at bedtime       INSULIN LISP PROT & LISP, HUM, (75-25) 100 UNIT/ML SUSP Inject 72 Units into the skin daily (with breakfast)       INSULIN LISP PROT & LISP, HUM, (75-25) 100 UNIT/ML SUSP Inject 55 Units into the skin Daily with supper       clotrimazole-betamethasone (LOTRISONE) cream Apply  topically as needed. Apply topically 2 times daily. No current facility-administered medications on file prior to encounter.        REVIEW OF SYSTEMS   ROS : All others Negative if blank [], Positive if [x]  General Vascular   [] Fevers [] Claudication   [] Chills [] Rest Pain   Skin Neurologic   [x] Tissue Loss [x] Lower extremity neuropathy     Objective:    /74   Pulse 77   Temp 97.1 °F (36.2 °C) (Temporal)   Resp 20   Wt Readings from Last 3 Encounters:   06/22/22 246 lb (111.6 kg)   06/15/22 234 lb (106.1 kg)   06/08/22 234 lb (106.1 kg)       PHYSICAL EXAM   CONSTITUTIONAL:   Awake, alert, cooperative   PSYCHIATRIC :  Oriented to time, place and person      normal insight to disease process  EXTREMITIES:   R LE Open wounds are noted   Skin color is abnormal with ulcers   Edema is  noted   Sensation deficit noted - protective   Palpation of the foot does cause pain   4/5 strength DF/PF  L LE Open wounds are noted   Skin color is abnormal with ulcers   Edema is  noted   Sensation deficit noted - protective   Palpation of the foot does cause pain   4/5 strength DF/PF  R dorsalis pedis 1 L dorsalis pedis 1   R posterior tibial 1 L posterior tibial 1     Assessment:     Problem List Items Addressed This Visit     Diabetic ulcer of right heel associated with type 2 diabetes mellitus, with fat layer exposed ([address] - Primary    Relevant Medications    lidocaine (XYLOCAINE) 4 % external solution    Other Relevant Orders    Initiate Outpatient Wound Care Protocol    Diabetic ulcer of left heel (HCC)    Relevant Medications    lidocaine (XYLOCAINE) 4 % external solution    Other Relevant Orders    Initiate Outpatient Wound Care Protocol          Pre Debridement Measurements:  Are located in the [facility]  Documentation Flow Sheet  Post Debridement Measurements:  Wound/Ulcer Descriptions are Pre Debridement except measurements:     Wound 01/05/22 Heel Left #1 (Active)   Wound Image   07/13/22 0841   Wound Etiology Diabetic Campbell 2 01/05/22 0943   Dressing Status New dressing applied 07/13/22 0941   Wound Cleansed Cleansed with saline 07/13/22 0941   Dressing/Treatment Honey gel/honey paste;ABD;Dry dressing 07/13/22 0941   Offloading for Diabetic Foot Ulcers Post op shoe 07/13/22 0941   Wound Length (cm) 4.6 cm 07/13/22 0841   Wound Width (cm) 3.2 cm 07/13/22 0841   Wound Depth (cm) 0.3 cm 07/13/22 0841   Wound Surface Area (cm^2) 14.72 cm^2 07/13/22 0841   Change in Wound Size % (l*w) -6.67 07/13/22 0841   Wound Volume (cm^3) 4.416 cm^3 07/13/22 0841   Wound Healing % -60 07/13/22 0841   Post-Procedure Length (cm) 4.6 cm 07/13/22 0917   Post-Procedure Width (cm) 3.2 cm 07/13/22 0917   Post-Procedure Depth (cm) 0.3 cm 07/13/22 0917   Post-Procedure Surface Area (cm^2) 14.72 cm^2 07/13/22 0917   Post-Procedure Volume (cm^3) 4.416 cm^3 07/13/22 0917   Wound Assessment Pink/red;Fibrin 07/13/22 0841   Drainage Amount Moderate 07/13/22 0841   Drainage Description Yellow;Brown 07/13/22 0841   Odor None 07/13/22 0841   Rashmi-wound Assessment Maceration 07/13/22 0841   Wound Thickness Description not for Pressure Injury Full thickness 01/19/22 0909   Number of days: 188       Wound 01/05/22 Heel Right #2 (Active)   Wound Image   07/13/22 0841   Wound Etiology Venous 01/05/22 0943   Dressing Status New dressing applied 07/13/22 0941   Wound Cleansed Cleansed with saline 07/13/22 0941   Dressing/Treatment ABD;Dry dressing;Honey gel/honey paste 07/13/22 0941   Offloading for Diabetic Foot Ulcers Post op shoe 07/13/22 0941   Wound Length (cm) 2.3 cm 07/13/22 0841   Wound Width (cm) 1.4 cm 07/13/22 0841   Wound Depth (cm) 0.3 cm 07/13/22 0841   Wound Surface Area (cm^2) 3.22 cm^2 07/13/22 0841   Change in Wound Size % (l*w) 24.24 07/13/22 0841   Wound Volume (cm^3) 0.966 cm^3 07/13/22 0841   Wound Healing % -14 07/13/22 0841   Post-Procedure Length (cm) 2.3 cm 07/13/22 0917   Post-Procedure Width (cm) 1.4 cm 07/13/22 0917   Post-Procedure Depth (cm) 0.4 cm 07/13/22 0917   Post-Procedure Surface Area (cm^2) 3.22 cm^2 07/13/22 0917   Post-Procedure Volume (cm^3) 1.288 cm^3 07/13/22 0917   Wound Assessment Fibrin;Pink/red 07/13/22 0841   Drainage Amount Small 07/13/22 0841   Drainage Description Serosanguinous; Yellow 07/13/22 0841   Odor None 07/13/22 0841   Rashmi-wound Assessment Maceration 07/13/22 0841   Wound Thickness Description not for Pressure Injury Full thickness 01/19/22 0909   Number of days: 188       Wound 03/14/22 Coccyx (Active)   Number of days: 120       Wound 03/23/22 Toe (Comment  which one) Left #10 Left Great Toe (Active)   Wound Image   07/13/22 0841   Dressing Status New dressing applied 07/13/22 0941   Wound Cleansed Cleansed with saline 07/13/22 0941   Dressing/Treatment Honey gel/honey paste;Dry dressing 07/13/22 0941   Offloading for Diabetic Foot Ulcers Post op shoe 07/13/22 0941   Wound Length (cm) 0.4 cm 07/13/22 0841   Wound Width (cm) 0.4 cm 07/13/22 0841   Wound Depth (cm) 0.3 cm 07/13/22 0841   Wound Surface Area (cm^2) 0.16 cm^2 07/13/22 0841   Change in Wound Size % (l*w) 98.67 07/13/22 0841   Wound Volume (cm^3) 0.048 cm^3 07/13/22 0841   Wound Healing % 96 07/13/22 0841   Post-Procedure Length (cm) 0.3 cm 07/06/22 0933   Post-Procedure Width (cm) 0.4 cm 07/06/22 0933   Post-Procedure Depth (cm) 0.2 cm 07/06/22 0933   Post-Procedure Surface Area (cm^2) 0.12 cm^2 07/06/22 0933   Post-Procedure Volume (cm^3) 0.024 cm^3 07/06/22 0933   Wound Assessment Fibrin;Pink/red 07/13/22 0841   Drainage Amount Small 07/13/22 0841   Drainage Description Yellow 07/13/22 0841   Odor None 07/13/22 0841   Rashmi-wound Assessment Hyperkeratosis (callous) 07/13/22 0841   Number of days: 112       Wound 04/13/22 Pretibial Distal;Left;Lateral #14 (blocked may 25)  (Active)   Wound Image   07/13/22 0841   Dressing Status New dressing applied 07/13/22 0941   Wound Cleansed Cleansed with saline 07/13/22 0941   Dressing/Treatment Dry dressing;Honey gel/honey paste 07/13/22 0941   Offloading for Diabetic Foot Ulcers Post op shoe 07/13/22 0941   Wound Length (cm) 0.3 cm 07/13/22 0841   Wound Width (cm) 0.3 cm 07/13/22 0841   Wound Depth (cm) 0.2 cm 07/13/22 0841   Wound Surface Area (cm^2) 0.09 cm^2 07/13/22 0841   Change in Wound Size % (l*w) 99.06 07/13/22 0841   Wound Volume (cm^3) 0.018 cm^3 07/13/22 0841   Wound Healing % 98 07/13/22 0841   Post-Procedure Length (cm) 0.4 cm 07/06/22 0933   Post-Procedure Width (cm) 0.4 cm 07/06/22 0933   Post-Procedure Depth (cm) 0.2 cm 07/06/22 0933   Post-Procedure Surface Area (cm^2) 0.16 cm^2 07/06/22 0933   Post-Procedure Volume (cm^3) 0.032 cm^3 07/06/22 0933   Wound Assessment Pink/red 07/13/22 0841   Drainage Amount Small 07/13/22 0841   Drainage Description Yellow 07/13/22 0841   Odor None 07/13/22 0841   Rashmi-wound Assessment Edematous; Maceration 07/13/22 0841   Number of days: 91       Wound 04/27/22 Tibial Left;Posterior #15 (Active)   Wound Image   07/13/22 0841   Dressing Status New dressing applied 07/13/22 0941   Wound Cleansed Cleansed with saline 07/13/22 0941   Dressing/Treatment Non adherent;ABD 07/13/22 0941   Offloading for Diabetic Foot Ulcers Post op shoe 07/13/22 0941   Wound Length (cm) 4.6 cm 07/13/22 0841   Wound Width (cm) 4.4 cm 07/13/22 0841   Wound Depth (cm) 0.6 cm 07/13/22 0841   Wound Surface Area (cm^2) 20.24 cm^2 07/13/22 0841   Change in Wound Size % (l*w) -44.16 07/13/22 0841   Wound Volume (cm^3) 12.144 cm^3 07/13/22 0841   Wound Healing % -73 07/13/22 0841   Post-Procedure Length (cm) 4.7 cm 07/13/22 0917   Post-Procedure Width (cm) 4.5 cm 07/13/22 0917   Post-Procedure Depth (cm) 0.7 cm 07/13/22 0917   Post-Procedure Surface Area (cm^2) 21.15 cm^2 07/13/22 0917   Post-Procedure Volume (cm^3) 14.805 cm^3 07/13/22 0917   Wound Assessment Granulation tissue;Fibrin 07/13/22 0841   Drainage Amount Moderate 07/13/22 0841   Drainage Description Yellow 07/13/22 0841   Odor None 07/13/22 0841   Rashmi-wound Assessment Edematous 07/13/22 0841   Number of days: 77          Procedure Note  Indications:  Based on my examination of this patient's wound(s)/ulcer(s) today, debridement is required to promote healing and evaluate the wound base. Performed by: Debbie Vallejo DPM    Consent obtained:  Yes    Time out taken:  Yes    Pain Control: Anesthetic  Anesthetic: 4% Lidocaine Liquid Topical     Debridement:Excisional Debridement    Using #15 blade scalpel the wound(s)/ulcer(s) was/were sharply debrided down through and including the removal of subcutaneous tissue. Devitalized Tissue Debrided:  fibrin, biofilm, slough and necrotic/eschar to stimulate bleeding to promote healing, post debridement good bleeding base and wound edges noted    Wound/Ulcer #: 2, 4, 10    Percent of Wound/Ulcer Debrided: 100%    Total Surface Area Debrided:  30 sq cm     Estimated Blood Loss:  Minimal  Hemostasis Achieved:  by pressure    Procedural Pain:  4  / 10   Post Procedural Pain:  5 / 10     Response to treatment:  Well tolerated by patient.      A culture was done. Skin Substitute Applied:         [] APLIGRAF   []44 sq/cm   []88 sq/cm    []132 sq/cm  []176  sq/cm           [] DERMAGRAFT  [] 38sq/cm   []76 sq/cm    []114 sq/cm  []152 sq/cm         [] OASIS   [] 10.5 sq/cm   []21 sq/cm    []4 sq/cm PuraPly  []8 sq/cm PuraPly        [x] OTHER puraply 16    []        sq/cm       Performed by: Darby Lomeli DPM    Wound Type:venous    Consent obtained: Yes    Time out taken: Yes     Fenestrated: Yes    Instrument(s) curette and #15 blade scalpel      [] Mesher Utilized    Skin Substitute was Applied to Wound Number(s): Wound #: 5      Wound 01/05/22 Heel Left #1 (Active)   Wound Image   06/08/22 0916   Wound Etiology Diabetic Campbell 2 01/05/22 0943   Dressing Status New dressing applied 06/01/22 1052   Wound Cleansed Cleansed with saline 06/01/22 1052   Dressing/Treatment ABD;Pharmaceutical agent (see MAR); Roll gauze 06/01/22 1052   Offloading for Diabetic Foot Ulcers Post op shoe 06/01/22 1052   Wound Length (cm) 4 cm 06/08/22 0916   Wound Width (cm) 3.5 cm 06/08/22 0916   Wound Depth (cm) 0.2 cm 06/08/22 0916   Wound Surface Area (cm^2) 14 cm^2 06/08/22 0916   Change in Wound Size % (l*w) -1.45 06/08/22 0916   Wound Volume (cm^3) 2.8 cm^3 06/08/22 0916   Wound Healing % -1 06/08/22 0916   Post-Procedure Length (cm) 4 cm 06/08/22 0951   Post-Procedure Width (cm) 3.5 cm 06/08/22 0951   Post-Procedure Depth (cm) 0.2 cm 06/08/22 0951   Post-Procedure Surface Area (cm^2) 14 cm^2 06/08/22 0951   Post-Procedure Volume (cm^3) 2.8 cm^3 06/08/22 0951   Wound Assessment Fibrin;Pink/red 06/08/22 0916   Drainage Amount Moderate 06/08/22 0916   Drainage Description Serosanguinous 06/08/22 0916   Odor None 06/08/22 0916   Rashmi-wound Assessment Maceration;Fragile 06/08/22 0916   Wound Thickness Description not for Pressure Injury Full thickness 01/19/22 0909   Number of days: 154       Wound 01/05/22 Heel Right #2 (Active)   Wound Image   06/08/22 0916   Wound Etiology Venous 01/05/22 0943   Dressing Status New dressing applied 06/01/22 1052   Wound Cleansed Cleansed with saline 06/01/22 1052   Dressing/Treatment ABD;Pharmaceutical agent (see MAR); Roll gauze 06/01/22 1052   Offloading for Diabetic Foot Ulcers Post op shoe 06/01/22 1052   Wound Length (cm) 2.5 cm 06/08/22 0916   Wound Width (cm) 1.6 cm 06/08/22 0916   Wound Depth (cm) 0.2 cm 06/08/22 0916   Wound Surface Area (cm^2) 4 cm^2 06/08/22 0916   Change in Wound Size % (l*w) 5.88 06/08/22 0916   Wound Volume (cm^3) 0.8 cm^3 06/08/22 0916   Wound Healing % 6 06/08/22 0916   Post-Procedure Length (cm) 2.5 cm 06/08/22 0951   Post-Procedure Width (cm) 1.8 cm 06/08/22 0951   Post-Procedure Depth (cm) 0.3 cm 06/08/22 0951   Post-Procedure Surface Area (cm^2) 4.5 cm^2 06/08/22 0951   Post-Procedure Volume (cm^3) 1.35 cm^3 06/08/22 0951   Wound Assessment Pink/red;Fibrin 06/08/22 0916   Drainage Amount Small 06/08/22 0916   Drainage Description Serosanguinous 06/08/22 0916   Odor None 06/08/22 0916   Rashmi-wound Assessment Intact;Fragile 06/08/22 0916   Wound Thickness Description not for Pressure Injury Full thickness 01/19/22 0909   Number of days: 154       Wound 03/14/22 Coccyx (Active)   Number of days: 85       Wound 03/23/22 Toe (Comment  which one) Left #10 Left Great Toe (Active)   Wound Image   06/08/22 0916   Dressing Status New dressing applied 06/01/22 1052   Wound Cleansed Cleansed with saline 06/01/22 1052   Dressing/Treatment Pharmaceutical agent (see MAR); Dry dressing 06/01/22 1052   Offloading for Diabetic Foot Ulcers Post op shoe 06/01/22 1052   Wound Length (cm) 1 cm 06/08/22 0916   Wound Width (cm) 0.8 cm 06/08/22 0916   Wound Depth (cm) 0.1 cm 06/08/22 0916   Wound Surface Area (cm^2) 0.8 cm^2 06/08/22 0916   Change in Wound Size % (l*w) 93.33 06/08/22 0916   Wound Volume (cm^3) 0.08 cm^3 06/08/22 0916   Wound Healing % 93 06/08/22 0916   Post-Procedure Length (cm) 1 cm 06/08/22 0951   Post-Procedure Width (cm) 1 cm 06/08/22 0951   Post-Procedure Depth (cm) 0.2 cm 06/08/22 0951   Post-Procedure Surface Area (cm^2) 1 cm^2 06/08/22 0951   Post-Procedure Volume (cm^3) 0.2 cm^3 06/08/22 0951   Wound Assessment Dry 06/08/22 0916   Drainage Amount Scant 06/08/22 0916   Drainage Description Thin;Yellow 06/08/22 0916   Odor None 06/08/22 0916   Rashmi-wound Assessment Intact; Hyperkeratosis (callous) 06/08/22 0916   Number of days: 77       Wound 04/13/22 Pretibial Distal;Left;Lateral #14 (blocked may 25)  (Active)   Wound Image   06/08/22 0916   Dressing Status New dressing applied 06/01/22 1052   Wound Cleansed Cleansed with saline 06/01/22 1052   Dressing/Treatment Dry dressing;ABD 06/01/22 1052   Offloading for Diabetic Foot Ulcers Post op shoe 06/01/22 1052   Wound Length (cm) 0.6 cm 06/08/22 0916   Wound Width (cm) 1.8 cm 06/08/22 0916   Wound Depth (cm) 0.2 cm 06/08/22 0916   Wound Surface Area (cm^2) 1.08 cm^2 06/08/22 0916   Change in Wound Size % (l*w) 88.75 06/08/22 0916   Wound Volume (cm^3) 0.216 cm^3 06/08/22 0916   Wound Healing % 78 06/08/22 0916   Post-Procedure Length (cm) 0.7 cm 06/08/22 0951   Post-Procedure Width (cm) 1.9 cm 06/08/22 0951   Post-Procedure Depth (cm) 0.2 cm 06/08/22 0951   Post-Procedure Surface Area (cm^2) 1.33 cm^2 06/08/22 0951   Post-Procedure Volume (cm^3) 0.266 cm^3 06/08/22 0951   Wound Assessment Fibrinous 06/08/22 0916   Drainage Amount Moderate 06/08/22 0916   Drainage Description Yellow 06/08/22 0916   Odor None 06/08/22 0916   Rashmi-wound Assessment Dry/flaky; Intact 06/08/22 0916   Number of days: 56       Wound 04/27/22 Tibial Left;Posterior #15 (Active)   Wound Image   06/08/22 0916   Dressing Status New dressing applied 06/01/22 1052   Wound Cleansed Cleansed with saline 06/01/22 1052   Dressing/Treatment ABD;Pharmaceutical agent (see MAR); Roll gauze 06/01/22 1052   Offloading for Diabetic Foot Ulcers Post op shoe 06/01/22 1052   Wound Length (cm) 3.8 cm 06/08/22 0916 Wound Width (cm) 4 cm 06/08/22 0916   Wound Depth (cm) 0.6 cm 06/08/22 0916   Wound Surface Area (cm^2) 15.2 cm^2 06/08/22 0916   Change in Wound Size % (l*w) -8.26 06/08/22 0916   Wound Volume (cm^3) 9.12 cm^3 06/08/22 0916   Wound Healing % -30 06/08/22 0916   Post-Procedure Length (cm) 3.8 cm 06/08/22 0951   Post-Procedure Width (cm) 4 cm 06/08/22 0951   Post-Procedure Depth (cm) 0.7 cm 06/08/22 0951   Post-Procedure Surface Area (cm^2) 15.2 cm^2 06/08/22 0951   Post-Procedure Volume (cm^3) 10.64 cm^3 06/08/22 0951   Wound Assessment Fibrin;Pink/red 06/08/22 0916   Drainage Amount Moderate 06/08/22 0916   Drainage Description Serosanguinous; Yellow 06/08/22 0916   Odor None 06/08/22 0916   Rashmi-wound Assessment Intact;Fragile 06/08/22 0916   Number of days: 42         Total Surface Area Covered 16 sq/cm     Amount Wasted 0 sq/cm    Reason for Waste none      Secured: Yes    Secured With:  [x]Steri Strips    []Sutures     []Staples []Other    Procedural Pain: 1/10     Post Procedural Pain: 2 / 10    Response to Treatment:  Well tolerated by patient. Plan:     Pt is not a smoker   - Discussed relationship of smoking and negative affects on wound healing   - Emphasized importance of tobacco avoidace/cessation   - Script for nicotine patch given to patient   - Information regarding support groups for smoking cessation given    In my professional opinion and based off the information that is available at this time this patient has appropriate indication for HBO Therapy: Maybe    Treatment Note please see attached Discharge Instructions    Written patient dismissal instructions given to patient and signed by patient or POA.          Discharge Instructions       Visit Discharge/Physician Orders     Discharge condition: Stable     Assessment of pain at discharge:  minimal     Anesthetic used: 4% lidocaine solution     Discharge to: Home     Left via:Private automobile     Accompanied by: accompanied by SELF     [facility]F/[facility]:  [facility].      Dressing Orders: Minta Ripa bilateral heel ulcers with NSS, apply medi-honey patch  Change every other day   left toe/lower leg medihoney gel and dry dressing every other day     TO LEFT LEG ULCER, graft applied , mepitel in place  MAY APPLY ALGINATE TO LIGIA WOUND IF MACERATEDTHEN  WOUND VAC AT 1255MMHG CHANGE Monday Wednesday friday. IF VAC MALFUNCTIONS APPLY WET TO DRY DRESSING UNTIL VAC CAN BE REPLACED- be careful not to disrupt graft         APPLY PREVALON BOOT TO BILATERAL HEELS     Physical therapy for ambulating safely while wearing offloading boots as indicated.     [facility] followup visit ________1 weeks with Dr. Gastelum__wednesday___________________  (Please note your next appointment above and if you are unable to keep, kindly give a 24 hour notice.  Thank you.)     Physician signature:__________________________        If you experience any of the following, please call the [address] [place] [address] Road during business hours:     * Increase in Pain  * Temperature over 101  * Increase in drainage from your wound  * Drainage with a foul odor  * Bleeding  * Increase in swelling  * Need for compression bandage changes due to slippage, breakthrough drainage.                 Electronically signed by Juliana Adair DPM on 7/13/2022 at 9:53 AM

## 2022-07-15 NOTE — DISCHARGE INSTRUCTIONS
Visit Discharge/Physician Orders     Discharge condition: Stable     Assessment of pain at discharge:  minimal     Anesthetic used: 4% lidocaine solution     Discharge to: Home     Left via:Private automobile     Accompanied by: accompanied by SELF     ECF/[facility]:  [facility]. Dressing Orders:  Clean bilateral heel ulcers with NSS, apply medi-honey patch  Change every other day   left lower leg medihoney gel and dry dressing every other day   left great toe cleanse with normal saline apply medihoney dry dressing every other day  TO LEFT LEG ULCER, graft applied , mepitel in place  MAY APPLY ALGINATE TO LIGIA WOUND  [facility] Monday Wednesday friday. IF VAC MALFUNCTIONS APPLY WET TO DRY DRESSING UNTIL VAC CAN BE REPLACED- be careful not to disrupt graft        APPLY PREVALON BOOT TO BILATERAL HEELS     Physical therapy for ambulating safely while wearing offloading boots as indicated. [facility] followup visit ________1 weeks with Dr. Gastelum__wednesday___________________  (Please note your next appointment above and if you are unable to keep, kindly give a 24 hour notice. Thank you.)     Physician signature:__________________________        If you experience any of the following, please call the [address] [place] [address] during business hours:     * Increase in Pain  * Temperature over 101  * Increase in drainage from your wound  * Drainage with a foul odor  * Bleeding  * Increase in swelling  * Need for compression bandage changes due to slippage, breakthrough drainage.                       Wound 01/05/22 Heel Left #1 (Active)   Wound Image   07/13/22 0841   Wound Etiology Diabetic Campbell 2 01/05/22 0943   Dressing Status New dressing applied 07/13/22 0941   Wound Cleansed Cleansed with saline 07/13/22 0941   Dressing/Treatment Honey gel/honey paste;ABD;Dry dressing 07/13/22 0941   Offloading for Diabetic Foot Ulcers Post op shoe 07/13/22 0941   Wound Length (cm) 4.1 cm 07/20/22 8746   Wound Width (cm) 2.6 cm 07/20/22 0917   Wound Depth (cm) 0.3 cm 07/20/22 0917   Wound Surface Area (cm^2) 10.66 cm^2 07/20/22 0917   Change in Wound Size % (l*w) 22.75 07/20/22 0917   Wound Volume (cm^3) 3.198 cm^3 07/20/22 0917   Wound Healing % -16 07/20/22 0917   Post-Procedure Length (cm) 4.2 cm 07/20/22 0944   Post-Procedure Width (cm) 2.7 cm 07/20/22 0944   Post-Procedure Depth (cm) 0.3 cm 07/20/22 0944   Post-Procedure Surface Area (cm^2) 11.34 cm^2 07/20/22 0944   Post-Procedure Volume (cm^3) 3.402 cm^3 07/20/22 0944   Wound Assessment Pink/red;Fibrin 07/20/22 0917   Drainage Amount Moderate 07/20/22 0917   Drainage Description Yellow;Brown 07/20/22 0917   Odor None 07/20/22 0917   Rashmi-wound Assessment Maceration 07/20/22 0917   Wound Thickness Description not for Pressure Injury Full thickness 01/19/22 0909   Number of days: 195       Wound 01/05/22 Heel Right #2 (Active)   Wound Image   07/13/22 0841   Wound Etiology Venous 01/05/22 0943   Dressing Status New dressing applied 07/13/22 0941   Wound Cleansed Cleansed with saline 07/13/22 0941   Dressing/Treatment ABD;Dry dressing;Honey gel/honey paste 07/13/22 0941   Offloading for Diabetic Foot Ulcers Post op shoe 07/13/22 0941   Wound Length (cm) 2 cm 07/20/22 0917   Wound Width (cm) 1.4 cm 07/20/22 0917   Wound Depth (cm) 0.3 cm 07/20/22 0917   Wound Surface Area (cm^2) 2.8 cm^2 07/20/22 0917   Change in Wound Size % (l*w) 34.12 07/20/22 0917   Wound Volume (cm^3) 0.84 cm^3 07/20/22 0917   Wound Healing % 1 07/20/22 0917   Post-Procedure Length (cm) 2 cm 07/20/22 0944   Post-Procedure Width (cm) 1.6 cm 07/20/22 0944   Post-Procedure Depth (cm) 0.33 cm 07/20/22 0944   Post-Procedure Surface Area (cm^2) 3.2 cm^2 07/20/22 0944   Post-Procedure Volume (cm^3) 1. 056 cm^3 07/20/22 0944   Wound Assessment Fibrin;Pink/red 07/20/22 0917   Drainage Amount Small 07/20/22 0917   Drainage Description Serosanguinous; Yellow 07/20/22 0917   Odor None 07/20/22 6202 Wound Volume (cm^3) 0.44 cm^3 07/20/22 0917   Wound Healing % 54 07/20/22 0917   Post-Procedure Length (cm) 1 cm 07/20/22 0944   Post-Procedure Width (cm) 2.2 cm 07/20/22 0944   Post-Procedure Depth (cm) 0.3 cm 07/20/22 0944   Post-Procedure Surface Area (cm^2) 2.2 cm^2 07/20/22 0944   Post-Procedure Volume (cm^3) 0.66 cm^3 07/20/22 0944   Wound Assessment Pink/red 07/20/22 0917   Drainage Amount Moderate 07/20/22 0917   Drainage Description Yellow 07/20/22 0917   Odor None 07/20/22 0917   Rashmi-wound Assessment Intact 07/20/22 0917   Number of days: 98       Wound 04/27/22 Tibial Left;Posterior #15 (Active)   Wound Image   07/13/22 0841   Dressing Status New dressing applied 07/13/22 0941   Wound Cleansed Cleansed with saline 07/13/22 0941   Dressing/Treatment Non adherent;ABD 07/13/22 0941   Offloading for Diabetic Foot Ulcers Post op shoe 07/13/22 0941   Wound Length (cm) 4.6 cm 07/20/22 0917   Wound Width (cm) 4.4 cm 07/20/22 0917   Wound Depth (cm) 0.6 cm 07/20/22 0917   Wound Surface Area (cm^2) 20.24 cm^2 07/20/22 0917   Change in Wound Size % (l*w) -44.16 07/20/22 0917   Wound Volume (cm^3) 12.144 cm^3 07/20/22 0917   Wound Healing % -73 07/20/22 0917   Post-Procedure Length (cm) 4.7 cm 07/20/22 0944   Post-Procedure Width (cm) 4.5 cm 07/20/22 0944   Post-Procedure Depth (cm) 0.7 cm 07/20/22 0944   Post-Procedure Surface Area (cm^2) 21.15 cm^2 07/20/22 0944   Post-Procedure Volume (cm^3) 14.805 cm^3 07/20/22 0944   Wound Assessment Granulation tissue;Fibrin 07/20/22 0917   Drainage Amount Large 07/20/22 0917   Drainage Description Yellow 07/20/22 0917   Odor None 07/20/22 0917   Rashmi-wound Assessment Intact 07/20/22 0917   Number of days: 84

## 2022-07-20 ENCOUNTER — HOSPITAL ENCOUNTER (OUTPATIENT)
Dept: WOUND CARE | Age: 87
Discharge: HOME | End: 2022-07-20
Payer: MEDICARE

## 2022-07-20 VITALS
HEIGHT: 72 IN | BODY MASS INDEX: 33.32 KG/M2 | SYSTOLIC BLOOD PRESSURE: 150 MMHG | DIASTOLIC BLOOD PRESSURE: 64 MMHG | WEIGHT: 246 LBS | TEMPERATURE: 45.5 F | RESPIRATION RATE: 18 BRPM | HEART RATE: 100 BPM

## 2022-07-20 DIAGNOSIS — E11.621 DIABETIC ULCER OF RIGHT HEEL ASSOCIATED WITH TYPE 2 DIABETES MELLITUS, WITH FAT LAYER EXPOSED (HCC): Primary | ICD-10-CM

## 2022-07-20 DIAGNOSIS — L97.421 DIABETIC ULCER OF LEFT HEEL ASSOCIATED WITH DIABETES MELLITUS DUE TO UNDERLYING CONDITION, LIMITED TO BREAKDOWN OF SKIN (HCC): ICD-10-CM

## 2022-07-20 DIAGNOSIS — L97.412 DIABETIC ULCER OF RIGHT HEEL ASSOCIATED WITH TYPE 2 DIABETES MELLITUS, WITH FAT LAYER EXPOSED (HCC): Primary | ICD-10-CM

## 2022-07-20 DIAGNOSIS — E08.621 DIABETIC ULCER OF LEFT HEEL ASSOCIATED WITH DIABETES MELLITUS DUE TO UNDERLYING CONDITION, LIMITED TO BREAKDOWN OF SKIN (HCC): ICD-10-CM

## 2022-07-20 PROCEDURE — 6370000000 HC RX 637 (ALT 250 FOR IP): Performed by: PODIATRIST

## 2022-07-20 PROCEDURE — 15271 SKIN SUB GRAFT TRNK/ARM/LEG: CPT

## 2022-07-20 PROCEDURE — C5271 LOW COST SKIN SUBSTITUTE APP: HCPCS

## 2022-07-20 RX ORDER — LIDOCAINE 50 MG/G
OINTMENT TOPICAL ONCE
Status: CANCELLED | OUTPATIENT
Start: 2022-07-20 | End: 2022-07-20

## 2022-07-20 RX ORDER — BETAMETHASONE DIPROPIONATE 0.05 %
OINTMENT (GRAM) TOPICAL ONCE
Status: CANCELLED | OUTPATIENT
Start: 2022-07-20 | End: 2022-07-20

## 2022-07-20 RX ORDER — LIDOCAINE HYDROCHLORIDE 40 MG/ML
SOLUTION TOPICAL ONCE
Status: CANCELLED | OUTPATIENT
Start: 2022-07-20 | End: 2022-07-20

## 2022-07-20 RX ORDER — CLOBETASOL PROPIONATE 0.5 MG/G
OINTMENT TOPICAL ONCE
Status: CANCELLED | OUTPATIENT
Start: 2022-07-20 | End: 2022-07-20

## 2022-07-20 RX ORDER — LIDOCAINE 40 MG/G
CREAM TOPICAL ONCE
Status: CANCELLED | OUTPATIENT
Start: 2022-07-20 | End: 2022-07-20

## 2022-07-20 RX ORDER — BACITRACIN ZINC AND POLYMYXIN B SULFATE 500; 1000 [USP'U]/G; [USP'U]/G
OINTMENT TOPICAL ONCE
Status: CANCELLED | OUTPATIENT
Start: 2022-07-20 | End: 2022-07-20

## 2022-07-20 RX ORDER — GENTAMICIN SULFATE 1 MG/G
OINTMENT TOPICAL ONCE
Status: CANCELLED | OUTPATIENT
Start: 2022-07-20 | End: 2022-07-20

## 2022-07-20 RX ORDER — GINSENG 100 MG
CAPSULE ORAL ONCE
Status: CANCELLED | OUTPATIENT
Start: 2022-07-20 | End: 2022-07-20

## 2022-07-20 RX ORDER — BACITRACIN, NEOMYCIN, POLYMYXIN B 400; 3.5; 5 [USP'U]/G; MG/G; [USP'U]/G
OINTMENT TOPICAL ONCE
Status: CANCELLED | OUTPATIENT
Start: 2022-07-20 | End: 2022-07-20

## 2022-07-20 RX ORDER — LIDOCAINE HYDROCHLORIDE 40 MG/ML
SOLUTION TOPICAL ONCE
Status: COMPLETED | OUTPATIENT
Start: 2022-07-20 | End: 2022-07-20

## 2022-07-20 RX ORDER — LIDOCAINE HYDROCHLORIDE 20 MG/ML
JELLY TOPICAL ONCE
Status: CANCELLED | OUTPATIENT
Start: 2022-07-20 | End: 2022-07-20

## 2022-07-20 RX ADMIN — LIDOCAINE HYDROCHLORIDE 15 ML: 40 SOLUTION TOPICAL at 09:30

## 2022-07-20 ASSESSMENT — PAIN DESCRIPTION - LOCATION: LOCATION: FOOT

## 2022-07-20 ASSESSMENT — PAIN DESCRIPTION - PAIN TYPE: TYPE: CHRONIC PAIN

## 2022-07-20 ASSESSMENT — PAIN DESCRIPTION - ONSET: ONSET: ON-GOING

## 2022-07-20 ASSESSMENT — PAIN - FUNCTIONAL ASSESSMENT: PAIN_FUNCTIONAL_ASSESSMENT: PREVENTS OR INTERFERES SOME ACTIVE ACTIVITIES AND ADLS

## 2022-07-20 ASSESSMENT — PAIN SCALES - GENERAL: PAINLEVEL_OUTOF10: 5

## 2022-07-20 ASSESSMENT — PAIN DESCRIPTION - FREQUENCY: FREQUENCY: INTERMITTENT

## 2022-07-20 ASSESSMENT — PAIN DESCRIPTION - DESCRIPTORS: DESCRIPTORS: ACHING;BURNING

## 2022-07-20 ASSESSMENT — PAIN DESCRIPTION - ORIENTATION: ORIENTATION: LEFT

## 2022-07-20 NOTE — FLOWSHEET NOTE
PuraPly AMTreatment Note    NAME:  Milton Teran. YOB: 1932  MEDICAL RECORD NUMBER:  [id number]  DATE:  7/20/2022    Goal:  Patient will receive safe and proper application of skin substitute. Patient will comply with caring for dressing, and reporting complications. Expiration date checked immediately prior to use. Package intact prior to use and no damage noted. Transport temperature controlled and acceptable. PuraPly AM was removed from protective sterile packaging by provider and applied to prepared ulcer bed. PuraPly AM was hydrated with sterile normal saline per provider. PuraPly AM was applied to left leg ulcer and affixed with steri-strips by the provider. PuraPly AM was covered with non-adherent ulcer dressing. Applied adaptic over non-adherent. Applied dry gauze and/or roll gauze. Patient/caregiver was instructed not to remove dressing and to keep it clean and dry. Pt/family/caregiver was instructed on signs and symptoms of complications to report such as draining through dressing, dressing falling down/slipping, getting wet, or severe pain or tingling. PuraPly AM may be applied a total of 10 times per wound over a 12 week period. Date of first application of PuraPly for this current wound is June 8, 2022.    Guidelines followed    Electronically signed by Negro Ma RN on 7/20/2022 at 9:54 AM

## 2022-07-20 NOTE — DISCHARGE INSTR - COC
Continuity of Care Form    Patient Name: Haylee Reyna :  1932  MRN:  [id number]    Admit date:  2022  Discharge date:  ***    Code Status Order: Prior   Advance Directives:     Admitting Physician:  No admitting provider for patient encounter. PCP: Martinez Hensley MD    Discharging Nurse: [facility] Unit/Room#: No information available for this encounter. Discharging Unit Phone Number: ***    Emergency Contact:   Extended Emergency Contact Information  Primary Emergency Contact: Theo Miles [address] Phone: [phone]  Mobile Phone: [phone]  Relation: Child   needed? No  Secondary Emergency Contact: Claude Lizarraga [place] [address] Phone: [phone]  Relation: Child    Past Surgical History:  Past Surgical History:   Procedure Laterality Date    [facility]  PICC [address] SINGLE  2021         MASTECTOMY      TOE AMPUTATION      TOE SURGERY      TONSILLECTOMY         Immunization History:   Immunization History   Administered Date(s) Administered    Influenza Virus Vaccine 10/23/2016    Pneumococcal Conjugate Vaccine 2013    Td, unspecified formulation 2012       Active Problems:  Patient Active Problem List   Diagnosis Code    HTN (hypertension) I10    [facility], [facility] C50.929    Obesity (BMI 30.0-34. 9) E66.9    Right hip pain M25.551    Diabetes mellitus type 2, uncontrolled (HCC) E11.65    Essential hypertension, benign I10    Hyperlipidemia with target LDL less than 100 E78.5    Dizzy spells R42    Troponin level elevated R77.8    Shortness of breath R06.02    Chest pressure R07.89    Acute respiratory insufficiency R06.89    Pneumonia J18.9    History of pulmonary embolus (PE) Z86.711    Acute left-sided CHF (congestive heart failure) (HCC) I50.1    Type I diabetes mellitus, uncontrolled (HCC) E10.65    Cellulitis and abscess of right lower extremity L03.115, L02.415    Cellulitis and abscess of left lower extremity L03.116, L02.416    Chronic venous insufficiency of lower extremity I87.2    Acute hypoxemic respiratory failure due to COVID-19 ([facility]) U07.1, J96.01    Acute on chronic respiratory failure with hypoxia ([facility]) J96.21    Acute respiratory failure with hypoxia ([facility]) J96.01    COVID-19 virus infection U07.1    Hyperlipidemia E78.5    Pneumonia due to COVID-19 virus U07.1, J12.82    Orthostatic hypotension I95.1    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 ([facility]) I70.244    PVD (peripheral vascular disease) ([facility]) I73.9    PAD (peripheral artery disease) ([facility]) I73.9    Diabetic ulcer of right heel associated with type 2 diabetes mellitus, with fat layer exposed ([facility] E11.621, L97.412    Diabetic ulcer of left heel ([facility]) E11.621, L97.429    Sepsis ([facility] A41.9    Diabetic foot infection ([facility]) E11.628, L08.9    Foot ulcer due to secondary DM ([facility] E13.621, L97.509       Isolation/Infection:   Isolation            No Isolation          Patient Infection Status       Infection Onset Added Last Indicated Last Indicated By Review Planned Expiration Resolved Resolved By    None active    Resolved    COVID-19 (Rule Out) 22 COVID-19, Rapid (Ordered)   22 Rule-Out Test Resulted    MRSA 21 Culture, Blood 2   03/15/22 Shaista Ray RN    MRSA blood 2021    COVID-19 21 Covid-19 Ambulatory   21     COVID-19 (Rule Out) 21 COVID-19, Rapid (Ordered)   21 Rule-Out Test Resulted    COVID-19 (Rule Out) 10/16/20 10/16/20 10/16/20 Covid-19 Ambulatory (Ordered)   10/17/20 Rule-Out Test Resulted    COVID-19 (Rule Out) 20 Covid-19 Ambulatory (Ordered)   20 Rule-Out Test Resulted    COVID-19 (Rule Out) 20 Covid-19 Ambulatory (Ordered)   20 Rule-Out Test Resulted    COVID-19 (Rule Out) 20 Covid-19 Ambulatory (Ordered)   07/30/20 Rule-Out Test Resulted            Nurse Assessment:  Last Vital Signs: BP (!) 150/64   Pulse 100   Temp (!) 45.5 °F (7.5 °C) (Temporal)   Resp 18   Ht 6' (1.829 m)   Wt 246 lb (111.6 kg)   BMI 33.36 kg/m²     Last documented pain score (0-10 scale): Pain Level: 5  Last Weight:   Wt Readings from Last 1 Encounters:   07/20/22 246 lb (111.6 kg)     Mental Status:  {IP PT MENTAL STATUS:20030}    IV Access:  { AGUSTINA IV ACCESS:654859581}    Nursing Mobility/ADLs:  Walking   {CHP DME JCOE:762278347}  Transfer  {CHP DME ZAMR:254763430}  Bathing  {CHP DME NSML:644196669}  Dressing  {CHP DME BNDP:337792589}  Toileting  {CHP DME EJNL:861015906}  Feeding  {CHP DME ZAUI:193254015}  Med Admin  {CHP DME CIMS:414123560}  Med Delivery   { AGUSTINA MED Delivery:484956367}    Wound Care Documentation and Therapy:  Wound 01/05/22 Heel Left #1 (Active)   Wound Image   07/13/22 0841   Wound Etiology Diabetic Campbell 2 01/05/22 0943   Dressing Status New dressing applied 07/13/22 0941   Wound Cleansed Cleansed with saline 07/13/22 0941   Dressing/Treatment Honey gel/honey paste;ABD;Dry dressing 07/13/22 0941   Offloading for Diabetic Foot Ulcers Post op shoe 07/13/22 0941   Wound Length (cm) 4.1 cm 07/20/22 0917   Wound Width (cm) 2.6 cm 07/20/22 0917   Wound Depth (cm) 0.3 cm 07/20/22 0917   Wound Surface Area (cm^2) 10.66 cm^2 07/20/22 0917   Change in Wound Size % (l*w) 22.75 07/20/22 0917   Wound Volume (cm^3) 3.198 cm^3 07/20/22 0917   Wound Healing % -16 07/20/22 0917   Post-Procedure Length (cm) 4.2 cm 07/20/22 0944   Post-Procedure Width (cm) 2.7 cm 07/20/22 0944   Post-Procedure Depth (cm) 0.3 cm 07/20/22 0944   Post-Procedure Surface Area (cm^2) 11.34 cm^2 07/20/22 0944   Post-Procedure Volume (cm^3) 3.402 cm^3 07/20/22 0944   Wound Assessment Pink/red;Fibrin 07/20/22 0917   Drainage Amount Moderate 07/20/22 0917   Drainage Description Yellow;Brown 07/20/22 0917   Odor None 07/20/22 0917   Rashmi-wound Assessment Maceration 07/20/22 0917   Wound Thickness Description not for Pressure Injury Full thickness 01/19/22 0909   Number of days: 195       Wound 01/05/22 Heel Right #2 (Active)   Wound Image   07/13/22 0841   Wound Etiology Venous 01/05/22 0943   Dressing Status New dressing applied 07/13/22 0941   Wound Cleansed Cleansed with saline 07/13/22 0941   Dressing/Treatment ABD;Dry dressing;Honey gel/honey paste 07/13/22 0941   Offloading for Diabetic Foot Ulcers Post op shoe 07/13/22 0941   Wound Length (cm) 2 cm 07/20/22 0917   Wound Width (cm) 1.4 cm 07/20/22 0917   Wound Depth (cm) 0.3 cm 07/20/22 0917   Wound Surface Area (cm^2) 2.8 cm^2 07/20/22 0917   Change in Wound Size % (l*w) 34.12 07/20/22 0917   Wound Volume (cm^3) 0.84 cm^3 07/20/22 0917   Wound Healing % 1 07/20/22 0917   Post-Procedure Length (cm) 2 cm 07/20/22 0944   Post-Procedure Width (cm) 1.6 cm 07/20/22 0944   Post-Procedure Depth (cm) 0.33 cm 07/20/22 0944   Post-Procedure Surface Area (cm^2) 3.2 cm^2 07/20/22 0944   Post-Procedure Volume (cm^3) 1. 056 cm^3 07/20/22 0944   Wound Assessment Fibrin;Pink/red 07/20/22 0917   Drainage Amount Small 07/20/22 0917   Drainage Description Serosanguinous; Yellow 07/20/22 0917   Odor None 07/20/22 0917   Rashmi-wound Assessment Maceration 07/20/22 0917   Wound Thickness Description not for Pressure Injury Full thickness 01/19/22 0909   Number of days: 195       Wound 03/14/22 Coccyx (Active)   Number of days: 127       Wound 03/23/22 Toe (Comment  which one) Left #10 Left Great Toe (Active)   Wound Image   07/13/22 0841   Dressing Status New dressing applied 07/13/22 0941   Wound Cleansed Cleansed with saline 07/13/22 0941   Dressing/Treatment Honey gel/honey paste;Dry dressing 07/13/22 0941   Offloading for Diabetic Foot Ulcers Post op shoe 07/13/22 0941   Wound Length (cm) 0.6 cm 07/20/22 0917   Wound Width (cm) 0.6 cm 07/20/22 0917   Wound Depth (cm) 0.2 cm 07/20/22 0917   Wound Surface Area (cm^2) 0.36 cm^2 07/20/22 0917   Change in Wound Size % (l*w) 97 07/20/22 0917   Wound Volume (cm^3) 0.072 cm^3 07/20/22 0917   Wound Healing % 94 07/20/22 0917   Post-Procedure Length (cm) 0.3 cm 07/06/22 0933   Post-Procedure Width (cm) 0.4 cm 07/06/22 0933   Post-Procedure Depth (cm) 0.2 cm 07/06/22 0933   Post-Procedure Surface Area (cm^2) 0.12 cm^2 07/06/22 0933   Post-Procedure Volume (cm^3) 0.024 cm^3 07/06/22 0933   Wound Assessment Fibrin;Pink/red 07/20/22 0917   Drainage Amount Small 07/20/22 0917   Drainage Description Yellow 07/20/22 0917   Odor None 07/20/22 0917   Rashmi-wound Assessment Hyperkeratosis (callous) 07/20/22 0917   Number of days: 119       Wound 04/13/22 Pretibial Distal;Left;Lateral #14 (blocked may 25)  (Active)   Wound Image   07/13/22 0841   Dressing Status New dressing applied 07/13/22 0941   Wound Cleansed Cleansed with saline 07/13/22 0941   Dressing/Treatment Dry dressing;Honey gel/honey paste 07/13/22 0941   Offloading for Diabetic Foot Ulcers Post op shoe 07/13/22 0941   Wound Length (cm) 1 cm 07/20/22 0917   Wound Width (cm) 2.2 cm 07/20/22 0917   Wound Depth (cm) 0.2 cm 07/20/22 0917   Wound Surface Area (cm^2) 2.2 cm^2 07/20/22 0917   Change in Wound Size % (l*w) 77.08 07/20/22 0917   Wound Volume (cm^3) 0.44 cm^3 07/20/22 0917   Wound Healing % 54 07/20/22 0917   Post-Procedure Length (cm) 1 cm 07/20/22 0944   Post-Procedure Width (cm) 2.2 cm 07/20/22 0944   Post-Procedure Depth (cm) 0.3 cm 07/20/22 0944   Post-Procedure Surface Area (cm^2) 2.2 cm^2 07/20/22 0944   Post-Procedure Volume (cm^3) 0.66 cm^3 07/20/22 0944   Wound Assessment Pink/red 07/20/22 0917   Drainage Amount Moderate 07/20/22 0917   Drainage Description Yellow 07/20/22 0917   Odor None 07/20/22 0917   Rashmi-wound Assessment Intact 07/20/22 0917   Number of days: 98       Wound 04/27/22 Tibial Left;Posterior #15 (Active)   Wound Image   07/13/22 0841   Dressing Status New dressing applied 07/13/22 0941   Wound Cleansed Cleansed with saline 22   Dressing/Treatment Non adherent;ABD 22   Offloading for Diabetic Foot Ulcers Post op shoe 22   Wound Length (cm) 4.6 cm 22   Wound Width (cm) 4.4 cm 22   Wound Depth (cm) 0.6 cm 22   Wound Surface Area (cm^2) 20.24 cm^2 22   Change in Wound Size % (l*w) -44.16 22   Wound Volume (cm^3) 12.144 cm^3 22   Wound Healing % -73 22   Post-Procedure Length (cm) 4.7 cm 22   Post-Procedure Width (cm) 4.5 cm 22   Post-Procedure Depth (cm) 0.7 cm 22   Post-Procedure Surface Area (cm^2) 21.15 cm^2 22   Post-Procedure Volume (cm^3) 14.805 cm^3 22   Wound Assessment Granulation tissue;Fibrin 22   Drainage Amount Large 22   Drainage Description Yellow 22   Odor None 22   Rashmi-wound Assessment Intact 22   Number of days: 84        Elimination:  Continence: Bowel: {YES / RENA:20237}  Bladder: {YES / VB:04983}  Urinary Catheter: {Urinary Catheter:191005306}   Colostomy/Ileostomy/Ileal Conduit: {YES / WT:60082}       Date of Last BM: ***  No intake or output data in the 24 hours ending 22 0950  No intake/output data recorded.     Safety Concerns:     [email or web address] Safety Concerns:844690425}    Impairments/Disabilities:      [address] [email or web address] Impairments/Disabilities:120486170}    Nutrition Therapy:  Current Nutrition Therapy:   [email or web address] Diet List:341209140}    Routes of Feeding: {CHP DME Other Feedings:753763429}  Liquids: {Slp liquid thickness:03966}  Daily Fluid Restriction: {CHP DME Yes amt example:385684842}  Last Modified Barium Swallow with Video (Video Swallowing Test): {Done Not Done [facility]}    Treatments at the Time of Hospital Discharge:   Respiratory Treatments: ***  Oxygen Therapy:  {Therapy; copd oxygen:83611}  Ventilator:    {MH CC Vent IZSJ:518681620}    Rehab Therapies: {THERAPEUTIC INTERVENTION:4755529205}  Weight Bearing Status/Restrictions: 50Celio Gill CC Weight Bearin}  Other Medical Equipment (for information only, NOT a DME order):  {EQUIPMENT:443827494}  Other Treatments: ***    Patient's personal belongings (please select all that are sent with patient):  {CHP DME Belongings:962111361}    RN SIGNATURE:  {Esignature:027348265}    CASE MANAGEMENT/SOCIAL WORK SECTION    Inpatient Status Date: ***    Readmission Risk Assessment Score:  Readmission Risk              Risk of Unplanned Readmission:  0           Discharging to Facility/ Agency   Name:   Address:  Phone:  Fax:    Dialysis Facility (if applicable)   Name:  Address:  Dialysis Schedule:  Phone:  Fax:    / signature: {Esignature:868890117}    PHYSICIAN SECTION    Prognosis: {Prognosis:0488456485}    Condition at Discharge: Tani Gill Patient Condition:228176564}    Rehab Potential (if transferring to Rehab): {Prognosis:9681516050}    Recommended Labs or Other Treatments After Discharge: ***    Physician Certification: I certify the above information and transfer of Gianfranco De Leon  is necessary for the continuing treatment of the diagnosis listed and that he requires {Admit to Appropriate Level of Care:77349} for {GREATER/LESS:767756814} 30 days.      Update Admission H&P: {CHP DME Changes in IEVIR:652540092}    PHYSICIAN SIGNATURE:  {Esignature:907621274}

## 2022-07-20 NOTE — PROGRESS NOTES
[facility]  History and Physical/Consultation  Podiatry    Referring Physician : Greer Hopkins MD  Kirsten Hahn. MEDICAL RECORD NUMBER:  [id number]  AGE: 80 y.o. GENDER: male  : 1932  EPISODE DATE:  2022  Subjective:     Chief Complaint   Patient presents with    Wound Check         HISTORY of PRESENT ILLNESS HPI     Kirsten Hahn. is a 80 y.o. male who presents today for wound/ulcer evaluation. History of Wound Context:  The patient has had a wound of diabetic b/l heels,and right ankle which was first noted approximately months ago. This has been treated betadyne. On their initial visit to the wound healing center, 22 ,  the patient has noted that the wound has been improving. The patient has had similar previous wounds in the past.      Pt is not on abx at time of initial visit.       Wound/Ulcer Pain Timing/Severity: constant  Quality of pain: sharp  Severity:  3 / 10   Modifying Factors: Pain worsens with walking  Associated Signs/Symptoms: edema, erythema and drainage    Ulcer Identification:  Ulcer Type: diabetic  Contributing Factors: edema and diabetes    Diabetic/Pressure/Non Pressure Ulcers onl y:  Ulcer: Diabetic ulcer, fat layer exposed    If patient has diabetic lower extremity wounds  Campbell Classification of diabetic lower extremity wounds:    Grade Description   []  0 No open wound   []  1 Superficial ulcer involving the full skin thickness   [x]  2 Deep ulcer involves ligament, tendon, joint capsule, or fascia  No bone involvement or abscess presence   []  3 Deep Ulcer with abcess formation and/or osteomyelitis   []  4 Localized gangrene   []  5 Extensive gangrene of the foot     Wound: N/A          22  Debrided, cont tx and care     22  Debrided, cont tx and care, await graft approval    22  Debrided, cont tx and care, puraply applied    22  Debrided, dermagraft applied    22  Debrided, dermagraft applied    22  Debrided, tobacco: Never   Vaping Use    Vaping Use: Never used   Substance Use Topics    Alcohol use: Not Currently     Alcohol/week: 1.0 standard drink     Types: 1 Cans of beer per week    Drug use: No     Allergies   Allergen Reactions    Clindamycin/Lincomycin     Sulfa Antibiotics      Current Outpatient Medications on File Prior to Encounter   Medication Sig Dispense Refill    apixaban (ELIQUIS) 2.5 MG TABS tablet Take 1 tablet by mouth 2 times daily (before meals) 60 tablet 2    docusate sodium (COLACE) 100 MG capsule Take 200 mg by mouth nightly       tamsulosin (FLOMAX) 0.4 MG capsule Take 0.4 mg by mouth daily      bisacodyl (DULCOLAX) 10 MG suppository Place 10 mg rectally daily as needed for Constipation      diphenhydrAMINE-zinc acetate (BENADRYL) 1-0.1 % cream Apply topically every 4 hours as needed for Itching Apply topically 3 times daily as needed. Sodium Phosphates (FLEET) 7-19 GM/118ML Place 1 enema rectally daily as needed      magnesium hydroxide (MILK OF MAGNESIA) 400 MG/5ML suspension Take 30 mLs by mouth daily as needed for Constipation      Polyethylene Glycol 400 1 % SOLN Apply 1 drop to eye every 4 hours as needed      ferrous sulfate (IRON 325) 325 (65 Fe) MG tablet Take 325 mg by mouth daily       zinc sulfate (ZINCATE) 220 (50 Zn) MG capsule Take 1 capsule by mouth daily for 14 days 14 capsule 0    miconazole (MICOTIN) 2 % powder Apply topically 2 times daily.  45 g 1    ascorbic acid (VITAMIN C) 500 MG tablet Take 1 tablet by mouth 2 times daily 30 tablet 0    Vitamin D (CHOLECALCIFEROL) 50 MCG (2000 UT) TABS tablet Take 1 tablet by mouth daily 60 tablet 0    furosemide (LASIX) 40 MG tablet Take 20 mg by mouth daily       omeprazole (PRILOSEC) 20 MG delayed release capsule Take 20 mg by mouth daily      Multiple Vitamins-Minerals (THERAPEUTIC MULTIVITAMIN-MINERALS) tablet Take 1 tablet by mouth daily      Multiple Vitamins-Minerals (PRESERVISION AREDS PO) Take 1 tablet by mouth 2 times daily (with meals)      acetaminophen (TYLENOL) 325 MG tablet Take 650 mg by mouth every 4 hours as needed for Pain or Fever       calcium carbonate (TUMS) 500 MG chewable tablet Take 1 tablet by mouth every 4 hours as needed for Heartburn      ipratropium-albuterol (DUONEB) 0.5-2.5 (3) MG/3ML SOLN nebulizer solution Inhale 3 mLs into the lungs every 4 hours (while awake) for 30 doses 90 mL 0    gabapentin (NEURONTIN) 100 MG capsule Take 200 mg by mouth Daily with supper. atorvastatin (LIPITOR) 80 MG tablet Take 80 mg by mouth at bedtime       INSULIN LISP PROT & LISP, HUM, (75-25) 100 UNIT/ML SUSP Inject 72 Units into the skin daily (with breakfast)       INSULIN LISP PROT & LISP, HUM, (75-25) 100 UNIT/ML SUSP Inject 55 Units into the skin Daily with supper       clotrimazole-betamethasone (LOTRISONE) cream Apply  topically as needed. Apply topically 2 times daily. No current facility-administered medications on file prior to encounter.        REVIEW OF SYSTEMS   ROS : All others Negative if blank [], Positive if [x]  General Vascular   [] Fevers [] Claudication   [] Chills [] Rest Pain   Skin Neurologic   [x] Tissue Loss [x] Lower extremity neuropathy     Objective:    BP (!) 150/64   Pulse 100   Temp (!) 45.5 °F (7.5 °C) (Temporal)   Resp 18   Ht 6' (1.829 m)   Wt 246 lb (111.6 kg)   BMI 33.36 kg/m²   Wt Readings from Last 3 Encounters:   07/20/22 246 lb (111.6 kg)   06/22/22 246 lb (111.6 kg)   06/15/22 234 lb (106.1 kg)       PHYSICAL EXAM   CONSTITUTIONAL:   Awake, alert, cooperative   PSYCHIATRIC :  Oriented to time, place and person      normal insight to disease process  EXTREMITIES:   R LE Open wounds are noted   Skin color is abnormal with ulcers   Edema is  noted   Sensation deficit noted - protective   Palpation of the foot does cause pain   4/5 strength DF/PF  L LE Open wounds are noted   Skin color is abnormal with ulcers   Edema is  noted   Sensation deficit noted - protective   Palpation of the foot does cause pain   4/5 strength DF/PF  R dorsalis pedis 1 L dorsalis pedis 1   R posterior tibial 1 L posterior tibial 1     Assessment:     Problem List Items Addressed This Visit       Diabetic ulcer of right heel associated with type 2 diabetes mellitus, with fat layer exposed ([facility] [address] - Primary    Relevant Orders    Initiate Outpatient Wound Care Protocol    Diabetic ulcer of left heel ([facility] [address]    Relevant Orders    Initiate Outpatient Wound Care Protocol       Pre Debridement Measurements:  Are located in the [facility]  Documentation Flow Sheet  Post Debridement Measurements:  Wound/Ulcer Descriptions are Pre Debridement except measurements:     Wound 01/05/22 Heel Left #1 (Active)   Wound Image   07/13/22 0841   Wound Etiology Diabetic Campbell 2 01/05/22 0943   Dressing Status New dressing applied 07/13/22 0941   Wound Cleansed Cleansed with saline 07/13/22 0941   Dressing/Treatment Honey gel/honey paste;ABD;Dry dressing 07/13/22 0941   Offloading for Diabetic Foot Ulcers Post op shoe 07/13/22 0941   Wound Length (cm) 4.1 cm 07/20/22 0917   Wound Width (cm) 2.6 cm 07/20/22 0917   Wound Depth (cm) 0.3 cm 07/20/22 0917   Wound Surface Area (cm^2) 10.66 cm^2 07/20/22 0917   Change in Wound Size % (l*w) 22.75 07/20/22 0917   Wound Volume (cm^3) 3.198 cm^3 07/20/22 0917   Wound Healing % -16 07/20/22 0917   Post-Procedure Length (cm) 4.2 cm 07/20/22 0944   Post-Procedure Width (cm) 2.7 cm 07/20/22 0944   Post-Procedure Depth (cm) 0.3 cm 07/20/22 0944   Post-Procedure Surface Area (cm^2) 11.34 cm^2 07/20/22 0944   Post-Procedure Volume (cm^3) 3.402 cm^3 07/20/22 0944   Wound Assessment Pink/red;Fibrin 07/20/22 0917   Drainage Amount Moderate 07/20/22 0917   Drainage Description Yellow;Brown 07/20/22 0917   Odor None 07/20/22 0917   Rashmi-wound Assessment Maceration 07/20/22 0917   Wound Thickness Description not for Pressure Injury Full thickness 01/19/22 0909   Number of days: 195 Wound 01/05/22 Heel Right #2 (Active)   Wound Image   07/13/22 0841   Wound Etiology Venous 01/05/22 0943   Dressing Status New dressing applied 07/13/22 0941   Wound Cleansed Cleansed with saline 07/13/22 0941   Dressing/Treatment ABD;Dry dressing;Honey gel/honey paste 07/13/22 0941   Offloading for Diabetic Foot Ulcers Post op shoe 07/13/22 0941   Wound Length (cm) 2 cm 07/20/22 0917   Wound Width (cm) 1.4 cm 07/20/22 0917   Wound Depth (cm) 0.3 cm 07/20/22 0917   Wound Surface Area (cm^2) 2.8 cm^2 07/20/22 0917   Change in Wound Size % (l*w) 34.12 07/20/22 0917   Wound Volume (cm^3) 0.84 cm^3 07/20/22 0917   Wound Healing % 1 07/20/22 0917   Post-Procedure Length (cm) 2 cm 07/20/22 0944   Post-Procedure Width (cm) 1.6 cm 07/20/22 0944   Post-Procedure Depth (cm) 0.33 cm 07/20/22 0944   Post-Procedure Surface Area (cm^2) 3.2 cm^2 07/20/22 0944   Post-Procedure Volume (cm^3) 1. 056 cm^3 07/20/22 0944   Wound Assessment Fibrin;Pink/red 07/20/22 0917   Drainage Amount Small 07/20/22 0917   Drainage Description Serosanguinous; Yellow 07/20/22 0917   Odor None 07/20/22 0917   Rashmi-wound Assessment Maceration 07/20/22 0917   Wound Thickness Description not for Pressure Injury Full thickness 01/19/22 0909   Number of days: 195       Wound 03/14/22 Coccyx (Active)   Number of days: 127       Wound 03/23/22 Toe (Comment  which one) Left #10 Left Great Toe (Active)   Wound Image   07/13/22 0841   Dressing Status New dressing applied 07/13/22 0941   Wound Cleansed Cleansed with saline 07/13/22 0941   Dressing/Treatment Honey gel/honey paste;Dry dressing 07/13/22 0941   Offloading for Diabetic Foot Ulcers Post op shoe 07/13/22 0941   Wound Length (cm) 0.6 cm 07/20/22 0917   Wound Width (cm) 0.6 cm 07/20/22 0917   Wound Depth (cm) 0.2 cm 07/20/22 0917   Wound Surface Area (cm^2) 0.36 cm^2 07/20/22 0917   Change in Wound Size % (l*w) 97 07/20/22 0917   Wound Volume (cm^3) 0.072 cm^3 07/20/22 0917   Wound Healing % 94 07/20/22 1058   Post-Procedure Length (cm) 0.3 cm 07/06/22 0933   Post-Procedure Width (cm) 0.4 cm 07/06/22 0933   Post-Procedure Depth (cm) 0.2 cm 07/06/22 0933   Post-Procedure Surface Area (cm^2) 0.12 cm^2 07/06/22 0933   Post-Procedure Volume (cm^3) 0.024 cm^3 07/06/22 0933   Wound Assessment Fibrin;Pink/red 07/20/22 0917   Drainage Amount Small 07/20/22 0917   Drainage Description Yellow 07/20/22 0917   Odor None 07/20/22 0917   Rashmi-wound Assessment Hyperkeratosis (callous) 07/20/22 0917   Number of days: 119       Wound 04/13/22 Pretibial Distal;Left;Lateral #14 (blocked may 25)  (Active)   Wound Image   07/13/22 0841   Dressing Status New dressing applied 07/13/22 0941   Wound Cleansed Cleansed with saline 07/13/22 0941   Dressing/Treatment Dry dressing;Honey gel/honey paste 07/13/22 0941   Offloading for Diabetic Foot Ulcers Post op shoe 07/13/22 0941   Wound Length (cm) 1 cm 07/20/22 0917   Wound Width (cm) 2.2 cm 07/20/22 0917   Wound Depth (cm) 0.2 cm 07/20/22 0917   Wound Surface Area (cm^2) 2.2 cm^2 07/20/22 0917   Change in Wound Size % (l*w) 77.08 07/20/22 0917   Wound Volume (cm^3) 0.44 cm^3 07/20/22 0917   Wound Healing % 54 07/20/22 0917   Post-Procedure Length (cm) 1 cm 07/20/22 0944   Post-Procedure Width (cm) 2.2 cm 07/20/22 0944   Post-Procedure Depth (cm) 0.3 cm 07/20/22 0944   Post-Procedure Surface Area (cm^2) 2.2 cm^2 07/20/22 0944   Post-Procedure Volume (cm^3) 0.66 cm^3 07/20/22 0944   Wound Assessment Pink/red 07/20/22 0917   Drainage Amount Moderate 07/20/22 0917   Drainage Description Yellow 07/20/22 0917   Odor None 07/20/22 0917   Rashmi-wound Assessment Intact 07/20/22 0917   Number of days: 98       Wound 04/27/22 Tibial Left;Posterior #15 (Active)   Wound Image   07/13/22 0841   Dressing Status New dressing applied 07/13/22 0941   Wound Cleansed Cleansed with saline 07/13/22 0941   Dressing/Treatment Non adherent;ABD 07/13/22 0941   Offloading for Diabetic Foot Ulcers Post op shoe 07/13/22 DERMAGRAFT  [] 38sq/cm   []76 sq/cm    []114 sq/cm  []152 sq/cm         [] OASIS   [] 10.5 sq/cm   []21 sq/cm    []4 sq/cm PuraPly  []8 sq/cm PuraPly        [x] OTHER puraply 16    []        sq/cm       Performed by: [place] DPM    Wound Type:venous    Consent obtained: Yes    Time out taken: Yes     Fenestrated: Yes    Instrument(s) curette and #15 blade scalpel      [] Mesher Utilized    Skin Substitute was Applied to Wound Number(s): Wound #: 5      Wound 01/05/22 Heel Left #1 (Active)   Wound Image   06/08/22 0916   Wound Etiology Diabetic Campbell 2 01/05/22 0943   Dressing Status New dressing applied 06/01/22 1052   Wound Cleansed Cleansed with saline 06/01/22 1052   Dressing/Treatment ABD;Pharmaceutical agent (see MAR); Roll gauze 06/01/22 1052   Offloading for Diabetic Foot Ulcers Post op shoe 06/01/22 1052   Wound Length (cm) 4 cm 06/08/22 0916   Wound Width (cm) 3.5 cm 06/08/22 0916   Wound Depth (cm) 0.2 cm 06/08/22 0916   Wound Surface Area (cm^2) 14 cm^2 06/08/22 0916   Change in Wound Size % (l*w) -1.45 06/08/22 0916   Wound Volume (cm^3) 2.8 cm^3 06/08/22 0916   Wound Healing % -1 06/08/22 0916   Post-Procedure Length (cm) 4 cm 06/08/22 0951   Post-Procedure Width (cm) 3.5 cm 06/08/22 0951   Post-Procedure Depth (cm) 0.2 cm 06/08/22 0951   Post-Procedure Surface Area (cm^2) 14 cm^2 06/08/22 0951   Post-Procedure Volume (cm^3) 2.8 cm^3 06/08/22 0951   Wound Assessment Fibrin;Pink/red 06/08/22 0916   Drainage Amount Moderate 06/08/22 0916   Drainage Description Serosanguinous 06/08/22 0916   Odor None 06/08/22 0916   Rashmi-wound Assessment Maceration;Fragile 06/08/22 0916   Wound Thickness Description not for Pressure Injury Full thickness 01/19/22 0909   Number of days: 154       Wound 01/05/22 Heel Right #2 (Active)   Wound Image   06/08/22 0916   Wound Etiology Venous 01/05/22 0943   Dressing Status New dressing applied 06/01/22 1052   Wound Cleansed Cleansed with saline 06/01/22 1052 Dressing/Treatment ABD;Pharmaceutical agent (see MAR); Roll gauze 06/01/22 1052   Offloading for Diabetic Foot Ulcers Post op shoe 06/01/22 1052   Wound Length (cm) 2.5 cm 06/08/22 0916   Wound Width (cm) 1.6 cm 06/08/22 0916   Wound Depth (cm) 0.2 cm 06/08/22 0916   Wound Surface Area (cm^2) 4 cm^2 06/08/22 0916   Change in Wound Size % (l*w) 5.88 06/08/22 0916   Wound Volume (cm^3) 0.8 cm^3 06/08/22 0916   Wound Healing % 6 06/08/22 0916   Post-Procedure Length (cm) 2.5 cm 06/08/22 0951   Post-Procedure Width (cm) 1.8 cm 06/08/22 0951   Post-Procedure Depth (cm) 0.3 cm 06/08/22 0951   Post-Procedure Surface Area (cm^2) 4.5 cm^2 06/08/22 0951   Post-Procedure Volume (cm^3) 1.35 cm^3 06/08/22 0951   Wound Assessment Pink/red;Fibrin 06/08/22 0916   Drainage Amount Small 06/08/22 0916   Drainage Description Serosanguinous 06/08/22 0916   Odor None 06/08/22 0916   Rashmi-wound Assessment Intact;Fragile 06/08/22 0916   Wound Thickness Description not for Pressure Injury Full thickness 01/19/22 0909   Number of days: 154       Wound 03/14/22 Coccyx (Active)   Number of days: 85       Wound 03/23/22 Toe (Comment  which one) Left #10 Left Great Toe (Active)   Wound Image   06/08/22 0916   Dressing Status New dressing applied 06/01/22 1052   Wound Cleansed Cleansed with saline 06/01/22 1052   Dressing/Treatment Pharmaceutical agent (see MAR); Dry dressing 06/01/22 1052   Offloading for Diabetic Foot Ulcers Post op shoe 06/01/22 1052   Wound Length (cm) 1 cm 06/08/22 0916   Wound Width (cm) 0.8 cm 06/08/22 0916   Wound Depth (cm) 0.1 cm 06/08/22 0916   Wound Surface Area (cm^2) 0.8 cm^2 06/08/22 0916   Change in Wound Size % (l*w) 93.33 06/08/22 0916   Wound Volume (cm^3) 0.08 cm^3 06/08/22 0916   Wound Healing % 93 06/08/22 0916   Post-Procedure Length (cm) 1 cm 06/08/22 0951   Post-Procedure Width (cm) 1 cm 06/08/22 0951   Post-Procedure Depth (cm) 0.2 cm 06/08/22 0951   Post-Procedure Surface Area (cm^2) 1 cm^2 06/08/22 9673   Post-Procedure Volume (cm^3) 0.2 cm^3 06/08/22 0951   Wound Assessment Dry 06/08/22 0916   Drainage Amount Scant 06/08/22 0916   Drainage Description Thin;Yellow 06/08/22 0916   Odor None 06/08/22 0916   Rashmi-wound Assessment Intact; Hyperkeratosis (callous) 06/08/22 0916   Number of days: 77       Wound 04/13/22 Pretibial Distal;Left;Lateral #14 (blocked may 25)  (Active)   Wound Image   06/08/22 0916   Dressing Status New dressing applied 06/01/22 1052   Wound Cleansed Cleansed with saline 06/01/22 1052   Dressing/Treatment Dry dressing;ABD 06/01/22 1052   Offloading for Diabetic Foot Ulcers Post op shoe 06/01/22 1052   Wound Length (cm) 0.6 cm 06/08/22 0916   Wound Width (cm) 1.8 cm 06/08/22 0916   Wound Depth (cm) 0.2 cm 06/08/22 0916   Wound Surface Area (cm^2) 1.08 cm^2 06/08/22 0916   Change in Wound Size % (l*w) 88.75 06/08/22 0916   Wound Volume (cm^3) 0.216 cm^3 06/08/22 0916   Wound Healing % 78 06/08/22 0916   Post-Procedure Length (cm) 0.7 cm 06/08/22 0951   Post-Procedure Width (cm) 1.9 cm 06/08/22 0951   Post-Procedure Depth (cm) 0.2 cm 06/08/22 0951   Post-Procedure Surface Area (cm^2) 1.33 cm^2 06/08/22 0951   Post-Procedure Volume (cm^3) 0.266 cm^3 06/08/22 0951   Wound Assessment Fibrinous 06/08/22 0916   Drainage Amount Moderate 06/08/22 0916   Drainage Description Yellow 06/08/22 0916   Odor None 06/08/22 0916   Rashmi-wound Assessment Dry/flaky; Intact 06/08/22 0916   Number of days: 56       Wound 04/27/22 Tibial Left;Posterior #15 (Active)   Wound Image   06/08/22 0916   Dressing Status New dressing applied 06/01/22 1052   Wound Cleansed Cleansed with saline 06/01/22 1052   Dressing/Treatment ABD;Pharmaceutical agent (see MAR); Roll gauze 06/01/22 1052   Offloading for Diabetic Foot Ulcers Post op shoe 06/01/22 1052   Wound Length (cm) 3.8 cm 06/08/22 0916   Wound Width (cm) 4 cm 06/08/22 0916   Wound Depth (cm) 0.6 cm 06/08/22 0916   Wound Surface Area (cm^2) 15.2 cm^2 06/08/22 0916 Change in Wound Size % (l*w) -8.26 06/08/22 0916   Wound Volume (cm^3) 9.12 cm^3 06/08/22 0916   Wound Healing % -30 06/08/22 0916   Post-Procedure Length (cm) 3.8 cm 06/08/22 0951   Post-Procedure Width (cm) 4 cm 06/08/22 0951   Post-Procedure Depth (cm) 0.7 cm 06/08/22 0951   Post-Procedure Surface Area (cm^2) 15.2 cm^2 06/08/22 0951   Post-Procedure Volume (cm^3) 10.64 cm^3 06/08/22 0951   Wound Assessment Fibrin;Pink/red 06/08/22 0916   Drainage Amount Moderate 06/08/22 0916   Drainage Description Serosanguinous; Yellow 06/08/22 0916   Odor None 06/08/22 0916   Rashmi-wound Assessment Intact;Fragile 06/08/22 0916   Number of days: 42         Total Surface Area Covered 16 sq/cm     Amount Wasted 0 sq/cm    Reason for Waste none      Secured: Yes    Secured With:  [x]Steri Strips    []Sutures     []Staples []Other    Procedural Pain: 1/10     Post Procedural Pain: 2 / 10    Response to Treatment:  Well tolerated by patient. Plan:     Pt is not a smoker   - Discussed relationship of smoking and negative affects on wound healing   - Emphasized importance of tobacco avoidace/cessation   - Script for nicotine patch given to patient   - Information regarding support groups for smoking cessation given    In my professional opinion and based off the information that is available at this time this patient has appropriate indication for HBO Therapy: Maybe    Treatment Note please see attached Discharge Instructions    Written patient dismissal instructions given to patient and signed by patient or POA. Discharge Instructions         Visit Discharge/Physician Orders     Discharge condition: Stable     Assessment of pain at discharge:  minimal     Anesthetic used: 4% lidocaine solution     Discharge to: Home     Left via:Private automobile     Accompanied by: accompanied by SELF     ECF/[facility]:  [facility].      Dressing Orders:  Clean bilateral heel ulcers with NSS, apply medi-honey patch  Change every other day   left lower leg medihoney gel and dry dressing every other day   left great toe cleanse with normal saline apply medihoney dry dressing every other day  TO LEFT LEG ULCER, graft applied , mepitel in place  MAY APPLY ALGINATE TO LIGIA WOUND IF MACERATEDTHEN  WOUND VAC AT 1255MMHG CHANGE Monday Wednesday friday. IF VAC MALFUNCTIONS APPLY WET TO DRY DRESSING UNTIL VAC CAN BE REPLACED- be careful not to disrupt graft        APPLY PREVALON BOOT TO BILATERAL HEELS     Physical therapy for ambulating safely while wearing offloading boots as indicated. [facility] followup visit ________1 weeks with Dr. Gastelum__wednesday___________________  (Please note your next appointment above and if you are unable to keep, kindly give a 24 hour notice. Thank you.)     Physician signature:__________________________        If you experience any of the following, please call the [address] [place] [address] Road during business hours:     * Increase in Pain  * Temperature over 101  * Increase in drainage from your wound  * Drainage with a foul odor  * Bleeding  * Increase in swelling  * Need for compression bandage changes due to slippage, breakthrough drainage.                                         Electronically signed by Juliana Adair DPM on 7/20/2022 at 9:49 AM

## 2022-07-20 NOTE — PROGRESS NOTES
[facility]  History and Physical/Consultation  Podiatry    Referring Physician : Seth De Guzman MD  Dwaine Denery. MEDICAL RECORD NUMBER:  [id number]  AGE: 80 y.o. GENDER: male  : 1932  EPISODE DATE:  2022  Subjective:     Chief Complaint   Patient presents with    Wound Check         HISTORY of PRESENT ILLNESS HPI     Dwaine Jc is a 80 y.o. male who presents today for wound/ulcer evaluation. History of Wound Context:  The patient has had a wound of diabetic b/l heels,and right ankle which was first noted approximately months ago. This has been treated betadyne. On their initial visit to the wound healing center, 22 ,  the patient has noted that the wound has been improving. The patient has had similar previous wounds in the past.      Pt is not on abx at time of initial visit.       Wound/Ulcer Pain Timing/Severity: constant  Quality of pain: sharp  Severity:  3 / 10   Modifying Factors: Pain worsens with walking  Associated Signs/Symptoms: edema, erythema and drainage    Ulcer Identification:  Ulcer Type: diabetic  Contributing Factors: edema and diabetes    Diabetic/Pressure/Non Pressure Ulcers onl y:  Ulcer: Diabetic ulcer, fat layer exposed    If patient has diabetic lower extremity wounds  Campbell Classification of diabetic lower extremity wounds:    Grade Description   []  0 No open wound   []  1 Superficial ulcer involving the full skin thickness   [x]  2 Deep ulcer involves ligament, tendon, joint capsule, or fascia  No bone involvement or abscess presence   []  3 Deep Ulcer with abcess formation and/or osteomyelitis   []  4 Localized gangrene   []  5 Extensive gangrene of the foot     Wound: N/A          22  Debrided, cont tx and care     22  Debrided, cont tx and care, await graft approval    22  Debrided, cont tx and care, puraply applied    22  Debrided, dermagraft applied    22  Debrided, dermagraft applied    22  Debrided, graft applied    3-2-22  Debrided, graft applied    3-9-22  Debrided, graft applied     3-23-22  Just release from hospital care, new wounds noted from hospital, also trauma to left toe loosened nail that was removed without incident, debrided all wounds, culture taken    3-30-22  Debrided, santyl to left leg, applied dermagraft b/l heels, prevelon boots wound vac left leg    4-6-22  Debrided, dermagraft b/l, wound care    4-13-22  Debrided, wound vac, dermagraft left    4-27-22  Debrided, cont tx and care     5-4-22  Debrided, cont tx and care, wound vac to leg left, and hydrofera blue to all other wounds    5-11-22  Debrided, cont tx and care    5-18-22  Debrided, cont tx and care    5-25-22  Cont wound vac, cont previous tx, offload    6-1-22  Debrided, offload    6-8-22  Debrided, puraply to left leg, b/l heels medihoney patches    6-15-22  Debrided, wound vac with puraply left leg, medihoney b/l heels and left 1st digit    6-22-22  Debrided, graft applied     6-29-22  Debrided, graft applied    7-13-22  Debrided, cont tx and care     7-20-22  Debrided, puraply applied     PAST MEDICAL HISTORY      Diagnosis Date    Arthritis     Cancer ([address]     breast    Cellulitis     CHF (congestive heart failure) ([facility])     CKD (chronic kidney disease) stage 3, GFR 30-59 ml/min ([facility])     Diabetes mellitus ([address]     History of lumpectomy     Hx of blood clots     Hyperlipidemia     Hypertension     Left ventricular failure ([address]     Macular degeneration     Obesity     Orthostatic hypotension     PE (pulmonary thromboembolism) ([address]     Pressure injury of both heels, unstageable ([address]     Staph aureus infection     Venous insufficiency      Past Surgical History:   Procedure Laterality Date    [facility] [address] [facility]  9/2/2021         MASTECTOMY      TOE AMPUTATION      TOE SURGERY      TONSILLECTOMY       History reviewed. No pertinent family history.   Social History     Tobacco Use    Smoking status: Never    Smokeless tobacco: Never   Vaping Use    Vaping Use: Never used   Substance Use Topics    Alcohol use: Not Currently     Alcohol/week: 1.0 standard drink     Types: 1 Cans of beer per week    Drug use: No     Allergies   Allergen Reactions    Clindamycin/Lincomycin     Sulfa Antibiotics      Current Outpatient Medications on File Prior to Encounter   Medication Sig Dispense Refill    apixaban (ELIQUIS) 2.5 MG TABS tablet Take 1 tablet by mouth 2 times daily (before meals) 60 tablet 2    docusate sodium (COLACE) 100 MG capsule Take 200 mg by mouth nightly       tamsulosin (FLOMAX) 0.4 MG capsule Take 0.4 mg by mouth daily      bisacodyl (DULCOLAX) 10 MG suppository Place 10 mg rectally daily as needed for Constipation      diphenhydrAMINE-zinc acetate (BENADRYL) 1-0.1 % cream Apply topically every 4 hours as needed for Itching Apply topically 3 times daily as needed. Sodium Phosphates (FLEET) 7-19 GM/118ML Place 1 enema rectally daily as needed      magnesium hydroxide (MILK OF MAGNESIA) 400 MG/5ML suspension Take 30 mLs by mouth daily as needed for Constipation      Polyethylene Glycol 400 1 % SOLN Apply 1 drop to eye every 4 hours as needed      ferrous sulfate (IRON 325) 325 (65 Fe) MG tablet Take 325 mg by mouth daily       zinc sulfate (ZINCATE) 220 (50 Zn) MG capsule Take 1 capsule by mouth daily for 14 days 14 capsule 0    miconazole (MICOTIN) 2 % powder Apply topically 2 times daily.  45 g 1    ascorbic acid (VITAMIN C) 500 MG tablet Take 1 tablet by mouth 2 times daily 30 tablet 0    Vitamin D (CHOLECALCIFEROL) 50 MCG (2000 UT) TABS tablet Take 1 tablet by mouth daily 60 tablet 0    furosemide (LASIX) 40 MG tablet Take 20 mg by mouth daily       omeprazole (PRILOSEC) 20 MG delayed release capsule Take 20 mg by mouth daily      Multiple Vitamins-Minerals (THERAPEUTIC MULTIVITAMIN-MINERALS) tablet Take 1 tablet by mouth daily      Multiple Vitamins-Minerals (PRESERVISION AREDS PO) Take 1 tablet by mouth 2 times daily (with meals)      acetaminophen (TYLENOL) 325 MG tablet Take 650 mg by mouth every 4 hours as needed for Pain or Fever       calcium carbonate (TUMS) 500 MG chewable tablet Take 1 tablet by mouth every 4 hours as needed for Heartburn      ipratropium-albuterol (DUONEB) 0.5-2.5 (3) MG/3ML SOLN nebulizer solution Inhale 3 mLs into the lungs every 4 hours (while awake) for 30 doses 90 mL 0    gabapentin (NEURONTIN) 100 MG capsule Take 200 mg by mouth Daily with supper. atorvastatin (LIPITOR) 80 MG tablet Take 80 mg by mouth at bedtime       INSULIN LISP PROT & LISP, HUM, (75-25) 100 UNIT/ML SUSP Inject 72 Units into the skin daily (with breakfast)       INSULIN LISP PROT & LISP, HUM, (75-25) 100 UNIT/ML SUSP Inject 55 Units into the skin Daily with supper       clotrimazole-betamethasone (LOTRISONE) cream Apply  topically as needed. Apply topically 2 times daily. No current facility-administered medications on file prior to encounter.        REVIEW OF SYSTEMS   ROS : All others Negative if blank [], Positive if [x]  General Vascular   [] Fevers [] Claudication   [] Chills [] Rest Pain   Skin Neurologic   [x] Tissue Loss [x] Lower extremity neuropathy     Objective:    BP (!) 150/64   Pulse 100   Temp (!) 45.5 °F (7.5 °C) (Temporal)   Resp 18   Ht 6' (1.829 m)   Wt 246 lb (111.6 kg)   BMI 33.36 kg/m²   Wt Readings from Last 3 Encounters:   07/20/22 246 lb (111.6 kg)   06/22/22 246 lb (111.6 kg)   06/15/22 234 lb (106.1 kg)       PHYSICAL EXAM   CONSTITUTIONAL:   Awake, alert, cooperative   PSYCHIATRIC :  Oriented to time, place and person      normal insight to disease process  EXTREMITIES:   R LE Open wounds are noted   Skin color is abnormal with ulcers   Edema is  noted   Sensation deficit noted - protective   Palpation of the foot does cause pain   4/5 strength DF/PF  L LE Open wounds are noted   Skin color is abnormal with ulcers   Edema is  noted   Sensation deficit noted - protective   Palpation of the foot does cause pain   4/5 strength DF/PF  R dorsalis pedis 1 L dorsalis pedis 1   R posterior tibial 1 L posterior tibial 1     Assessment:     Problem List Items Addressed This Visit       Diabetic ulcer of right heel associated with type 2 diabetes mellitus, with fat layer exposed ([facility] - Primary    Relevant Orders    Initiate Outpatient Wound Care Protocol    Diabetic ulcer of left heel ([facility]    Relevant Orders    Initiate Outpatient Wound Care Protocol       Pre Debridement Measurements:  Are located in the [place]  Documentation Flow Sheet  Post Debridement Measurements:  Wound/Ulcer Descriptions are Pre Debridement except measurements:     Wound 01/05/22 Heel Left #1 (Active)   Wound Image   07/13/22 0841   Wound Etiology Diabetic Campbell 2 01/05/22 0943   Dressing Status New dressing applied 07/13/22 0941   Wound Cleansed Cleansed with saline 07/13/22 0941   Dressing/Treatment Honey gel/honey paste;ABD;Dry dressing 07/13/22 0941   Offloading for Diabetic Foot Ulcers Post op shoe 07/13/22 0941   Wound Length (cm) 4.1 cm 07/20/22 0917   Wound Width (cm) 2.6 cm 07/20/22 0917   Wound Depth (cm) 0.3 cm 07/20/22 0917   Wound Surface Area (cm^2) 10.66 cm^2 07/20/22 0917   Change in Wound Size % (l*w) 22.75 07/20/22 0917   Wound Volume (cm^3) 3.198 cm^3 07/20/22 0917   Wound Healing % -16 07/20/22 0917   Post-Procedure Length (cm) 4.2 cm 07/20/22 0944   Post-Procedure Width (cm) 2.7 cm 07/20/22 0944   Post-Procedure Depth (cm) 0.3 cm 07/20/22 0944   Post-Procedure Surface Area (cm^2) 11.34 cm^2 07/20/22 0944   Post-Procedure Volume (cm^3) 3.402 cm^3 07/20/22 0944   Wound Assessment Pink/red;Fibrin 07/20/22 0917   Drainage Amount Moderate 07/20/22 0917   Drainage Description Yellow;Brown 07/20/22 0917   Odor None 07/20/22 0917   Rashmi-wound Assessment Maceration 07/20/22 0917   Wound Thickness Description not for Pressure Injury Full thickness 01/19/22 0909   Number of days: 196 4875   Post-Procedure Length (cm) 0.3 cm 07/06/22 0933   Post-Procedure Width (cm) 0.4 cm 07/06/22 0933   Post-Procedure Depth (cm) 0.2 cm 07/06/22 0933   Post-Procedure Surface Area (cm^2) 0.12 cm^2 07/06/22 0933   Post-Procedure Volume (cm^3) 0.024 cm^3 07/06/22 0933   Wound Assessment Fibrin;Pink/red 07/20/22 0917   Drainage Amount Small 07/20/22 0917   Drainage Description Yellow 07/20/22 0917   Odor None 07/20/22 0917   Rashmi-wound Assessment Hyperkeratosis (callous) 07/20/22 0917   Number of days: 119       Wound 04/13/22 Pretibial Distal;Left;Lateral #14 (blocked may 25)  (Active)   Wound Image   07/13/22 0841   Dressing Status New dressing applied 07/13/22 0941   Wound Cleansed Cleansed with saline 07/13/22 0941   Dressing/Treatment Dry dressing;Honey gel/honey paste 07/13/22 0941   Offloading for Diabetic Foot Ulcers Post op shoe 07/13/22 0941   Wound Length (cm) 1 cm 07/20/22 0917   Wound Width (cm) 2.2 cm 07/20/22 0917   Wound Depth (cm) 0.2 cm 07/20/22 0917   Wound Surface Area (cm^2) 2.2 cm^2 07/20/22 0917   Change in Wound Size % (l*w) 77.08 07/20/22 0917   Wound Volume (cm^3) 0.44 cm^3 07/20/22 0917   Wound Healing % 54 07/20/22 0917   Post-Procedure Length (cm) 1 cm 07/20/22 0944   Post-Procedure Width (cm) 2.2 cm 07/20/22 0944   Post-Procedure Depth (cm) 0.3 cm 07/20/22 0944   Post-Procedure Surface Area (cm^2) 2.2 cm^2 07/20/22 0944   Post-Procedure Volume (cm^3) 0.66 cm^3 07/20/22 0944   Wound Assessment Pink/red 07/20/22 0917   Drainage Amount Moderate 07/20/22 0917   Drainage Description Yellow 07/20/22 0917   Odor None 07/20/22 0917   Rashmi-wound Assessment Intact 07/20/22 0917   Number of days: 98       Wound 04/27/22 Tibial Left;Posterior #15 (Active)   Wound Image   07/13/22 0841   Dressing Status New dressing applied 07/13/22 0941   Wound Cleansed Cleansed with saline 07/13/22 0941   Dressing/Treatment Non adherent;ABD 07/13/22 0941   Offloading for Diabetic Foot Ulcers Post op shoe 07/13/22 DERMAGRAFT  [] 38sq/cm   []76 sq/cm    []114 sq/cm  []152 sq/cm         [] OASIS   [] 10.5 sq/cm   []21 sq/cm    []4 sq/cm PuraPly  []8 sq/cm PuraPly        [x] OTHER puraply 16    []        sq/cm       Performed by: Bárbara Cary DPM    Wound Type:venous    Consent obtained: Yes    Time out taken: Yes     Fenestrated: Yes    Instrument(s) curette and #15 blade scalpel      [] Mesher Utilized    Skin Substitute was Applied to Wound Number(s): Wound #: 5      Wound 01/05/22 Heel Left #1 (Active)   Wound Image   06/08/22 0916   Wound Etiology Diabetic Campbell 2 01/05/22 0943   Dressing Status New dressing applied 06/01/22 1052   Wound Cleansed Cleansed with saline 06/01/22 1052   Dressing/Treatment ABD;Pharmaceutical agent (see MAR); Roll gauze 06/01/22 1052   Offloading for Diabetic Foot Ulcers Post op shoe 06/01/22 1052   Wound Length (cm) 4 cm 06/08/22 0916   Wound Width (cm) 3.5 cm 06/08/22 0916   Wound Depth (cm) 0.2 cm 06/08/22 0916   Wound Surface Area (cm^2) 14 cm^2 06/08/22 0916   Change in Wound Size % (l*w) -1.45 06/08/22 0916   Wound Volume (cm^3) 2.8 cm^3 06/08/22 0916   Wound Healing % -1 06/08/22 0916   Post-Procedure Length (cm) 4 cm 06/08/22 0951   Post-Procedure Width (cm) 3.5 cm 06/08/22 0951   Post-Procedure Depth (cm) 0.2 cm 06/08/22 0951   Post-Procedure Surface Area (cm^2) 14 cm^2 06/08/22 0951   Post-Procedure Volume (cm^3) 2.8 cm^3 06/08/22 0951   Wound Assessment Fibrin;Pink/red 06/08/22 0916   Drainage Amount Moderate 06/08/22 0916   Drainage Description Serosanguinous 06/08/22 0916   Odor None 06/08/22 0916   Rashmi-wound Assessment Maceration;Fragile 06/08/22 0916   Wound Thickness Description not for Pressure Injury Full thickness 01/19/22 0909   Number of days: 154       Wound 01/05/22 Heel Right #2 (Active)   Wound Image   06/08/22 0916   Wound Etiology Venous 01/05/22 0943   Dressing Status New dressing applied 06/01/22 1052   Wound Cleansed Cleansed with saline 06/01/22 1052 Dressing/Treatment ABD;Pharmaceutical agent (see MAR); Roll gauze 06/01/22 1052   Offloading for Diabetic Foot Ulcers Post op shoe 06/01/22 1052   Wound Length (cm) 2.5 cm 06/08/22 0916   Wound Width (cm) 1.6 cm 06/08/22 0916   Wound Depth (cm) 0.2 cm 06/08/22 0916   Wound Surface Area (cm^2) 4 cm^2 06/08/22 0916   Change in Wound Size % (l*w) 5.88 06/08/22 0916   Wound Volume (cm^3) 0.8 cm^3 06/08/22 0916   Wound Healing % 6 06/08/22 0916   Post-Procedure Length (cm) 2.5 cm 06/08/22 0951   Post-Procedure Width (cm) 1.8 cm 06/08/22 0951   Post-Procedure Depth (cm) 0.3 cm 06/08/22 0951   Post-Procedure Surface Area (cm^2) 4.5 cm^2 06/08/22 0951   Post-Procedure Volume (cm^3) 1.35 cm^3 06/08/22 0951   Wound Assessment Pink/red;Fibrin 06/08/22 0916   Drainage Amount Small 06/08/22 0916   Drainage Description Serosanguinous 06/08/22 0916   Odor None 06/08/22 0916   Rashmi-wound Assessment Intact;Fragile 06/08/22 0916   Wound Thickness Description not for Pressure Injury Full thickness 01/19/22 0909   Number of days: 154       Wound 03/14/22 Coccyx (Active)   Number of days: 85       Wound 03/23/22 Toe (Comment  which one) Left #10 Left Great Toe (Active)   Wound Image   06/08/22 0916   Dressing Status New dressing applied 06/01/22 1052   Wound Cleansed Cleansed with saline 06/01/22 1052   Dressing/Treatment Pharmaceutical agent (see MAR); Dry dressing 06/01/22 1052   Offloading for Diabetic Foot Ulcers Post op shoe 06/01/22 1052   Wound Length (cm) 1 cm 06/08/22 0916   Wound Width (cm) 0.8 cm 06/08/22 0916   Wound Depth (cm) 0.1 cm 06/08/22 0916   Wound Surface Area (cm^2) 0.8 cm^2 06/08/22 0916   Change in Wound Size % (l*w) 93.33 06/08/22 0916   Wound Volume (cm^3) 0.08 cm^3 06/08/22 0916   Wound Healing % 93 06/08/22 0916   Post-Procedure Length (cm) 1 cm 06/08/22 0951   Post-Procedure Width (cm) 1 cm 06/08/22 0951   Post-Procedure Depth (cm) 0.2 cm 06/08/22 0951   Post-Procedure Surface Area (cm^2) 1 cm^2 06/08/22 7043   Post-Procedure Volume (cm^3) 0.2 cm^3 06/08/22 0951   Wound Assessment Dry 06/08/22 0916   Drainage Amount Scant 06/08/22 0916   Drainage Description Thin;Yellow 06/08/22 0916   Odor None 06/08/22 0916   Rashmi-wound Assessment Intact; Hyperkeratosis (callous) 06/08/22 0916   Number of days: 77       Wound 04/13/22 Pretibial Distal;Left;Lateral #14 (blocked may 25)  (Active)   Wound Image   06/08/22 0916   Dressing Status New dressing applied 06/01/22 1052   Wound Cleansed Cleansed with saline 06/01/22 1052   Dressing/Treatment Dry dressing;ABD 06/01/22 1052   Offloading for Diabetic Foot Ulcers Post op shoe 06/01/22 1052   Wound Length (cm) 0.6 cm 06/08/22 0916   Wound Width (cm) 1.8 cm 06/08/22 0916   Wound Depth (cm) 0.2 cm 06/08/22 0916   Wound Surface Area (cm^2) 1.08 cm^2 06/08/22 0916   Change in Wound Size % (l*w) 88.75 06/08/22 0916   Wound Volume (cm^3) 0.216 cm^3 06/08/22 0916   Wound Healing % 78 06/08/22 0916   Post-Procedure Length (cm) 0.7 cm 06/08/22 0951   Post-Procedure Width (cm) 1.9 cm 06/08/22 0951   Post-Procedure Depth (cm) 0.2 cm 06/08/22 0951   Post-Procedure Surface Area (cm^2) 1.33 cm^2 06/08/22 0951   Post-Procedure Volume (cm^3) 0.266 cm^3 06/08/22 0951   Wound Assessment Fibrinous 06/08/22 0916   Drainage Amount Moderate 06/08/22 0916   Drainage Description Yellow 06/08/22 0916   Odor None 06/08/22 0916   Rashmi-wound Assessment Dry/flaky; Intact 06/08/22 0916   Number of days: 56       Wound 04/27/22 Tibial Left;Posterior #15 (Active)   Wound Image   06/08/22 0916   Dressing Status New dressing applied 06/01/22 1052   Wound Cleansed Cleansed with saline 06/01/22 1052   Dressing/Treatment ABD;Pharmaceutical agent (see MAR); Roll gauze 06/01/22 1052   Offloading for Diabetic Foot Ulcers Post op shoe 06/01/22 1052   Wound Length (cm) 3.8 cm 06/08/22 0916   Wound Width (cm) 4 cm 06/08/22 0916   Wound Depth (cm) 0.6 cm 06/08/22 0916   Wound Surface Area (cm^2) 15.2 cm^2 06/08/22 0916 Post-Procedure Depth (cm) 0.3 cm 07/20/22 0944   Post-Procedure Surface Area (cm^2) 11.34 cm^2 07/20/22 0944   Post-Procedure Volume (cm^3) 3.402 cm^3 07/20/22 0944   Wound Assessment Pink/red;Fibrin 07/20/22 0917   Drainage Amount Moderate 07/20/22 0917   Drainage Description Yellow;Brown 07/20/22 0917   Odor None 07/20/22 0917   Rashmi-wound Assessment Maceration 07/20/22 0917   Wound Thickness Description not for Pressure Injury Full thickness 01/19/22 0909   Number of days: 195       Wound 01/05/22 Heel Right #2 (Active)   Wound Image   07/13/22 0841   Wound Etiology Venous 01/05/22 0943   Dressing Status New dressing applied 07/13/22 0941   Wound Cleansed Cleansed with saline 07/13/22 0941   Dressing/Treatment ABD;Dry dressing;Honey gel/honey paste 07/13/22 0941   Offloading for Diabetic Foot Ulcers Post op shoe 07/13/22 0941   Wound Length (cm) 2 cm 07/20/22 0917   Wound Width (cm) 1.4 cm 07/20/22 0917   Wound Depth (cm) 0.3 cm 07/20/22 0917   Wound Surface Area (cm^2) 2.8 cm^2 07/20/22 0917   Change in Wound Size % (l*w) 34.12 07/20/22 0917   Wound Volume (cm^3) 0.84 cm^3 07/20/22 0917   Wound Healing % 1 07/20/22 0917   Post-Procedure Length (cm) 2 cm 07/20/22 0944   Post-Procedure Width (cm) 1.6 cm 07/20/22 0944   Post-Procedure Depth (cm) 0.33 cm 07/20/22 0944   Post-Procedure Surface Area (cm^2) 3.2 cm^2 07/20/22 0944   Post-Procedure Volume (cm^3) 1. 056 cm^3 07/20/22 0944   Wound Assessment Fibrin;Pink/red 07/20/22 0917   Drainage Amount Small 07/20/22 0917   Drainage Description Serosanguinous; Yellow 07/20/22 0917   Odor None 07/20/22 0917   Rashmi-wound Assessment Maceration 07/20/22 0917   Wound Thickness Description not for Pressure Injury Full thickness 01/19/22 3503   Number of days: 195       Wound 03/14/22 Coccyx (Active)   Number of days: 127       Wound 03/23/22 Toe (Comment  which one) Left #10 Left Great Toe (Active)   Wound Image   07/13/22 0841   Dressing Status New dressing applied 07/13/22 5103   Wound Cleansed Cleansed with saline 07/13/22 0941   Dressing/Treatment Honey gel/honey paste;Dry dressing 07/13/22 0941   Offloading for Diabetic Foot Ulcers Post op shoe 07/13/22 0941   Wound Length (cm) 0.6 cm 07/20/22 0917   Wound Width (cm) 0.6 cm 07/20/22 0917   Wound Depth (cm) 0.2 cm 07/20/22 0917   Wound Surface Area (cm^2) 0.36 cm^2 07/20/22 0917   Change in Wound Size % (l*w) 97 07/20/22 0917   Wound Volume (cm^3) 0.072 cm^3 07/20/22 0917   Wound Healing % 94 07/20/22 0917   Post-Procedure Length (cm) 0.3 cm 07/06/22 0933   Post-Procedure Width (cm) 0.4 cm 07/06/22 0933   Post-Procedure Depth (cm) 0.2 cm 07/06/22 0933   Post-Procedure Surface Area (cm^2) 0.12 cm^2 07/06/22 0933   Post-Procedure Volume (cm^3) 0.024 cm^3 07/06/22 0933   Wound Assessment Fibrin;Pink/red 07/20/22 0917   Drainage Amount Small 07/20/22 0917   Drainage Description Yellow 07/20/22 0917   Odor None 07/20/22 0917   Rashmi-wound Assessment Hyperkeratosis (callous) 07/20/22 0917   Number of days: 119       Wound 04/13/22 Pretibial Distal;Left;Lateral #14 (blocked may 25)  (Active)   Wound Image   07/13/22 0841   Dressing Status New dressing applied 07/13/22 0941   Wound Cleansed Cleansed with saline 07/13/22 0941   Dressing/Treatment Dry dressing;Honey gel/honey paste 07/13/22 0941   Offloading for Diabetic Foot Ulcers Post op shoe 07/13/22 0941   Wound Length (cm) 1 cm 07/20/22 0917   Wound Width (cm) 2.2 cm 07/20/22 0917   Wound Depth (cm) 0.2 cm 07/20/22 0917   Wound Surface Area (cm^2) 2.2 cm^2 07/20/22 0917   Change in Wound Size % (l*w) 77.08 07/20/22 0917   Wound Volume (cm^3) 0.44 cm^3 07/20/22 0917   Wound Healing % 54 07/20/22 0917   Post-Procedure Length (cm) 1 cm 07/20/22 0944   Post-Procedure Width (cm) 2.2 cm 07/20/22 0944   Post-Procedure Depth (cm) 0.3 cm 07/20/22 0944   Post-Procedure Surface Area (cm^2) 2.2 cm^2 07/20/22 0944   Post-Procedure Volume (cm^3) 0.66 cm^3 07/20/22 0944   Wound Assessment [place]/red 07/20/22 0917   Drainage Amount Moderate 07/20/22 0917   Drainage Description Yellow 07/20/22 0917   Odor None 07/20/22 0917   Rashmi-wound Assessment Intact 07/20/22 0917   Number of days: 98       Wound 04/27/22 Tibial Left;Posterior #15 (Active)   Wound Image   07/13/22 0841   Dressing Status New dressing applied 07/13/22 0941   Wound Cleansed Cleansed with saline 07/13/22 0941   Dressing/Treatment Non adherent;ABD 07/13/22 0941   Offloading for Diabetic Foot Ulcers Post op shoe 07/13/22 0941   Wound Length (cm) 4.6 cm 07/20/22 0917   Wound Width (cm) 4.4 cm 07/20/22 0917   Wound Depth (cm) 0.6 cm 07/20/22 0917   Wound Surface Area (cm^2) 20.24 cm^2 07/20/22 0917   Change in Wound Size % (l*w) -44.16 07/20/22 0917   Wound Volume (cm^3) 12.144 cm^3 07/20/22 0917   Wound Healing % -73 07/20/22 0917   Post-Procedure Length (cm) 4.7 cm 07/20/22 0944   Post-Procedure Width (cm) 4.5 cm 07/20/22 0944   Post-Procedure Depth (cm) 0.7 cm 07/20/22 0944   Post-Procedure Surface Area (cm^2) 21.15 cm^2 07/20/22 0944   Post-Procedure Volume (cm^3) 14.805 cm^3 07/20/22 0944   Wound Assessment Granulation tissue;Fibrin 07/20/22 0917   Drainage Amount Large 07/20/22 0917   Drainage Description Yellow 07/20/22 0917   Odor None 07/20/22 0917   Rashmi-wound Assessment Intact 07/20/22 0917   Number of days: 84                       Electronically signed by Lv Jorge DPM on 7/20/2022 at 10:15 AM

## 2022-07-27 ENCOUNTER — HOSPITAL ENCOUNTER (OUTPATIENT)
Dept: WOUND CARE | Age: 87
Discharge: HOME | End: 2022-07-27
Payer: MEDICARE

## 2022-07-27 VITALS
DIASTOLIC BLOOD PRESSURE: 60 MMHG | HEART RATE: 92 BPM | TEMPERATURE: 98.6 F | SYSTOLIC BLOOD PRESSURE: 122 MMHG | RESPIRATION RATE: 16 BRPM

## 2022-07-27 DIAGNOSIS — E11.621 DIABETIC ULCER OF RIGHT HEEL ASSOCIATED WITH TYPE 2 DIABETES MELLITUS, WITH FAT LAYER EXPOSED (HCC): Primary | ICD-10-CM

## 2022-07-27 DIAGNOSIS — L97.412 DIABETIC ULCER OF RIGHT HEEL ASSOCIATED WITH TYPE 2 DIABETES MELLITUS, WITH FAT LAYER EXPOSED (HCC): Primary | ICD-10-CM

## 2022-07-27 DIAGNOSIS — L97.421 DIABETIC ULCER OF LEFT HEEL ASSOCIATED WITH DIABETES MELLITUS DUE TO UNDERLYING CONDITION, LIMITED TO BREAKDOWN OF SKIN (HCC): ICD-10-CM

## 2022-07-27 DIAGNOSIS — E08.621 DIABETIC ULCER OF LEFT HEEL ASSOCIATED WITH DIABETES MELLITUS DUE TO UNDERLYING CONDITION, LIMITED TO BREAKDOWN OF SKIN (HCC): ICD-10-CM

## 2022-07-27 PROCEDURE — 15271 SKIN SUB GRAFT TRNK/ARM/LEG: CPT

## 2022-07-27 PROCEDURE — C5271 LOW COST SKIN SUBSTITUTE APP: HCPCS

## 2022-07-27 PROCEDURE — 6370000000 HC RX 637 (ALT 250 FOR IP): Performed by: PODIATRIST

## 2022-07-27 PROCEDURE — 11042 DBRDMT SUBQ TIS 1ST 20SQCM/<: CPT

## 2022-07-27 PROCEDURE — 11045 DBRDMT SUBQ TISS EACH ADDL: CPT

## 2022-07-27 RX ORDER — LIDOCAINE HYDROCHLORIDE 40 MG/ML
SOLUTION TOPICAL ONCE
Status: CANCELLED | OUTPATIENT
Start: 2022-07-27 | End: 2022-07-27

## 2022-07-27 RX ORDER — LIDOCAINE 50 MG/G
OINTMENT TOPICAL ONCE
Status: CANCELLED | OUTPATIENT
Start: 2022-07-27 | End: 2022-07-27

## 2022-07-27 RX ORDER — BETAMETHASONE DIPROPIONATE 0.05 %
OINTMENT (GRAM) TOPICAL ONCE
Status: CANCELLED | OUTPATIENT
Start: 2022-07-27 | End: 2022-07-27

## 2022-07-27 RX ORDER — LIDOCAINE 40 MG/G
CREAM TOPICAL ONCE
Status: CANCELLED | OUTPATIENT
Start: 2022-07-27 | End: 2022-07-27

## 2022-07-27 RX ORDER — GENTAMICIN SULFATE 1 MG/G
OINTMENT TOPICAL ONCE
Status: CANCELLED | OUTPATIENT
Start: 2022-07-27 | End: 2022-07-27

## 2022-07-27 RX ORDER — CLOBETASOL PROPIONATE 0.5 MG/G
OINTMENT TOPICAL ONCE
Status: CANCELLED | OUTPATIENT
Start: 2022-07-27 | End: 2022-07-27

## 2022-07-27 RX ORDER — BACITRACIN, NEOMYCIN, POLYMYXIN B 400; 3.5; 5 [USP'U]/G; MG/G; [USP'U]/G
OINTMENT TOPICAL ONCE
Status: CANCELLED | OUTPATIENT
Start: 2022-07-27 | End: 2022-07-27

## 2022-07-27 RX ORDER — LIDOCAINE HYDROCHLORIDE 20 MG/ML
JELLY TOPICAL ONCE
Status: CANCELLED | OUTPATIENT
Start: 2022-07-27 | End: 2022-07-27

## 2022-07-27 RX ORDER — LIDOCAINE HYDROCHLORIDE 40 MG/ML
SOLUTION TOPICAL ONCE
Status: COMPLETED | OUTPATIENT
Start: 2022-07-27 | End: 2022-07-27

## 2022-07-27 RX ORDER — BACITRACIN ZINC AND POLYMYXIN B SULFATE 500; 1000 [USP'U]/G; [USP'U]/G
OINTMENT TOPICAL ONCE
Status: CANCELLED | OUTPATIENT
Start: 2022-07-27 | End: 2022-07-27

## 2022-07-27 RX ORDER — GINSENG 100 MG
CAPSULE ORAL ONCE
Status: CANCELLED | OUTPATIENT
Start: 2022-07-27 | End: 2022-07-27

## 2022-07-27 RX ADMIN — LIDOCAINE HYDROCHLORIDE 8 ML: 40 SOLUTION TOPICAL at 09:32

## 2022-07-27 ASSESSMENT — PAIN DESCRIPTION - DESCRIPTORS: DESCRIPTORS: SHOOTING;SHARP

## 2022-07-27 ASSESSMENT — PAIN DESCRIPTION - ORIENTATION: ORIENTATION: LEFT

## 2022-07-27 ASSESSMENT — PAIN SCALES - GENERAL: PAINLEVEL_OUTOF10: 6

## 2022-07-27 ASSESSMENT — PAIN DESCRIPTION - FREQUENCY: FREQUENCY: INTERMITTENT

## 2022-07-27 ASSESSMENT — PAIN DESCRIPTION - PAIN TYPE: TYPE: CHRONIC PAIN

## 2022-07-27 ASSESSMENT — PAIN - FUNCTIONAL ASSESSMENT: PAIN_FUNCTIONAL_ASSESSMENT: ACTIVITIES ARE NOT PREVENTED

## 2022-07-27 ASSESSMENT — PAIN DESCRIPTION - ONSET: ONSET: SUDDEN

## 2022-07-27 ASSESSMENT — PAIN DESCRIPTION - LOCATION: LOCATION: LEG

## 2022-07-27 NOTE — PLAN OF CARE
Problem: Chronic Conditions and Co-morbidities  Goal: Patient's chronic conditions and co-morbidity symptoms are monitored and maintained or improved  Outcome: Progressing Towards Goal     Problem: Chronic Conditions and Co-morbidities  Goal: Patient's chronic conditions and co-morbidity symptoms are monitored and maintained or improved  Outcome: Progressing Towards Goal     Problem: ABCDS Injury Assessment  Goal: Absence of physical injury  Outcome: Progressing Towards Goal     Problem: Wound:  Goal: Will show signs of wound healing; wound closure and no evidence of infection  Description: Will show signs of wound healing; wound closure and no evidence of infection  Outcome: Progressing Towards Goal     Problem: Venous:  Goal: Signs of wound healing will improve  Description: Signs of wound healing will improve  Outcome: Progressing Towards Goal     Problem: Pain  Goal: Verbalizes/displays adequate comfort level or baseline comfort level  Outcome: Progressing Towards Goal

## 2022-07-27 NOTE — PROGRESS NOTES
[facility]  History and Physical/Consultation  Podiatry    Referring Physician : Vinny Churchill MD  Milton Teran. MEDICAL RECORD NUMBER:  [id number]  AGE: 80 y.o. GENDER: male  : 1932  EPISODE DATE:  2022  Subjective:     Chief Complaint   Patient presents with    Wound Check     Wound bilateral heels, left leg         HISTORY of PRESENT ILLNESS HPI     Milton Teran. is a 80 y.o. male who presents today for wound/ulcer evaluation. History of Wound Context:  The patient has had a wound of diabetic b/l heels,and right ankle which was first noted approximately months ago. This has been treated betadyne. On their initial visit to the wound healing center, 22 ,  the patient has noted that the wound has been improving. The patient has had similar previous wounds in the past.      Pt is not on abx at time of initial visit.       Wound/Ulcer Pain Timing/Severity: constant  Quality of pain: sharp  Severity:  3 / 10   Modifying Factors: Pain worsens with walking  Associated Signs/Symptoms: edema, erythema and drainage    Ulcer Identification:  Ulcer Type: diabetic  Contributing Factors: edema and diabetes    Diabetic/Pressure/Non Pressure Ulcers onl y:  Ulcer: Diabetic ulcer, fat layer exposed    If patient has diabetic lower extremity wounds  Campbell Classification of diabetic lower extremity wounds:    Grade Description   []  0 No open wound   []  1 Superficial ulcer involving the full skin thickness   [x]  2 Deep ulcer involves ligament, tendon, joint capsule, or fascia  No bone involvement or abscess presence   []  3 Deep Ulcer with abcess formation and/or osteomyelitis   []  4 Localized gangrene   []  5 Extensive gangrene of the foot     Wound: N/A          22  Debrided, cont tx and care     22  Debrided, cont tx and care, await graft approval    22  Debrided, cont tx and care, puraply applied    22  Debrided, dermagraft applied    22  Debrided, dermagraft applied    2-23-22  Debrided, graft applied    3-2-22  Debrided, graft applied    3-9-22  Debrided, graft applied     3-23-22  Just release from hospital care, new wounds noted from hospital, also trauma to left toe loosened nail that was removed without incident, debrided all wounds, culture taken    3-30-22  Debrided, santyl to left leg, applied dermagraft b/l heels, prevelon boots wound vac left leg    4-6-22  Debrided, dermagraft b/l, wound care    4-13-22  Debrided, wound vac, dermagraft left    4-27-22  Debrided, cont tx and care     5-4-22  Debrided, cont tx and care, wound vac to leg left, and hydrofera blue to all other wounds    5-11-22  Debrided, cont tx and care    5-18-22  Debrided, cont tx and care    5-25-22  Cont wound vac, cont previous tx, offload    6-1-22  Debrided, offload    6-8-22  Debrided, puraply to left leg, b/l heels medihoney patches    6-15-22  Debrided, wound vac with puraply left leg, medihoney b/l heels and left 1st digit    6-22-22  Debrided, graft applied     6-29-22  Debrided, graft applied    7-13-22  Debrided, cont tx and care     7-20-22  Debrided, puraply applied     7-27-22  Debrided, healing well, add adaptic, regranex for heels?     PAST MEDICAL HISTORY      Diagnosis Date    Arthritis     Cancer [facility])     breast    Cellulitis     CHF (congestive heart failure) ([facility])     CKD (chronic kidney disease) stage 3, GFR 30-59 ml/min ([facility])     Diabetes mellitus ([address]     History of lumpectomy     Hx of blood clots     Hyperlipidemia     Hypertension     Left ventricular failure ([facility])     Macular degeneration     Obesity     Orthostatic hypotension     PE (pulmonary thromboembolism) ([facility])     Pressure injury of both heels, unstageable ([address]     Staph aureus infection     Venous insufficiency      Past Surgical History:   Procedure Laterality Date    [facility].  PICC POWERPIC SINGLE  9/2/2021         MASTECTOMY      TOE AMPUTATION      TOE SURGERY      TONSILLECTOMY       History reviewed. No pertinent family history. Social History     Tobacco Use    Smoking status: Never    Smokeless tobacco: Never   Vaping Use    Vaping Use: Never used   Substance Use Topics    Alcohol use: Not Currently     Alcohol/week: 1.0 standard drink     Types: 1 Cans of beer per week    Drug use: No     Allergies   Allergen Reactions    Clindamycin/Lincomycin     Sulfa Antibiotics      Current Outpatient Medications on File Prior to Encounter   Medication Sig Dispense Refill    apixaban (ELIQUIS) 2.5 MG TABS tablet Take 1 tablet by mouth 2 times daily (before meals) 60 tablet 2    docusate sodium (COLACE) 100 MG capsule Take 200 mg by mouth nightly       tamsulosin (FLOMAX) 0.4 MG capsule Take 0.4 mg by mouth daily      bisacodyl (DULCOLAX) 10 MG suppository Place 10 mg rectally daily as needed for Constipation      diphenhydrAMINE-zinc acetate (BENADRYL) 1-0.1 % cream Apply topically every 4 hours as needed for Itching Apply topically 3 times daily as needed. Sodium Phosphates (FLEET) 7-19 GM/118ML Place 1 enema rectally daily as needed      magnesium hydroxide (MILK OF MAGNESIA) 400 MG/5ML suspension Take 30 mLs by mouth daily as needed for Constipation      Polyethylene Glycol 400 1 % SOLN Apply 1 drop to eye every 4 hours as needed      ferrous sulfate (IRON 325) 325 (65 Fe) MG tablet Take 325 mg by mouth daily       zinc sulfate (ZINCATE) 220 (50 Zn) MG capsule Take 1 capsule by mouth daily for 14 days 14 capsule 0    miconazole (MICOTIN) 2 % powder Apply topically 2 times daily.  45 g 1    ascorbic acid (VITAMIN C) 500 MG tablet Take 1 tablet by mouth 2 times daily 30 tablet 0    Vitamin D (CHOLECALCIFEROL) 50 MCG (2000 UT) TABS tablet Take 1 tablet by mouth daily 60 tablet 0    furosemide (LASIX) 40 MG tablet Take 20 mg by mouth daily       omeprazole (PRILOSEC) 20 MG delayed release capsule Take 20 mg by mouth daily      Multiple Vitamins-Minerals (THERAPEUTIC MULTIVITAMIN-MINERALS) tablet Take 1 tablet by mouth daily      Multiple Vitamins-Minerals (PRESERVISION AREDS PO) Take 1 tablet by mouth 2 times daily (with meals)      acetaminophen (TYLENOL) 325 MG tablet Take 650 mg by mouth every 4 hours as needed for Pain or Fever       calcium carbonate (TUMS) 500 MG chewable tablet Take 1 tablet by mouth every 4 hours as needed for Heartburn      ipratropium-albuterol (DUONEB) 0.5-2.5 (3) MG/3ML SOLN nebulizer solution Inhale 3 mLs into the lungs every 4 hours (while awake) for 30 doses 90 mL 0    gabapentin (NEURONTIN) 100 MG capsule Take 200 mg by mouth Daily with supper. atorvastatin (LIPITOR) 80 MG tablet Take 80 mg by mouth at bedtime       INSULIN LISP PROT & LISP, HUM, (75-25) 100 UNIT/ML SUSP Inject 72 Units into the skin daily (with breakfast)       INSULIN LISP PROT & LISP, HUM, (75-25) 100 UNIT/ML SUSP Inject 55 Units into the skin Daily with supper       clotrimazole-betamethasone (LOTRISONE) cream Apply  topically as needed. Apply topically 2 times daily. No current facility-administered medications on file prior to encounter.        REVIEW OF SYSTEMS   ROS : All others Negative if blank [], Positive if [x]  General Vascular   [] Fevers [] Claudication   [] Chills [] Rest Pain   Skin Neurologic   [x] Tissue Loss [x] Lower extremity neuropathy     Objective:    /60   Pulse 92   Temp 98.6 °F (37 °C) (Temporal)   Resp 16   Wt Readings from Last 3 Encounters:   07/20/22 246 lb (111.6 kg)   06/22/22 246 lb (111.6 kg)   06/15/22 234 lb (106.1 kg)       PHYSICAL EXAM   CONSTITUTIONAL:   Awake, alert, cooperative   PSYCHIATRIC :  Oriented to time, place and person      normal insight to disease process  EXTREMITIES:   R LE Open wounds are noted   Skin color is abnormal with ulcers   Edema is  noted   Sensation deficit noted - protective   Palpation of the foot does cause pain   4/5 strength DF/PF  L LE Open wounds are noted   Skin color is abnormal with ulcers   Edema is noted   Sensation deficit noted - protective   Palpation of the foot does cause pain   4/5 strength DF/PF  R dorsalis pedis 1 L dorsalis pedis 1   R posterior tibial 1 L posterior tibial 1     Assessment:     Problem List Items Addressed This Visit       Diabetic ulcer of right heel associated with type 2 diabetes mellitus, with fat layer exposed ([facility] - Primary    Relevant Orders    Initiate Outpatient Wound Care Protocol    Diabetic ulcer of left heel ([facility]    Relevant Orders    Initiate Outpatient Wound Care Protocol       Pre Debridement Measurements:  Are located in the [facility]  Documentation Flow Sheet  Post Debridement Measurements:  Wound/Ulcer Descriptions are Pre Debridement except measurements:     Wound 01/05/22 Heel Left #1 (Active)   Wound Image   07/13/22 0841   Wound Etiology Diabetic Campbell 2 01/05/22 0943   Dressing Status New dressing applied 07/20/22 0944   Wound Cleansed Cleansed with saline 07/20/22 0944   Dressing/Treatment Honey gel/honey paste;ABD;Dry dressing 07/13/22 0941   Offloading for Diabetic Foot Ulcers Post op shoe 07/20/22 0944   Wound Length (cm) 4 cm 07/27/22 0928   Wound Width (cm) 2.3 cm 07/27/22 0928   Wound Depth (cm) 0.2 cm 07/27/22 0928   Wound Surface Area (cm^2) 9.2 cm^2 07/27/22 0928   Change in Wound Size % (l*w) 33.33 07/27/22 0928   Wound Volume (cm^3) 1.84 cm^3 07/27/22 0928   Wound Healing % 33 07/27/22 0928   Post-Procedure Length (cm) 4 cm 07/27/22 0937   Post-Procedure Width (cm) 2.3 cm 07/27/22 0937   Post-Procedure Depth (cm) 0.3 cm 07/27/22 0937   Post-Procedure Surface Area (cm^2) 9.2 cm^2 07/27/22 0937   Post-Procedure Volume (cm^3) 2.76 cm^3 07/27/22 0937   Wound Assessment Fibrin;Pink/red 07/27/22 0928   Drainage Amount Moderate 07/27/22 0928   Drainage Description Yellow;Serosanguinous 07/27/22 0928   Odor None 07/27/22 0928   Rashmi-wound Assessment Intact 07/27/22 0928   Wound Thickness Description not for Pressure Injury Full thickness 01/19/22 0909 Number of days: 202       Wound 01/05/22 Heel Right #2 (Active)   Wound Image   07/13/22 0841   Wound Etiology Venous 01/05/22 0943   Dressing Status New dressing applied 07/20/22 0944   Wound Cleansed Cleansed with saline 07/20/22 0944   Dressing/Treatment ABD;Dry dressing;Honey gel/honey paste 07/13/22 0941   Offloading for Diabetic Foot Ulcers Post op shoe 07/20/22 0944   Wound Length (cm) 1.2 cm 07/27/22 0928   Wound Width (cm) 1.6 cm 07/27/22 0928   Wound Depth (cm) 0.2 cm 07/27/22 0928   Wound Surface Area (cm^2) 1.92 cm^2 07/27/22 0928   Change in Wound Size % (l*w) 54.82 07/27/22 0928   Wound Volume (cm^3) 0.384 cm^3 07/27/22 0928   Wound Healing % 55 07/27/22 0928   Post-Procedure Length (cm) 1.2 cm 07/27/22 0937   Post-Procedure Width (cm) 1.7 cm 07/27/22 0937   Post-Procedure Depth (cm) 0.3 cm 07/27/22 0937   Post-Procedure Surface Area (cm^2) 2.04 cm^2 07/27/22 0937   Post-Procedure Volume (cm^3) 0.612 cm^3 07/27/22 0937   Wound Assessment Fibrin;Pink/red 07/27/22 0928   Drainage Amount Small 07/27/22 0928   Drainage Description Serosanguinous; Yellow 07/27/22 0928   Odor None 07/27/22 0928   Rashmi-wound Assessment Intact 07/27/22 0928   Wound Thickness Description not for Pressure Injury Full thickness 01/19/22 0909   Number of days: 202       Wound 03/14/22 Coccyx (Active)   Number of days: 134       Wound 03/23/22 Toe (Comment  which one) Left #10 Left Great Toe (Active)   Wound Image   07/13/22 0841   Dressing Status New dressing applied 07/20/22 0944   Wound Cleansed Cleansed with saline 07/20/22 0944   Dressing/Treatment Honey gel/honey paste;Dry dressing 07/13/22 0941   Offloading for Diabetic Foot Ulcers Post op shoe 07/20/22 0944   Wound Length (cm) 0.4 cm 07/27/22 0928   Wound Width (cm) 0.4 cm 07/27/22 0928   Wound Depth (cm) 0.1 cm 07/27/22 0928   Wound Surface Area (cm^2) 0.16 cm^2 07/27/22 0928   Change in Wound Size % (l*w) 98.67 07/27/22 0928   Wound Volume (cm^3) 0.016 cm^3 07/27/22 4332 Wound Healing % 99 07/27/22 0928   Post-Procedure Length (cm) 0.4 cm 07/27/22 0937   Post-Procedure Width (cm) 0.4 cm 07/27/22 5747   Post-Procedure Depth (cm) 0.2 cm 07/27/22 0937   Post-Procedure Surface Area (cm^2) 0.16 cm^2 07/27/22 0937   Post-Procedure Volume (cm^3) 0.032 cm^3 07/27/22 0937   Wound Assessment Pink/red 07/27/22 0928   Drainage Amount Small 07/27/22 0928   Drainage Description Yellow 07/27/22 0928   Odor None 07/27/22 0928   Rashmi-wound Assessment Intact 07/27/22 0928   Number of days: 126       Wound 04/13/22 Pretibial Distal;Left;Lateral #14 (blocked may 25)  (Active)   Wound Image   07/13/22 0841   Dressing Status New dressing applied 07/20/22 0944   Wound Cleansed Cleansed with saline 07/20/22 0944   Dressing/Treatment Dry dressing;Honey gel/honey paste 07/13/22 0941   Offloading for Diabetic Foot Ulcers Offloading not required 07/20/22 0944   Wound Length (cm) 0.5 cm 07/27/22 0928   Wound Width (cm) 1 cm 07/27/22 0928   Wound Depth (cm) 0.1 cm 07/27/22 0928   Wound Surface Area (cm^2) 0.5 cm^2 07/27/22 0928   Change in Wound Size % (l*w) 94.79 07/27/22 0928   Wound Volume (cm^3) 0.05 cm^3 07/27/22 0928   Wound Healing % 95 07/27/22 0928   Post-Procedure Length (cm) 0.5 cm 07/27/22 0937   Post-Procedure Width (cm) 1 cm 07/27/22 1727   Post-Procedure Depth (cm) 0.1 cm 07/27/22 0937   Post-Procedure Surface Area (cm^2) 0.5 cm^2 07/27/22 0937   Post-Procedure Volume (cm^3) 0.05 cm^3 07/27/22 0937   Wound Assessment Pink/red 07/27/22 0928   Drainage Amount Small 07/27/22 0928   Drainage Description Yellow 07/27/22 0928   Odor None 07/27/22 0928   Rashmi-wound Assessment Intact 07/20/22 0917   Number of days: 104       Wound 04/27/22 Tibial Left;Posterior #15 (Active)   Wound Image   07/13/22 0841   Dressing Status New dressing applied 07/20/22 0944   Wound Cleansed Cleansed with saline 07/20/22 0944   Dressing/Treatment Dry dressing;Silicone pad 05/52/81 9571   Offloading for Diabetic Foot Ulcers Offloading not required 07/20/22 0944   Wound Length (cm) 6 cm 07/27/22 0928   Wound Width (cm) 5 cm 07/27/22 0928   Wound Depth (cm) 0.6 cm 07/27/22 0928   Wound Surface Area (cm^2) 30 cm^2 07/27/22 0928   Change in Wound Size % (l*w) -113.68 07/27/22 0928   Wound Volume (cm^3) 18 cm^3 07/27/22 0928   Wound Healing % -156 07/27/22 0928   Post-Procedure Length (cm) 6 cm 07/27/22 0937   Post-Procedure Width (cm) 5 cm 07/27/22 0937   Post-Procedure Depth (cm) 0.7 cm 07/27/22 0937   Post-Procedure Surface Area (cm^2) 30 cm^2 07/27/22 0937   Post-Procedure Volume (cm^3) 21 cm^3 07/27/22 0937   Wound Assessment Devitalized tissue;Granulation tissue;Fibrin 07/27/22 0928   Drainage Amount Moderate 07/27/22 0928   Drainage Description Serosanguinous 07/27/22 0928   Odor Mild 07/27/22 0928   Rashmi-wound Assessment Fragile;Edematous 07/27/22 0928   Number of days: 90          Procedure Note  Indications:  Based on my examination of this patient's wound(s)/ulcer(s) today, debridement is required to promote healing and evaluate the wound base. Performed by: Juliana Adair DPM    Consent obtained:  Yes    Time out taken:  Yes    Pain Control: Anesthetic  Anesthetic: 4% Lidocaine Liquid Topical     Debridement:Excisional Debridement    Using #15 blade scalpel the wound(s)/ulcer(s) was/were sharply debrided down through and including the removal of subcutaneous tissue. Devitalized Tissue Debrided:  fibrin, biofilm, slough and necrotic/eschar to stimulate bleeding to promote healing, post debridement good bleeding base and wound edges noted    Wound/Ulcer #: 2, 4, 10    Percent of Wound/Ulcer Debrided: 100%    Total Surface Area Debrided:  30 sq cm     Estimated Blood Loss:  Minimal  Hemostasis Achieved:  by pressure    Procedural Pain:  4  / 10   Post Procedural Pain:  5 / 10     Response to treatment:  Well tolerated by patient. A culture was done.     Skin Substitute Applied:         [] APLIGRAF   []44 sq/cm []88 sq/cm    []132 sq/cm  []176  sq/cm           [] DERMAGRAFT  [] 38sq/cm   []76 sq/cm    []114 sq/cm  []152 sq/cm         [] OASIS   [] 10.5 sq/cm   []21 sq/cm    []4 sq/cm PuraPly  []8 sq/cm PuraPly        [x] OTHER puraply 16    []        sq/cm       Performed by: Librado YOSELYN posey    Wound Type:venous    Consent obtained: Yes    Time out taken: Yes     Fenestrated: Yes    Instrument(s) curette and #15 blade scalpel      [] Mesher Utilized    Skin Substitute was Applied to Wound Number(s): Wound #: 5      Wound 01/05/22 Heel Left #1 (Active)   Wound Image   06/08/22 0916   Wound Etiology Diabetic Campbell 2 01/05/22 0943   Dressing Status New dressing applied 06/01/22 1052   Wound Cleansed Cleansed with saline 06/01/22 1052   Dressing/Treatment ABD;Pharmaceutical agent (see MAR); Roll gauze 06/01/22 1052   Offloading for Diabetic Foot Ulcers Post op shoe 06/01/22 1052   Wound Length (cm) 4 cm 06/08/22 0916   Wound Width (cm) 3.5 cm 06/08/22 0916   Wound Depth (cm) 0.2 cm 06/08/22 0916   Wound Surface Area (cm^2) 14 cm^2 06/08/22 0916   Change in Wound Size % (l*w) -1.45 06/08/22 0916   Wound Volume (cm^3) 2.8 cm^3 06/08/22 0916   Wound Healing % -1 06/08/22 0916   Post-Procedure Length (cm) 4 cm 06/08/22 0951   Post-Procedure Width (cm) 3.5 cm 06/08/22 0951   Post-Procedure Depth (cm) 0.2 cm 06/08/22 0951   Post-Procedure Surface Area (cm^2) 14 cm^2 06/08/22 0951   Post-Procedure Volume (cm^3) 2.8 cm^3 06/08/22 0951   Wound Assessment Fibrin;Pink/red 06/08/22 0916   Drainage Amount Moderate 06/08/22 0916   Drainage Description Serosanguinous 06/08/22 0916   Odor None 06/08/22 0916   Rashmi-wound Assessment Maceration;Fragile 06/08/22 0916   Wound Thickness Description not for Pressure Injury Full thickness 01/19/22 0909   Number of days: 154       Wound 01/05/22 Heel Right #2 (Active)   Wound Image   06/08/22 0916   Wound Etiology Venous 01/05/22 0943   Dressing Status New dressing applied 06/01/22 1052 Wound Cleansed Cleansed with saline 06/01/22 1052   Dressing/Treatment ABD;Pharmaceutical agent (see MAR); Roll gauze 06/01/22 1052   Offloading for Diabetic Foot Ulcers Post op shoe 06/01/22 1052   Wound Length (cm) 2.5 cm 06/08/22 0916   Wound Width (cm) 1.6 cm 06/08/22 0916   Wound Depth (cm) 0.2 cm 06/08/22 0916   Wound Surface Area (cm^2) 4 cm^2 06/08/22 0916   Change in Wound Size % (l*w) 5.88 06/08/22 0916   Wound Volume (cm^3) 0.8 cm^3 06/08/22 0916   Wound Healing % 6 06/08/22 0916   Post-Procedure Length (cm) 2.5 cm 06/08/22 0951   Post-Procedure Width (cm) 1.8 cm 06/08/22 0951   Post-Procedure Depth (cm) 0.3 cm 06/08/22 0951   Post-Procedure Surface Area (cm^2) 4.5 cm^2 06/08/22 0951   Post-Procedure Volume (cm^3) 1.35 cm^3 06/08/22 0951   Wound Assessment Pink/red;Fibrin 06/08/22 0916   Drainage Amount Small 06/08/22 0916   Drainage Description Serosanguinous 06/08/22 0916   Odor None 06/08/22 0916   Rashmi-wound Assessment Intact;Fragile 06/08/22 0916   Wound Thickness Description not for Pressure Injury Full thickness 01/19/22 0909   Number of days: 154       Wound 03/14/22 Coccyx (Active)   Number of days: 85       Wound 03/23/22 Toe (Comment  which one) Left #10 Left Great Toe (Active)   Wound Image   06/08/22 0916   Dressing Status New dressing applied 06/01/22 1052   Wound Cleansed Cleansed with saline 06/01/22 1052   Dressing/Treatment Pharmaceutical agent (see MAR); Dry dressing 06/01/22 1052   Offloading for Diabetic Foot Ulcers Post op shoe 06/01/22 1052   Wound Length (cm) 1 cm 06/08/22 0916   Wound Width (cm) 0.8 cm 06/08/22 0916   Wound Depth (cm) 0.1 cm 06/08/22 0916   Wound Surface Area (cm^2) 0.8 cm^2 06/08/22 0916   Change in Wound Size % (l*w) 93.33 06/08/22 0916   Wound Volume (cm^3) 0.08 cm^3 06/08/22 0916   Wound Healing % 93 06/08/22 0916   Post-Procedure Length (cm) 1 cm 06/08/22 0951   Post-Procedure Width (cm) 1 cm 06/08/22 0951   Post-Procedure Depth (cm) 0.2 cm 06/08/22 0951 Post-Procedure Surface Area (cm^2) 1 cm^2 06/08/22 0951   Post-Procedure Volume (cm^3) 0.2 cm^3 06/08/22 0951   Wound Assessment Dry 06/08/22 0916   Drainage Amount Scant 06/08/22 0916   Drainage Description Thin;Yellow 06/08/22 0916   Odor None 06/08/22 0916   Rashmi-wound Assessment Intact; Hyperkeratosis (callous) 06/08/22 0916   Number of days: 77       Wound 04/13/22 Pretibial Distal;Left;Lateral #14 (blocked may 25)  (Active)   Wound Image   06/08/22 0916   Dressing Status New dressing applied 06/01/22 1052   Wound Cleansed Cleansed with saline 06/01/22 1052   Dressing/Treatment Dry dressing;ABD 06/01/22 1052   Offloading for Diabetic Foot Ulcers Post op shoe 06/01/22 1052   Wound Length (cm) 0.6 cm 06/08/22 0916   Wound Width (cm) 1.8 cm 06/08/22 0916   Wound Depth (cm) 0.2 cm 06/08/22 0916   Wound Surface Area (cm^2) 1.08 cm^2 06/08/22 0916   Change in Wound Size % (l*w) 88.75 06/08/22 0916   Wound Volume (cm^3) 0.216 cm^3 06/08/22 0916   Wound Healing % 78 06/08/22 0916   Post-Procedure Length (cm) 0.7 cm 06/08/22 0951   Post-Procedure Width (cm) 1.9 cm 06/08/22 0951   Post-Procedure Depth (cm) 0.2 cm 06/08/22 0951   Post-Procedure Surface Area (cm^2) 1.33 cm^2 06/08/22 0951   Post-Procedure Volume (cm^3) 0.266 cm^3 06/08/22 0951   Wound Assessment Fibrinous 06/08/22 0916   Drainage Amount Moderate 06/08/22 0916   Drainage Description Yellow 06/08/22 0916   Odor None 06/08/22 0916   Rashmi-wound Assessment Dry/flaky; Intact 06/08/22 0916   Number of days: 56       Wound 04/27/22 Tibial Left;Posterior #15 (Active)   Wound Image   06/08/22 0916   Dressing Status New dressing applied 06/01/22 1052   Wound Cleansed Cleansed with saline 06/01/22 1052   Dressing/Treatment ABD;Pharmaceutical agent (see MAR); Roll gauze 06/01/22 1052   Offloading for Diabetic Foot Ulcers Post op shoe 06/01/22 1052   Wound Length (cm) 3.8 cm 06/08/22 0916   Wound Width (cm) 4 cm 06/08/22 0916   Wound Depth (cm) 0.6 cm 06/08/22 0916 Wound Surface Area (cm^2) 15.2 cm^2 06/08/22 0916   Change in Wound Size % (l*w) -8.26 06/08/22 0916   Wound Volume (cm^3) 9.12 cm^3 06/08/22 0916   Wound Healing % -30 06/08/22 0916   Post-Procedure Length (cm) 3.8 cm 06/08/22 0951   Post-Procedure Width (cm) 4 cm 06/08/22 0951   Post-Procedure Depth (cm) 0.7 cm 06/08/22 0951   Post-Procedure Surface Area (cm^2) 15.2 cm^2 06/08/22 0951   Post-Procedure Volume (cm^3) 10.64 cm^3 06/08/22 0951   Wound Assessment Fibrin;Pink/red 06/08/22 0916   Drainage Amount Moderate 06/08/22 0916   Drainage Description Serosanguinous; Yellow 06/08/22 0916   Odor None 06/08/22 0916   Rashmi-wound Assessment Intact;Fragile 06/08/22 0916   Number of days: 42         Total Surface Area Covered 16 sq/cm     Amount Wasted 0 sq/cm    Reason for Waste none      Secured: Yes    Secured With:  [x]Steri Strips    []Sutures     []Staples []Other    Procedural Pain: 1/10     Post Procedural Pain: 2 / 10    Response to Treatment:  Well tolerated by patient. Plan:     Pt is not a smoker   - Discussed relationship of smoking and negative affects on wound healing   - Emphasized importance of tobacco avoidace/cessation   - Script for nicotine patch given to patient   - Information regarding support groups for smoking cessation given    In my professional opinion and based off the information that is available at this time this patient has appropriate indication for HBO Therapy: Maybe    Treatment Note please see attached Discharge Instructions    Written patient dismissal instructions given to patient and signed by patient or POA.              Electronically signed by Miguel Kenny DPM on 7/27/2022 at 9:39 AM

## 2022-08-01 NOTE — DISCHARGE INSTRUCTIONS
Visit Discharge/Physician Orders     Discharge condition: Stable     Assessment of pain at discharge:  minimal     Anesthetic used: 4% lidocaine solution     Discharge to: Home     Left via:Private automobile     Accompanied by: accompanied by SELF     ECF/[facility]:  [facility]. Dressing Orders:  Clean bilateral heel ulcers with NSS, apply medi-honey patch  Change every other day   left lower leg medihoney gel and dry dressing every other day   left great toe cleanse with normal saline apply medihoney dry dressing every other day  TO LEFT LEG ULCER, graft applied , mepitel in place  MAY APPLY ALGINATE TO LIGIA WOUND  [facility] CHANGE Monday Wednesday friday. IF VAC MALFUNCTIONS APPLY WET TO DRY DRESSING UNTIL VAC CAN BE REPLACED- be careful not to disrupt graft        APPLY PREVALON BOOT TO BILATERAL HEELS     Physical therapy for ambulating safely while wearing offloading boots as indicated. [address] [place] [address],[address] followup visit ________1 weeks with Dr. Gastelum__wednesday___________________  (Please note your next appointment above and if you are unable to keep, kindly give a 24 hour notice. Thank you.)     Physician signature:__________________________        If you experience any of the following, please call the [address] [place] [address] during business hours:     * Increase in Pain  * Temperature over 101  * Increase in drainage from your wound  * Drainage with a foul odor  * Bleeding  * Increase in swelling  * Need for compression bandage changes due to slippage, breakthrough drainage.                             Wound 01/05/22 Heel Left #1 (Active)   Wound Image   07/13/22 0841   Wound Etiology Diabetic Campbell 2 01/05/22 0943   Dressing Status New dressing applied 07/27/22 1012   Wound Cleansed Cleansed with saline 07/27/22 1012   Dressing/Treatment Honey gel/honey paste;ABD;Dry dressing 07/27/22 1012   Offloading for Diabetic Foot Ulcers Post op shoe 07/27/22 1012   Wound Length (cm) 4.6 cm 08/03/22 0900   Wound Width (cm) 3.5 cm 08/03/22 0900   Wound Depth (cm) 0.3 cm 08/03/22 0900   Wound Surface Area (cm^2) 16.1 cm^2 08/03/22 0900   Change in Wound Size % (l*w) -16.67 08/03/22 0900   Wound Volume (cm^3) 4.83 cm^3 08/03/22 0900   Wound Healing % -75 08/03/22 0900   Post-Procedure Length (cm) 4.8 cm 08/03/22 0930   Post-Procedure Width (cm) 3.6 cm 08/03/22 0930   Post-Procedure Depth (cm) 0.5 cm 08/03/22 0930   Post-Procedure Surface Area (cm^2) 17.28 cm^2 08/03/22 0930   Post-Procedure Volume (cm^3) 8.64 cm^3 08/03/22 0930   Wound Assessment Fibrin;Pink/red 08/03/22 0900   Drainage Amount Moderate 08/03/22 0900   Drainage Description Yellow;Serosanguinous 08/03/22 0900   Odor None 08/03/22 0900   Rashmi-wound Assessment Intact 08/03/22 0900   Wound Thickness Description not for Pressure Injury Full thickness 01/19/22 0909   Number of days: 209       Wound 01/05/22 Heel Right #2 (Active)   Wound Image   07/13/22 0841   Wound Etiology Venous 01/05/22 0943   Dressing Status New dressing applied 07/27/22 1012   Wound Cleansed Cleansed with saline 07/27/22 1012   Dressing/Treatment ABD;Dry dressing;Honey gel/honey paste 07/27/22 1012   Offloading for Diabetic Foot Ulcers Post op shoe 07/27/22 1012   Wound Length (cm) 2.3 cm 08/03/22 0900   Wound Width (cm) 1.5 cm 08/03/22 0900   Wound Depth (cm) 0.2 cm 08/03/22 0900   Wound Surface Area (cm^2) 3.45 cm^2 08/03/22 0900   Change in Wound Size % (l*w) 18.82 08/03/22 0900   Wound Volume (cm^3) 0.69 cm^3 08/03/22 0900   Wound Healing % 19 08/03/22 0900   Post-Procedure Length (cm) 2.5 cm 08/03/22 0930   Post-Procedure Width (cm) 1.6 cm 08/03/22 0930   Post-Procedure Depth (cm) 0.3 cm 08/03/22 0930   Post-Procedure Surface Area (cm^2) 4 cm^2 08/03/22 0930   Post-Procedure Volume (cm^3) 1.2 cm^3 08/03/22 0930   Wound Assessment Fibrin;Pink/red 08/03/22 0900   Drainage Amount Small 08/03/22 0900   Drainage Description Serosanguinous; Yellow 08/03/22 0900   Odor None 08/03/22 0900   Rashmi-wound Assessment Intact 08/03/22 0900   Wound Thickness Description not for Pressure Injury Full thickness 01/19/22 0909   Number of days: 209       Wound 03/14/22 Coccyx (Active)   Number of days: 141       Wound 03/23/22 Toe (Comment  which one) Left #10 Left Great Toe (Active)   Wound Image   07/13/22 0841   Dressing Status New dressing applied 07/27/22 1012   Wound Cleansed Cleansed with saline 07/27/22 1012   Dressing/Treatment Honey gel/honey paste;Dry dressing 07/27/22 1012   Offloading for Diabetic Foot Ulcers Post op shoe 07/27/22 1012   Wound Length (cm) 0 cm 08/03/22 0900   Wound Width (cm) 0.4 cm 08/03/22 0900   Wound Depth (cm) 0.1 cm 08/03/22 0900   Wound Surface Area (cm^2) 0 cm^2 08/03/22 0900   Change in Wound Size % (l*w) 100 08/03/22 0900   Wound Volume (cm^3) 0 cm^3 08/03/22 0900   Wound Healing % 100 08/03/22 0900   Post-Procedure Length (cm) 0.4 cm 07/27/22 0937   Post-Procedure Width (cm) 0.4 cm 07/27/22 [facility]   Post-Procedure Depth (cm) 0.2 cm 07/27/22 0937   Post-Procedure Surface Area (cm^2) 0.16 cm^2 07/27/22 0937   Post-Procedure Volume (cm^3) 0.032 cm^3 07/27/22 0937   Wound Assessment Pink/red;Fibrin 08/03/22 0900   Drainage Amount Small 08/03/22 0900   Drainage Description Yellow 08/03/22 0900   Odor None 08/03/22 0900   Rashmi-wound Assessment Maceration 08/03/22 0900   Number of days: 133       Wound 04/13/22 Pretibial Distal;Left;Lateral #14 (blocked may 25)  (Active)   Wound Image   07/13/22 0841   Dressing Status New dressing applied 07/27/22 1012   Wound Cleansed Cleansed with saline 07/27/22 1012   Dressing/Treatment Other (comment) 07/27/22 1012   Offloading for Diabetic Foot Ulcers Post op shoe 07/27/22 1012   Wound Length (cm) 1.3 cm 08/03/22 0900   Wound Width (cm) 1.9 cm 08/03/22 0900   Wound Depth (cm) 0.2 cm 08/03/22 0900   Wound Surface Area (cm^2) 2.47 cm^2 08/03/22 0900   Change in Wound Size % (l*w) 74.27 08/03/22 0900   Wound Volume (cm^3) 0.494 cm^3 08/03/22 0900   Wound Healing % 49 08/03/22 0900   Post-Procedure Length (cm) 1.1 cm 08/03/22 0930   Post-Procedure Width (cm) 1.9 cm 08/03/22 0930   Post-Procedure Depth (cm) 0.3 cm 08/03/22 0930   Post-Procedure Surface Area (cm^2) 2.09 cm^2 08/03/22 0930   Post-Procedure Volume (cm^3) 0.627 cm^3 08/03/22 0930   Wound Assessment Fibrin;Pink/red 08/03/22 0900   Drainage Amount Small 08/03/22 0900   Drainage Description Yellow 08/03/22 0900   Odor None 08/03/22 0900   Rashmi-wound Assessment Intact 08/03/22 0900   Number of days: 111       Wound 04/27/22 Tibial Left;Posterior #15 (Active)   Wound Image   07/13/22 0841   Dressing Status New dressing applied 07/27/22 1012   Wound Cleansed Cleansed with saline 07/27/22 1012   Dressing/Treatment Other (comment) 07/27/22 1012   Offloading for Diabetic Foot Ulcers Post op shoe 07/27/22 1012   Wound Length (cm) 5.5 cm 08/03/22 0900   Wound Width (cm) 0.7 cm 08/03/22 0900   Wound Depth (cm) 0.6 cm 08/03/22 0900   Wound Surface Area (cm^2) 3.85 cm^2 08/03/22 0900   Change in Wound Size % (l*w) 72.58 08/03/22 0900   Wound Volume (cm^3) 2.31 cm^3 08/03/22 0900   Wound Healing % 67 08/03/22 0900   Post-Procedure Length (cm) 5.5 cm 08/03/22 0930   Post-Procedure Width (cm) 0.9 cm 08/03/22 0930   Post-Procedure Depth (cm) 0.8 cm 08/03/22 0930   Post-Procedure Surface Area (cm^2) 4.95 cm^2 08/03/22 0930   Post-Procedure Volume (cm^3) 3.96 cm^3 08/03/22 0930   Wound Assessment Slough;Eschar moist;Pink/red;Fibrin 08/03/22 0900   Drainage Amount Moderate 08/03/22 0900   Drainage Description Serosanguinous 08/03/22 0900   Odor Mild 08/03/22 0900   Rashmi-wound Assessment Fragile;Edematous 08/03/22 0900   Number of days: 97

## 2022-08-03 ENCOUNTER — HOSPITAL ENCOUNTER (OUTPATIENT)
Dept: WOUND CARE | Age: 87
Discharge: HOME | End: 2022-08-03
Payer: MEDICARE

## 2022-08-03 VITALS
SYSTOLIC BLOOD PRESSURE: 142 MMHG | BODY MASS INDEX: 33.32 KG/M2 | HEART RATE: 88 BPM | WEIGHT: 246 LBS | HEIGHT: 72 IN | RESPIRATION RATE: 18 BRPM | TEMPERATURE: 97.5 F | DIASTOLIC BLOOD PRESSURE: 62 MMHG

## 2022-08-03 DIAGNOSIS — E11.621 DIABETIC ULCER OF RIGHT HEEL ASSOCIATED WITH TYPE 2 DIABETES MELLITUS, WITH FAT LAYER EXPOSED (HCC): Primary | ICD-10-CM

## 2022-08-03 DIAGNOSIS — E08.621 DIABETIC ULCER OF LEFT HEEL ASSOCIATED WITH DIABETES MELLITUS DUE TO UNDERLYING CONDITION, LIMITED TO BREAKDOWN OF SKIN (HCC): ICD-10-CM

## 2022-08-03 DIAGNOSIS — L97.421 DIABETIC ULCER OF LEFT HEEL ASSOCIATED WITH DIABETES MELLITUS DUE TO UNDERLYING CONDITION, LIMITED TO BREAKDOWN OF SKIN (HCC): ICD-10-CM

## 2022-08-03 DIAGNOSIS — L97.412 DIABETIC ULCER OF RIGHT HEEL ASSOCIATED WITH TYPE 2 DIABETES MELLITUS, WITH FAT LAYER EXPOSED (HCC): Primary | ICD-10-CM

## 2022-08-03 PROCEDURE — 15271 SKIN SUB GRAFT TRNK/ARM/LEG: CPT

## 2022-08-03 PROCEDURE — 6370000000 HC RX 637 (ALT 250 FOR IP): Performed by: PODIATRIST

## 2022-08-03 PROCEDURE — C5271 LOW COST SKIN SUBSTITUTE APP: HCPCS

## 2022-08-03 RX ORDER — LIDOCAINE HYDROCHLORIDE 20 MG/ML
JELLY TOPICAL ONCE
Status: CANCELLED | OUTPATIENT
Start: 2022-08-03 | End: 2022-08-03

## 2022-08-03 RX ORDER — LIDOCAINE 50 MG/G
OINTMENT TOPICAL ONCE
Status: CANCELLED | OUTPATIENT
Start: 2022-08-03 | End: 2022-08-03

## 2022-08-03 RX ORDER — CLOBETASOL PROPIONATE 0.5 MG/G
OINTMENT TOPICAL ONCE
Status: CANCELLED | OUTPATIENT
Start: 2022-08-03 | End: 2022-08-03

## 2022-08-03 RX ORDER — LIDOCAINE HYDROCHLORIDE 40 MG/ML
SOLUTION TOPICAL ONCE
Status: CANCELLED | OUTPATIENT
Start: 2022-08-03 | End: 2022-08-03

## 2022-08-03 RX ORDER — BACITRACIN, NEOMYCIN, POLYMYXIN B 400; 3.5; 5 [USP'U]/G; MG/G; [USP'U]/G
OINTMENT TOPICAL ONCE
Status: CANCELLED | OUTPATIENT
Start: 2022-08-03 | End: 2022-08-03

## 2022-08-03 RX ORDER — GINSENG 100 MG
CAPSULE ORAL ONCE
Status: CANCELLED | OUTPATIENT
Start: 2022-08-03 | End: 2022-08-03

## 2022-08-03 RX ORDER — LIDOCAINE HYDROCHLORIDE 40 MG/ML
SOLUTION TOPICAL ONCE
Status: COMPLETED | OUTPATIENT
Start: 2022-08-03 | End: 2022-08-03

## 2022-08-03 RX ORDER — BACITRACIN ZINC AND POLYMYXIN B SULFATE 500; 1000 [USP'U]/G; [USP'U]/G
OINTMENT TOPICAL ONCE
Status: CANCELLED | OUTPATIENT
Start: 2022-08-03 | End: 2022-08-03

## 2022-08-03 RX ORDER — LIDOCAINE 40 MG/G
CREAM TOPICAL ONCE
Status: CANCELLED | OUTPATIENT
Start: 2022-08-03 | End: 2022-08-03

## 2022-08-03 RX ORDER — GENTAMICIN SULFATE 1 MG/G
OINTMENT TOPICAL ONCE
Status: CANCELLED | OUTPATIENT
Start: 2022-08-03 | End: 2022-08-03

## 2022-08-03 RX ORDER — BETAMETHASONE DIPROPIONATE 0.05 %
OINTMENT (GRAM) TOPICAL ONCE
Status: CANCELLED | OUTPATIENT
Start: 2022-08-03 | End: 2022-08-03

## 2022-08-03 RX ADMIN — LIDOCAINE HYDROCHLORIDE 30 ML: 40 SOLUTION TOPICAL at 09:19

## 2022-08-03 ASSESSMENT — PAIN DESCRIPTION - DESCRIPTORS: DESCRIPTORS: TINGLING;TIGHTNESS

## 2022-08-03 ASSESSMENT — PAIN DESCRIPTION - ONSET: ONSET: ON-GOING

## 2022-08-03 ASSESSMENT — PAIN DESCRIPTION - LOCATION: LOCATION: FOOT

## 2022-08-03 ASSESSMENT — PAIN - FUNCTIONAL ASSESSMENT: PAIN_FUNCTIONAL_ASSESSMENT: ACTIVITIES ARE NOT PREVENTED

## 2022-08-03 ASSESSMENT — PAIN DESCRIPTION - FREQUENCY: FREQUENCY: INTERMITTENT

## 2022-08-03 ASSESSMENT — PAIN DESCRIPTION - PAIN TYPE: TYPE: CHRONIC PAIN

## 2022-08-03 ASSESSMENT — PAIN DESCRIPTION - ORIENTATION: ORIENTATION: RIGHT;LEFT

## 2022-08-03 ASSESSMENT — PAIN SCALES - GENERAL: PAINLEVEL_OUTOF10: 6

## 2022-08-03 NOTE — DISCHARGE INSTR - COC
Continuity of Care Form    Patient Name: Gregor Gill. :  1932  MRN:  [id number]    Admit date:  8/3/2022  Discharge date:  ***    Code Status Order: Prior   Advance Directives:     Admitting Physician:  No admitting provider for patient encounter. PCP: Jose L Chanel MD    Discharging Nurse: [facility] Unit/Room#: No information available for this encounter. Discharging Unit Phone Number: ***    Emergency Contact:   Extended Emergency Contact Information  Primary Emergency Contact: [address] Phone: [phone]  Mobile Phone: [phone]  Relation: Child   needed? No  Secondary Emergency Contact: Claude Lizarr[address] Phone: [phone]  Relation: Child    Past Surgical History:  Past Surgical History:   Procedure Laterality Date    [facility]  PICC [address] SINGLE  2021         MASTECTOMY      TOE AMPUTATION      TOE SURGERY      TONSILLECTOMY         Immunization History:   Immunization History   Administered Date(s) Administered    Influenza Virus Vaccine 10/23/2016    Pneumococcal Conjugate Vaccine 2013    Td, unspecified formulation 2012       Active Problems:  Patient Active Problem List   Diagnosis Code    HTN (hypertension) I10    Breast cancer, male ([facility] [address] C50.929    Obesity (BMI 30.0-34. 9) E66.9    Right hip pain M25.551    Diabetes mellitus type 2, uncontrolled (HCC) E11.65    Essential hypertension, benign I10    Hyperlipidemia with target LDL less than 100 E78.5    Dizzy spells R42    Troponin level elevated R77.8    Shortness of breath R06.02    Chest pressure R07.89    Acute respiratory insufficiency R06.89    Pneumonia J18.9    History of pulmonary embolus (PE) Z86.711    Acute left-sided CHF (congestive heart failure) (HCC) I50.1    Type I diabetes mellitus, uncontrolled (HCC) E10.65    Cellulitis and abscess of right lower extremity L03.115, L02.415    Cellulitis and abscess of left lower extremity L03.116, L02.416    Chronic venous insufficiency of lower extremity I87.2    Acute hypoxemic respiratory failure due to COVID-19 ([facility]) U07.1, J96.01    Acute on chronic respiratory failure with hypoxia ([facility]) J96.21    Acute respiratory failure with hypoxia ([facility]) J96.01    COVID-19 virus infection U07.1    Hyperlipidemia E78.5    Pneumonia due to COVID-19 virus U07.1, J12.82    Orthostatic hypotension I95.1    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 ([facility]) I70.244    PVD (peripheral vascular disease) ([facility]) I73.9    PAD (peripheral artery disease) ([facility]) I73.9    Diabetic ulcer of right heel associated with type 2 diabetes mellitus, with fat layer exposed ([facility] [address] E11.621, L97.412    Diabetic ulcer of left heel ([facility]) E11.621, L97.429    Sepsis ([facility] [address] A41.9    Diabetic foot infection ([facility]) E11.628, L08.9    Foot ulcer due to secondary DM ([facility] [address] E13.621, L97.509       Isolation/Infection:   Isolation            No Isolation          Patient Infection Status       Infection Onset Added Last Indicated Last Indicated By Review Planned Expiration Resolved Resolved By    None active    Resolved    COVID-19 (Rule Out) 22 COVID-19, Rapid (Ordered)   22 Rule-Out Test Resulted    MRSA 21 Culture, Blood 2   03/15/22 Lesly Soliz RN    MRSA blood 2021    COVID-19 21 Covid-19 Ambulatory   21     COVID-19 (Rule Out) 21 COVID-19, Rapid (Ordered)   21 Rule-Out Test Resulted    COVID-19 (Rule Out) 10/16/20 10/16/20 10/16/20 Covid-19 Ambulatory (Ordered)   10/17/20 Rule-Out Test Resulted    COVID-19 (Rule Out) 20 Covid-19 Ambulatory (Ordered)   20 Rule-Out Test Resulted    COVID-19 (Rule Out) 20 Covid-19 Ambulatory (Ordered)   20 Rule-Out Test Resulted    COVID-19 (Rule Out) 20 Covid-19 Ambulatory (Ordered)   07/30/20 Rule-Out Test Resulted            Nurse Assessment:  Last Vital Signs: BP (!) 142/62   Pulse 88   Temp 97.5 °F (36.4 °C) (Temporal)   Resp 18   Ht 6' (1.829 m)   Wt 246 lb (111.6 kg)   BMI 33.36 kg/m²     Last documented pain score (0-10 scale): Pain Level: 6  Last Weight:   Wt Readings from Last 1 Encounters:   08/03/22 246 lb (111.6 kg)     Mental Status:  {IP PT MENTAL STATUS:20030}    IV Access:  { AGUSTINA IV ACCESS:818550408}    Nursing Mobility/ADLs:  Walking   {CHP DME DCNU:511394946}  Transfer  {CHP DME NPSD:475618952}  Bathing  {CHP DME HECF:898337031}  Dressing  {CHP DME ETWV:213798108}  Toileting  {CHP DME UPKF:510812594}  Feeding  {CHP DME JHQB:607115287}  Med Admin  {P DME IBRJ:305695954}  Med Delivery   { AGUSTINA MED Delivery:565727966}    Wound Care Documentation and Therapy:  Wound 01/05/22 Heel Left #1 (Active)   Wound Image   07/13/22 0841   Wound Etiology Diabetic Campbell 2 01/05/22 0943   Dressing Status New dressing applied 07/27/22 1012   Wound Cleansed Cleansed with saline 07/27/22 1012   Dressing/Treatment Honey gel/honey paste;ABD;Dry dressing 07/27/22 1012   Offloading for Diabetic Foot Ulcers Post op shoe 07/27/22 1012   Wound Length (cm) 4.6 cm 08/03/22 0900   Wound Width (cm) 3.5 cm 08/03/22 0900   Wound Depth (cm) 0.3 cm 08/03/22 0900   Wound Surface Area (cm^2) 16.1 cm^2 08/03/22 0900   Change in Wound Size % (l*w) -16.67 08/03/22 0900   Wound Volume (cm^3) 4.83 cm^3 08/03/22 0900   Wound Healing % -75 08/03/22 0900   Post-Procedure Length (cm) 4.8 cm 08/03/22 0930   Post-Procedure Width (cm) 3.6 cm 08/03/22 0930   Post-Procedure Depth (cm) 0.5 cm 08/03/22 0930   Post-Procedure Surface Area (cm^2) 17.28 cm^2 08/03/22 0930   Post-Procedure Volume (cm^3) 8.64 cm^3 08/03/22 0930   Wound Assessment Fibrin;Pink/red 08/03/22 0900   Drainage Amount Moderate 08/03/22 0900   Drainage Description Yellow;Serosanguinous 08/03/22 0900   Odor None 08/03/22 0900   Rashmi-wound Assessment Intact 08/03/22 0900   Wound Thickness Description not for Pressure Injury Full thickness 01/19/22 0909   Number of days: 209       Wound 01/05/22 Heel Right #2 (Active)   Wound Image   07/13/22 0841   Wound Etiology Venous 01/05/22 0943   Dressing Status New dressing applied 07/27/22 1012   Wound Cleansed Cleansed with saline 07/27/22 1012   Dressing/Treatment ABD;Dry dressing;Honey gel/honey paste 07/27/22 1012   Offloading for Diabetic Foot Ulcers Post op shoe 07/27/22 1012   Wound Length (cm) 2.3 cm 08/03/22 0900   Wound Width (cm) 1.5 cm 08/03/22 0900   Wound Depth (cm) 0.2 cm 08/03/22 0900   Wound Surface Area (cm^2) 3.45 cm^2 08/03/22 0900   Change in Wound Size % (l*w) 18.82 08/03/22 0900   Wound Volume (cm^3) 0.69 cm^3 08/03/22 0900   Wound Healing % 19 08/03/22 0900   Post-Procedure Length (cm) 2.5 cm 08/03/22 0930   Post-Procedure Width (cm) 1.6 cm 08/03/22 0930   Post-Procedure Depth (cm) 0.3 cm 08/03/22 0930   Post-Procedure Surface Area (cm^2) 4 cm^2 08/03/22 0930   Post-Procedure Volume (cm^3) 1.2 cm^3 08/03/22 0930   Wound Assessment Fibrin;Pink/red 08/03/22 0900   Drainage Amount Small 08/03/22 0900   Drainage Description Serosanguinous; Yellow 08/03/22 0900   Odor None 08/03/22 0900   Rashmi-wound Assessment Intact 08/03/22 0900   Wound Thickness Description not for Pressure Injury Full thickness 01/19/22 0909   Number of days: 209       Wound 03/14/22 Coccyx (Active)   Number of days: 141       Wound 03/23/22 Toe (Comment  which one) Left #10 Left Great Toe (Active)   Wound Image   07/13/22 0841   Dressing Status New dressing applied 07/27/22 1012   Wound Cleansed Cleansed with saline 07/27/22 1012   Dressing/Treatment Honey gel/honey paste;Dry dressing 07/27/22 1012   Offloading for Diabetic Foot Ulcers Post op shoe 07/27/22 1012   Wound Length (cm) 0 cm 08/03/22 0900   Wound Width (cm) 0.4 cm 08/03/22 0900   Wound Depth (cm) 0.1 cm 08/03/22 0900   Wound Surface Area (cm^2) 0 cm^2 08/03/22 0900   Change in Wound Size % (l*w) 100 08/03/22 0900   Wound Volume (cm^3) 0 cm^3 08/03/22 0900   Wound Healing % 100 08/03/22 0900   Post-Procedure Length (cm) 0.4 cm 07/27/22 0937   Post-Procedure Width (cm) 0.4 cm 07/27/22 0937   Post-Procedure Depth (cm) 0.2 cm 07/27/22 0937   Post-Procedure Surface Area (cm^2) 0.16 cm^2 07/27/22 0937   Post-Procedure Volume (cm^3) 0.032 cm^3 07/27/22 0937   Wound Assessment Pink/red;Fibrin 08/03/22 0900   Drainage Amount Small 08/03/22 0900   Drainage Description Yellow 08/03/22 0900   Odor None 08/03/22 0900   Rashmi-wound Assessment Maceration 08/03/22 0900   Number of days: 133       Wound 04/13/22 Pretibial Distal;Left;Lateral #14 (blocked may 25)  (Active)   Wound Image   07/13/22 0841   Dressing Status New dressing applied 07/27/22 1012   Wound Cleansed Cleansed with saline 07/27/22 1012   Dressing/Treatment Other (comment) 07/27/22 1012   Offloading for Diabetic Foot Ulcers Post op shoe 07/27/22 1012   Wound Length (cm) 1.3 cm 08/03/22 0900   Wound Width (cm) 1.9 cm 08/03/22 0900   Wound Depth (cm) 0.2 cm 08/03/22 0900   Wound Surface Area (cm^2) 2.47 cm^2 08/03/22 0900   Change in Wound Size % (l*w) 74.27 08/03/22 0900   Wound Volume (cm^3) 0.494 cm^3 08/03/22 0900   Wound Healing % 49 08/03/22 0900   Post-Procedure Length (cm) 1.1 cm 08/03/22 0930   Post-Procedure Width (cm) 1.9 cm 08/03/22 0930   Post-Procedure Depth (cm) 0.3 cm 08/03/22 0930   Post-Procedure Surface Area (cm^2) 2.09 cm^2 08/03/22 0930   Post-Procedure Volume (cm^3) 0.627 cm^3 08/03/22 0930   Wound Assessment Fibrin;Pink/red 08/03/22 0900   Drainage Amount Small 08/03/22 0900   Drainage Description Yellow 08/03/22 0900   Odor None 08/03/22 0900   Rashmi-wound Assessment Intact 08/03/22 0900   Number of days: 111       Wound 04/27/22 Tibial Left;Posterior #15 (Active)   Wound Image   07/13/22 0841   Dressing Status New dressing applied 07/27/22 1012   Wound Cleansed Cleansed with saline 07/27/22 1012   Dressing/Treatment Other (comment) 07/27/22 1012   Offloading for Diabetic Foot Ulcers Post op shoe 07/27/22 1012   Wound Length (cm) 5.5 cm 08/03/22 0900   Wound Width (cm) 0.7 cm 08/03/22 0900   Wound Depth (cm) 0.6 cm 08/03/22 0900   Wound Surface Area (cm^2) 3.85 cm^2 08/03/22 0900   Change in Wound Size % (l*w) 72.58 08/03/22 0900   Wound Volume (cm^3) 2.31 cm^3 08/03/22 0900   Wound Healing % 67 08/03/22 0900   Post-Procedure Length (cm) 5.5 cm 08/03/22 0930   Post-Procedure Width (cm) 0.9 cm 08/03/22 0930   Post-Procedure Depth (cm) 0.8 cm 08/03/22 0930   Post-Procedure Surface Area (cm^2) 4.95 cm^2 08/03/22 0930   Post-Procedure Volume (cm^3) 3.96 cm^3 08/03/22 0930   Wound Assessment Slough;Eschar moist;Pink/red;Fibrin 08/03/22 0900   Drainage Amount Moderate 08/03/22 0900   Drainage Description Serosanguinous 08/03/22 0900   Odor Mild 08/03/22 0900   Rashmi-wound Assessment Fragile;Edematous 08/03/22 0900   Number of days: 97        Elimination:  Continence: Bowel: {YES / GL:54384}  Bladder: {YES / IQ:42457}  Urinary Catheter: {Urinary Catheter:632586439}   Colostomy/Ileostomy/Ileal Conduit: {YES / GI:63160}       Date of Last BM: ***  No intake or output data in the 24 hours ending 08/03/22 0933  No intake/output data recorded.     Safety Concerns:     508 Object Matrix Safety Concerns:501792485}    Impairments/Disabilities:      508 Object Matrix Impairments/Disabilities:817975802}    Nutrition Therapy:  Current Nutrition Therapy:   508 Object Matrix Diet List:357174004}    Routes of Feeding: {CHP DME Other Feedings:858027279}  Liquids: {Slp liquid thickness:88277}  Daily Fluid Restriction: {CHP DME Yes amt example:480786250}  Last Modified Barium Swallow with Video (Video Swallowing Test): {Done Not Done URNJ:778565500}    Treatments at the Time of Hospital Discharge:   Respiratory Treatments: ***  Oxygen Therapy:  {Therapy; copd oxygen:86794}  Ventilator:    {MH CC Vent KTUP:200968018}    Rehab Therapies: {THERAPEUTIC INTERVENTION:9471099919}  Weight Bearing Status/Restrictions: 50Celio Gill CC Weight Bearin}  Other Medical Equipment (for information only, NOT a DME order):  {EQUIPMENT:894308995}  Other Treatments: ***    Patient's personal belongings (please select all that are sent with patient):  {CHP DME Belongings:507103077}    RN SIGNATURE:  {Esignature:686244596}    CASE MANAGEMENT/SOCIAL WORK SECTION    Inpatient Status Date: ***    Readmission Risk Assessment Score:  Readmission Risk              Risk of Unplanned Readmission:  0           Discharging to Facility/ Agency   Name:   Address:  Phone:  Fax:    Dialysis Facility (if applicable)   Name:  Address:  Dialysis Schedule:  Phone:  Fax:    / signature: {Esignature:695999549}    PHYSICIAN SECTION    Prognosis: {Prognosis:8655835287}    Condition at Discharge: 50Celio Gill Patient Condition:365642239}    Rehab Potential (if transferring to Rehab): {Prognosis:8709274542}    Recommended Labs or Other Treatments After Discharge: ***    Physician Certification: I certify the above information and transfer of Shaista Murillo.  is necessary for the continuing treatment of the diagnosis listed and that he requires {Admit to Appropriate Level of Care:69052} for {GREATER/LESS:955444422} 30 days.      Update Admission H&P: {CHP DME Changes in MAZDE:072053383}    PHYSICIAN SIGNATURE:  {Esignature:701733676}

## 2022-08-03 NOTE — PROGRESS NOTES
[facility]  History and Physical/Consultation  Podiatry    Referring Physician : Elvin Zelaya MD  Marian Connell. MEDICAL RECORD NUMBER:  [id number]  AGE: 80 y.o. GENDER: male  : 1932  EPISODE DATE:  8/3/2022  Subjective:     Chief Complaint   Patient presents with    Wound Check     Right and left lower legs         HISTORY of PRESENT ILLNESS HPI     Marian Tobias is a 80 y.o. male who presents today for wound/ulcer evaluation. History of Wound Context:  The patient has had a wound of diabetic b/l heels,and right ankle which was first noted approximately months ago. This has been treated betadyne. On their initial visit to the wound healing center, 22 ,  the patient has noted that the wound has been improving. The patient has had similar previous wounds in the past.      Pt is not on abx at time of initial visit.       Wound/Ulcer Pain Timing/Severity: constant  Quality of pain: sharp  Severity:  3 / 10   Modifying Factors: Pain worsens with walking  Associated Signs/Symptoms: edema, erythema and drainage    Ulcer Identification:  Ulcer Type: diabetic  Contributing Factors: edema and diabetes    Diabetic/Pressure/Non Pressure Ulcers onl y:  Ulcer: Diabetic ulcer, fat layer exposed    If patient has diabetic lower extremity wounds  Campbell Classification of diabetic lower extremity wounds:    Grade Description   []  0 No open wound   []  1 Superficial ulcer involving the full skin thickness   [x]  2 Deep ulcer involves ligament, tendon, joint capsule, or fascia  No bone involvement or abscess presence   []  3 Deep Ulcer with abcess formation and/or osteomyelitis   []  4 Localized gangrene   []  5 Extensive gangrene of the foot     Wound: N/A          22  Debrided, cont tx and care     22  Debrided, cont tx and care, await graft approval    22  Debrided, cont tx and care, puraply applied    22  Debrided, dermagraft applied    22  Debrided, dermagraft applied    2-23-22  Debrided, graft applied    3-2-22  Debrided, graft applied    3-9-22  Debrided, graft applied     3-23-22  Just release from hospital care, new wounds noted from hospital, also trauma to left toe loosened nail that was removed without incident, debrided all wounds, culture taken    3-30-22  Debrided, santyl to left leg, applied dermagraft b/l heels, prevelon boots wound vac left leg    4-6-22  Debrided, dermagraft b/l, wound care    4-13-22  Debrided, wound vac, dermagraft left    4-27-22  Debrided, cont tx and care     5-4-22  Debrided, cont tx and care, wound vac to leg left, and hydrofera blue to all other wounds    5-11-22  Debrided, cont tx and care    5-18-22  Debrided, cont tx and care    5-25-22  Cont wound vac, cont previous tx, offload    6-1-22  Debrided, offload    6-8-22  Debrided, puraply to left leg, b/l heels medihoney patches    6-15-22  Debrided, wound vac with puraply left leg, medihoney b/l heels and left 1st digit    6-22-22  Debrided, graft applied     6-29-22  Debrided, graft applied    7-13-22  Debrided, cont tx and care     7-20-22  Debrided, puraply applied     7-27-22  Debrided, healing well, add adaptic, regranex for heels?    8-3-22  Debrided, cont tx and care, regranex?     PAST MEDICAL HISTORY      Diagnosis Date    Arthritis     Cancer [facility])     breast    Cellulitis     CHF (congestive heart failure) ([facility])     CKD (chronic kidney disease) stage 3, GFR 30-59 ml/min ([facility])     Diabetes mellitus ([facility] [address]     History of lumpectomy     Hx of blood clots     Hyperlipidemia     Hypertension     Left ventricular failure ([facility])     Macular degeneration     Obesity     Orthostatic hypotension     PE (pulmonary thromboembolism) ([facility])     Pressure injury of both heels, unstageable ([facility] [address]     Staph aureus infection     Venous insufficiency      Past Surgical History:   Procedure Laterality Date    [facility]         MASTECTOMY      TOE AMPUTATION      TOE SURGERY      TONSILLECTOMY       History reviewed. No pertinent family history. Social History     Tobacco Use    Smoking status: Never    Smokeless tobacco: Never   Vaping Use    Vaping Use: Never used   Substance Use Topics    Alcohol use: Not Currently     Alcohol/week: 1.0 standard drink     Types: 1 Cans of beer per week    Drug use: No     Allergies   Allergen Reactions    Clindamycin/Lincomycin     Sulfa Antibiotics      Current Outpatient Medications on File Prior to Encounter   Medication Sig Dispense Refill    apixaban (ELIQUIS) 2.5 MG TABS tablet Take 1 tablet by mouth 2 times daily (before meals) 60 tablet 2    docusate sodium (COLACE) 100 MG capsule Take 200 mg by mouth nightly       tamsulosin (FLOMAX) 0.4 MG capsule Take 0.4 mg by mouth daily      bisacodyl (DULCOLAX) 10 MG suppository Place 10 mg rectally daily as needed for Constipation      diphenhydrAMINE-zinc acetate (BENADRYL) 1-0.1 % cream Apply topically every 4 hours as needed for Itching Apply topically 3 times daily as needed. Sodium Phosphates (FLEET) 7-19 GM/118ML Place 1 enema rectally daily as needed      magnesium hydroxide (MILK OF MAGNESIA) 400 MG/5ML suspension Take 30 mLs by mouth daily as needed for Constipation      Polyethylene Glycol 400 1 % SOLN Apply 1 drop to eye every 4 hours as needed      ferrous sulfate (IRON 325) 325 (65 Fe) MG tablet Take 325 mg by mouth daily       zinc sulfate (ZINCATE) 220 (50 Zn) MG capsule Take 1 capsule by mouth daily for 14 days 14 capsule 0    miconazole (MICOTIN) 2 % powder Apply topically 2 times daily.  45 g 1    ascorbic acid (VITAMIN C) 500 MG tablet Take 1 tablet by mouth 2 times daily 30 tablet 0    Vitamin D (CHOLECALCIFEROL) 50 MCG (2000 UT) TABS tablet Take 1 tablet by mouth daily 60 tablet 0    furosemide (LASIX) 40 MG tablet Take 20 mg by mouth daily       omeprazole (PRILOSEC) 20 MG delayed release capsule Take 20 mg by mouth daily      Multiple Vitamins-Minerals (THERAPEUTIC MULTIVITAMIN-MINERALS) tablet Take 1 tablet by mouth daily      Multiple Vitamins-Minerals (PRESERVISION AREDS PO) Take 1 tablet by mouth 2 times daily (with meals)      acetaminophen (TYLENOL) 325 MG tablet Take 650 mg by mouth every 4 hours as needed for Pain or Fever       calcium carbonate (TUMS) 500 MG chewable tablet Take 1 tablet by mouth every 4 hours as needed for Heartburn      ipratropium-albuterol (DUONEB) 0.5-2.5 (3) MG/3ML SOLN nebulizer solution Inhale 3 mLs into the lungs every 4 hours (while awake) for 30 doses 90 mL 0    gabapentin (NEURONTIN) 100 MG capsule Take 200 mg by mouth Daily with supper. atorvastatin (LIPITOR) 80 MG tablet Take 80 mg by mouth at bedtime       INSULIN LISP PROT & LISP, HUM, (75-25) 100 UNIT/ML SUSP Inject 72 Units into the skin daily (with breakfast)       INSULIN LISP PROT & LISP, HUM, (75-25) 100 UNIT/ML SUSP Inject 55 Units into the skin Daily with supper       clotrimazole-betamethasone (LOTRISONE) cream Apply  topically as needed. Apply topically 2 times daily. No current facility-administered medications on file prior to encounter.        REVIEW OF SYSTEMS   ROS : All others Negative if blank [], Positive if [x]  General Vascular   [] Fevers [] Claudication   [] Chills [] Rest Pain   Skin Neurologic   [x] Tissue Loss [x] Lower extremity neuropathy     Objective:    BP (!) 142/62   Pulse 88   Temp 97.5 °F (36.4 °C) (Temporal)   Resp 18   Ht 6' (1.829 m)   Wt 246 lb (111.6 kg)   BMI 33.36 kg/m²   Wt Readings from Last 3 Encounters:   08/03/22 246 lb (111.6 kg)   07/20/22 246 lb (111.6 kg)   06/22/22 246 lb (111.6 kg)       PHYSICAL EXAM   CONSTITUTIONAL:   Awake, alert, cooperative   PSYCHIATRIC :  Oriented to time, place and person      normal insight to disease process  EXTREMITIES:   R LE Open wounds are noted   Skin color is abnormal with ulcers   Edema is  noted   Sensation deficit noted - protective   Palpation of the foot does cause pain   4/5 strength DF/PF  L LE Open wounds are noted   Skin color is abnormal with ulcers   Edema is  noted   Sensation deficit noted - protective   Palpation of the foot does cause pain   4/5 strength DF/PF  R dorsalis pedis 1 L dorsalis pedis 1   R posterior tibial 1 L posterior tibial 1     Assessment:     Problem List Items Addressed This Visit       Diabetic ulcer of right heel associated with type 2 diabetes mellitus, with fat layer exposed ([address] - Primary    Relevant Orders    Initiate Outpatient Wound Care Protocol    Diabetic ulcer of left heel [facility])    Relevant Orders    Initiate Outpatient Wound Care Protocol       Pre Debridement Measurements:  Are located in the [facility]  Documentation Flow Sheet  Post Debridement Measurements:  Wound/Ulcer Descriptions are Pre Debridement except measurements:     Negative Pressure Wound Therapy Leg Left; Lower; Posterior (Active)   Number of pieces removed 1 08/03/22 0900   Number of days: 0       Wound 01/05/22 Heel Left #1 (Active)   Wound Image   07/13/22 0841   Wound Etiology Diabetic Campbell 2 01/05/22 0943   Dressing Status New dressing applied 07/27/22 1012   Wound Cleansed Cleansed with saline 07/27/22 1012   Dressing/Treatment Honey gel/honey paste;ABD;Dry dressing 07/27/22 1012   Offloading for Diabetic Foot Ulcers Post op shoe 07/27/22 1012   Wound Length (cm) 4.6 cm 08/03/22 0900   Wound Width (cm) 3.5 cm 08/03/22 0900   Wound Depth (cm) 0.3 cm 08/03/22 0900   Wound Surface Area (cm^2) 16.1 cm^2 08/03/22 0900   Change in Wound Size % (l*w) -16.67 08/03/22 0900   Wound Volume (cm^3) 4.83 cm^3 08/03/22 0900   Wound Healing % -75 08/03/22 0900   Post-Procedure Length (cm) 4.8 cm 08/03/22 0930   Post-Procedure Width (cm) 3.6 cm 08/03/22 0930   Post-Procedure Depth (cm) 0.5 cm 08/03/22 0930   Post-Procedure Surface Area (cm^2) 17.28 cm^2 08/03/22 0930   Post-Procedure Volume (cm^3) 8.64 cm^3 08/03/22 0930   Wound Assessment Fibrin;Pink/red 08/03/22 0900   Drainage Amount Moderate 08/03/22 0900   Drainage Description Yellow;Serosanguinous 08/03/22 0900   Odor None 08/03/22 0900   Rashmi-wound Assessment Intact 08/03/22 0900   Wound Thickness Description not for Pressure Injury Full thickness 01/19/22 0909   Number of days: 210       Wound 01/05/22 Heel Right #2 (Active)   Wound Image   07/13/22 0841   Wound Etiology Venous 01/05/22 0943   Dressing Status New dressing applied 07/27/22 1012   Wound Cleansed Cleansed with saline 07/27/22 1012   Dressing/Treatment ABD;Dry dressing;Honey gel/honey paste 07/27/22 1012   Offloading for Diabetic Foot Ulcers Post op shoe 07/27/22 1012   Wound Length (cm) 2.3 cm 08/03/22 0900   Wound Width (cm) 1.5 cm 08/03/22 0900   Wound Depth (cm) 0.2 cm 08/03/22 0900   Wound Surface Area (cm^2) 3.45 cm^2 08/03/22 0900   Change in Wound Size % (l*w) 18.82 08/03/22 0900   Wound Volume (cm^3) 0.69 cm^3 08/03/22 0900   Wound Healing % 19 08/03/22 0900   Post-Procedure Length (cm) 2.5 cm 08/03/22 0930   Post-Procedure Width (cm) 1.6 cm 08/03/22 0930   Post-Procedure Depth (cm) 0.3 cm 08/03/22 0930   Post-Procedure Surface Area (cm^2) 4 cm^2 08/03/22 0930   Post-Procedure Volume (cm^3) 1.2 cm^3 08/03/22 0930   Wound Assessment Fibrin;Pink/red 08/03/22 0900   Drainage Amount Small 08/03/22 0900   Drainage Description Serosanguinous; Yellow 08/03/22 0900   Odor None 08/03/22 0900   Rashmi-wound Assessment Intact 08/03/22 0900   Wound Thickness Description not for Pressure Injury Full thickness 01/19/22 0909   Number of days: 210       Wound 03/14/22 Coccyx (Active)   Number of days: 141       Wound 03/23/22 Toe (Comment  which one) Left #10 Left Great Toe (Active)   Wound Image   07/13/22 0841   Dressing Status New dressing applied 07/27/22 1012   Wound Cleansed Cleansed with saline 07/27/22 1012   Dressing/Treatment Honey gel/honey paste;Dry dressing 07/27/22 1012   Offloading for Diabetic Foot Ulcers Post op shoe 07/27/22 1012   Wound Length (cm) 0 cm 08/03/22 0900 Wound Width (cm) 0.4 cm 08/03/22 0900   Wound Depth (cm) 0.1 cm 08/03/22 0900   Wound Surface Area (cm^2) 0 cm^2 08/03/22 0900   Change in Wound Size % (l*w) 100 08/03/22 0900   Wound Volume (cm^3) 0 cm^3 08/03/22 0900   Wound Healing % 100 08/03/22 0900   Post-Procedure Length (cm) 0.4 cm 07/27/22 0937   Post-Procedure Width (cm) 0.4 cm 07/27/22 0937   Post-Procedure Depth (cm) 0.2 cm 07/27/22 0937   Post-Procedure Surface Area (cm^2) 0.16 cm^2 07/27/22 0937   Post-Procedure Volume (cm^3) 0.032 cm^3 07/27/22 0937   Wound Assessment Pink/red;Fibrin 08/03/22 0900   Drainage Amount Small 08/03/22 0900   Drainage Description Yellow 08/03/22 0900   Odor None 08/03/22 0900   Rashmi-wound Assessment Maceration 08/03/22 0900   Number of days: 133       Wound 04/13/22 Pretibial Distal;Left;Lateral #14 (blocked may 25)  (Active)   Wound Image   07/13/22 0841   Dressing Status New dressing applied 07/27/22 1012   Wound Cleansed Cleansed with saline 07/27/22 1012   Dressing/Treatment Other (comment) 07/27/22 1012   Offloading for Diabetic Foot Ulcers Post op shoe 07/27/22 1012   Wound Length (cm) 1.3 cm 08/03/22 0900   Wound Width (cm) 1.9 cm 08/03/22 0900   Wound Depth (cm) 0.2 cm 08/03/22 0900   Wound Surface Area (cm^2) 2.47 cm^2 08/03/22 0900   Change in Wound Size % (l*w) 74.27 08/03/22 0900   Wound Volume (cm^3) 0.494 cm^3 08/03/22 0900   Wound Healing % 49 08/03/22 0900   Post-Procedure Length (cm) 1.1 cm 08/03/22 0930   Post-Procedure Width (cm) 1.9 cm 08/03/22 0930   Post-Procedure Depth (cm) 0.3 cm 08/03/22 0930   Post-Procedure Surface Area (cm^2) 2.09 cm^2 08/03/22 0930   Post-Procedure Volume (cm^3) 0.627 cm^3 08/03/22 0930   Wound Assessment Fibrin;Pink/red 08/03/22 0900   Drainage Amount Small 08/03/22 0900   Drainage Description Yellow 08/03/22 0900   Odor None 08/03/22 0900   Rashmi-wound Assessment Intact 08/03/22 0900   Number of days: 112       Wound 04/27/22 Tibial Left;Posterior #15 (Active)   Wound Image 07/13/22 0841   Dressing Status New dressing applied 07/27/22 1012   Wound Cleansed Cleansed with saline 07/27/22 1012   Dressing/Treatment Other (comment) 07/27/22 1012   Offloading for Diabetic Foot Ulcers Post op shoe 07/27/22 1012   Wound Length (cm) 5.5 cm 08/03/22 0900   Wound Width (cm) 0.7 cm 08/03/22 0900   Wound Depth (cm) 0.6 cm 08/03/22 0900   Wound Surface Area (cm^2) 3.85 cm^2 08/03/22 0900   Change in Wound Size % (l*w) 72.58 08/03/22 0900   Wound Volume (cm^3) 2.31 cm^3 08/03/22 0900   Wound Healing % 67 08/03/22 0900   Post-Procedure Length (cm) 5.5 cm 08/03/22 0930   Post-Procedure Width (cm) 0.9 cm 08/03/22 0930   Post-Procedure Depth (cm) 0.8 cm 08/03/22 0930   Post-Procedure Surface Area (cm^2) 4.95 cm^2 08/03/22 0930   Post-Procedure Volume (cm^3) 3.96 cm^3 08/03/22 0930   Wound Assessment Slough;Eschar moist;Pink/red;Fibrin 08/03/22 0900   Drainage Amount Moderate 08/03/22 0900   Drainage Description Serosanguinous 08/03/22 0900   Odor Mild 08/03/22 0900   Rashmi-wound Assessment Fragile;Edematous 08/03/22 0900   Number of days: 98          Procedure Note  Indications:  Based on my examination of this patient's wound(s)/ulcer(s) today, debridement is required to promote healing and evaluate the wound base. Performed by: Alba Ireland DPM    Consent obtained:  Yes    Time out taken:  Yes    Pain Control: Anesthetic  Anesthetic: 4% Lidocaine Liquid Topical     Debridement:Excisional Debridement    Using #15 blade scalpel the wound(s)/ulcer(s) was/were sharply debrided down through and including the removal of subcutaneous tissue.         Devitalized Tissue Debrided:  fibrin, biofilm, slough and necrotic/eschar to stimulate bleeding to promote healing, post debridement good bleeding base and wound edges noted    Wound/Ulcer #: 2, 4, 10    Percent of Wound/Ulcer Debrided: 100%    Total Surface Area Debrided:  30 sq cm     Estimated Blood Loss:  Minimal  Hemostasis Achieved:  by pressure    Procedural Pain:  4  / 10   Post Procedural Pain:  5 / 10     Response to treatment:  Well tolerated by patient. A culture was done. Skin Substitute Applied:         [] APLIGRAF   []44 sq/cm   []88 sq/cm    []132 sq/cm  []176  sq/cm           [] DERMAGRAFT  [] 38sq/cm   []76 sq/cm    []114 sq/cm  []152 sq/cm         [] OASIS   [] 10.5 sq/cm   []21 sq/cm    []4 sq/cm PuraPly  []8 sq/cm PuraPly        [x] OTHER nushield 16    []        sq/cm       Performed by: Matilde Chow DPM    Wound Type:venous    Consent obtained: Yes    Time out taken: Yes     Fenestrated: Yes    Instrument(s) curette and #15 blade scalpel      [] Mesher Utilized    Skin Substitute was Applied to Wound Number(s): Wound #: 5      Wound 01/05/22 Heel Left #1 (Active)   Wound Image   06/08/22 0916   Wound Etiology Diabetic Campbell 2 01/05/22 0943   Dressing Status New dressing applied 06/01/22 1052   Wound Cleansed Cleansed with saline 06/01/22 1052   Dressing/Treatment ABD;Pharmaceutical agent (see MAR); Roll gauze 06/01/22 1052   Offloading for Diabetic Foot Ulcers Post op shoe 06/01/22 1052   Wound Length (cm) 4 cm 06/08/22 0916   Wound Width (cm) 3.5 cm 06/08/22 0916   Wound Depth (cm) 0.2 cm 06/08/22 0916   Wound Surface Area (cm^2) 14 cm^2 06/08/22 0916   Change in Wound Size % (l*w) -1.45 06/08/22 0916   Wound Volume (cm^3) 2.8 cm^3 06/08/22 0916   Wound Healing % -1 06/08/22 0916   Post-Procedure Length (cm) 4 cm 06/08/22 0951   Post-Procedure Width (cm) 3.5 cm 06/08/22 0951   Post-Procedure Depth (cm) 0.2 cm 06/08/22 0951   Post-Procedure Surface Area (cm^2) 14 cm^2 06/08/22 0951   Post-Procedure Volume (cm^3) 2.8 cm^3 06/08/22 0951   Wound Assessment Fibrin;Pink/red 06/08/22 0916   Drainage Amount Moderate 06/08/22 0916   Drainage Description Serosanguinous 06/08/22 0916   Odor None 06/08/22 0916   Rashmi-wound Assessment Maceration;Fragile 06/08/22 0916   Wound Thickness Description not for Pressure Injury Full thickness 01/19/22 0909   Number of days: 154       Wound 01/05/22 Heel Right #2 (Active)   Wound Image   06/08/22 0916   Wound Etiology Venous 01/05/22 0943   Dressing Status New dressing applied 06/01/22 1052   Wound Cleansed Cleansed with saline 06/01/22 1052   Dressing/Treatment ABD;Pharmaceutical agent (see MAR); Roll gauze 06/01/22 1052   Offloading for Diabetic Foot Ulcers Post op shoe 06/01/22 1052   Wound Length (cm) 2.5 cm 06/08/22 0916   Wound Width (cm) 1.6 cm 06/08/22 0916   Wound Depth (cm) 0.2 cm 06/08/22 0916   Wound Surface Area (cm^2) 4 cm^2 06/08/22 0916   Change in Wound Size % (l*w) 5.88 06/08/22 0916   Wound Volume (cm^3) 0.8 cm^3 06/08/22 0916   Wound Healing % 6 06/08/22 0916   Post-Procedure Length (cm) 2.5 cm 06/08/22 0951   Post-Procedure Width (cm) 1.8 cm 06/08/22 0951   Post-Procedure Depth (cm) 0.3 cm 06/08/22 0951   Post-Procedure Surface Area (cm^2) 4.5 cm^2 06/08/22 0951   Post-Procedure Volume (cm^3) 1.35 cm^3 06/08/22 0951   Wound Assessment Pink/red;Fibrin 06/08/22 0916   Drainage Amount Small 06/08/22 0916   Drainage Description Serosanguinous 06/08/22 0916   Odor None 06/08/22 0916   Rashmi-wound Assessment Intact;Fragile 06/08/22 0916   Wound Thickness Description not for Pressure Injury Full thickness 01/19/22 0909   Number of days: 154       Wound 03/14/22 Coccyx (Active)   Number of days: 85       Wound 03/23/22 Toe (Comment  which one) Left #10 Left Great Toe (Active)   Wound Image   06/08/22 0916   Dressing Status New dressing applied 06/01/22 1052   Wound Cleansed Cleansed with saline 06/01/22 1052   Dressing/Treatment Pharmaceutical agent (see MAR); Dry dressing 06/01/22 1052   Offloading for Diabetic Foot Ulcers Post op shoe 06/01/22 1052   Wound Length (cm) 1 cm 06/08/22 0916   Wound Width (cm) 0.8 cm 06/08/22 0916   Wound Depth (cm) 0.1 cm 06/08/22 0916   Wound Surface Area (cm^2) 0.8 cm^2 06/08/22 0916   Change in Wound Size % (l*w) 93.33 06/08/22 0916   Wound Volume (cm^3) 0.08 cm^3 06/08/22 0916   Wound Healing % 93 06/08/22 0916   Post-Procedure Length (cm) 1 cm 06/08/22 0951   Post-Procedure Width (cm) 1 cm 06/08/22 0951   Post-Procedure Depth (cm) 0.2 cm 06/08/22 0951   Post-Procedure Surface Area (cm^2) 1 cm^2 06/08/22 0951   Post-Procedure Volume (cm^3) 0.2 cm^3 06/08/22 0951   Wound Assessment Dry 06/08/22 0916   Drainage Amount Scant 06/08/22 0916   Drainage Description Thin;Yellow 06/08/22 0916   Odor None 06/08/22 0916   Rashmi-wound Assessment Intact; Hyperkeratosis (callous) 06/08/22 0916   Number of days: 77       Wound 04/13/22 Pretibial Distal;Left;Lateral #14 (blocked may 25)  (Active)   Wound Image   06/08/22 0916   Dressing Status New dressing applied 06/01/22 1052   Wound Cleansed Cleansed with saline 06/01/22 1052   Dressing/Treatment Dry dressing;ABD 06/01/22 1052   Offloading for Diabetic Foot Ulcers Post op shoe 06/01/22 1052   Wound Length (cm) 0.6 cm 06/08/22 0916   Wound Width (cm) 1.8 cm 06/08/22 0916   Wound Depth (cm) 0.2 cm 06/08/22 0916   Wound Surface Area (cm^2) 1.08 cm^2 06/08/22 0916   Change in Wound Size % (l*w) 88.75 06/08/22 0916   Wound Volume (cm^3) 0.216 cm^3 06/08/22 0916   Wound Healing % 78 06/08/22 0916   Post-Procedure Length (cm) 0.7 cm 06/08/22 0951   Post-Procedure Width (cm) 1.9 cm 06/08/22 0951   Post-Procedure Depth (cm) 0.2 cm 06/08/22 0951   Post-Procedure Surface Area (cm^2) 1.33 cm^2 06/08/22 0951   Post-Procedure Volume (cm^3) 0.266 cm^3 06/08/22 0951   Wound Assessment Fibrinous 06/08/22 0916   Drainage Amount Moderate 06/08/22 0916   Drainage Description Yellow 06/08/22 0916   Odor None 06/08/22 0916   Rashmi-wound Assessment Dry/flaky; Intact 06/08/22 0916   Number of days: 56       Wound 04/27/22 Tibial Left;Posterior #15 (Active)   Wound Image   06/08/22 0916   Dressing Status New dressing applied 06/01/22 1052   Wound Cleansed Cleansed with saline 06/01/22 1052   Dressing/Treatment ABD;Pharmaceutical agent (see MAR); Roll gauze 06/01/22 1052   Offloading for Diabetic Foot Ulcers Post op shoe 06/01/22 1052   Wound Length (cm) 3.8 cm 06/08/22 0916   Wound Width (cm) 4 cm 06/08/22 0916   Wound Depth (cm) 0.6 cm 06/08/22 0916   Wound Surface Area (cm^2) 15.2 cm^2 06/08/22 0916   Change in Wound Size % (l*w) -8.26 06/08/22 0916   Wound Volume (cm^3) 9.12 cm^3 06/08/22 0916   Wound Healing % -30 06/08/22 0916   Post-Procedure Length (cm) 3.8 cm 06/08/22 0951   Post-Procedure Width (cm) 4 cm 06/08/22 0951   Post-Procedure Depth (cm) 0.7 cm 06/08/22 0951   Post-Procedure Surface Area (cm^2) 15.2 cm^2 06/08/22 0951   Post-Procedure Volume (cm^3) 10.64 cm^3 06/08/22 0951   Wound Assessment Fibrin;Pink/red 06/08/22 0916   Drainage Amount Moderate 06/08/22 0916   Drainage Description Serosanguinous; Yellow 06/08/22 0916   Odor None 06/08/22 0916   Rashmi-wound Assessment Intact;Fragile 06/08/22 0916   Number of days: 42         Total Surface Area Covered 16 sq/cm     Amount Wasted 0 sq/cm    Reason for Waste none      Secured: Yes    Secured With:  [x]Steri Strips    []Sutures     []Staples []Other    Procedural Pain: 1/10     Post Procedural Pain: 2 / 10    Response to Treatment:  Well tolerated by patient. Plan:     Pt is not a smoker   - Discussed relationship of smoking and negative affects on wound healing   - Emphasized importance of tobacco avoidace/cessation   - Script for nicotine patch given to patient   - Information regarding support groups for smoking cessation given    In my professional opinion and based off the information that is available at this time this patient has appropriate indication for HBO Therapy: Maybe    Treatment Note please see attached Discharge Instructions    Written patient dismissal instructions given to patient and signed by patient or POA.          Discharge Instructions         Visit Discharge/Physician Orders     Discharge condition: Stable     Assessment of pain at discharge:  minimal     Anesthetic used: 4% lidocaine solution     Discharge to: Home     Left via:Private automobile     Accompanied by: accompanied by SELF     [facility]F/[facility]:  [facility]. Dressing Orders:  Clean bilateral heel ulcers with NSS, apply medi-honey patch  Change every other day   left lower leg medihoney gel and dry dressing every other day   left great toe cleanse with normal saline apply medihoney dry dressing every other day  TO LEFT LEG ULCER, graft applied , mepitel in place  MAY APPLY ALGINATE [facility] LIG[facility] WOUND  [facility] Monday Wednesday friday. IF VAC MALFUNCTIONS APPLY WET TO DRY DRESSING UNTIL VAC CAN BE REPLACED- be careful not to disrupt graft        APPLY PREVALON BOOT TO BILATERAL HEELS     Physical therapy for ambulating safely while wearing offloading boots as indicated. [facility] followup visit ________1 weeks with Dr. Gastelum__wednesday___________________  (Please note your next appointment above and if you are unable to keep, kindly give a 24 hour notice. Thank you.)     Physician signature:__________________________        If you experience any of the following, please call the [address] [place] [address] Road during business hours:     * Increase in Pain  * Temperature over 101  * Increase in drainage from your wound  * Drainage with a foul odor  * Bleeding  * Increase in swelling  * Need for compression bandage changes due to slippage, breakthrough drainage.                             Wound 01/05/22 Heel Left #1 (Active)   Wound Image   07/13/22 0841   Wound Etiology Diabetic Campbell 2 01/05/22 0943   Dressing Status New dressing applied 07/27/22 1012   Wound Cleansed Cleansed with saline 07/27/22 1012   Dressing/Treatment Honey gel/honey paste;ABD;Dry dressing 07/27/22 1012   Offloading for Diabetic Foot Ulcers Post op shoe 07/27/22 1012   Wound Length (cm) 4.6 cm 08/03/22 0900   Wound Width (cm) 3.5 cm 08/03/22 0900   Wound Depth (cm) 0.3 cm 08/03/22 0900   Wound Surface Area (cm^2) 16.1 cm^2 08/03/22 0900   Change in Wound Size % (l*w) -16.67 08/03/22 0900   Wound Volume (cm^3) 4.83 cm^3 08/03/22 0900   Wound Healing % -75 08/03/22 0900   Post-Procedure Length (cm) 4.8 cm 08/03/22 0930   Post-Procedure Width (cm) 3.6 cm 08/03/22 0930   Post-Procedure Depth (cm) 0.5 cm 08/03/22 0930   Post-Procedure Surface Area (cm^2) 17.28 cm^2 08/03/22 0930   Post-Procedure Volume (cm^3) 8.64 cm^3 08/03/22 0930   Wound Assessment Fibrin;Pink/red 08/03/22 0900   Drainage Amount Moderate 08/03/22 0900   Drainage Description Yellow;Serosanguinous 08/03/22 0900   Odor None 08/03/22 0900   Rashmi-wound Assessment Intact 08/03/22 0900   Wound Thickness Description not for Pressure Injury Full thickness 01/19/22 0909   Number of days: 209       Wound 01/05/22 Heel Right #2 (Active)   Wound Image   07/13/22 0841   Wound Etiology Venous 01/05/22 0943   Dressing Status New dressing applied 07/27/22 1012   Wound Cleansed Cleansed with saline 07/27/22 1012   Dressing/Treatment ABD;Dry dressing;Honey gel/honey paste 07/27/22 1012   Offloading for Diabetic Foot Ulcers Post op shoe 07/27/22 1012   Wound Length (cm) 2.3 cm 08/03/22 0900   Wound Width (cm) 1.5 cm 08/03/22 0900   Wound Depth (cm) 0.2 cm 08/03/22 0900   Wound Surface Area (cm^2) 3.45 cm^2 08/03/22 0900   Change in Wound Size % (l*w) 18.82 08/03/22 0900   Wound Volume (cm^3) 0.69 cm^3 08/03/22 0900   Wound Healing % 19 08/03/22 0900   Post-Procedure Length (cm) 2.5 cm 08/03/22 0930   Post-Procedure Width (cm) 1.6 cm 08/03/22 0930   Post-Procedure Depth (cm) 0.3 cm 08/03/22 0930   Post-Procedure Surface Area (cm^2) 4 cm^2 08/03/22 0930   Post-Procedure Volume (cm^3) 1.2 cm^3 08/03/22 0930   Wound Assessment Fibrin;Pink/red 08/03/22 0900   Drainage Amount Small 08/03/22 0900   Drainage Description Serosanguinous; Yellow 08/03/22 0900   Odor None 08/03/22 0900   Rashmi-wound Assessment Intact 08/03/22 0900   Wound Thickness Description not for Pressure Injury Full thickness 01/19/22 0909   Number of days: 209       Wound 03/14/22 Coccyx (Active)   Number of days: 141       Wound 03/23/22 Toe (Comment  which one) Left #10 Left Great Toe (Active)   Wound Image   07/13/22 0841   Dressing Status New dressing applied 07/27/22 1012   Wound Cleansed Cleansed with saline 07/27/22 1012   Dressing/Treatment Honey gel/honey paste;Dry dressing 07/27/22 1012   Offloading for Diabetic Foot Ulcers Post op shoe 07/27/22 1012   Wound Length (cm) 0 cm 08/03/22 0900   Wound Width (cm) 0.4 cm 08/03/22 0900   Wound Depth (cm) 0.1 cm 08/03/22 0900   Wound Surface Area (cm^2) 0 cm^2 08/03/22 0900   Change in Wound Size % (l*w) 100 08/03/22 0900   Wound Volume (cm^3) 0 cm^3 08/03/22 0900   Wound Healing % 100 08/03/22 0900   Post-Procedure Length (cm) 0.4 cm 07/27/22 0937   Post-Procedure Width (cm) 0.4 cm 07/27/22 3852   Post-Procedure Depth (cm) 0.2 cm 07/27/22 0937   Post-Procedure Surface Area (cm^2) 0.16 cm^2 07/27/22 0937   Post-Procedure Volume (cm^3) 0.032 cm^3 07/27/22 0937   Wound Assessment Pink/red;Fibrin 08/03/22 0900   Drainage Amount Small 08/03/22 0900   Drainage Description Yellow 08/03/22 0900   Odor None 08/03/22 0900   Rashmi-wound Assessment Maceration 08/03/22 0900   Number of days: 133       Wound 04/13/22 Pretibial Distal;Left;Lateral #14 (blocked may 25)  (Active)   Wound Image   07/13/22 0841   Dressing Status New dressing applied 07/27/22 1012   Wound Cleansed Cleansed with saline 07/27/22 1012   Dressing/Treatment Other (comment) 07/27/22 1012   Offloading for Diabetic Foot Ulcers Post op shoe 07/27/22 1012   Wound Length (cm) 1.3 cm 08/03/22 0900   Wound Width (cm) 1.9 cm 08/03/22 0900   Wound Depth (cm) 0.2 cm 08/03/22 0900   Wound Surface Area (cm^2) 2.47 cm^2 08/03/22 0900   Change in Wound Size % (l*w) 74.27 08/03/22 0900   Wound Volume (cm^3) 0.494 cm^3 08/03/22 0900   Wound Healing % 49 08/03/22 0900   Post-Procedure Length (cm) 1.1 cm 08/03/22 0930   Post-Procedure Width (cm) 1.9 cm 08/03/22 0930   Post-Procedure Depth (cm) 0.3 cm 08/03/22 0930   Post-Procedure Surface Area (cm^2) 2.09 cm^2 08/03/22 0930   Post-Procedure Volume (cm^3) 0.627 cm^3 08/03/22 0930   Wound Assessment Fibrin;Pink/red 08/03/22 0900   Drainage Amount Small 08/03/22 0900   Drainage Description Yellow 08/03/22 0900   Odor None 08/03/22 0900   Rashmi-wound Assessment Intact 08/03/22 0900   Number of days: 111       Wound 04/27/22 Tibial Left;Posterior #15 (Active)   Wound Image   07/13/22 0841   Dressing Status New dressing applied 07/27/22 1012   Wound Cleansed Cleansed with saline 07/27/22 1012   Dressing/Treatment Other (comment) 07/27/22 1012   Offloading for Diabetic Foot Ulcers Post op shoe 07/27/22 1012   Wound Length (cm) 5.5 cm 08/03/22 0900   Wound Width (cm) 0.7 cm 08/03/22 0900   Wound Depth (cm) 0.6 cm 08/03/22 0900   Wound Surface Area (cm^2) 3.85 cm^2 08/03/22 0900   Change in Wound Size % (l*w) 72.58 08/03/22 0900   Wound Volume (cm^3) 2.31 cm^3 08/03/22 0900   Wound Healing % 67 08/03/22 0900   Post-Procedure Length (cm) 5.5 cm 08/03/22 0930   Post-Procedure Width (cm) 0.9 cm 08/03/22 0930   Post-Procedure Depth (cm) 0.8 cm 08/03/22 0930   Post-Procedure Surface Area (cm^2) 4.95 cm^2 08/03/22 0930   Post-Procedure Volume (cm^3) 3.96 cm^3 08/03/22 0930   Wound Assessment Slough;Eschar moist;Pink/red;Fibrin 08/03/22 0900   Drainage Amount Moderate 08/03/22 0900   Drainage Description Serosanguinous 08/03/22 0900   Odor Mild 08/03/22 0900   Rashmi-wound Assessment Fragile;Edematous 08/03/22 0900   Number of days: 80                    Electronically signed by Soumya Anne DPM on 8/3/2022 at 10:11 AM

## 2022-08-03 NOTE — FLOWSHEET NOTE
NuShield Treatment Note    NAME:  Alisha Khan. YOB: 1932  MEDICAL RECORD NUMBER:  [id number]  DATE:  8/3/2022    Goal:  Patient will receive safe and proper application of skin substitute. Patient will comply with caring for dressing, offloading and reporting complications. Expiration date checked immediately prior to use. Package intact prior to use and no damage noted. Nushield was removed from protective sterile packaging by provider and applied to prepared ulcer bed. NuShield applied with notch to Top Upper Right Corner. NuShield was hydrated with sterile normal saline per provider. NuShield was applied to LEFT LEG ULCER and affixed with steri-strips by the provider. Applied nonadherent and ADAPTIC (Secondary Dressing)   Coban or ace wrap was applied to secure graft and decrease edema. Patient/caregiver was instructed not to remove dressing and to keep it clean and dry. NuShield may be applied a total of 10 times per wound over a 12 week period. Additionally NuShield may only be used every 12 months per wound. Date of first application of NuShield for this current wound is Angelita 3, 2022.       Application # 9 out of 10           Guidelines followed    Electronically signed by Edmar Queen RN on 8/3/2022 at 9:58 AM

## 2022-08-10 ENCOUNTER — HOSPITAL ENCOUNTER (OUTPATIENT)
Dept: WOUND CARE | Age: 87
Discharge: HOME | End: 2022-08-10
Payer: MEDICARE

## 2022-08-10 VITALS
HEIGHT: 72 IN | HEART RATE: 56 BPM | TEMPERATURE: 96.9 F | SYSTOLIC BLOOD PRESSURE: 148 MMHG | BODY MASS INDEX: 33.32 KG/M2 | WEIGHT: 246 LBS | DIASTOLIC BLOOD PRESSURE: 65 MMHG | RESPIRATION RATE: 18 BRPM

## 2022-08-10 DIAGNOSIS — E08.621 DIABETIC ULCER OF LEFT HEEL ASSOCIATED WITH DIABETES MELLITUS DUE TO UNDERLYING CONDITION, LIMITED TO BREAKDOWN OF SKIN (HCC): ICD-10-CM

## 2022-08-10 DIAGNOSIS — L97.412 DIABETIC ULCER OF RIGHT HEEL ASSOCIATED WITH TYPE 2 DIABETES MELLITUS, WITH FAT LAYER EXPOSED (HCC): Primary | ICD-10-CM

## 2022-08-10 DIAGNOSIS — L97.421 DIABETIC ULCER OF LEFT HEEL ASSOCIATED WITH DIABETES MELLITUS DUE TO UNDERLYING CONDITION, LIMITED TO BREAKDOWN OF SKIN (HCC): ICD-10-CM

## 2022-08-10 DIAGNOSIS — E11.621 DIABETIC ULCER OF RIGHT HEEL ASSOCIATED WITH TYPE 2 DIABETES MELLITUS, WITH FAT LAYER EXPOSED (HCC): Primary | ICD-10-CM

## 2022-08-10 PROCEDURE — C5271 LOW COST SKIN SUBSTITUTE APP: HCPCS

## 2022-08-10 PROCEDURE — 15271 SKIN SUB GRAFT TRNK/ARM/LEG: CPT

## 2022-08-10 RX ORDER — CLOBETASOL PROPIONATE 0.5 MG/G
OINTMENT TOPICAL ONCE
Status: CANCELLED | OUTPATIENT
Start: 2022-08-10 | End: 2022-08-10

## 2022-08-10 RX ORDER — LIDOCAINE HYDROCHLORIDE 40 MG/ML
SOLUTION TOPICAL ONCE
Status: CANCELLED | OUTPATIENT
Start: 2022-08-10 | End: 2022-08-10

## 2022-08-10 RX ORDER — BACITRACIN, NEOMYCIN, POLYMYXIN B 400; 3.5; 5 [USP'U]/G; MG/G; [USP'U]/G
OINTMENT TOPICAL ONCE
Status: CANCELLED | OUTPATIENT
Start: 2022-08-10 | End: 2022-08-10

## 2022-08-10 RX ORDER — LIDOCAINE 40 MG/G
CREAM TOPICAL ONCE
Status: CANCELLED | OUTPATIENT
Start: 2022-08-10 | End: 2022-08-10

## 2022-08-10 RX ORDER — GENTAMICIN SULFATE 1 MG/G
OINTMENT TOPICAL ONCE
Status: CANCELLED | OUTPATIENT
Start: 2022-08-10 | End: 2022-08-10

## 2022-08-10 RX ORDER — LIDOCAINE HYDROCHLORIDE 20 MG/ML
JELLY TOPICAL ONCE
Status: CANCELLED | OUTPATIENT
Start: 2022-08-10 | End: 2022-08-10

## 2022-08-10 RX ORDER — BACITRACIN ZINC AND POLYMYXIN B SULFATE 500; 1000 [USP'U]/G; [USP'U]/G
OINTMENT TOPICAL ONCE
Status: CANCELLED | OUTPATIENT
Start: 2022-08-10 | End: 2022-08-10

## 2022-08-10 RX ORDER — LIDOCAINE HYDROCHLORIDE 40 MG/ML
SOLUTION TOPICAL ONCE
Status: DISCONTINUED | OUTPATIENT
Start: 2022-08-10 | End: 2022-08-11 | Stop reason: HOSPADM

## 2022-08-10 RX ORDER — GINSENG 100 MG
CAPSULE ORAL ONCE
Status: CANCELLED | OUTPATIENT
Start: 2022-08-10 | End: 2022-08-10

## 2022-08-10 RX ORDER — LIDOCAINE 50 MG/G
OINTMENT TOPICAL ONCE
Status: CANCELLED | OUTPATIENT
Start: 2022-08-10 | End: 2022-08-10

## 2022-08-10 RX ORDER — BETAMETHASONE DIPROPIONATE 0.05 %
OINTMENT (GRAM) TOPICAL ONCE
Status: CANCELLED | OUTPATIENT
Start: 2022-08-10 | End: 2022-08-10

## 2022-08-10 NOTE — FLOWSHEET NOTE
NuShield Treatment Note    NAME:  Rayna Orantes. YOB: 1932  MEDICAL RECORD NUMBER:  [id number]  DATE:  8/10/2022    Goal:  Patient will receive safe and proper application of skin substitute. Patient will comply with caring for dressing, offloading and reporting complications. Expiration date checked immediately prior to use. Package intact prior to use and no damage noted. Nushield was removed from protective sterile packaging by provider and applied to prepared ulcer bed. NuShield applied with notch to Top Upper Right Corner. NuShield was hydrated with sterile normal saline per provider. NuShield was applied to left leg ulcer and affixed with steri-strips by the provider. Applied nonadherent and adaptic (Secondary Dressing)   Coban or ace wrap was applied to secure graft and decrease edema. Patient/caregiver was instructed not to remove dressing and to keep it clean and dry. NuShield may be applied a total of 10 times per wound over a 12 week period. Additionally NuShield may only be used every 12 months per wound. Date of first application of NuShield for this current wound is June 8, 2022.       Application # 10 out of 10           Guidelines followed    Electronically signed by Helen Olmos RN on 8/10/2022 at 11:02 AM

## 2022-08-10 NOTE — DISCHARGE INSTR - COC
Continuity of Care Form    Patient Name: Radha Palomo :  1932  MRN:  [id number]    Admit date:  8/10/2022  Discharge date:  ***    Code Status Order: Prior   Advance Directives:     Admitting Physician:  No admitting provider for patient encounter. PCP: Michaelle Otero MD    Discharging Nurse: [facility] Unit/Room#: No information available for this encounter. Discharging Unit Phone Number: ***    Emergency Contact:   Extended Emergency Contact Information  Primary Emergency Contact: Mark [place] [address] Phone: [phone]  Mobile Phone: [phone]  Relation: Child   needed? No  Secondary Emergency Contact: Claude Lizarraga [place] [address] Phone: [phone]  Relation: Child    Past Surgical History:  Past Surgical History:   Procedure Laterality Date    [facility]  PICC [address] SINGLE  2021         MASTECTOMY      TOE AMPUTATION      TOE SURGERY      TONSILLECTOMY         Immunization History:   Immunization History   Administered Date(s) Administered    Influenza Virus Vaccine 10/23/2016    Pneumococcal Conjugate Vaccine 2013    Td, unspecified formulation 2012       Active Problems:  Patient Active Problem List   Diagnosis Code    HTN (hypertension) I10    Breast cancer, male ([facility] [address] C50.929    Obesity (BMI 30.0-34. 9) E66.9    Right hip pain M25.551    Diabetes mellitus type 2, uncontrolled (HCC) E11.65    Essential hypertension, benign I10    Hyperlipidemia with target LDL less than 100 E78.5    Dizzy spells R42    Troponin level elevated R77.8    Shortness of breath R06.02    Chest pressure R07.89    Acute respiratory insufficiency R06.89    Pneumonia J18.9    History of pulmonary embolus (PE) Z86.711    Acute left-sided CHF (congestive heart failure) (HCC) I50.1    Type I diabetes mellitus, uncontrolled (HCC) E10.65    Cellulitis and abscess of right lower extremity L03.115, L02.415    Cellulitis and abscess of left lower extremity L03.116, L02.416    Chronic venous insufficiency of lower extremity I87.2    Acute hypoxemic respiratory failure due to COVID-19 ([facility]) U07.1, J96.01    Acute on chronic respiratory failure with hypoxia ([facility]) J96.21    Acute respiratory failure with hypoxia ([facility]) J96.01    COVID-19 virus infection U07.1    Hyperlipidemia E78.5    Pneumonia due to COVID-19 virus U07.1, J12.82    Orthostatic hypotension I95.1    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 ([facility]) I70.244    PVD (peripheral vascular disease) ([facility]) I73.9    PAD (peripheral artery disease) ([facility]) I73.9    Diabetic ulcer of right heel associated with type 2 diabetes mellitus, with fat layer exposed ([facility] [address] E11.621, L97.412    Diabetic ulcer of left heel ([facility]) E11.621, L97.429    Sepsis ([facility] [address] A41.9    Diabetic foot infection ([facility]) E11.628, L08.9    Foot ulcer due to secondary DM ([facility] [address] E13.621, L97.509       Isolation/Infection:   Isolation            No Isolation          Patient Infection Status       Infection Onset Added Last Indicated Last Indicated By Review Planned Expiration Resolved Resolved By    None active    Resolved    COVID-19 (Rule Out) 22 COVID-19, Rapid (Ordered)   22 Rule-Out Test Resulted    MRSA 21 Culture, Blood 2   03/15/22 Lesly Soliz RN    MRSA blood 2021    COVID-19 21 Covid-19 Ambulatory   21     COVID-19 (Rule Out) 21 COVID-19, Rapid (Ordered)   21 Rule-Out Test Resulted    COVID-19 (Rule Out) 10/16/20 10/16/20 10/16/20 Covid-19 Ambulatory (Ordered)   10/17/20 Rule-Out Test Resulted    COVID-19 (Rule Out) 20 Covid-19 Ambulatory (Ordered)   20 Rule-Out Test Resulted    COVID-19 (Rule Out) 20 Covid-19 Ambulatory (Ordered)   20 Rule-Out Test Resulted    COVID-19 (Rule Out) 20 Covid-19 Ambulatory (Ordered)   20 Rule-Out Test Resulted            Nurse Assessment:  Last Vital Signs: BP (!) 148/65   Pulse 56   Temp 96.9 °F (36.1 °C) (Temporal)   Resp 18   Ht 6' (1.829 m)   Wt 246 lb (111.6 kg)   BMI 33.36 kg/m²     Last documented pain score (0-10 scale):    Last Weight:   Wt Readings from Last 1 Encounters:   08/10/22 246 lb (111.6 kg)     Mental Status:  {IP PT MENTAL STATUS:}    IV Access:  { AGUSTINA IV ACCESS:465836556}    Nursing Mobility/ADLs:  Walking   {CHP DME OEM}  Transfer  {CHP DME ITSE:202984143}  Bathing  {CHP DME DQPY:053298746}  Dressing  {CHP DME HEQJ:882870768}  Toileting  {CHP DME WELF:375671672}  Feeding  {CHP DME VLF}  Med Admin  {P DME CDWI:272330952}  Med Delivery   { AGUSTINA MED Delivery:104988063}    Wound Care Documentation and Therapy:  Negative Pressure Wound Therapy Leg Left; Lower; Posterior (Active)   Number of pieces removed 1 22 0900   Number of days: 7       Wound 22 Heel Left #1 (Active)   Wound Image   22 0841   Wound Etiology Diabetic Campbell 2 22 0943   Dressing Status New dressing applied 22 1037   Wound Cleansed Cleansed with saline 22 1037   Dressing/Treatment Honey gel/honey paste;ABD;Dry dressing 22 1037   Offloading for Diabetic Foot Ulcers Post op shoe 22 1037   Wound Length (cm) 4.5 cm 08/10/22 0957   Wound Width (cm) 2.3 cm 08/10/22 0957   Wound Depth (cm) 0.2 cm 08/10/22 0957   Wound Surface Area (cm^2) 10.35 cm^2 08/10/22 0957   Change in Wound Size % (l*w) 25 08/10/22 0957   Wound Volume (cm^3) 2.07 cm^3 08/10/22 0957   Wound Healing % 25 08/10/22 0957   Post-Procedure Length (cm) 4.6 cm 08/10/22 1048   Post-Procedure Width (cm) 2.4 cm 08/10/22 1048   Post-Procedure Depth (cm) 0.2 cm 08/10/22 1048   Post-Procedure Surface Area (cm^2) 11.04 cm^2 08/10/22 1048   Post-Procedure Volume (cm^3) 2. 208 cm^3 08/10/22 1048   Wound Assessment Fibrin;Pink/red 08/10/22 0957   Drainage Amount Small 08/10/22 0957   Drainage Description Yellow;Serosanguinous 08/10/22 0957   Odor None 08/10/22 0957   Rashmi-wound Assessment Intact 08/10/22 0957   Wound Thickness Description not for Pressure Injury Full thickness 01/19/22 0909   Number of days: 217       Wound 01/05/22 Heel Right #2 (Active)   Wound Image   07/13/22 0841   Wound Etiology Venous 01/05/22 0943   Dressing Status New dressing applied 08/03/22 1037   Wound Cleansed Cleansed with saline 08/03/22 1037   Dressing/Treatment ABD;Dry dressing;Honey gel/honey paste 08/03/22 1037   Offloading for Diabetic Foot Ulcers Post op shoe 08/03/22 1037   Wound Length (cm) 2 cm 08/10/22 0957   Wound Width (cm) 1.5 cm 08/10/22 0957   Wound Depth (cm) 0.2 cm 08/10/22 0957   Wound Surface Area (cm^2) 3 cm^2 08/10/22 0957   Change in Wound Size % (l*w) 29.41 08/10/22 0957   Wound Volume (cm^3) 0.6 cm^3 08/10/22 0957   Wound Healing % 29 08/10/22 0957   Post-Procedure Length (cm) 2 cm 08/10/22 1048   Post-Procedure Width (cm) 1.6 cm 08/10/22 1048   Post-Procedure Depth (cm) 0.2 cm 08/10/22 1048   Post-Procedure Surface Area (cm^2) 3.2 cm^2 08/10/22 1048   Post-Procedure Volume (cm^3) 0.64 cm^3 08/10/22 1048   Wound Assessment Fibrin;Pink/red 08/10/22 0957   Drainage Amount Small 08/10/22 0957   Drainage Description Serosanguinous; Yellow 08/10/22 0957   Odor None 08/10/22 0957   Rashmi-wound Assessment Maceration 08/10/22 0957   Wound Thickness Description not for Pressure Injury Full thickness 01/19/22 0909   Number of days: 217       Wound 03/14/22 Coccyx (Active)   Number of days: 148       Wound 03/23/22 Toe (Comment  which one) Left #10 Left Great Toe (Active)   Wound Image   07/13/22 0841   Dressing Status New dressing applied 08/03/22 1037   Wound Cleansed Cleansed with saline 08/03/22 1037   Dressing/Treatment Honey gel/honey paste;Dry dressing 08/03/22 1037   Offloading for Diabetic Foot Ulcers Post op shoe 08/03/22 1037   Wound Length (cm) 0.4 cm 08/10/22 0957 Wound Width (cm) 0.3 cm 08/10/22 0957   Wound Depth (cm) 0.1 cm 08/10/22 0957   Wound Surface Area (cm^2) 0.12 cm^2 08/10/22 0957   Change in Wound Size % (l*w) 99 08/10/22 0957   Wound Volume (cm^3) 0.012 cm^3 08/10/22 0957   Wound Healing % 99 08/10/22 0957   Post-Procedure Length (cm) 0.4 cm 07/27/22 0937   Post-Procedure Width (cm) 0.4 cm 07/27/22 0937   Post-Procedure Depth (cm) 0.2 cm 07/27/22 0937   Post-Procedure Surface Area (cm^2) 0.16 cm^2 07/27/22 0937   Post-Procedure Volume (cm^3) 0.032 cm^3 07/27/22 0937   Wound Assessment Pink/red;Fibrin 08/10/22 0957   Drainage Amount Scant 08/10/22 0957   Drainage Description Serous 08/10/22 0957   Odor None 08/10/22 0957   Rashmi-wound Assessment Dry/flaky 08/10/22 0957   Number of days: 140       Wound 04/13/22 Pretibial Distal;Left;Lateral #14 (blocked may 25)  (Active)   Wound Image   07/13/22 0841   Dressing Status New dressing applied 08/03/22 1037   Wound Cleansed Cleansed with saline 08/03/22 1037   Dressing/Treatment Other (comment) 08/03/22 1037   Offloading for Diabetic Foot Ulcers Post op shoe 08/03/22 1037   Wound Length (cm) 2.6 cm 08/10/22 0957   Wound Width (cm) 1.7 cm 08/10/22 0957   Wound Depth (cm) 0.1 cm 08/10/22 0957   Wound Surface Area (cm^2) 4.42 cm^2 08/10/22 0957   Change in Wound Size % (l*w) 53.96 08/10/22 0957   Wound Volume (cm^3) 0.442 cm^3 08/10/22 0957   Wound Healing % 54 08/10/22 0957   Post-Procedure Length (cm) 2.7 cm 08/10/22 1048   Post-Procedure Width (cm) 1.9 cm 08/10/22 1048   Post-Procedure Depth (cm) 0.2 cm 08/10/22 1048   Post-Procedure Surface Area (cm^2) 5.13 cm^2 08/10/22 1048   Post-Procedure Volume (cm^3) 1.026 cm^3 08/10/22 1048   Wound Assessment Purple/maroon;Pink/red 08/10/22 0957   Drainage Amount Small 08/10/22 0957   Drainage Description Serosanguinous 08/10/22 0957   Odor None 08/10/22 0957   Rashmi-wound Assessment Fragile 08/10/22 0957   Number of days: 119       Wound 04/27/22 Tibial Left;Posterior #15 (Active)   Wound Image   07/13/22 0841   Dressing Status New dressing applied 08/03/22 1037   Wound Cleansed Cleansed with saline 08/03/22 1037   Dressing/Treatment Other (comment) 08/03/22 1037   Offloading for Diabetic Foot Ulcers Post op shoe 08/03/22 1037   Wound Length (cm) 4.8 cm 08/10/22 0957   Wound Width (cm) 4 cm 08/10/22 0957   Wound Depth (cm) 1 cm 08/10/22 0957   Wound Surface Area (cm^2) 19.2 cm^2 08/10/22 0957   Change in Wound Size % (l*w) -36.75 08/10/22 0957   Wound Volume (cm^3) 19.2 cm^3 08/10/22 0957   Wound Healing % -174 08/10/22 0957   Post-Procedure Length (cm) 5 cm 08/10/22 1048   Post-Procedure Width (cm) 4.2 cm 08/10/22 1048   Post-Procedure Depth (cm) 1.1 cm 08/10/22 1048   Post-Procedure Surface Area (cm^2) 21 cm^2 08/10/22 1048   Post-Procedure Volume (cm^3) 23.1 cm^3 08/10/22 1048   Wound Assessment Pale granulation tissue;Fibrin 08/10/22 0957   Drainage Amount Moderate 08/10/22 0957   Drainage Description Serosanguinous 08/10/22 0957   Odor Mild 08/10/22 0957   Rashmi-wound Assessment Fragile;Edematous 08/10/22 0957   Number of days: 105        Elimination:  Continence: Bowel: {YES / CY:01132}  Bladder: {YES / IM:67232}  Urinary Catheter: {Urinary Catheter:940526304}   Colostomy/Ileostomy/Ileal Conduit: {YES / FU:52862}       Date of Last BM: ***  No intake or output data in the 24 hours ending 08/10/22 1054  No intake/output data recorded.     Safety Concerns:     [employer] Safety Concerns:834850853}    Impairments/Disabilities:      [employer] Impairments/Disabilities:116997325}    Nutrition Therapy:  Current Nutrition Therapy:   [employer] Diet List:528800159}    Routes of Feeding: {CHP DME Other Feedings:673105182}  Liquids: {Slp liquid thickness:56478}  Daily Fluid Restriction: {CHP DME Yes amt example:777693203}  Last Modified Barium Swallow with Video (Video Swallowing Test): {Done Not Done MLQX:074817476}    Treatments at the Time of Hospital Discharge:   Respiratory Treatments: ***  Oxygen Therapy:  {Therapy; copd oxygen:59924}  Ventilator:    {MH CC Vent SUPP:902268032}    Rehab Therapies: {THERAPEUTIC INTERVENTION:8731093720}  Weight Bearing Status/Restrictions: [address] CC Weight Bearin}  Other Medical Equipment (for information only, NOT a DME order):  {EQUIPMENT:773066880}  Other Treatments: ***    Patient's personal belongings (please select all that are sent with patient):  {[facility] DME Belongings:003607005}    RN SIGNATURE:  {Esignature:197604339}    CASE MANAGEMENT/SOCIAL WORK SECTION    Inpatient Status Date: ***    Readmission Risk Assessment Score:  Readmission Risk              Risk of Unplanned Readmission:  0           Discharging to Facility/ Agency   Name:   Address:  Phone:  Fax:    Dialysis Facility (if applicable)   Name:  Address:  Dialysis Schedule:  Phone:  Fax:    / signature: {Esignature:045347097}    PHYSICIAN SECTION    Prognosis: {Prognosis:1363006019}    Condition at Discharge: [address] Patient Condition:677595150}    Rehab Potential (if transferring to Rehab): {Prognosis:9292708320}    Recommended Labs or Other Treatments After Discharge: ***    Physician Certification: I certify the above information and transfer of Roseanne Ordaz.  is necessary for the continuing treatment of the diagnosis listed and that he requires {Admit to Appropriate Level of Care:85311} for {GREATER/LESS:379866665} 30 days.      Update Admission H&P: {P DME Changes in IKYUM:328431384}    PHYSICIAN SIGNATURE:  {Esignature:192597678}

## 2022-08-10 NOTE — PROGRESS NOTES
[facility]  History and Physical/Consultation  Podiatry    Referring Physician : Elliot Dee MD  Rayna Orantes. MEDICAL RECORD NUMBER:  [id number]  AGE: 80 y.o. GENDER: male  : 1932  EPISODE DATE:  8/10/2022  Subjective:     Chief Complaint   Patient presents with    Wound Check     Bilateral lower legs         HISTORY of PRESENT ILLNESS HPI     Rayna Orantes. is a 80 y.o. male who presents today for wound/ulcer evaluation. History of Wound Context:  The patient has had a wound of diabetic b/l heels,and right ankle which was first noted approximately months ago. This has been treated betadyne. On their initial visit to the wound healing center, 22 ,  the patient has noted that the wound has been improving. The patient has had similar previous wounds in the past.      Pt is not on abx at time of initial visit.       Wound/Ulcer Pain Timing/Severity: constant  Quality of pain: sharp  Severity:  3 / 10   Modifying Factors: Pain worsens with walking  Associated Signs/Symptoms: edema, erythema and drainage    Ulcer Identification:  Ulcer Type: diabetic  Contributing Factors: edema and diabetes    Diabetic/Pressure/Non Pressure Ulcers onl y:  Ulcer: Diabetic ulcer, fat layer exposed    If patient has diabetic lower extremity wounds  Campbell Classification of diabetic lower extremity wounds:    Grade Description   []  0 No open wound   []  1 Superficial ulcer involving the full skin thickness   [x]  2 Deep ulcer involves ligament, tendon, joint capsule, or fascia  No bone involvement or abscess presence   []  3 Deep Ulcer with abcess formation and/or osteomyelitis   []  4 Localized gangrene   []  5 Extensive gangrene of the foot     Wound: N/A          22  Debrided, cont tx and care     22  Debrided, cont tx and care, await graft approval    22  Debrided, cont tx and care, puraply applied    22  Debrided, dermagraft applied    22  Debrided, dermagraft applied    2-23-22  Debrided, graft applied    3-2-22  Debrided, graft applied    3-9-22  Debrided, graft applied     3-23-22  Just release from hospital care, new wounds noted from hospital, also trauma to left toe loosened nail that was removed without incident, debrided all wounds, culture taken    3-30-22  Debrided, santyl to left leg, applied dermagraft b/l heels, prevelon boots wound vac left leg    4-6-22  Debrided, dermagraft b/l, wound care    4-13-22  Debrided, wound vac, dermagraft left    4-27-22  Debrided, cont tx and care     5-4-22  Debrided, cont tx and care, wound vac to leg left, and hydrofera blue to all other wounds    5-11-22  Debrided, cont tx and care    5-18-22  Debrided, cont tx and care    5-25-22  Cont wound vac, cont previous tx, offload    6-1-22  Debrided, offload    6-8-22  Debrided, puraply to left leg, b/l heels medihoney patches    6-15-22  Debrided, wound vac with puraply left leg, medihoney b/l heels and left 1st digit    6-22-22  Debrided, graft applied     6-29-22  Debrided, graft applied    7-13-22  Debrided, cont tx and care     7-20-22  Debrided, puraply applied     7-27-22  Debrided, healing well, add adaptic, regranex for heels?    8-3-22  Debrided, cont tx and care, regranex?     PAST MEDICAL HISTORY      Diagnosis Date    Arthritis     Cancer [facility])     breast    Cellulitis     CHF (congestive heart failure) ([facility])     CKD (chronic kidney disease) stage 3, GFR 30-59 ml/min ([facility])     Diabetes mellitus ([facility] [address]     History of lumpectomy     Hx of blood clots     Hyperlipidemia     Hypertension     Left ventricular failure ([facility])     Macular degeneration     Obesity     Orthostatic hypotension     PE (pulmonary thromboembolism) ([facility])     Pressure injury of both heels, unstageable ([address]     Staph aureus infection     Venous insufficiency      Past Surgical History:   Procedure Laterality Date    [facility]         MASTECTOMY      TOE AMPUTATION      TOE SURGERY      TONSILLECTOMY       No family history on file. Social History     Tobacco Use    Smoking status: Never    Smokeless tobacco: Never   Vaping Use    Vaping Use: Never used   Substance Use Topics    Alcohol use: Not Currently     Alcohol/week: 1.0 standard drink     Types: 1 Cans of beer per week    Drug use: No     Allergies   Allergen Reactions    Clindamycin/Lincomycin     Sulfa Antibiotics      Current Outpatient Medications on File Prior to Encounter   Medication Sig Dispense Refill    apixaban (ELIQUIS) 2.5 MG TABS tablet Take 1 tablet by mouth 2 times daily (before meals) 60 tablet 2    docusate sodium (COLACE) 100 MG capsule Take 200 mg by mouth nightly       tamsulosin (FLOMAX) 0.4 MG capsule Take 0.4 mg by mouth daily      bisacodyl (DULCOLAX) 10 MG suppository Place 10 mg rectally daily as needed for Constipation      diphenhydrAMINE-zinc acetate (BENADRYL) 1-0.1 % cream Apply topically every 4 hours as needed for Itching Apply topically 3 times daily as needed. Sodium Phosphates (FLEET) 7-19 GM/118ML Place 1 enema rectally daily as needed      magnesium hydroxide (MILK OF MAGNESIA) 400 MG/5ML suspension Take 30 mLs by mouth daily as needed for Constipation      Polyethylene Glycol 400 1 % SOLN Apply 1 drop to eye every 4 hours as needed      ferrous sulfate (IRON 325) 325 (65 Fe) MG tablet Take 325 mg by mouth daily       zinc sulfate (ZINCATE) 220 (50 Zn) MG capsule Take 1 capsule by mouth daily for 14 days 14 capsule 0    miconazole (MICOTIN) 2 % powder Apply topically 2 times daily.  45 g 1    ascorbic acid (VITAMIN C) 500 MG tablet Take 1 tablet by mouth 2 times daily 30 tablet 0    Vitamin D (CHOLECALCIFEROL) 50 MCG (2000 UT) TABS tablet Take 1 tablet by mouth daily 60 tablet 0    furosemide (LASIX) 40 MG tablet Take 20 mg by mouth daily       omeprazole (PRILOSEC) 20 MG delayed release capsule Take 20 mg by mouth daily      Multiple Vitamins-Minerals (THERAPEUTIC MULTIVITAMIN-MINERALS) tablet Take 1 tablet by mouth daily      Multiple Vitamins-Minerals (PRESERVISION AREDS PO) Take 1 tablet by mouth 2 times daily (with meals)      acetaminophen (TYLENOL) 325 MG tablet Take 650 mg by mouth every 4 hours as needed for Pain or Fever       calcium carbonate (TUMS) 500 MG chewable tablet Take 1 tablet by mouth every 4 hours as needed for Heartburn      ipratropium-albuterol (DUONEB) 0.5-2.5 (3) MG/3ML SOLN nebulizer solution Inhale 3 mLs into the lungs every 4 hours (while awake) for 30 doses 90 mL 0    gabapentin (NEURONTIN) 100 MG capsule Take 200 mg by mouth Daily with supper. atorvastatin (LIPITOR) 80 MG tablet Take 80 mg by mouth at bedtime       INSULIN LISP PROT & LISP, HUM, (75-25) 100 UNIT/ML SUSP Inject 72 Units into the skin daily (with breakfast)       INSULIN LISP PROT & LISP, HUM, (75-25) 100 UNIT/ML SUSP Inject 55 Units into the skin Daily with supper       clotrimazole-betamethasone (LOTRISONE) cream Apply  topically as needed. Apply topically 2 times daily. No current facility-administered medications on file prior to encounter. REVIEW OF SYSTEMS   ROS : All others Negative if blank [], Positive if [x]  General Vascular   [] Fevers [] Claudication   [] Chills [] Rest Pain   Skin Neurologic   [x] Tissue Loss [x] Lower extremity neuropathy     8-10-22  Debrided, possible reganex?  Nushield applied   Objective:    BP (!) 148/65   Pulse 56   Temp 96.9 °F (36.1 °C) (Temporal)   Resp 18   Ht 6' (1.829 m)   Wt 246 lb (111.6 kg)   BMI 33.36 kg/m²   Wt Readings from Last 3 Encounters:   08/10/22 246 lb (111.6 kg)   08/03/22 246 lb (111.6 kg)   07/20/22 246 lb (111.6 kg)       PHYSICAL EXAM   CONSTITUTIONAL:   Awake, alert, cooperative   PSYCHIATRIC :  Oriented to time, place and person      normal insight to disease process  EXTREMITIES:   R LE Open wounds are noted   Skin color is abnormal with ulcers   Edema is  noted   Sensation deficit noted - protective   Palpation of the foot does cause pain   4/5 strength DF/PF  L LE Open wounds are noted   Skin color is abnormal with ulcers   Edema is  noted   Sensation deficit noted - protective   Palpation of the foot does cause pain   4/5 strength DF/PF  R dorsalis pedis 1 L dorsalis pedis 1   R posterior tibial 1 L posterior tibial 1     Assessment:     Problem List Items Addressed This Visit       Diabetic ulcer of right heel associated with type 2 diabetes mellitus, with fat layer exposed ([address] - Primary    Relevant Medications    lidocaine (XYLOCAINE) 4 % external solution    Other Relevant Orders    Initiate Outpatient Wound Care Protocol    Diabetic ulcer of left heel (HCC)    Relevant Medications    lidocaine (XYLOCAINE) 4 % external solution    Other Relevant Orders    Initiate Outpatient Wound Care Protocol       Pre Debridement Measurements:  Are located in the [place]  Documentation Flow Sheet  Post Debridement Measurements:  Wound/Ulcer Descriptions are Pre Debridement except measurements:     Negative Pressure Wound Therapy Leg Left; Lower; Posterior (Active)   Number of pieces removed 1 08/03/22 0900   Number of days: 7       Wound 01/05/22 Heel Left #1 (Active)   Wound Image   07/13/22 0841   Wound Etiology Diabetic Campbell 2 01/05/22 0943   Dressing Status New dressing applied 08/03/22 1037   Wound Cleansed Cleansed with saline 08/03/22 1037   Dressing/Treatment Honey gel/honey paste;ABD;Dry dressing 08/03/22 1037   Offloading for Diabetic Foot Ulcers Post op shoe 08/03/22 1037   Wound Length (cm) 4.5 cm 08/10/22 0957   Wound Width (cm) 2.3 cm 08/10/22 0957   Wound Depth (cm) 0.2 cm 08/10/22 0957   Wound Surface Area (cm^2) 10.35 cm^2 08/10/22 0957   Change in Wound Size % (l*w) 25 08/10/22 0957   Wound Volume (cm^3) 2.07 cm^3 08/10/22 0957   Wound Healing % 25 08/10/22 0957   Post-Procedure Length (cm) 4.6 cm 08/10/22 1048   Post-Procedure Width (cm) 2.4 cm 08/10/22 1048   Post-Procedure Depth (cm) 0.2 cm 08/10/22 1048   Post-Procedure Surface Area (cm^2) 11.04 cm^2 08/10/22 1048   Post-Procedure Volume (cm^3) 2. 208 cm^3 08/10/22 1048   Wound Assessment Fibrin;Pink/red 08/10/22 0957   Drainage Amount Small 08/10/22 0957   Drainage Description Yellow;Serosanguinous 08/10/22 0957   Odor None 08/10/22 0957   Rashmi-wound Assessment Intact 08/10/22 0957   Wound Thickness Description not for Pressure Injury Full thickness 01/19/22 0909   Number of days: 217       Wound 01/05/22 Heel Right #2 (Active)   Wound Image   07/13/22 0841   Wound Etiology Venous 01/05/22 0943   Dressing Status New dressing applied 08/03/22 1037   Wound Cleansed Cleansed with saline 08/03/22 1037   Dressing/Treatment ABD;Dry dressing;Honey gel/honey paste 08/03/22 1037   Offloading for Diabetic Foot Ulcers Post op shoe 08/03/22 1037   Wound Length (cm) 2 cm 08/10/22 0957   Wound Width (cm) 1.5 cm 08/10/22 0957   Wound Depth (cm) 0.2 cm 08/10/22 0957   Wound Surface Area (cm^2) 3 cm^2 08/10/22 0957   Change in Wound Size % (l*w) 29.41 08/10/22 0957   Wound Volume (cm^3) 0.6 cm^3 08/10/22 0957   Wound Healing % 29 08/10/22 0957   Post-Procedure Length (cm) 2 cm 08/10/22 1048   Post-Procedure Width (cm) 1.6 cm 08/10/22 1048   Post-Procedure Depth (cm) 0.2 cm 08/10/22 1048   Post-Procedure Surface Area (cm^2) 3.2 cm^2 08/10/22 1048   Post-Procedure Volume (cm^3) 0.64 cm^3 08/10/22 1048   Wound Assessment Fibrin;Pink/red 08/10/22 0957   Drainage Amount Small 08/10/22 0957   Drainage Description Serosanguinous; Yellow 08/10/22 0957   Odor None 08/10/22 0957   Rashmi-wound Assessment Maceration 08/10/22 0957   Wound Thickness Description not for Pressure Injury Full thickness 01/19/22 0909   Number of days: 217       Wound 03/14/22 Coccyx (Active)   Number of days: 148       Wound 03/23/22 Toe (Comment  which one) Left #10 Left Great Toe (Active)   Wound Image   07/13/22 0841   Dressing Status New dressing applied 08/03/22 1037   Wound Cleansed Cleansed with saline 08/03/22 1037   Dressing/Treatment Honey gel/honey paste;Dry dressing 08/03/22 1037   Offloading for Diabetic Foot Ulcers Post op shoe 08/03/22 1037   Wound Length (cm) 0.4 cm 08/10/22 0957   Wound Width (cm) 0.3 cm 08/10/22 0957   Wound Depth (cm) 0.1 cm 08/10/22 0957   Wound Surface Area (cm^2) 0.12 cm^2 08/10/22 0957   Change in Wound Size % (l*w) 99 08/10/22 0957   Wound Volume (cm^3) 0.012 cm^3 08/10/22 0957   Wound Healing % 99 08/10/22 0957   Post-Procedure Length (cm) 0.4 cm 07/27/22 0937   Post-Procedure Width (cm) 0.4 cm 07/27/22 0937   Post-Procedure Depth (cm) 0.2 cm 07/27/22 0937   Post-Procedure Surface Area (cm^2) 0.16 cm^2 07/27/22 0937   Post-Procedure Volume (cm^3) 0.032 cm^3 07/27/22 0937   Wound Assessment Pink/red;Fibrin 08/10/22 0957   Drainage Amount Scant 08/10/22 0957   Drainage Description Serous 08/10/22 0957   Odor None 08/10/22 0957   Rashmi-wound Assessment Dry/flaky 08/10/22 0957   Number of days: 140       Wound 04/13/22 Pretibial Distal;Left;Lateral #14 (blocked may 25)  (Active)   Wound Image   07/13/22 0841   Dressing Status New dressing applied 08/03/22 1037   Wound Cleansed Cleansed with saline 08/03/22 1037   Dressing/Treatment Other (comment) 08/03/22 1037   Offloading for Diabetic Foot Ulcers Post op shoe 08/03/22 1037   Wound Length (cm) 2.6 cm 08/10/22 0957   Wound Width (cm) 1.7 cm 08/10/22 0957   Wound Depth (cm) 0.1 cm 08/10/22 0957   Wound Surface Area (cm^2) 4.42 cm^2 08/10/22 0957   Change in Wound Size % (l*w) 53.96 08/10/22 0957   Wound Volume (cm^3) 0.442 cm^3 08/10/22 0957   Wound Healing % 54 08/10/22 0957   Post-Procedure Length (cm) 2.7 cm 08/10/22 1048   Post-Procedure Width (cm) 1.9 cm 08/10/22 1048   Post-Procedure Depth (cm) 0.2 cm 08/10/22 1048   Post-Procedure Surface Area (cm^2) 5.13 cm^2 08/10/22 1048   Post-Procedure Volume (cm^3) 1.026 cm^3 08/10/22 1048   Wound Assessment Purple/maroon;Pink/red 08/10/22 0957   Drainage Amount Small 08/10/22 0957   Drainage Description Serosanguinous 08/10/22 0957   Odor None 08/10/22 0957   Rashmi-wound Assessment Fragile 08/10/22 0957   Number of days: 119       Wound 04/27/22 Tibial Left;Posterior #15 (Active)   Wound Image   07/13/22 0841   Dressing Status New dressing applied 08/03/22 1037   Wound Cleansed Cleansed with saline 08/03/22 1037   Dressing/Treatment Other (comment) 08/03/22 1037   Offloading for Diabetic Foot Ulcers Post op shoe 08/03/22 1037   Wound Length (cm) 4.8 cm 08/10/22 0957   Wound Width (cm) 4 cm 08/10/22 0957   Wound Depth (cm) 1 cm 08/10/22 0957   Wound Surface Area (cm^2) 19.2 cm^2 08/10/22 0957   Change in Wound Size % (l*w) -36.75 08/10/22 0957   Wound Volume (cm^3) 19.2 cm^3 08/10/22 0957   Wound Healing % -174 08/10/22 0957   Post-Procedure Length (cm) 5 cm 08/10/22 1048   Post-Procedure Width (cm) 4.2 cm 08/10/22 1048   Post-Procedure Depth (cm) 1.1 cm 08/10/22 1048   Post-Procedure Surface Area (cm^2) 21 cm^2 08/10/22 1048   Post-Procedure Volume (cm^3) 23.1 cm^3 08/10/22 1048   Wound Assessment Pale granulation tissue;Fibrin 08/10/22 0957   Drainage Amount Moderate 08/10/22 0957   Drainage Description Serosanguinous 08/10/22 0957   Odor Mild 08/10/22 0957   Rashmi-wound Assessment Fragile;Edematous 08/10/22 0957   Number of days: 105          Procedure Note  Indications:  Based on my examination of this patient's wound(s)/ulcer(s) today, debridement is required to promote healing and evaluate the wound base. Performed by: Lv Jorge DPM    Consent obtained:  Yes    Time out taken:  Yes    Pain Control: Anesthetic  Anesthetic: 4% Lidocaine Liquid Topical     Debridement:Excisional Debridement    Using #15 blade scalpel the wound(s)/ulcer(s) was/were sharply debrided down through and including the removal of subcutaneous tissue.         Devitalized Tissue Debrided:  fibrin, biofilm, slough and necrotic/eschar to stimulate bleeding to promote healing, post debridement good 06/08/22 0916   Drainage Description Serosanguinous 06/08/22 0916   Odor None 06/08/22 0916   Rashmi-wound Assessment Maceration;Fragile 06/08/22 0916   Wound Thickness Description not for Pressure Injury Full thickness 01/19/22 0909   Number of days: 154       Wound 01/05/22 Heel Right #2 (Active)   Wound Image   06/08/22 0916   Wound Etiology Venous 01/05/22 0943   Dressing Status New dressing applied 06/01/22 1052   Wound Cleansed Cleansed with saline 06/01/22 1052   Dressing/Treatment ABD;Pharmaceutical agent (see MAR); Roll gauze 06/01/22 1052   Offloading for Diabetic Foot Ulcers Post op shoe 06/01/22 1052   Wound Length (cm) 2.5 cm 06/08/22 0916   Wound Width (cm) 1.6 cm 06/08/22 0916   Wound Depth (cm) 0.2 cm 06/08/22 0916   Wound Surface Area (cm^2) 4 cm^2 06/08/22 0916   Change in Wound Size % (l*w) 5.88 06/08/22 0916   Wound Volume (cm^3) 0.8 cm^3 06/08/22 0916   Wound Healing % 6 06/08/22 0916   Post-Procedure Length (cm) 2.5 cm 06/08/22 0951   Post-Procedure Width (cm) 1.8 cm 06/08/22 0951   Post-Procedure Depth (cm) 0.3 cm 06/08/22 0951   Post-Procedure Surface Area (cm^2) 4.5 cm^2 06/08/22 0951   Post-Procedure Volume (cm^3) 1.35 cm^3 06/08/22 0951   Wound Assessment Pink/red;Fibrin 06/08/22 0916   Drainage Amount Small 06/08/22 0916   Drainage Description Serosanguinous 06/08/22 0916   Odor None 06/08/22 0916   Rashmi-wound Assessment Intact;Fragile 06/08/22 0916   Wound Thickness Description not for Pressure Injury Full thickness 01/19/22 0909   Number of days: 154       Wound 03/14/22 Coccyx (Active)   Number of days: 85       Wound 03/23/22 Toe (Comment  which one) Left #10 Left Great Toe (Active)   Wound Image   06/08/22 0916   Dressing Status New dressing applied 06/01/22 1052   Wound Cleansed Cleansed with saline 06/01/22 1052   Dressing/Treatment Pharmaceutical agent (see MAR); Dry dressing 06/01/22 1052   Offloading for Diabetic Foot Ulcers Post op shoe 06/01/22 1052   Wound Length (cm) 1 cm 06/08/22 0916   Wound Width (cm) 0.8 cm 06/08/22 0916   Wound Depth (cm) 0.1 cm 06/08/22 0916   Wound Surface Area (cm^2) 0.8 cm^2 06/08/22 0916   Change in Wound Size % (l*w) 93.33 06/08/22 0916   Wound Volume (cm^3) 0.08 cm^3 06/08/22 0916   Wound Healing % 93 06/08/22 0916   Post-Procedure Length (cm) 1 cm 06/08/22 0951   Post-Procedure Width (cm) 1 cm 06/08/22 0951   Post-Procedure Depth (cm) 0.2 cm 06/08/22 0951   Post-Procedure Surface Area (cm^2) 1 cm^2 06/08/22 0951   Post-Procedure Volume (cm^3) 0.2 cm^3 06/08/22 0951   Wound Assessment Dry 06/08/22 0916   Drainage Amount Scant 06/08/22 0916   Drainage Description Thin;Yellow 06/08/22 0916   Odor None 06/08/22 0916   Rashmi-wound Assessment Intact; Hyperkeratosis (callous) 06/08/22 0916   Number of days: 77       Wound 04/13/22 Pretibial Distal;Left;Lateral #14 (blocked may 25)  (Active)   Wound Image   06/08/22 0916   Dressing Status New dressing applied 06/01/22 1052   Wound Cleansed Cleansed with saline 06/01/22 1052   Dressing/Treatment Dry dressing;ABD 06/01/22 1052   Offloading for Diabetic Foot Ulcers Post op shoe 06/01/22 1052   Wound Length (cm) 0.6 cm 06/08/22 0916   Wound Width (cm) 1.8 cm 06/08/22 0916   Wound Depth (cm) 0.2 cm 06/08/22 0916   Wound Surface Area (cm^2) 1.08 cm^2 06/08/22 0916   Change in Wound Size % (l*w) 88.75 06/08/22 0916   Wound Volume (cm^3) 0.216 cm^3 06/08/22 0916   Wound Healing % 78 06/08/22 0916   Post-Procedure Length (cm) 0.7 cm 06/08/22 0951   Post-Procedure Width (cm) 1.9 cm 06/08/22 0951   Post-Procedure Depth (cm) 0.2 cm 06/08/22 0951   Post-Procedure Surface Area (cm^2) 1.33 cm^2 06/08/22 0951   Post-Procedure Volume (cm^3) 0.266 cm^3 06/08/22 0951   Wound Assessment Fibrinous 06/08/22 0916   Drainage Amount Moderate 06/08/22 0916   Drainage Description Yellow 06/08/22 0916   Odor None 06/08/22 0916   Rashmi-wound Assessment Dry/flaky; Intact 06/08/22 0916   Number of days: 56       Wound 04/27/22 Tibial Left;Posterior #15 (Active)   Wound Image   06/08/22 0916   Dressing Status New dressing applied 06/01/22 1052   Wound Cleansed Cleansed with saline 06/01/22 1052   Dressing/Treatment ABD;Pharmaceutical agent (see MAR); Roll gauze 06/01/22 1052   Offloading for Diabetic Foot Ulcers Post op shoe 06/01/22 1052   Wound Length (cm) 3.8 cm 06/08/22 0916   Wound Width (cm) 4 cm 06/08/22 0916   Wound Depth (cm) 0.6 cm 06/08/22 0916   Wound Surface Area (cm^2) 15.2 cm^2 06/08/22 0916   Change in Wound Size % (l*w) -8.26 06/08/22 0916   Wound Volume (cm^3) 9.12 cm^3 06/08/22 0916   Wound Healing % -30 06/08/22 0916   Post-Procedure Length (cm) 3.8 cm 06/08/22 0951   Post-Procedure Width (cm) 4 cm 06/08/22 0951   Post-Procedure Depth (cm) 0.7 cm 06/08/22 0951   Post-Procedure Surface Area (cm^2) 15.2 cm^2 06/08/22 0951   Post-Procedure Volume (cm^3) 10.64 cm^3 06/08/22 0951   Wound Assessment Fibrin;Pink/red 06/08/22 0916   Drainage Amount Moderate 06/08/22 0916   Drainage Description Serosanguinous; Yellow 06/08/22 0916   Odor None 06/08/22 0916   Rashmi-wound Assessment Intact;Fragile 06/08/22 0916   Number of days: 42         Total Surface Area Covered 16 sq/cm     Amount Wasted 0 sq/cm    Reason for Waste none      Secured: Yes    Secured With:  [x]Steri Strips    []Sutures     []Staples []Other    Procedural Pain: 1/10     Post Procedural Pain: 2 / 10    Response to Treatment:  Well tolerated by patient.     Plan:     Pt is not a smoker   - Discussed relationship of smoking and negative affects on wound healing   - Emphasized importance of tobacco avoidace/cessation   - Script for nicotine patch given to patient   - Information regarding support groups for smoking cessation given    In my professional opinion and based off the information that is available at this time this patient has appropriate indication for HBO Therapy: Maybe    Treatment Note please see attached Discharge Instructions    Written patient dismissal instructions given to patient and signed by patient or POA. Discharge Instructions         Visit Discharge/Physician Orders     Discharge condition: Stable     Assessment of pain at discharge:  minimal     Anesthetic used: 4% lidocaine solution     Discharge to: Home     Left via:Private automobile     Accompanied by: accompanied by SELF     [facility]F/[facility]:  [facility]. Dressing Orders:  Clean bilateral heel ulcers with NSS, apply medi-honey patch  Change every other day   left lower leg medihoney gel and dry dressing every other day   left great toe cleanse with normal saline apply medihoney dry dressing every other day  TO LEFT LEG ULCER, graft applied , mepitel in place  MAY APPLY ALGINATE TO LIGIA WOUND  [facility] AT [facility] CHANGE Monday Wednesday friday. IF VAC MALFUNCTIONS APPLY WET TO DRY DRESSING UNTIL VAC CAN BE REPLACED- be careful not to disrupt graft        APPLY PREVALON BOOT TO BILATERAL HEELS     Physical therapy for ambulating safely while wearing offloading boots as indicated. [address] [place] [address],[address] followup visit ________2weeks with Dr. Gastelum__wednesday___________________  (Please note your next appointment above and if you are unable to keep, kindly give a 24 hour notice. Thank you.)     Physician signature:__________________________        If you experience any of the following, please call the [address] [place] [address] during business hours:     * Increase in Pain  * Temperature over 101  * Increase in drainage from your wound  * Drainage with a foul odor  * Bleeding  * Increase in swelling  * Need for compression bandage changes due to slippage, breakthrough drainage.                              Electronically signed by Bessy Cross DPM on 8/10/2022 at 11:01 AM

## 2022-08-10 NOTE — DISCHARGE INSTRUCTIONS
Visit Discharge/Physician Orders     Discharge condition: Stable     Assessment of pain at discharge:  minimal     Anesthetic used: 4% lidocaine solution     Discharge to: Home     Left via:Private automobile     Accompanied by: accompanied by SELF     ECF/[facility]:  [facility]. Dressing Orders:  Clean bilateral heel ulcers with NSS, apply medi-honey patch  Change every other day   left lower leg medihoney gel and dry dressing every other day   left great toe cleanse with normal saline apply medihoney dry dressing every other day  TO LEFT LEG ULCER, graft applied , mepitel in place  MAY APPLY ALGINATE TO LIGIA WOUND  [facility] AT [facility] CHANGE Monday Wednesday friday. IF VAC MALFUNCTIONS APPLY WET TO DRY DRESSING UNTIL VAC CAN BE REPLACED- be careful not to disrupt graft        APPLY PREVALON BOOT TO BILATERAL HEELS     Physical therapy for ambulating safely while wearing offloading boots as indicated. [address] [place] [address],[address] followup visit ________2weeks with Dr. Gastelum__wednesday___________________  (Please note your next appointment above and if you are unable to keep, kindly give a 24 hour notice. Thank you.)     Physician signature:__________________________        If you experience any of the following, please call the [facility] during business hours:     * Increase in Pain  * Temperature over 101  * Increase in drainage from your wound  * Drainage with a foul odor  * Bleeding  * Increase in swelling  * Need for compression bandage changes due to slippage, breakthrough drainage.

## 2022-08-23 NOTE — DISCHARGE INSTRUCTIONS
Visit Discharge/Physician Orders     Discharge condition: Stable     Assessment of pain at discharge:  minimal     Anesthetic used: 4% lidocaine solution     Discharge to: Home     Left via:Private automobile     Accompanied by: accompanied by SELF     [facility]F/[facility]. Dressing Orders:  Clean bilateral heel ulcers with NSS, apply medi-honey patch  Change every other day     left lower leg medihoney gel and dry dressing every other day   left great toe cleanse with normal saline apply medihoney dry dressing every other day  TO LEFT LEG ULCER,  HOLD WOUND VAC, APPLY SANTYL AND BACTROBAN 50/50 MIXTURE TO WOUND DAILY- KEEP PRESSURE OFF WOUND AT ALL TIMES  APPLY PREVALON BOOT TO BILATERAL HEELS     Physical therapy for ambulating safely while wearing offloading boots as indicated. [address] [place] [address],[address] followup visit ________2weeks with Dr. Gastelum__wednesday___________________  (Please note your next appointment above and if you are unable to keep, kindly give a 24 hour notice. Thank you.)     Physician signature:__________________________        If you experience any of the following, please call the [address] [place] [address] Road during business hours:     * Increase in Pain  * Temperature over 101  * Increase in drainage from your wound  * Drainage with a foul odor  * Bleeding  * Increase in swelling  * Need for compression bandage changes due to slippage, breakthrough drainage.

## 2022-08-24 ENCOUNTER — HOSPITAL ENCOUNTER (OUTPATIENT)
Dept: WOUND CARE | Age: 87
Discharge: HOME | End: 2022-08-24
Payer: MEDICARE

## 2022-08-24 VITALS
BODY MASS INDEX: 33.32 KG/M2 | HEART RATE: 87 BPM | TEMPERATURE: 97.9 F | RESPIRATION RATE: 16 BRPM | DIASTOLIC BLOOD PRESSURE: 62 MMHG | SYSTOLIC BLOOD PRESSURE: 122 MMHG | HEIGHT: 72 IN | WEIGHT: 246 LBS

## 2022-08-24 DIAGNOSIS — E11.621 DIABETIC ULCER OF RIGHT HEEL ASSOCIATED WITH TYPE 2 DIABETES MELLITUS, WITH FAT LAYER EXPOSED (HCC): Primary | ICD-10-CM

## 2022-08-24 DIAGNOSIS — L97.412 DIABETIC ULCER OF RIGHT HEEL ASSOCIATED WITH TYPE 2 DIABETES MELLITUS, WITH FAT LAYER EXPOSED (HCC): Primary | ICD-10-CM

## 2022-08-24 DIAGNOSIS — L97.421 DIABETIC ULCER OF LEFT HEEL ASSOCIATED WITH DIABETES MELLITUS DUE TO UNDERLYING CONDITION, LIMITED TO BREAKDOWN OF SKIN (HCC): ICD-10-CM

## 2022-08-24 DIAGNOSIS — E08.621 DIABETIC ULCER OF LEFT HEEL ASSOCIATED WITH DIABETES MELLITUS DUE TO UNDERLYING CONDITION, LIMITED TO BREAKDOWN OF SKIN (HCC): ICD-10-CM

## 2022-08-24 PROCEDURE — 6370000000 HC RX 637 (ALT 250 FOR IP): Performed by: PODIATRIST

## 2022-08-24 PROCEDURE — 11045 DBRDMT SUBQ TISS EACH ADDL: CPT

## 2022-08-24 PROCEDURE — 11042 DBRDMT SUBQ TIS 1ST 20SQCM/<: CPT

## 2022-08-24 RX ORDER — LIDOCAINE HYDROCHLORIDE 20 MG/ML
JELLY TOPICAL ONCE
Status: CANCELLED | OUTPATIENT
Start: 2022-08-24 | End: 2022-08-24

## 2022-08-24 RX ORDER — CLOBETASOL PROPIONATE 0.5 MG/G
OINTMENT TOPICAL ONCE
Status: CANCELLED | OUTPATIENT
Start: 2022-08-24 | End: 2022-08-24

## 2022-08-24 RX ORDER — GINSENG 100 MG
CAPSULE ORAL ONCE
Status: CANCELLED | OUTPATIENT
Start: 2022-08-24 | End: 2022-08-24

## 2022-08-24 RX ORDER — LIDOCAINE HYDROCHLORIDE 40 MG/ML
SOLUTION TOPICAL ONCE
Status: COMPLETED | OUTPATIENT
Start: 2022-08-24 | End: 2022-08-24

## 2022-08-24 RX ORDER — LIDOCAINE HYDROCHLORIDE 40 MG/ML
SOLUTION TOPICAL ONCE
Status: CANCELLED | OUTPATIENT
Start: 2022-08-24 | End: 2022-08-24

## 2022-08-24 RX ORDER — MELOXICAM 7.5 MG/1
7.5 TABLET ORAL DAILY
COMMUNITY

## 2022-08-24 RX ORDER — FEXOFENADINE HCL 180 MG/1
180 TABLET ORAL DAILY
COMMUNITY

## 2022-08-24 RX ORDER — BETAMETHASONE DIPROPIONATE 0.05 %
OINTMENT (GRAM) TOPICAL ONCE
Status: CANCELLED | OUTPATIENT
Start: 2022-08-24 | End: 2022-08-24

## 2022-08-24 RX ORDER — BACITRACIN ZINC AND POLYMYXIN B SULFATE 500; 1000 [USP'U]/G; [USP'U]/G
OINTMENT TOPICAL ONCE
Status: CANCELLED | OUTPATIENT
Start: 2022-08-24 | End: 2022-08-24

## 2022-08-24 RX ORDER — LIDOCAINE 40 MG/G
CREAM TOPICAL ONCE
Status: CANCELLED | OUTPATIENT
Start: 2022-08-24 | End: 2022-08-24

## 2022-08-24 RX ORDER — BACITRACIN, NEOMYCIN, POLYMYXIN B 400; 3.5; 5 [USP'U]/G; MG/G; [USP'U]/G
OINTMENT TOPICAL ONCE
Status: CANCELLED | OUTPATIENT
Start: 2022-08-24 | End: 2022-08-24

## 2022-08-24 RX ORDER — LIDOCAINE 50 MG/G
OINTMENT TOPICAL ONCE
Status: CANCELLED | OUTPATIENT
Start: 2022-08-24 | End: 2022-08-24

## 2022-08-24 RX ORDER — GENTAMICIN SULFATE 1 MG/G
OINTMENT TOPICAL ONCE
Status: CANCELLED | OUTPATIENT
Start: 2022-08-24 | End: 2022-08-24

## 2022-08-24 RX ADMIN — LIDOCAINE HYDROCHLORIDE: 40 SOLUTION TOPICAL at 09:14

## 2022-08-24 ASSESSMENT — PAIN DESCRIPTION - DESCRIPTORS: DESCRIPTORS: ACHING;BURNING;SHOOTING

## 2022-08-24 ASSESSMENT — PAIN DESCRIPTION - ORIENTATION: ORIENTATION: LEFT

## 2022-08-24 ASSESSMENT — PAIN DESCRIPTION - LOCATION: LOCATION: FOOT;LEG

## 2022-08-24 ASSESSMENT — PAIN DESCRIPTION - ONSET: ONSET: ON-GOING

## 2022-08-24 ASSESSMENT — PAIN SCALES - GENERAL: PAINLEVEL_OUTOF10: 8

## 2022-08-24 ASSESSMENT — PAIN DESCRIPTION - FREQUENCY: FREQUENCY: INTERMITTENT

## 2022-08-24 NOTE — DISCHARGE INSTR - COC
Continuity of Care Form    Patient Name: Kirsten Hahn. :  1932  MRN:  [id number]    Admit date:  2022  Discharge date:  ***    Code Status Order: Prior   Advance Directives:     Admitting Physician:  No admitting provider for patient encounter. PCP: Greer Hopkins MD    Discharging Nurse: [facility] Unit/Room#: No information available for this encounter. Discharging Unit Phone Number: ***    Emergency Contact:   Extended Emergency Contact Information  Primary Emergency Contact: [address] Phone: [phone]  Mobile Phone: [phone]  Relation: Child   needed? No  Secondary Emergency Contact: Claude Shearer Replaced by [facility] Phone: [phone]  Relation: Child    Past Surgical History:  Past Surgical History:   Procedure Laterality Date    [facility]  PICC [address] [facility] SINGLE  2021         MASTECTOMY      TOE AMPUTATION      TOE SURGERY      TONSILLECTOMY         Immunization History:   Immunization History   Administered Date(s) Administered    Influenza Virus Vaccine 10/23/2016    Pneumococcal Conjugate Vaccine 2013    Td, unspecified formulation 2012       Active Problems:  Patient Active Problem List   Diagnosis Code    HTN (hypertension) I10    Breast cancer, male ([facility] C50.929    Obesity (BMI 30.0-34. 9) E66.9    Right hip pain M25.551    Diabetes mellitus type 2, uncontrolled (HCC) E11.65    Essential hypertension, benign I10    Hyperlipidemia with target LDL less than 100 E78.5    Dizzy spells R42    Troponin level elevated R77.8    Shortness of breath R06.02    Chest pressure R07.89    Acute respiratory insufficiency R06.89    Pneumonia J18.9    History of pulmonary embolus (PE) Z86.711    Acute left-sided CHF (congestive heart failure) (HCC) I50.1    Type I diabetes mellitus, uncontrolled (HCC) E10.65    Cellulitis and abscess of right lower extremity L03.115, L02.415    Cellulitis and abscess of left lower extremity L03.116, L02.416    Chronic venous insufficiency of lower extremity I87.2    Acute hypoxemic respiratory failure due to COVID-19 ([facility]) U07.1, J96.01    Acute on chronic respiratory failure with hypoxia ([facility]) J96.21    Acute respiratory failure with hypoxia ([facility]) J96.01    COVID-19 virus infection U07.1    Hyperlipidemia E78.5    Pneumonia due to COVID-19 virus U07.1, J12.82    Orthostatic hypotension I95.1    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 ([facility]) I70.244    PVD (peripheral vascular disease) ([facility]) I73.9    PAD (peripheral artery disease) ([facility]) I73.9    Diabetic ulcer of right heel associated with type 2 diabetes mellitus, with fat layer exposed ([facility] E11.621, L97.412    Diabetic ulcer of left heel ([facility]) E11.621, L97.429    Sepsis ([facility] A41.9    Diabetic foot infection ([facility]) E11.628, L08.9    Foot ulcer due to secondary DM ([facility] E13.621, L97.509       Isolation/Infection:   Isolation            No Isolation          Patient Infection Status       Infection Onset Added Last Indicated Last Indicated By Review Planned Expiration Resolved Resolved By    None active    Resolved    COVID-19 (Rule Out) 22 COVID-19, Rapid (Ordered)   22 Rule-Out Test Resulted    MRSA 21 Culture, Blood 2   03/15/22 Lyubov Herman RN    MRSA blood 2021    COVID-19 21 Covid-19 Ambulatory   21     COVID-19 (Rule Out) 21 COVID-19, Rapid (Ordered)   21 Rule-Out Test Resulted    COVID-19 (Rule Out) 10/16/20 10/16/20 10/16/20 Covid-19 Ambulatory (Ordered)   10/17/20 Rule-Out Test Resulted    COVID-19 (Rule Out) 20 Covid-19 Ambulatory (Ordered)   20 Rule-Out Test Resulted    COVID-19 (Rule Out) 20 Covid-19 Ambulatory (Ordered)   20 Rule-Out Test Resulted    COVID-19 (Rule Out) 20 Covid-19 Ambulatory (Ordered)   07/30/20 Rule-Out Test Resulted            Nurse Assessment:  Last Vital Signs: /62   Pulse 87   Temp 97.9 °F (36.6 °C) (Temporal)   Resp 16   Ht 6' (1.829 m)   Wt 246 lb (111.6 kg)   BMI 33.36 kg/m²     Last documented pain score (0-10 scale): Pain Level: 8  Last Weight:   Wt Readings from Last 1 Encounters:   08/24/22 246 lb (111.6 kg)     Mental Status:  {IP PT MENTAL STATUS:20030}    IV Access:  { AGUSTINA IV ACCESS:779197250}    Nursing Mobility/ADLs:  Walking   {CHP DME TFDB:165448530}  Transfer  {CHP DME THBR:206280755}  Bathing  {CHP DME RALJ:479192904}  Dressing  {CHP DME ZWUD:338517425}  Toileting  {CHP DME DVNZ:628404467}  Feeding  {CHP DME BDWS:007095642}  Med Admin  {P DME ZSKO:711362515}  Med Delivery   { AGUSTINA MED Delivery:344357882}    Wound Care Documentation and Therapy:  Negative Pressure Wound Therapy Leg Left; Lower; Posterior (Active)   Number of pieces removed 1 08/03/22 0900   Number of days: 20       Wound 01/05/22 Heel Left #1 (Active)   Wound Image   08/24/22 0855   Wound Etiology Diabetic Campbell 2 01/05/22 0943   Dressing Status New dressing applied 08/03/22 1037   Wound Cleansed Cleansed with saline 08/03/22 1037   Dressing/Treatment Honey gel/honey paste;ABD;Dry dressing 08/03/22 1037   Offloading for Diabetic Foot Ulcers Post op shoe 08/03/22 1037   Wound Length (cm) 4 cm 08/24/22 0855   Wound Width (cm) 2.9 cm 08/24/22 0855   Wound Depth (cm) 0.2 cm 08/24/22 0855   Wound Surface Area (cm^2) 11.6 cm^2 08/24/22 0855   Change in Wound Size % (l*w) 15.94 08/24/22 0855   Wound Volume (cm^3) 2.32 cm^3 08/24/22 0855   Wound Healing % 16 08/24/22 0855   Post-Procedure Length (cm) 4.6 cm 08/10/22 1048   Post-Procedure Width (cm) 2.4 cm 08/10/22 1048   Post-Procedure Depth (cm) 0.2 cm 08/10/22 1048   Post-Procedure Surface Area (cm^2) 11.04 cm^2 08/10/22 1048   Post-Procedure Volume (cm^3) 2. 208 cm^3 08/10/22 1048   Wound Assessment Fibrin;Pink/red 08/24/22 0855   Drainage Amount Moderate 08/24/22 0855   Drainage Description Yellow;Serosanguinous 08/24/22 0855   Odor None 08/24/22 0855   Rashmi-wound Assessment Intact 08/24/22 0855   Wound Thickness Description not for Pressure Injury Full thickness 01/19/22 0909   Number of days: 230       Wound 01/05/22 Heel Right #2 (Active)   Wound Image   08/24/22 0855   Wound Etiology Venous 01/05/22 0943   Dressing Status New dressing applied 08/03/22 1037   Wound Cleansed Cleansed with saline 08/03/22 1037   Dressing/Treatment ABD;Dry dressing;Honey gel/honey paste 08/03/22 1037   Offloading for Diabetic Foot Ulcers Post op shoe 08/03/22 1037   Wound Length (cm) 2 cm 08/24/22 0855   Wound Width (cm) 1.5 cm 08/24/22 0855   Wound Depth (cm) 0.2 cm 08/24/22 0855   Wound Surface Area (cm^2) 3 cm^2 08/24/22 0855   Change in Wound Size % (l*w) 29.41 08/24/22 0855   Wound Volume (cm^3) 0.6 cm^3 08/24/22 0855   Wound Healing % 29 08/24/22 0855   Post-Procedure Length (cm) 2 cm 08/10/22 1048   Post-Procedure Width (cm) 1.6 cm 08/10/22 1048   Post-Procedure Depth (cm) 0.2 cm 08/10/22 1048   Post-Procedure Surface Area (cm^2) 3.2 cm^2 08/10/22 1048   Post-Procedure Volume (cm^3) 0.64 cm^3 08/10/22 1048   Wound Assessment Fibrin;Pink/red 08/24/22 0855   Drainage Amount Moderate 08/24/22 0855   Drainage Description Serosanguinous; Yellow 08/24/22 0855   Odor None 08/24/22 0855   Rashmi-wound Assessment Maceration; Hyperkeratosis (callous) 08/24/22 0855   Wound Thickness Description not for Pressure Injury Full thickness 01/19/22 0909   Number of days: 230       Wound 03/14/22 Coccyx (Active)   Number of days: 162       Wound 03/23/22 Toe (Comment  which one) Left #10 Left Great Toe (Active)   Wound Image   08/24/22 0855   Dressing Status New dressing applied 08/03/22 1037   Wound Cleansed Cleansed with saline 08/03/22 1037   Dressing/Treatment Honey gel/honey paste;Dry dressing 08/03/22 1037   Offloading for Diabetic Foot Ulcers Post op shoe 08/03/22 1037 Wound Length (cm) 0.2 cm 08/24/22 0855   Wound Width (cm) 0.2 cm 08/24/22 0855   Wound Depth (cm) 0.1 cm 08/24/22 0855   Wound Surface Area (cm^2) 0.04 cm^2 08/24/22 0855   Change in Wound Size % (l*w) 99.67 08/24/22 0855   Wound Volume (cm^3) 0.004 cm^3 08/24/22 0855   Wound Healing % 100 08/24/22 0855   Post-Procedure Length (cm) 0.4 cm 07/27/22 0937   Post-Procedure Width (cm) 0.4 cm 07/27/22 5915   Post-Procedure Depth (cm) 0.2 cm 07/27/22 0937   Post-Procedure Surface Area (cm^2) 0.16 cm^2 07/27/22 0937   Post-Procedure Volume (cm^3) 0.032 cm^3 07/27/22 0937   Wound Assessment Pink/red;Fibrin 08/24/22 0855   Drainage Amount Scant 08/24/22 0855   Drainage Description Serous 08/24/22 0855   Odor None 08/24/22 0855   Rashmi-wound Assessment Dry/flaky 08/24/22 0855   Number of days: 154       Wound 04/13/22 Pretibial Distal;Left;Lateral #14 (blocked may 25)  (Active)   Wound Image   08/24/22 0855   Dressing Status New dressing applied 08/03/22 1037   Wound Cleansed Cleansed with saline 08/03/22 1037   Dressing/Treatment Other (comment) 08/03/22 1037   Offloading for Diabetic Foot Ulcers Post op shoe 08/03/22 1037   Wound Length (cm) 1.6 cm 08/24/22 0855   Wound Width (cm) 2 cm 08/24/22 0855   Wound Depth (cm) 0.2 cm 08/24/22 0855   Wound Surface Area (cm^2) 3.2 cm^2 08/24/22 0855   Change in Wound Size % (l*w) 66.67 08/24/22 0855   Wound Volume (cm^3) 0.64 cm^3 08/24/22 0855   Wound Healing % 33 08/24/22 0855   Post-Procedure Length (cm) 2.7 cm 08/10/22 1048   Post-Procedure Width (cm) 1.9 cm 08/10/22 1048   Post-Procedure Depth (cm) 0.2 cm 08/10/22 1048   Post-Procedure Surface Area (cm^2) 5.13 cm^2 08/10/22 1048   Post-Procedure Volume (cm^3) 1.026 cm^3 08/10/22 1048   Wound Assessment Pale granulation tissue;Fibrin 08/24/22 0855   Drainage Amount Moderate 08/24/22 0855   Drainage Description Yellow 08/24/22 0855   Odor None 08/24/22 0855   Rashmi-wound Assessment Fragile; Maceration 08/24/22 0855   Number of days: 132       Wound 04/27/22 Tibial Left;Posterior #15 (Active)   Wound Image   08/24/22 0855   Dressing Status New dressing applied 08/03/22 1037   Wound Cleansed Cleansed with saline 08/03/22 1037   Dressing/Treatment Other (comment) 08/03/22 1037   Offloading for Diabetic Foot Ulcers Post op shoe 08/03/22 1037   Wound Length (cm) 6.8 cm 08/24/22 0855   Wound Width (cm) 4 cm 08/24/22 0855   Wound Depth (cm) 0.7 cm 08/24/22 0855   Wound Surface Area (cm^2) 27.2 cm^2 08/24/22 0855   Change in Wound Size % (l*w) -93.73 08/24/22 0855   Wound Volume (cm^3) 19.04 cm^3 08/24/22 0855   Wound Healing % -171 08/24/22 0855   Post-Procedure Length (cm) 5 cm 08/10/22 1048   Post-Procedure Width (cm) 4.2 cm 08/10/22 1048   Post-Procedure Depth (cm) 1.1 cm 08/10/22 1048   Post-Procedure Surface Area (cm^2) 21 cm^2 08/10/22 1048   Post-Procedure Volume (cm^3) 23.1 cm^3 08/10/22 1048   Wound Assessment Pink/red 08/24/22 0855   Drainage Amount Large 08/24/22 0855   Drainage Description Yellow 08/24/22 0855   Odor Mild 08/24/22 0855   Rashmi-wound Assessment Fragile;Edematous 08/24/22 0855   Number of days: 118        Elimination:  Continence: Bowel: {YES / GW:41931}  Bladder: {YES / LV:55708}  Urinary Catheter: {Urinary Catheter:297357440}   Colostomy/Ileostomy/Ileal Conduit: {YES / RT:40019}       Date of Last BM: ***  No intake or output data in the 24 hours ending 08/24/22 0913  No intake/output data recorded.     Safety Concerns:     [address] Safety Concerns:937604871}    Impairments/Disabilities:      [address] Impairments/Disabilities:942937101}    Nutrition Therapy:  Current Nutrition Therapy:   [address] Diet List:760729668}    Routes of Feeding: {CHP DME Other Feedings:556478776}  Liquids: {Slp liquid thickness:38606}  Daily Fluid Restriction: {CHP DME Yes amt example:515282123}  Last Modified Barium Swallow with Video (Video Swallowing Test): {Done Not Done RGRV:225355003}    Treatments at the Time of Hospital Discharge: Respiratory Treatments: ***  Oxygen Therapy:  {Therapy; copd oxygen:86805}  Ventilator:    {MH CC Vent XHDE:385453526}    Rehab Therapies: {THERAPEUTIC INTERVENTION:0880929074}  Weight Bearing Status/Restrictions: 50[address] VACA Weight Bearin}  Other Medical Equipment (for information only, NOT a DME order):  {EQUIPMENT:391263145}  Other Treatments: ***    Patient's personal belongings (please select all that are sent with patient):  {[facility] DME Belongings:389220297}    RN SIGNATURE:  {Esignature:504454868}    CASE MANAGEMENT/SOCIAL WORK SECTION    Inpatient Status Date: ***    Readmission Risk Assessment Score:  Readmission Risk              Risk of Unplanned Readmission:  0           Discharging to Facility/ Agency   Name:   Address:  Phone:  Fax:    Dialysis Facility (if applicable)   Name:  Address:  Dialysis Schedule:  Phone:  Fax:    / signature: {Esignature:142279749}    PHYSICIAN SECTION    Prognosis: {Prognosis:9679753022}    Condition at Discharge: [address] Gill Patient Condition:857573725}    Rehab Potential (if transferring to Rehab): {Prognosis:7126258306}    Recommended Labs or Other Treatments After Discharge: ***    Physician Certification: I certify the above information and transfer of Ronen Jonas.  is necessary for the continuing treatment of the diagnosis listed and that he requires {Admit to Appropriate Level of Care:65489} for {GREATER/LESS:504678551} 30 days.      Update Admission H&P: {P DME Changes in AYDTR:202525951}    PHYSICIAN SIGNATURE:  {Esignature:333901863}

## 2022-08-24 NOTE — PROGRESS NOTES
[facility]  History and Physical/Consultation  Podiatry    Referring Physician : Papito Valladares MD  Patricia Saeed. MEDICAL RECORD NUMBER:  [id number]  AGE: 80 y.o. GENDER: male  : 1932  EPISODE DATE:  2022  Subjective:     Chief Complaint   Patient presents with    Wound Check     Bilateral lle          HISTORY of PRESENT ILLNESS HPI     Patricia Saeed. is a 80 y.o. male who presents today for wound/ulcer evaluation. History of Wound Context:  The patient has had a wound of diabetic b/l heels,and right ankle which was first noted approximately months ago. This has been treated betadyne. On their initial visit to the wound healing center, 22 ,  the patient has noted that the wound has been improving. The patient has had similar previous wounds in the past.      Pt is not on abx at time of initial visit.       Wound/Ulcer Pain Timing/Severity: constant  Quality of pain: sharp  Severity:  3 / 10   Modifying Factors: Pain worsens with walking  Associated Signs/Symptoms: edema, erythema and drainage    Ulcer Identification:  Ulcer Type: diabetic  Contributing Factors: edema and diabetes    Diabetic/Pressure/Non Pressure Ulcers onl y:  Ulcer: Diabetic ulcer, fat layer exposed    If patient has diabetic lower extremity wounds  Campbell Classification of diabetic lower extremity wounds:    Grade Description   []  0 No open wound   []  1 Superficial ulcer involving the full skin thickness   [x]  2 Deep ulcer involves ligament, tendon, joint capsule, or fascia  No bone involvement or abscess presence   []  3 Deep Ulcer with abcess formation and/or osteomyelitis   []  4 Localized gangrene   []  5 Extensive gangrene of the foot     Wound: N/A          22  Debrided, cont tx and care     22  Debrided, cont tx and care, await graft approval    22  Debrided, cont tx and care, puraply applied    22  Debrided, dermagraft applied    22  Debrided, dermagraft applied    2-23-22  Debrided, graft applied    3-2-22  Debrided, graft applied    3-9-22  Debrided, graft applied     3-23-22  Just release from hospital care, new wounds noted from hospital, also trauma to left toe loosened nail that was removed without incident, debrided all wounds, culture taken    3-30-22  Debrided, santyl to left leg, applied dermagraft b/l heels, prevelon boots wound vac left leg    4-6-22  Debrided, dermagraft b/l, wound care    4-13-22  Debrided, wound vac, dermagraft left    4-27-22  Debrided, cont tx and care     5-4-22  Debrided, cont tx and care, wound vac to leg left, and hydrofera blue to all other wounds    5-11-22  Debrided, cont tx and care    5-18-22  Debrided, cont tx and care    5-25-22  Cont wound vac, cont previous tx, offload    6-1-22  Debrided, offload    6-8-22  Debrided, puraply to left leg, b/l heels medihoney patches    6-15-22  Debrided, wound vac with puraply left leg, medihoney b/l heels and left 1st digit    6-22-22  Debrided, graft applied     6-29-22  Debrided, graft applied    7-13-22  Debrided, cont tx and care     7-20-22  Debrided, puraply applied     7-27-22  Debrided, healing well, add adaptic, regranex for heels?    8-3-22  Debrided, cont tx and care, regranex?     8-24-22  Healing well, hold wound vac to left leg, apply santyl/ bactroban daily    PAST MEDICAL HISTORY      Diagnosis Date    Arthritis     Cancer ([facility] [address]     breast    Cellulitis     CHF (congestive heart failure) ([facility])     CKD (chronic kidney disease) stage 3, GFR 30-59 ml/min ([facility])     Diabetes mellitus ([address]     History of lumpectomy     Hx of blood clots     Hyperlipidemia     Hypertension     Left ventricular failure ([facility])     Macular degeneration     Obesity     Orthostatic hypotension     PE (pulmonary thromboembolism) ([facility])     Pressure injury of both heels, unstageable ([facility] [address]     Staph aureus infection     Venous insufficiency      Past Surgical History:   Procedure Laterality Date     [facility] SINGLE  9/2/2021         MASTECTOMY      TOE AMPUTATION      TOE SURGERY      TONSILLECTOMY       History reviewed. No pertinent family history. Social History     Tobacco Use    Smoking status: Never    Smokeless tobacco: Never   Vaping Use    Vaping Use: Never used   Substance Use Topics    Alcohol use: Not Currently     Alcohol/week: 1.0 standard drink     Types: 1 Cans of beer per week    Drug use: No     Allergies   Allergen Reactions    Clindamycin/Lincomycin     Sulfa Antibiotics      Current Outpatient Medications on File Prior to Encounter   Medication Sig Dispense Refill    fexofenadine (ALLEGRA) 180 MG tablet Take 180 mg by mouth daily      meloxicam (MOBIC) 7.5 MG tablet Take 7.5 mg by mouth daily      apixaban (ELIQUIS) 2.5 MG TABS tablet Take 1 tablet by mouth 2 times daily (before meals) 60 tablet 2    docusate sodium (COLACE) 100 MG capsule Take 200 mg by mouth nightly       tamsulosin (FLOMAX) 0.4 MG capsule Take 0.4 mg by mouth daily      bisacodyl (DULCOLAX) 10 MG suppository Place 10 mg rectally daily as needed for Constipation      diphenhydrAMINE-zinc acetate (BENADRYL) 1-0.1 % cream Apply topically every 4 hours as needed for Itching Apply topically 3 times daily as needed. Sodium Phosphates (FLEET) 7-19 GM/118ML Place 1 enema rectally daily as needed      magnesium hydroxide (MILK OF MAGNESIA) 400 MG/5ML suspension Take 30 mLs by mouth daily as needed for Constipation      Polyethylene Glycol 400 1 % SOLN Apply 1 drop to eye every 4 hours as needed      ferrous sulfate (IRON 325) 325 (65 Fe) MG tablet Take 325 mg by mouth daily       zinc sulfate (ZINCATE) 220 (50 Zn) MG capsule Take 1 capsule by mouth daily for 14 days 14 capsule 0    miconazole (MICOTIN) 2 % powder Apply topically 2 times daily.  45 g 1    ascorbic acid (VITAMIN C) 500 MG tablet Take 1 tablet by mouth 2 times daily 30 tablet 0    Vitamin D (CHOLECALCIFEROL) 50 MCG (2000 UT) TABS tablet Take 1 tablet by mouth daily 60 tablet 0    furosemide (LASIX) 40 MG tablet Take 20 mg by mouth daily       omeprazole (PRILOSEC) 20 MG delayed release capsule Take 20 mg by mouth daily      Multiple Vitamins-Minerals (THERAPEUTIC MULTIVITAMIN-MINERALS) tablet Take 1 tablet by mouth daily      Multiple Vitamins-Minerals (PRESERVISION AREDS PO) Take 1 tablet by mouth 2 times daily (with meals)      acetaminophen (TYLENOL) 325 MG tablet Take 650 mg by mouth every 4 hours as needed for Pain or Fever       calcium carbonate (TUMS) 500 MG chewable tablet Take 1 tablet by mouth every 4 hours as needed for Heartburn      ipratropium-albuterol (DUONEB) 0.5-2.5 (3) MG/3ML SOLN nebulizer solution Inhale 3 mLs into the lungs every 4 hours (while awake) for 30 doses 90 mL 0    gabapentin (NEURONTIN) 100 MG capsule Take 200 mg by mouth Daily with supper. atorvastatin (LIPITOR) 80 MG tablet Take 80 mg by mouth at bedtime       INSULIN LISP PROT & LISP, HUM, (75-25) 100 UNIT/ML SUSP Inject 72 Units into the skin daily (with breakfast)       INSULIN LISP PROT & LISP, HUM, (75-25) 100 UNIT/ML SUSP Inject 55 Units into the skin Daily with supper       clotrimazole-betamethasone (LOTRISONE) cream Apply  topically as needed. Apply topically 2 times daily. No current facility-administered medications on file prior to encounter. REVIEW OF SYSTEMS   ROS : All others Negative if blank [], Positive if [x]  General Vascular   [] Fevers [] Claudication   [] Chills [] Rest Pain   Skin Neurologic   [x] Tissue Loss [x] Lower extremity neuropathy     8-10-22  Debrided, possible reganex?  Nushield applied   Objective:    /62   Pulse 87   Temp 97.9 °F (36.6 °C) (Temporal)   Resp 16   Ht 6' (1.829 m)   Wt 246 lb (111.6 kg)   BMI 33.36 kg/m²   Wt Readings from Last 3 Encounters:   08/24/22 246 lb (111.6 kg)   08/10/22 246 lb (111.6 kg)   08/03/22 246 lb (111.6 kg)       PHYSICAL EXAM   CONSTITUTIONAL:   Awake, alert, cooperative PSYCHIATRIC :  Oriented to time, place and person      normal insight to disease process  EXTREMITIES:   R LE Open wounds are noted   Skin color is abnormal with ulcers   Edema is  noted   Sensation deficit noted - protective   Palpation of the foot does cause pain   4/5 strength DF/PF  L LE Open wounds are noted   Skin color is abnormal with ulcers   Edema is  noted   Sensation deficit noted - protective   Palpation of the foot does cause pain   4/5 strength DF/PF  R dorsalis pedis 1 L dorsalis pedis 1   R posterior tibial 1 L posterior tibial 1     Assessment:     Problem List Items Addressed This Visit       Diabetic ulcer of right heel associated with type 2 diabetes mellitus, with fat layer exposed ([facility] [address] - Primary    Relevant Orders    Initiate Outpatient Wound Care Protocol    Diabetic ulcer of left heel ([facility] [address]    Relevant Orders    Initiate Outpatient Wound Care Protocol       Pre Debridement Measurements:  Are located in the [place]  Documentation Flow Sheet  Post Debridement Measurements:  Wound/Ulcer Descriptions are Pre Debridement except measurements:     Negative Pressure Wound Therapy Leg Left; Lower; Posterior (Active)   Number of pieces removed 1 08/03/22 0900   Number of days: 20       Wound 01/05/22 Heel Left #1 (Active)   Wound Image   08/24/22 0855   Wound Etiology Diabetic Campbell 2 01/05/22 0943   Dressing Status New dressing applied 08/03/22 1037   Wound Cleansed Cleansed with saline 08/03/22 1037   Dressing/Treatment Honey gel/honey paste;ABD;Dry dressing 08/03/22 1037   Offloading for Diabetic Foot Ulcers Post op shoe 08/03/22 1037   Wound Length (cm) 4 cm 08/24/22 0855   Wound Width (cm) 2.9 cm 08/24/22 0855   Wound Depth (cm) 0.2 cm 08/24/22 0855   Wound Surface Area (cm^2) 11.6 cm^2 08/24/22 0855   Change in Wound Size % (l*w) 15.94 08/24/22 0855   Wound Volume (cm^3) 2.32 cm^3 08/24/22 0855   Wound Healing % 16 08/24/22 0855   Post-Procedure Length (cm) 4.2 cm 08/24/22 9282 Post-Procedure Width (cm) 3 cm 08/24/22 0923   Post-Procedure Depth (cm) 0.3 cm 08/24/22 0923   Post-Procedure Surface Area (cm^2) 12.6 cm^2 08/24/22 0923   Post-Procedure Volume (cm^3) 3.78 cm^3 08/24/22 0923   Wound Assessment Fibrin;Pink/red 08/24/22 0855   Drainage Amount Moderate 08/24/22 0855   Drainage Description Yellow;Serosanguinous 08/24/22 0855   Odor None 08/24/22 0855   Rashmi-wound Assessment Intact 08/24/22 0855   Wound Thickness Description not for Pressure Injury Full thickness 01/19/22 0909   Number of days: 230       Wound 01/05/22 Heel Right #2 (Active)   Wound Image   08/24/22 0855   Wound Etiology Venous 01/05/22 0943   Dressing Status New dressing applied 08/03/22 1037   Wound Cleansed Cleansed with saline 08/03/22 1037   Dressing/Treatment ABD;Dry dressing;Honey gel/honey paste 08/03/22 1037   Offloading for Diabetic Foot Ulcers Post op shoe 08/03/22 1037   Wound Length (cm) 2 cm 08/24/22 0855   Wound Width (cm) 1.5 cm 08/24/22 0855   Wound Depth (cm) 0.2 cm 08/24/22 0855   Wound Surface Area (cm^2) 3 cm^2 08/24/22 0855   Change in Wound Size % (l*w) 29.41 08/24/22 0855   Wound Volume (cm^3) 0.6 cm^3 08/24/22 0855   Wound Healing % 29 08/24/22 0855   Post-Procedure Length (cm) 2 cm 08/24/22 0923   Post-Procedure Width (cm) 1.6 cm 08/24/22 0923   Post-Procedure Depth (cm) 0.2 cm 08/24/22 0923   Post-Procedure Surface Area (cm^2) 3.2 cm^2 08/24/22 0923   Post-Procedure Volume (cm^3) 0.64 cm^3 08/24/22 0923   Wound Assessment Fibrin;Pink/red 08/24/22 0855   Drainage Amount Moderate 08/24/22 0855   Drainage Description Serosanguinous; Yellow 08/24/22 0855   Odor None 08/24/22 0855   Rashmi-wound Assessment Maceration; Hyperkeratosis (callous) 08/24/22 0855   Wound Thickness Description not for Pressure Injury Full thickness 01/19/22 5670   Number of days: 230       Wound 03/14/22 Coccyx (Active)   Number of days: 162       Wound 03/23/22 Toe (Comment  which one) Left #10 Left Great Toe (Active) Wound Image   08/24/22 0855   Dressing Status New dressing applied 08/03/22 1037   Wound Cleansed Cleansed with saline 08/03/22 1037   Dressing/Treatment Honey gel/honey paste;Dry dressing 08/03/22 1037   Offloading for Diabetic Foot Ulcers Post op shoe 08/03/22 1037   Wound Length (cm) 0.2 cm 08/24/22 0855   Wound Width (cm) 0.2 cm 08/24/22 0855   Wound Depth (cm) 0.1 cm 08/24/22 0855   Wound Surface Area (cm^2) 0.04 cm^2 08/24/22 0855   Change in Wound Size % (l*w) 99.67 08/24/22 0855   Wound Volume (cm^3) 0.004 cm^3 08/24/22 0855   Wound Healing % 100 08/24/22 0855   Post-Procedure Length (cm) 0.3 cm 08/24/22 0923   Post-Procedure Width (cm) 0.3 cm 08/24/22 0923   Post-Procedure Depth (cm) 0.2 cm 08/24/22 0923   Post-Procedure Surface Area (cm^2) 0.09 cm^2 08/24/22 0923   Post-Procedure Volume (cm^3) 0.018 cm^3 08/24/22 0923   Wound Assessment Pink/red;Fibrin 08/24/22 0855   Drainage Amount Scant 08/24/22 0855   Drainage Description Serous 08/24/22 0855   Odor None 08/24/22 0855   Rashmi-wound Assessment Dry/flaky 08/24/22 0855   Number of days: 154       Wound 04/13/22 Pretibial Distal;Left;Lateral #14 (blocked may 25)  (Active)   Wound Image   08/24/22 0855   Dressing Status New dressing applied 08/03/22 1037   Wound Cleansed Cleansed with saline 08/03/22 1037   Dressing/Treatment Other (comment) 08/03/22 1037   Offloading for Diabetic Foot Ulcers Post op shoe 08/03/22 1037   Wound Length (cm) 1.6 cm 08/24/22 0855   Wound Width (cm) 2 cm 08/24/22 0855   Wound Depth (cm) 0.2 cm 08/24/22 0855   Wound Surface Area (cm^2) 3.2 cm^2 08/24/22 0855   Change in Wound Size % (l*w) 66.67 08/24/22 0855   Wound Volume (cm^3) 0.64 cm^3 08/24/22 0855   Wound Healing % 33 08/24/22 0855   Post-Procedure Length (cm) 1.8 cm 08/24/22 0923   Post-Procedure Width (cm) 2 cm 08/24/22 0923   Post-Procedure Depth (cm) 0.3 cm 08/24/22 0923   Post-Procedure Surface Area (cm^2) 3.6 cm^2 08/24/22 0923   Post-Procedure Volume (cm^3) 1.08 slough and necrotic/eschar to stimulate bleeding to promote healing, post debridement good bleeding base and wound edges noted    Wound/Ulcer #: 2, 4, 10    Percent of Wound/Ulcer Debrided: 100%    Total Surface Area Debrided:  30 sq cm     Estimated Blood Loss:  Minimal  Hemostasis Achieved:  by pressure    Procedural Pain:  4  / 10   Post Procedural Pain:  5 / 10     Response to treatment:  Well tolerated by patient. A culture was done. Performed by: Darby Lomeli DPM    Wound Type:venous    Consent obtained: Yes    Time out taken: Yes     Fenestrated: Yes    Instrument(s) curette and #15 blade scalpel      [] Mesher Utilized    Plan:     Pt is not a smoker   - Discussed relationship of smoking and negative affects on wound healing   - Emphasized importance of tobacco avoidace/cessation   - Script for nicotine patch given to patient   - Information regarding support groups for smoking cessation given    In my professional opinion and based off the information that is available at this time this patient has appropriate indication for HBO Therapy: Maybe    Treatment Note please see attached Discharge Instructions    Written patient dismissal instructions given to patient and signed by patient or POA. Discharge Instructions               Visit Discharge/Physician Orders     Discharge condition: Stable     Assessment of pain at discharge:  minimal     Anesthetic used: 4% lidocaine solution     Discharge to: Home     Left via:Private automobile     Accompanied by: accompanied by SELF     ECF/HHA:  [facility].      Dressing Orders:  Clean bilateral heel ulcers with NSS, apply medi-honey patch  Change every other day     left lower leg medihoney gel and dry dressing every other day   left great toe cleanse with normal saline apply medihoney dry dressing every other day  TO LEFT LEG ULCER,  HOLD WOUND VAC, APPLY SANTYL AND BACTROBAN 50/50 MIXTURE TO WOUND DAILY- KEEP PRESSURE OFF WOUND AT ALL TIMES  APPLY PREVALON BOOT TO BILATERAL HEELS     Physical therapy for ambulating safely while wearing offloading boots as indicated. [facility] followup visit ________2weeks with Dr. Gastelum__wednesday___________________  (Please note your next appointment above and if you are unable to keep, kindly give a 24 hour notice. Thank you.)     Physician signature:__________________________        If you experience any of the following, please call the [address] [place] [address] Road during business hours:     * Increase in Pain  * Temperature over 101  * Increase in drainage from your wound  * Drainage with a foul odor  * Bleeding  * Increase in swelling  * Need for compression bandage changes due to slippage, breakthrough drainage.                                              Electronically signed by Thomas Pedraza DPM on 8/24/2022 at 9:45 AM

## 2022-08-24 NOTE — PLAN OF CARE
Problem: Chronic Conditions and Co-morbidities  Goal: Patient's chronic conditions and co-morbidity symptoms are monitored and maintained or improved  Outcome: Progressing     Problem: Pain  Goal: Verbalizes/displays adequate comfort level or baseline comfort level  Outcome: Progressing     Problem: ABCDS Injury Assessment  Goal: Absence of physical injury  Outcome: Progressing     Problem: Wound:  Goal: Will show signs of wound healing; wound closure and no evidence of infection  Description: Will show signs of wound healing; wound closure and no evidence of infection  Outcome: Progressing     Problem: Venous:  Goal: Signs of wound healing will improve  Description: Signs of wound healing will improve  Outcome: Progressing

## 2022-09-06 NOTE — DISCHARGE INSTRUCTIONS
Visit Discharge/Physician Orders     Discharge condition: Stable     Assessment of pain at discharge:  minimal     Anesthetic used: 4% lidocaine solution     Discharge to: Home     Left via:Private automobile     Accompanied by: accompanied by SELF     ECF/[facility]:  [facility]. Dressing Orders:  Clean bilateral heel ulcers with NSS, apply medi-honey patch  Change every other day extra padding      left lower leg medihoney gel and dry dressing every other day   left great toe cleanse with normal saline apply medihoney dry dressing every other day  TO LEFT LEG ULCER,  HOLD WOUND VAC, APPLY SANTYL AND BACTROBAN 50/50 MIXTURE TO WOUND DAILY- KEEP PRESSURE OFF WOUND AT ALL TIMES  APPLY PREVALON BOOT TO BILATERAL HEELS     Physical therapy for ambulating safely while wearing offloading boots as indicated. [facility] followup visit ________2weeks with Dr. Gastelum__wednesday___________________  (Please note your next appointment above and if you are unable to keep, kindly give a 24 hour notice. Thank you.)     Physician signature:__________________________        If you experience any of the following, please call the [address] [place] [address] Road during business hours:     * Increase in Pain  * Temperature over 101  * Increase in drainage from your wound  * Drainage with a foul odor  * Bleeding  * Increase in swelling  * Need for compression bandage changes due to slippage, breakthrough drainage.

## 2022-09-07 ENCOUNTER — HOSPITAL ENCOUNTER (OUTPATIENT)
Dept: WOUND CARE | Age: 87
Discharge: HOME | End: 2022-09-07
Payer: MEDICARE

## 2022-09-07 VITALS
BODY MASS INDEX: 33.32 KG/M2 | SYSTOLIC BLOOD PRESSURE: 118 MMHG | DIASTOLIC BLOOD PRESSURE: 70 MMHG | RESPIRATION RATE: 18 BRPM | HEART RATE: 71 BPM | WEIGHT: 246 LBS | HEIGHT: 72 IN | TEMPERATURE: 97.2 F

## 2022-09-07 DIAGNOSIS — L97.421 DIABETIC ULCER OF LEFT HEEL ASSOCIATED WITH DIABETES MELLITUS DUE TO UNDERLYING CONDITION, LIMITED TO BREAKDOWN OF SKIN (HCC): ICD-10-CM

## 2022-09-07 DIAGNOSIS — L97.412 DIABETIC ULCER OF RIGHT HEEL ASSOCIATED WITH TYPE 2 DIABETES MELLITUS, WITH FAT LAYER EXPOSED (HCC): Primary | ICD-10-CM

## 2022-09-07 DIAGNOSIS — E08.621 DIABETIC ULCER OF LEFT HEEL ASSOCIATED WITH DIABETES MELLITUS DUE TO UNDERLYING CONDITION, LIMITED TO BREAKDOWN OF SKIN (HCC): ICD-10-CM

## 2022-09-07 DIAGNOSIS — E11.621 DIABETIC ULCER OF RIGHT HEEL ASSOCIATED WITH TYPE 2 DIABETES MELLITUS, WITH FAT LAYER EXPOSED (HCC): Primary | ICD-10-CM

## 2022-09-07 PROCEDURE — 11042 DBRDMT SUBQ TIS 1ST 20SQCM/<: CPT

## 2022-09-07 PROCEDURE — 11045 DBRDMT SUBQ TISS EACH ADDL: CPT

## 2022-09-07 PROCEDURE — 6370000000 HC RX 637 (ALT 250 FOR IP): Performed by: PODIATRIST

## 2022-09-07 RX ORDER — GENTAMICIN SULFATE 1 MG/G
OINTMENT TOPICAL ONCE
Status: CANCELLED | OUTPATIENT
Start: 2022-09-07 | End: 2022-09-07

## 2022-09-07 RX ORDER — LIDOCAINE 40 MG/G
CREAM TOPICAL ONCE
Status: CANCELLED | OUTPATIENT
Start: 2022-09-07 | End: 2022-09-07

## 2022-09-07 RX ORDER — GINSENG 100 MG
CAPSULE ORAL ONCE
Status: CANCELLED | OUTPATIENT
Start: 2022-09-07 | End: 2022-09-07

## 2022-09-07 RX ORDER — LIDOCAINE HYDROCHLORIDE 20 MG/ML
JELLY TOPICAL ONCE
Status: CANCELLED | OUTPATIENT
Start: 2022-09-07 | End: 2022-09-07

## 2022-09-07 RX ORDER — LIDOCAINE 50 MG/G
OINTMENT TOPICAL ONCE
Status: CANCELLED | OUTPATIENT
Start: 2022-09-07 | End: 2022-09-07

## 2022-09-07 RX ORDER — LIDOCAINE HYDROCHLORIDE 40 MG/ML
SOLUTION TOPICAL ONCE
Status: CANCELLED | OUTPATIENT
Start: 2022-09-07 | End: 2022-09-07

## 2022-09-07 RX ORDER — BACITRACIN ZINC AND POLYMYXIN B SULFATE 500; 1000 [USP'U]/G; [USP'U]/G
OINTMENT TOPICAL ONCE
Status: CANCELLED | OUTPATIENT
Start: 2022-09-07 | End: 2022-09-07

## 2022-09-07 RX ORDER — LIDOCAINE HYDROCHLORIDE 40 MG/ML
SOLUTION TOPICAL ONCE
Status: COMPLETED | OUTPATIENT
Start: 2022-09-07 | End: 2022-09-07

## 2022-09-07 RX ORDER — CLOBETASOL PROPIONATE 0.5 MG/G
OINTMENT TOPICAL ONCE
Status: CANCELLED | OUTPATIENT
Start: 2022-09-07 | End: 2022-09-07

## 2022-09-07 RX ORDER — BETAMETHASONE DIPROPIONATE 0.05 %
OINTMENT (GRAM) TOPICAL ONCE
Status: CANCELLED | OUTPATIENT
Start: 2022-09-07 | End: 2022-09-07

## 2022-09-07 RX ORDER — BACITRACIN, NEOMYCIN, POLYMYXIN B 400; 3.5; 5 [USP'U]/G; MG/G; [USP'U]/G
OINTMENT TOPICAL ONCE
Status: CANCELLED | OUTPATIENT
Start: 2022-09-07 | End: 2022-09-07

## 2022-09-07 RX ADMIN — LIDOCAINE HYDROCHLORIDE: 40 SOLUTION TOPICAL at 09:03

## 2022-09-07 NOTE — PLAN OF CARE
Problem: Chronic Conditions and Co-morbidities  Goal: Patient's chronic conditions and co-morbidity symptoms are monitored and maintained or improved  Outcome: Progressing     Problem: ABCDS Injury Assessment  Goal: Absence of physical injury  Outcome: Progressing     Problem: Wound:  Goal: Will show signs of wound healing; wound closure and no evidence of infection  Description: Will show signs of wound healing; wound closure and no evidence of infection  Outcome: Progressing     Problem: Venous:  Goal: Signs of wound healing will improve  Description: Signs of wound healing will improve  Outcome: Progressing

## 2022-09-07 NOTE — PROGRESS NOTES
applied    2-23-22  Debrided, graft applied    3-2-22  Debrided, graft applied    3-9-22  Debrided, graft applied     3-23-22  Just release from hospital care, new wounds noted from hospital, also trauma to left toe loosened nail that was removed without incident, debrided all wounds, culture taken    3-30-22  Debrided, santyl to left leg, applied dermagraft b/l heels, prevelon boots wound vac left leg    4-6-22  Debrided, dermagraft b/l, wound care    4-13-22  Debrided, wound vac, dermagraft left    4-27-22  Debrided, cont tx and care     5-4-22  Debrided, cont tx and care, wound vac to leg left, and hydrofera blue to all other wounds    5-11-22  Debrided, cont tx and care    5-18-22  Debrided, cont tx and care    5-25-22  Cont wound vac, cont previous tx, offload    6-1-22  Debrided, offload    6-8-22  Debrided, puraply to left leg, b/l heels medihoney patches    6-15-22  Debrided, wound vac with puraply left leg, medihoney b/l heels and left 1st digit    6-22-22  Debrided, graft applied     6-29-22  Debrided, graft applied    7-13-22  Debrided, cont tx and care     7-20-22  Debrided, puraply applied     7-27-22  Debrided, healing well, add adaptic, regranex for heels?    8-3-22  Debrided, cont tx and care, regranex?     8-24-22  Healing well, hold wound vac to left leg, apply santyl/ bactroban daily    9-7-22  Debrided, all wounds healing well    PAST MEDICAL HISTORY      Diagnosis Date    Arthritis     Cancer ([address]     breast    Cellulitis     CHF (congestive heart failure) ([facility])     CKD (chronic kidney disease) stage 3, GFR 30-59 ml/min ([facility])     Diabetes mellitus ([address]     History of lumpectomy     Hx of blood clots     Hyperlipidemia     Hypertension     Left ventricular failure ([facility])     Macular degeneration     Obesity     Orthostatic hypotension     PE (pulmonary thromboembolism) ([facility])     Pressure injury of both heels, unstageable ([address]     Staph aureus infection     Venous insufficiency      Past Surgical History:   Procedure Laterality Date    [facility] [address] SINGLE  9/2/2021         MASTECTOMY      TOE AMPUTATION      TOE SURGERY      TONSILLECTOMY       History reviewed. No pertinent family history. Social History     Tobacco Use    Smoking status: Never    Smokeless tobacco: Never   Vaping Use    Vaping Use: Never used   Substance Use Topics    Alcohol use: Not Currently     Alcohol/week: 1.0 standard drink     Types: 1 Cans of beer per week    Drug use: No     Allergies   Allergen Reactions    Clindamycin/Lincomycin     Sulfa Antibiotics      Current Outpatient Medications on File Prior to Encounter   Medication Sig Dispense Refill    fexofenadine (ALLEGRA) 180 MG tablet Take 180 mg by mouth daily      meloxicam (MOBIC) 7.5 MG tablet Take 7.5 mg by mouth daily      apixaban (ELIQUIS) 2.5 MG TABS tablet Take 1 tablet by mouth 2 times daily (before meals) 60 tablet 2    docusate sodium (COLACE) 100 MG capsule Take 200 mg by mouth nightly       tamsulosin (FLOMAX) 0.4 MG capsule Take 0.4 mg by mouth daily      bisacodyl (DULCOLAX) 10 MG suppository Place 10 mg rectally daily as needed for Constipation      diphenhydrAMINE-zinc acetate (BENADRYL) 1-0.1 % cream Apply topically every 4 hours as needed for Itching Apply topically 3 times daily as needed. Sodium Phosphates (FLEET) 7-19 GM/118ML Place 1 enema rectally daily as needed      magnesium hydroxide (MILK OF MAGNESIA) 400 MG/5ML suspension Take 30 mLs by mouth daily as needed for Constipation      Polyethylene Glycol 400 1 % SOLN Apply 1 drop to eye every 4 hours as needed      ferrous sulfate (IRON 325) 325 (65 Fe) MG tablet Take 325 mg by mouth daily       zinc sulfate (ZINCATE) 220 (50 Zn) MG capsule Take 1 capsule by mouth daily for 14 days 14 capsule 0    miconazole (MICOTIN) 2 % powder Apply topically 2 times daily.  45 g 1    ascorbic acid (VITAMIN C) 500 MG tablet Take 1 tablet by mouth 2 times daily 30 tablet 0    Vitamin D (CHOLECALCIFEROL) 50 MCG (2000 UT) TABS tablet Take 1 tablet by mouth daily 60 tablet 0    furosemide (LASIX) 40 MG tablet Take 20 mg by mouth daily       omeprazole (PRILOSEC) 20 MG delayed release capsule Take 20 mg by mouth daily      Multiple Vitamins-Minerals (THERAPEUTIC MULTIVITAMIN-MINERALS) tablet Take 1 tablet by mouth daily      Multiple Vitamins-Minerals (PRESERVISION AREDS PO) Take 1 tablet by mouth 2 times daily (with meals)      acetaminophen (TYLENOL) 325 MG tablet Take 650 mg by mouth every 4 hours as needed for Pain or Fever       calcium carbonate (TUMS) 500 MG chewable tablet Take 1 tablet by mouth every 4 hours as needed for Heartburn      ipratropium-albuterol (DUONEB) 0.5-2.5 (3) MG/3ML SOLN nebulizer solution Inhale 3 mLs into the lungs every 4 hours (while awake) for 30 doses 90 mL 0    gabapentin (NEURONTIN) 100 MG capsule Take 200 mg by mouth Daily with supper. atorvastatin (LIPITOR) 80 MG tablet Take 80 mg by mouth at bedtime       INSULIN LISP PROT & LISP, HUM, (75-25) 100 UNIT/ML SUSP Inject 72 Units into the skin daily (with breakfast)       INSULIN LISP PROT & LISP, HUM, (75-25) 100 UNIT/ML SUSP Inject 55 Units into the skin Daily with supper       clotrimazole-betamethasone (LOTRISONE) cream Apply  topically as needed. Apply topically 2 times daily. No current facility-administered medications on file prior to encounter. REVIEW OF SYSTEMS   ROS : All others Negative if blank [], Positive if [x]  General Vascular   [] Fevers [] Claudication   [] Chills [] Rest Pain   Skin Neurologic   [x] Tissue Loss [x] Lower extremity neuropathy     8-10-22  Debrided, possible reganex?  [facility] applied   Objective:    /70   Pulse 71   Temp 97.2 °F (36.2 °C) (Temporal)   Resp 18   Ht 6' (1.829 m)   Wt 246 lb (111.6 kg)   BMI 33.36 kg/m²   Wt Readings from Last 3 Encounters:   09/07/22 246 lb (111.6 kg)   08/24/22 246 lb (111.6 kg)   08/10/22 246 lb (111.6 kg)       PHYSICAL EXAM CONSTITUTIONAL:   Awake, alert, cooperative   PSYCHIATRIC :  Oriented to time, place and person      normal insight to disease process  EXTREMITIES:   R LE Open wounds are noted   Skin color is abnormal with ulcers   Edema is  noted   Sensation deficit noted - protective   Palpation of the foot does cause pain   4/5 strength DF/PF  L LE Open wounds are noted   Skin color is abnormal with ulcers   Edema is  noted   Sensation deficit noted - protective   Palpation of the foot does cause pain   4/5 strength DF/PF  R dorsalis pedis 1 L dorsalis pedis 1   R posterior tibial 1 L posterior tibial 1     Assessment:     Problem List Items Addressed This Visit       Diabetic ulcer of right heel associated with type 2 diabetes mellitus, with fat layer exposed ([address] - Primary    Relevant Orders    Initiate Outpatient Wound Care Protocol    Diabetic ulcer of left heel ([facility]    Relevant Orders    Initiate Outpatient Wound Care Protocol       Pre Debridement Measurements:  Are located in the [place]  Documentation Flow Sheet  Post Debridement Measurements:  Wound/Ulcer Descriptions are Pre Debridement except measurements:     Negative Pressure Wound Therapy Leg Left; Lower; Posterior (Active)   Number of pieces removed 1 08/03/22 0900   Number of days: 34       Wound 01/05/22 Heel Left #1 (Active)   Wound Image   08/24/22 0855   Wound Etiology Diabetic Campbell 2 01/05/22 0943   Dressing Status New dressing applied 08/24/22 0951   Wound Cleansed Cleansed with saline 08/24/22 0951   Dressing/Treatment Honey gel/honey paste;ABD;Dry dressing 08/24/22 0951   Offloading for Diabetic Foot Ulcers Post op shoe 08/24/22 0951   Wound Length (cm) 4.5 cm 09/07/22 0854   Wound Width (cm) 2.2 cm 09/07/22 0854   Wound Depth (cm) 0.1 cm 09/07/22 0854   Wound Surface Area (cm^2) 9.9 cm^2 09/07/22 0854   Change in Wound Size % (l*w) 28.26 09/07/22 0854   Wound Volume (cm^3) 0.99 cm^3 09/07/22 0854   Wound Healing % 64 09/07/22 0854 Post-Procedure Length (cm) 4.5 cm 09/07/22 0917   Post-Procedure Width (cm) 2.3 cm 09/07/22 0917   Post-Procedure Depth (cm) 0.2 cm 09/07/22 0917   Post-Procedure Surface Area (cm^2) 10.35 cm^2 09/07/22 0917   Post-Procedure Volume (cm^3) 2.07 cm^3 09/07/22 0917   Wound Assessment Fibrin;Pink/red 09/07/22 0854   Drainage Amount Moderate 09/07/22 0854   Drainage Description Yellow;Serosanguinous 09/07/22 0854   Odor None 09/07/22 0854   Rashmi-wound Assessment Intact;Dry/flaky 09/07/22 0854   Wound Thickness Description not for Pressure Injury Full thickness 01/19/22 0909   Number of days: 244       Wound 01/05/22 Heel Right #2 (Active)   Wound Image   08/24/22 0855   Wound Etiology Venous 01/05/22 0943   Dressing Status New dressing applied 08/24/22 0951   Wound Cleansed Cleansed with saline 08/24/22 0951   Dressing/Treatment ABD;Dry dressing;Honey gel/honey paste 08/24/22 0951   Offloading for Diabetic Foot Ulcers Post op shoe 08/24/22 0951   Wound Length (cm) 1.7 cm 09/07/22 0854   Wound Width (cm) 1.4 cm 09/07/22 0854   Wound Depth (cm) 0.1 cm 09/07/22 0854   Wound Surface Area (cm^2) 2.38 cm^2 09/07/22 0854   Change in Wound Size % (l*w) 44 09/07/22 0854   Wound Volume (cm^3) 0.238 cm^3 09/07/22 0854   Wound Healing % 72 09/07/22 0854   Post-Procedure Length (cm) 1.7 cm 09/07/22 0917   Post-Procedure Width (cm) 1.5 cm 09/07/22 0917   Post-Procedure Depth (cm) 0.3 cm 09/07/22 0917   Post-Procedure Surface Area (cm^2) 2.55 cm^2 09/07/22 0917   Post-Procedure Volume (cm^3) 0.765 cm^3 09/07/22 0917   Wound Assessment Fibrin;Pink/red 09/07/22 0854   Drainage Amount Moderate 09/07/22 0854   Drainage Description Serosanguinous; Yellow 09/07/22 0854   Odor None 09/07/22 0854   Rashmi-wound Assessment Maceration; Intact 09/07/22 0854   Wound Thickness Description not for Pressure Injury Full thickness 01/19/22 0909   Number of days: 244       Wound 03/14/22 Coccyx (Active)   Number of days: 176       Wound 03/23/22 Toe (Comment  which one) Left #10 Left Great Toe (Active)   Wound Image   08/24/22 0855   Dressing Status New dressing applied 08/24/22 0951   Wound Cleansed Cleansed with saline 08/24/22 0951   Dressing/Treatment Honey gel/honey paste;Dry dressing 08/24/22 0951   Offloading for Diabetic Foot Ulcers Post op shoe 08/24/22 0951   Wound Length (cm) 0.2 cm 09/07/22 0854   Wound Width (cm) 0.2 cm 09/07/22 0854   Wound Depth (cm) 0.2 cm 09/07/22 0854   Wound Surface Area (cm^2) 0.04 cm^2 09/07/22 0854   Change in Wound Size % (l*w) 99.67 09/07/22 0854   Wound Volume (cm^3) 0.008 cm^3 09/07/22 0854   Wound Healing % 99 09/07/22 0854   Post-Procedure Length (cm) 0.3 cm 09/07/22 0917   Post-Procedure Width (cm) 0.3 cm 09/07/22 0917   Post-Procedure Depth (cm) 0.2 cm 09/07/22 0917   Post-Procedure Surface Area (cm^2) 0.09 cm^2 09/07/22 0917   Post-Procedure Volume (cm^3) 0.018 cm^3 09/07/22 0917   Wound Assessment Pale granulation tissue 09/07/22 0854   Drainage Amount Scant 09/07/22 0854   Drainage Description Serous 09/07/22 0854   Odor None 09/07/22 0854   Rashmi-wound Assessment Dry/flaky 09/07/22 0854   Number of days: 168       Wound 04/13/22 Pretibial Distal;Left;Lateral #14 (blocked may 25)  (Active)   Wound Image   08/24/22 0855   Dressing Status New dressing applied 08/24/22 0951   Wound Cleansed Cleansed with saline 08/24/22 0951   Dressing/Treatment Other (comment);Dry dressing 08/24/22 0951   Offloading for Diabetic Foot Ulcers Post op shoe 08/24/22 0951   Wound Length (cm) 0.3 cm 09/07/22 0854   Wound Width (cm) 0.2 cm 09/07/22 0854   Wound Depth (cm) 0.1 cm 09/07/22 0854   Wound Surface Area (cm^2) 0.06 cm^2 09/07/22 0854   Change in Wound Size % (l*w) 99.38 09/07/22 0854   Wound Volume (cm^3) 0.006 cm^3 09/07/22 0854   Wound Healing % 99 09/07/22 0854   Post-Procedure Length (cm) 0.4 cm 09/07/22 0917   Post-Procedure Width (cm) 0.3 cm 09/07/22 0917   Post-Procedure Depth (cm) 0.2 cm 09/07/22 0917   Post-Procedure WOUND VAC, APPLY SANTYL AND BACTROBAN 50/50 MIXTURE TO WOUND DAILY- KEEP PRESSURE OFF WOUND AT ALL TIMES  APPLY PREVALON BOOT TO BILATERAL HEELS     Physical therapy for ambulating safely while wearing offloading boots as indicated. [facility] followup visit ________2weeks with Dr. Gastelum__wednesday___________________  (Please note your next appointment above and if you are unable to keep, kindly give a 24 hour notice. Thank you.)     Physician signature:__________________________        If you experience any of the following, please call the [address] [place] [address] Road during business hours:     * Increase in Pain  * Temperature over 101  * Increase in drainage from your wound  * Drainage with a foul odor  * Bleeding  * Increase in swelling  * Need for compression bandage changes due to slippage, breakthrough drainage.                                                                                                                                                                                                                                                                                                                                                                                                                                                                                                                                                                                                                                                                                                                                       Electronically signed by Prema Herman DPM on 9/7/2022 at 9:38 AM

## 2022-09-07 NOTE — DISCHARGE INSTR - COC
Continuity of Care Form    Patient Name: Radha Palomo :  1932  MRN:  [id number]    Admit date:  2022  Discharge date:  ***    Code Status Order: Prior   Advance Directives:     Admitting Physician:  No admitting provider for patient encounter. PCP: Michaelle Otero MD    Discharging Nurse: [facility] Unit/Room#: No information available for this encounter. Discharging Unit Phone Number: ***    Emergency Contact:   Extended Emergency Contact Information  Primary Emergency Contact: Mark [place] [address] Phone: [phone]  Mobile Phone: [phone]  Relation: Child   needed? No  Secondary Emergency Contact: Claude Lizarraga [place] [address] Phone: [phone]  Relation: Child    Past Surgical History:  Past Surgical History:   Procedure Laterality Date    [facility] [address] SINGLE  2021         MASTECTOMY      TOE AMPUTATION      TOE SURGERY      TONSILLECTOMY         Immunization History:   Immunization History   Administered Date(s) Administered    Influenza Virus Vaccine 10/23/2016    Pneumococcal Conjugate Vaccine 2013    Td, unspecified formulation 2012       Active Problems:  Patient Active Problem List   Diagnosis Code    HTN (hypertension) I10    Breast cancer, male ([facility] [address] C50.929    Obesity (BMI 30.0-34. 9) E66.9    Right hip pain M25.551    Diabetes mellitus type 2, uncontrolled (HCC) E11.65    Essential hypertension, benign I10    Hyperlipidemia with target LDL less than 100 E78.5    Dizzy spells R42    Troponin level elevated R77.8    Shortness of breath R06.02    Chest pressure R07.89    Acute respiratory insufficiency R06.89    Pneumonia J18.9    History of pulmonary embolus (PE) Z86.711    Acute left-sided CHF (congestive heart failure) (HCC) I50.1    Type I diabetes mellitus, uncontrolled (HCC) E10.65    Cellulitis and abscess of right lower extremity L03.115, L02.415    Cellulitis and abscess of left lower extremity L03.116, L02.416    Chronic venous insufficiency of lower extremity I87.2    Acute hypoxemic respiratory failure due to COVID-19 ([facility]) U07.1, J96.01    Acute on chronic respiratory failure with hypoxia ([facility]) J96.21    Acute respiratory failure with hypoxia ([facility]) J96.01    COVID-19 virus infection U07.1    Hyperlipidemia E78.5    Pneumonia due to COVID-19 virus U07.1, J12.82    Orthostatic hypotension I95.1    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 ([facility]) I70.244    PVD (peripheral vascular disease) ([facility]) I73.9    PAD (peripheral artery disease) ([facility]) I73.9    Diabetic ulcer of right heel associated with type 2 diabetes mellitus, with fat layer exposed ([facility] [address] E11.621, L97.412    Diabetic ulcer of left heel ([facility]) E11.621, L97.429    Sepsis ([facility] [address] A41.9    Diabetic foot infection ([facility]) E11.628, L08.9    Foot ulcer due to secondary DM ([facility] [address] E13.621, L97.509       Isolation/Infection:   Isolation            No Isolation          Patient Infection Status       Infection Onset Added Last Indicated Last Indicated By Review Planned Expiration Resolved Resolved By    None active    Resolved    COVID-19 (Rule Out) 22 COVID-19, Rapid (Ordered)   22 Rule-Out Test Resulted    MRSA 21 Culture, Blood 2   03/15/22 Gerard Roberts RN    MRSA blood 2021    COVID-19 21 Covid-19 Ambulatory   21     COVID-19 (Rule Out) 21 COVID-19, Rapid (Ordered)   21 Rule-Out Test Resulted    COVID-19 (Rule Out) 10/16/20 10/16/20 10/16/20 Covid-19 Ambulatory (Ordered)   10/17/20 Rule-Out Test Resulted    COVID-19 (Rule Out) 20 Covid-19 Ambulatory (Ordered)   20 Rule-Out Test Resulted    COVID-19 (Rule Out) 20 Covid-19 Ambulatory (Ordered)   20 Rule-Out Test Resulted    COVID-19 (Rule Out) 20 Covid-19 Ambulatory (Ordered)   07/30/20 Rule-Out Test Resulted            Nurse Assessment:  Last Vital Signs: /70   Pulse 71   Temp 97.2 °F (36.2 °C) (Temporal)   Resp 18   Ht 6' (1.829 m)   Wt 246 lb (111.6 kg)   BMI 33.36 kg/m²     Last documented pain score (0-10 scale):    Last Weight:   Wt Readings from Last 1 Encounters:   09/07/22 246 lb (111.6 kg)     Mental Status:  {IP PT MENTAL STATUS:20030}    IV Access:  { AGUSTINA IV ACCESS:565013420}    Nursing Mobility/ADLs:  Walking   {CHP DME ENMP:628479799}  Transfer  {P DME BUDQ:792287460}  Bathing  {CHP DME SQHK:985044714}  Dressing  {CHP DME WCHL:168932151}  Toileting  {CHP DME MKPN:096766841}  Feeding  {P DME VDUI:507709794}  Med Admin  {[facility] DME JHDK:334492053}  Med Delivery   { AGUSTINA MED Delivery:775179736}    Wound Care Documentation and Therapy:  Negative Pressure Wound Therapy Leg Left; Lower; Posterior (Active)   Number of pieces removed 1 08/03/22 0900   Number of days: 34       Wound 01/05/22 Heel Left #1 (Active)   Wound Image   08/24/22 0855   Wound Etiology Diabetic Campbell 2 01/05/22 0943   Dressing Status New dressing applied 08/24/22 0951   Wound Cleansed Cleansed with saline 08/24/22 0951   Dressing/Treatment Honey gel/honey paste;ABD;Dry dressing 08/24/22 0951   Offloading for Diabetic Foot Ulcers Post op shoe 08/24/22 0951   Wound Length (cm) 4.5 cm 09/07/22 0854   Wound Width (cm) 2.2 cm 09/07/22 0854   Wound Depth (cm) 0.1 cm 09/07/22 0854   Wound Surface Area (cm^2) 9.9 cm^2 09/07/22 0854   Change in Wound Size % (l*w) 28.26 09/07/22 0854   Wound Volume (cm^3) 0.99 cm^3 09/07/22 0854   Wound Healing % 64 09/07/22 0854   Post-Procedure Length (cm) 4.5 cm 09/07/22 0917   Post-Procedure Width (cm) 2.3 cm 09/07/22 0917   Post-Procedure Depth (cm) 0.2 cm 09/07/22 0917   Post-Procedure Surface Area (cm^2) 10.35 cm^2 09/07/22 0917   Post-Procedure Volume (cm^3) 2.07 cm^3 09/07/22 0917   Wound Assessment Fibrin;Pink/red 09/07/22 0854   Drainage Amount Moderate 09/07/22 0854   Drainage Description Yellow;Serosanguinous 09/07/22 0854   Odor None 09/07/22 0854   Rashmi-wound Assessment Intact;Dry/flaky 09/07/22 0854   Wound Thickness Description not for Pressure Injury Full thickness 01/19/22 0909   Number of days: 244       Wound 01/05/22 Heel Right #2 (Active)   Wound Image   08/24/22 0855   Wound Etiology Venous 01/05/22 0943   Dressing Status New dressing applied 08/24/22 0951   Wound Cleansed Cleansed with saline 08/24/22 0951   Dressing/Treatment ABD;Dry dressing;Honey gel/honey paste 08/24/22 0951   Offloading for Diabetic Foot Ulcers Post op shoe 08/24/22 0951   Wound Length (cm) 1.7 cm 09/07/22 0854   Wound Width (cm) 1.4 cm 09/07/22 0854   Wound Depth (cm) 0.1 cm 09/07/22 0854   Wound Surface Area (cm^2) 2.38 cm^2 09/07/22 0854   Change in Wound Size % (l*w) 44 09/07/22 0854   Wound Volume (cm^3) 0.238 cm^3 09/07/22 0854   Wound Healing % 72 09/07/22 0854   Post-Procedure Length (cm) 1.7 cm 09/07/22 0917   Post-Procedure Width (cm) 1.5 cm 09/07/22 0917   Post-Procedure Depth (cm) 0.3 cm 09/07/22 0917   Post-Procedure Surface Area (cm^2) 2.55 cm^2 09/07/22 0917   Post-Procedure Volume (cm^3) 0.765 cm^3 09/07/22 0917   Wound Assessment Fibrin;Pink/red 09/07/22 0854   Drainage Amount Moderate 09/07/22 0854   Drainage Description Serosanguinous; Yellow 09/07/22 0854   Odor None 09/07/22 0854   Rashmi-wound Assessment Maceration; Intact 09/07/22 0854   Wound Thickness Description not for Pressure Injury Full thickness 01/19/22 0909   Number of days: 244       Wound 03/14/22 Coccyx (Active)   Number of days: 176       Wound 03/23/22 Toe (Comment  which one) Left #10 Left Great Toe (Active)   Wound Image   08/24/22 0855   Dressing Status New dressing applied 08/24/22 0951   Wound Cleansed Cleansed with saline 08/24/22 0951   Dressing/Treatment Honey gel/honey paste;Dry dressing 08/24/22 0951   Offloading for Diabetic Foot Ulcers Post op shoe 08/24/22 0951   Wound Length (cm) 0.2 cm 09/07/22 0854   Wound Width (cm) 0.2 cm 09/07/22 0854   Wound Depth (cm) 0.2 cm 09/07/22 0854   Wound Surface Area (cm^2) 0.04 cm^2 09/07/22 0854   Change in Wound Size % (l*w) 99.67 09/07/22 0854   Wound Volume (cm^3) 0.008 cm^3 09/07/22 0854   Wound Healing % 99 09/07/22 0854   Post-Procedure Length (cm) 0.3 cm 09/07/22 0917   Post-Procedure Width (cm) 0.3 cm 09/07/22 0917   Post-Procedure Depth (cm) 0.2 cm 09/07/22 0917   Post-Procedure Surface Area (cm^2) 0.09 cm^2 09/07/22 0917   Post-Procedure Volume (cm^3) 0.018 cm^3 09/07/22 0917   Wound Assessment Pale granulation tissue 09/07/22 0854   Drainage Amount Scant 09/07/22 0854   Drainage Description Serous 09/07/22 0854   Odor None 09/07/22 0854   Rashmi-wound Assessment Dry/flaky 09/07/22 0854   Number of days: 168       Wound 04/13/22 Pretibial Distal;Left;Lateral #14 (blocked may 25)  (Active)   Wound Image   08/24/22 0855   Dressing Status New dressing applied 08/24/22 0951   Wound Cleansed Cleansed with saline 08/24/22 0951   Dressing/Treatment Other (comment);Dry dressing 08/24/22 0951   Offloading for Diabetic Foot Ulcers Post op shoe 08/24/22 0951   Wound Length (cm) 0.3 cm 09/07/22 0854   Wound Width (cm) 0.2 cm 09/07/22 0854   Wound Depth (cm) 0.1 cm 09/07/22 0854   Wound Surface Area (cm^2) 0.06 cm^2 09/07/22 0854   Change in Wound Size % (l*w) 99.38 09/07/22 0854   Wound Volume (cm^3) 0.006 cm^3 09/07/22 0854   Wound Healing % 99 09/07/22 0854   Post-Procedure Length (cm) 0.4 cm 09/07/22 0917   Post-Procedure Width (cm) 0.3 cm 09/07/22 0917   Post-Procedure Depth (cm) 0.2 cm 09/07/22 0917   Post-Procedure Surface Area (cm^2) 0.12 cm^2 09/07/22 0917   Post-Procedure Volume (cm^3) 0.024 cm^3 09/07/22 0917   Wound Assessment Pink/red 09/07/22 0854   Drainage Amount Small 09/07/22 0854   Drainage Description Yellow 09/07/22 0854   Odor None 09/07/22 0854   Rashmi-wound Assessment Dry/flaky 09/07/22 0854   Number of days: 146       Wound 04/27/22 Tibial Left;Posterior #15 (Active)   Wound Image   08/24/22 0855   Dressing Status New dressing applied 08/24/22 0951   Wound Cleansed Cleansed with saline 08/24/22 0951   Dressing/Treatment Alginate;ABD;Dry dressing 08/24/22 0951   Offloading for Diabetic Foot Ulcers Post op shoe 08/24/22 0951   Wound Length (cm) 6.2 cm 09/07/22 0854   Wound Width (cm) 4.1 cm 09/07/22 0854   Wound Depth (cm) 0.3 cm 09/07/22 0854   Wound Surface Area (cm^2) 25.42 cm^2 09/07/22 0854   Change in Wound Size % (l*w) -81.05 09/07/22 0854   Wound Volume (cm^3) 7.626 cm^3 09/07/22 0854   Wound Healing % -9 09/07/22 0854   Post-Procedure Length (cm) 6.3 cm 09/07/22 0917   Post-Procedure Width (cm) 4.2 cm 09/07/22 0917   Post-Procedure Depth (cm) 0.4 cm 09/07/22 0917   Post-Procedure Surface Area (cm^2) 26.46 cm^2 09/07/22 0917   Post-Procedure Volume (cm^3) 10.584 cm^3 09/07/22 0917   Wound Assessment Pink/red;Fibrin 09/07/22 0854   Drainage Amount Large 09/07/22 0854   Drainage Description Yellow 09/07/22 0854   Odor Mild 09/07/22 0854   Rashmi-wound Assessment Edematous; Intact;Dry/flaky 09/07/22 0854   Number of days: 132        Elimination:  Continence: Bowel: {YES / KS:29987}  Bladder: {YES / CX:31875}  Urinary Catheter: {Urinary Catheter:293490369}   Colostomy/Ileostomy/Ileal Conduit: {YES / FI:99967}       Date of Last BM: ***  No intake or output data in the 24 hours ending 09/07/22 0918  No intake/output data recorded.     Safety Concerns:     [address] [employer] Safety Concerns:353883059}    Impairments/Disabilities:      [address] [employer] [facility] Impairments/Disabilities:733210554}    Nutrition Therapy:  Current Nutrition Therapy:   [address] Gill AGUSTINA Diet List:895736190}    Routes of Feeding: {CHP DME Other Feedings:689835779}  Liquids: {Slp liquid thickness:16892}  Daily Fluid Restriction: {CHP DME Yes amt example:952077232}  Last Modified Barium Swallow with Video (Video Swallowing Test): {Done Not Done LWGL:086211601}    Treatments at the Time of Hospital Discharge:   Respiratory Treatments: ***  Oxygen Therapy:  {Therapy; copd oxygen:46157}  Ventilator:    {MH CC Vent ABRE:971310958}    Rehab Therapies: {THERAPEUTIC INTERVENTION:6172570477}  Weight Bearing Status/Restrictions: 50Celio VACA Weight Bearin}  Other Medical Equipment (for information only, NOT a DME order):  {EQUIPMENT:798716452}  Other Treatments: ***    Patient's personal belongings (please select all that are sent with patient):  {[facility] DME Belongings:758949983}    RN SIGNATURE:  {Esignature:259002102}    CASE MANAGEMENT/SOCIAL WORK SECTION    Inpatient Status Date: ***    Readmission Risk Assessment Score:  Readmission Risk              Risk of Unplanned Readmission:  0           Discharging to Facility/ Agency   Name:   Address:  Phone:  Fax:    Dialysis Facility (if applicable)   Name:  Address:  Dialysis Schedule:  Phone:  Fax:    / signature: {Esignature:009339542}    PHYSICIAN SECTION    Prognosis: {Prognosis:1734599856}    Condition at Discharge: 50Celio Gill Patient Condition:614077224}    Rehab Potential (if transferring to Rehab): {Prognosis:9186908841}    Recommended Labs or Other Treatments After Discharge: ***    Physician Certification: I certify the above information and transfer of Kennymandy Ordaz.  is necessary for the continuing treatment of the diagnosis listed and that he requires {Admit to Appropriate Level of Care:38230} for {GREATER/LESS:426604014} 30 days.      Update Admission H&P: {P DME Changes in KZBOF:664998557}    PHYSICIAN SIGNATURE:  {Esignature:785150282}

## 2022-09-20 NOTE — DISCHARGE INSTRUCTIONS
Visit Discharge/Physician Orders     Discharge condition: Stable     Assessment of pain at discharge:  minimal     Anesthetic used: 4% lidocaine solution     Discharge to: Home     Left via:Private automobile     Accompanied by: accompanied by SELF     [facility]F/[facility]. Dressing Orders:  Clean bilateral heel ulcers with NSS, apply medi-honey patch  Change every other day extra padding      left lower leg medihoney gel and dry dressing every other day    TO LEFT LEG ULCER,  HOLD WOUND VAC, APPLY SANTYL AND BACTROBAN 50/50 MIXTURE TO WOUND DAILY- KEEP PRESSURE OFF WOUND AT ALL TIMES  APPLY PREVALON BOOT TO BILATERAL HEELS     Physical therapy for ambulating safely while wearing offloading boots as indicated. [facility] followup visit ________2weeks with Dr. Gastelum__wednesday___________________  (Please note your next appointment above and if you are unable to keep, kindly give a 24 hour notice. Thank you.)     Physician signature:__________________________        If you experience any of the following, please call the [address] [place] [address] Road during business hours:     * Increase in Pain  * Temperature over 101  * Increase in drainage from your wound  * Drainage with a foul odor  * Bleeding  * Increase in swelling  * Need for compression bandage changes due to slippage, breakthrough drainage.

## 2022-09-21 ENCOUNTER — HOSPITAL ENCOUNTER (OUTPATIENT)
Dept: WOUND CARE | Age: 87
Discharge: HOME | End: 2022-09-21
Payer: MEDICARE

## 2022-09-21 VITALS
HEART RATE: 76 BPM | DIASTOLIC BLOOD PRESSURE: 70 MMHG | TEMPERATURE: 96.6 F | SYSTOLIC BLOOD PRESSURE: 132 MMHG | RESPIRATION RATE: 18 BRPM

## 2022-09-21 DIAGNOSIS — L97.421 DIABETIC ULCER OF LEFT HEEL ASSOCIATED WITH DIABETES MELLITUS DUE TO UNDERLYING CONDITION, LIMITED TO BREAKDOWN OF SKIN (HCC): ICD-10-CM

## 2022-09-21 DIAGNOSIS — L97.922 ULCER OF LEFT LOWER EXTREMITY WITH FAT LAYER EXPOSED (HCC): ICD-10-CM

## 2022-09-21 DIAGNOSIS — L97.412 DIABETIC ULCER OF RIGHT HEEL ASSOCIATED WITH TYPE 2 DIABETES MELLITUS, WITH FAT LAYER EXPOSED (HCC): Primary | ICD-10-CM

## 2022-09-21 DIAGNOSIS — E11.621 DIABETIC ULCER OF RIGHT HEEL ASSOCIATED WITH TYPE 2 DIABETES MELLITUS, WITH FAT LAYER EXPOSED (HCC): Primary | ICD-10-CM

## 2022-09-21 DIAGNOSIS — L97.522 ULCER OF LEFT FOOT WITH FAT LAYER EXPOSED (HCC): ICD-10-CM

## 2022-09-21 DIAGNOSIS — E08.621 DIABETIC ULCER OF LEFT HEEL ASSOCIATED WITH DIABETES MELLITUS DUE TO UNDERLYING CONDITION, LIMITED TO BREAKDOWN OF SKIN (HCC): ICD-10-CM

## 2022-09-21 PROBLEM — L97.529 ULCER OF LEFT FOOT (HCC): Status: ACTIVE | Noted: 2022-09-21

## 2022-09-21 PROCEDURE — 11042 DBRDMT SUBQ TIS 1ST 20SQCM/<: CPT

## 2022-09-21 PROCEDURE — 11045 DBRDMT SUBQ TISS EACH ADDL: CPT

## 2022-09-21 PROCEDURE — 6370000000 HC RX 637 (ALT 250 FOR IP): Performed by: PODIATRIST

## 2022-09-21 PROCEDURE — 11042 DBRDMT SUBQ TIS 1ST 20SQCM/<: CPT | Performed by: NURSE PRACTITIONER

## 2022-09-21 PROCEDURE — 11045 DBRDMT SUBQ TISS EACH ADDL: CPT | Performed by: NURSE PRACTITIONER

## 2022-09-21 RX ORDER — BACITRACIN ZINC AND POLYMYXIN B SULFATE 500; 1000 [USP'U]/G; [USP'U]/G
OINTMENT TOPICAL ONCE
Status: CANCELLED | OUTPATIENT
Start: 2022-09-21 | End: 2022-09-21

## 2022-09-21 RX ORDER — GENTAMICIN SULFATE 1 MG/G
OINTMENT TOPICAL ONCE
Status: CANCELLED | OUTPATIENT
Start: 2022-09-21 | End: 2022-09-21

## 2022-09-21 RX ORDER — CLOBETASOL PROPIONATE 0.5 MG/G
OINTMENT TOPICAL ONCE
Status: CANCELLED | OUTPATIENT
Start: 2022-09-21 | End: 2022-09-21

## 2022-09-21 RX ORDER — BACITRACIN, NEOMYCIN, POLYMYXIN B 400; 3.5; 5 [USP'U]/G; MG/G; [USP'U]/G
OINTMENT TOPICAL ONCE
Status: CANCELLED | OUTPATIENT
Start: 2022-09-21 | End: 2022-09-21

## 2022-09-21 RX ORDER — LIDOCAINE 50 MG/G
OINTMENT TOPICAL ONCE
Status: CANCELLED | OUTPATIENT
Start: 2022-09-21 | End: 2022-09-21

## 2022-09-21 RX ORDER — LIDOCAINE HYDROCHLORIDE 40 MG/ML
SOLUTION TOPICAL ONCE
Status: CANCELLED | OUTPATIENT
Start: 2022-09-21 | End: 2022-09-21

## 2022-09-21 RX ORDER — LIDOCAINE HYDROCHLORIDE 40 MG/ML
SOLUTION TOPICAL ONCE
Status: COMPLETED | OUTPATIENT
Start: 2022-09-21 | End: 2022-09-21

## 2022-09-21 RX ORDER — GINSENG 100 MG
CAPSULE ORAL ONCE
Status: CANCELLED | OUTPATIENT
Start: 2022-09-21 | End: 2022-09-21

## 2022-09-21 RX ORDER — LIDOCAINE 40 MG/G
CREAM TOPICAL ONCE
Status: CANCELLED | OUTPATIENT
Start: 2022-09-21 | End: 2022-09-21

## 2022-09-21 RX ORDER — BETAMETHASONE DIPROPIONATE 0.05 %
OINTMENT (GRAM) TOPICAL ONCE
Status: CANCELLED | OUTPATIENT
Start: 2022-09-21 | End: 2022-09-21

## 2022-09-21 RX ORDER — LIDOCAINE HYDROCHLORIDE 20 MG/ML
JELLY TOPICAL ONCE
Status: CANCELLED | OUTPATIENT
Start: 2022-09-21 | End: 2022-09-21

## 2022-09-21 RX ADMIN — LIDOCAINE HYDROCHLORIDE: 40 SOLUTION TOPICAL at 09:16

## 2022-09-21 ASSESSMENT — PAIN SCALES - GENERAL: PAINLEVEL_OUTOF10: 3

## 2022-09-21 ASSESSMENT — PAIN - FUNCTIONAL ASSESSMENT: PAIN_FUNCTIONAL_ASSESSMENT: PREVENTS OR INTERFERES SOME ACTIVE ACTIVITIES AND ADLS

## 2022-09-21 ASSESSMENT — PAIN DESCRIPTION - FREQUENCY: FREQUENCY: INTERMITTENT

## 2022-09-21 ASSESSMENT — PAIN DESCRIPTION - DESCRIPTORS: DESCRIPTORS: DISCOMFORT

## 2022-09-21 ASSESSMENT — PAIN DESCRIPTION - PAIN TYPE: TYPE: CHRONIC PAIN

## 2022-09-21 ASSESSMENT — PAIN DESCRIPTION - LOCATION: LOCATION: FOOT

## 2022-09-21 ASSESSMENT — PAIN DESCRIPTION - ORIENTATION: ORIENTATION: LEFT

## 2022-09-21 ASSESSMENT — PAIN DESCRIPTION - ONSET: ONSET: ON-GOING

## 2022-09-21 NOTE — PLAN OF CARE
Problem: Chronic Conditions and Co-morbidities  Goal: Patient's chronic conditions and co-morbidity symptoms are monitored and maintained or improved  Outcome: Progressing     Problem: Pain  Goal: Verbalizes/displays adequate comfort level or baseline comfort level  Outcome: Progressing     Problem: Wound:  Goal: Will show signs of wound healing; wound closure and no evidence of infection  Description: Will show signs of wound healing; wound closure and no evidence of infection  Outcome: Progressing     Problem: Venous:  Goal: Signs of wound healing will improve  Description: Signs of wound healing will improve  Outcome: Progressing

## 2022-09-21 NOTE — PROGRESS NOTES
[facility]  History and Physical/Consultation  Podiatry    Referring Physician : Karlie Lopez MD  Akua Velazquez. MEDICAL RECORD NUMBER:  [id number]  AGE: 80 y.o. GENDER: male  : 1932  EPISODE DATE:  2022  Subjective:     Chief Complaint   Patient presents with    Wound Check     ble         HISTORY of PRESENT ILLNESS HPI     Akua Toribio is a 80 y.o. male who presents today for wound/ulcer evaluation. History of Wound Context:  The patient has had a wound of diabetic b/l heels,and right ankle which was first noted approximately months ago. This has been treated betadyne. On their initial visit to the wound healing center, 22 ,  the patient has noted that the wound has been improving. The patient has had similar previous wounds in the past.      Pt is not on abx at time of initial visit.       Wound/Ulcer Pain Timing/Severity: constant  Quality of pain: sharp  Severity:  3 / 10   Modifying Factors: Pain worsens with walking  Associated Signs/Symptoms: edema, erythema and drainage    Ulcer Identification:  Ulcer Type: diabetic  Contributing Factors: edema and diabetes    Diabetic/Pressure/Non Pressure Ulcers onl y:  Ulcer: Diabetic ulcer, fat layer exposed    If patient has diabetic lower extremity wounds  Campbell Classification of diabetic lower extremity wounds:    Grade Description   []  0 No open wound   []  1 Superficial ulcer involving the full skin thickness   [x]  2 Deep ulcer involves ligament, tendon, joint capsule, or fascia  No bone involvement or abscess presence   []  3 Deep Ulcer with abcess formation and/or osteomyelitis   []  4 Localized gangrene   []  5 Extensive gangrene of the foot     Wound: N/A          22  Debrided, cont tx and care     22  Debrided, cont tx and care, await graft approval    22  Debrided, cont tx and care, puraply applied    22  Debrided, dermagraft applied    22  Debrided, dermagraft applied    2-23-22  Debrided, graft applied    3-2-22  Debrided, graft applied    3-9-22  Debrided, graft applied     3-23-22  Just release from hospital care, new wounds noted from hospital, also trauma to left toe loosened nail that was removed without incident, debrided all wounds, culture taken    3-30-22  Debrided, santyl to left leg, applied dermagraft b/l heels, prevelon boots wound vac left leg    4-6-22  Debrided, dermagraft b/l, wound care    4-13-22  Debrided, wound vac, dermagraft left    4-27-22  Debrided, cont tx and care     5-4-22  Debrided, cont tx and care, wound vac to leg left, and hydrofera blue to all other wounds    5-11-22  Debrided, cont tx and care    5-18-22  Debrided, cont tx and care    5-25-22  Cont wound vac, cont previous tx, offload    6-1-22  Debrided, offload    6-8-22  Debrided, puraply to left leg, b/l heels medihoney patches    6-15-22  Debrided, wound vac with puraply left leg, medihoney b/l heels and left 1st digit    6-22-22  Debrided, graft applied     6-29-22  Debrided, graft applied    7-13-22  Debrided, cont tx and care     7-20-22  Debrided, puraply applied     7-27-22  Debrided, healing well, add adaptic, regranex for heels?    8-3-22  Debrided, cont tx and care, regranex?     8-24-22  Healing well, hold wound vac to left leg, apply santyl/ bactroban daily    9-7-22  Debrided, all wounds healing well    9/21/22  Left great toe wound healed   Left lateral leg proximal wound measuring smaller   Left lateral leg distal wound and bilateral heel wounds measuring slightly larger   Continue Santyl, Bactroban and medihoney dressings    PAST MEDICAL HISTORY      Diagnosis Date    Arthritis     Cancer ([facility]     breast    Cellulitis     CHF (congestive heart failure) ([facility])     CKD (chronic kidney disease) stage 3, GFR 30-59 ml/min ([facility])     Diabetes mellitus ([facility]     History of lumpectomy     Hx of blood clots     Hyperlipidemia     Hypertension     Left ventricular failure ([facility] Macular degeneration     Obesity     Orthostatic hypotension     PE (pulmonary thromboembolism) ([facility])     Pressure injury of both heels, unstageable ([facility])     Staph aureus infection     Venous insufficiency      Past Surgical History:   Procedure Laterality Date    [facility] [address] SINGLE  9/2/2021         MASTECTOMY      TOE AMPUTATION      TOE SURGERY      TONSILLECTOMY       History reviewed. No pertinent family history. Social History     Tobacco Use    Smoking status: Never    Smokeless tobacco: Never   Vaping Use    Vaping Use: Never used   Substance Use Topics    Alcohol use: Not Currently     Alcohol/week: 1.0 standard drink     Types: 1 Cans of beer per week    Drug use: No     Allergies   Allergen Reactions    Clindamycin/Lincomycin     Sulfa Antibiotics      Current Outpatient Medications on File Prior to Encounter   Medication Sig Dispense Refill    fexofenadine (ALLEGRA) 180 MG tablet Take 180 mg by mouth daily      meloxicam (MOBIC) 7.5 MG tablet Take 7.5 mg by mouth daily      apixaban (ELIQUIS) 2.5 MG TABS tablet Take 1 tablet by mouth 2 times daily (before meals) 60 tablet 2    docusate sodium (COLACE) 100 MG capsule Take 200 mg by mouth nightly       tamsulosin (FLOMAX) 0.4 MG capsule Take 0.4 mg by mouth daily      bisacodyl (DULCOLAX) 10 MG suppository Place 10 mg rectally daily as needed for Constipation      diphenhydrAMINE-zinc acetate (BENADRYL) 1-0.1 % cream Apply topically every 4 hours as needed for Itching Apply topically 3 times daily as needed.       Sodium Phosphates (FLEET) 7-19 GM/118ML Place 1 enema rectally daily as needed      magnesium hydroxide (MILK OF MAGNESIA) 400 MG/5ML suspension Take 30 mLs by mouth daily as needed for Constipation      Polyethylene Glycol 400 1 % SOLN Apply 1 drop to eye every 4 hours as needed      ferrous sulfate (IRON 325) 325 (65 Fe) MG tablet Take 325 mg by mouth daily       zinc sulfate (ZINCATE) 220 (50 Zn) MG capsule Take 1 capsule by mouth daily for 14 days 14 capsule 0    miconazole (MICOTIN) 2 % powder Apply topically 2 times daily. 45 g 1    ascorbic acid (VITAMIN C) 500 MG tablet Take 1 tablet by mouth 2 times daily 30 tablet 0    Vitamin D (CHOLECALCIFEROL) 50 MCG (2000 UT) TABS tablet Take 1 tablet by mouth daily 60 tablet 0    furosemide (LASIX) 40 MG tablet Take 20 mg by mouth daily       omeprazole (PRILOSEC) 20 MG delayed release capsule Take 20 mg by mouth daily      Multiple Vitamins-Minerals (THERAPEUTIC MULTIVITAMIN-MINERALS) tablet Take 1 tablet by mouth daily      Multiple Vitamins-Minerals (PRESERVISION AREDS PO) Take 1 tablet by mouth 2 times daily (with meals)      acetaminophen (TYLENOL) 325 MG tablet Take 650 mg by mouth every 4 hours as needed for Pain or Fever       calcium carbonate (TUMS) 500 MG chewable tablet Take 1 tablet by mouth every 4 hours as needed for Heartburn      ipratropium-albuterol (DUONEB) 0.5-2.5 (3) MG/3ML SOLN nebulizer solution Inhale 3 mLs into the lungs every 4 hours (while awake) for 30 doses 90 mL 0    gabapentin (NEURONTIN) 100 MG capsule Take 200 mg by mouth Daily with supper. atorvastatin (LIPITOR) 80 MG tablet Take 80 mg by mouth at bedtime       INSULIN LISP PROT & LISP, HUM, (75-25) 100 UNIT/ML SUSP Inject 72 Units into the skin daily (with breakfast)       INSULIN LISP PROT & LISP, HUM, (75-25) 100 UNIT/ML SUSP Inject 55 Units into the skin Daily with supper       clotrimazole-betamethasone (LOTRISONE) cream Apply  topically as needed. Apply topically 2 times daily. No current facility-administered medications on file prior to encounter. REVIEW OF SYSTEMS   ROS : All others Negative if blank [], Positive if [x]  General Vascular   [] Fevers [] Claudication   [] Chills [] Rest Pain   Skin Neurologic   [x] Tissue Loss [x] Lower extremity neuropathy     8-10-22  Debrided, possible reganex?  Nushield applied   Objective:    /70   Pulse 76   Temp (!) 96.6 °F (35.9 °C) (Temporal)   Resp 18   Wt Readings from Last 3 Encounters:   09/07/22 246 lb (111.6 kg)   08/24/22 246 lb (111.6 kg)   08/10/22 246 lb (111.6 kg)       PHYSICAL EXAM   CONSTITUTIONAL:   Awake, alert, cooperative   PSYCHIATRIC :  Oriented to time, place and person      normal insight to disease process  EXTREMITIES:   R LE Open wounds are noted   Skin color is abnormal with ulcers   Edema is  noted   Sensation deficit noted - protective   Palpation of the foot does cause pain   4/5 strength DF/PF  L LE Open wounds are noted   Skin color is abnormal with ulcers   Edema is  noted   Sensation deficit noted - protective   Palpation of the foot does cause pain   4/5 strength DF/PF  R dorsalis pedis 1 L dorsalis pedis 1   R posterior tibial 1 L posterior tibial 1     Assessment:     Problem List Items Addressed This Visit       Ulcer of left foot ([address]    Ulcer of left lower extremity with fat layer exposed ([address]    Diabetic ulcer of left heel ([address]    Relevant Orders    Initiate Outpatient Wound Care Protocol    Diabetic ulcer of right heel associated with type 2 diabetes mellitus, with fat layer exposed ([address] - Primary    Relevant Orders    Initiate Outpatient Wound Care Protocol       Pre Debridement Measurements:  Are located in the [place]  Documentation Flow Sheet  Post Debridement Measurements:  Wound/Ulcer Descriptions are Pre Debridement except measurements:     Negative Pressure Wound Therapy Leg Left; Lower; Posterior (Active)   Number of pieces removed 1 08/03/22 0900   Number of days: 49       Wound 01/05/22 Heel Left #1 (Active)   Wound Image   09/21/22 0907   Wound Etiology Diabetic Campbell 2 01/05/22 0943   Dressing Status New dressing applied 09/21/22 1036   Wound Cleansed Cleansed with saline 09/21/22 1036   Dressing/Treatment Honey gel/honey paste;ABD;Dry dressing 09/21/22 1036   Offloading for Diabetic Foot Ulcers Post op shoe 09/21/22 1036   Wound Length (cm) 4.3 cm 09/21/22 0907   Wound Width (cm) 2.8 cm 09/21/22 0907   Wound Depth (cm) 0.1 cm 09/21/22 0907   Wound Surface Area (cm^2) 12.04 cm^2 09/21/22 0907   Change in Wound Size % (l*w) 12.75 09/21/22 0907   Wound Volume (cm^3) 1.204 cm^3 09/21/22 0907   Wound Healing % 56 09/21/22 0907   Post-Procedure Length (cm) 4.5 cm 09/21/22 1004   Post-Procedure Width (cm) 2.9 cm 09/21/22 1004   Post-Procedure Depth (cm) 0.2 cm 09/21/22 1004   Post-Procedure Surface Area (cm^2) 13.05 cm^2 09/21/22 1004   Post-Procedure Volume (cm^3) 2.61 cm^3 09/21/22 1004   Wound Assessment Fibrin;Pink/red 09/21/22 0907   Drainage Amount Moderate 09/21/22 0907   Drainage Description Yellow;Serosanguinous 09/21/22 0907   Odor None 09/21/22 0907   Rashmi-wound Assessment Intact;Dry/flaky 09/21/22 0907   Wound Thickness Description not for Pressure Injury Full thickness 01/19/22 0909   Number of days: 259       Wound 01/05/22 Heel Right #2 (Active)   Wound Image   09/21/22 0907   Wound Etiology Venous 01/05/22 0943   Dressing Status New dressing applied 09/21/22 1036   Wound Cleansed Cleansed with saline 09/21/22 1036   Dressing/Treatment ABD;Dry dressing;Honey gel/honey paste 09/21/22 1036   Offloading for Diabetic Foot Ulcers Post op shoe 09/21/22 1036   Wound Length (cm) 2.4 cm 09/21/22 0907   Wound Width (cm) 1.3 cm 09/21/22 0907   Wound Depth (cm) 0.2 cm 09/21/22 0907   Wound Surface Area (cm^2) 3.12 cm^2 09/21/22 0907   Change in Wound Size % (l*w) 26.59 09/21/22 0907   Wound Volume (cm^3) 0.624 cm^3 09/21/22 0907   Wound Healing % 27 09/21/22 0907   Post-Procedure Length (cm) 2.5 cm 09/21/22 1004   Post-Procedure Width (cm) 1.3 cm 09/21/22 1004   Post-Procedure Depth (cm) 0.3 cm 09/21/22 1004   Post-Procedure Surface Area (cm^2) 3.25 cm^2 09/21/22 1004   Post-Procedure Volume (cm^3) 0.975 cm^3 09/21/22 1004   Wound Assessment Fibrin;Pink/red 09/21/22 0907   Drainage Amount Moderate 09/21/22 0907   Drainage Description Serosanguinous; Yellow 09/21/22 0907   Odor None 09/21/22 0598 Rashmi-wound Assessment Maceration; Intact 09/21/22 0907   Wound Thickness Description not for Pressure Injury Full thickness 01/19/22 0909   Number of days: 259       Wound 03/14/22 Coccyx (Active)   Number of days: 190       Wound 03/23/22 Toe (Comment  which one) Left #10 Left Great Toe (Active)   Wound Image   09/21/22 0907   Dressing Status New dressing applied 08/24/22 0951   Wound Cleansed Cleansed with saline 08/24/22 0951   Dressing/Treatment Honey gel/honey paste;Dry dressing 08/24/22 0951   Offloading for Diabetic Foot Ulcers Post op shoe 08/24/22 0951   Wound Length (cm) 0 cm 09/21/22 0907   Wound Width (cm) 0 cm 09/21/22 0907   Wound Depth (cm) 0 cm 09/21/22 0907   Wound Surface Area (cm^2) 0 cm^2 09/21/22 0907   Change in Wound Size % (l*w) 100 09/21/22 0907   Wound Volume (cm^3) 0 cm^3 09/21/22 0907   Wound Healing % 100 09/21/22 0907   Post-Procedure Length (cm) 0.3 cm 09/07/22 0917   Post-Procedure Width (cm) 0.3 cm 09/07/22 0917   Post-Procedure Depth (cm) 0.2 cm 09/07/22 0917   Post-Procedure Surface Area (cm^2) 0.09 cm^2 09/07/22 0917   Post-Procedure Volume (cm^3) 0.018 cm^3 09/07/22 0917   Wound Assessment Dry;Pink/red 09/21/22 0907   Drainage Amount Scant 09/21/22 0907   Drainage Description Yellow 09/21/22 0907   Odor None 09/21/22 0907   Rashmi-wound Assessment Dry/flaky 09/21/22 0907   Number of days: 182       Wound 04/13/22 Pretibial Distal;Left;Lateral #14 (blocked may 25)  (Active)   Wound Image   09/21/22 0907   Dressing Status New dressing applied 09/21/22 1036   Wound Cleansed Cleansed with saline 09/21/22 1036   Dressing/Treatment Honey gel/honey paste;Dry dressing;Roll gauze 09/21/22 1036   Offloading for Diabetic Foot Ulcers Post op shoe 09/21/22 1036   Wound Length (cm) 0.5 cm 09/21/22 0907   Wound Width (cm) 0.3 cm 09/21/22 0907   Wound Depth (cm) 0.1 cm 09/21/22 0907   Wound Surface Area (cm^2) 0.15 cm^2 09/21/22 0907   Change in Wound Size % (l*w) 98.44 09/21/22 0907   Wound Volume (cm^3) 0.015 cm^3 09/21/22 0907   Wound Healing % 98 09/21/22 0907   Post-Procedure Length (cm) 0.5 cm 09/21/22 1004   Post-Procedure Width (cm) 0.3 cm 09/21/22 1004   Post-Procedure Depth (cm) 0.2 cm 09/21/22 1004   Post-Procedure Surface Area (cm^2) 0.15 cm^2 09/21/22 1004   Post-Procedure Volume (cm^3) 0.03 cm^3 09/21/22 1004   Wound Assessment Pink/red 09/21/22 0907   Drainage Amount Moderate 09/21/22 0907   Drainage Description Yellow;Serosanguinous 09/21/22 0907   Odor None 09/21/22 0907   Rashmi-wound Assessment Dry/flaky 09/21/22 0907   Number of days: 161       Wound 04/27/22 Tibial Left;Posterior #15 (Active)   Wound Image   09/21/22 0907   Dressing Status New dressing applied 09/21/22 1036   Wound Cleansed Cleansed with saline 09/21/22 1036   Dressing/Treatment Honey gel/honey paste;ABD;Roll gauze 09/21/22 1036   Offloading for Diabetic Foot Ulcers Post op shoe 09/21/22 1036   Wound Length (cm) 5.8 cm 09/21/22 0907   Wound Width (cm) 4 cm 09/21/22 0907   Wound Depth (cm) 0.4 cm 09/21/22 0907   Wound Surface Area (cm^2) 23.2 cm^2 09/21/22 0907   Change in Wound Size % (l*w) -65.24 09/21/22 0907   Wound Volume (cm^3) 9.28 cm^3 09/21/22 0907   Wound Healing % -32 09/21/22 0907   Post-Procedure Length (cm) 5.9 cm 09/21/22 1004   Post-Procedure Width (cm) 4.2 cm 09/21/22 1004   Post-Procedure Depth (cm) 0.5 cm 09/21/22 1004   Post-Procedure Surface Area (cm^2) 24.78 cm^2 09/21/22 1004   Post-Procedure Volume (cm^3) 12.39 cm^3 09/21/22 1004   Wound Assessment Pink/red;Fibrin;Slough 09/21/22 0907   Drainage Amount Large 09/21/22 0907   Drainage Description Thick; Yellow;Serosanguinous 09/21/22 0907   Odor None 09/21/22 0907   Rashmi-wound Assessment Edematous; Intact;Dry/flaky 09/21/22 1137   Number of days: 147               Procedure Note  Indications:  Based on my examination of this patient's wound(s)/ulcer(s) today, debridement is required to promote healing and evaluate the wound base.     Performed by: SUSANNA Walsh CNP    Consent obtained:  Yes    Time out taken:  Yes    Pain Control: Anesthetic  Anesthetic: 4% Lidocaine Liquid Topical     Debridement:Excisional Debridement    Using curette the wound(s)/ulcer(s) was/were sharply debrided down through and including the removal of subcutaneous tissue. Devitalized Tissue Debrided:  fibrin, biofilm, and slough to stimulate bleeding to promote healing, post debridement good bleeding base and wound edges noted    Wound/Ulcer #: 1,2, 10, 14, 15    Percent of Wound/Ulcer Debrided: 95%    Total Surface Area Debrided:  40 sq cm     Estimated Blood Loss:  Minimal  Hemostasis Achieved:  by pressure    Procedural Pain:  4  / 10   Post Procedural Pain:  5 / 10     Response to treatment:  Well tolerated by patient. A culture was done. Performed by: SUSANNA Walsh CNP    Wound Type:venous    Consent obtained: Yes    Time out taken: Yes     Fenestrated: Yes    Instrument(s) curette and #15 blade scalpel      [] Mesher Utilized    Plan:     Pt is not a smoker   - Discussed relationship of smoking and negative affects on wound healing   - Emphasized importance of tobacco avoidace/cessation     In my professional opinion and based off the information that is available at this time this patient has appropriate indication for HBO Therapy: Maybe    Treatment Note please see attached Discharge Instructions    Written patient dismissal instructions given to patient and signed by patient or POA. Discharge Instructions         Visit Discharge/Physician Orders     Discharge condition: Stable     Assessment of pain at discharge:  minimal     Anesthetic used: 4% lidocaine solution     Discharge to: Home     Left via:Private automobile     Accompanied by: accompanied by SELF     ECF/[facility]:  [facility].      Dressing Orders:  Clean bilateral heel ulcers with NSS, apply medi-honey patch  Change every other day extra padding      left

## 2022-09-21 NOTE — DISCHARGE INSTR - COC
Continuity of Care Form    Patient Name: Joshua Greenfield. :  1932  MRN:  [id number]    Admit date:  2022  Discharge date:  ***    Code Status Order: Prior   Advance Directives:     Admitting Physician:  No admitting provider for patient encounter. PCP: Mary Beth Heaton MD    Discharging Nurse: [facility] Unit/Room#: No information available for this encounter. Discharging Unit Phone Number: ***    Emergency Contact:   Extended Emergency Contact Information  Primary Emergency Contact: [address] Phone: [phone]  Mobile Phone: [phone]  Relation: Child   needed? No  Secondary Emergency Contact: [address] Phone: [phone]  Relation: Child    Past Surgical History:  Past Surgical History:   Procedure Laterality Date    [facility]  PICC [address] SINGLE  2021         MASTECTOMY      TOE AMPUTATION      TOE SURGERY      TONSILLECTOMY         Immunization History:   Immunization History   Administered Date(s) Administered    Influenza Virus Vaccine 10/23/2016    Pneumococcal Conjugate Vaccine 2013    Td, unspecified formulation 2012       Active Problems:  Patient Active Problem List   Diagnosis Code    HTN (hypertension) I10    Breast cancer, male ([facility] [address] C50.929    Obesity (BMI 30.0-34. 9) E66.9    Right hip pain M25.551    Diabetes mellitus type 2, uncontrolled (HCC) E11.65    Essential hypertension, benign I10    Hyperlipidemia with target LDL less than 100 E78.5    Dizzy spells R42    Troponin level elevated R77.8    Shortness of breath R06.02    Chest pressure R07.89    Acute respiratory insufficiency R06.89    Pneumonia J18.9    History of pulmonary embolus (PE) Z86.711    Acute left-sided CHF (congestive heart failure) (HCC) I50.1    Type I diabetes mellitus, uncontrolled (HCC) E10.65    Cellulitis and abscess of right lower extremity L03.115, L02.415    Cellulitis and abscess of left lower extremity L03.116, L02.416    Chronic venous insufficiency of lower extremity I87.2    Acute hypoxemic respiratory failure due to COVID-19 ([facility]) U07.1, J96.01    Acute on chronic respiratory failure with hypoxia ([facility]) J96.21    Acute respiratory failure with hypoxia ([facility]) J96.01    COVID-19 virus infection U07.1    Hyperlipidemia E78.5    Pneumonia due to COVID-19 virus U07.1, J12.82    Orthostatic hypotension I95.1    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 ([facility]) I70.244    PVD (peripheral vascular disease) ([facility]) I73.9    PAD (peripheral artery disease) ([facility]) I73.9    Diabetic ulcer of right heel associated with type 2 diabetes mellitus, with fat layer exposed ([facility] [address] E11.621, L97.412    Diabetic ulcer of left heel ([facility]) E11.621, L97.429    Sepsis ([facility] [address] A41.9    Diabetic foot infection ([facility]) E11.628, L08.9    Foot ulcer due to secondary DM ([facility] [address] E13.621, L97.509       Isolation/Infection:   Isolation            No Isolation          Patient Infection Status       Infection Onset Added Last Indicated Last Indicated By Review Planned Expiration Resolved Resolved By    None active    Resolved    COVID-19 (Rule Out) 22 COVID-19, Rapid (Ordered)   22 Rule-Out Test Resulted    MRSA 21 Culture, Blood 2   03/15/22 Danial Castellon RN    MRSA blood 2021    COVID-19 21 Covid-19 Ambulatory   21     COVID-19 (Rule Out) 21 COVID-19, Rapid (Ordered)   21 Rule-Out Test Resulted    COVID-19 (Rule Out) 10/16/20 10/16/20 10/16/20 Covid-19 Ambulatory (Ordered)   10/17/20 Rule-Out Test Resulted    COVID-19 (Rule Out) 20 Covid-19 Ambulatory (Ordered)   20 Rule-Out Test Resulted    COVID-19 (Rule Out) 20 Covid-19 Ambulatory (Ordered)   20 Rule-Out Test Resulted    COVID-19 (Rule Out) 20 Covid-19 Ambulatory (Ordered)   07/30/20 Rule-Out Test Resulted            Nurse Assessment:  Last Vital Signs: /70   Pulse 76   Temp (!) 96.6 °F (35.9 °C) (Temporal)   Resp 18     Last documented pain score (0-10 scale): Pain Level: 3  Last Weight:   Wt Readings from Last 1 Encounters:   09/07/22 246 lb (111.6 kg)     Mental Status:  {IP PT MENTAL STATUS:37025}    IV Access:  { AGUSTINA IV ACCESS:426989840}    Nursing Mobility/ADLs:  Walking   {CHP DME USEE:202688411}  Transfer  {CHP DME IWHL:468793868}  Bathing  {CHP DME GZJE:045882543}  Dressing  {CHP DME KMYF:510350063}  Toileting  {CHP DME IRKU:312643842}  Feeding  {CHP DME JNSW:903876574}  Med Admin  {CHP DME IGAW:522916705}  Med Delivery   { AGUSTINA MED Delivery:062296398}    Wound Care Documentation and Therapy:  Negative Pressure Wound Therapy Leg Left; Lower; Posterior (Active)   Number of pieces removed 1 08/03/22 0900   Number of days: 49       Wound 01/05/22 Heel Left #1 (Active)   Wound Image   09/21/22 0907   Wound Etiology Diabetic Campbell 2 01/05/22 0943   Dressing Status New dressing applied 08/24/22 0951   Wound Cleansed Cleansed with saline 08/24/22 0951   Dressing/Treatment Honey gel/honey paste;ABD;Dry dressing 08/24/22 0951   Offloading for Diabetic Foot Ulcers Post op shoe 08/24/22 0951   Wound Length (cm) 4.3 cm 09/21/22 0907   Wound Width (cm) 2.8 cm 09/21/22 0907   Wound Depth (cm) 0.1 cm 09/21/22 0907   Wound Surface Area (cm^2) 12.04 cm^2 09/21/22 0907   Change in Wound Size % (l*w) 12.75 09/21/22 0907   Wound Volume (cm^3) 1.204 cm^3 09/21/22 0907   Wound Healing % 56 09/21/22 0907   Post-Procedure Length (cm) 4.5 cm 09/21/22 1004   Post-Procedure Width (cm) 2.9 cm 09/21/22 1004   Post-Procedure Depth (cm) 0.2 cm 09/21/22 1004   Post-Procedure Surface Area (cm^2) 13.05 cm^2 09/21/22 1004   Post-Procedure Volume (cm^3) 2.61 cm^3 09/21/22 1004   Wound Assessment Fibrin;Pink/red 09/21/22 0907   Drainage Amount Moderate 09/21/22 0907   Drainage Description Yellow;Serosanguinous 09/21/22 0907   Odor None 09/21/22 0907   Rashmi-wound Assessment Intact;Dry/flaky 09/21/22 0907   Wound Thickness Description not for Pressure Injury Full thickness 01/19/22 0909   Number of days: 259       Wound 01/05/22 Heel Right #2 (Active)   Wound Image   09/21/22 0907   Wound Etiology Venous 01/05/22 0943   Dressing Status New dressing applied 08/24/22 0951   Wound Cleansed Cleansed with saline 08/24/22 0951   Dressing/Treatment ABD;Dry dressing;Honey gel/honey paste 08/24/22 0951   Offloading for Diabetic Foot Ulcers Post op shoe 08/24/22 0951   Wound Length (cm) 2.4 cm 09/21/22 0907   Wound Width (cm) 1.3 cm 09/21/22 0907   Wound Depth (cm) 0.2 cm 09/21/22 0907   Wound Surface Area (cm^2) 3.12 cm^2 09/21/22 0907   Change in Wound Size % (l*w) 26.59 09/21/22 0907   Wound Volume (cm^3) 0.624 cm^3 09/21/22 0907   Wound Healing % 27 09/21/22 0907   Post-Procedure Length (cm) 2.5 cm 09/21/22 1004   Post-Procedure Width (cm) 1.3 cm 09/21/22 1004   Post-Procedure Depth (cm) 0.3 cm 09/21/22 1004   Post-Procedure Surface Area (cm^2) 3.25 cm^2 09/21/22 1004   Post-Procedure Volume (cm^3) 0.975 cm^3 09/21/22 1004   Wound Assessment Fibrin;Pink/red 09/21/22 0907   Drainage Amount Moderate 09/21/22 0907   Drainage Description Serosanguinous; Yellow 09/21/22 0907   Odor None 09/21/22 0907   Rashmi-wound Assessment Maceration; Intact 09/21/22 0907   Wound Thickness Description not for Pressure Injury Full thickness 01/19/22 0909   Number of days: 259       Wound 03/14/22 Coccyx (Active)   Number of days: 190       Wound 03/23/22 Toe (Comment  which one) Left #10 Left Great Toe (Active)   Wound Image   09/21/22 0907   Dressing Status New dressing applied 08/24/22 0951   Wound Cleansed Cleansed with saline 08/24/22 0951   Dressing/Treatment Honey gel/honey paste;Dry dressing 08/24/22 0951   Offloading for Diabetic Foot Ulcers Post op shoe 08/24/22 0951   Wound Length (cm) 0.2 cm 09/21/22 0907   Wound Width (cm) 0.2 cm 09/21/22 0907   Wound Depth (cm) 0.1 cm 09/21/22 0907   Wound Surface Area (cm^2) 0.04 cm^2 09/21/22 0907   Change in Wound Size % (l*w) 99.67 09/21/22 0907   Wound Volume (cm^3) 0.004 cm^3 09/21/22 0907   Wound Healing % 100 09/21/22 0907   Post-Procedure Length (cm) 0.2 cm 09/21/22 1004   Post-Procedure Width (cm) 0.2 cm 09/21/22 1004   Post-Procedure Depth (cm) 0.2 cm 09/21/22 1004   Post-Procedure Surface Area (cm^2) 0.04 cm^2 09/21/22 1004   Post-Procedure Volume (cm^3) 0.008 cm^3 09/21/22 1004   Wound Assessment Dry;Pink/red 09/21/22 0907   Drainage Amount Scant 09/21/22 0907   Drainage Description Yellow 09/21/22 0907   Odor None 09/21/22 0907   Rashmi-wound Assessment Dry/flaky 09/21/22 0907   Number of days: 182       Wound 04/13/22 Pretibial Distal;Left;Lateral #14 (blocked may 25)  (Active)   Wound Image   09/21/22 0907   Dressing Status New dressing applied 08/24/22 0951   Wound Cleansed Cleansed with saline 08/24/22 0951   Dressing/Treatment Other (comment);Dry dressing 08/24/22 0951   Offloading for Diabetic Foot Ulcers Post op shoe 08/24/22 0951   Wound Length (cm) 0.5 cm 09/21/22 0907   Wound Width (cm) 0.3 cm 09/21/22 0907   Wound Depth (cm) 0.1 cm 09/21/22 0907   Wound Surface Area (cm^2) 0.15 cm^2 09/21/22 0907   Change in Wound Size % (l*w) 98.44 09/21/22 0907   Wound Volume (cm^3) 0.015 cm^3 09/21/22 0907   Wound Healing % 98 09/21/22 0907   Post-Procedure Length (cm) 0.5 cm 09/21/22 1004   Post-Procedure Width (cm) 0.3 cm 09/21/22 1004   Post-Procedure Depth (cm) 0.2 cm 09/21/22 1004   Post-Procedure Surface Area (cm^2) 0.15 cm^2 09/21/22 1004   Post-Procedure Volume (cm^3) 0.03 cm^3 09/21/22 1004   Wound Assessment Pink/red 09/21/22 0907   Drainage Amount Moderate 09/21/22 0907   Drainage Description Yellow;Serosanguinous 09/21/22 0907   Odor None 09/21/22 0907   Rashmi-wound Assessment Dry/flaky 09/21/22 0907   Number of days: 161       Wound 04/27/22 Tibial Left;Posterior #15 (Active)   Wound Image   09/21/22 0907   Dressing Status New dressing applied 08/24/22 0951   Wound Cleansed Cleansed with saline 08/24/22 0951   Dressing/Treatment Alginate;ABD;Dry dressing 08/24/22 0951   Offloading for Diabetic Foot Ulcers Post op shoe 08/24/22 0951   Wound Length (cm) 5.8 cm 09/21/22 0907   Wound Width (cm) 4 cm 09/21/22 0907   Wound Depth (cm) 0.4 cm 09/21/22 0907   Wound Surface Area (cm^2) 23.2 cm^2 09/21/22 0907   Change in Wound Size % (l*w) -65.24 09/21/22 0907   Wound Volume (cm^3) 9.28 cm^3 09/21/22 0907   Wound Healing % -32 09/21/22 0907   Post-Procedure Length (cm) 5.9 cm 09/21/22 1004   Post-Procedure Width (cm) 4.2 cm 09/21/22 1004   Post-Procedure Depth (cm) 0.5 cm 09/21/22 1004   Post-Procedure Surface Area (cm^2) 24.78 cm^2 09/21/22 1004   Post-Procedure Volume (cm^3) 12.39 cm^3 09/21/22 1004   Wound Assessment Pink/red;Fibrin;Slough 09/21/22 0907   Drainage Amount Large 09/21/22 0907   Drainage Description Thick; Yellow;Serosanguinous 09/21/22 0907   Odor None 09/21/22 0907   Rashmi-wound Assessment Edematous; Intact;Dry/flaky 09/21/22 7051   Number of days: 147        Elimination:  Continence: Bowel: {YES / XE:81562}  Bladder: {YES / WV:98790}  Urinary Catheter: {Urinary Catheter:295792693}   Colostomy/Ileostomy/Ileal Conduit: {YES / TY:35041}       Date of Last BM: ***  No intake or output data in the 24 hours ending 09/21/22 1006  No intake/output data recorded.     Safety Concerns:     [facility] Safety Concerns:790505917}    Impairments/Disabilities:      [facility] Impairments/Disabilities:627182037}    Nutrition Therapy:  Current Nutrition Therapy:   [facility] Diet List:569146847}    Routes of Feeding: {CHP DME Other Feedings:479111242}  Liquids: {Slp liquid thickness:01145}  Daily Fluid Restriction: {CHP DME Yes amt example:297678341}  Last Modified Barium Swallow with Video (Video Swallowing Test): {Done Not Done FNPX:745251308}    Treatments at the Time of Hospital Discharge: Respiratory Treatments: ***  Oxygen Therapy:  {Therapy; copd oxygen:87161}  Ventilator:    {MH CC Vent QGYX:377779300}    Rehab Therapies: {THERAPEUTIC INTERVENTION:2778408052}  Weight Bearing Status/Restrictions: 50[facility] VACA Weight Bearin}  Other Medical Equipment (for information only, NOT a DME order):  {EQUIPMENT:304734079}  Other Treatments: ***    Patient's personal belongings (please select all that are sent with patient):  {[facility] DME Belongings:373146989}    RN SIGNATURE:  {Esignature:034315158}    CASE MANAGEMENT/SOCIAL WORK SECTION    Inpatient Status Date: ***    Readmission Risk Assessment Score:  Readmission Risk              Risk of Unplanned Readmission:  0           Discharging to Facility/ Agency   Name:   Address:  Phone:  Fax:    Dialysis Facility (if applicable)   Name:  Address:  Dialysis Schedule:  Phone:  Fax:    / signature: {Esignature:786700229}    PHYSICIAN SECTION    Prognosis: {Prognosis:9847230566}    Condition at Discharge: 50[facility] Gill Patient Condition:089873020}    Rehab Potential (if transferring to Rehab): {Prognosis:7321470811}    Recommended Labs or Other Treatments After Discharge: ***    Physician Certification: I certify the above information and transfer of Eloise Ware.  is necessary for the continuing treatment of the diagnosis listed and that he requires {Admit to Appropriate Level of Care:12313} for {GREATER/LESS:979196975} 30 days.      Update Admission H&P: {P DME Changes in NUSLJ:840647318}    PHYSICIAN SIGNATURE:  {Esignature:347552585}

## 2022-09-28 ENCOUNTER — HOSPITAL ENCOUNTER (OUTPATIENT)
Dept: WOUND CARE | Age: 87
Discharge: HOME | End: 2022-09-28
Payer: MEDICARE

## 2022-09-28 VITALS
TEMPERATURE: 96.1 F | HEIGHT: 72 IN | RESPIRATION RATE: 18 BRPM | BODY MASS INDEX: 33.32 KG/M2 | DIASTOLIC BLOOD PRESSURE: 52 MMHG | HEART RATE: 80 BPM | SYSTOLIC BLOOD PRESSURE: 124 MMHG | WEIGHT: 246 LBS

## 2022-09-28 DIAGNOSIS — E11.621 DIABETIC ULCER OF RIGHT HEEL ASSOCIATED WITH TYPE 2 DIABETES MELLITUS, WITH FAT LAYER EXPOSED (HCC): Primary | ICD-10-CM

## 2022-09-28 DIAGNOSIS — L97.412 DIABETIC ULCER OF RIGHT HEEL ASSOCIATED WITH TYPE 2 DIABETES MELLITUS, WITH FAT LAYER EXPOSED (HCC): Primary | ICD-10-CM

## 2022-09-28 DIAGNOSIS — L97.421 DIABETIC ULCER OF LEFT HEEL ASSOCIATED WITH DIABETES MELLITUS DUE TO UNDERLYING CONDITION, LIMITED TO BREAKDOWN OF SKIN (HCC): ICD-10-CM

## 2022-09-28 DIAGNOSIS — E08.621 DIABETIC ULCER OF LEFT HEEL ASSOCIATED WITH DIABETES MELLITUS DUE TO UNDERLYING CONDITION, LIMITED TO BREAKDOWN OF SKIN (HCC): ICD-10-CM

## 2022-09-28 PROBLEM — L89.151 PRESSURE INJURY OF SACRAL REGION, STAGE 1: Status: ACTIVE | Noted: 2022-09-28

## 2022-09-28 PROCEDURE — 99203 OFFICE O/P NEW LOW 30 MIN: CPT | Performed by: SURGERY

## 2022-09-28 PROCEDURE — 99212 OFFICE O/P EST SF 10 MIN: CPT

## 2022-09-28 RX ORDER — LIDOCAINE HYDROCHLORIDE 20 MG/ML
JELLY TOPICAL ONCE
Status: CANCELLED | OUTPATIENT
Start: 2022-09-28 | End: 2022-09-28

## 2022-09-28 RX ORDER — LIDOCAINE 40 MG/G
CREAM TOPICAL ONCE
Status: CANCELLED | OUTPATIENT
Start: 2022-09-28 | End: 2022-09-28

## 2022-09-28 RX ORDER — BETAMETHASONE DIPROPIONATE 0.05 %
OINTMENT (GRAM) TOPICAL ONCE
Status: CANCELLED | OUTPATIENT
Start: 2022-09-28 | End: 2022-09-28

## 2022-09-28 RX ORDER — GINSENG 100 MG
CAPSULE ORAL ONCE
Status: CANCELLED | OUTPATIENT
Start: 2022-09-28 | End: 2022-09-28

## 2022-09-28 RX ORDER — BACITRACIN, NEOMYCIN, POLYMYXIN B 400; 3.5; 5 [USP'U]/G; MG/G; [USP'U]/G
OINTMENT TOPICAL ONCE
Status: CANCELLED | OUTPATIENT
Start: 2022-09-28 | End: 2022-09-28

## 2022-09-28 RX ORDER — LIDOCAINE 50 MG/G
OINTMENT TOPICAL ONCE
Status: CANCELLED | OUTPATIENT
Start: 2022-09-28 | End: 2022-09-28

## 2022-09-28 RX ORDER — BACITRACIN ZINC AND POLYMYXIN B SULFATE 500; 1000 [USP'U]/G; [USP'U]/G
OINTMENT TOPICAL ONCE
Status: CANCELLED | OUTPATIENT
Start: 2022-09-28 | End: 2022-09-28

## 2022-09-28 RX ORDER — LIDOCAINE HYDROCHLORIDE 40 MG/ML
SOLUTION TOPICAL ONCE
Status: CANCELLED | OUTPATIENT
Start: 2022-09-28 | End: 2022-09-28

## 2022-09-28 RX ORDER — GENTAMICIN SULFATE 1 MG/G
OINTMENT TOPICAL ONCE
Status: CANCELLED | OUTPATIENT
Start: 2022-09-28 | End: 2022-09-28

## 2022-09-28 RX ORDER — CLOBETASOL PROPIONATE 0.5 MG/G
OINTMENT TOPICAL ONCE
Status: CANCELLED | OUTPATIENT
Start: 2022-09-28 | End: 2022-09-28

## 2022-09-28 NOTE — PLAN OF CARE
Problem: Chronic Conditions and Co-morbidities  Goal: Patient's chronic conditions and co-morbidity symptoms are monitored and maintained or improved  Outcome: Progressing     Problem: Pressure Ulcer:  Goal: Signs of wound healing will improve  Description: Signs of wound healing will improve  Outcome: Progressing  Goal: Absence of new pressure ulcer  Description: Absence of new pressure ulcer  Outcome: Progressing  Goal: Will show no infection signs and symptoms  Description: Will show no infection signs and symptoms  Outcome: Progressing

## 2022-09-28 NOTE — PROGRESS NOTES
Wound Healing Center /Hyperbarics   History and Physical/Consultation  General Surgery/Medicine    Referring Physician : Ba Parker MD  Cholo Manzanares. MEDICAL RECORD NUMBER:  [id number]  AGE: 80 y.o. GENDER: male  : 1932  EPISODE DATE:  2022  Subjective:     Chief Complaint   Patient presents with    Wound Check     coccyx         HISTORY of PRESENT ILLNESS HPI     Cholo Proctor is a 80 y.o. male who presents today for wound/ulcer evaluation. History of Wound Context:  The patient has had a wound of sacral region which was first noted approximately 4 weeks. This has been treated with marty. On their initial visit to the wound healing center,  the patient has noted that the wound has not been improving. The patient has not had similar previous wounds in the past.      Pt is not on abx at time of initial visit.       Wound/Ulcer Pain Timing/Severity: intermittent  Quality of pain: aching  Severity:  5 / 10   Modifying Factors: Pain is relieved/improved with rest  Associated Signs/Symptoms: erythema    Ulcer Identification:  Ulcer Type: pressure  Contributing Factors: chronic pressure and obesity    Diabetic/Pressure/Non Pressure Ulcers only:  Ulcer:     Wound:         PAST MEDICAL HISTORY      Diagnosis Date    Arthritis     Cancer ([facility])     breast    Cellulitis     CHF (congestive heart failure) ([facility])     CKD (chronic kidney disease) stage 3, GFR 30-59 ml/min ([facility])     Diabetes mellitus ([facility] [address]     History of lumpectomy     Hx of blood clots     Hyperlipidemia     Hypertension     Left ventricular failure ([facility])     Macular degeneration     Obesity     Orthostatic hypotension     PE (pulmonary thromboembolism) ([facility])     Pressure injury of both heels, unstageable ([facility])     Staph aureus infection     Venous insufficiency      Past Surgical History:   Procedure Laterality Date    [facility].  PICC POWERPIC SINGLE  2021         MASTECTOMY      TOE AMPUTATION      TOE SURGERY      TONSILLECTOMY History reviewed. No pertinent family history. Social History     Tobacco Use    Smoking status: Never    Smokeless tobacco: Never   Vaping Use    Vaping Use: Never used   Substance Use Topics    Alcohol use: Not Currently     Alcohol/week: 1.0 standard drink     Types: 1 Cans of beer per week    Drug use: No     Allergies   Allergen Reactions    Clindamycin/Lincomycin     Sulfa Antibiotics      Current Outpatient Medications on File Prior to Encounter   Medication Sig Dispense Refill    fexofenadine (ALLEGRA) 180 MG tablet Take 180 mg by mouth daily      meloxicam (MOBIC) 7.5 MG tablet Take 7.5 mg by mouth daily      apixaban (ELIQUIS) 2.5 MG TABS tablet Take 1 tablet by mouth 2 times daily (before meals) 60 tablet 2    docusate sodium (COLACE) 100 MG capsule Take 200 mg by mouth nightly       tamsulosin (FLOMAX) 0.4 MG capsule Take 0.4 mg by mouth daily      bisacodyl (DULCOLAX) 10 MG suppository Place 10 mg rectally daily as needed for Constipation      diphenhydrAMINE-zinc acetate (BENADRYL) 1-0.1 % cream Apply topically every 4 hours as needed for Itching Apply topically 3 times daily as needed. Sodium Phosphates (FLEET) 7-19 GM/118ML Place 1 enema rectally daily as needed      magnesium hydroxide (MILK OF MAGNESIA) 400 MG/5ML suspension Take 30 mLs by mouth daily as needed for Constipation      Polyethylene Glycol 400 1 % SOLN Apply 1 drop to eye every 4 hours as needed      ferrous sulfate (IRON 325) 325 (65 Fe) MG tablet Take 325 mg by mouth daily       zinc sulfate (ZINCATE) 220 (50 Zn) MG capsule Take 1 capsule by mouth daily for 14 days 14 capsule 0    miconazole (MICOTIN) 2 % powder Apply topically 2 times daily.  45 g 1    ascorbic acid (VITAMIN C) 500 MG tablet Take 1 tablet by mouth 2 times daily 30 tablet 0    Vitamin D (CHOLECALCIFEROL) 50 MCG (2000 UT) TABS tablet Take 1 tablet by mouth daily 60 tablet 0    furosemide (LASIX) 40 MG tablet Take 20 mg by mouth daily       omeprazole (PRILOSEC) 20 MG delayed release capsule Take 20 mg by mouth daily      Multiple Vitamins-Minerals (THERAPEUTIC MULTIVITAMIN-MINERALS) tablet Take 1 tablet by mouth daily      Multiple Vitamins-Minerals (PRESERVISION AREDS PO) Take 1 tablet by mouth 2 times daily (with meals)      acetaminophen (TYLENOL) 325 MG tablet Take 650 mg by mouth every 4 hours as needed for Pain or Fever       calcium carbonate (TUMS) 500 MG chewable tablet Take 1 tablet by mouth every 4 hours as needed for Heartburn      ipratropium-albuterol (DUONEB) 0.5-2.5 (3) MG/3ML SOLN nebulizer solution Inhale 3 mLs into the lungs every 4 hours (while awake) for 30 doses 90 mL 0    gabapentin (NEURONTIN) 100 MG capsule Take 200 mg by mouth Daily with supper. atorvastatin (LIPITOR) 80 MG tablet Take 80 mg by mouth at bedtime       INSULIN LISP PROT & LISP, HUM, (75-25) 100 UNIT/ML SUSP Inject 72 Units into the skin daily (with breakfast)       INSULIN LISP PROT & LISP, HUM, (75-25) 100 UNIT/ML SUSP Inject 55 Units into the skin Daily with supper       clotrimazole-betamethasone (LOTRISONE) cream Apply  topically as needed. Apply topically 2 times daily. No current facility-administered medications on file prior to encounter.        REVIEW OF SYSTEMS     ROS : All others Negative if blank [], Positive if [x]  General Urinary   [] Fevers [] Hematuria   [] Chills [] Dysuria   [] Weight Loss Neurolgoic   Skin [] Stroke/TIA   [] Tissue Loss [] Focal weakness   Eyes [] Slurred Speech   [] Wears Glasses/Contacts ENT   [] Vision Changes [] Difficulty swallowing   Respiratory Endocrine    [] Shortness of breath [] Increased Thirst   Cardiovascular Gastrointestinal   [] Chest Pain [] Abdominal Pain   [] Shortness of breath with exertion [] Melena    [] Hematochezia     Objective:    BP (!) 124/52   Pulse 80   Temp (!) 96.1 °F (35.6 °C) (Temporal)   Resp 18   Ht 6' (1.829 m)   Wt 246 lb (111.6 kg)   BMI 33.36 kg/m²   Wt Readings from Last 3 Encounters:   09/28/22 246 lb (111.6 kg)   09/07/22 246 lb (111.6 kg)   08/24/22 246 lb (111.6 kg)       PHYSICAL EXAM  CONSTITUTIONAL:   Awake, alert, cooperative     PSYCHIATRIC :  Oriented to time, place and person        normal insight to disease process  ENT:  External ears and nose without lesions      Hearing deficit is not noted  NECK: Supple, symmetrical, trachea midline      Thyroid goiter not appreciated     LUNGS:  No increased work of breathing                    Clear to auscultation bilaterally     CARDIOVASCULAR:  regular rate and rhythm     ABDOMEN:  soft, non-distended, non-tender      Hernias is not noted  Lymphatics : Cervical lymphadenopathy is not noted     Femoral lymphadenopathy is not noted  SKIN:   Skin color is normal   Texture and turgor is  normal   Induration is not noted  EXTREMITIES:   R UE Edema is not noted  L UE Edema is not noted  R LE Edema is not noted  L LE Edema is not noted  Assessment:     Problem List Items Addressed This Visit       Diabetic ulcer of right heel associated with type 2 diabetes mellitus, with fat layer exposed ([address] - Primary    Relevant Orders    Initiate Outpatient Wound Care Protocol    Diabetic ulcer of left heel ([facility] [address]    Relevant Orders    Initiate Outpatient Wound Care Protocol       Pre Debridement Measurements:  Are located in the [place]  Documentation Flow Sheet  Post Debridement Measurements:  Wound/Ulcer Descriptions are Pre Debridement except measurements:     Negative Pressure Wound Therapy Leg Left; Lower; Posterior (Active)   Number of pieces removed 1 08/03/22 0900   Number of days: 56       Wound 01/05/22 Heel Left #1 (Active)   Wound Image   09/21/22 0907   Wound Etiology Diabetic Campbell 2 01/05/22 0943   Dressing Status New dressing applied 09/21/22 1036   Wound Cleansed Cleansed with saline 09/21/22 1036   Dressing/Treatment Honey gel/honey paste;ABD;Dry dressing 09/21/22 1036   Offloading for Diabetic Foot Ulcers Post op shoe 09/21/22 1036   Wound Length (cm) 4.3 cm 09/21/22 0907   Wound Width (cm) 2.8 cm 09/21/22 0907   Wound Depth (cm) 0.1 cm 09/21/22 0907   Wound Surface Area (cm^2) 12.04 cm^2 09/21/22 0907   Change in Wound Size % (l*w) 12.75 09/21/22 0907   Wound Volume (cm^3) 1.204 cm^3 09/21/22 0907   Wound Healing % 56 09/21/22 0907   Post-Procedure Length (cm) 4.5 cm 09/21/22 1004   Post-Procedure Width (cm) 2.9 cm 09/21/22 1004   Post-Procedure Depth (cm) 0.2 cm 09/21/22 1004   Post-Procedure Surface Area (cm^2) 13.05 cm^2 09/21/22 1004   Post-Procedure Volume (cm^3) 2.61 cm^3 09/21/22 1004   Wound Assessment Fibrin;Pink/red 09/21/22 0907   Drainage Amount Moderate 09/21/22 0907   Drainage Description Yellow;Serosanguinous 09/21/22 0907   Odor None 09/21/22 0907   Rashmi-wound Assessment Intact;Dry/flaky 09/21/22 0907   Wound Thickness Description not for Pressure Injury Full thickness 01/19/22 0909   Number of days: 266       Wound 01/05/22 Heel Right #2 (Active)   Wound Image   09/21/22 0907   Wound Etiology Venous 01/05/22 0943   Dressing Status New dressing applied 09/21/22 1036   Wound Cleansed Cleansed with saline 09/21/22 1036   Dressing/Treatment ABD;Dry dressing;Honey gel/honey paste 09/21/22 1036   Offloading for Diabetic Foot Ulcers Post op shoe 09/21/22 1036   Wound Length (cm) 2.4 cm 09/21/22 0907   Wound Width (cm) 1.3 cm 09/21/22 0907   Wound Depth (cm) 0.2 cm 09/21/22 0907   Wound Surface Area (cm^2) 3.12 cm^2 09/21/22 0907   Change in Wound Size % (l*w) 26.59 09/21/22 0907   Wound Volume (cm^3) 0.624 cm^3 09/21/22 0907   Wound Healing % 27 09/21/22 0907   Post-Procedure Length (cm) 2.5 cm 09/21/22 1004   Post-Procedure Width (cm) 1.3 cm 09/21/22 1004   Post-Procedure Depth (cm) 0.3 cm 09/21/22 1004   Post-Procedure Surface Area (cm^2) 3.25 cm^2 09/21/22 1004   Post-Procedure Volume (cm^3) 0.975 cm^3 09/21/22 1004   Wound Assessment Fibrin;Pink/red 09/21/22 0907   Drainage Amount Moderate 09/21/22 0907   Drainage Description Serosanguinous; Yellow 09/21/22 0907   Odor None 09/21/22 0907   Rashmi-wound Assessment Maceration; Intact 09/21/22 0907   Wound Thickness Description not for Pressure Injury Full thickness 01/19/22 0909   Number of days: 266       Wound 03/23/22 Toe (Comment  which one) Left #10 Left Great Toe (Active)   Wound Image   09/21/22 0907   Dressing Status New dressing applied 08/24/22 0951   Wound Cleansed Cleansed with saline 08/24/22 0951   Dressing/Treatment Honey gel/honey paste;Dry dressing 08/24/22 0951   Offloading for Diabetic Foot Ulcers Post op shoe 08/24/22 0951   Wound Length (cm) 0 cm 09/21/22 0907   Wound Width (cm) 0 cm 09/21/22 0907   Wound Depth (cm) 0 cm 09/21/22 0907   Wound Surface Area (cm^2) 0 cm^2 09/21/22 0907   Change in Wound Size % (l*w) 100 09/21/22 0907   Wound Volume (cm^3) 0 cm^3 09/21/22 0907   Wound Healing % 100 09/21/22 0907   Post-Procedure Length (cm) 0.3 cm 09/07/22 0917   Post-Procedure Width (cm) 0.3 cm 09/07/22 0917   Post-Procedure Depth (cm) 0.2 cm 09/07/22 0917   Post-Procedure Surface Area (cm^2) 0.09 cm^2 09/07/22 0917   Post-Procedure Volume (cm^3) 0.018 cm^3 09/07/22 0917   Wound Assessment Dry;Pink/red 09/21/22 0907   Drainage Amount Scant 09/21/22 0907   Drainage Description Yellow 09/21/22 0907   Odor None 09/21/22 0907   Rashmi-wound Assessment Dry/flaky 09/21/22 0907   Number of days: 189       Wound 04/13/22 Pretibial Distal;Left;Lateral #14 (blocked may 25)  (Active)   Wound Image   09/21/22 0907   Dressing Status New dressing applied 09/21/22 1036   Wound Cleansed Cleansed with saline 09/21/22 1036   Dressing/Treatment Honey gel/honey paste;Dry dressing;Roll gauze 09/21/22 1036   Offloading for Diabetic Foot Ulcers Post op shoe 09/21/22 1036   Wound Length (cm) 0.5 cm 09/21/22 0907   Wound Width (cm) 0.3 cm 09/21/22 0907   Wound Depth (cm) 0.1 cm 09/21/22 0907   Wound Surface Area (cm^2) 0.15 cm^2 09/21/22 0907   Change in Wound Size % (l*w) 98.44 09/21/22 0727   Wound Volume (cm^3) 0.015 cm^3 09/21/22 0907   Wound Healing % 98 09/21/22 0907   Post-Procedure Length (cm) 0.5 cm 09/21/22 1004   Post-Procedure Width (cm) 0.3 cm 09/21/22 1004   Post-Procedure Depth (cm) 0.2 cm 09/21/22 1004   Post-Procedure Surface Area (cm^2) 0.15 cm^2 09/21/22 1004   Post-Procedure Volume (cm^3) 0.03 cm^3 09/21/22 1004   Wound Assessment Pink/red 09/21/22 0907   Drainage Amount Moderate 09/21/22 0907   Drainage Description Yellow;Serosanguinous 09/21/22 0907   Odor None 09/21/22 0907   Rashmi-wound Assessment Dry/flaky 09/21/22 0907   Number of days: 168       Wound 04/27/22 Tibial Left;Posterior #15 (Active)   Wound Image   09/21/22 0907   Dressing Status New dressing applied 09/21/22 1036   Wound Cleansed Cleansed with saline 09/21/22 1036   Dressing/Treatment Honey gel/honey paste;ABD;Roll gauze 09/21/22 1036   Offloading for Diabetic Foot Ulcers Post op shoe 09/21/22 1036   Wound Length (cm) 5.8 cm 09/21/22 0907   Wound Width (cm) 4 cm 09/21/22 0907   Wound Depth (cm) 0.4 cm 09/21/22 0907   Wound Surface Area (cm^2) 23.2 cm^2 09/21/22 0907   Change in Wound Size % (l*w) -65.24 09/21/22 0907   Wound Volume (cm^3) 9.28 cm^3 09/21/22 0907   Wound Healing % -32 09/21/22 0907   Post-Procedure Length (cm) 5.9 cm 09/21/22 1004   Post-Procedure Width (cm) 4.2 cm 09/21/22 1004   Post-Procedure Depth (cm) 0.5 cm 09/21/22 1004   Post-Procedure Surface Area (cm^2) 24.78 cm^2 09/21/22 1004   Post-Procedure Volume (cm^3) 12.39 cm^3 09/21/22 1004   Wound Assessment Pink/red;Fibrin;Slough 09/21/22 0907   Drainage Amount Large 09/21/22 0907   Drainage Description Thick; Yellow;Serosanguinous 09/21/22 0907   Odor None 09/21/22 0907   Rashmi-wound Assessment Edematous; Intact;Dry/flaky 09/21/22 2506   Number of days: 154          Procedure Note  Indications:  Based on my examination of this patient's wound(s)/ulcer(s) today, debridement is not required to promote healing and evaluate the wound base.    Performed by: Olivia Castanon MD    A culture was done. Plan:     Pt is not a smoker   en    In my professional opinion and based off the information that is available at this time this patient has appropriate indication for HBO Therapy: No    Treatment Note please see attached Discharge Instructions    Written patient dismissal instructions given to patient and signed by patient or POA. Discharge Instructions         Visit Discharge/Physician Orders     Discharge condition: Stable     Assessment of pain at discharge:  minimal     Anesthetic used: 4% lidocaine solution     Discharge to: Home     Left via:Private automobile     Accompanied by: accompanied by SELF     ECF/HHA:  [facility]. Dressing Orders:  Clean bilateral heel ulcers with NSS, apply medi-honey patch  Change every other day extra padding   to deep tissue skin injury on buttocks, leave cover patches off for now. No open wound. Continue to protect skin on buttocks with good skin barrier ointment. Encourage position changes at least every 2 hours, keep skin clean and dry. Assist patient when in bed to try to offload backside to relieve pressure off tailbone   left lower leg medihoney gel and dry dressing every other day     TO LEFT LEG ULCER,  HOLD WOUND VAC, APPLY SANTYL AND BACTROBAN 50/50 MIXTURE TO WOUND DAILY- KEEP PRESSURE OFF WOUND AT ALL TIMES  APPLY PREVALON BOOT TO BILATERAL HEELS     Physical therapy for ambulating safely while wearing offloading boots as indicated. [facility] [facility] followup visit _______1_week with Dr. Gastelum_____________________  (Please note your next appointment above and if you are unable to keep, kindly give a 24 hour notice.  Thank you.)     Physician signature:__________________________        If you experience any of the following, please call the [address] [place] [address] Road during business hours:     * Increase in Pain  * Temperature over 101  * Increase in drainage from your wound  * Drainage with a

## 2022-09-30 ENCOUNTER — APPOINTMENT (OUTPATIENT)
Dept: GENERAL RADIOLOGY | Age: 87
End: 2022-09-30
Payer: MEDICARE

## 2022-09-30 ENCOUNTER — HOSPITAL ENCOUNTER (EMERGENCY)
Age: 87
Discharge: OTHER INSTITUTION | End: 2022-10-01
Attending: STUDENT IN AN ORGANIZED HEALTH CARE EDUCATION/TRAINING PROGRAM
Payer: MEDICARE

## 2022-09-30 ENCOUNTER — APPOINTMENT (OUTPATIENT)
Dept: CT IMAGING | Age: 87
End: 2022-09-30
Payer: MEDICARE

## 2022-09-30 DIAGNOSIS — R07.9 CHEST PAIN, UNSPECIFIED TYPE: Primary | ICD-10-CM

## 2022-09-30 LAB
ALBUMIN SERPL-MCNC: 3.6 G/DL (ref 3.5–5.2)
ALP BLD-CCNC: 170 U/L (ref 40–129)
ALT SERPL-CCNC: 9 U/L (ref 0–40)
ANION GAP SERPL CALCULATED.3IONS-SCNC: 7 MMOL/L (ref 7–16)
AST SERPL-CCNC: 15 U/L (ref 0–39)
BACTERIA: ABNORMAL /HPF
BASOPHILIC STIPPLING: ABNORMAL
BASOPHILS ABSOLUTE: 0 E9/L (ref 0–0.2)
BASOPHILS RELATIVE PERCENT: 0 % (ref 0–2)
BILIRUB SERPL-MCNC: 0.3 MG/DL (ref 0–1.2)
BILIRUBIN URINE: NEGATIVE
BLOOD, URINE: NEGATIVE
BUN BLDV-MCNC: 35 MG/DL (ref 6–23)
CALCIUM SERPL-MCNC: 9 MG/DL (ref 8.6–10.2)
CHLORIDE BLD-SCNC: 104 MMOL/L (ref 98–107)
CLARITY: CLEAR
CO2: 30 MMOL/L (ref 22–29)
COLOR: YELLOW
CREAT SERPL-MCNC: 1.2 MG/DL (ref 0.7–1.2)
EOSINOPHILS ABSOLUTE: 0.43 E9/L (ref 0.05–0.5)
EOSINOPHILS RELATIVE PERCENT: 3.5 % (ref 0–6)
EPITHELIAL CELLS, UA: ABNORMAL /HPF
GFR AFRICAN AMERICAN: >60
GFR NON-AFRICAN AMERICAN: 57 ML/MIN/1.73
GLUCOSE BLD-MCNC: 167 MG/DL (ref 74–99)
GLUCOSE URINE: NEGATIVE MG/DL
HCT VFR BLD CALC: 37.4 % (ref 37–54)
HEMOGLOBIN: 11.3 G/DL (ref 12.5–16.5)
KETONES, URINE: NEGATIVE MG/DL
LACTIC ACID: 1.6 MMOL/L (ref 0.5–2.2)
LEUKOCYTE ESTERASE, URINE: NEGATIVE
LYMPHOCYTES ABSOLUTE: 0.24 E9/L (ref 1.5–4)
LYMPHOCYTES RELATIVE PERCENT: 1.7 % (ref 20–42)
MCH RBC QN AUTO: 26.4 PG (ref 26–35)
MCHC RBC AUTO-ENTMCNC: 30.2 % (ref 32–34.5)
MCV RBC AUTO: 87.4 FL (ref 80–99.9)
MONOCYTES ABSOLUTE: 0.37 E9/L (ref 0.1–0.95)
MONOCYTES RELATIVE PERCENT: 2.6 % (ref 2–12)
NEUTROPHILS ABSOLUTE: 11.22 E9/L (ref 1.8–7.3)
NEUTROPHILS RELATIVE PERCENT: 92.2 % (ref 43–80)
NITRITE, URINE: NEGATIVE
NUCLEATED RED BLOOD CELLS: 0 /100 WBC
OVALOCYTES: ABNORMAL
PDW BLD-RTO: 20.2 FL (ref 11.5–15)
PH UA: 6 (ref 5–9)
PLATELET # BLD: 186 E9/L (ref 130–450)
PMV BLD AUTO: 9.4 FL (ref 7–12)
POIKILOCYTES: ABNORMAL
POLYCHROMASIA: ABNORMAL
POTASSIUM REFLEX MAGNESIUM: 5.1 MMOL/L (ref 3.5–5)
PRO-BNP: 921 PG/ML (ref 0–450)
PROTEIN UA: 30 MG/DL
RBC # BLD: 4.28 E12/L (ref 3.8–5.8)
RBC UA: ABNORMAL /HPF (ref 0–2)
SCHISTOCYTES: ABNORMAL
SEDIMENTATION RATE, ERYTHROCYTE: 38 MM/HR (ref 0–15)
SODIUM BLD-SCNC: 141 MMOL/L (ref 132–146)
SPECIFIC GRAVITY UA: 1.01 (ref 1–1.03)
TOTAL PROTEIN: 6.8 G/DL (ref 6.4–8.3)
TROPONIN, HIGH SENSITIVITY: 53 NG/L (ref 0–11)
UROBILINOGEN, URINE: 0.2 E.U./DL
WBC # BLD: 12.2 E9/L (ref 4.5–11.5)
WBC UA: ABNORMAL /HPF (ref 0–5)

## 2022-09-30 PROCEDURE — 80053 COMPREHEN METABOLIC PANEL: CPT

## 2022-09-30 PROCEDURE — 83605 ASSAY OF LACTIC ACID: CPT

## 2022-09-30 PROCEDURE — 99285 EMERGENCY DEPT VISIT HI MDM: CPT

## 2022-09-30 PROCEDURE — 94640 AIRWAY INHALATION TREATMENT: CPT

## 2022-09-30 PROCEDURE — 85025 COMPLETE CBC W/AUTO DIFF WBC: CPT

## 2022-09-30 PROCEDURE — 93005 ELECTROCARDIOGRAM TRACING: CPT | Performed by: STUDENT IN AN ORGANIZED HEALTH CARE EDUCATION/TRAINING PROGRAM

## 2022-09-30 PROCEDURE — 84484 ASSAY OF TROPONIN QUANT: CPT

## 2022-09-30 PROCEDURE — 81001 URINALYSIS AUTO W/SCOPE: CPT

## 2022-09-30 PROCEDURE — 70450 CT HEAD/BRAIN W/O DYE: CPT

## 2022-09-30 PROCEDURE — 2580000003 HC RX 258: Performed by: STUDENT IN AN ORGANIZED HEALTH CARE EDUCATION/TRAINING PROGRAM

## 2022-09-30 PROCEDURE — 85651 RBC SED RATE NONAUTOMATED: CPT

## 2022-09-30 PROCEDURE — 83880 ASSAY OF NATRIURETIC PEPTIDE: CPT

## 2022-09-30 PROCEDURE — 6370000000 HC RX 637 (ALT 250 FOR IP): Performed by: EMERGENCY MEDICINE

## 2022-09-30 PROCEDURE — 71045 X-RAY EXAM CHEST 1 VIEW: CPT

## 2022-09-30 RX ORDER — 0.9 % SODIUM CHLORIDE 0.9 %
1000 INTRAVENOUS SOLUTION INTRAVENOUS ONCE
Status: COMPLETED | OUTPATIENT
Start: 2022-09-30 | End: 2022-10-01

## 2022-09-30 RX ORDER — IPRATROPIUM BROMIDE AND ALBUTEROL SULFATE 2.5; .5 MG/3ML; MG/3ML
1 SOLUTION RESPIRATORY (INHALATION) ONCE
Status: COMPLETED | OUTPATIENT
Start: 2022-09-30 | End: 2022-09-30

## 2022-09-30 RX ADMIN — IPRATROPIUM BROMIDE AND ALBUTEROL SULFATE 1 AMPULE: 2.5; .5 SOLUTION RESPIRATORY (INHALATION) at 23:36

## 2022-09-30 RX ADMIN — SODIUM CHLORIDE 1000 ML: 9 INJECTION, SOLUTION INTRAVENOUS at 23:23

## 2022-09-30 ASSESSMENT — PAIN SCALES - GENERAL: PAINLEVEL_OUTOF10: 3

## 2022-09-30 ASSESSMENT — PAIN DESCRIPTION - DESCRIPTORS: DESCRIPTORS: TIGHTNESS

## 2022-09-30 ASSESSMENT — PAIN - FUNCTIONAL ASSESSMENT
PAIN_FUNCTIONAL_ASSESSMENT: 0-10
PAIN_FUNCTIONAL_ASSESSMENT: NONE - DENIES PAIN

## 2022-09-30 ASSESSMENT — PAIN DESCRIPTION - PAIN TYPE: TYPE: ACUTE PAIN

## 2022-09-30 ASSESSMENT — PAIN DESCRIPTION - FREQUENCY: FREQUENCY: CONTINUOUS

## 2022-09-30 ASSESSMENT — PAIN DESCRIPTION - LOCATION: LOCATION: CHEST

## 2022-09-30 NOTE — ED PROVIDER NOTES
Department of Emergency Medicine   ED  Provider Note  Admit Date/RoomTime: 9/30/2022  6:11 PM  ED Room: 18/18              Hortencia Russell is a 80 y.o. male with a PMHx significant for HTN, HLD, DM (insulin dependent), breast c/a (in remission), PE (on eliquis 10 years ago), CHF who presents for evaluation of shortness of breath, chest pain, beginning prior to arrival.  The complaint has been persistent, moderate in severity, and worsened by nothing. The patient states that he has chronic wounds to his b/l LE follows with Dr. Tonya Bowles. Notes that today around 1200 he went to go into his medical lift chair while at facility. Notes that he then started to feel nauseated, shortness of breath, a little tightness in chest, headache, chills. Notes that these symptoms have been persistent since 1200. He feels extremely thirst at this time. The history is provided by the patient and medical records. Review of Systems   Constitutional:  Positive for fatigue. Negative for chills and fever. HENT:  Positive for congestion. Negative for ear pain, sinus pressure and sore throat. Eyes:  Negative for pain, discharge and redness. Respiratory:  Positive for cough and shortness of breath. Negative for wheezing. Cardiovascular:  Positive for chest pain. Gastrointestinal:  Negative for abdominal pain, diarrhea, nausea and vomiting. Genitourinary:  Negative for dysuria and frequency. Musculoskeletal:  Negative for arthralgias and back pain. Skin:  Positive for wound. Negative for rash. Neurological:  Negative for weakness and headaches. Hematological:  Negative for adenopathy. All other systems reviewed and are negative. Physical Exam  Vitals and nursing note reviewed. Constitutional:       General: He is not in acute distress. Appearance: Normal appearance. He is well-developed. He is not ill-appearing. HENT:      Head: Normocephalic and atraumatic.       Right Ear: External ear normal.      Left Ear: External ear normal.   Eyes:      General:         Right eye: No discharge. Left eye: No discharge. Extraocular Movements: Extraocular movements intact. Conjunctiva/sclera: Conjunctivae normal.   Cardiovascular:      Rate and Rhythm: Normal rate and regular rhythm. Heart sounds: Normal heart sounds. No murmur heard. Pulmonary:      Effort: Pulmonary effort is normal. No respiratory distress. Breath sounds: Normal breath sounds. No stridor. Abdominal:      General: There is no distension. Palpations: Abdomen is soft. There is no mass. Musculoskeletal:      Cervical back: Normal range of motion and neck supple. Skin:     General: Skin is warm and dry. Comments: Wounds to the b/l LE   Neurological:      Mental Status: He is alert and oriented to person, place, and time. Cranial Nerves: No cranial nerve deficit. Coordination: Coordination normal.        Procedures    MDM    EKG: This EKG is signed and interpreted by me. Rate: 113  Rhythm: Sinus  Interpretation: sinus tachycardia, no visible ST Elevations or depressions; baseline artifact, non-specific t-waves, QRS 68, QTc 397  Comparison: changes compared to previous EKG           Alf Del Real. presents to the ED for evaluation of fatigue and shortness of breath from facility. Workup in the ED revealed patient with wounds to b/l LE. CXR was negative for acute findings. He was found to have a troponin of 53 with repeat of 59. Patient was signed out to oncoming physician pending repeat evaluation and lab results. Disposition as per Dr. Wu Crimes.       Yuejairo Genna, DO  10/13/22 [id number] [address],   11/01/22 2689

## 2022-10-01 VITALS
TEMPERATURE: 98.4 F | RESPIRATION RATE: 18 BRPM | SYSTOLIC BLOOD PRESSURE: 146 MMHG | HEIGHT: 72 IN | HEART RATE: 88 BPM | OXYGEN SATURATION: 93 % | BODY MASS INDEX: 34.27 KG/M2 | DIASTOLIC BLOOD PRESSURE: 62 MMHG | WEIGHT: 253 LBS

## 2022-10-01 LAB — TROPONIN, HIGH SENSITIVITY: 59 NG/L (ref 0–11)

## 2022-10-01 ASSESSMENT — PAIN - FUNCTIONAL ASSESSMENT: PAIN_FUNCTIONAL_ASSESSMENT: NONE - DENIES PAIN

## 2022-10-01 ASSESSMENT — PAIN DESCRIPTION - ORIENTATION: ORIENTATION: RIGHT;UPPER

## 2022-10-01 ASSESSMENT — PAIN SCALES - GENERAL: PAINLEVEL_OUTOF10: 2

## 2022-10-01 ASSESSMENT — PAIN DESCRIPTION - DESCRIPTORS: DESCRIPTORS: DULL

## 2022-10-01 ASSESSMENT — PAIN DESCRIPTION - LOCATION: LOCATION: ABDOMEN

## 2022-10-01 NOTE — ED NOTES
Pt maintaining adequate oxygen saturation on room air 94%. MD notified.        Annelise Tan RN  10/01/22 [id number]

## 2022-10-01 NOTE — ED NOTES
Report called to Syd Sosa pt awaiting discharge back to facility.       Aleta Galindo RN  10/01/22 2435

## 2022-10-01 NOTE — ED NOTES
Wound care provided for pt.   Pt's legs and feet were cleaned with sterile water and dressed with ABD pads and gauze dressings per pt's request.      Sheila Pineda RN  10/01/22 9145

## 2022-10-02 LAB
EKG ATRIAL RATE: 117 BPM
EKG Q-T INTERVAL: 290 MS
EKG QRS DURATION: 68 MS
EKG QTC CALCULATION (BAZETT): 397 MS
EKG R AXIS: 1 DEGREES
EKG T AXIS: 64 DEGREES
EKG VENTRICULAR RATE: 113 BPM

## 2022-10-03 NOTE — DISCHARGE INSTRUCTIONS
Visit Discharge/Physician Orders     Discharge condition: Stable     Assessment of pain at discharge:  minimal     Anesthetic used: 4% lidocaine solution     Discharge to: Home     Left via:Private automobile     Accompanied by: accompanied by SELF     ECF/[facility]:  [facility]. Dressing Orders:  Clean bilateral heel ulcers with NSS, apply medi-honey patch  Change every other day extra padding   to deep tissue skin injury on buttocks, leave cover patches off for now. No open wound. Continue to protect skin on buttocks with good skin barrier ointment. Encourage position changes at least every 2 hours, keep skin clean and dry. Assist patient when in bed to try to offload backside to relieve pressure off tailbone   left lower leg medihoney gel and dry dressing every other day     TO LEFT LEG ULCER,  HOLD WOUND VAC, APPLY SANTYL AND BACTROBAN 50/50 MIXTURE TO WOUND DAILY- KEEP PRESSURE OFF WOUND AT ALL TIMES  APPLY PREVALON BOOT TO BILATERAL HEELS     Physical therapy for ambulating safely while wearing offloading boots as indicated. [facility] followup visit _______2week with Dr. Gastelum_____________________  (Please note your next appointment above and if you are unable to keep, kindly give a 24 hour notice. Thank you.)     Physician signature:__________________________        If you experience any of the following, please call the [address] [place] [address] Road during business hours:     * Increase in Pain  * Temperature over 101  * Increase in drainage from your wound  * Drainage with a foul odor  * Bleeding  * Increase in swelling  * Need for compression bandage changes due to slippage, breakthrough drainage.

## 2022-10-05 ENCOUNTER — HOSPITAL ENCOUNTER (OUTPATIENT)
Dept: WOUND CARE | Age: 87
Discharge: HOME | End: 2022-10-05
Payer: MEDICARE

## 2022-10-05 VITALS
TEMPERATURE: 96 F | DIASTOLIC BLOOD PRESSURE: 64 MMHG | SYSTOLIC BLOOD PRESSURE: 118 MMHG | HEART RATE: 84 BPM | BODY MASS INDEX: 34.31 KG/M2 | RESPIRATION RATE: 18 BRPM | WEIGHT: 253 LBS

## 2022-10-05 DIAGNOSIS — L97.422 DIABETIC ULCER OF LEFT HEEL ASSOCIATED WITH TYPE 2 DIABETES MELLITUS, WITH FAT LAYER EXPOSED (HCC): ICD-10-CM

## 2022-10-05 DIAGNOSIS — L97.412 DIABETIC ULCER OF RIGHT HEEL ASSOCIATED WITH TYPE 2 DIABETES MELLITUS, WITH FAT LAYER EXPOSED (HCC): Primary | ICD-10-CM

## 2022-10-05 DIAGNOSIS — E11.621 DIABETIC ULCER OF LEFT HEEL ASSOCIATED WITH TYPE 2 DIABETES MELLITUS, WITH FAT LAYER EXPOSED (HCC): ICD-10-CM

## 2022-10-05 DIAGNOSIS — L97.421 DIABETIC ULCER OF LEFT HEEL ASSOCIATED WITH DIABETES MELLITUS DUE TO UNDERLYING CONDITION, LIMITED TO BREAKDOWN OF SKIN (HCC): ICD-10-CM

## 2022-10-05 DIAGNOSIS — E11.621 DIABETIC ULCER OF RIGHT HEEL ASSOCIATED WITH TYPE 2 DIABETES MELLITUS, WITH FAT LAYER EXPOSED (HCC): Primary | ICD-10-CM

## 2022-10-05 DIAGNOSIS — E08.621 DIABETIC ULCER OF LEFT HEEL ASSOCIATED WITH DIABETES MELLITUS DUE TO UNDERLYING CONDITION, LIMITED TO BREAKDOWN OF SKIN (HCC): ICD-10-CM

## 2022-10-05 PROCEDURE — 6370000000 HC RX 637 (ALT 250 FOR IP): Performed by: PODIATRIST

## 2022-10-05 PROCEDURE — 11045 DBRDMT SUBQ TISS EACH ADDL: CPT

## 2022-10-05 PROCEDURE — 11042 DBRDMT SUBQ TIS 1ST 20SQCM/<: CPT

## 2022-10-05 RX ORDER — GENTAMICIN SULFATE 1 MG/G
OINTMENT TOPICAL ONCE
Status: CANCELLED | OUTPATIENT
Start: 2022-10-05 | End: 2022-10-05

## 2022-10-05 RX ORDER — LIDOCAINE HYDROCHLORIDE 40 MG/ML
SOLUTION TOPICAL ONCE
Status: COMPLETED | OUTPATIENT
Start: 2022-10-05 | End: 2022-10-05

## 2022-10-05 RX ORDER — LIDOCAINE HYDROCHLORIDE 20 MG/ML
JELLY TOPICAL ONCE
Status: CANCELLED | OUTPATIENT
Start: 2022-10-05 | End: 2022-10-05

## 2022-10-05 RX ORDER — BETAMETHASONE DIPROPIONATE 0.05 %
OINTMENT (GRAM) TOPICAL ONCE
Status: CANCELLED | OUTPATIENT
Start: 2022-10-05 | End: 2022-10-05

## 2022-10-05 RX ORDER — LIDOCAINE HYDROCHLORIDE 40 MG/ML
SOLUTION TOPICAL ONCE
Status: CANCELLED | OUTPATIENT
Start: 2022-10-05 | End: 2022-10-05

## 2022-10-05 RX ORDER — BACITRACIN, NEOMYCIN, POLYMYXIN B 400; 3.5; 5 [USP'U]/G; MG/G; [USP'U]/G
OINTMENT TOPICAL ONCE
Status: CANCELLED | OUTPATIENT
Start: 2022-10-05 | End: 2022-10-05

## 2022-10-05 RX ORDER — CLOBETASOL PROPIONATE 0.5 MG/G
OINTMENT TOPICAL ONCE
Status: CANCELLED | OUTPATIENT
Start: 2022-10-05 | End: 2022-10-05

## 2022-10-05 RX ORDER — LIDOCAINE 50 MG/G
OINTMENT TOPICAL ONCE
Status: CANCELLED | OUTPATIENT
Start: 2022-10-05 | End: 2022-10-05

## 2022-10-05 RX ORDER — GINSENG 100 MG
CAPSULE ORAL ONCE
Status: CANCELLED | OUTPATIENT
Start: 2022-10-05 | End: 2022-10-05

## 2022-10-05 RX ORDER — LIDOCAINE 40 MG/G
CREAM TOPICAL ONCE
Status: CANCELLED | OUTPATIENT
Start: 2022-10-05 | End: 2022-10-05

## 2022-10-05 RX ORDER — BACITRACIN ZINC AND POLYMYXIN B SULFATE 500; 1000 [USP'U]/G; [USP'U]/G
OINTMENT TOPICAL ONCE
Status: CANCELLED | OUTPATIENT
Start: 2022-10-05 | End: 2022-10-05

## 2022-10-05 RX ADMIN — LIDOCAINE HYDROCHLORIDE 10 ML: 40 SOLUTION TOPICAL at 08:56

## 2022-10-05 ASSESSMENT — PAIN DESCRIPTION - ORIENTATION: ORIENTATION: RIGHT;LEFT

## 2022-10-05 ASSESSMENT — PAIN DESCRIPTION - LOCATION: LOCATION: FOOT

## 2022-10-05 ASSESSMENT — PAIN SCALES - GENERAL: PAINLEVEL_OUTOF10: 2

## 2022-10-05 NOTE — DISCHARGE INSTR - COC
Continuity of Care Form    Patient Name: Salvador Stubbs. :  1932  MRN:  [id number]    Admit date:  10/5/2022  Discharge date:  ***    Code Status Order: Prior   Advance Directives:     Admitting Physician:  No admitting provider for patient encounter. PCP: Dali Beltrán MD    Discharging Nurse: [facility] Unit/Room#: No information available for this encounter. Discharging Unit Phone Number: ***    Emergency Contact:   Extended Emergency Contact Information  Primary Emergency Contact: Kimberly Abraham [address] Phone: [phone]  Mobile Phone: [phone]  Relation: Child   needed? No  Secondary Emergency Contact: Claude Lizarraga [place] [address] Phone: [phone]  Relation: Child    Past Surgical History:  Past Surgical History:   Procedure Laterality Date    [facility]  PICC [address] SINGLE  2021         MASTECTOMY      TOE AMPUTATION      TOE SURGERY      TONSILLECTOMY         Immunization History:   Immunization History   Administered Date(s) Administered    Influenza Virus Vaccine 10/23/2016    Pneumococcal Conjugate Vaccine 2013    Td, unspecified formulation 2012       Active Problems:  Patient Active Problem List   Diagnosis Code    HTN (hypertension) I10    Breast cancer, male ([facility] C50.929    Obesity (BMI 30.0-34. 9) E66.9    Right hip pain M25.551    Diabetes mellitus type 2, uncontrolled [id number]    Essential hypertension, benign I10    Hyperlipidemia with target LDL less than 100 E78.5    Dizzy spells R42    Troponin level elevated R77.8    Shortness of breath R06.02    Chest pressure R07.89    Acute respiratory insufficiency R06.89    Pneumonia J18.9    History of pulmonary embolus (PE) Z86.711    Acute left-sided CHF (congestive heart failure) (HCC) I50.1    Type I diabetes mellitus, uncontrolled [id number]    Cellulitis and abscess of right lower extremity L03.115, L02.415    Cellulitis and abscess of left lower extremity L03.116, L02.416 Chronic venous insufficiency of lower extremity I87.2    Acute hypoxemic respiratory failure due to COVID-19 ([facility]) U07.1, J96.01    Acute on chronic respiratory failure with hypoxia ([facility]) J96.21    Acute respiratory failure with hypoxia ([facility]) J96.01    COVID-19 virus infection U07.1    Hyperlipidemia E78.5    Pneumonia due to COVID-19 virus U07.1, J12.82    Orthostatic hypotension I95.1    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 ([facility]) I70.244    PVD (peripheral vascular disease) ([facility]) I73.9    PAD (peripheral artery disease) ([facility]) I73.9    Diabetic ulcer of right heel associated with type 2 diabetes mellitus, with fat layer exposed ([address] E11.621, L97.412    Diabetic ulcer of left heel ([facility]) E11.621, L97.429    Sepsis ([address] A41.9    Diabetic foot infection ([facility]) E11.628, L08.9    Foot ulcer due to secondary DM ([address] E13.621, L97.509    Ulcer of left lower extremity with fat layer exposed ([address] G17.901    Ulcer of left foot ([address] L97.529    Pressure injury of sacral region, stage 1 L89.151       Isolation/Infection:   Isolation            No Isolation          Patient Infection Status       Infection Onset Added Last Indicated Last Indicated By Review Planned Expiration Resolved Resolved By    None active    Resolved    COVID-19 (Rule Out) 22 COVID-19, Rapid (Ordered)   22 Rule-Out Test Resulted    MRSA 21 Culture, Blood 2   03/15/22 Jacque Jones RN    MRSA blood 2021    COVID-19 21 Covid-19 Ambulatory   21     COVID-19 (Rule Out) 21 COVID-19, Rapid (Ordered)   21 Rule-Out Test Resulted    COVID-19 (Rule Out) 10/16/20 10/16/20 10/16/20 Covid-19 Ambulatory (Ordered)   10/17/20 Rule-Out Test Resulted    COVID-19 (Rule Out) 20 Covid-19 Ambulatory (Ordered)   20 Rule-Out Test Resulted    COVID-19 (Rule Out) 20 08/12/20 08/12/20 Covid-19 Ambulatory (Ordered)   08/14/20 Rule-Out Test Resulted    COVID-19 (Rule Out) 07/28/20 07/29/20 07/28/20 Covid-19 Ambulatory (Ordered)   07/30/20 Rule-Out Test Resulted            Nurse Assessment:  Last Vital Signs: /64   Pulse 84   Temp (!) 96 °F (35.6 °C) (Tympanic)   Resp 18   Wt 253 lb (114.8 kg)   BMI 34.31 kg/m²     Last documented pain score (0-10 scale): Pain Level: 2  Last Weight:   Wt Readings from Last 1 Encounters:   10/05/22 253 lb (114.8 kg)     Mental Status:  {IP PT MENTAL STATUS:23269}    IV Access:  { AGUSTINA IV ACCESS:750689359}    Nursing Mobility/ADLs:  Walking   {CHP DME IVAM:719238137}  Transfer  {CHP DME EOIM:843253832}  Bathing  {CHP DME PLHL:158395375}  Dressing  {CHP DME SCVX:009853740}  Toileting  {CHP DME JLBP:329428159}  Feeding  {CHP DME GUPC:528585756}  Med Admin  {CHP DME XUGM:640849636}  Med Delivery   { AGUSTINA MED Delivery:863266795}    Wound Care Documentation and Therapy:  Negative Pressure Wound Therapy Leg Left; Lower; Posterior (Active)   Number of pieces removed 1 08/03/22 0900   Number of days: 62       Wound 01/05/22 Heel Left #1 (Active)   Wound Image   09/21/22 0907   Wound Etiology Diabetic Campbell 2 01/05/22 0943   Dressing Status New dressing applied 09/21/22 1036   Wound Cleansed Cleansed with saline 09/21/22 1036   Dressing/Treatment Honey gel/honey paste;ABD;Dry dressing 09/21/22 1036   Offloading for Diabetic Foot Ulcers Post op shoe 09/21/22 1036   Wound Length (cm) 5 cm 10/05/22 0852   Wound Width (cm) 2.7 cm 10/05/22 0852   Wound Depth (cm) 0.2 cm 10/05/22 0852   Wound Surface Area (cm^2) 13.5 cm^2 10/05/22 0852   Change in Wound Size % (l*w) 2.17 10/05/22 0852   Wound Volume (cm^3) 2.7 cm^3 10/05/22 0852   Wound Healing % 2 10/05/22 0852   Post-Procedure Length (cm) 5 cm 10/05/22 0924   Post-Procedure Width (cm) 2.7 cm 10/05/22 0924   Post-Procedure Depth (cm) 0.3 cm 10/05/22 0924   Post-Procedure Surface Area (cm^2) 13.5 cm^2 10/05/22 0924   Post-Procedure Volume (cm^3) 4.05 cm^3 10/05/22 0924   Wound Assessment Fibrin;Pink/red 10/05/22 0852   Drainage Amount Moderate 10/05/22 0852   Drainage Description Yellow 10/05/22 0852   Odor None 10/05/22 0852   Rashmi-wound Assessment Maceration 10/05/22 0852   Wound Thickness Description not for Pressure Injury Full thickness 01/19/22 0909   Number of days: 272       Wound 01/05/22 Heel Right #2 (Active)   Wound Image   09/21/22 0907   Wound Etiology Venous 01/05/22 0943   Dressing Status New dressing applied 09/21/22 1036   Wound Cleansed Cleansed with saline 09/21/22 1036   Dressing/Treatment ABD;Dry dressing;Honey gel/honey paste 09/21/22 1036   Offloading for Diabetic Foot Ulcers Post op shoe 09/21/22 1036   Wound Length (cm) 1.5 cm 10/05/22 0852   Wound Width (cm) 1.2 cm 10/05/22 0852   Wound Depth (cm) 0.2 cm 10/05/22 0852   Wound Surface Area (cm^2) 1.8 cm^2 10/05/22 0852   Change in Wound Size % (l*w) 57.65 10/05/22 0852   Wound Volume (cm^3) 0.36 cm^3 10/05/22 0852   Wound Healing % 58 10/05/22 0852   Post-Procedure Length (cm) 1.5 cm 10/05/22 0924   Post-Procedure Width (cm) 1.3 cm 10/05/22 0924   Post-Procedure Depth (cm) 0.3 cm 10/05/22 0924   Post-Procedure Surface Area (cm^2) 1.95 cm^2 10/05/22 0924   Post-Procedure Volume (cm^3) 0.585 cm^3 10/05/22 0924   Wound Assessment Fibrin;Pink/red 10/05/22 0852   Drainage Amount Moderate 10/05/22 0852   Drainage Description Yellow 10/05/22 0852   Odor None 10/05/22 0852   Rashmi-wound Assessment Maceration 10/05/22 0852   Wound Thickness Description not for Pressure Injury Full thickness 01/19/22 0909   Number of days: 272       Wound 04/13/22 Pretibial Distal;Left;Lateral #14 (blocked may 25)  (Active)   Wound Image   09/21/22 0907   Dressing Status New dressing applied 09/21/22 1036   Wound Cleansed Cleansed with saline 09/21/22 1036   Dressing/Treatment Honey gel/honey paste;Dry dressing;Roll gauze 09/21/22 1036   Offloading for Diabetic Foot Ulcers Post op shoe 09/21/22 1036   Wound Length (cm) 0.1 cm 10/05/22 0852   Wound Width (cm) 0.1 cm 10/05/22 0852   Wound Depth (cm) 0.1 cm 10/05/22 0852   Wound Surface Area (cm^2) 0.01 cm^2 10/05/22 0852   Change in Wound Size % (l*w) 99.9 10/05/22 0852   Wound Volume (cm^3) 0.001 cm^3 10/05/22 0852   Wound Healing % 100 10/05/22 0852   Post-Procedure Length (cm) 0.5 cm 09/21/22 1004   Post-Procedure Width (cm) 0.3 cm 09/21/22 1004   Post-Procedure Depth (cm) 0.2 cm 09/21/22 1004   Post-Procedure Surface Area (cm^2) 0.15 cm^2 09/21/22 1004   Post-Procedure Volume (cm^3) 0.03 cm^3 09/21/22 1004   Wound Assessment Pink/red 10/05/22 0852   Drainage Amount Scant 10/05/22 0852   Drainage Description Yellow 10/05/22 0852   Odor None 10/05/22 0852   Rashmi-wound Assessment Dry/flaky 10/05/22 0852   Number of days: 174       Wound 04/27/22 Tibial Left;Posterior #15 (Active)   Wound Image   09/21/22 0907   Dressing Status New dressing applied 09/21/22 1036   Wound Cleansed Cleansed with saline 09/21/22 1036   Dressing/Treatment Honey gel/honey paste;ABD;Roll gauze 09/21/22 1036   Offloading for Diabetic Foot Ulcers Post op shoe 09/21/22 1036   Wound Length (cm) 6.3 cm 10/05/22 0852   Wound Width (cm) 4.5 cm 10/05/22 0852   Wound Depth (cm) 0.4 cm 10/05/22 0852   Wound Surface Area (cm^2) 28.35 cm^2 10/05/22 0852   Change in Wound Size % (l*w) -101.92 10/05/22 0852   Wound Volume (cm^3) 11.34 cm^3 10/05/22 0852   Wound Healing % -62 10/05/22 0852   Post-Procedure Length (cm) 6.5 cm 10/05/22 0924   Post-Procedure Width (cm) 4.5 cm 10/05/22 0924   Post-Procedure Depth (cm) 0.5 cm 10/05/22 0924   Post-Procedure Surface Area (cm^2) 29.25 cm^2 10/05/22 0924   Post-Procedure Volume (cm^3) 14.625 cm^3 10/05/22 0924   Wound Assessment Fibrin;Pink/red 10/05/22 0852   Drainage Amount Large 10/05/22 0852   Drainage Description Yellow 10/05/22 0852   Odor None 10/05/22 0852   Rashmi-wound Assessment Maceration;Dry/flaky 10/05/22 0395 Number of days: 160        Elimination:  Continence: Bowel: {YES / IS:78059}  Bladder: {YES / HB:24845}  Urinary Catheter: {Urinary Catheter:213705560}   Colostomy/Ileostomy/Ileal Conduit: {YES / IC:07950}       Date of Last BM: ***  No intake or output data in the 24 hours ending 10/05/22 0926  No intake/output data recorded.     Safety Concerns:     [facility] Safety Concerns:565586025}    Impairments/Disabilities:      [facility] Impairments/Disabilities:157428754}    Nutrition Therapy:  Current Nutrition Therapy:   [facility] Diet List:307152793}    Routes of Feeding: {CHP DME Other Feedings:096671932}  Liquids: {Slp liquid thickness:16923}  Daily Fluid Restriction: {CHP DME Yes amt example:303973096}  Last Modified Barium Swallow with Video (Video Swallowing Test): {Done Not Done FHLT:719456726}    Treatments at the Time of Hospital Discharge:   Respiratory Treatments: ***  Oxygen Therapy:  {Therapy; copd oxygen:41294}  Ventilator:    { CC Vent WBOS:025674566}    Rehab Therapies: {THERAPEUTIC INTERVENTION:6920602114}  Weight Bearing Status/Restrictions: [facility] Weight Bearin}  Other Medical Equipment (for information only, NOT a DME order):  {EQUIPMENT:328300606}  Other Treatments: ***    Patient's personal belongings (please select all that are sent with patient):  {[facility] DME Belongings:949298364}    RN SIGNATURE:  {Esignature:832669507}    CASE MANAGEMENT/SOCIAL WORK SECTION    Inpatient Status Date: ***    Readmission Risk Assessment Score:  Readmission Risk              Risk of Unplanned Readmission:  0           Discharging to Facility/ Agency   Name:   Address:  Phone:  Fax:    Dialysis Facility (if applicable)   Name:  Address:  Dialysis Schedule:  Phone:  Fax:    / signature: {Esignature:872072012}    PHYSICIAN SECTION    Prognosis: {Prognosis:4888242276}    Condition at Discharge: [facility] Patient Condition:644313656}    Rehab Potential (if transferring to Rehab): {Prognosis:4201594946}    Recommended Labs or Other Treatments After Discharge: ***    Physician Certification: I certify the above information and transfer of Luke Nath.  is necessary for the continuing treatment of the diagnosis listed and that he requires {Admit to Appropriate Level of Care:82451} for {GREATER/LESS:253764034} 30 days.      Update Admission H&P: {CHP DME Changes in JW}    PHYSICIAN SIGNATURE:  {Esignature:509509693}

## 2022-10-05 NOTE — PROGRESS NOTES
W[facility]aling Center /Hyperbarics   History and Physical/Consultation  General Surgery/Medicine    Referring Physician : Lg Pepper MD  Zeinab Samaniego. MEDICAL RECORD NUMBER:  [id number]  AGE: 80 y.o. GENDER: male  : 1932  EPISODE DATE:  10/5/2022  Subjective:     Chief Complaint   Patient presents with    Wound Check     Right and Left feet         HISTORY of PRESENT ILLNESS HPI     Zeinab Samaniego. is a 80 y.o. male who presents today for wound/ulcer evaluation. History of Wound Context:  The patient has had a wound of sacral region which was first noted approximately 4 weeks. This has been treated with marty. On their initial visit to the wound healing center,  the patient has noted that the wound has not been improving. The patient has not had similar previous wounds in the past.      Pt is not on abx at time of initial visit.       Wound/Ulcer Pain Timing/Severity: intermittent  Quality of pain: aching  Severity:  5 / 10   Modifying Factors: Pain is relieved/improved with rest  Associated Signs/Symptoms: erythema    Ulcer Identification:  Ulcer Type: pressure  Contributing Factors: chronic pressure and obesity    Diabetic/Pressure/Non Pressure Ulcers only:  Ulcer:     Wound:         PAST MEDICAL HISTORY      Diagnosis Date    Arthritis     Cancer ([facility])     breast    Cellulitis     CHF (congestive heart failure) ([facility])     CKD (chronic kidney disease) stage 3, GFR 30-59 ml/min ([facility])     Diabetes mellitus ([facility] [address]     History of lumpectomy     Hx of blood clots     Hyperlipidemia     Hypertension     Left ventricular failure ([facility])     Macular degeneration     Obesity     Orthostatic hypotension     PE (pulmonary thromboembolism) ([facility])     Pressure injury of both heels, unstageable ([facility])     Staph aureus infection     Venous insufficiency      Past Surgical History:   Procedure Laterality Date    [facility].  PICC POWERPIC SINGLE  2021         MASTECTOMY      TOE AMPUTATION      TOE SURGERY TONSILLECTOMY       History reviewed. No pertinent family history. Social History     Tobacco Use    Smoking status: Never    Smokeless tobacco: Never   Vaping Use    Vaping Use: Never used   Substance Use Topics    Alcohol use: Not Currently     Alcohol/week: 1.0 standard drink     Types: 1 Cans of beer per week    Drug use: No     Allergies   Allergen Reactions    Clindamycin/Lincomycin     Sulfa Antibiotics      Current Outpatient Medications on File Prior to Encounter   Medication Sig Dispense Refill    fexofenadine (ALLEGRA) 180 MG tablet Take 180 mg by mouth daily      meloxicam (MOBIC) 7.5 MG tablet Take 7.5 mg by mouth daily      apixaban (ELIQUIS) 2.5 MG TABS tablet Take 1 tablet by mouth 2 times daily (before meals) 60 tablet 2    docusate sodium (COLACE) 100 MG capsule Take 200 mg by mouth nightly       tamsulosin (FLOMAX) 0.4 MG capsule Take 0.4 mg by mouth daily      bisacodyl (DULCOLAX) 10 MG suppository Place 10 mg rectally daily as needed for Constipation      diphenhydrAMINE-zinc acetate (BENADRYL) 1-0.1 % cream Apply topically every 4 hours as needed for Itching Apply topically 3 times daily as needed. Sodium Phosphates (FLEET) 7-19 GM/118ML Place 1 enema rectally daily as needed      magnesium hydroxide (MILK OF MAGNESIA) 400 MG/5ML suspension Take 30 mLs by mouth daily as needed for Constipation      Polyethylene Glycol 400 1 % SOLN Apply 1 drop to eye every 4 hours as needed      ferrous sulfate (IRON 325) 325 (65 Fe) MG tablet Take 325 mg by mouth daily       zinc sulfate (ZINCATE) 220 (50 Zn) MG capsule Take 1 capsule by mouth daily for 14 days 14 capsule 0    miconazole (MICOTIN) 2 % powder Apply topically 2 times daily.  45 g 1    ascorbic acid (VITAMIN C) 500 MG tablet Take 1 tablet by mouth 2 times daily 30 tablet 0    Vitamin D (CHOLECALCIFEROL) 50 MCG (2000 UT) TABS tablet Take 1 tablet by mouth daily 60 tablet 0    furosemide (LASIX) 40 MG tablet Take 20 mg by mouth daily 10/05/22 253 lb (114.8 kg)   09/30/22 253 lb (114.8 kg)   09/28/22 246 lb (111.6 kg)     10-5-22  Debrided, cont tx and care    PHYSICAL EXAM  CONSTITUTIONAL:   Awake, alert, cooperative     PSYCHIATRIC :  Oriented to time, place and person        normal insight to disease process  ENT:  External ears and nose without lesions      Hearing deficit is not noted  NECK: Supple, symmetrical, trachea midline      Thyroid goiter not appreciated     LUNGS:  No increased work of breathing                    Clear to auscultation bilaterally     CARDIOVASCULAR:  regular rate and rhythm     ABDOMEN:  soft, non-distended, non-tender      Hernias is not noted  Lymphatics : Cervical lymphadenopathy is not noted     Femoral lymphadenopathy is not noted  SKIN:   Skin color is normal   Texture and turgor is  normal   Induration is not noted  EXTREMITIES:   R UE Edema is not noted  L UE Edema is not noted  R LE Edema is not noted  L LE Edema is not noted  Assessment:     Problem List Items Addressed This Visit       Diabetic ulcer of right heel associated with type 2 diabetes mellitus, with fat layer exposed ([address] - Primary    Relevant Orders    Initiate Outpatient Wound Care Protocol    Diabetic ulcer of left heel ([address]    Relevant Orders    Initiate Outpatient Wound Care Protocol       Pre Debridement Measurements:  Are located in the [place]  Documentation Flow Sheet  Post Debridement Measurements:  Wound/Ulcer Descriptions are Pre Debridement except measurements:     Negative Pressure Wound Therapy Leg Left; Lower; Posterior (Active)   Number of pieces removed 1 08/03/22 0900   Number of days: 63       Wound 01/05/22 Heel Left #1 (Active)   Wound Image   09/21/22 0907   Wound Etiology Diabetic Campbell 2 01/05/22 0943   Dressing Status New dressing applied 09/21/22 1036   Wound Cleansed Cleansed with saline 09/21/22 1036   Dressing/Treatment Honey gel/honey paste;ABD;Dry dressing 09/21/22 1036   Offloading for Diabetic Foot Ulcers Post op shoe 09/21/22 1036   Wound Length (cm) 5 cm 10/05/22 0852   Wound Width (cm) 2.7 cm 10/05/22 0852   Wound Depth (cm) 0.2 cm 10/05/22 0852   Wound Surface Area (cm^2) 13.5 cm^2 10/05/22 0852   Change in Wound Size % (l*w) 2.17 10/05/22 0852   Wound Volume (cm^3) 2.7 cm^3 10/05/22 0852   Wound Healing % 2 10/05/22 0852   Post-Procedure Length (cm) 5 cm 10/05/22 0924   Post-Procedure Width (cm) 2.7 cm 10/05/22 0924   Post-Procedure Depth (cm) 0.3 cm 10/05/22 0924   Post-Procedure Surface Area (cm^2) 13.5 cm^2 10/05/22 0924   Post-Procedure Volume (cm^3) 4.05 cm^3 10/05/22 0924   Wound Assessment Fibrin;Pink/red 10/05/22 0852   Drainage Amount Moderate 10/05/22 0852   Drainage Description Yellow 10/05/22 0852   Odor None 10/05/22 0852   Rashmi-wound Assessment Maceration 10/05/22 0852   Wound Thickness Description not for Pressure Injury Full thickness 01/19/22 0909   Number of days: 272       Wound 01/05/22 Heel Right #2 (Active)   Wound Image   09/21/22 0907   Wound Etiology Venous 01/05/22 0943   Dressing Status New dressing applied 09/21/22 1036   Wound Cleansed Cleansed with saline 09/21/22 1036   Dressing/Treatment ABD;Dry dressing;Honey gel/honey paste 09/21/22 1036   Offloading for Diabetic Foot Ulcers Post op shoe 09/21/22 1036   Wound Length (cm) 1.5 cm 10/05/22 0852   Wound Width (cm) 1.2 cm 10/05/22 0852   Wound Depth (cm) 0.2 cm 10/05/22 0852   Wound Surface Area (cm^2) 1.8 cm^2 10/05/22 0852   Change in Wound Size % (l*w) 57.65 10/05/22 0852   Wound Volume (cm^3) 0.36 cm^3 10/05/22 0852   Wound Healing % 58 10/05/22 0852   Post-Procedure Length (cm) 1.5 cm 10/05/22 0924   Post-Procedure Width (cm) 1.3 cm 10/05/22 0924   Post-Procedure Depth (cm) 0.3 cm 10/05/22 0924   Post-Procedure Surface Area (cm^2) 1.95 cm^2 10/05/22 0924   Post-Procedure Volume (cm^3) 0.585 cm^3 10/05/22 0924   Wound Assessment Fibrin;Pink/red 10/05/22 0852   Drainage Amount Moderate 10/05/22 0852   Drainage Description Yellow 10/05/22 0852   Odor None 10/05/22 0852   Rashmi-wound Assessment Maceration 10/05/22 0852   Wound Thickness Description not for Pressure Injury Full thickness 01/19/22 0909   Number of days: 272       Wound 04/13/22 Pretibial Distal;Left;Lateral #14 (blocked may 25)  (Active)   Wound Image   09/21/22 0907   Dressing Status New dressing applied 09/21/22 1036   Wound Cleansed Cleansed with saline 09/21/22 1036   Dressing/Treatment Honey gel/honey paste;Dry dressing;Roll gauze 09/21/22 1036   Offloading for Diabetic Foot Ulcers Post op shoe 09/21/22 1036   Wound Length (cm) 0.1 cm 10/05/22 0852   Wound Width (cm) 0.1 cm 10/05/22 0852   Wound Depth (cm) 0.1 cm 10/05/22 0852   Wound Surface Area (cm^2) 0.01 cm^2 10/05/22 0852   Change in Wound Size % (l*w) 99.9 10/05/22 0852   Wound Volume (cm^3) 0.001 cm^3 10/05/22 0852   Wound Healing % 100 10/05/22 0852   Post-Procedure Length (cm) 0.5 cm 09/21/22 1004   Post-Procedure Width (cm) 0.3 cm 09/21/22 1004   Post-Procedure Depth (cm) 0.2 cm 09/21/22 1004   Post-Procedure Surface Area (cm^2) 0.15 cm^2 09/21/22 1004   Post-Procedure Volume (cm^3) 0.03 cm^3 09/21/22 1004   Wound Assessment Pink/red 10/05/22 0852   Drainage Amount Scant 10/05/22 0852   Drainage Description Yellow 10/05/22 0852   Odor None 10/05/22 0852   Rashmi-wound Assessment Dry/flaky 10/05/22 0852   Number of days: 175       Wound 04/27/22 Tibial Left;Posterior #15 (Active)   Wound Image   09/21/22 0907   Dressing Status New dressing applied 09/21/22 1036   Wound Cleansed Cleansed with saline 09/21/22 1036   Dressing/Treatment Honey gel/honey paste;ABD;Roll gauze 09/21/22 1036   Offloading for Diabetic Foot Ulcers Post op shoe 09/21/22 1036   Wound Length (cm) 6.3 cm 10/05/22 0852   Wound Width (cm) 4.5 cm 10/05/22 0852   Wound Depth (cm) 0.4 cm 10/05/22 0852   Wound Surface Area (cm^2) 28.35 cm^2 10/05/22 0852   Change in Wound Size % (l*w) -101.92 10/05/22 0852   Wound Volume (cm^3) 11.34 cm^3 10/05/22 0852   Wound Healing % -62 10/05/22 0852   Post-Procedure Length (cm) 6.5 cm 10/05/22 0924   Post-Procedure Width (cm) 4.5 cm 10/05/22 0924   Post-Procedure Depth (cm) 0.5 cm 10/05/22 0924   Post-Procedure Surface Area (cm^2) 29.25 cm^2 10/05/22 0924   Post-Procedure Volume (cm^3) 14.625 cm^3 10/05/22 0924   Wound Assessment Fibrin;Pink/red 10/05/22 0852   Drainage Amount Large 10/05/22 0852   Drainage Description Yellow 10/05/22 0852   Odor None 10/05/22 0852   Rashmi-wound Assessment Maceration;Dry/flaky 10/05/22 0852   Number of days: 161          Procedure Note  Indications:  Based on my examination of this patient's wound(s)/ulcer(s) today, debridement is not required to promote healing and evaluate the wound base. Performed by: Suman Brizuela DPM    A culture was done. Plan:     Pt is not a smoker   en    In my professional opinion and based off the information that is available at this time this patient has appropriate indication for HBO Therapy: No    Treatment Note please see attached Discharge Instructions    Written patient dismissal instructions given to patient and signed by patient or POA. Discharge Instructions         Visit Discharge/Physician Orders     Discharge condition: Stable     Assessment of pain at discharge:  minimal     Anesthetic used: 4% lidocaine solution     Discharge to: Home     Left via:Private automobile     Accompanied by: accompanied by SELF     ECF/HHA:  [facility]. Dressing Orders:  Clean bilateral heel ulcers with NSS, apply medi-honey patch  Change every other day extra padding   to deep tissue skin injury on buttocks, leave cover patches off for now. No open wound. Continue to protect skin on buttocks with good skin barrier ointment. Encourage position changes at least every 2 hours, keep skin clean and dry.  Assist patient when in bed to try to offload backside to relieve pressure off tailbone   left lower leg medihoney gel and dry dressing every other day     TO LEFT LEG ULCER,  HOLD WOUND VAC, APPLY SANTYL AND BACTROBAN 50/50 MIXTURE TO WOUND DAILY- KEEP PRESSURE OFF WOUND AT ALL TIMES  APPLY PREVALON BOOT TO BILATERAL HEELS     Physical therapy for ambulating safely while wearing offloading boots as indicated. [facility],[address] followup visit _______2week with Dr. Gastelum_____________________  (Please note your next appointment above and if you are unable to keep, kindly give a 24 hour notice. Thank you.)     Physician signature:__________________________        If you experience any of the following, please call the [address] [place] [address] Road during business hours:     * Increase in Pain  * Temperature over 101  * Increase in drainage from your wound  * Drainage with a foul odor  * Bleeding  * Increase in swelling  * Need for compression bandage changes due to slippage, breakthrough drainage.                                                               Electronically signed by Jacque Willis DPM on 10/5/2022 at 9:59 AM

## 2022-10-05 NOTE — PLAN OF CARE
Problem: Chronic Conditions and Co-morbidities  Goal: Patient's chronic conditions and co-morbidity symptoms are monitored and maintained or improved  Outcome: Progressing     Problem: Chronic Conditions and Co-morbidities  Goal: Patient's chronic conditions and co-morbidity symptoms are monitored and maintained or improved  Outcome: Progressing     Problem: Pressure Ulcer:  Goal: Signs of wound healing will improve  Description: Signs of wound healing will improve  Outcome: Progressing  Goal: Absence of new pressure ulcer  Description: Absence of new pressure ulcer  Outcome: Progressing  Goal: Will show no infection signs and symptoms  Description: Will show no infection signs and symptoms  Outcome: Progressing     Problem: Wound:  Goal: Will show signs of wound healing; wound closure and no evidence of infection  Description: Will show signs of wound healing; wound closure and no evidence of infection  Outcome: Progressing

## 2022-10-06 ASSESSMENT — ENCOUNTER SYMPTOMS
VOMITING: 0
SHORTNESS OF BREATH: 1
WHEEZING: 0
BACK PAIN: 0
SORE THROAT: 0
DIARRHEA: 0
EYE REDNESS: 0
COUGH: 1
EYE DISCHARGE: 0
EYE PAIN: 0
SINUS PRESSURE: 0
NAUSEA: 0
ABDOMINAL PAIN: 0

## 2022-10-10 NOTE — DISCHARGE INSTRUCTIONS
Visit Discharge/Physician Orders     Discharge condition: Stable     Assessment of pain at discharge:  minimal     Anesthetic used: 4% lidocaine solution     Discharge to: Home     Left via:Private automobile     Accompanied by: accompanied by SELF     ECF/[facility]:  [facility]. Dressing Orders:  Clean bilateral heel ulcers with NSS, apply medi-honey patch  Change every other day extra padding   to deep tissue skin injury on buttocks, leave cover patches off for now. No open wound. Continue to protect skin on buttocks with good skin barrier ointment. Encourage position changes at least every 2 hours, keep skin clean and dry. Assist patient when in bed to try to offload backside to relieve pressure off tailbone   left lower leg medihoney gel and dry dressing every other day     TO LEFT LEG ULCER,  HOLD WOUND VAC, APPLY SANTYL AND BACTROBAN 50/50 MIXTURE TO WOUND DAILY- KEEP PRESSURE OFF WOUND AT ALL TIMES  APPLY PREVALON BOOT TO BILATERAL HEELS     Physical therapy for ambulating safely while wearing offloading boots as indicated. [address] [place] [address],[address] followup visit _______2week with Dr. Gastelum_____________________  (Please note your next appointment above and if you are unable to keep, kindly give a 24 hour notice. Thank you.)     Physician signature:__________________________        If you experience any of the following, please call the [address] [place] [address] Road during business hours:     * Increase in Pain  * Temperature over 101  * Increase in drainage from your wound  * Drainage with a foul odor  * Bleeding  * Increase in swelling  * Need for compression bandage changes due to slippage, breakthrough drainage.

## 2022-10-12 ENCOUNTER — HOSPITAL ENCOUNTER (OUTPATIENT)
Dept: WOUND CARE | Age: 87
Discharge: HOME | End: 2022-10-12

## 2022-10-15 NOTE — ED PROVIDER NOTES
8:00 PM AM EDT  I received this patient at sign out from Dr. Roddy Rodríguez. I have discussed the patient's initial exam, treatment and plan of care with the out going physician. I have introduced my self to the patient / family and have answered their questions to this point. I have examined the patient myself and reviewed ordered tests / medications and  reviewed any available results to this point. If a resident is involved in the Emergency Department care, I have discussed my findings and plan with them as well. Patient is presenting emergency department the chief complaint of chest pain as well as mild shortness of breath. The patient was mildly hypoxic 88%. The patient did receive a DuoNeb was no longer hypoxic. The patient did have improvement in his symptoms. Labs and imaging which were reviewed. CBC fairly unremarkable, CMP unremarkable, high-sensitivity troponin with unremarkable delta, EKG with no ischemic changes. Chest x-ray with no acute process. The patient did feel comfortable with discharge back. No acute indication at this time for hospital admission. He will be discharged and follow-up outpatient. Diagnosis:  1. Chest pain, unspecified type      Disposition  Patient's disposition: Discharge to nursing home  Patient's condition is stable.        Alex Rae DO  10/15/22 [id number]

## 2022-10-17 NOTE — DISCHARGE INSTRUCTIONS
Visit Discharge/Physician Orders     Discharge condition: Stable     Assessment of pain at discharge:  minimal     Anesthetic used: 4% lidocaine solution     Discharge to: Home     Left via:Private automobile     Accompanied by: accompanied by SELF     ECF/[facility]:  [facility]. Dressing Orders:  Clean bilateral heel ulcers with NSS, apply medi-honey patch  Change every other day extra padding   to deep tissue skin injury on buttocks, leave cover patches off for now. No open wound. Continue to protect skin on buttocks with good skin barrier ointment. Encourage position changes at least every 2 hours, keep skin clean and dry. Assist patient when in bed to try to offload backside to relieve pressure off tailbone   left lower leg medihoney gel and dry dressing every other day     TO LEFT LEG ULCER,  HOLD WOUND VAC, APPLY SANTYL AND BACTROBAN 50/50 MIXTURE TO WOUND DAILY- KEEP PRESSURE OFF WOUND AT ALL TIMES  APPLY PREVALON BOOT TO BILATERAL HEELS     Physical therapy for ambulating safely while wearing offloading boots as indicated. [facility] followup visit _______2week with Dr. Gastelum_____________________  (Please note your next appointment above and if you are unable to keep, kindly give a 24 hour notice. Thank you.)     Physician signature:__________________________        If you experience any of the following, please call the [address] [place] [address] Road during business hours:     * Increase in Pain  * Temperature over 101  * Increase in drainage from your wound  * Drainage with a foul odor  * Bleeding  * Increase in swelling  * Need for compression bandage changes due to slippage, breakthrough drainage. Negative Pressure Wound Therapy Leg Left; Lower; Posterior (Active)   Number of pieces removed 1 08/03/22 0900   Number of days: 76       Wound 01/05/22 Heel Left #1 (Active)   Wound Image   09/21/22 0907   Wound Etiology Diabetic Campbell 2 01/05/22 0943   Dressing Status New dressing applied 10/05/22 1000   Wound Cleansed Cleansed with saline 10/05/22 1000   Dressing/Treatment Honey gel/honey paste;ABD;Dry dressing 10/05/22 1000   Offloading for Diabetic Foot Ulcers Post op shoe 10/05/22 1000   Wound Length (cm) 3.1 cm 10/19/22 0853   Wound Width (cm) 3 cm 10/19/22 0853   Wound Depth (cm) 0.2 cm 10/19/22 0853   Wound Surface Area (cm^2) 9.3 cm^2 10/19/22 0853   Change in Wound Size % (l*w) 32.61 10/19/22 0853   Wound Volume (cm^3) 1.86 cm^3 10/19/22 0853   Wound Healing % 33 10/19/22 0853   Post-Procedure Length (cm) 3.2 cm 10/19/22 0859   Post-Procedure Width (cm) 3 cm 10/19/22 0859   Post-Procedure Depth (cm) 0.3 cm 10/19/22 0859   Post-Procedure Surface Area (cm^2) 9.6 cm^2 10/19/22 0859   Post-Procedure Volume (cm^3) 2.88 cm^3 10/19/22 0859   Wound Assessment Fibrin;Pink/red 10/19/22 0853   Drainage Amount Moderate 10/19/22 0853   Drainage Description Yellow 10/19/22 0853   Odor None 10/19/22 0853   Rashmi-wound Assessment Maceration 10/19/22 0853   Wound Thickness Description not for Pressure Injury Full thickness 01/19/22 0909   Number of days: 286       Wound 01/05/22 Heel Right #2 (Active)   Wound Image   09/21/22 0907   Wound Etiology Venous 01/05/22 0943   Dressing Status New dressing applied 10/05/22 1000   Wound Cleansed Cleansed with saline 10/05/22 1000   Dressing/Treatment ABD;Dry dressing;Honey gel/honey paste 10/05/22 1000   Offloading for Diabetic Foot Ulcers Post op shoe 10/05/22 1000   Wound Length (cm) 2.5 cm 10/19/22 0853   Wound Width (cm) 2 cm 10/19/22 0853   Wound Depth (cm) 0.2 cm 10/19/22 0853   Wound Surface Area (cm^2) 5 cm^2 10/19/22 0853   Change in Wound Size % (l*w) -17.65 10/19/22 0853   Wound Volume (cm^3) 1 cm^3 10/19/22 0853   Wound Healing % -18 10/19/22 0853   Post-Procedure Length (cm) 2.5 cm 10/19/22 0859   Post-Procedure Width (cm) 2 cm 10/19/22 0859   Post-Procedure Depth (cm) 0.3 cm 10/19/22 0859   Post-Procedure Surface Area (cm^2) 5 cm^2 10/19/22 0859   Post-Procedure Volume (cm^3) 1.5 cm^3 10/19/22 0859   Wound Assessment Fibrin;Pink/red 10/19/22 0853   Drainage Amount Moderate 10/19/22 0853   Drainage Description Yellow 10/19/22 0853   Odor None 10/19/22 0853   Rashmi-wound Assessment Maceration 10/19/22 0853   Wound Thickness Description not for Pressure Injury Full thickness 01/19/22 0909   Number of days: 286       Wound 04/13/22 Pretibial Distal;Left;Lateral #14 (blocked may 25)  (Active)   Wound Image   09/21/22 0907   Dressing Status New dressing applied 10/05/22 1000   Wound Cleansed Cleansed with saline 10/05/22 1000   Dressing/Treatment Dry dressing;Roll gauze; Pharmaceutical agent (see MAR) 10/05/22 1000   Offloading for Diabetic Foot Ulcers Post op shoe 10/05/22 1000   Wound Length (cm) 0.2 cm 10/19/22 0853   Wound Width (cm) 0.1 cm 10/19/22 0853   Wound Depth (cm) 0.1 cm 10/19/22 0853   Wound Surface Area (cm^2) 0.02 cm^2 10/19/22 0853   Change in Wound Size % (l*w) 99.79 10/19/22 0853   Wound Volume (cm^3) 0.002 cm^3 10/19/22 0853   Wound Healing % 100 10/19/22 0853   Post-Procedure Length (cm) 0.5 cm 09/21/22 1004   Post-Procedure Width (cm) 0.3 cm 09/21/22 1004   Post-Procedure Depth (cm) 0.2 cm 09/21/22 1004   Post-Procedure Surface Area (cm^2) 0.15 cm^2 09/21/22 1004   Post-Procedure Volume (cm^3) 0.03 cm^3 09/21/22 1004   Wound Assessment Pink/red 10/19/22 0853   Drainage Amount Scant 10/19/22 0853   Drainage Description Yellow 10/19/22 0853   Odor None 10/19/22 0853   Rashmi-wound Assessment Dry/flaky 10/19/22 0853   Number of days: 188       Wound 04/27/22 Tibial Left;Posterior #15 (Active)   Wound Image   09/21/22 0907   Dressing Status New dressing applied 10/05/22 1000   Wound Cleansed Cleansed with saline 10/05/22 1000   Dressing/Treatment Honey gel/honey paste;ABD;Roll gauze 10/05/22 1000   Offloading for Diabetic Foot Ulcers Post op shoe 10/05/22 1000   Wound Length (cm) 5.5 cm 10/19/22 0853   Wound Width (cm) 4.6 cm 10/19/22 0853   Wound Depth (cm) 0.3 cm 10/19/22 0853   Wound Surface Area (cm^2) 25.3 cm^2 10/19/22 0853   Change in Wound Size % (l*w) -80.2 10/19/22 0853   Wound Volume (cm^3) 7.59 cm^3 10/19/22 0853   Wound Healing % -8 10/19/22 0853   Post-Procedure Length (cm) 5.6 cm 10/19/22 0859   Post-Procedure Width (cm) 4.6 cm 10/19/22 0859   Post-Procedure Depth (cm) 0.4 cm 10/19/22 0859   Post-Procedure Surface Area (cm^2) 25.76 cm^2 10/19/22 0859   Post-Procedure Volume (cm^3) 10.304 cm^3 10/19/22 0859   Wound Assessment Fibrin;Pink/red 10/19/22 0853   Drainage Amount Large 10/19/22 0853   Drainage Description Yellow 10/19/22 0853   Odor None 10/19/22 0853   Rashmi-wound Assessment Maceration;Dry/flaky 10/19/22 0853   Number of days: 400

## 2022-10-19 ENCOUNTER — HOSPITAL ENCOUNTER (OUTPATIENT)
Dept: WOUND CARE | Age: 87
Discharge: HOME | End: 2022-10-19
Payer: MEDICARE

## 2022-10-19 VITALS
HEIGHT: 72 IN | HEART RATE: 72 BPM | TEMPERATURE: 96 F | OXYGEN SATURATION: 97 % | SYSTOLIC BLOOD PRESSURE: 143 MMHG | BODY MASS INDEX: 34.27 KG/M2 | RESPIRATION RATE: 18 BRPM | DIASTOLIC BLOOD PRESSURE: 58 MMHG | WEIGHT: 253 LBS

## 2022-10-19 DIAGNOSIS — L97.412 DIABETIC ULCER OF RIGHT HEEL ASSOCIATED WITH TYPE 2 DIABETES MELLITUS, WITH FAT LAYER EXPOSED (HCC): Primary | ICD-10-CM

## 2022-10-19 DIAGNOSIS — E11.621 DIABETIC ULCER OF RIGHT HEEL ASSOCIATED WITH TYPE 2 DIABETES MELLITUS, WITH FAT LAYER EXPOSED (HCC): Primary | ICD-10-CM

## 2022-10-19 DIAGNOSIS — E11.621 DIABETIC ULCER OF LEFT HEEL ASSOCIATED WITH TYPE 2 DIABETES MELLITUS, WITH FAT LAYER EXPOSED (HCC): ICD-10-CM

## 2022-10-19 DIAGNOSIS — L97.422 DIABETIC ULCER OF LEFT HEEL ASSOCIATED WITH TYPE 2 DIABETES MELLITUS, WITH FAT LAYER EXPOSED (HCC): ICD-10-CM

## 2022-10-19 PROCEDURE — 11042 DBRDMT SUBQ TIS 1ST 20SQCM/<: CPT

## 2022-10-19 PROCEDURE — 87070 CULTURE OTHR SPECIMN AEROBIC: CPT

## 2022-10-19 PROCEDURE — 87186 SC STD MICRODIL/AGAR DIL: CPT

## 2022-10-19 PROCEDURE — 87075 CULTR BACTERIA EXCEPT BLOOD: CPT

## 2022-10-19 PROCEDURE — 87077 CULTURE AEROBIC IDENTIFY: CPT

## 2022-10-19 PROCEDURE — 11045 DBRDMT SUBQ TISS EACH ADDL: CPT

## 2022-10-19 ASSESSMENT — PAIN SCALES - GENERAL: PAINLEVEL_OUTOF10: 6

## 2022-10-19 ASSESSMENT — PAIN DESCRIPTION - LOCATION: LOCATION: LEG

## 2022-10-19 ASSESSMENT — PAIN DESCRIPTION - ORIENTATION: ORIENTATION: LEFT

## 2022-10-19 NOTE — PLAN OF CARE
Problem: Chronic Conditions and Co-morbidities  Goal: Patient's chronic conditions and co-morbidity symptoms are monitored and maintained or improved  Outcome: Progressing     Problem: Wound:  Goal: Will show signs of wound healing; wound closure and no evidence of infection  Description: Will show signs of wound healing; wound closure and no evidence of infection  Outcome: Progressing     Problem: Venous:  Goal: Signs of wound healing will improve  Description: Signs of wound healing will improve  Outcome: Progressing     Problem: Pressure Ulcer:  Goal: Signs of wound healing will improve  Description: Signs of wound healing will improve  Outcome: Progressing  Goal: Absence of new pressure ulcer  Description: Absence of new pressure ulcer  Outcome: Progressing  Goal: Will show no infection signs and symptoms  Description: Will show no infection signs and symptoms  Outcome: Progressing

## 2022-10-19 NOTE — DISCHARGE INSTR - COC
Continuity of Care Form    Patient Name: Carmina Morgan. :  1932  MRN:  [id number]    Admit date:  10/19/2022  Discharge date:  ***    Code Status Order: Prior   Advance Directives:     Admitting Physician:  No admitting provider for patient encounter. PCP: Salvatore Carlos MD    Discharging Nurse: [facility] Unit/Room#: No information available for this encounter. Discharging Unit Phone Number: ***    Emergency Contact:   Extended Emergency Contact Information  Primary Emergency Contact: Seng Em [address] Phone: [phone]  Mobile Phone: [phone]  Relation: Child   needed? No  Secondary Emergency Contact: Claude Lizarraga [place] [address] Phone: [phone]  Relation: Child    Past Surgical History:  Past Surgical History:   Procedure Laterality Date    [facility]  PICC [address] SINGLE  2021         MASTECTOMY      TOE AMPUTATION      TOE SURGERY      TONSILLECTOMY         Immunization History:   Immunization History   Administered Date(s) Administered    Influenza Virus Vaccine 10/23/2016    Pneumococcal Conjugate Vaccine 2013    Td, unspecified formulation 2012       Active Problems:  Patient Active Problem List   Diagnosis Code    HTN (hypertension) I10    Breast cancer, male ([facility] C50.929    Obesity (BMI 30.0-34. 9) E66.9    Right hip pain M25.551    Diabetes mellitus type 2, uncontrolled [id number]    Essential hypertension, benign I10    Hyperlipidemia with target LDL less than 100 E78.5    Dizzy spells R42    Troponin level elevated R77.8    Shortness of breath R06.02    Chest pressure R07.89    Acute respiratory insufficiency R06.89    Pneumonia J18.9    History of pulmonary embolus (PE) Z86.711    Acute left-sided CHF (congestive heart failure) (HCC) I50.1    Type I diabetes mellitus, uncontrolled [id number]    Cellulitis and abscess of right lower extremity L03.115, L02.415    Cellulitis and abscess of left lower extremity L03.116, L02.416 Chronic venous insufficiency of lower extremity I87.2    Acute hypoxemic respiratory failure due to COVID-19 ([facility]) U07.1, J96.01    Acute on chronic respiratory failure with hypoxia ([facility]) J96.21    Acute respiratory failure with hypoxia ([facility]) J96.01    COVID-19 virus infection U07.1    Hyperlipidemia E78.5    Pneumonia due to COVID-19 virus U07.1, J12.82    Orthostatic hypotension I95.1    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 ([facility]) I70.244    PVD (peripheral vascular disease) ([facility]) I73.9    PAD (peripheral artery disease) ([facility]) I73.9    Diabetic ulcer of right heel associated with type 2 diabetes mellitus, with fat layer exposed ([address] E11.621, L97.412    Diabetic ulcer of left heel ([facility]) E11.621, L97.429    Sepsis ([address] A41.9    Diabetic foot infection ([facility]) E11.628, L08.9    Foot ulcer due to secondary DM ([address] E13.621, L97.509    Ulcer of left lower extremity with fat layer exposed ([address] Y81.630    Ulcer of left foot ([address] L97.529    Pressure injury of sacral region, stage 1 L89.151       Isolation/Infection:   Isolation            No Isolation          Patient Infection Status       Infection Onset Added Last Indicated Last Indicated By Review Planned Expiration Resolved Resolved By    None active    Resolved    COVID-19 (Rule Out) 22 COVID-19, Rapid (Ordered)   22 Rule-Out Test Resulted    MRSA 21 Culture, Blood 2   03/15/22 Andres Quiroga RN    MRSA blood 2021    COVID-19 21 Covid-19 Ambulatory   21     COVID-19 (Rule Out) 21 COVID-19, Rapid (Ordered)   21 Rule-Out Test Resulted    COVID-19 (Rule Out) 10/16/20 10/16/20 10/16/20 Covid-19 Ambulatory (Ordered)   10/17/20 Rule-Out Test Resulted    COVID-19 (Rule Out) 20 Covid-19 Ambulatory (Ordered)   20 Rule-Out Test Resulted    COVID-19 (Rule Out) 20 08/12/20 08/12/20 Covid-19 Ambulatory (Ordered)   08/14/20 Rule-Out Test Resulted    COVID-19 (Rule Out) 07/28/20 07/29/20 07/28/20 Covid-19 Ambulatory (Ordered)   07/30/20 Rule-Out Test Resulted            Nurse Assessment:  Last Vital Signs: BP (!) 143/58   Pulse 72   Temp (!) 96 °F (35.6 °C) (Temporal)   Resp 18   Ht 6' (1.829 m)   Wt 253 lb (114.8 kg)   SpO2 97%   BMI 34.31 kg/m²     Last documented pain score (0-10 scale): Pain Level: 6  Last Weight:   Wt Readings from Last 1 Encounters:   10/19/22 253 lb (114.8 kg)     Mental Status:  {IP PT MENTAL STATUS:20030}    IV Access:  { AGUSTINA IV ACCESS:533286702}    Nursing Mobility/ADLs:  Walking   {CHP DME UFNV:348178327}  Transfer  {CHP DME AZWT:377057114}  Bathing  {CHP DME EBXS:675552493}  Dressing  {CHP DME MHRT:685904407}  Toileting  {CHP DME XEOZ:110239254}  Feeding  {CHP DME RKJI:369995066}  Med Admin  {CHP DME SYIL:452270486}  Med Delivery   { AGUSTINA MED Delivery:858964373}    Wound Care Documentation and Therapy:  Negative Pressure Wound Therapy Leg Left; Lower; Posterior (Active)   Number of pieces removed 1 08/03/22 0900   Number of days: 76       Wound 01/05/22 Heel Left #1 (Active)   Wound Image   09/21/22 0907   Wound Etiology Diabetic Campbell 2 01/05/22 0943   Dressing Status New dressing applied 10/05/22 1000   Wound Cleansed Cleansed with saline 10/05/22 1000   Dressing/Treatment Honey gel/honey paste;ABD;Dry dressing 10/05/22 1000   Offloading for Diabetic Foot Ulcers Post op shoe 10/05/22 1000   Wound Length (cm) 3.1 cm 10/19/22 0853   Wound Width (cm) 3 cm 10/19/22 0853   Wound Depth (cm) 0.2 cm 10/19/22 0853   Wound Surface Area (cm^2) 9.3 cm^2 10/19/22 0853   Change in Wound Size % (l*w) 32.61 10/19/22 0853   Wound Volume (cm^3) 1.86 cm^3 10/19/22 0853   Wound Healing % 33 10/19/22 0853   Post-Procedure Length (cm) 3.2 cm 10/19/22 0859   Post-Procedure Width (cm) 3 cm 10/19/22 0859   Post-Procedure Depth (cm) 0.3 cm 10/19/22 0859 Post-Procedure Surface Area (cm^2) 9.6 cm^2 10/19/22 0859   Post-Procedure Volume (cm^3) 2.88 cm^3 10/19/22 0859   Wound Assessment Fibrin;Pink/red 10/19/22 0853   Drainage Amount Moderate 10/19/22 0853   Drainage Description Yellow 10/19/22 0853   Odor None 10/19/22 0853   Arshmi-wound Assessment Maceration 10/19/22 0853   Wound Thickness Description not for Pressure Injury Full thickness 01/19/22 0909   Number of days: 286       Wound 01/05/22 Heel Right #2 (Active)   Wound Image   09/21/22 0907   Wound Etiology Venous 01/05/22 0943   Dressing Status New dressing applied 10/05/22 1000   Wound Cleansed Cleansed with saline 10/05/22 1000   Dressing/Treatment ABD;Dry dressing;Honey gel/honey paste 10/05/22 1000   Offloading for Diabetic Foot Ulcers Post op shoe 10/05/22 1000   Wound Length (cm) 2.5 cm 10/19/22 0853   Wound Width (cm) 2 cm 10/19/22 0853   Wound Depth (cm) 0.2 cm 10/19/22 0853   Wound Surface Area (cm^2) 5 cm^2 10/19/22 0853   Change in Wound Size % (l*w) -17.65 10/19/22 0853   Wound Volume (cm^3) 1 cm^3 10/19/22 0853   Wound Healing % -18 10/19/22 0853   Post-Procedure Length (cm) 2.5 cm 10/19/22 0859   Post-Procedure Width (cm) 2 cm 10/19/22 0859   Post-Procedure Depth (cm) 0.3 cm 10/19/22 0859   Post-Procedure Surface Area (cm^2) 5 cm^2 10/19/22 0859   Post-Procedure Volume (cm^3) 1.5 cm^3 10/19/22 0859   Wound Assessment Fibrin;Pink/red 10/19/22 0853   Drainage Amount Moderate 10/19/22 0853   Drainage Description Yellow 10/19/22 0853   Odor None 10/19/22 0853   Rashmi-wound Assessment Maceration 10/19/22 0853   Wound Thickness Description not for Pressure Injury Full thickness 01/19/22 0909   Number of days: 286       Wound 04/13/22 Pretibial Distal;Left;Lateral #14 (blocked may 25)  (Active)   Wound Image   09/21/22 0907   Dressing Status New dressing applied 10/05/22 1000   Wound Cleansed Cleansed with saline 10/05/22 1000   Dressing/Treatment Dry dressing;Roll gauze; Pharmaceutical agent (see MAR) 10/05/22 1000   Offloading for Diabetic Foot Ulcers Post op shoe 10/05/22 1000   Wound Length (cm) 0.2 cm 10/19/22 0853   Wound Width (cm) 0.1 cm 10/19/22 0853   Wound Depth (cm) 0.1 cm 10/19/22 0853   Wound Surface Area (cm^2) 0.02 cm^2 10/19/22 0853   Change in Wound Size % (l*w) 99.79 10/19/22 0853   Wound Volume (cm^3) 0.002 cm^3 10/19/22 0853   Wound Healing % 100 10/19/22 0853   Post-Procedure Length (cm) 0.5 cm 09/21/22 1004   Post-Procedure Width (cm) 0.3 cm 09/21/22 1004   Post-Procedure Depth (cm) 0.2 cm 09/21/22 1004   Post-Procedure Surface Area (cm^2) 0.15 cm^2 09/21/22 1004   Post-Procedure Volume (cm^3) 0.03 cm^3 09/21/22 1004   Wound Assessment Pink/red 10/19/22 0853   Drainage Amount Scant 10/19/22 0853   Drainage Description Yellow 10/19/22 0853   Odor None 10/19/22 0853   Rashmi-wound Assessment Dry/flaky 10/19/22 0853   Number of days: 188       Wound 04/27/22 Tibial Left;Posterior #15 (Active)   Wound Image   09/21/22 0907   Dressing Status New dressing applied 10/05/22 1000   Wound Cleansed Cleansed with saline 10/05/22 1000   Dressing/Treatment Honey gel/honey paste;ABD;Roll gauze 10/05/22 1000   Offloading for Diabetic Foot Ulcers Post op shoe 10/05/22 1000   Wound Length (cm) 5.5 cm 10/19/22 0853   Wound Width (cm) 4.6 cm 10/19/22 0853   Wound Depth (cm) 0.3 cm 10/19/22 0853   Wound Surface Area (cm^2) 25.3 cm^2 10/19/22 0853   Change in Wound Size % (l*w) -80.2 10/19/22 0853   Wound Volume (cm^3) 7.59 cm^3 10/19/22 0853   Wound Healing % -8 10/19/22 0853   Post-Procedure Length (cm) 5.6 cm 10/19/22 0859   Post-Procedure Width (cm) 4.6 cm 10/19/22 0859   Post-Procedure Depth (cm) 0.4 cm 10/19/22 0859   Post-Procedure Surface Area (cm^2) 25.76 cm^2 10/19/22 0859   Post-Procedure Volume (cm^3) 10.304 cm^3 10/19/22 0859   Wound Assessment Fibrin;Pink/red 10/19/22 0853   Drainage Amount Large 10/19/22 0853   Drainage Description Yellow 10/19/22 0853   Odor None 10/19/22 0853   Rashmi-wound Assessment Maceration;Dry/flaky 10/19/22 0853   Number of days: 174        Elimination:  Continence: Bowel: {YES / FJ:15402}  Bladder: {YES / EJ:61041}  Urinary Catheter: {Urinary Catheter:095902119}   Colostomy/Ileostomy/Ileal Conduit: {YES / YT:88402}       Date of Last BM: ***  No intake or output data in the 24 hours ending 10/19/22 0917  No intake/output data recorded.     Safety Concerns:     [address] [employer] [facility] Safety Concerns:953615383}    Impairments/Disabilities:      [address] AGUSTINA Impairments/Disabilities:527505564}    Nutrition Therapy:  Current Nutrition Therapy:   [facility] AGUSTINA Diet List:000987076}    Routes of Feeding: {CHP DME Other Feedings:501417656}  Liquids: {Slp liquid thickness:23424}  Daily Fluid Restriction: {CHP DME Yes amt example:546490163}  Last Modified Barium Swallow with Video (Video Swallowing Test): {Done Not Done RYSR:404438149}    Treatments at the Time of Hospital Discharge:   Respiratory Treatments: ***  Oxygen Therapy:  {Therapy; copd oxygen:66755}  Ventilator:    { CC Vent ZTFV:030392922}    Rehab Therapies: {THERAPEUTIC INTERVENTION:3663710632}  Weight Bearing Status/Restrictions: [address] [place] [address] Weight Bearin}  Other Medical Equipment (for information only, NOT a DME order):  {EQUIPMENT:891933934}  Other Treatments: ***    Patient's personal belongings (please select all that are sent with patient):  {[facility] DME Belongings:558432855}    RN SIGNATURE:  {Esignature:272249830}    CASE MANAGEMENT/SOCIAL WORK SECTION    Inpatient Status Date: ***    Readmission Risk Assessment Score:  Readmission Risk              Risk of Unplanned Readmission:  0           Discharging to Facility/ Agency   Name:   Address:  Phone:  Fax:    Dialysis Facility (if applicable)   Name:  Address:  Dialysis Schedule:  Phone:  Fax:    / signature: {Esignature:929027328}    PHYSICIAN SECTION    Prognosis: {Prognosis:4444592868}    Condition at Discharge: [facility] Patient Condition:668204187}    Rehab Potential (if transferring to Rehab): {Prognosis:5007009695}    Recommended Labs or Other Treatments After Discharge: ***    Physician Certification: I certify the above information and transfer of Terrell Melissa.  is necessary for the continuing treatment of the diagnosis listed and that he requires {Admit to Appropriate Level of Care:37584} for {GREATER/LESS:291671225} 30 days.      Update Admission H&P: {CHP DME Changes in WAVOT:092020783}    PHYSICIAN SIGNATURE:  {Esignature:171596995}

## 2022-10-19 NOTE — PROGRESS NOTES
Wound Healing Center /Hyperbarics   History and Physical/Consultation  General Surgery/Medicine    Referring Physician : Sho Stewart MD  Sandra Lay. MEDICAL RECORD NUMBER:  [id number]  AGE: 80 y.o. GENDER: male  : 1932  EPISODE DATE:  10/19/2022  Subjective:     Chief Complaint   Patient presents with    Wound Check     BILATERAL LOWER EXTREMITIES          HISTORY of PRESENT ILLNESS HPI     Sandra lAcaraz is a 80 y.o. male who presents today for wound/ulcer evaluation. History of Wound Context:  The patient has had a wound of sacral region which was first noted approximately 4 weeks. This has been treated with marty. On their initial visit to the wound healing center,  the patient has noted that the wound has not been improving. The patient has not had similar previous wounds in the past.      Pt is not on abx at time of initial visit.       Wound/Ulcer Pain Timing/Severity: intermittent  Quality of pain: aching  Severity:  5 / 10   Modifying Factors: Pain is relieved/improved with rest  Associated Signs/Symptoms: erythema    Ulcer Identification:  Ulcer Type: pressure  Contributing Factors: chronic pressure and obesity    Diabetic/Pressure/Non Pressure Ulcers only:  Ulcer:     Wound:         PAST MEDICAL HISTORY      Diagnosis Date    Arthritis     Cancer (HCC)     breast    Cellulitis     CHF (congestive heart failure) ([facility])     CKD (chronic kidney disease) stage 3, GFR 30-59 ml/min ([facility])     Diabetes mellitus ([facility] [address]     History of lumpectomy     Hx of blood clots     Hyperlipidemia     Hypertension     Left ventricular failure (HCC)     Macular degeneration     Obesity     Orthostatic hypotension     PE (pulmonary thromboembolism) ([facility])     Pressure injury of both heels, unstageable ([facility])     Staph aureus infection     Venous insufficiency      Past Surgical History:   Procedure Laterality Date    [facility].  PICC POWERPIC SINGLE  2021         MASTECTOMY      TOE AMPUTATION      TOE (PRILOSEC) 20 MG delayed release capsule Take 20 mg by mouth daily      Multiple Vitamins-Minerals (THERAPEUTIC MULTIVITAMIN-MINERALS) tablet Take 1 tablet by mouth daily      Multiple Vitamins-Minerals (PRESERVISION AREDS PO) Take 1 tablet by mouth 2 times daily (with meals)      acetaminophen (TYLENOL) 325 MG tablet Take 650 mg by mouth every 4 hours as needed for Pain or Fever       calcium carbonate (TUMS) 500 MG chewable tablet Take 1 tablet by mouth every 4 hours as needed for Heartburn      ipratropium-albuterol (DUONEB) 0.5-2.5 (3) MG/3ML SOLN nebulizer solution Inhale 3 mLs into the lungs every 4 hours (while awake) for 30 doses 90 mL 0    gabapentin (NEURONTIN) 100 MG capsule Take 200 mg by mouth Daily with supper. atorvastatin (LIPITOR) 80 MG tablet Take 80 mg by mouth at bedtime       INSULIN LISP PROT & LISP, HUM, (75-25) 100 UNIT/ML SUSP Inject 72 Units into the skin daily (with breakfast)       INSULIN LISP PROT & LISP, HUM, (75-25) 100 UNIT/ML SUSP Inject 55 Units into the skin Daily with supper       clotrimazole-betamethasone (LOTRISONE) cream Apply  topically as needed. Apply topically 2 times daily. No current facility-administered medications on file prior to encounter.        REVIEW OF SYSTEMS     ROS : All others Negative if blank [], Positive if [x]  General Urinary   [] Fevers [] Hematuria   [] Chills [] Dysuria   [] Weight Loss Neurolgoic   Skin [] Stroke/TIA   [] Tissue Loss [] Focal weakness   Eyes [] Slurred Speech   [] Wears Glasses/Contacts ENT   [] Vision Changes [] Difficulty swallowing   Respiratory Endocrine    [] Shortness of breath [] Increased Thirst   Cardiovascular Gastrointestinal   [] Chest Pain [] Abdominal Pain   [] Shortness of breath with exertion [] Melena    [] Hematochezia     Objective:    BP (!) 143/58   Pulse 72   Temp (!) 96 °F (35.6 °C) (Temporal)   Resp 18   Ht 6' (1.829 m)   Wt 253 lb (114.8 kg)   SpO2 97%   BMI 34.31 kg/m²   Wt Readings from Last 3 Encounters:   10/19/22 253 lb (114.8 kg)   10/05/22 253 lb (114.8 kg)   09/30/22 253 lb (114.8 kg)     10-5-22  Debrided, cont tx and care    PHYSICAL EXAM  CONSTITUTIONAL:   Awake, alert, cooperative     PSYCHIATRIC :  Oriented to time, place and person        normal insight to disease process  ENT:  External ears and nose without lesions      Hearing deficit is not noted  NECK: Supple, symmetrical, trachea midline      Thyroid goiter not appreciated     LUNGS:  No increased work of breathing                    Clear to auscultation bilaterally     CARDIOVASCULAR:  regular rate and rhythm     ABDOMEN:  soft, non-distended, non-tender      Hernias is not noted  Lymphatics : Cervical lymphadenopathy is not noted     Femoral lymphadenopathy is not noted  SKIN:   Skin color is normal   Texture and turgor is  normal   Induration is not noted  EXTREMITIES:   R UE Edema is not noted  L UE Edema is not noted  R LE Edema is not noted  L LE Edema is not noted  Assessment:     Problem List Items Addressed This Visit       Diabetic ulcer of right heel associated with type 2 diabetes mellitus, with fat layer exposed ([address] - Primary    Diabetic ulcer of left heel ([address]       Pre Debridement Measurements:  Are located in the [place]  Documentation Flow Sheet  Post Debridement Measurements:  Wound/Ulcer Descriptions are Pre Debridement except measurements:     Negative Pressure Wound Therapy Leg Left; Lower; Posterior (Active)   Number of pieces removed 1 08/03/22 0900   Number of days: 76       Wound 01/05/22 Heel Left #1 (Active)   Wound Image   09/21/22 0907   Wound Etiology Diabetic Campbell 2 01/05/22 0943   Dressing Status New dressing applied 10/05/22 1000   Wound Cleansed Cleansed with saline 10/05/22 1000   Dressing/Treatment Honey gel/honey paste;ABD;Dry dressing 10/05/22 1000   Offloading for Diabetic Foot Ulcers Post op shoe 10/05/22 1000   Wound Length (cm) 3.1 cm 10/19/22 0853   Wound Width (cm) 3 cm 10/19/22 1180   Wound Depth (cm) 0.2 cm 10/19/22 0853   Wound Surface Area (cm^2) 9.3 cm^2 10/19/22 0853   Change in Wound Size % (l*w) 32.61 10/19/22 0853   Wound Volume (cm^3) 1.86 cm^3 10/19/22 0853   Wound Healing % 33 10/19/22 0853   Post-Procedure Length (cm) 3.2 cm 10/19/22 0859   Post-Procedure Width (cm) 3 cm 10/19/22 0859   Post-Procedure Depth (cm) 0.3 cm 10/19/22 0859   Post-Procedure Surface Area (cm^2) 9.6 cm^2 10/19/22 0859   Post-Procedure Volume (cm^3) 2.88 cm^3 10/19/22 0859   Wound Assessment Fibrin;Pink/red 10/19/22 0853   Drainage Amount Moderate 10/19/22 0853   Drainage Description Yellow 10/19/22 0853   Odor None 10/19/22 0853   Rashmi-wound Assessment Maceration 10/19/22 0853   Wound Thickness Description not for Pressure Injury Full thickness 01/19/22 0909   Number of days: 286       Wound 01/05/22 Heel Right #2 (Active)   Wound Image   09/21/22 0907   Wound Etiology Venous 01/05/22 0943   Dressing Status New dressing applied 10/05/22 1000   Wound Cleansed Cleansed with saline 10/05/22 1000   Dressing/Treatment ABD;Dry dressing;Honey gel/honey paste 10/05/22 1000   Offloading for Diabetic Foot Ulcers Post op shoe 10/05/22 1000   Wound Length (cm) 2.5 cm 10/19/22 0853   Wound Width (cm) 2 cm 10/19/22 0853   Wound Depth (cm) 0.2 cm 10/19/22 0853   Wound Surface Area (cm^2) 5 cm^2 10/19/22 0853   Change in Wound Size % (l*w) -17.65 10/19/22 0853   Wound Volume (cm^3) 1 cm^3 10/19/22 0853   Wound Healing % -18 10/19/22 0853   Post-Procedure Length (cm) 2.5 cm 10/19/22 0859   Post-Procedure Width (cm) 2 cm 10/19/22 0859   Post-Procedure Depth (cm) 0.3 cm 10/19/22 0859   Post-Procedure Surface Area (cm^2) 5 cm^2 10/19/22 0859   Post-Procedure Volume (cm^3) 1.5 cm^3 10/19/22 0859   Wound Assessment Fibrin;Pink/red 10/19/22 0853   Drainage Amount Moderate 10/19/22 0853   Drainage Description Yellow 10/19/22 0853   Odor None 10/19/22 0853   Rashmi-wound Assessment Maceration 10/19/22 0853   Wound Thickness Description not for Pressure Injury Full thickness 01/19/22 0909   Number of days: 286       Wound 04/13/22 Pretibial Distal;Left;Lateral #14 (blocked may 25)  (Active)   Wound Image   09/21/22 0907   Dressing Status New dressing applied 10/05/22 1000   Wound Cleansed Cleansed with saline 10/05/22 1000   Dressing/Treatment Dry dressing;Roll gauze; Pharmaceutical agent (see MAR) 10/05/22 1000   Offloading for Diabetic Foot Ulcers Post op shoe 10/05/22 1000   Wound Length (cm) 0.2 cm 10/19/22 0853   Wound Width (cm) 0.1 cm 10/19/22 0853   Wound Depth (cm) 0.1 cm 10/19/22 0853   Wound Surface Area (cm^2) 0.02 cm^2 10/19/22 0853   Change in Wound Size % (l*w) 99.79 10/19/22 0853   Wound Volume (cm^3) 0.002 cm^3 10/19/22 0853   Wound Healing % 100 10/19/22 0853   Post-Procedure Length (cm) 0.5 cm 09/21/22 1004   Post-Procedure Width (cm) 0.3 cm 09/21/22 1004   Post-Procedure Depth (cm) 0.2 cm 09/21/22 1004   Post-Procedure Surface Area (cm^2) 0.15 cm^2 09/21/22 1004   Post-Procedure Volume (cm^3) 0.03 cm^3 09/21/22 1004   Wound Assessment Pink/red 10/19/22 0853   Drainage Amount Scant 10/19/22 0853   Drainage Description Yellow 10/19/22 0853   Odor None 10/19/22 0853   Rashmi-wound Assessment Dry/flaky 10/19/22 0853   Number of days: 188       Wound 04/27/22 Tibial Left;Posterior #15 (Active)   Wound Image   09/21/22 0907   Dressing Status New dressing applied 10/05/22 1000   Wound Cleansed Cleansed with saline 10/05/22 1000   Dressing/Treatment Honey gel/honey paste;ABD;Roll gauze 10/05/22 1000   Offloading for Diabetic Foot Ulcers Post op shoe 10/05/22 1000   Wound Length (cm) 5.5 cm 10/19/22 0853   Wound Width (cm) 4.6 cm 10/19/22 0853   Wound Depth (cm) 0.3 cm 10/19/22 0853   Wound Surface Area (cm^2) 25.3 cm^2 10/19/22 0853   Change in Wound Size % (l*w) -80.2 10/19/22 0853   Wound Volume (cm^3) 7.59 cm^3 10/19/22 0853   Wound Healing % -8 10/19/22 0853   Post-Procedure Length (cm) 5.6 cm 10/19/22 0859   Post-Procedure Width (cm) 4.6 cm 10/19/22 0859   Post-Procedure Depth (cm) 0.4 cm 10/19/22 0859   Post-Procedure Surface Area (cm^2) 25.76 cm^2 10/19/22 0859   Post-Procedure Volume (cm^3) 10.304 cm^3 10/19/22 0859   Wound Assessment Fibrin;Pink/red 10/19/22 0853   Drainage Amount Large 10/19/22 0853   Drainage Description Yellow 10/19/22 0853   Odor None 10/19/22 0853   Rashmi-wound Assessment Maceration;Dry/flaky 10/19/22 0853   Number of days: 174          Procedure Note  Indications:  Based on my examination of this patient's wound(s)/ulcer(s) today, debridement is not required to promote healing and evaluate the wound base. Performed by: Christiano Bryant DPM    A culture was done. 10-19-22  Debrided, all healing well   Plan:     Pt is not a smoker   en    In my professional opinion and based off the information that is available at this time this patient has appropriate indication for HBO Therapy: No    Treatment Note please see attached Discharge Instructions    Written patient dismissal instructions given to patient and signed by patient or POA. Discharge Instructions         Visit Discharge/Physician Orders     Discharge condition: Stable     Assessment of pain at discharge:  minimal     Anesthetic used: 4% lidocaine solution     Discharge to: Home     Left via:Private automobile     Accompanied by: accompanied by SELF     ECF/[facility]:  [facility]. Dressing Orders:  Clean bilateral heel ulcers with NSS, apply medi-honey patch  Change every other day extra padding   to deep tissue skin injury on buttocks, leave cover patches off for now. No open wound. Continue to protect skin on buttocks with good skin barrier ointment. Encourage position changes at least every 2 hours, keep skin clean and dry.  Assist patient when in bed to try to offload backside to relieve pressure off tailbone   left lower leg medihoney gel and dry dressing every other day     TO LEFT LEG ULCER,  HOLD WOUND VAC, APPLY SANTYL AND BACTROBAN 50/50 MIXTURE TO WOUND DAILY- KEEP PRESSURE OFF WOUND AT ALL TIMES  APPLY PREVALON BOOT TO BILATERAL HEELS     Physical therapy for ambulating safely while wearing offloading boots as indicated. [facility] followup visit _______2week with Dr. Gastelum_____________________  (Please note your next appointment above and if you are unable to keep, kindly give a 24 hour notice. Thank you.)     Physician signature:__________________________        If you experience any of the following, please call the [address] [place] [address] Road during business hours:     * Increase in Pain  * Temperature over 101  * Increase in drainage from your wound  * Drainage with a foul odor  * Bleeding  * Increase in swelling  * Need for compression bandage changes due to slippage, breakthrough drainage. Negative Pressure Wound Therapy Leg Left; Lower; Posterior (Active)   Number of pieces removed 1 08/03/22 0900   Number of days: 76       Wound 01/05/22 Heel Left #1 (Active)   Wound Image   09/21/22 0907   Wound Etiology Diabetic Campbell 2 01/05/22 0943   Dressing Status New dressing applied 10/05/22 1000   Wound Cleansed Cleansed with saline 10/05/22 1000   Dressing/Treatment Honey gel/honey paste;ABD;Dry dressing 10/05/22 1000   Offloading for Diabetic Foot Ulcers Post op shoe 10/05/22 1000   Wound Length (cm) 3.1 cm 10/19/22 0853   Wound Width (cm) 3 cm 10/19/22 0853   Wound Depth (cm) 0.2 cm 10/19/22 0853   Wound Surface Area (cm^2) 9.3 cm^2 10/19/22 0853   Change in Wound Size % (l*w) 32.61 10/19/22 0853   Wound Volume (cm^3) 1.86 cm^3 10/19/22 0853   Wound Healing % 33 10/19/22 0853   Post-Procedure Length (cm) 3.2 cm 10/19/22 0859   Post-Procedure Width (cm) 3 cm 10/19/22 0859   Post-Procedure Depth (cm) 0.3 cm 10/19/22 0859   Post-Procedure Surface Area (cm^2) 9.6 cm^2 10/19/22 0859   Post-Procedure Volume (cm^3) 2.88 cm^3 10/19/22 0859   Wound Assessment Fibrin;Pink/red 10/19/22 7723 Drainage Amount Moderate 10/19/22 0853   Drainage Description Yellow 10/19/22 0853   Odor None 10/19/22 0853   Rashmi-wound Assessment Maceration 10/19/22 0853   Wound Thickness Description not for Pressure Injury Full thickness 01/19/22 0909   Number of days: 286       Wound 01/05/22 Heel Right #2 (Active)   Wound Image   09/21/22 0907   Wound Etiology Venous 01/05/22 0943   Dressing Status New dressing applied 10/05/22 1000   Wound Cleansed Cleansed with saline 10/05/22 1000   Dressing/Treatment ABD;Dry dressing;Honey gel/honey paste 10/05/22 1000   Offloading for Diabetic Foot Ulcers Post op shoe 10/05/22 1000   Wound Length (cm) 2.5 cm 10/19/22 0853   Wound Width (cm) 2 cm 10/19/22 0853   Wound Depth (cm) 0.2 cm 10/19/22 0853   Wound Surface Area (cm^2) 5 cm^2 10/19/22 0853   Change in Wound Size % (l*w) -17.65 10/19/22 0853   Wound Volume (cm^3) 1 cm^3 10/19/22 0853   Wound Healing % -18 10/19/22 0853   Post-Procedure Length (cm) 2.5 cm 10/19/22 0859   Post-Procedure Width (cm) 2 cm 10/19/22 0859   Post-Procedure Depth (cm) 0.3 cm 10/19/22 0859   Post-Procedure Surface Area (cm^2) 5 cm^2 10/19/22 0859   Post-Procedure Volume (cm^3) 1.5 cm^3 10/19/22 0859   Wound Assessment Fibrin;Pink/red 10/19/22 0853   Drainage Amount Moderate 10/19/22 0853   Drainage Description Yellow 10/19/22 0853   Odor None 10/19/22 0853   Rashmi-wound Assessment Maceration 10/19/22 0853   Wound Thickness Description not for Pressure Injury Full thickness 01/19/22 0909   Number of days: 286       Wound 04/13/22 Pretibial Distal;Left;Lateral #14 (blocked may 25)  (Active)   Wound Image   09/21/22 0907   Dressing Status New dressing applied 10/05/22 1000   Wound Cleansed Cleansed with saline 10/05/22 1000   Dressing/Treatment Dry dressing;Roll gauze; Pharmaceutical agent (see MAR) 10/05/22 1000   Offloading for Diabetic Foot Ulcers Post op shoe 10/05/22 1000   Wound Length (cm) 0.2 cm 10/19/22 0853   Wound Width (cm) 0.1 cm 10/19/22 0853 Wound Depth (cm) 0.1 cm 10/19/22 0853   Wound Surface Area (cm^2) 0.02 cm^2 10/19/22 0853   Change in Wound Size % (l*w) 99.79 10/19/22 0853   Wound Volume (cm^3) 0.002 cm^3 10/19/22 0853   Wound Healing % 100 10/19/22 0853   Post-Procedure Length (cm) 0.5 cm 09/21/22 1004   Post-Procedure Width (cm) 0.3 cm 09/21/22 1004   Post-Procedure Depth (cm) 0.2 cm 09/21/22 1004   Post-Procedure Surface Area (cm^2) 0.15 cm^2 09/21/22 1004   Post-Procedure Volume (cm^3) 0.03 cm^3 09/21/22 1004   Wound Assessment Pink/red 10/19/22 0853   Drainage Amount Scant 10/19/22 0853   Drainage Description Yellow 10/19/22 0853   Odor None 10/19/22 0853   Rashmi-wound Assessment Dry/flaky 10/19/22 0853   Number of days: 188       Wound 04/27/22 Tibial Left;Posterior #15 (Active)   Wound Image   09/21/22 0907   Dressing Status New dressing applied 10/05/22 1000   Wound Cleansed Cleansed with saline 10/05/22 1000   Dressing/Treatment Honey gel/honey paste;ABD;Roll gauze 10/05/22 1000   Offloading for Diabetic Foot Ulcers Post op shoe 10/05/22 1000   Wound Length (cm) 5.5 cm 10/19/22 0853   Wound Width (cm) 4.6 cm 10/19/22 0853   Wound Depth (cm) 0.3 cm 10/19/22 0853   Wound Surface Area (cm^2) 25.3 cm^2 10/19/22 0853   Change in Wound Size % (l*w) -80.2 10/19/22 0853   Wound Volume (cm^3) 7.59 cm^3 10/19/22 0853   Wound Healing % -8 10/19/22 0853   Post-Procedure Length (cm) 5.6 cm 10/19/22 0859   Post-Procedure Width (cm) 4.6 cm 10/19/22 0859   Post-Procedure Depth (cm) 0.4 cm 10/19/22 0859   Post-Procedure Surface Area (cm^2) 25.76 cm^2 10/19/22 0859   Post-Procedure Volume (cm^3) 10.304 cm^3 10/19/22 0859   Wound Assessment Fibrin;Pink/red 10/19/22 0853   Drainage Amount Large 10/19/22 0853   Drainage Description Yellow 10/19/22 0853   Odor None 10/19/22 0853   Rashmi-wound Assessment Maceration;Dry/flaky 10/19/22 0853   Number of days: 174                                Electronically signed by Radha Coto DPM on 10/19/2022 at 9:28 AM

## 2022-10-22 LAB
ANAEROBIC CULTURE: NORMAL
ORGANISM: ABNORMAL
WOUND/ABSCESS: ABNORMAL

## 2022-10-26 NOTE — PROGRESS NOTES
Script called to Bhumi at [id number] [facility]. care for omnicef 300mg 3 xday for 10days

## 2022-10-31 NOTE — DISCHARGE INSTRUCTIONS
Visit Discharge/Physician Orders     Discharge condition: Stable     Assessment of pain at discharge:  minimal     Anesthetic used: 4% lidocaine solution     Discharge to: Home     Left via:Private automobile     Accompanied by: accompanied by SELF     ECF/[facility]:  [facility]. Dressing Orders:  Clean bilateral heel ulcers with NSS, apply medi-honey patch  Change every other day extra padding   to deep tissue skin injury on buttocks, leave cover patches off for now. No open wound. Continue to protect skin on buttocks with good skin barrier ointment. Encourage position changes at least every 2 hours, keep skin clean and dry. Assist patient when in bed to try to offload backside to relieve pressure off tailbone   left lower leg medihoney pad and dry dressing every other day     TO LEFT LEG ULCER,  HOLD WOUND VAC, APPLY SANTYL AND BACTROBAN 50/50 MIXTURE TO WOUND DAILY- KEEP PRESSURE OFF WOUND AT ALL TIMES  APPLY PREVALON BOOT TO BILATERAL HEELS     Physical therapy for ambulating safely while wearing offloading boots as indicated. [address] [place] [address],[address] followup visit _______2week with Dr. Gastelum_____________________  (Please note your next appointment above and if you are unable to keep, kindly give a 24 hour notice. Thank you.)     Physician signature:__________________________        If you experience any of the following, please call the [address] [place] [address] during business hours:     * Increase in Pain  * Temperature over 101  * Increase in drainage from your wound  * Drainage with a foul odor  * Bleeding  * Increase in swelling  * Need for compression bandage changes due to slippage, breakthrough drainage.                 Wound 01/05/22 Heel Left #1 (Active)   Wound Image   11/02/22 0840   Wound Etiology Diabetic Campbell 2 01/05/22 0943   Dressing Status New dressing applied 10/19/22 0938   Wound Cleansed Cleansed with saline 10/19/22 0938   Dressing/Treatment Honey gel/honey paste;ABD;Dry dressing 10/19/22 0938 Offloading for Diabetic Foot Ulcers Post op shoe 10/19/22 0938   Wound Length (cm) 3.1 cm 11/02/22 0840   Wound Width (cm) 2.7 cm 11/02/22 0840   Wound Depth (cm) 0.2 cm 11/02/22 0840   Wound Surface Area (cm^2) 8.37 cm^2 11/02/22 0840   Change in Wound Size % (l*w) 39.35 11/02/22 0840   Wound Volume (cm^3) 1.674 cm^3 11/02/22 0840   Wound Healing % 39 11/02/22 0840   Post-Procedure Length (cm) 3.1 cm 11/02/22 0857   Post-Procedure Width (cm) 2.7 cm 11/02/22 0857   Post-Procedure Depth (cm) 0.3 cm 11/02/22 0857   Post-Procedure Surface Area (cm^2) 8.37 cm^2 11/02/22 0857   Post-Procedure Volume (cm^3) 2.511 cm^3 11/02/22 0857   Wound Assessment Fibrin;Pink/red 11/02/22 0840   Drainage Amount Large 11/02/22 0840   Drainage Description Yellow 11/02/22 0840   Odor None 11/02/22 0840   Rashmi-wound Assessment Maceration 11/02/22 0840   Wound Thickness Description not for Pressure Injury Full thickness 01/19/22 0909   Number of days: 300       Wound 01/05/22 Heel Right #2 (Active)   Wound Image   11/02/22 0840   Wound Etiology Venous 01/05/22 0943   Dressing Status New dressing applied 10/19/22 0938   Wound Cleansed Cleansed with saline 10/19/22 0938   Dressing/Treatment ABD;Dry dressing;Honey gel/honey paste 10/19/22 0938   Offloading for Diabetic Foot Ulcers Post op shoe 10/19/22 0938   Wound Length (cm) 2 cm 11/02/22 0840   Wound Width (cm) 1.8 cm 11/02/22 0840   Wound Depth (cm) 0.2 cm 11/02/22 0840   Wound Surface Area (cm^2) 3.6 cm^2 11/02/22 0840   Change in Wound Size % (l*w) 15.29 11/02/22 0840   Wound Volume (cm^3) 0.72 cm^3 11/02/22 0840   Wound Healing % 15 11/02/22 0840   Post-Procedure Length (cm) 2 cm 11/02/22 0857   Post-Procedure Width (cm) 2 cm 11/02/22 0857   Post-Procedure Depth (cm) 0.3 cm 11/02/22 0857   Post-Procedure Surface Area (cm^2) 4 cm^2 11/02/22 0857   Post-Procedure Volume (cm^3) 1.2 cm^3 11/02/22 0857   Wound Assessment Fibrin;Pink/red 11/02/22 0840   Drainage Amount Moderate 11/02/22 0840   Drainage Description Yellow 11/02/22 0840   Odor None 11/02/22 0840   Rashmi-wound Assessment Maceration 11/02/22 0840   Wound Thickness Description not for Pressure Injury Full thickness 01/19/22 0909   Number of days: 300       Wound 04/13/22 Pretibial Distal;Left;Lateral #14 (blocked may 25)  (Active)   Wound Image   11/02/22 0840   Dressing Status New dressing applied 10/19/22 0938   Wound Cleansed Cleansed with saline 10/19/22 0938   Dressing/Treatment ABD;Dry dressing;Honey gel/honey paste 10/19/22 0938   Offloading for Diabetic Foot Ulcers Post op shoe 10/19/22 0938   Wound Length (cm) 0.4 cm 11/02/22 0840   Wound Width (cm) 0.5 cm 11/02/22 0840   Wound Depth (cm) 0.1 cm 11/02/22 0840   Wound Surface Area (cm^2) 0.2 cm^2 11/02/22 0840   Change in Wound Size % (l*w) 97.92 11/02/22 0840   Wound Volume (cm^3) 0.02 cm^3 11/02/22 0840   Wound Healing % 98 11/02/22 0840   Post-Procedure Length (cm) 0.5 cm 09/21/22 1004   Post-Procedure Width (cm) 0.3 cm 09/21/22 1004   Post-Procedure Depth (cm) 0.2 cm 09/21/22 1004   Post-Procedure Surface Area (cm^2) 0.15 cm^2 09/21/22 1004   Post-Procedure Volume (cm^3) 0.03 cm^3 09/21/22 1004   Wound Assessment Pink/red 11/02/22 0840   Drainage Amount Scant 11/02/22 0840   Drainage Description Yellow 11/02/22 0840   Odor None 11/02/22 0840   Rashmi-wound Assessment Dry/flaky 11/02/22 0840   Number of days: 202       Wound 04/27/22 Tibial Left;Posterior #15 (Active)   Wound Image   11/02/22 0840   Dressing Status New dressing applied 10/19/22 0938   Wound Cleansed Cleansed with saline 10/19/22 0938   Dressing/Treatment ABD;Dry dressing; Pharmaceutical agent (see MAR) 10/19/22 0938   Offloading for Diabetic Foot Ulcers Post op shoe 10/19/22 0938   Wound Length (cm) 6 cm 11/02/22 0840   Wound Width (cm) 4.3 cm 11/02/22 0840   Wound Depth (cm) 0.3 cm 11/02/22 0840   Wound Surface Area (cm^2) 25.8 cm^2 11/02/22 0840   Change in Wound Size % (l*w) -83.76 11/02/22 0840   Wound Volume (cm^3) 7.74 cm^3 11/02/22 0840   Wound Healing % -10 11/02/22 0840   Post-Procedure Length (cm) 6 cm 11/02/22 0857   Post-Procedure Width (cm) 4.3 cm 11/02/22 0857   Post-Procedure Depth (cm) 0.4 cm 11/02/22 0857   Post-Procedure Surface Area (cm^2) 25.8 cm^2 11/02/22 0857   Post-Procedure Volume (cm^3) 10.32 cm^3 11/02/22 0857   Wound Assessment Fibrin;Pink/red 11/02/22 0840   Drainage Amount Large 11/02/22 0840   Drainage Description Yellow 11/02/22 0840   Odor None 11/02/22 0840   Rashmi-wound Assessment Maceration;Dry/flaky 11/02/22 0840   Number of days: 188

## 2022-11-02 ENCOUNTER — HOSPITAL ENCOUNTER (OUTPATIENT)
Dept: WOUND CARE | Age: 87
Discharge: HOME | End: 2022-11-02
Payer: MEDICARE

## 2022-11-02 VITALS
HEIGHT: 72 IN | BODY MASS INDEX: 34.27 KG/M2 | RESPIRATION RATE: 18 BRPM | WEIGHT: 253 LBS | SYSTOLIC BLOOD PRESSURE: 151 MMHG | TEMPERATURE: 97.1 F | OXYGEN SATURATION: 98 % | HEART RATE: 78 BPM | DIASTOLIC BLOOD PRESSURE: 61 MMHG

## 2022-11-02 DIAGNOSIS — L97.412 DIABETIC ULCER OF RIGHT HEEL ASSOCIATED WITH TYPE 2 DIABETES MELLITUS, WITH FAT LAYER EXPOSED (HCC): Primary | ICD-10-CM

## 2022-11-02 DIAGNOSIS — L97.421 DIABETIC ULCER OF LEFT HEEL ASSOCIATED WITH DIABETES MELLITUS DUE TO UNDERLYING CONDITION, LIMITED TO BREAKDOWN OF SKIN (HCC): ICD-10-CM

## 2022-11-02 DIAGNOSIS — E11.621 DIABETIC ULCER OF RIGHT HEEL ASSOCIATED WITH TYPE 2 DIABETES MELLITUS, WITH FAT LAYER EXPOSED (HCC): Primary | ICD-10-CM

## 2022-11-02 DIAGNOSIS — E08.621 DIABETIC ULCER OF LEFT HEEL ASSOCIATED WITH DIABETES MELLITUS DUE TO UNDERLYING CONDITION, LIMITED TO BREAKDOWN OF SKIN (HCC): ICD-10-CM

## 2022-11-02 PROCEDURE — 11045 DBRDMT SUBQ TISS EACH ADDL: CPT

## 2022-11-02 PROCEDURE — 11042 DBRDMT SUBQ TIS 1ST 20SQCM/<: CPT

## 2022-11-02 PROCEDURE — 6370000000 HC RX 637 (ALT 250 FOR IP): Performed by: PODIATRIST

## 2022-11-02 RX ORDER — LIDOCAINE 40 MG/G
CREAM TOPICAL ONCE
Status: CANCELLED | OUTPATIENT
Start: 2022-11-02 | End: 2022-11-02

## 2022-11-02 RX ORDER — LIDOCAINE HYDROCHLORIDE 40 MG/ML
SOLUTION TOPICAL ONCE
Status: CANCELLED | OUTPATIENT
Start: 2022-11-02 | End: 2022-11-02

## 2022-11-02 RX ORDER — BACITRACIN, NEOMYCIN, POLYMYXIN B 400; 3.5; 5 [USP'U]/G; MG/G; [USP'U]/G
OINTMENT TOPICAL ONCE
Status: CANCELLED | OUTPATIENT
Start: 2022-11-02 | End: 2022-11-02

## 2022-11-02 RX ORDER — GENTAMICIN SULFATE 1 MG/G
OINTMENT TOPICAL ONCE
Status: CANCELLED | OUTPATIENT
Start: 2022-11-02 | End: 2022-11-02

## 2022-11-02 RX ORDER — GINSENG 100 MG
CAPSULE ORAL ONCE
Status: CANCELLED | OUTPATIENT
Start: 2022-11-02 | End: 2022-11-02

## 2022-11-02 RX ORDER — BETAMETHASONE DIPROPIONATE 0.05 %
OINTMENT (GRAM) TOPICAL ONCE
Status: CANCELLED | OUTPATIENT
Start: 2022-11-02 | End: 2022-11-02

## 2022-11-02 RX ORDER — BACITRACIN ZINC AND POLYMYXIN B SULFATE 500; 1000 [USP'U]/G; [USP'U]/G
OINTMENT TOPICAL ONCE
Status: CANCELLED | OUTPATIENT
Start: 2022-11-02 | End: 2022-11-02

## 2022-11-02 RX ORDER — LIDOCAINE HYDROCHLORIDE 40 MG/ML
SOLUTION TOPICAL ONCE
Status: COMPLETED | OUTPATIENT
Start: 2022-11-02 | End: 2022-11-02

## 2022-11-02 RX ORDER — CLOBETASOL PROPIONATE 0.5 MG/G
OINTMENT TOPICAL ONCE
Status: CANCELLED | OUTPATIENT
Start: 2022-11-02 | End: 2022-11-02

## 2022-11-02 RX ORDER — LIDOCAINE HYDROCHLORIDE 20 MG/ML
JELLY TOPICAL ONCE
Status: CANCELLED | OUTPATIENT
Start: 2022-11-02 | End: 2022-11-02

## 2022-11-02 RX ORDER — LIDOCAINE 50 MG/G
OINTMENT TOPICAL ONCE
Status: CANCELLED | OUTPATIENT
Start: 2022-11-02 | End: 2022-11-02

## 2022-11-02 RX ADMIN — LIDOCAINE HYDROCHLORIDE 15 ML: 40 SOLUTION TOPICAL at 08:56

## 2022-11-02 ASSESSMENT — PAIN DESCRIPTION - FREQUENCY: FREQUENCY: INTERMITTENT

## 2022-11-02 ASSESSMENT — PAIN - FUNCTIONAL ASSESSMENT: PAIN_FUNCTIONAL_ASSESSMENT: PREVENTS OR INTERFERES SOME ACTIVE ACTIVITIES AND ADLS

## 2022-11-02 ASSESSMENT — PAIN SCALES - GENERAL: PAINLEVEL_OUTOF10: 3

## 2022-11-02 ASSESSMENT — PAIN DESCRIPTION - LOCATION: LOCATION: FOOT

## 2022-11-02 ASSESSMENT — PAIN DESCRIPTION - ORIENTATION: ORIENTATION: LEFT

## 2022-11-02 ASSESSMENT — PAIN DESCRIPTION - DESCRIPTORS: DESCRIPTORS: SHARP

## 2022-11-02 ASSESSMENT — PAIN DESCRIPTION - PAIN TYPE: TYPE: CHRONIC PAIN

## 2022-11-02 ASSESSMENT — PAIN DESCRIPTION - ONSET: ONSET: PROGRESSIVE

## 2022-11-02 NOTE — PLAN OF CARE
Problem: Chronic Conditions and Co-morbidities  Goal: Patient's chronic conditions and co-morbidity symptoms are monitored and maintained or improved  Outcome: Progressing     Problem: Pain  Goal: Verbalizes/displays adequate comfort level or baseline comfort level  Outcome: Progressing     Problem: Wound:  Goal: Will show signs of wound healing; wound closure and no evidence of infection  Description: Will show signs of wound healing; wound closure and no evidence of infection  Outcome: Progressing     Problem: Venous:  Goal: Signs of wound healing will improve  Description: Signs of wound healing will improve  Outcome: Progressing     Problem: Pressure Ulcer:  Goal: Signs of wound healing will improve  Description: Signs of wound healing will improve  Outcome: Progressing  Goal: Absence of new pressure ulcer  Description: Absence of new pressure ulcer  Outcome: Progressing  Goal: Will show no infection signs and symptoms  Description: Will show no infection signs and symptoms  Outcome: Progressing

## 2022-11-02 NOTE — PROGRESS NOTES
[facility]aling Center /Hyperbarics   History and Physical/Consultation  General Surgery/Medicine    Referring Physician : Sho Stewart MD  Sandra Lay. MEDICAL RECORD NUMBER:  [id number]  AGE: 80 y.o. GENDER: male  : 1932  EPISODE DATE:  2022  Subjective:     Chief Complaint   Patient presents with    Wound Check     Left leg and heel, right heel         HISTORY of PRESENT ILLNESS HPI     Sandra Alcaraz is a 80 y.o. male who presents today for wound/ulcer evaluation. History of Wound Context:  The patient has had a wound of sacral region which was first noted approximately 4 weeks. This has been treated with marty. On their initial visit to the wound healing center,  the patient has noted that the wound has not been improving. The patient has not had similar previous wounds in the past.      Pt is not on abx at time of initial visit.       Wound/Ulcer Pain Timing/Severity: intermittent  Quality of pain: aching  Severity:  5 / 10   Modifying Factors: Pain is relieved/improved with rest  Associated Signs/Symptoms: erythema    Ulcer Identification:  Ulcer Type: pressure  Contributing Factors: chronic pressure and obesity    Diabetic/Pressure/Non Pressure Ulcers only:  Ulcer:     Wound:         PAST MEDICAL HISTORY      Diagnosis Date    Arthritis     Cancer (Formerly [facility])     breast    Cellulitis     CHF (congestive heart failure) (Formerly [facility])     CKD (chronic kidney disease) stage 3, GFR 30-59 ml/min (Formerly [facility])     Diabetes mellitus ([facility] [address]     History of lumpectomy     Hx of blood clots     Hyperlipidemia     Hypertension     Left ventricular failure (Formerly [facility])     Macular degeneration     Obesity     Orthostatic hypotension     PE (pulmonary thromboembolism) (Formerly [facility])     Pressure injury of both heels, unstageable (Formerly [facility])     Staph aureus infection     Venous insufficiency      Past Surgical History:   Procedure Laterality Date    [facility].  PICC POWERPIC SINGLE  2021         MASTECTOMY      TOE AMPUTATION      TOE SURGERY      TONSILLECTOMY       History reviewed. No pertinent family history. Social History     Tobacco Use    Smoking status: Never    Smokeless tobacco: Never   Vaping Use    Vaping Use: Never used   Substance Use Topics    Alcohol use: Not Currently     Alcohol/week: 1.0 standard drink     Types: 1 Cans of beer per week    Drug use: No     Allergies   Allergen Reactions    Clindamycin/Lincomycin     Sulfa Antibiotics      Current Outpatient Medications on File Prior to Encounter   Medication Sig Dispense Refill    fexofenadine (ALLEGRA) 180 MG tablet Take 180 mg by mouth daily      meloxicam (MOBIC) 7.5 MG tablet Take 7.5 mg by mouth daily      apixaban (ELIQUIS) 2.5 MG TABS tablet Take 1 tablet by mouth 2 times daily (before meals) 60 tablet 2    docusate sodium (COLACE) 100 MG capsule Take 200 mg by mouth nightly       tamsulosin (FLOMAX) 0.4 MG capsule Take 0.4 mg by mouth daily      bisacodyl (DULCOLAX) 10 MG suppository Place 10 mg rectally daily as needed for Constipation      diphenhydrAMINE-zinc acetate (BENADRYL) 1-0.1 % cream Apply topically every 4 hours as needed for Itching Apply topically 3 times daily as needed. Sodium Phosphates (FLEET) 7-19 GM/118ML Place 1 enema rectally daily as needed      magnesium hydroxide (MILK OF MAGNESIA) 400 MG/5ML suspension Take 30 mLs by mouth daily as needed for Constipation      Polyethylene Glycol 400 1 % SOLN Apply 1 drop to eye every 4 hours as needed      ferrous sulfate (IRON 325) 325 (65 Fe) MG tablet Take 325 mg by mouth daily       zinc sulfate (ZINCATE) 220 (50 Zn) MG capsule Take 1 capsule by mouth daily for 14 days 14 capsule 0    miconazole (MICOTIN) 2 % powder Apply topically 2 times daily.  45 g 1    ascorbic acid (VITAMIN C) 500 MG tablet Take 1 tablet by mouth 2 times daily 30 tablet 0    Vitamin D (CHOLECALCIFEROL) 50 MCG (2000 UT) TABS tablet Take 1 tablet by mouth daily 60 tablet 0    furosemide (LASIX) 40 MG tablet Take 20 mg by mouth daily       omeprazole (PRILOSEC) 20 MG delayed release capsule Take 20 mg by mouth daily      Multiple Vitamins-Minerals (THERAPEUTIC MULTIVITAMIN-MINERALS) tablet Take 1 tablet by mouth daily      Multiple Vitamins-Minerals (PRESERVISION AREDS PO) Take 1 tablet by mouth 2 times daily (with meals)      acetaminophen (TYLENOL) 325 MG tablet Take 650 mg by mouth every 4 hours as needed for Pain or Fever       calcium carbonate (TUMS) 500 MG chewable tablet Take 1 tablet by mouth every 4 hours as needed for Heartburn      ipratropium-albuterol (DUONEB) 0.5-2.5 (3) MG/3ML SOLN nebulizer solution Inhale 3 mLs into the lungs every 4 hours (while awake) for 30 doses 90 mL 0    gabapentin (NEURONTIN) 100 MG capsule Take 200 mg by mouth Daily with supper. atorvastatin (LIPITOR) 80 MG tablet Take 80 mg by mouth at bedtime       INSULIN LISP PROT & LISP, HUM, (75-25) 100 UNIT/ML SUSP Inject 72 Units into the skin daily (with breakfast)       INSULIN LISP PROT & LISP, HUM, (75-25) 100 UNIT/ML SUSP Inject 55 Units into the skin Daily with supper       clotrimazole-betamethasone (LOTRISONE) cream Apply  topically as needed. Apply topically 2 times daily. No current facility-administered medications on file prior to encounter.        REVIEW OF SYSTEMS     ROS : All others Negative if blank [], Positive if [x]  General Urinary   [] Fevers [] Hematuria   [] Chills [] Dysuria   [] Weight Loss Neurolgoic   Skin [] Stroke/TIA   [] Tissue Loss [] Focal weakness   Eyes [] Slurred Speech   [] Wears Glasses/Contacts ENT   [] Vision Changes [] Difficulty swallowing   Respiratory Endocrine    [] Shortness of breath [] Increased Thirst   Cardiovascular Gastrointestinal   [] Chest Pain [] Abdominal Pain   [] Shortness of breath with exertion [] Melena    [] Hematochezia     Objective:    BP (!) 151/61   Pulse 78   Temp 97.1 °F (36.2 °C) (Temporal)   Resp 18   Ht 6' (1.829 m)   Wt 253 lb (114.8 kg)   SpO2 98%   BMI 34.31 kg/m²   Wt Readings from Last 3 Encounters:   11/02/22 253 lb (114.8 kg)   10/19/22 253 lb (114.8 kg)   10/05/22 253 lb (114.8 kg)     10-5-22  Debrided, cont tx and care    PHYSICAL EXAM  CONSTITUTIONAL:   Awake, alert, cooperative     PSYCHIATRIC :  Oriented to time, place and person        normal insight to disease process  ENT:  External ears and nose without lesions      Hearing deficit is not noted  NECK: Supple, symmetrical, trachea midline      Thyroid goiter not appreciated     LUNGS:  No increased work of breathing                    Clear to auscultation bilaterally     CARDIOVASCULAR:  regular rate and rhythm     ABDOMEN:  soft, non-distended, non-tender      Hernias is not noted  Lymphatics : Cervical lymphadenopathy is not noted     Femoral lymphadenopathy is not noted  SKIN:   Skin color is normal   Texture and turgor is  normal   Induration is not noted  EXTREMITIES:   R UE Edema is not noted  L UE Edema is not noted  R LE Edema is not noted  L LE Edema is not noted  Assessment:     Problem List Items Addressed This Visit       Diabetic ulcer of right heel associated with type 2 diabetes mellitus, with fat layer exposed ([address] - Primary    Relevant Orders    Initiate Outpatient Wound Care Protocol    Diabetic ulcer of left heel ([address]    Relevant Orders    Initiate Outpatient Wound Care Protocol       Pre Debridement Measurements:  Are located in the [facility]  Documentation Flow Sheet  Post Debridement Measurements:  Wound/Ulcer Descriptions are Pre Debridement except measurements:     Wound 01/05/22 Heel Left #1 (Active)   Wound Image   11/02/22 0840   Wound Etiology Diabetic Campbell 2 01/05/22 0943   Dressing Status New dressing applied 10/19/22 0938   Wound Cleansed Cleansed with saline 10/19/22 0938   Dressing/Treatment Honey gel/honey paste;ABD;Dry dressing 10/19/22 0938   Offloading for Diabetic Foot Ulcers Post op shoe 10/19/22 0938   Wound Length (cm) 3.1 cm 11/02/22 0840   Wound Width (cm) 2.7 cm 11/02/22 0840   Wound Depth (cm) 0.2 cm 11/02/22 0840   Wound Surface Area (cm^2) 8.37 cm^2 11/02/22 0840   Change in Wound Size % (l*w) 39.35 11/02/22 0840   Wound Volume (cm^3) 1.674 cm^3 11/02/22 0840   Wound Healing % 39 11/02/22 0840   Post-Procedure Length (cm) 3.1 cm 11/02/22 0857   Post-Procedure Width (cm) 2.7 cm 11/02/22 0857   Post-Procedure Depth (cm) 0.3 cm 11/02/22 0857   Post-Procedure Surface Area (cm^2) 8.37 cm^2 11/02/22 0857   Post-Procedure Volume (cm^3) 2.511 cm^3 11/02/22 0857   Wound Assessment Fibrin;Pink/red 11/02/22 0840   Drainage Amount Large 11/02/22 0840   Drainage Description Yellow 11/02/22 0840   Odor None 11/02/22 0840   Rashmi-wound Assessment Maceration 11/02/22 0840   Wound Thickness Description not for Pressure Injury Full thickness 01/19/22 0909   Number of days: 300       Wound 01/05/22 Heel Right #2 (Active)   Wound Image   11/02/22 0840   Wound Etiology Venous 01/05/22 0943   Dressing Status New dressing applied 10/19/22 0938   Wound Cleansed Cleansed with saline 10/19/22 0938   Dressing/Treatment ABD;Dry dressing;Honey gel/honey paste 10/19/22 0938   Offloading for Diabetic Foot Ulcers Post op shoe 10/19/22 0938   Wound Length (cm) 2 cm 11/02/22 0840   Wound Width (cm) 1.8 cm 11/02/22 0840   Wound Depth (cm) 0.2 cm 11/02/22 0840   Wound Surface Area (cm^2) 3.6 cm^2 11/02/22 0840   Change in Wound Size % (l*w) 15.29 11/02/22 0840   Wound Volume (cm^3) 0.72 cm^3 11/02/22 0840   Wound Healing % 15 11/02/22 0840   Post-Procedure Length (cm) 2 cm 11/02/22 0857   Post-Procedure Width (cm) 2 cm 11/02/22 0857   Post-Procedure Depth (cm) 0.3 cm 11/02/22 0857   Post-Procedure Surface Area (cm^2) 4 cm^2 11/02/22 0857   Post-Procedure Volume (cm^3) 1.2 cm^3 11/02/22 0857   Wound Assessment Fibrin;Pink/red 11/02/22 0840   Drainage Amount Moderate 11/02/22 0840   Drainage Description Yellow 11/02/22 0840   Odor None 11/02/22 0840   Rashmi-wound Assessment Maceration 11/02/22 0840 Wound Thickness Description not for Pressure Injury Full thickness 01/19/22 0909   Number of days: 300       Wound 04/13/22 Pretibial Distal;Left;Lateral #14 (blocked may 25)  (Active)   Wound Image   11/02/22 0840   Dressing Status New dressing applied 10/19/22 0938   Wound Cleansed Cleansed with saline 10/19/22 0938   Dressing/Treatment ABD;Dry dressing;Honey gel/honey paste 10/19/22 0938   Offloading for Diabetic Foot Ulcers Post op shoe 10/19/22 0938   Wound Length (cm) 0.4 cm 11/02/22 0840   Wound Width (cm) 0.5 cm 11/02/22 0840   Wound Depth (cm) 0.1 cm 11/02/22 0840   Wound Surface Area (cm^2) 0.2 cm^2 11/02/22 0840   Change in Wound Size % (l*w) 97.92 11/02/22 0840   Wound Volume (cm^3) 0.02 cm^3 11/02/22 0840   Wound Healing % 98 11/02/22 0840   Post-Procedure Length (cm) 0.5 cm 09/21/22 1004   Post-Procedure Width (cm) 0.3 cm 09/21/22 1004   Post-Procedure Depth (cm) 0.2 cm 09/21/22 1004   Post-Procedure Surface Area (cm^2) 0.15 cm^2 09/21/22 1004   Post-Procedure Volume (cm^3) 0.03 cm^3 09/21/22 1004   Wound Assessment Pink/red 11/02/22 0840   Drainage Amount Scant 11/02/22 0840   Drainage Description Yellow 11/02/22 0840   Odor None 11/02/22 0840   Rashmi-wound Assessment Dry/flaky 11/02/22 0840   Number of days: 202       Wound 04/27/22 Tibial Left;Posterior #15 (Active)   Wound Image   11/02/22 0840   Dressing Status New dressing applied 10/19/22 0938   Wound Cleansed Cleansed with saline 10/19/22 0938   Dressing/Treatment ABD;Dry dressing; Pharmaceutical agent (see MAR) 10/19/22 0938   Offloading for Diabetic Foot Ulcers Post op shoe 10/19/22 0938   Wound Length (cm) 6 cm 11/02/22 0840   Wound Width (cm) 4.3 cm 11/02/22 0840   Wound Depth (cm) 0.3 cm 11/02/22 0840   Wound Surface Area (cm^2) 25.8 cm^2 11/02/22 0840   Change in Wound Size % (l*w) -83.76 11/02/22 0840   Wound Volume (cm^3) 7.74 cm^3 11/02/22 0840   Wound Healing % -10 11/02/22 0840   Post-Procedure Length (cm) 6 cm 11/02/22 0857 Post-Procedure Width (cm) 4.3 cm 11/02/22 0857   Post-Procedure Depth (cm) 0.4 cm 11/02/22 0857   Post-Procedure Surface Area (cm^2) 25.8 cm^2 11/02/22 0857   Post-Procedure Volume (cm^3) 10.32 cm^3 11/02/22 0857   Wound Assessment Fibrin;Pink/red 11/02/22 0840   Drainage Amount Large 11/02/22 0840   Drainage Description Yellow 11/02/22 0840   Odor None 11/02/22 0840   Rashmi-wound Assessment Maceration;Dry/flaky 11/02/22 0840   Number of days: 188          Procedure Note  Indications:  Based on my examination of this patient's wound(s)/ulcer(s) today, debridement is not required to promote healing and evaluate the wound base. Performed by: Blaine Hair DPM    A culture was done. 10-19-22  Debrided, all healing well     11-2-22  Debrided, cont tx and care    Debrided down to subq 160cm without incident  Plan:     Pt is not a smoker   en    In my professional opinion and based off the information that is available at this time this patient has appropriate indication for HBO Therapy: No    Treatment Note please see attached Discharge Instructions    Written patient dismissal instructions given to patient and signed by patient or POA. Discharge Instructions         Visit Discharge/Physician Orders     Discharge condition: Stable     Assessment of pain at discharge:  minimal     Anesthetic used: 4% lidocaine solution     Discharge to: Home     Left via:Private automobile     Accompanied by: accompanied by SELF     ECF/[facility]:  [facility]. Dressing Orders:  Clean bilateral heel ulcers with NSS, apply medi-honey patch  Change every other day extra padding   to deep tissue skin injury on buttocks, leave cover patches off for now. No open wound. Continue to protect skin on buttocks with good skin barrier ointment. Encourage position changes at least every 2 hours, keep skin clean and dry.  Assist patient when in bed to try to offload backside to relieve pressure off tailbone   left lower leg medihoney pad and dry dressing every other day     TO LEFT LEG ULCER,  HOLD WOUND VAC, APPLY SANTYL AND BACTROBAN 50/50 MIXTURE TO WOUND DAILY- KEEP PRESSURE OFF WOUND AT ALL TIMES  APPLY PREVALON BOOT TO BILATERAL HEELS     Physical therapy for ambulating safely while wearing offloading boots as indicated. [address] [place] [address],[address] followup visit _______2week with Dr. Gastelum_____________________  (Please note your next appointment above and if you are unable to keep, kindly give a 24 hour notice. Thank you.)     Physician signature:__________________________        If you experience any of the following, please call the [address] [place] [address] during business hours:     * Increase in Pain  * Temperature over 101  * Increase in drainage from your wound  * Drainage with a foul odor  * Bleeding  * Increase in swelling  * Need for compression bandage changes due to slippage, breakthrough drainage.                 Wound 01/05/22 Heel Left #1 (Active)   Wound Image   11/02/22 0840   Wound Etiology Diabetic Campbell 2 01/05/22 0943   Dressing Status New dressing applied 10/19/22 0938   Wound Cleansed Cleansed with saline 10/19/22 0938   Dressing/Treatment Honey gel/honey paste;ABD;Dry dressing 10/19/22 0938   Offloading for Diabetic Foot Ulcers Post op shoe 10/19/22 0938   Wound Length (cm) 3.1 cm 11/02/22 0840   Wound Width (cm) 2.7 cm 11/02/22 0840   Wound Depth (cm) 0.2 cm 11/02/22 0840   Wound Surface Area (cm^2) 8.37 cm^2 11/02/22 0840   Change in Wound Size % (l*w) 39.35 11/02/22 0840   Wound Volume (cm^3) 1.674 cm^3 11/02/22 0840   Wound Healing % 39 11/02/22 0840   Post-Procedure Length (cm) 3.1 cm 11/02/22 0857   Post-Procedure Width (cm) 2.7 cm 11/02/22 0857   Post-Procedure Depth (cm) 0.3 cm 11/02/22 0857   Post-Procedure Surface Area (cm^2) 8.37 cm^2 11/02/22 0857   Post-Procedure Volume (cm^3) 2.511 cm^3 11/02/22 0857   Wound Assessment Fibrin;Pink/red 11/02/22 0840   Drainage Amount Large 11/02/22 0840   Drainage Description Yellow 11/02/22 0840   Odor None 11/02/22 0840   Rashmi-wound Assessment Maceration 11/02/22 0840   Wound Thickness Description not for Pressure Injury Full thickness 01/19/22 0909   Number of days: 300       Wound 01/05/22 Heel Right #2 (Active)   Wound Image   11/02/22 0840   Wound Etiology Venous 01/05/22 0943   Dressing Status New dressing applied 10/19/22 0938   Wound Cleansed Cleansed with saline 10/19/22 0938   Dressing/Treatment ABD;Dry dressing;Honey gel/honey paste 10/19/22 0938   Offloading for Diabetic Foot Ulcers Post op shoe 10/19/22 0938   Wound Length (cm) 2 cm 11/02/22 0840   Wound Width (cm) 1.8 cm 11/02/22 0840   Wound Depth (cm) 0.2 cm 11/02/22 0840   Wound Surface Area (cm^2) 3.6 cm^2 11/02/22 0840   Change in Wound Size % (l*w) 15.29 11/02/22 0840   Wound Volume (cm^3) 0.72 cm^3 11/02/22 0840   Wound Healing % 15 11/02/22 0840   Post-Procedure Length (cm) 2 cm 11/02/22 0857   Post-Procedure Width (cm) 2 cm 11/02/22 0857   Post-Procedure Depth (cm) 0.3 cm 11/02/22 0857   Post-Procedure Surface Area (cm^2) 4 cm^2 11/02/22 0857   Post-Procedure Volume (cm^3) 1.2 cm^3 11/02/22 0857   Wound Assessment Fibrin;Pink/red 11/02/22 0840   Drainage Amount Moderate 11/02/22 0840   Drainage Description Yellow 11/02/22 0840   Odor None 11/02/22 0840   Rashmi-wound Assessment Maceration 11/02/22 0840   Wound Thickness Description not for Pressure Injury Full thickness 01/19/22 0909   Number of days: 300       Wound 04/13/22 Pretibial Distal;Left;Lateral #14 (blocked may 25)  (Active)   Wound Image   11/02/22 0840   Dressing Status New dressing applied 10/19/22 0938   Wound Cleansed Cleansed with saline 10/19/22 0938   Dressing/Treatment ABD;Dry dressing;Honey gel/honey paste 10/19/22 0938   Offloading for Diabetic Foot Ulcers Post op shoe 10/19/22 0938   Wound Length (cm) 0.4 cm 11/02/22 0840   Wound Width (cm) 0.5 cm 11/02/22 0840   Wound Depth (cm) 0.1 cm 11/02/22 0840   Wound Surface Area (cm^2) 0.2 cm^2 11/02/22 0840   Change in Wound Size % (l*w) 97.92 11/02/22 0840   Wound Volume (cm^3) 0.02 cm^3 11/02/22 0840   Wound Healing % 98 11/02/22 0840   Post-Procedure Length (cm) 0.5 cm 09/21/22 1004   Post-Procedure Width (cm) 0.3 cm 09/21/22 1004   Post-Procedure Depth (cm) 0.2 cm 09/21/22 1004   Post-Procedure Surface Area (cm^2) 0.15 cm^2 09/21/22 1004   Post-Procedure Volume (cm^3) 0.03 cm^3 09/21/22 1004   Wound Assessment Pink/red 11/02/22 0840   Drainage Amount Scant 11/02/22 0840   Drainage Description Yellow 11/02/22 0840   Odor None 11/02/22 0840   Rashmi-wound Assessment Dry/flaky 11/02/22 0840   Number of days: 202       Wound 04/27/22 Tibial Left;Posterior #15 (Active)   Wound Image   11/02/22 0840   Dressing Status New dressing applied 10/19/22 0938   Wound Cleansed Cleansed with saline 10/19/22 0938   Dressing/Treatment ABD;Dry dressing; Pharmaceutical agent (see MAR) 10/19/22 0938   Offloading for Diabetic Foot Ulcers Post op shoe 10/19/22 0938   Wound Length (cm) 6 cm 11/02/22 0840   Wound Width (cm) 4.3 cm 11/02/22 0840   Wound Depth (cm) 0.3 cm 11/02/22 0840   Wound Surface Area (cm^2) 25.8 cm^2 11/02/22 0840   Change in Wound Size % (l*w) -83.76 11/02/22 0840   Wound Volume (cm^3) 7.74 cm^3 11/02/22 0840   Wound Healing % -10 11/02/22 0840   Post-Procedure Length (cm) 6 cm 11/02/22 0857   Post-Procedure Width (cm) 4.3 cm 11/02/22 0857   Post-Procedure Depth (cm) 0.4 cm 11/02/22 0857   Post-Procedure Surface Area (cm^2) 25.8 cm^2 11/02/22 0857   Post-Procedure Volume (cm^3) 10.32 cm^3 11/02/22 0857   Wound Assessment Fibrin;Pink/red 11/02/22 0840   Drainage Amount Large 11/02/22 0840   Drainage Description Yellow 11/02/22 0840   Odor None 11/02/22 0840   Rashmi-wound Assessment Maceration;Dry/flaky 11/02/22 0840   Number of days: 188         Electronically signed by Parish Townsend DPM on 11/2/2022 at 9:14 AM

## 2022-11-02 NOTE — DISCHARGE INSTR - COC
Continuity of Care Form    Patient Name: Tanner Brar :  1932  MRN:  [id number]    Admit date:  2022  Discharge date:  ***    Code Status Order: Prior   Advance Directives:     Admitting Physician:  No admitting provider for patient encounter. PCP: Terrance Orourke MD    Discharging Nurse: [facility] Unit/Room#: No information available for this encounter. Discharging Unit Phone Number: ***    Emergency Contact:   Extended Emergency Contact Information  Primary Emergency Contact: [address] Phone: [phone]  Mobile Phone: [phone]  Relation: Child   needed? No  Secondary Emergency Contact: Claude Lizarraga [place] [address] Phone: [phone]  Relation: Child    Past Surgical History:  Past Surgical History:   Procedure Laterality Date    [facility]  PICC [address] SINGLE  2021         MASTECTOMY      TOE AMPUTATION      TOE SURGERY      TONSILLECTOMY         Immunization History:   Immunization History   Administered Date(s) Administered    Influenza Virus Vaccine 10/23/2016    Pneumococcal Conjugate Vaccine 2013    Td, unspecified formulation 2012       Active Problems:  Patient Active Problem List   Diagnosis Code    HTN (hypertension) I10    Breast cancer, male ([facility] [address] C50.929    Obesity (BMI 30.0-34. 9) E66.9    Right hip pain M25.551    Diabetes mellitus type 2, uncontrolled [id number]    Essential hypertension, benign I10    Hyperlipidemia with target LDL less than 100 E78.5    Dizzy spells R42    Troponin level elevated R77.8    Shortness of breath R06.02    Chest pressure R07.89    Acute respiratory insufficiency R06.89    Pneumonia J18.9    History of pulmonary embolus (PE) Z86.711    Acute left-sided CHF (congestive heart failure) (HCC) I50.1    Type I diabetes mellitus, uncontrolled [id number]    Cellulitis and abscess of right lower extremity L03.115, L02.415    Cellulitis and abscess of left lower extremity L03.116, L02.416 Chronic venous insufficiency of lower extremity I87.2    Acute hypoxemic respiratory failure due to COVID-19 ([facility]) U07.1, J96.01    Acute on chronic respiratory failure with hypoxia ([facility]) J96.21    Acute respiratory failure with hypoxia ([facility]) J96.01    COVID-19 virus infection U07.1    Hyperlipidemia E78.5    Pneumonia due to COVID-19 virus U07.1, J12.82    Orthostatic hypotension I95.1    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 ([facility]) I70.244    PVD (peripheral vascular disease) ([facility]) I73.9    PAD (peripheral artery disease) ([facility]) I73.9    Diabetic ulcer of right heel associated with type 2 diabetes mellitus, with fat layer exposed ([address] E11.621, L97.412    Diabetic ulcer of left heel ([facility]) E11.621, L97.429    Sepsis ([address] A41.9    Diabetic foot infection ([facility]) E11.628, L08.9    Foot ulcer due to secondary DM ([address] E13.621, L97.509    Ulcer of left lower extremity with fat layer exposed ([address] C48.579    Ulcer of left foot ([address] L97.529    Pressure injury of sacral region, stage 1 L89.151       Isolation/Infection:   Isolation            No Isolation          Patient Infection Status       Infection Onset Added Last Indicated Last Indicated By Review Planned Expiration Resolved Resolved By    None active    Resolved    COVID-19 (Rule Out) 22 COVID-19, Rapid (Ordered)   22 Rule-Out Test Resulted    MRSA 21 Culture, Blood 2   03/15/22 Cholo Jordan RN    MRSA blood 2021    COVID-19 21 Covid-19 Ambulatory   21     COVID-19 (Rule Out) 21 COVID-19, Rapid (Ordered)   21 Rule-Out Test Resulted    COVID-19 (Rule Out) 10/16/20 10/16/20 10/16/20 Covid-19 Ambulatory (Ordered)   10/17/20 Rule-Out Test Resulted    COVID-19 (Rule Out) 20 Covid-19 Ambulatory (Ordered)   20 Rule-Out Test Resulted    COVID-19 (Rule Out) 20 08/12/20 08/12/20 Covid-19 Ambulatory (Ordered)   08/14/20 Rule-Out Test Resulted    COVID-19 (Rule Out) 07/28/20 07/29/20 07/28/20 Covid-19 Ambulatory (Ordered)   07/30/20 Rule-Out Test Resulted            Nurse Assessment:  Last Vital Signs: BP (!) 151/61   Pulse 78   Temp 97.1 °F (36.2 °C) (Temporal)   Resp 18   Ht 6' (1.829 m)   Wt 253 lb (114.8 kg)   SpO2 98%   BMI 34.31 kg/m²     Last documented pain score (0-10 scale): Pain Level: 3  Last Weight:   Wt Readings from Last 1 Encounters:   11/02/22 253 lb (114.8 kg)     Mental Status:  {IP PT MENTAL STATUS:20030}    IV Access:  { AGUSTINA IV ACCESS:562186709}    Nursing Mobility/ADLs:  Walking   {CHP DME RPTB:613751959}  Transfer  {CHP DME WZIW:237676687}  Bathing  {CHP DME BEJR:391454225}  Dressing  {CHP DME ZOYF:319503807}  Toileting  {CHP DME MDDS:264598566}  Feeding  {CHP DME KHAQ:862845128}  Med Admin  {CHP DME PAAO:007311657}  Med Delivery   { AGUSTINA MED Delivery:301365932}    Wound Care Documentation and Therapy:  Wound 01/05/22 Heel Left #1 (Active)   Wound Image   11/02/22 0840   Wound Etiology Diabetic Campbell 2 01/05/22 0943   Dressing Status New dressing applied 10/19/22 0938   Wound Cleansed Cleansed with saline 10/19/22 0938   Dressing/Treatment Honey gel/honey paste;ABD;Dry dressing 10/19/22 0938   Offloading for Diabetic Foot Ulcers Post op shoe 10/19/22 0938   Wound Length (cm) 3.1 cm 11/02/22 0840   Wound Width (cm) 2.7 cm 11/02/22 0840   Wound Depth (cm) 0.2 cm 11/02/22 0840   Wound Surface Area (cm^2) 8.37 cm^2 11/02/22 0840   Change in Wound Size % (l*w) 39.35 11/02/22 0840   Wound Volume (cm^3) 1.674 cm^3 11/02/22 0840   Wound Healing % 39 11/02/22 0840   Post-Procedure Length (cm) 3.1 cm 11/02/22 0857   Post-Procedure Width (cm) 2.7 cm 11/02/22 0857   Post-Procedure Depth (cm) 0.3 cm 11/02/22 0857   Post-Procedure Surface Area (cm^2) 8.37 cm^2 11/02/22 0857   Post-Procedure Volume (cm^3) 2.511 cm^3 11/02/22 0857   Wound Assessment Fibrin;Pink/red 11/02/22 0840   Drainage Amount Large 11/02/22 0840   Drainage Description Yellow 11/02/22 0840   Odor None 11/02/22 0840   Rashmi-wound Assessment Maceration 11/02/22 0840   Wound Thickness Description not for Pressure Injury Full thickness 01/19/22 0909   Number of days: 300       Wound 01/05/22 Heel Right #2 (Active)   Wound Image   11/02/22 0840   Wound Etiology Venous 01/05/22 0943   Dressing Status New dressing applied 10/19/22 0938   Wound Cleansed Cleansed with saline 10/19/22 0938   Dressing/Treatment ABD;Dry dressing;Honey gel/honey paste 10/19/22 0938   Offloading for Diabetic Foot Ulcers Post op shoe 10/19/22 0938   Wound Length (cm) 2 cm 11/02/22 0840   Wound Width (cm) 1.8 cm 11/02/22 0840   Wound Depth (cm) 0.2 cm 11/02/22 0840   Wound Surface Area (cm^2) 3.6 cm^2 11/02/22 0840   Change in Wound Size % (l*w) 15.29 11/02/22 0840   Wound Volume (cm^3) 0.72 cm^3 11/02/22 0840   Wound Healing % 15 11/02/22 0840   Post-Procedure Length (cm) 2 cm 11/02/22 0857   Post-Procedure Width (cm) 2 cm 11/02/22 0857   Post-Procedure Depth (cm) 0.3 cm 11/02/22 0857   Post-Procedure Surface Area (cm^2) 4 cm^2 11/02/22 0857   Post-Procedure Volume (cm^3) 1.2 cm^3 11/02/22 0857   Wound Assessment Fibrin;Pink/red 11/02/22 0840   Drainage Amount Moderate 11/02/22 0840   Drainage Description Yellow 11/02/22 0840   Odor None 11/02/22 0840   Rashmi-wound Assessment Maceration 11/02/22 0840   Wound Thickness Description not for Pressure Injury Full thickness 01/19/22 0909   Number of days: 300       Wound 04/13/22 Pretibial Distal;Left;Lateral #14 (blocked may 25)  (Active)   Wound Image   11/02/22 0840   Dressing Status New dressing applied 10/19/22 0938   Wound Cleansed Cleansed with saline 10/19/22 0938   Dressing/Treatment ABD;Dry dressing;Honey gel/honey paste 10/19/22 0938   Offloading for Diabetic Foot Ulcers Post op shoe 10/19/22 0938   Wound Length (cm) 0.4 cm 11/02/22 0840   Wound Width (cm) 0.5 cm 11/02/22 0840   Wound Depth (cm) 0.1 cm 11/02/22 0840   Wound Surface Area (cm^2) 0.2 cm^2 11/02/22 0840   Change in Wound Size % (l*w) 97.92 11/02/22 0840   Wound Volume (cm^3) 0.02 cm^3 11/02/22 0840   Wound Healing % 98 11/02/22 0840   Post-Procedure Length (cm) 0.5 cm 09/21/22 1004   Post-Procedure Width (cm) 0.3 cm 09/21/22 1004   Post-Procedure Depth (cm) 0.2 cm 09/21/22 1004   Post-Procedure Surface Area (cm^2) 0.15 cm^2 09/21/22 1004   Post-Procedure Volume (cm^3) 0.03 cm^3 09/21/22 1004   Wound Assessment Pink/red 11/02/22 0840   Drainage Amount Scant 11/02/22 0840   Drainage Description Yellow 11/02/22 0840   Odor None 11/02/22 0840   Rashmi-wound Assessment Dry/flaky 11/02/22 0840   Number of days: 202       Wound 04/27/22 Tibial Left;Posterior #15 (Active)   Wound Image   11/02/22 0840   Dressing Status New dressing applied 10/19/22 0938   Wound Cleansed Cleansed with saline 10/19/22 0938   Dressing/Treatment ABD;Dry dressing; Pharmaceutical agent (see MAR) 10/19/22 0938   Offloading for Diabetic Foot Ulcers Post op shoe 10/19/22 0938   Wound Length (cm) 6 cm 11/02/22 0840   Wound Width (cm) 4.3 cm 11/02/22 0840   Wound Depth (cm) 0.3 cm 11/02/22 0840   Wound Surface Area (cm^2) 25.8 cm^2 11/02/22 0840   Change in Wound Size % (l*w) -83.76 11/02/22 0840   Wound Volume (cm^3) 7.74 cm^3 11/02/22 0840   Wound Healing % -10 11/02/22 0840   Post-Procedure Length (cm) 6 cm 11/02/22 0857   Post-Procedure Width (cm) 4.3 cm 11/02/22 0857   Post-Procedure Depth (cm) 0.4 cm 11/02/22 0857   Post-Procedure Surface Area (cm^2) 25.8 cm^2 11/02/22 0857   Post-Procedure Volume (cm^3) 10.32 cm^3 11/02/22 0857   Wound Assessment Fibrin;Pink/red 11/02/22 0840   Drainage Amount Large 11/02/22 0840   Drainage Description Yellow 11/02/22 0840   Odor None 11/02/22 0840   Rashmi-wound Assessment Maceration;Dry/flaky 11/02/22 0840   Number of days: 188        Elimination:  Continence:    Bowel: {YES / KO:75950}  Bladder: {YES / KE:23120}  Urinary Catheter: {Urinary Catheter:006840638}   Colostomy/Ileostomy/Ileal Conduit: {YES / PRATIK:56817}       Date of Last BM: ***  No intake or output data in the 24 hours ending 22 0900  No intake/output data recorded.     Safety Concerns:     [address] B[address]RI[address] Safety Concerns:131301974}    Impairments/Disabilities:      [address] [facility] Impairments/Disabilities:484113670}    Nutrition Therapy:  Current Nutrition Therapy:   [address] [facility] Diet List:549616757}    Routes of Feeding: {CHP DME Other Feedings:813233101}  Liquids: {Slp liquid thickness:74670}  Daily Fluid Restriction: {CHP DME Yes amt example:940177910}  Last Modified Barium Swallow with Video (Video Swallowing Test): {Done Not Done SHARRON:289570516}    Treatments at the Time of Hospital Discharge:   Respiratory Treatments: ***  Oxygen Therapy:  {Therapy; copd oxygen:51033}  Ventilator:    { CC Vent XIZO:359910846}    Rehab Therapies: {THERAPEUTIC INTERVENTION:4671903716}  Weight Bearing Status/Restrictions: [address]  Weight Bearin}  Other Medical Equipment (for information only, NOT a DME order):  {EQUIPMENT:068916958}  Other Treatments: ***    Patient's personal belongings (please select all that are sent with patient):  {P DME Belongings:585483123}    RN SIGNATURE:  {Esignature:527873644}    CASE MANAGEMENT/SOCIAL WORK SECTION    Inpatient Status Date: ***    Readmission Risk Assessment Score:  Readmission Risk              Risk of Unplanned Readmission:  0           Discharging to Facility/ Agency   Name:   Address:  Phone:  Fax:    Dialysis Facility (if applicable)   Name:  Address:  Dialysis Schedule:  Phone:  Fax:    / signature: {Esignature:641827668}    PHYSICIAN SECTION    Prognosis: {Prognosis:5333977688}    Condition at Discharge: [address] Patient Condition:094912480}    Rehab Potential (if transferring to Rehab): {Prognosis:4805091041}    Recommended Labs or Other Treatments After Discharge: ***    Physician Certification: I certify the above information and transfer of Melody Lagos.  is necessary for the continuing treatment of the diagnosis listed and that he requires {Admit to Appropriate Level of Care:28022} for {GREATER/LESS:839211506} 30 days.      Update Admission H&P: {CHP DME Changes in [facility]:526928127}    PHYSICIAN SIGNATURE:  {Esignature:721751031}

## 2022-11-10 NOTE — DISCHARGE INSTRUCTIONS
Visit Discharge/Physician Orders     Discharge condition: Stable     Assessment of pain at discharge:  minimal     Anesthetic used: 4% lidocaine solution     Discharge to: Home     Left via:Private automobile     Accompanied by: accompanied by SELF     ECF/[facility]:  [facility]. apply mycolog 2 cream every other day to dry scaly areas on legs  Dressing Orders:  Clean bilateral heel ulcers with NSS, apply medi-honey patch  Change every other day extra padding   to deep tissue skin injury on buttocks, leave cover patches off for now. No open wound. Continue to protect skin on buttocks with good skin barrier ointment. Encourage position changes at least every 2 hours, keep skin clean and dry. Assist patient when in bed to try to offload backside to relieve pressure off tailbone   left lower leg medihoney pad and dry dressing every other day     TO LEFT LEG ULCER,  apply medihoney pad change every other day- KEEP PRESSURE OFF WOUND AT ALL TIMES  APPLY PREVALON BOOT TO BILATERAL HEELS     Physical therapy for ambulating safely while wearing offloading boots as indicated. [address] [place] [address],[address] followup visit _______2week with Dr. Gastelum_____________________  (Please note your next appointment above and if you are unable to keep, kindly give a 24 hour notice. Thank you.)     Physician signature:__________________________        If you experience any of the following, please call the [address] [place] [address] Road during business hours:     * Increase in Pain  * Temperature over 101  * Increase in drainage from your wound  * Drainage with a foul odor  * Bleeding  * Increase in swelling  * Need for compression bandage changes due to slippage, breakthrough drainage.                   Wound 01/05/22 Heel Left #1 (Active)   Wound Image   11/02/22 0840   Wound Etiology Diabetic Campbell 2 01/05/22 0943   Dressing Status New dressing applied 11/02/22 1001   Wound Cleansed Cleansed with saline 11/02/22 1001   Dressing/Treatment Honey gel/honey paste;ABD;Dry dressing 11/02/22 1001   Offloading for Diabetic Foot Ulcers Post op shoe 11/02/22 1001   Wound Length (cm) 4 cm 11/16/22 0910   Wound Width (cm) 2.5 cm 11/16/22 0910   Wound Depth (cm) 0.2 cm 11/16/22 0910   Wound Surface Area (cm^2) 10 cm^2 11/16/22 0910   Change in Wound Size % (l*w) 27.54 11/16/22 0910   Wound Volume (cm^3) 2 cm^3 11/16/22 0910   Wound Healing % 28 11/16/22 0910   Post-Procedure Length (cm) 4 cm 11/16/22 0929   Post-Procedure Width (cm) 2.5 cm 11/16/22 0929   Post-Procedure Depth (cm) 0.3 cm 11/16/22 0929   Post-Procedure Surface Area (cm^2) 10 cm^2 11/16/22 0929   Post-Procedure Volume (cm^3) 3 cm^3 11/16/22 0929   Wound Assessment Fibrin;Pink/red 11/16/22 0910   Drainage Amount Large 11/16/22 0910   Drainage Description Yellow 11/16/22 0910   Odor None 11/16/22 0910   Rashmi-wound Assessment Maceration 11/16/22 0910   Wound Thickness Description not for Pressure Injury Full thickness 01/19/22 0909   Number of days: 315       Wound 01/05/22 Heel Right #2 (Active)   Wound Image   11/02/22 0840   Wound Etiology Venous 01/05/22 0943   Dressing Status New dressing applied 11/02/22 1001   Wound Cleansed Cleansed with saline 11/02/22 1001   Dressing/Treatment ABD;Dry dressing;Honey gel/honey paste 11/02/22 1001   Offloading for Diabetic Foot Ulcers Post op shoe 11/02/22 1001   Wound Length (cm) 1.3 cm 11/16/22 0910   Wound Width (cm) 1.5 cm 11/16/22 0910   Wound Depth (cm) 0.2 cm 11/16/22 0910   Wound Surface Area (cm^2) 1.95 cm^2 11/16/22 0910   Change in Wound Size % (l*w) 54.12 11/16/22 0910   Wound Volume (cm^3) 0.39 cm^3 11/16/22 0910   Wound Healing % 54 11/16/22 0910   Post-Procedure Length (cm) 1.4 cm 11/16/22 0929   Post-Procedure Width (cm) 1.5 cm 11/16/22 0929   Post-Procedure Depth (cm) 0.3 cm 11/16/22 0929   Post-Procedure Surface Area (cm^2) 2.1 cm^2 11/16/22 0929   Post-Procedure Volume (cm^3) 0.63 cm^3 11/16/22 0929   Wound Assessment Fibrin;Pink/red 11/16/22 0910 Drainage Amount Moderate 11/16/22 0910   Drainage Description Yellow 11/16/22 0910   Odor None 11/16/22 0910   Rashmi-wound Assessment Maceration 11/16/22 0910   Wound Thickness Description not for Pressure Injury Full thickness 01/19/22 0909   Number of days: 315       Wound 04/13/22 Pretibial Distal;Left;Lateral #14 (blocked may 25)  (Active)   Wound Image   11/02/22 0840   Dressing Status New dressing applied 11/02/22 1001   Wound Cleansed Cleansed with saline 11/02/22 1001   Dressing/Treatment ABD;Dry dressing;Honey gel/honey paste 11/02/22 1001   Offloading for Diabetic Foot Ulcers Post op shoe 11/02/22 1001   Wound Length (cm) 0.3 cm 11/16/22 0910   Wound Width (cm) 0.5 cm 11/16/22 0910   Wound Depth (cm) 0.1 cm 11/16/22 0910   Wound Surface Area (cm^2) 0.15 cm^2 11/16/22 0910   Change in Wound Size % (l*w) 98.44 11/16/22 0910   Wound Volume (cm^3) 0.015 cm^3 11/16/22 0910   Wound Healing % 98 11/16/22 0910   Post-Procedure Length (cm) 0.4 cm 11/16/22 0929   Post-Procedure Width (cm) 0.5 cm 11/16/22 0929   Post-Procedure Depth (cm) 0.2 cm 11/16/22 0929   Post-Procedure Surface Area (cm^2) 0.2 cm^2 11/16/22 0929   Post-Procedure Volume (cm^3) 0.04 cm^3 11/16/22 0929   Wound Assessment Pink/red 11/16/22 0910   Drainage Amount Small 11/16/22 0910   Drainage Description Yellow 11/16/22 0910   Odor None 11/16/22 0910   Rashmi-wound Assessment Dry/flaky 11/16/22 0910   Number of days: 217       Wound 04/27/22 Tibial Left;Posterior #15 (Active)   Wound Image   11/02/22 0840   Dressing Status New dressing applied 11/02/22 1001   Wound Cleansed Cleansed with saline 11/02/22 1001   Dressing/Treatment ABD;Dry dressing; Pharmaceutical agent (see MAR) 11/02/22 1001   Offloading for Diabetic Foot Ulcers Post op shoe 11/02/22 1001   Wound Length (cm) 5.6 cm 11/16/22 0910   Wound Width (cm) 4.3 cm 11/16/22 0910   Wound Depth (cm) 0.3 cm 11/16/22 0910   Wound Surface Area (cm^2) 24.08 cm^2 11/16/22 0910   Change in Wound Size %

## 2022-11-16 ENCOUNTER — HOSPITAL ENCOUNTER (OUTPATIENT)
Dept: WOUND CARE | Age: 87
Discharge: HOME | End: 2022-11-16
Payer: MEDICARE

## 2022-11-16 VITALS
HEIGHT: 72 IN | HEART RATE: 96 BPM | BODY MASS INDEX: 34.27 KG/M2 | TEMPERATURE: 97.6 F | WEIGHT: 253 LBS | RESPIRATION RATE: 20 BRPM | DIASTOLIC BLOOD PRESSURE: 59 MMHG | SYSTOLIC BLOOD PRESSURE: 156 MMHG

## 2022-11-16 DIAGNOSIS — E11.621 DIABETIC ULCER OF RIGHT HEEL ASSOCIATED WITH TYPE 2 DIABETES MELLITUS, WITH FAT LAYER EXPOSED (HCC): Primary | ICD-10-CM

## 2022-11-16 DIAGNOSIS — L97.421 DIABETIC ULCER OF LEFT HEEL ASSOCIATED WITH DIABETES MELLITUS DUE TO UNDERLYING CONDITION, LIMITED TO BREAKDOWN OF SKIN (HCC): ICD-10-CM

## 2022-11-16 DIAGNOSIS — E08.621 DIABETIC ULCER OF LEFT HEEL ASSOCIATED WITH DIABETES MELLITUS DUE TO UNDERLYING CONDITION, LIMITED TO BREAKDOWN OF SKIN (HCC): ICD-10-CM

## 2022-11-16 DIAGNOSIS — L97.412 DIABETIC ULCER OF RIGHT HEEL ASSOCIATED WITH TYPE 2 DIABETES MELLITUS, WITH FAT LAYER EXPOSED (HCC): Primary | ICD-10-CM

## 2022-11-16 DIAGNOSIS — E11.621 DIABETIC ULCER OF LEFT HEEL ASSOCIATED WITH TYPE 2 DIABETES MELLITUS, WITH FAT LAYER EXPOSED (HCC): ICD-10-CM

## 2022-11-16 DIAGNOSIS — L97.422 DIABETIC ULCER OF LEFT HEEL ASSOCIATED WITH TYPE 2 DIABETES MELLITUS, WITH FAT LAYER EXPOSED (HCC): ICD-10-CM

## 2022-11-16 PROCEDURE — 99213 OFFICE O/P EST LOW 20 MIN: CPT

## 2022-11-16 PROCEDURE — 6370000000 HC RX 637 (ALT 250 FOR IP): Performed by: PODIATRIST

## 2022-11-16 RX ORDER — LIDOCAINE HYDROCHLORIDE 20 MG/ML
JELLY TOPICAL ONCE
OUTPATIENT
Start: 2022-11-16 | End: 2022-11-16

## 2022-11-16 RX ORDER — CLOBETASOL PROPIONATE 0.5 MG/G
OINTMENT TOPICAL ONCE
OUTPATIENT
Start: 2022-11-16 | End: 2022-11-16

## 2022-11-16 RX ORDER — GINSENG 100 MG
CAPSULE ORAL ONCE
OUTPATIENT
Start: 2022-11-16 | End: 2022-11-16

## 2022-11-16 RX ORDER — GENTAMICIN SULFATE 1 MG/G
OINTMENT TOPICAL ONCE
OUTPATIENT
Start: 2022-11-16 | End: 2022-11-16

## 2022-11-16 RX ORDER — LIDOCAINE 50 MG/G
OINTMENT TOPICAL ONCE
OUTPATIENT
Start: 2022-11-16 | End: 2022-11-16

## 2022-11-16 RX ORDER — BACITRACIN ZINC AND POLYMYXIN B SULFATE 500; 1000 [USP'U]/G; [USP'U]/G
OINTMENT TOPICAL ONCE
OUTPATIENT
Start: 2022-11-16 | End: 2022-11-16

## 2022-11-16 RX ORDER — CLOTRIMAZOLE AND BETAMETHASONE DIPROPIONATE 10; .64 MG/G; MG/G
CREAM TOPICAL
Qty: 45 G | Refills: 2 | Status: SHIPPED | OUTPATIENT
Start: 2022-11-16

## 2022-11-16 RX ORDER — LIDOCAINE 40 MG/G
CREAM TOPICAL ONCE
OUTPATIENT
Start: 2022-11-16 | End: 2022-11-16

## 2022-11-16 RX ORDER — BACITRACIN, NEOMYCIN, POLYMYXIN B 400; 3.5; 5 [USP'U]/G; MG/G; [USP'U]/G
OINTMENT TOPICAL ONCE
OUTPATIENT
Start: 2022-11-16 | End: 2022-11-16

## 2022-11-16 RX ORDER — LIDOCAINE HYDROCHLORIDE 40 MG/ML
SOLUTION TOPICAL ONCE
OUTPATIENT
Start: 2022-11-16 | End: 2022-11-16

## 2022-11-16 RX ORDER — LIDOCAINE HYDROCHLORIDE 40 MG/ML
SOLUTION TOPICAL ONCE
Status: COMPLETED | OUTPATIENT
Start: 2022-11-16 | End: 2022-11-16

## 2022-11-16 RX ORDER — BETAMETHASONE DIPROPIONATE 0.05 %
OINTMENT (GRAM) TOPICAL ONCE
OUTPATIENT
Start: 2022-11-16 | End: 2022-11-16

## 2022-11-16 RX ADMIN — LIDOCAINE HYDROCHLORIDE: 40 SOLUTION TOPICAL at 09:16

## 2022-11-16 NOTE — PROGRESS NOTES
W[facility]aling Center /Hyperbarics   History and Physical/Consultation  General Surgery/Medicine    Referring Physician : Irma Blake MD  Kimberli Salas. MEDICAL RECORD NUMBER:  [id number]  AGE: 80 y.o. GENDER: male  : 1932  EPISODE DATE:  2022  Subjective:     Chief Complaint   Patient presents with    Wound Check     Bilateral lower extremities          HISTORY of PRESENT ILLNESS HPI     Kimberli Ventura is a 80 y.o. male who presents today for wound/ulcer evaluation. History of Wound Context:  The patient has had a wound of sacral region which was first noted approximately 4 weeks. This has been treated with marty. On their initial visit to the wound healing center,  the patient has noted that the wound has not been improving. The patient has not had similar previous wounds in the past.      Pt is not on abx at time of initial visit.       Wound/Ulcer Pain Timing/Severity: intermittent  Quality of pain: aching  Severity:  5 / 10   Modifying Factors: Pain is relieved/improved with rest  Associated Signs/Symptoms: erythema    Ulcer Identification:  Ulcer Type: pressure  Contributing Factors: chronic pressure and obesity    Diabetic/Pressure/Non Pressure Ulcers only:  Ulcer:     Wound:         PAST MEDICAL HISTORY      Diagnosis Date    Arthritis     Cancer (HCC)     breast    Cellulitis     CHF (congestive heart failure) ([facility])     CKD (chronic kidney disease) stage 3, GFR 30-59 ml/min ([facility])     Diabetes mellitus ([facility] [address]     History of lumpectomy     Hx of blood clots     Hyperlipidemia     Hypertension     Left ventricular failure (HCC)     Macular degeneration     Obesity     Orthostatic hypotension     PE (pulmonary thromboembolism) ([facility])     Pressure injury of both heels, unstageable ([facility])     Staph aureus infection     Venous insufficiency      Past Surgical History:   Procedure Laterality Date    [facility].  PICC POWERPIC SINGLE  2021         MASTECTOMY      TOE AMPUTATION      TOE SURGERY      TONSILLECTOMY       History reviewed. No pertinent family history. Social History     Tobacco Use    Smoking status: Never    Smokeless tobacco: Never   Vaping Use    Vaping Use: Never used   Substance Use Topics    Alcohol use: Not Currently     Alcohol/week: 1.0 standard drink     Types: 1 Cans of beer per week    Drug use: No     Allergies   Allergen Reactions    Clindamycin/Lincomycin     Sulfa Antibiotics      Current Outpatient Medications on File Prior to Encounter   Medication Sig Dispense Refill    fexofenadine (ALLEGRA) 180 MG tablet Take 180 mg by mouth daily      meloxicam (MOBIC) 7.5 MG tablet Take 7.5 mg by mouth daily      apixaban (ELIQUIS) 2.5 MG TABS tablet Take 1 tablet by mouth 2 times daily (before meals) 60 tablet 2    docusate sodium (COLACE) 100 MG capsule Take 200 mg by mouth nightly       tamsulosin (FLOMAX) 0.4 MG capsule Take 0.4 mg by mouth daily      bisacodyl (DULCOLAX) 10 MG suppository Place 10 mg rectally daily as needed for Constipation      diphenhydrAMINE-zinc acetate (BENADRYL) 1-0.1 % cream Apply topically every 4 hours as needed for Itching Apply topically 3 times daily as needed. Sodium Phosphates (FLEET) 7-19 GM/118ML Place 1 enema rectally daily as needed      magnesium hydroxide (MILK OF MAGNESIA) 400 MG/5ML suspension Take 30 mLs by mouth daily as needed for Constipation      Polyethylene Glycol 400 1 % SOLN Apply 1 drop to eye every 4 hours as needed      ferrous sulfate (IRON 325) 325 (65 Fe) MG tablet Take 325 mg by mouth daily       zinc sulfate (ZINCATE) 220 (50 Zn) MG capsule Take 1 capsule by mouth daily for 14 days 14 capsule 0    miconazole (MICOTIN) 2 % powder Apply topically 2 times daily.  45 g 1    ascorbic acid (VITAMIN C) 500 MG tablet Take 1 tablet by mouth 2 times daily 30 tablet 0    Vitamin D (CHOLECALCIFEROL) 50 MCG (2000 UT) TABS tablet Take 1 tablet by mouth daily 60 tablet 0    furosemide (LASIX) 40 MG tablet Take 20 mg by mouth Readings from Last 3 Encounters:   11/16/22 253 lb (114.8 kg)   11/02/22 253 lb (114.8 kg)   10/19/22 253 lb (114.8 kg)     10-5-22  Debrided, cont tx and care    PHYSICAL EXAM  CONSTITUTIONAL:   Awake, alert, cooperative     PSYCHIATRIC :  Oriented to time, place and person        normal insight to disease process  ENT:  External ears and nose without lesions      Hearing deficit is not noted  NECK: Supple, symmetrical, trachea midline      Thyroid goiter not appreciated     LUNGS:  No increased work of breathing                    Clear to auscultation bilaterally     CARDIOVASCULAR:  regular rate and rhythm     ABDOMEN:  soft, non-distended, non-tender      Hernias is not noted  Lymphatics : Cervical lymphadenopathy is not noted     Femoral lymphadenopathy is not noted  SKIN:   Skin color is normal   Texture and turgor is  normal   Induration is not noted  EXTREMITIES:   R UE Edema is not noted  L UE Edema is not noted  R LE Edema is not noted  L LE Edema is not noted  Assessment:     Problem List Items Addressed This Visit       Diabetic ulcer of right heel associated with type 2 diabetes mellitus, with fat layer exposed ([address] - Primary    Relevant Orders    Initiate Outpatient Wound Care Protocol    Diabetic ulcer of left heel ([address]    Relevant Orders    Initiate Outpatient Wound Care Protocol       Pre Debridement Measurements:  Are located in the [facility]  Documentation Flow Sheet  Post Debridement Measurements:  Wound/Ulcer Descriptions are Pre Debridement except measurements:     Wound 01/05/22 Heel Left #1 (Active)   Wound Image   11/02/22 0840   Wound Etiology Diabetic Campbell 2 01/05/22 0943   Dressing Status New dressing applied 11/02/22 1001   Wound Cleansed Cleansed with saline 11/02/22 1001   Dressing/Treatment Honey gel/honey paste;ABD;Dry dressing 11/02/22 1001   Offloading for Diabetic Foot Ulcers Post op shoe 11/02/22 1001   Wound Length (cm) 4 cm 11/16/22 0910   Wound Width (cm) 2.5 cm 11/16/22 1640   Wound Depth (cm) 0.2 cm 11/16/22 0910   Wound Surface Area (cm^2) 10 cm^2 11/16/22 0910   Change in Wound Size % (l*w) 27.54 11/16/22 0910   Wound Volume (cm^3) 2 cm^3 11/16/22 0910   Wound Healing % 28 11/16/22 0910   Post-Procedure Length (cm) 4 cm 11/16/22 0929   Post-Procedure Width (cm) 2.5 cm 11/16/22 0929   Post-Procedure Depth (cm) 0.3 cm 11/16/22 0929   Post-Procedure Surface Area (cm^2) 10 cm^2 11/16/22 0929   Post-Procedure Volume (cm^3) 3 cm^3 11/16/22 0929   Wound Assessment Fibrin;Pink/red 11/16/22 0910   Drainage Amount Large 11/16/22 0910   Drainage Description Yellow 11/16/22 0910   Odor None 11/16/22 0910   Rashmi-wound Assessment Maceration 11/16/22 0910   Wound Thickness Description not for Pressure Injury Full thickness 01/19/22 0909   Number of days: 315       Wound 01/05/22 Heel Right #2 (Active)   Wound Image   11/02/22 0840   Wound Etiology Venous 01/05/22 0943   Dressing Status New dressing applied 11/02/22 1001   Wound Cleansed Cleansed with saline 11/02/22 1001   Dressing/Treatment ABD;Dry dressing;Honey gel/honey paste 11/02/22 1001   Offloading for Diabetic Foot Ulcers Post op shoe 11/02/22 1001   Wound Length (cm) 1.3 cm 11/16/22 0910   Wound Width (cm) 1.5 cm 11/16/22 0910   Wound Depth (cm) 0.2 cm 11/16/22 0910   Wound Surface Area (cm^2) 1.95 cm^2 11/16/22 0910   Change in Wound Size % (l*w) 54.12 11/16/22 0910   Wound Volume (cm^3) 0.39 cm^3 11/16/22 0910   Wound Healing % 54 11/16/22 0910   Post-Procedure Length (cm) 1.4 cm 11/16/22 0929   Post-Procedure Width (cm) 1.5 cm 11/16/22 0929   Post-Procedure Depth (cm) 0.3 cm 11/16/22 0929   Post-Procedure Surface Area (cm^2) 2.1 cm^2 11/16/22 0929   Post-Procedure Volume (cm^3) 0.63 cm^3 11/16/22 0929   Wound Assessment Fibrin;Pink/red 11/16/22 0910   Drainage Amount Moderate 11/16/22 0910   Drainage Description Yellow 11/16/22 0910   Odor None 11/16/22 0910   Rashmi-wound Assessment Maceration 11/16/22 0910   Wound Thickness Description not for Pressure Injury Full thickness 01/19/22 0909   Number of days: 315       Wound 04/13/22 Pretibial Distal;Left;Lateral #14 (blocked may 25)  (Active)   Wound Image   11/02/22 0840   Dressing Status New dressing applied 11/02/22 1001   Wound Cleansed Cleansed with saline 11/02/22 1001   Dressing/Treatment ABD;Dry dressing;Honey gel/honey paste 11/02/22 1001   Offloading for Diabetic Foot Ulcers Post op shoe 11/02/22 1001   Wound Length (cm) 0.3 cm 11/16/22 0910   Wound Width (cm) 0.5 cm 11/16/22 0910   Wound Depth (cm) 0.1 cm 11/16/22 0910   Wound Surface Area (cm^2) 0.15 cm^2 11/16/22 0910   Change in Wound Size % (l*w) 98.44 11/16/22 0910   Wound Volume (cm^3) 0.015 cm^3 11/16/22 0910   Wound Healing % 98 11/16/22 0910   Post-Procedure Length (cm) 0.4 cm 11/16/22 0929   Post-Procedure Width (cm) 0.5 cm 11/16/22 0929   Post-Procedure Depth (cm) 0.2 cm 11/16/22 0929   Post-Procedure Surface Area (cm^2) 0.2 cm^2 11/16/22 0929   Post-Procedure Volume (cm^3) 0.04 cm^3 11/16/22 0929   Wound Assessment Pink/red 11/16/22 0910   Drainage Amount Small 11/16/22 0910   Drainage Description Yellow 11/16/22 0910   Odor None 11/16/22 0910   Rashmi-wound Assessment Dry/flaky 11/16/22 0910   Number of days: 217       Wound 04/27/22 Tibial Left;Posterior #15 (Active)   Wound Image   11/02/22 0840   Dressing Status New dressing applied 11/02/22 1001   Wound Cleansed Cleansed with saline 11/02/22 1001   Dressing/Treatment ABD;Dry dressing; Pharmaceutical agent (see MAR) 11/02/22 1001   Offloading for Diabetic Foot Ulcers Post op shoe 11/02/22 1001   Wound Length (cm) 5.6 cm 11/16/22 0910   Wound Width (cm) 4.3 cm 11/16/22 0910   Wound Depth (cm) 0.3 cm 11/16/22 0910   Wound Surface Area (cm^2) 24.08 cm^2 11/16/22 0910   Change in Wound Size % (l*w) -71.51 11/16/22 0910   Wound Volume (cm^3) 7.224 cm^3 11/16/22 0910   Wound Healing % -3 11/16/22 0910   Post-Procedure Length (cm) 5.6 cm 11/16/22 0929   Post-Procedure Width (cm) 4.4 cm 11/16/22 0929   Post-Procedure Depth (cm) 0.4 cm 11/16/22 0929   Post-Procedure Surface Area (cm^2) 24.64 cm^2 11/16/22 0929   Post-Procedure Volume (cm^3) 9. 856 cm^3 11/16/22 0929   Wound Assessment Fibrin;Pink/red 11/16/22 0910   Drainage Amount Large 11/16/22 0910   Drainage Description Yellow 11/16/22 0910   Odor None 11/16/22 0910   Rashmi-wound Assessment Maceration;Dry/flaky 11/16/22 0910   Number of days: 203          Procedure Note  Indications:  Based on my examination of this patient's wound(s)/ulcer(s) today, debridement is not required to promote healing and evaluate the wound base. Performed by: Namita Ford DPM    A culture was done. 10-19-22  Debrided, all healing well     11-2-22  Debrided, cont tx and care    11-16-22  Debrided, healing well, medihoney pads qod with mycolog 2 to flaky intact skin     Debrided down to subq 160cm without incident  Plan:     Pt is not a smoker   en    In my professional opinion and based off the information that is available at this time this patient has appropriate indication for HBO Therapy: No    Treatment Note please see attached Discharge Instructions    Written patient dismissal instructions given to patient and signed by patient or POA. Discharge Instructions                Visit Discharge/Physician Orders     Discharge condition: Stable     Assessment of pain at discharge:  minimal     Anesthetic used: 4% lidocaine solution     Discharge to: Home     Left via:Private automobile     Accompanied by: accompanied by SELF     ECF/[facility]:  [facility]. apply mycolog 2 cream every other day to dry scaly areas on legs  Dressing Orders:  Clean bilateral heel ulcers with NSS, apply medi-honey patch  Change every other day extra padding   to deep tissue skin injury on buttocks, leave cover patches off for now. No open wound. Continue to protect skin on buttocks with good skin barrier ointment.  Encourage position changes at least every 2 hours, keep skin clean and dry. Assist patient when in bed to try to offload backside to relieve pressure off tailbone   left lower leg medihoney pad and dry dressing every other day     TO LEFT LEG ULCER,  apply medihoney pad change every other day- KEEP PRESSURE OFF WOUND AT ALL TIMES  APPLY PREVALON BOOT TO BILATERAL HEELS     Physical therapy for ambulating safely while wearing offloading boots as indicated. [facility] followup visit _______2week with Dr. Gastelum_____________________  (Please note your next appointment above and if you are unable to keep, kindly give a 24 hour notice. Thank you.)     Physician signature:__________________________        If you experience any of the following, please call the [address] [place] [address] Road during business hours:     * Increase in Pain  * Temperature over 101  * Increase in drainage from your wound  * Drainage with a foul odor  * Bleeding  * Increase in swelling  * Need for compression bandage changes due to slippage, breakthrough drainage.                   Wound 01/05/22 Heel Left #1 (Active)   Wound Image   11/02/22 0840   Wound Etiology Diabetic Campbell 2 01/05/22 0943   Dressing Status New dressing applied 11/02/22 1001   Wound Cleansed Cleansed with saline 11/02/22 1001   Dressing/Treatment Honey gel/honey paste;ABD;Dry dressing 11/02/22 1001   Offloading for Diabetic Foot Ulcers Post op shoe 11/02/22 1001   Wound Length (cm) 4 cm 11/16/22 0910   Wound Width (cm) 2.5 cm 11/16/22 0910   Wound Depth (cm) 0.2 cm 11/16/22 0910   Wound Surface Area (cm^2) 10 cm^2 11/16/22 0910   Change in Wound Size % (l*w) 27.54 11/16/22 0910   Wound Volume (cm^3) 2 cm^3 11/16/22 0910   Wound Healing % 28 11/16/22 0910   Post-Procedure Length (cm) 4 cm 11/16/22 0929   Post-Procedure Width (cm) 2.5 cm 11/16/22 0929   Post-Procedure Depth (cm) 0.3 cm 11/16/22 0929   Post-Procedure Surface Area (cm^2) 10 cm^2 11/16/22 0929   Post-Procedure Volume (cm^3) 3 cm^3 11/16/22 7503 Wound Assessment Fibrin;Pink/red 11/16/22 0910   Drainage Amount Large 11/16/22 0910   Drainage Description Yellow 11/16/22 0910   Odor None 11/16/22 0910   Rashmi-wound Assessment Maceration 11/16/22 0910   Wound Thickness Description not for Pressure Injury Full thickness 01/19/22 0909   Number of days: 315       Wound 01/05/22 Heel Right #2 (Active)   Wound Image   11/02/22 0840   Wound Etiology Venous 01/05/22 0943   Dressing Status New dressing applied 11/02/22 1001   Wound Cleansed Cleansed with saline 11/02/22 1001   Dressing/Treatment ABD;Dry dressing;Honey gel/honey paste 11/02/22 1001   Offloading for Diabetic Foot Ulcers Post op shoe 11/02/22 1001   Wound Length (cm) 1.3 cm 11/16/22 0910   Wound Width (cm) 1.5 cm 11/16/22 0910   Wound Depth (cm) 0.2 cm 11/16/22 0910   Wound Surface Area (cm^2) 1.95 cm^2 11/16/22 0910   Change in Wound Size % (l*w) 54.12 11/16/22 0910   Wound Volume (cm^3) 0.39 cm^3 11/16/22 0910   Wound Healing % 54 11/16/22 0910   Post-Procedure Length (cm) 1.4 cm 11/16/22 0929   Post-Procedure Width (cm) 1.5 cm 11/16/22 0929   Post-Procedure Depth (cm) 0.3 cm 11/16/22 0929   Post-Procedure Surface Area (cm^2) 2.1 cm^2 11/16/22 0929   Post-Procedure Volume (cm^3) 0.63 cm^3 11/16/22 0929   Wound Assessment Fibrin;Pink/red 11/16/22 0910   Drainage Amount Moderate 11/16/22 0910   Drainage Description Yellow 11/16/22 0910   Odor None 11/16/22 0910   Rashmi-wound Assessment Maceration 11/16/22 0910   Wound Thickness Description not for Pressure Injury Full thickness 01/19/22 0909   Number of days: 315       Wound 04/13/22 Pretibial Distal;Left;Lateral #14 (blocked may 25)  (Active)   Wound Image   11/02/22 0840   Dressing Status New dressing applied 11/02/22 1001   Wound Cleansed Cleansed with saline 11/02/22 1001   Dressing/Treatment ABD;Dry dressing;Honey gel/honey paste 11/02/22 1001   Offloading for Diabetic Foot Ulcers Post op shoe 11/02/22 1001   Wound Length (cm) 0.3 cm 11/16/22 0910 Wound Width (cm) 0.5 cm 11/16/22 0910   Wound Depth (cm) 0.1 cm 11/16/22 0910   Wound Surface Area (cm^2) 0.15 cm^2 11/16/22 0910   Change in Wound Size % (l*w) 98.44 11/16/22 0910   Wound Volume (cm^3) 0.015 cm^3 11/16/22 0910   Wound Healing % 98 11/16/22 0910   Post-Procedure Length (cm) 0.4 cm 11/16/22 0929   Post-Procedure Width (cm) 0.5 cm 11/16/22 0929   Post-Procedure Depth (cm) 0.2 cm 11/16/22 0929   Post-Procedure Surface Area (cm^2) 0.2 cm^2 11/16/22 0929   Post-Procedure Volume (cm^3) 0.04 cm^3 11/16/22 0929   Wound Assessment Pink/red 11/16/22 0910   Drainage Amount Small 11/16/22 0910   Drainage Description Yellow 11/16/22 0910   Odor None 11/16/22 0910   Rashmi-wound Assessment Dry/flaky 11/16/22 0910   Number of days: 217       Wound 04/27/22 Tibial Left;Posterior #15 (Active)   Wound Image   11/02/22 0840   Dressing Status New dressing applied 11/02/22 1001   Wound Cleansed Cleansed with saline 11/02/22 1001   Dressing/Treatment ABD;Dry dressing; Pharmaceutical agent (see MAR) 11/02/22 1001   Offloading for Diabetic Foot Ulcers Post op shoe 11/02/22 1001   Wound Length (cm) 5.6 cm 11/16/22 0910   Wound Width (cm) 4.3 cm 11/16/22 0910   Wound Depth (cm) 0.3 cm 11/16/22 0910   Wound Surface Area (cm^2) 24.08 cm^2 11/16/22 0910   Change in Wound Size % (l*w) -71.51 11/16/22 0910   Wound Volume (cm^3) 7.224 cm^3 11/16/22 0910   Wound Healing % -3 11/16/22 0910   Post-Procedure Length (cm) 5.6 cm 11/16/22 0929   Post-Procedure Width (cm) 4.4 cm 11/16/22 0929   Post-Procedure Depth (cm) 0.4 cm 11/16/22 0929   Post-Procedure Surface Area (cm^2) 24.64 cm^2 11/16/22 0929   Post-Procedure Volume (cm^3) 9. 856 cm^3 11/16/22 0929   Wound Assessment Fibrin;Pink/red 11/16/22 0910   Drainage Amount Large 11/16/22 0910   Drainage Description Yellow 11/16/22 0910   Odor None 11/16/22 0910   Rashmi-wound Assessment Maceration;Dry/flaky 11/16/22 0910   Number of days: 203           Electronically signed by Sudha Lujan Mariella Otoole DPM on 11/16/2022 at 9:32 AM

## 2022-11-16 NOTE — DISCHARGE INSTR - COC
Continuity of Care Form    Patient Name: Darell Luna :  1932  MRN:  [id number]    Admit date:  2022  Discharge date:  ***    Code Status Order: Prior   Advance Directives:     Admitting Physician:  No admitting provider for patient encounter. PCP: Hema Paiz MD    Discharging Nurse: [facility] Unit/Room#: No information available for this encounter. Discharging Unit Phone Number: ***    Emergency Contact:   Extended Emergency Contact Information  Primary Emergency Contact: [address] Phone: [phone]  Mobile Phone: [phone]  Relation: Child   needed? No  Secondary Emergency Contact: Claude Lizarraga [place] [address] Phone: [phone]  Relation: Child    Past Surgical History:  Past Surgical History:   Procedure Laterality Date    [facility]  PICC [address] [facility] [address] SINGLE  2021         MASTECTOMY      TOE AMPUTATION      TOE SURGERY      TONSILLECTOMY         Immunization History:   Immunization History   Administered Date(s) Administered    Influenza Virus Vaccine 10/23/2016    Pneumococcal Conjugate Vaccine 2013    Td, unspecified formulation 2012       Active Problems:  Patient Active Problem List   Diagnosis Code    HTN (hypertension) I10    Breast cancer, male ([facility] [address] C50.929    Obesity (BMI 30.0-34. 9) E66.9    Right hip pain M25.551    Diabetes mellitus type 2, uncontrolled [id number]    Essential hypertension, benign I10    Hyperlipidemia with target LDL less than 100 E78.5    Dizzy spells R42    Troponin level elevated R77.8    Shortness of breath R06.02    Chest pressure R07.89    Acute respiratory insufficiency R06.89    Pneumonia J18.9    History of pulmonary embolus (PE) Z86.711    Acute left-sided CHF (congestive heart failure) (HCC) I50.1    Type I diabetes mellitus, uncontrolled [id number]    Cellulitis and abscess of right lower extremity L03.115, L02.415    Cellulitis and abscess of left lower extremity L03.116, L02.416 Chronic venous insufficiency of lower extremity I87.2    Acute hypoxemic respiratory failure due to COVID-19 ([facility]) U07.1, J96.01    Acute on chronic respiratory failure with hypoxia ([facility]) J96.21    Acute respiratory failure with hypoxia ([facility]) J96.01    COVID-19 virus infection U07.1    Hyperlipidemia E78.5    Pneumonia due to COVID-19 virus U07.1, J12.82    Orthostatic hypotension I95.1    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 ([facility]) I70.244    PVD (peripheral vascular disease) ([facility]) I73.9    PAD (peripheral artery disease) ([facility]) I73.9    Diabetic ulcer of right heel associated with type 2 diabetes mellitus, with fat layer exposed ([address] E11.621, L97.412    Diabetic ulcer of left heel ([facility]) E11.621, L97.429    Sepsis ([address] A41.9    Diabetic foot infection ([facility]) E11.628, L08.9    Foot ulcer due to secondary DM ([address] E13.621, L97.509    Ulcer of left lower extremity with fat layer exposed ([address] F49.115    Ulcer of left foot ([address] L97.529    Pressure injury of sacral region, stage 1 L89.151       Isolation/Infection:   Isolation            No Isolation          Patient Infection Status       Infection Onset Added Last Indicated Last Indicated By Review Planned Expiration Resolved Resolved By    None active    Resolved    COVID-19 (Rule Out) 22 COVID-19, Rapid (Ordered)   22 Rule-Out Test Resulted    MRSA 21 Culture, Blood 2   03/15/22 Andres Quiroga RN    MRSA blood 2021    COVID-19 21 Covid-19 Ambulatory   21     COVID-19 (Rule Out) 21 COVID-19, Rapid (Ordered)   21 Rule-Out Test Resulted    COVID-19 (Rule Out) 10/16/20 10/16/20 10/16/20 Covid-19 Ambulatory (Ordered)   10/17/20 Rule-Out Test Resulted    COVID-19 (Rule Out) 20 Covid-19 Ambulatory (Ordered)   20 Rule-Out Test Resulted    COVID-19 (Rule Out) 20 08/12/20 08/12/20 Covid-19 Ambulatory (Ordered)   08/14/20 Rule-Out Test Resulted    COVID-19 (Rule Out) 07/28/20 07/29/20 07/28/20 Covid-19 Ambulatory (Ordered)   07/30/20 Rule-Out Test Resulted            Nurse Assessment:  Last Vital Signs: BP (!) 156/59   Pulse 96   Temp 97.6 °F (36.4 °C) (Temporal)   Resp 20   Ht 6' (1.829 m)   Wt 253 lb (114.8 kg)   BMI 34.31 kg/m²     Last documented pain score (0-10 scale):    Last Weight:   Wt Readings from Last 1 Encounters:   11/16/22 253 lb (114.8 kg)     Mental Status:  {IP PT MENTAL STATUS:20030}    IV Access:  { AGUSTINA IV ACCESS:924462931}    Nursing Mobility/ADLs:  Walking   {CHP DME PXCE:550431460}  Transfer  {CHP DME IUII:841236261}  Bathing  {CHP DME UXMX:785223996}  Dressing  {CHP DME ZAFN:023967704}  Toileting  {CHP DME QELA:939702875}  Feeding  {CHP DME WJVU:416183721}  Med Admin  {CHP DME VCLX:257610263}  Med Delivery   { AGUSTINA MED Delivery:912566035}    Wound Care Documentation and Therapy:  Wound 01/05/22 Heel Left #1 (Active)   Wound Image   11/02/22 0840   Wound Etiology Diabetic Campbell 2 01/05/22 0943   Dressing Status New dressing applied 11/02/22 1001   Wound Cleansed Cleansed with saline 11/02/22 1001   Dressing/Treatment Honey gel/honey paste;ABD;Dry dressing 11/02/22 1001   Offloading for Diabetic Foot Ulcers Post op shoe 11/02/22 1001   Wound Length (cm) 4 cm 11/16/22 0910   Wound Width (cm) 2.5 cm 11/16/22 0910   Wound Depth (cm) 0.2 cm 11/16/22 0910   Wound Surface Area (cm^2) 10 cm^2 11/16/22 0910   Change in Wound Size % (l*w) 27.54 11/16/22 0910   Wound Volume (cm^3) 2 cm^3 11/16/22 0910   Wound Healing % 28 11/16/22 0910   Post-Procedure Length (cm) 4 cm 11/16/22 0929   Post-Procedure Width (cm) 2.5 cm 11/16/22 0929   Post-Procedure Depth (cm) 0.3 cm 11/16/22 0929   Post-Procedure Surface Area (cm^2) 10 cm^2 11/16/22 0929   Post-Procedure Volume (cm^3) 3 cm^3 11/16/22 0929   Wound Assessment Fibrin;Pink/red 11/16/22 0910   Drainage Amount Large 11/16/22 0910   Drainage Description Yellow 11/16/22 0910   Odor None 11/16/22 0910   Rashmi-wound Assessment Maceration 11/16/22 0910   Wound Thickness Description not for Pressure Injury Full thickness 01/19/22 0909   Number of days: 315       Wound 01/05/22 Heel Right #2 (Active)   Wound Image   11/02/22 0840   Wound Etiology Venous 01/05/22 0943   Dressing Status New dressing applied 11/02/22 1001   Wound Cleansed Cleansed with saline 11/02/22 1001   Dressing/Treatment ABD;Dry dressing;Honey gel/honey paste 11/02/22 1001   Offloading for Diabetic Foot Ulcers Post op shoe 11/02/22 1001   Wound Length (cm) 1.3 cm 11/16/22 0910   Wound Width (cm) 1.5 cm 11/16/22 0910   Wound Depth (cm) 0.2 cm 11/16/22 0910   Wound Surface Area (cm^2) 1.95 cm^2 11/16/22 0910   Change in Wound Size % (l*w) 54.12 11/16/22 0910   Wound Volume (cm^3) 0.39 cm^3 11/16/22 0910   Wound Healing % 54 11/16/22 0910   Post-Procedure Length (cm) 1.4 cm 11/16/22 0929   Post-Procedure Width (cm) 1.5 cm 11/16/22 0929   Post-Procedure Depth (cm) 0.3 cm 11/16/22 0929   Post-Procedure Surface Area (cm^2) 2.1 cm^2 11/16/22 0929   Post-Procedure Volume (cm^3) 0.63 cm^3 11/16/22 0929   Wound Assessment Fibrin;Pink/red 11/16/22 0910   Drainage Amount Moderate 11/16/22 0910   Drainage Description Yellow 11/16/22 0910   Odor None 11/16/22 0910   Rashmi-wound Assessment Maceration 11/16/22 0910   Wound Thickness Description not for Pressure Injury Full thickness 01/19/22 0909   Number of days: 315       Wound 04/13/22 Pretibial Distal;Left;Lateral #14 (blocked may 25)  (Active)   Wound Image   11/02/22 0840   Dressing Status New dressing applied 11/02/22 1001   Wound Cleansed Cleansed with saline 11/02/22 1001   Dressing/Treatment ABD;Dry dressing;Honey gel/honey paste 11/02/22 1001   Offloading for Diabetic Foot Ulcers Post op shoe 11/02/22 1001   Wound Length (cm) 0.3 cm 11/16/22 0910   Wound Width (cm) 0.5 cm 11/16/22 0910   Wound Depth (cm) 0.1 cm 11/16/22 0910   Wound Surface Area (cm^2) 0.15 cm^2 11/16/22 0910   Change in Wound Size % (l*w) 98.44 11/16/22 0910   Wound Volume (cm^3) 0.015 cm^3 11/16/22 0910   Wound Healing % 98 11/16/22 0910   Post-Procedure Length (cm) 0.4 cm 11/16/22 0929   Post-Procedure Width (cm) 0.5 cm 11/16/22 0929   Post-Procedure Depth (cm) 0.2 cm 11/16/22 0929   Post-Procedure Surface Area (cm^2) 0.2 cm^2 11/16/22 0929   Post-Procedure Volume (cm^3) 0.04 cm^3 11/16/22 0929   Wound Assessment Pink/red 11/16/22 0910   Drainage Amount Small 11/16/22 0910   Drainage Description Yellow 11/16/22 0910   Odor None 11/16/22 0910   Rashmi-wound Assessment Dry/flaky 11/16/22 0910   Number of days: 217       Wound 04/27/22 Tibial Left;Posterior #15 (Active)   Wound Image   11/02/22 0840   Dressing Status New dressing applied 11/02/22 1001   Wound Cleansed Cleansed with saline 11/02/22 1001   Dressing/Treatment ABD;Dry dressing; Pharmaceutical agent (see MAR) 11/02/22 1001   Offloading for Diabetic Foot Ulcers Post op shoe 11/02/22 1001   Wound Length (cm) 5.6 cm 11/16/22 0910   Wound Width (cm) 4.3 cm 11/16/22 0910   Wound Depth (cm) 0.3 cm 11/16/22 0910   Wound Surface Area (cm^2) 24.08 cm^2 11/16/22 0910   Change in Wound Size % (l*w) -71.51 11/16/22 0910   Wound Volume (cm^3) 7.224 cm^3 11/16/22 0910   Wound Healing % -3 11/16/22 0910   Post-Procedure Length (cm) 5.6 cm 11/16/22 0929   Post-Procedure Width (cm) 4.4 cm 11/16/22 0929   Post-Procedure Depth (cm) 0.4 cm 11/16/22 0929   Post-Procedure Surface Area (cm^2) 24.64 cm^2 11/16/22 0929   Post-Procedure Volume (cm^3) 9. 856 cm^3 11/16/22 0929   Wound Assessment Fibrin;Pink/red 11/16/22 0910   Drainage Amount Large 11/16/22 0910   Drainage Description Yellow 11/16/22 0910   Odor None 11/16/22 0910   Rashmi-wound Assessment Maceration;Dry/flaky 11/16/22 0910   Number of days: 203        Elimination:  Continence:    Bowel: {YES / FV:17215}  Bladder: {YES / NL:32153}  Urinary Catheter: {Urinary Catheter:962930623}   Colostomy/Ileostomy/Ileal Conduit: {YES / HL:57431}       Date of Last BM: ***  No intake or output data in the 24 hours ending 22 0931  No intake/output data recorded.     Safety Concerns:     [address] BERRIOS Safety Concerns:164114808}    Impairments/Disabilities:      [address] AGUSTINA Impairments/Disabilities:527260633}    Nutrition Therapy:  Current Nutrition Therapy:   [address] [facility] Diet List:251416612}    Routes of Feeding: {CHP DME Other Feedings:864211131}  Liquids: {Slp liquid thickness:17058}  Daily Fluid Restriction: {CHP DME Yes amt example:565818407}  Last Modified Barium Swallow with Video (Video Swallowing Test): {Done Not Done HBRN:814259997}    Treatments at the Time of Hospital Discharge:   Respiratory Treatments: ***  Oxygen Therapy:  {Therapy; copd oxygen:42358}  Ventilator:    { CC Vent XIGD:223349246}    Rehab Therapies: {THERAPEUTIC INTERVENTION:8042789571}  Weight Bearing Status/Restrictions: [address]  Weight Bearin}  Other Medical Equipment (for information only, NOT a DME order):  {EQUIPMENT:141796370}  Other Treatments: ***    Patient's personal belongings (please select all that are sent with patient):  {P DME Belongings:688120458}    RN SIGNATURE:  {Esignature:848868978}    CASE MANAGEMENT/SOCIAL WORK SECTION    Inpatient Status Date: ***    Readmission Risk Assessment Score:  Readmission Risk              Risk of Unplanned Readmission:  0           Discharging to Facility/ Agency   Name:   Address:  Phone:  Fax:    Dialysis Facility (if applicable)   Name:  Address:  Dialysis Schedule:  Phone:  Fax:    / signature: {Esignature:655253759}    PHYSICIAN SECTION    Prognosis: {Prognosis:9539003246}    Condition at Discharge: [address] Patient Condition:047211059}    Rehab Potential (if transferring to Rehab): {Prognosis:6786380318}    Recommended Labs or Other Treatments After Discharge: ***    Physician Certification: I certify the above information and transfer of Dante Lugo.  is necessary for the continuing treatment of the diagnosis listed and that he requires {Admit to Appropriate Level of Care:09050} for {GREATER/LESS:759241554} 30 days.      Update Admission H&P: {CHP DME Changes in [facility]:407308022}    PHYSICIAN SIGNATURE:  {Esignature:646083387}

## 2022-12-09 LAB
ALBUMIN SERPL-MCNC: 2.9 G/DL (ref 3.5–5.2)
ALP BLD-CCNC: 146 U/L (ref 40–129)
ALT SERPL-CCNC: 7 U/L (ref 0–40)
ANION GAP SERPL CALCULATED.3IONS-SCNC: 10 MMOL/L (ref 7–16)
AST SERPL-CCNC: 21 U/L (ref 0–39)
BASOPHILS ABSOLUTE: 0.03 E9/L (ref 0–0.2)
BASOPHILS RELATIVE PERCENT: 0.3 % (ref 0–2)
BILIRUB SERPL-MCNC: 0.2 MG/DL (ref 0–1.2)
BUN BLDV-MCNC: 45 MG/DL (ref 6–23)
CALCIUM SERPL-MCNC: 8.9 MG/DL (ref 8.6–10.2)
CHLORIDE BLD-SCNC: 105 MMOL/L (ref 98–107)
CO2: 26 MMOL/L (ref 22–29)
CREAT SERPL-MCNC: 1.3 MG/DL (ref 0.7–1.2)
EOSINOPHILS ABSOLUTE: 0.54 E9/L (ref 0.05–0.5)
EOSINOPHILS RELATIVE PERCENT: 5.5 % (ref 0–6)
GFR SERPL CREATININE-BSD FRML MDRD: 52 ML/MIN/1.73
GLUCOSE BLD-MCNC: 53 MG/DL (ref 74–99)
HBA1C MFR BLD: 7.6 % (ref 4–5.6)
HCT VFR BLD CALC: 40.5 % (ref 37–54)
HEMOGLOBIN: 11.9 G/DL (ref 12.5–16.5)
IMMATURE GRANULOCYTES #: 0.03 E9/L
IMMATURE GRANULOCYTES %: 0.3 % (ref 0–5)
LYMPHOCYTES ABSOLUTE: 1.56 E9/L (ref 1.5–4)
LYMPHOCYTES RELATIVE PERCENT: 15.9 % (ref 20–42)
MCH RBC QN AUTO: 27.7 PG (ref 26–35)
MCHC RBC AUTO-ENTMCNC: 29.4 % (ref 32–34.5)
MCV RBC AUTO: 94.4 FL (ref 80–99.9)
MONOCYTES ABSOLUTE: 0.67 E9/L (ref 0.1–0.95)
MONOCYTES RELATIVE PERCENT: 6.8 % (ref 2–12)
NEUTROPHILS ABSOLUTE: 7 E9/L (ref 1.8–7.3)
NEUTROPHILS RELATIVE PERCENT: 71.2 % (ref 43–80)
PDW BLD-RTO: 17.1 FL (ref 11.5–15)
PLATELET # BLD: 171 E9/L (ref 130–450)
PMV BLD AUTO: 10.1 FL (ref 7–12)
POTASSIUM SERPL-SCNC: 4.7 MMOL/L (ref 3.5–5)
RBC # BLD: 4.29 E12/L (ref 3.8–5.8)
SODIUM BLD-SCNC: 141 MMOL/L (ref 132–146)
TOTAL PROTEIN: 6.2 G/DL (ref 6.4–8.3)
WBC # BLD: 9.8 E9/L (ref 4.5–11.5)

## 2022-12-11 LAB
INFLUENZA A BY PCR: NOT DETECTED
INFLUENZA B BY PCR: NOT DETECTED

## 2023-02-25 LAB
ORGANISM: ABNORMAL
WOUND/ABSCESS: ABNORMAL
WOUND/ABSCESS: ABNORMAL

## 2023-03-23 LAB
BACTERIA SPEC ANAEROBE CULT: NORMAL
BACTERIA WND AEROBE CULT: ABNORMAL
ORGANISM: ABNORMAL

## 2023-05-28 ENCOUNTER — APPOINTMENT (OUTPATIENT)
Dept: GENERAL RADIOLOGY | Age: 88
DRG: 872 | End: 2023-05-28
Payer: MEDICARE

## 2023-05-28 ENCOUNTER — APPOINTMENT (OUTPATIENT)
Dept: CT IMAGING | Age: 88
DRG: 872 | End: 2023-05-28
Payer: MEDICARE

## 2023-05-28 ENCOUNTER — HOSPITAL ENCOUNTER (INPATIENT)
Age: 88
LOS: 8 days | Discharge: NURSING, REHAB OR LONG-TERM CARE | DRG: 872 | End: 2023-06-05
Attending: STUDENT IN AN ORGANIZED HEALTH CARE EDUCATION/TRAINING PROGRAM | Admitting: INTERNAL MEDICINE
Payer: MEDICARE

## 2023-05-28 DIAGNOSIS — A41.9 SEPTICEMIA (HCC): Primary | ICD-10-CM

## 2023-05-28 DIAGNOSIS — R50.9 FEVER, UNSPECIFIED FEVER CAUSE: ICD-10-CM

## 2023-05-28 LAB
ALBUMIN SERPL-MCNC: 3.6 G/DL (ref 3.5–5.2)
ALP SERPL-CCNC: 166 U/L (ref 40–129)
ALT SERPL-CCNC: 12 U/L (ref 0–40)
ANION GAP SERPL CALCULATED.3IONS-SCNC: 9 MMOL/L (ref 7–16)
AST SERPL-CCNC: 20 U/L (ref 0–39)
B PARAP IS1001 DNA NPH QL NAA+NON-PROBE: NOT DETECTED
B PERT.PT PRMT NPH QL NAA+NON-PROBE: NOT DETECTED
BACTERIA URNS QL MICRO: NORMAL /HPF
BASOPHILS # BLD: 0 E9/L (ref 0–0.2)
BASOPHILS NFR BLD: 0 % (ref 0–2)
BILIRUB SERPL-MCNC: 0.6 MG/DL (ref 0–1.2)
BILIRUB UR QL STRIP: NEGATIVE
BNP BLD-MCNC: 318 PG/ML (ref 0–450)
BUN SERPL-MCNC: 43 MG/DL (ref 6–23)
C PNEUM DNA NPH QL NAA+NON-PROBE: NOT DETECTED
CALCIUM SERPL-MCNC: 9.2 MG/DL (ref 8.6–10.2)
CHLORIDE SERPL-SCNC: 103 MMOL/L (ref 98–107)
CLARITY UR: CLEAR
CO2 SERPL-SCNC: 30 MMOL/L (ref 22–29)
COLOR UR: YELLOW
CREAT SERPL-MCNC: 1.2 MG/DL (ref 0.7–1.2)
EOSINOPHIL # BLD: 0 E9/L (ref 0.05–0.5)
EOSINOPHIL NFR BLD: 0 % (ref 0–6)
ERYTHROCYTE [DISTWIDTH] IN BLOOD BY AUTOMATED COUNT: 13.7 FL (ref 11.5–15)
FLUAV RNA NPH QL NAA+NON-PROBE: NOT DETECTED
FLUBV RNA NPH QL NAA+NON-PROBE: NOT DETECTED
GLUCOSE SERPL-MCNC: 172 MG/DL (ref 74–99)
GLUCOSE UR STRIP-MCNC: NEGATIVE MG/DL
HADV DNA NPH QL NAA+NON-PROBE: NOT DETECTED
HCOV 229E RNA NPH QL NAA+NON-PROBE: NOT DETECTED
HCOV HKU1 RNA NPH QL NAA+NON-PROBE: NOT DETECTED
HCOV NL63 RNA NPH QL NAA+NON-PROBE: NOT DETECTED
HCOV OC43 RNA NPH QL NAA+NON-PROBE: NOT DETECTED
HCT VFR BLD AUTO: 38.2 % (ref 37–54)
HGB BLD-MCNC: 11.9 G/DL (ref 12.5–16.5)
HGB UR QL STRIP: ABNORMAL
HMPV RNA NPH QL NAA+NON-PROBE: NOT DETECTED
HPIV1 RNA NPH QL NAA+NON-PROBE: NOT DETECTED
HPIV2 RNA NPH QL NAA+NON-PROBE: NOT DETECTED
HPIV3 RNA NPH QL NAA+NON-PROBE: NOT DETECTED
HPIV4 RNA NPH QL NAA+NON-PROBE: NOT DETECTED
INFLUENZA A BY PCR: NOT DETECTED
INFLUENZA B BY PCR: NOT DETECTED
KETONES UR STRIP-MCNC: ABNORMAL MG/DL
LACTATE BLDV-SCNC: 1.5 MMOL/L (ref 0.5–1.9)
LEUKOCYTE ESTERASE UR QL STRIP: NEGATIVE
LYMPHOCYTES # BLD: 0 E9/L (ref 1.5–4)
LYMPHOCYTES NFR BLD: 0 % (ref 20–42)
M PNEUMO DNA NPH QL NAA+NON-PROBE: NOT DETECTED
MCH RBC QN AUTO: 29.7 PG (ref 26–35)
MCHC RBC AUTO-ENTMCNC: 31.2 % (ref 32–34.5)
MCV RBC AUTO: 95.3 FL (ref 80–99.9)
METAMYELOCYTES NFR BLD MANUAL: 0.9 % (ref 0–1)
METER GLUCOSE: 182 MG/DL (ref 74–99)
MONOCYTES # BLD: 0.68 E9/L (ref 0.1–0.95)
MONOCYTES NFR BLD: 2.6 % (ref 2–12)
NEUTROPHILS # BLD: 21.83 E9/L (ref 1.8–7.3)
NEUTS SEG NFR BLD: 96.5 % (ref 43–80)
NITRITE UR QL STRIP: NEGATIVE
NRBC BLD-RTO: 0 /100 WBC
OVALOCYTES: ABNORMAL
PH UR STRIP: 5 [PH] (ref 5–9)
PLATELET # BLD AUTO: 173 E9/L (ref 130–450)
PMV BLD AUTO: 9.3 FL (ref 7–12)
POIKILOCYTES: ABNORMAL
POLYCHROMASIA: ABNORMAL
POTASSIUM SERPL-SCNC: 4.7 MMOL/L (ref 3.5–5)
PROCALCITONIN: 6.83 NG/ML (ref 0–0.08)
PROT SERPL-MCNC: 6.9 G/DL (ref 6.4–8.3)
PROT UR STRIP-MCNC: ABNORMAL MG/DL
RBC # BLD AUTO: 4.01 E12/L (ref 3.8–5.8)
RBC #/AREA URNS HPF: NORMAL /HPF (ref 0–2)
RSV RNA NPH QL NAA+NON-PROBE: NOT DETECTED
RV+EV RNA NPH QL NAA+NON-PROBE: NOT DETECTED
SARS-COV-2 RDRP RESP QL NAA+PROBE: NOT DETECTED
SARS-COV-2 RNA NPH QL NAA+NON-PROBE: NOT DETECTED
SODIUM SERPL-SCNC: 142 MMOL/L (ref 132–146)
SP GR UR STRIP: 1.01 (ref 1–1.03)
TROPONIN, HIGH SENSITIVITY: 63 NG/L (ref 0–11)
TROPONIN, HIGH SENSITIVITY: 82 NG/L (ref 0–11)
TROPONIN, HIGH SENSITIVITY: 83 NG/L (ref 0–11)
TSH SERPL-MCNC: 2.21 UIU/ML (ref 0.27–4.2)
UROBILINOGEN UR STRIP-ACNC: 0.2 E.U./DL
WBC # BLD: 22.5 E9/L (ref 4.5–11.5)
WBC #/AREA URNS HPF: NORMAL /HPF (ref 0–5)

## 2023-05-28 PROCEDURE — 80053 COMPREHEN METABOLIC PANEL: CPT

## 2023-05-28 PROCEDURE — 2580000003 HC RX 258: Performed by: EMERGENCY MEDICINE

## 2023-05-28 PROCEDURE — 87635 SARS-COV-2 COVID-19 AMP PRB: CPT

## 2023-05-28 PROCEDURE — 2060000000 HC ICU INTERMEDIATE R&B

## 2023-05-28 PROCEDURE — 74177 CT ABD & PELVIS W/CONTRAST: CPT

## 2023-05-28 PROCEDURE — 83880 ASSAY OF NATRIURETIC PEPTIDE: CPT

## 2023-05-28 PROCEDURE — 84145 PROCALCITONIN (PCT): CPT

## 2023-05-28 PROCEDURE — 84443 ASSAY THYROID STIM HORMONE: CPT

## 2023-05-28 PROCEDURE — 0202U NFCT DS 22 TRGT SARS-COV-2: CPT

## 2023-05-28 PROCEDURE — 84484 ASSAY OF TROPONIN QUANT: CPT

## 2023-05-28 PROCEDURE — 87502 INFLUENZA DNA AMP PROBE: CPT

## 2023-05-28 PROCEDURE — 87088 URINE BACTERIA CULTURE: CPT

## 2023-05-28 PROCEDURE — 99285 EMERGENCY DEPT VISIT HI MDM: CPT

## 2023-05-28 PROCEDURE — 94664 DEMO&/EVAL PT USE INHALER: CPT

## 2023-05-28 PROCEDURE — 2580000003 HC RX 258: Performed by: STUDENT IN AN ORGANIZED HEALTH CARE EDUCATION/TRAINING PROGRAM

## 2023-05-28 PROCEDURE — 6370000000 HC RX 637 (ALT 250 FOR IP): Performed by: INTERNAL MEDICINE

## 2023-05-28 PROCEDURE — 6360000002 HC RX W HCPCS: Performed by: EMERGENCY MEDICINE

## 2023-05-28 PROCEDURE — 36415 COLL VENOUS BLD VENIPUNCTURE: CPT

## 2023-05-28 PROCEDURE — 85025 COMPLETE CBC W/AUTO DIFF WBC: CPT

## 2023-05-28 PROCEDURE — 87040 BLOOD CULTURE FOR BACTERIA: CPT

## 2023-05-28 PROCEDURE — 93005 ELECTROCARDIOGRAM TRACING: CPT | Performed by: STUDENT IN AN ORGANIZED HEALTH CARE EDUCATION/TRAINING PROGRAM

## 2023-05-28 PROCEDURE — 81001 URINALYSIS AUTO W/SCOPE: CPT

## 2023-05-28 PROCEDURE — 6370000000 HC RX 637 (ALT 250 FOR IP): Performed by: STUDENT IN AN ORGANIZED HEALTH CARE EDUCATION/TRAINING PROGRAM

## 2023-05-28 PROCEDURE — 96374 THER/PROPH/DIAG INJ IV PUSH: CPT

## 2023-05-28 PROCEDURE — 6360000004 HC RX CONTRAST MEDICATION: Performed by: RADIOLOGY

## 2023-05-28 PROCEDURE — 83605 ASSAY OF LACTIC ACID: CPT

## 2023-05-28 PROCEDURE — 2580000003 HC RX 258: Performed by: INTERNAL MEDICINE

## 2023-05-28 PROCEDURE — 71045 X-RAY EXAM CHEST 1 VIEW: CPT

## 2023-05-28 PROCEDURE — 87449 NOS EACH ORGANISM AG IA: CPT

## 2023-05-28 PROCEDURE — 82962 GLUCOSE BLOOD TEST: CPT

## 2023-05-28 PROCEDURE — 71275 CT ANGIOGRAPHY CHEST: CPT

## 2023-05-28 RX ORDER — SODIUM CHLORIDE 0.9 % (FLUSH) 0.9 %
10 SYRINGE (ML) INJECTION PRN
Status: DISCONTINUED | OUTPATIENT
Start: 2023-05-28 | End: 2023-06-05 | Stop reason: HOSPADM

## 2023-05-28 RX ORDER — IPRATROPIUM BROMIDE AND ALBUTEROL SULFATE 2.5; .5 MG/3ML; MG/3ML
1 SOLUTION RESPIRATORY (INHALATION)
Status: DISCONTINUED | OUTPATIENT
Start: 2023-05-28 | End: 2023-06-05 | Stop reason: HOSPADM

## 2023-05-28 RX ORDER — 0.9 % SODIUM CHLORIDE 0.9 %
500 INTRAVENOUS SOLUTION INTRAVENOUS ONCE
Status: COMPLETED | OUTPATIENT
Start: 2023-05-28 | End: 2023-05-28

## 2023-05-28 RX ORDER — SODIUM CHLORIDE 9 MG/ML
INJECTION, SOLUTION INTRAVENOUS PRN
Status: DISCONTINUED | OUTPATIENT
Start: 2023-05-28 | End: 2023-06-05 | Stop reason: HOSPADM

## 2023-05-28 RX ORDER — FAMOTIDINE 20 MG/1
20 TABLET, FILM COATED ORAL NIGHTLY
COMMUNITY

## 2023-05-28 RX ORDER — INSULIN GLARGINE 100 [IU]/ML
76 INJECTION, SOLUTION SUBCUTANEOUS NIGHTLY
Status: DISCONTINUED | OUTPATIENT
Start: 2023-05-28 | End: 2023-06-05 | Stop reason: HOSPADM

## 2023-05-28 RX ORDER — ACETAMINOPHEN 650 MG/1
650 SUPPOSITORY RECTAL EVERY 6 HOURS PRN
Status: DISCONTINUED | OUTPATIENT
Start: 2023-05-28 | End: 2023-06-05 | Stop reason: HOSPADM

## 2023-05-28 RX ORDER — PANTOPRAZOLE SODIUM 40 MG/1
40 TABLET, DELAYED RELEASE ORAL
Status: DISCONTINUED | OUTPATIENT
Start: 2023-05-29 | End: 2023-06-05 | Stop reason: HOSPADM

## 2023-05-28 RX ORDER — INSULIN LISPRO 100 [IU]/ML
10 INJECTION, SOLUTION INTRAVENOUS; SUBCUTANEOUS
Status: DISCONTINUED | OUTPATIENT
Start: 2023-05-28 | End: 2023-06-05 | Stop reason: HOSPADM

## 2023-05-28 RX ORDER — ATORVASTATIN CALCIUM 40 MG/1
80 TABLET, FILM COATED ORAL NIGHTLY
Status: DISCONTINUED | OUTPATIENT
Start: 2023-05-28 | End: 2023-06-05 | Stop reason: HOSPADM

## 2023-05-28 RX ORDER — LOPERAMIDE HYDROCHLORIDE 2 MG/1
2 CAPSULE ORAL 4 TIMES DAILY PRN
COMMUNITY

## 2023-05-28 RX ORDER — ACETAMINOPHEN 325 MG/1
650 TABLET ORAL ONCE
Status: COMPLETED | OUTPATIENT
Start: 2023-05-28 | End: 2023-05-28

## 2023-05-28 RX ORDER — SENNA PLUS 8.6 MG/1
1 TABLET ORAL DAILY PRN
Status: DISCONTINUED | OUTPATIENT
Start: 2023-05-28 | End: 2023-06-05 | Stop reason: HOSPADM

## 2023-05-28 RX ORDER — KETOROLAC TROMETHAMINE 30 MG/ML
15 INJECTION, SOLUTION INTRAMUSCULAR; INTRAVENOUS ONCE
Status: COMPLETED | OUTPATIENT
Start: 2023-05-28 | End: 2023-05-28

## 2023-05-28 RX ORDER — CETIRIZINE HYDROCHLORIDE 10 MG/1
5 TABLET ORAL DAILY
Status: DISCONTINUED | OUTPATIENT
Start: 2023-05-29 | End: 2023-06-05 | Stop reason: HOSPADM

## 2023-05-28 RX ORDER — FERROUS SULFATE 325(65) MG
325 TABLET ORAL DAILY
Status: DISCONTINUED | OUTPATIENT
Start: 2023-05-29 | End: 2023-06-05 | Stop reason: HOSPADM

## 2023-05-28 RX ORDER — CALCIUM CARBONATE 500 MG/1
1 TABLET, CHEWABLE ORAL EVERY 4 HOURS PRN
Status: DISCONTINUED | OUTPATIENT
Start: 2023-05-28 | End: 2023-06-05 | Stop reason: HOSPADM

## 2023-05-28 RX ORDER — ENOXAPARIN SODIUM 100 MG/ML
30 INJECTION SUBCUTANEOUS 2 TIMES DAILY
Status: DISCONTINUED | OUTPATIENT
Start: 2023-05-28 | End: 2023-05-28

## 2023-05-28 RX ORDER — ACETAMINOPHEN 325 MG/1
650 TABLET ORAL EVERY 6 HOURS PRN
Status: DISCONTINUED | OUTPATIENT
Start: 2023-05-28 | End: 2023-06-05 | Stop reason: HOSPADM

## 2023-05-28 RX ORDER — SODIUM CHLORIDE 0.9 % (FLUSH) 0.9 %
10 SYRINGE (ML) INJECTION EVERY 12 HOURS SCHEDULED
Status: DISCONTINUED | OUTPATIENT
Start: 2023-05-28 | End: 2023-06-05 | Stop reason: HOSPADM

## 2023-05-28 RX ORDER — SODIUM CHLORIDE 9 MG/ML
INJECTION, SOLUTION INTRAVENOUS CONTINUOUS
Status: DISCONTINUED | OUTPATIENT
Start: 2023-05-28 | End: 2023-05-30 | Stop reason: ALTCHOICE

## 2023-05-28 RX ORDER — GABAPENTIN 100 MG/1
200 CAPSULE ORAL
Status: DISCONTINUED | OUTPATIENT
Start: 2023-05-28 | End: 2023-06-05 | Stop reason: HOSPADM

## 2023-05-28 RX ORDER — BUMETANIDE 1 MG/1
1 TABLET ORAL DAILY
COMMUNITY

## 2023-05-28 RX ORDER — TAMSULOSIN HYDROCHLORIDE 0.4 MG/1
0.4 CAPSULE ORAL DAILY
Status: DISCONTINUED | OUTPATIENT
Start: 2023-05-28 | End: 2023-06-05 | Stop reason: HOSPADM

## 2023-05-28 RX ADMIN — SODIUM CHLORIDE: 9 INJECTION, SOLUTION INTRAVENOUS at 20:49

## 2023-05-28 RX ADMIN — SODIUM CHLORIDE 500 ML: 9 INJECTION, SOLUTION INTRAVENOUS at 16:19

## 2023-05-28 RX ADMIN — KETOROLAC TROMETHAMINE 15 MG: 30 INJECTION, SOLUTION INTRAMUSCULAR; INTRAVENOUS at 17:06

## 2023-05-28 RX ADMIN — IOPAMIDOL 75 ML: 755 INJECTION, SOLUTION INTRAVENOUS at 14:45

## 2023-05-28 RX ADMIN — IPRATROPIUM BROMIDE AND ALBUTEROL SULFATE 1 DOSE: 2.5; .5 SOLUTION RESPIRATORY (INHALATION) at 19:37

## 2023-05-28 RX ADMIN — ATORVASTATIN CALCIUM 80 MG: 40 TABLET, FILM COATED ORAL at 20:53

## 2023-05-28 RX ADMIN — CEFEPIME 2000 MG: 2 INJECTION, POWDER, FOR SOLUTION INTRAVENOUS at 20:53

## 2023-05-28 RX ADMIN — APIXABAN 2.5 MG: 2.5 TABLET, FILM COATED ORAL at 20:53

## 2023-05-28 RX ADMIN — SODIUM CHLORIDE 500 ML: 9 INJECTION, SOLUTION INTRAVENOUS at 14:14

## 2023-05-28 RX ADMIN — GABAPENTIN 200 MG: 100 CAPSULE ORAL at 20:53

## 2023-05-28 RX ADMIN — INSULIN GLARGINE 76 UNITS: 100 INJECTION, SOLUTION SUBCUTANEOUS at 20:56

## 2023-05-28 RX ADMIN — SODIUM CHLORIDE, PRESERVATIVE FREE 10 ML: 5 INJECTION INTRAVENOUS at 20:54

## 2023-05-28 RX ADMIN — VANCOMYCIN HYDROCHLORIDE 2500 MG: 10 INJECTION, POWDER, LYOPHILIZED, FOR SOLUTION INTRAVENOUS at 16:17

## 2023-05-28 RX ADMIN — ACETAMINOPHEN 650 MG: 325 TABLET ORAL at 13:59

## 2023-05-28 RX ADMIN — TAMSULOSIN HYDROCHLORIDE 0.4 MG: 0.4 CAPSULE ORAL at 20:53

## 2023-05-28 ASSESSMENT — ENCOUNTER SYMPTOMS
COUGH: 1
NAUSEA: 0
ABDOMINAL PAIN: 0
EYE DISCHARGE: 0
SHORTNESS OF BREATH: 1
SORE THROAT: 0
EYE REDNESS: 0
BACK PAIN: 0
EYE PAIN: 0
SINUS PRESSURE: 0
DIARRHEA: 0
VOMITING: 0
WHEEZING: 0

## 2023-05-28 ASSESSMENT — LIFESTYLE VARIABLES
HOW MANY STANDARD DRINKS CONTAINING ALCOHOL DO YOU HAVE ON A TYPICAL DAY: PATIENT DOES NOT DRINK
HOW OFTEN DO YOU HAVE A DRINK CONTAINING ALCOHOL: NEVER

## 2023-05-28 ASSESSMENT — PAIN DESCRIPTION - DESCRIPTORS: DESCRIPTORS: DISCOMFORT

## 2023-05-28 ASSESSMENT — PAIN - FUNCTIONAL ASSESSMENT
PAIN_FUNCTIONAL_ASSESSMENT: 0-10
PAIN_FUNCTIONAL_ASSESSMENT: PREVENTS OR INTERFERES SOME ACTIVE ACTIVITIES AND ADLS

## 2023-05-28 ASSESSMENT — PAIN DESCRIPTION - LOCATION
LOCATION: HIP
LOCATION: FOOT;OTHER (COMMENT)

## 2023-05-28 ASSESSMENT — PAIN SCALES - GENERAL
PAINLEVEL_OUTOF10: 9
PAINLEVEL_OUTOF10: 1
PAINLEVEL_OUTOF10: 0

## 2023-05-28 ASSESSMENT — PAIN DESCRIPTION - PAIN TYPE: TYPE: ACUTE PAIN

## 2023-05-28 ASSESSMENT — PAIN DESCRIPTION - ONSET: ONSET: ON-GOING

## 2023-05-28 ASSESSMENT — PAIN DESCRIPTION - ORIENTATION
ORIENTATION: RIGHT
ORIENTATION: LEFT

## 2023-05-28 ASSESSMENT — PAIN DESCRIPTION - FREQUENCY: FREQUENCY: CONTINUOUS

## 2023-05-29 LAB
ALBUMIN SERPL-MCNC: 3 G/DL (ref 3.5–5.2)
ALP SERPL-CCNC: 133 U/L (ref 40–129)
ALT SERPL-CCNC: 10 U/L (ref 0–40)
ANION GAP SERPL CALCULATED.3IONS-SCNC: 12 MMOL/L (ref 7–16)
AST SERPL-CCNC: 29 U/L (ref 0–39)
BASOPHILS # BLD: 0 E9/L (ref 0–0.2)
BASOPHILS NFR BLD: 0 % (ref 0–2)
BILIRUB SERPL-MCNC: 0.5 MG/DL (ref 0–1.2)
BUN SERPL-MCNC: 38 MG/DL (ref 6–23)
CALCIUM SERPL-MCNC: 8.6 MG/DL (ref 8.6–10.2)
CHLORIDE SERPL-SCNC: 104 MMOL/L (ref 98–107)
CO2 SERPL-SCNC: 25 MMOL/L (ref 22–29)
CREAT SERPL-MCNC: 1.2 MG/DL (ref 0.7–1.2)
EOSINOPHIL # BLD: 0 E9/L (ref 0.05–0.5)
EOSINOPHIL NFR BLD: 0 % (ref 0–6)
ERYTHROCYTE [DISTWIDTH] IN BLOOD BY AUTOMATED COUNT: 14.3 FL (ref 11.5–15)
GLUCOSE SERPL-MCNC: 239 MG/DL (ref 74–99)
HCT VFR BLD AUTO: 35.7 % (ref 37–54)
HGB BLD-MCNC: 11.3 G/DL (ref 12.5–16.5)
HYPOCHROMIA: ABNORMAL
LEGIONELLA AG UR QL: NORMAL
LYMPHOCYTES # BLD: 0.77 E9/L (ref 1.5–4)
LYMPHOCYTES NFR BLD: 2.6 % (ref 20–42)
MCH RBC QN AUTO: 30 PG (ref 26–35)
MCHC RBC AUTO-ENTMCNC: 31.7 % (ref 32–34.5)
MCV RBC AUTO: 94.7 FL (ref 80–99.9)
METER GLUCOSE: 270 MG/DL (ref 74–99)
METER GLUCOSE: 282 MG/DL (ref 74–99)
METER GLUCOSE: 296 MG/DL (ref 74–99)
METER GLUCOSE: 339 MG/DL (ref 74–99)
MONOCYTES # BLD: 0.77 E9/L (ref 0.1–0.95)
MONOCYTES NFR BLD: 2.6 % (ref 2–12)
NEUTROPHILS # BLD: 24.51 E9/L (ref 1.8–7.3)
NEUTS SEG NFR BLD: 94.8 % (ref 43–80)
NRBC BLD-RTO: 0 /100 WBC
OVALOCYTES: ABNORMAL
PLATELET # BLD AUTO: 151 E9/L (ref 130–450)
PMV BLD AUTO: 9.7 FL (ref 7–12)
POIKILOCYTES: ABNORMAL
POLYCHROMASIA: ABNORMAL
POTASSIUM SERPL-SCNC: 4.6 MMOL/L (ref 3.5–5)
PROT SERPL-MCNC: 6.1 G/DL (ref 6.4–8.3)
RBC # BLD AUTO: 3.77 E12/L (ref 3.8–5.8)
S PNEUM AG SPEC QL: NORMAL
SODIUM SERPL-SCNC: 141 MMOL/L (ref 132–146)
WBC # BLD: 25.8 E9/L (ref 4.5–11.5)

## 2023-05-29 PROCEDURE — 6370000000 HC RX 637 (ALT 250 FOR IP): Performed by: INTERNAL MEDICINE

## 2023-05-29 PROCEDURE — 36415 COLL VENOUS BLD VENIPUNCTURE: CPT

## 2023-05-29 PROCEDURE — 94640 AIRWAY INHALATION TREATMENT: CPT

## 2023-05-29 PROCEDURE — 82962 GLUCOSE BLOOD TEST: CPT

## 2023-05-29 PROCEDURE — 85025 COMPLETE CBC W/AUTO DIFF WBC: CPT

## 2023-05-29 PROCEDURE — 80053 COMPREHEN METABOLIC PANEL: CPT

## 2023-05-29 PROCEDURE — 2700000000 HC OXYGEN THERAPY PER DAY

## 2023-05-29 PROCEDURE — 2060000000 HC ICU INTERMEDIATE R&B

## 2023-05-29 PROCEDURE — 2580000003 HC RX 258: Performed by: INTERNAL MEDICINE

## 2023-05-29 RX ORDER — AMMONIUM LACTATE 12 G/100G
LOTION TOPICAL PRN
Status: DISCONTINUED | OUTPATIENT
Start: 2023-05-29 | End: 2023-05-29 | Stop reason: SDUPTHER

## 2023-05-29 RX ORDER — ANALGESIC BALM 1.74; 4.06 G/29G; G/29G
OINTMENT TOPICAL PRN
Status: DISCONTINUED | OUTPATIENT
Start: 2023-05-29 | End: 2023-06-05 | Stop reason: HOSPADM

## 2023-05-29 RX ORDER — AMMONIUM LACTATE 12 G/100G
LOTION TOPICAL DAILY
Status: DISCONTINUED | OUTPATIENT
Start: 2023-05-29 | End: 2023-06-05 | Stop reason: HOSPADM

## 2023-05-29 RX ADMIN — SODIUM CHLORIDE: 9 INJECTION, SOLUTION INTRAVENOUS at 08:31

## 2023-05-29 RX ADMIN — PANTOPRAZOLE SODIUM 40 MG: 40 TABLET, DELAYED RELEASE ORAL at 06:32

## 2023-05-29 RX ADMIN — INSULIN LISPRO 10 UNITS: 100 INJECTION, SOLUTION INTRAVENOUS; SUBCUTANEOUS at 10:47

## 2023-05-29 RX ADMIN — ACETAMINOPHEN 650 MG: 325 TABLET ORAL at 04:22

## 2023-05-29 RX ADMIN — APIXABAN 2.5 MG: 2.5 TABLET, FILM COATED ORAL at 16:05

## 2023-05-29 RX ADMIN — INSULIN LISPRO 10 UNITS: 100 INJECTION, SOLUTION INTRAVENOUS; SUBCUTANEOUS at 06:32

## 2023-05-29 RX ADMIN — IPRATROPIUM BROMIDE AND ALBUTEROL SULFATE 1 DOSE: 2.5; .5 SOLUTION RESPIRATORY (INHALATION) at 12:02

## 2023-05-29 RX ADMIN — IPRATROPIUM BROMIDE AND ALBUTEROL SULFATE 1 DOSE: 2.5; .5 SOLUTION RESPIRATORY (INHALATION) at 15:32

## 2023-05-29 RX ADMIN — ATORVASTATIN CALCIUM 80 MG: 40 TABLET, FILM COATED ORAL at 20:34

## 2023-05-29 RX ADMIN — IPRATROPIUM BROMIDE AND ALBUTEROL SULFATE 1 DOSE: 2.5; .5 SOLUTION RESPIRATORY (INHALATION) at 09:07

## 2023-05-29 RX ADMIN — MICONAZOLE NITRATE: 2 OINTMENT TOPICAL at 20:33

## 2023-05-29 RX ADMIN — GABAPENTIN 200 MG: 100 CAPSULE ORAL at 16:05

## 2023-05-29 RX ADMIN — CETIRIZINE HYDROCHLORIDE 5 MG: 10 TABLET, FILM COATED ORAL at 08:27

## 2023-05-29 RX ADMIN — FERROUS SULFATE TAB 325 MG (65 MG ELEMENTAL FE) 325 MG: 325 (65 FE) TAB at 08:27

## 2023-05-29 RX ADMIN — SODIUM CHLORIDE: 9 INJECTION, SOLUTION INTRAVENOUS at 22:08

## 2023-05-29 RX ADMIN — APIXABAN 2.5 MG: 2.5 TABLET, FILM COATED ORAL at 06:32

## 2023-05-29 RX ADMIN — IPRATROPIUM BROMIDE AND ALBUTEROL SULFATE 1 DOSE: 2.5; .5 SOLUTION RESPIRATORY (INHALATION) at 19:33

## 2023-05-29 RX ADMIN — TAMSULOSIN HYDROCHLORIDE 0.4 MG: 0.4 CAPSULE ORAL at 08:27

## 2023-05-29 RX ADMIN — Medication: at 18:35

## 2023-05-29 RX ADMIN — SODIUM CHLORIDE, PRESERVATIVE FREE 10 ML: 5 INJECTION INTRAVENOUS at 20:33

## 2023-05-29 RX ADMIN — INSULIN LISPRO 10 UNITS: 100 INJECTION, SOLUTION INTRAVENOUS; SUBCUTANEOUS at 16:05

## 2023-05-29 ASSESSMENT — PAIN SCALES - GENERAL
PAINLEVEL_OUTOF10: 0

## 2023-05-30 LAB
ALBUMIN SERPL-MCNC: 2.4 G/DL (ref 3.5–5.2)
ALP SERPL-CCNC: 113 U/L (ref 40–129)
ALT SERPL-CCNC: 12 U/L (ref 0–40)
ANION GAP SERPL CALCULATED.3IONS-SCNC: 6 MMOL/L (ref 7–16)
AST SERPL-CCNC: 33 U/L (ref 0–39)
BACTERIA UR CULT: NORMAL
BASOPHILS # BLD: 0.02 E9/L (ref 0–0.2)
BASOPHILS NFR BLD: 0.1 % (ref 0–2)
BILIRUB SERPL-MCNC: 0.3 MG/DL (ref 0–1.2)
BUN SERPL-MCNC: 38 MG/DL (ref 6–23)
CALCIUM SERPL-MCNC: 8.2 MG/DL (ref 8.6–10.2)
CHLORIDE SERPL-SCNC: 106 MMOL/L (ref 98–107)
CO2 SERPL-SCNC: 25 MMOL/L (ref 22–29)
CREAT SERPL-MCNC: 1.2 MG/DL (ref 0.7–1.2)
EKG ATRIAL RATE: 122 BPM
EKG Q-T INTERVAL: 378 MS
EKG QRS DURATION: 82 MS
EKG QTC CALCULATION (BAZETT): 541 MS
EKG R AXIS: -13 DEGREES
EKG T AXIS: 70 DEGREES
EKG VENTRICULAR RATE: 123 BPM
EOSINOPHIL # BLD: 0.02 E9/L (ref 0.05–0.5)
EOSINOPHIL NFR BLD: 0.1 % (ref 0–6)
ERYTHROCYTE [DISTWIDTH] IN BLOOD BY AUTOMATED COUNT: 14.4 FL (ref 11.5–15)
GLUCOSE SERPL-MCNC: 218 MG/DL (ref 74–99)
HCT VFR BLD AUTO: 33.1 % (ref 37–54)
HGB BLD-MCNC: 10.2 G/DL (ref 12.5–16.5)
IMM GRANULOCYTES # BLD: 0.08 E9/L
IMM GRANULOCYTES NFR BLD: 0.6 % (ref 0–5)
LYMPHOCYTES # BLD: 0.63 E9/L (ref 1.5–4)
LYMPHOCYTES NFR BLD: 4.6 % (ref 20–42)
MCH RBC QN AUTO: 29.5 PG (ref 26–35)
MCHC RBC AUTO-ENTMCNC: 30.8 % (ref 32–34.5)
MCV RBC AUTO: 95.7 FL (ref 80–99.9)
METER GLUCOSE: 155 MG/DL (ref 74–99)
METER GLUCOSE: 182 MG/DL (ref 74–99)
METER GLUCOSE: 200 MG/DL (ref 74–99)
METER GLUCOSE: 216 MG/DL (ref 74–99)
MONOCYTES # BLD: 0.72 E9/L (ref 0.1–0.95)
MONOCYTES NFR BLD: 5.3 % (ref 2–12)
NEUTROPHILS # BLD: 12.08 E9/L (ref 1.8–7.3)
NEUTS SEG NFR BLD: 89.3 % (ref 43–80)
PLATELET # BLD AUTO: 122 E9/L (ref 130–450)
PMV BLD AUTO: 9.7 FL (ref 7–12)
POTASSIUM SERPL-SCNC: 4.3 MMOL/L (ref 3.5–5)
PROT SERPL-MCNC: 5.4 G/DL (ref 6.4–8.3)
RBC # BLD AUTO: 3.46 E12/L (ref 3.8–5.8)
SODIUM SERPL-SCNC: 137 MMOL/L (ref 132–146)
WBC # BLD: 13.6 E9/L (ref 4.5–11.5)

## 2023-05-30 PROCEDURE — 82962 GLUCOSE BLOOD TEST: CPT

## 2023-05-30 PROCEDURE — 6370000000 HC RX 637 (ALT 250 FOR IP): Performed by: SPECIALIST

## 2023-05-30 PROCEDURE — 2060000000 HC ICU INTERMEDIATE R&B

## 2023-05-30 PROCEDURE — 97530 THERAPEUTIC ACTIVITIES: CPT

## 2023-05-30 PROCEDURE — 6370000000 HC RX 637 (ALT 250 FOR IP): Performed by: INTERNAL MEDICINE

## 2023-05-30 PROCEDURE — 97165 OT EVAL LOW COMPLEX 30 MIN: CPT

## 2023-05-30 PROCEDURE — 93010 ELECTROCARDIOGRAM REPORT: CPT | Performed by: INTERNAL MEDICINE

## 2023-05-30 PROCEDURE — 36415 COLL VENOUS BLD VENIPUNCTURE: CPT

## 2023-05-30 PROCEDURE — 2580000003 HC RX 258: Performed by: SPECIALIST

## 2023-05-30 PROCEDURE — 80053 COMPREHEN METABOLIC PANEL: CPT

## 2023-05-30 PROCEDURE — 87186 SC STD MICRODIL/AGAR DIL: CPT

## 2023-05-30 PROCEDURE — 97535 SELF CARE MNGMENT TRAINING: CPT

## 2023-05-30 PROCEDURE — 85025 COMPLETE CBC W/AUTO DIFF WBC: CPT

## 2023-05-30 PROCEDURE — 6360000002 HC RX W HCPCS: Performed by: SPECIALIST

## 2023-05-30 PROCEDURE — 87070 CULTURE OTHR SPECIMN AEROBIC: CPT

## 2023-05-30 PROCEDURE — 2580000003 HC RX 258: Performed by: INTERNAL MEDICINE

## 2023-05-30 PROCEDURE — 2700000000 HC OXYGEN THERAPY PER DAY

## 2023-05-30 PROCEDURE — 94640 AIRWAY INHALATION TREATMENT: CPT

## 2023-05-30 RX ORDER — LINEZOLID 600 MG/1
600 TABLET, FILM COATED ORAL EVERY 12 HOURS SCHEDULED
Status: DISCONTINUED | OUTPATIENT
Start: 2023-05-30 | End: 2023-06-01

## 2023-05-30 RX ADMIN — ACETAMINOPHEN 650 MG: 325 TABLET ORAL at 23:05

## 2023-05-30 RX ADMIN — APIXABAN 2.5 MG: 2.5 TABLET, FILM COATED ORAL at 06:20

## 2023-05-30 RX ADMIN — INSULIN LISPRO 10 UNITS: 100 INJECTION, SOLUTION INTRAVENOUS; SUBCUTANEOUS at 10:54

## 2023-05-30 RX ADMIN — Medication: at 08:19

## 2023-05-30 RX ADMIN — LINEZOLID 600 MG: 600 TABLET, FILM COATED ORAL at 20:53

## 2023-05-30 RX ADMIN — TAMSULOSIN HYDROCHLORIDE 0.4 MG: 0.4 CAPSULE ORAL at 08:18

## 2023-05-30 RX ADMIN — MICONAZOLE NITRATE: 2 OINTMENT TOPICAL at 20:53

## 2023-05-30 RX ADMIN — INSULIN LISPRO 10 UNITS: 100 INJECTION, SOLUTION INTRAVENOUS; SUBCUTANEOUS at 06:20

## 2023-05-30 RX ADMIN — MICONAZOLE NITRATE: 2 OINTMENT TOPICAL at 08:18

## 2023-05-30 RX ADMIN — CEFEPIME 2000 MG: 2 INJECTION, POWDER, FOR SOLUTION INTRAVENOUS at 23:06

## 2023-05-30 RX ADMIN — IPRATROPIUM BROMIDE AND ALBUTEROL SULFATE 1 DOSE: 2.5; .5 SOLUTION RESPIRATORY (INHALATION) at 16:34

## 2023-05-30 RX ADMIN — PANTOPRAZOLE SODIUM 40 MG: 40 TABLET, DELAYED RELEASE ORAL at 06:20

## 2023-05-30 RX ADMIN — IPRATROPIUM BROMIDE AND ALBUTEROL SULFATE 1 DOSE: 2.5; .5 SOLUTION RESPIRATORY (INHALATION) at 20:58

## 2023-05-30 RX ADMIN — CETIRIZINE HYDROCHLORIDE 5 MG: 10 TABLET, FILM COATED ORAL at 08:18

## 2023-05-30 RX ADMIN — CEFEPIME 2000 MG: 2 INJECTION, POWDER, FOR SOLUTION INTRAVENOUS at 12:09

## 2023-05-30 RX ADMIN — SODIUM CHLORIDE, PRESERVATIVE FREE 10 ML: 5 INJECTION INTRAVENOUS at 20:54

## 2023-05-30 RX ADMIN — SODIUM CHLORIDE: 9 INJECTION, SOLUTION INTRAVENOUS at 10:53

## 2023-05-30 RX ADMIN — INSULIN GLARGINE 76 UNITS: 100 INJECTION, SOLUTION SUBCUTANEOUS at 20:53

## 2023-05-30 RX ADMIN — IPRATROPIUM BROMIDE AND ALBUTEROL SULFATE 1 DOSE: 2.5; .5 SOLUTION RESPIRATORY (INHALATION) at 08:47

## 2023-05-30 RX ADMIN — FERROUS SULFATE TAB 325 MG (65 MG ELEMENTAL FE) 325 MG: 325 (65 FE) TAB at 08:18

## 2023-05-30 RX ADMIN — ACETAMINOPHEN 650 MG: 325 TABLET ORAL at 03:36

## 2023-05-30 RX ADMIN — APIXABAN 2.5 MG: 2.5 TABLET, FILM COATED ORAL at 15:46

## 2023-05-30 RX ADMIN — INSULIN LISPRO 10 UNITS: 100 INJECTION, SOLUTION INTRAVENOUS; SUBCUTANEOUS at 15:46

## 2023-05-30 RX ADMIN — GABAPENTIN 200 MG: 100 CAPSULE ORAL at 17:30

## 2023-05-30 RX ADMIN — ATORVASTATIN CALCIUM 80 MG: 40 TABLET, FILM COATED ORAL at 20:52

## 2023-05-30 ASSESSMENT — PAIN SCALES - GENERAL
PAINLEVEL_OUTOF10: 1
PAINLEVEL_OUTOF10: 2

## 2023-05-30 ASSESSMENT — PAIN DESCRIPTION - DESCRIPTORS: DESCRIPTORS: ACHING

## 2023-05-30 ASSESSMENT — PAIN - FUNCTIONAL ASSESSMENT: PAIN_FUNCTIONAL_ASSESSMENT: ACTIVITIES ARE NOT PREVENTED

## 2023-05-30 ASSESSMENT — PAIN DESCRIPTION - LOCATION: LOCATION: BACK

## 2023-05-30 ASSESSMENT — PAIN SCALES - WONG BAKER: WONGBAKER_NUMERICALRESPONSE: 0

## 2023-05-30 ASSESSMENT — PAIN DESCRIPTION - ORIENTATION: ORIENTATION: LOWER

## 2023-05-31 LAB
ALBUMIN SERPL-MCNC: 2.3 G/DL (ref 3.5–5.2)
ALP SERPL-CCNC: 102 U/L (ref 40–129)
ALT SERPL-CCNC: 10 U/L (ref 0–40)
ANION GAP SERPL CALCULATED.3IONS-SCNC: 6 MMOL/L (ref 7–16)
ANTISTREPTOLYSIN-O: 122 IU/ML (ref 0–200)
AST SERPL-CCNC: 29 U/L (ref 0–39)
BASOPHILS # BLD: 0.01 E9/L (ref 0–0.2)
BASOPHILS NFR BLD: 0.1 % (ref 0–2)
BILIRUB SERPL-MCNC: 0.3 MG/DL (ref 0–1.2)
BUN SERPL-MCNC: 34 MG/DL (ref 6–23)
CALCIUM SERPL-MCNC: 8.1 MG/DL (ref 8.6–10.2)
CHLORIDE SERPL-SCNC: 109 MMOL/L (ref 98–107)
CO2 SERPL-SCNC: 24 MMOL/L (ref 22–29)
CREAT SERPL-MCNC: 1.2 MG/DL (ref 0.7–1.2)
CRP SERPL HS-MCNC: 14.2 MG/DL (ref 0–0.4)
EOSINOPHIL # BLD: 0.21 E9/L (ref 0.05–0.5)
EOSINOPHIL NFR BLD: 2 % (ref 0–6)
ERYTHROCYTE [DISTWIDTH] IN BLOOD BY AUTOMATED COUNT: 14.1 FL (ref 11.5–15)
ERYTHROCYTE [SEDIMENTATION RATE] IN BLOOD BY WESTERGREN METHOD: 82 MM/HR (ref 0–15)
GLUCOSE SERPL-MCNC: 163 MG/DL (ref 74–99)
HCT VFR BLD AUTO: 30.3 % (ref 37–54)
HGB BLD-MCNC: 9.5 G/DL (ref 12.5–16.5)
IMM GRANULOCYTES # BLD: 0.07 E9/L
IMM GRANULOCYTES NFR BLD: 0.7 % (ref 0–5)
LYMPHOCYTES # BLD: 0.83 E9/L (ref 1.5–4)
LYMPHOCYTES NFR BLD: 7.9 % (ref 20–42)
MCH RBC QN AUTO: 29.8 PG (ref 26–35)
MCHC RBC AUTO-ENTMCNC: 31.4 % (ref 32–34.5)
MCV RBC AUTO: 95 FL (ref 80–99.9)
METER GLUCOSE: 163 MG/DL (ref 74–99)
METER GLUCOSE: 201 MG/DL (ref 74–99)
METER GLUCOSE: 217 MG/DL (ref 74–99)
METER GLUCOSE: 298 MG/DL (ref 74–99)
MONOCYTES # BLD: 0.84 E9/L (ref 0.1–0.95)
MONOCYTES NFR BLD: 8 % (ref 2–12)
NEUTROPHILS # BLD: 8.56 E9/L (ref 1.8–7.3)
NEUTS SEG NFR BLD: 81.3 % (ref 43–80)
PLATELET # BLD AUTO: 121 E9/L (ref 130–450)
PMV BLD AUTO: 9.6 FL (ref 7–12)
POTASSIUM SERPL-SCNC: 4.2 MMOL/L (ref 3.5–5)
PROT SERPL-MCNC: 5.4 G/DL (ref 6.4–8.3)
RBC # BLD AUTO: 3.19 E12/L (ref 3.8–5.8)
SODIUM SERPL-SCNC: 139 MMOL/L (ref 132–146)
WBC # BLD: 10.5 E9/L (ref 4.5–11.5)

## 2023-05-31 PROCEDURE — 85651 RBC SED RATE NONAUTOMATED: CPT

## 2023-05-31 PROCEDURE — 94640 AIRWAY INHALATION TREATMENT: CPT

## 2023-05-31 PROCEDURE — 85025 COMPLETE CBC W/AUTO DIFF WBC: CPT

## 2023-05-31 PROCEDURE — 82962 GLUCOSE BLOOD TEST: CPT

## 2023-05-31 PROCEDURE — 6370000000 HC RX 637 (ALT 250 FOR IP): Performed by: SPECIALIST

## 2023-05-31 PROCEDURE — 2580000003 HC RX 258: Performed by: SPECIALIST

## 2023-05-31 PROCEDURE — 80053 COMPREHEN METABOLIC PANEL: CPT

## 2023-05-31 PROCEDURE — 6370000000 HC RX 637 (ALT 250 FOR IP): Performed by: INTERNAL MEDICINE

## 2023-05-31 PROCEDURE — 36415 COLL VENOUS BLD VENIPUNCTURE: CPT

## 2023-05-31 PROCEDURE — 2580000003 HC RX 258: Performed by: INTERNAL MEDICINE

## 2023-05-31 PROCEDURE — 86140 C-REACTIVE PROTEIN: CPT

## 2023-05-31 PROCEDURE — 2700000000 HC OXYGEN THERAPY PER DAY

## 2023-05-31 PROCEDURE — 6360000002 HC RX W HCPCS: Performed by: SPECIALIST

## 2023-05-31 PROCEDURE — 2060000000 HC ICU INTERMEDIATE R&B

## 2023-05-31 PROCEDURE — 86060 ANTISTREPTOLYSIN O TITER: CPT

## 2023-05-31 RX ORDER — MELOXICAM 7.5 MG/1
7.5 TABLET ORAL DAILY
Status: DISCONTINUED | OUTPATIENT
Start: 2023-05-31 | End: 2023-06-05 | Stop reason: HOSPADM

## 2023-05-31 RX ADMIN — MELOXICAM 7.5 MG: 7.5 TABLET ORAL at 21:19

## 2023-05-31 RX ADMIN — MICONAZOLE NITRATE: 2 OINTMENT TOPICAL at 08:40

## 2023-05-31 RX ADMIN — CEFEPIME 2000 MG: 2 INJECTION, POWDER, FOR SOLUTION INTRAVENOUS at 12:31

## 2023-05-31 RX ADMIN — IPRATROPIUM BROMIDE AND ALBUTEROL SULFATE 1 DOSE: 2.5; .5 SOLUTION RESPIRATORY (INHALATION) at 12:15

## 2023-05-31 RX ADMIN — FERROUS SULFATE TAB 325 MG (65 MG ELEMENTAL FE) 325 MG: 325 (65 FE) TAB at 08:39

## 2023-05-31 RX ADMIN — Medication: at 08:41

## 2023-05-31 RX ADMIN — ATORVASTATIN CALCIUM 80 MG: 40 TABLET, FILM COATED ORAL at 21:18

## 2023-05-31 RX ADMIN — LINEZOLID 600 MG: 600 TABLET, FILM COATED ORAL at 08:39

## 2023-05-31 RX ADMIN — LINEZOLID 600 MG: 600 TABLET, FILM COATED ORAL at 21:19

## 2023-05-31 RX ADMIN — MICONAZOLE NITRATE: 2 OINTMENT TOPICAL at 21:19

## 2023-05-31 RX ADMIN — TAMSULOSIN HYDROCHLORIDE 0.4 MG: 0.4 CAPSULE ORAL at 08:39

## 2023-05-31 RX ADMIN — APIXABAN 2.5 MG: 2.5 TABLET, FILM COATED ORAL at 16:05

## 2023-05-31 RX ADMIN — IPRATROPIUM BROMIDE AND ALBUTEROL SULFATE 1 DOSE: 2.5; .5 SOLUTION RESPIRATORY (INHALATION) at 09:28

## 2023-05-31 RX ADMIN — GABAPENTIN 200 MG: 100 CAPSULE ORAL at 16:05

## 2023-05-31 RX ADMIN — PANTOPRAZOLE SODIUM 40 MG: 40 TABLET, DELAYED RELEASE ORAL at 06:03

## 2023-05-31 RX ADMIN — SODIUM CHLORIDE, PRESERVATIVE FREE 10 ML: 5 INJECTION INTRAVENOUS at 21:20

## 2023-05-31 RX ADMIN — SODIUM CHLORIDE, PRESERVATIVE FREE 10 ML: 5 INJECTION INTRAVENOUS at 08:44

## 2023-05-31 RX ADMIN — INSULIN GLARGINE 76 UNITS: 100 INJECTION, SOLUTION SUBCUTANEOUS at 21:37

## 2023-05-31 RX ADMIN — INSULIN LISPRO 10 UNITS: 100 INJECTION, SOLUTION INTRAVENOUS; SUBCUTANEOUS at 16:11

## 2023-05-31 RX ADMIN — Medication: at 16:05

## 2023-05-31 RX ADMIN — APIXABAN 2.5 MG: 2.5 TABLET, FILM COATED ORAL at 06:03

## 2023-05-31 RX ADMIN — IPRATROPIUM BROMIDE AND ALBUTEROL SULFATE 1 DOSE: 2.5; .5 SOLUTION RESPIRATORY (INHALATION) at 19:58

## 2023-05-31 RX ADMIN — CETIRIZINE HYDROCHLORIDE 5 MG: 10 TABLET, FILM COATED ORAL at 08:39

## 2023-05-31 RX ADMIN — INSULIN LISPRO 10 UNITS: 100 INJECTION, SOLUTION INTRAVENOUS; SUBCUTANEOUS at 11:02

## 2023-05-31 ASSESSMENT — PAIN SCALES - GENERAL
PAINLEVEL_OUTOF10: 0
PAINLEVEL_OUTOF10: 0

## 2023-06-01 LAB
BACTERIA WND AEROBE CULT: ABNORMAL
BACTERIA WND AEROBE CULT: ABNORMAL
EKG ATRIAL RATE: 101 BPM
EKG Q-T INTERVAL: 342 MS
EKG QRS DURATION: 96 MS
EKG QTC CALCULATION (BAZETT): 429 MS
EKG R AXIS: -9 DEGREES
EKG T AXIS: 38 DEGREES
EKG VENTRICULAR RATE: 95 BPM
METER GLUCOSE: 142 MG/DL (ref 74–99)
METER GLUCOSE: 198 MG/DL (ref 74–99)
METER GLUCOSE: 200 MG/DL (ref 74–99)
ORGANISM: ABNORMAL

## 2023-06-01 PROCEDURE — 6370000000 HC RX 637 (ALT 250 FOR IP): Performed by: INTERNAL MEDICINE

## 2023-06-01 PROCEDURE — 6360000002 HC RX W HCPCS: Performed by: SPECIALIST

## 2023-06-01 PROCEDURE — 97530 THERAPEUTIC ACTIVITIES: CPT

## 2023-06-01 PROCEDURE — 93010 ELECTROCARDIOGRAM REPORT: CPT | Performed by: INTERNAL MEDICINE

## 2023-06-01 PROCEDURE — 2580000003 HC RX 258: Performed by: INTERNAL MEDICINE

## 2023-06-01 PROCEDURE — 2060000000 HC ICU INTERMEDIATE R&B

## 2023-06-01 PROCEDURE — 2580000003 HC RX 258: Performed by: SPECIALIST

## 2023-06-01 PROCEDURE — 97161 PT EVAL LOW COMPLEX 20 MIN: CPT

## 2023-06-01 PROCEDURE — 6370000000 HC RX 637 (ALT 250 FOR IP): Performed by: SPECIALIST

## 2023-06-01 PROCEDURE — 94640 AIRWAY INHALATION TREATMENT: CPT

## 2023-06-01 PROCEDURE — 82962 GLUCOSE BLOOD TEST: CPT

## 2023-06-01 PROCEDURE — 93005 ELECTROCARDIOGRAM TRACING: CPT | Performed by: INTERNAL MEDICINE

## 2023-06-01 RX ORDER — DEXTROSE MONOHYDRATE 100 MG/ML
INJECTION, SOLUTION INTRAVENOUS CONTINUOUS PRN
Status: DISCONTINUED | OUTPATIENT
Start: 2023-06-01 | End: 2023-06-05 | Stop reason: HOSPADM

## 2023-06-01 RX ADMIN — IPRATROPIUM BROMIDE AND ALBUTEROL SULFATE 1 DOSE: 2.5; .5 SOLUTION RESPIRATORY (INHALATION) at 08:08

## 2023-06-01 RX ADMIN — INSULIN GLARGINE 76 UNITS: 100 INJECTION, SOLUTION SUBCUTANEOUS at 21:50

## 2023-06-01 RX ADMIN — SODIUM CHLORIDE, PRESERVATIVE FREE 10 ML: 5 INJECTION INTRAVENOUS at 08:30

## 2023-06-01 RX ADMIN — SODIUM CHLORIDE, PRESERVATIVE FREE 10 ML: 5 INJECTION INTRAVENOUS at 21:38

## 2023-06-01 RX ADMIN — CETIRIZINE HYDROCHLORIDE 5 MG: 10 TABLET, FILM COATED ORAL at 08:27

## 2023-06-01 RX ADMIN — MELOXICAM 7.5 MG: 7.5 TABLET ORAL at 08:27

## 2023-06-01 RX ADMIN — LINEZOLID 600 MG: 600 TABLET, FILM COATED ORAL at 08:27

## 2023-06-01 RX ADMIN — INSULIN LISPRO 10 UNITS: 100 INJECTION, SOLUTION INTRAVENOUS; SUBCUTANEOUS at 18:20

## 2023-06-01 RX ADMIN — APIXABAN 2.5 MG: 2.5 TABLET, FILM COATED ORAL at 05:14

## 2023-06-01 RX ADMIN — PANTOPRAZOLE SODIUM 40 MG: 40 TABLET, DELAYED RELEASE ORAL at 05:14

## 2023-06-01 RX ADMIN — Medication: at 05:16

## 2023-06-01 RX ADMIN — INSULIN LISPRO 10 UNITS: 100 INJECTION, SOLUTION INTRAVENOUS; SUBCUTANEOUS at 08:27

## 2023-06-01 RX ADMIN — CEFEPIME 2000 MG: 2 INJECTION, POWDER, FOR SOLUTION INTRAVENOUS at 00:34

## 2023-06-01 RX ADMIN — APIXABAN 2.5 MG: 2.5 TABLET, FILM COATED ORAL at 18:20

## 2023-06-01 RX ADMIN — GABAPENTIN 200 MG: 100 CAPSULE ORAL at 18:20

## 2023-06-01 RX ADMIN — FERROUS SULFATE TAB 325 MG (65 MG ELEMENTAL FE) 325 MG: 325 (65 FE) TAB at 08:27

## 2023-06-01 RX ADMIN — TAMSULOSIN HYDROCHLORIDE 0.4 MG: 0.4 CAPSULE ORAL at 08:27

## 2023-06-01 RX ADMIN — MICONAZOLE NITRATE: 2 OINTMENT TOPICAL at 21:39

## 2023-06-01 RX ADMIN — INSULIN LISPRO 10 UNITS: 100 INJECTION, SOLUTION INTRAVENOUS; SUBCUTANEOUS at 11:31

## 2023-06-01 RX ADMIN — CEFEPIME 2000 MG: 2 INJECTION, POWDER, FOR SOLUTION INTRAVENOUS at 12:57

## 2023-06-01 RX ADMIN — ATORVASTATIN CALCIUM 80 MG: 40 TABLET, FILM COATED ORAL at 21:38

## 2023-06-01 RX ADMIN — MICONAZOLE NITRATE: 2 OINTMENT TOPICAL at 08:30

## 2023-06-01 ASSESSMENT — PAIN SCALES - GENERAL
PAINLEVEL_OUTOF10: 0
PAINLEVEL_OUTOF10: 0

## 2023-06-02 LAB
BACTERIA BLD CULT ORG #2: NORMAL
BACTERIA BLD CULT: NORMAL
METER GLUCOSE: 103 MG/DL (ref 74–99)
METER GLUCOSE: 126 MG/DL (ref 74–99)
METER GLUCOSE: 201 MG/DL (ref 74–99)
METER GLUCOSE: 266 MG/DL (ref 74–99)

## 2023-06-02 PROCEDURE — 97530 THERAPEUTIC ACTIVITIES: CPT

## 2023-06-02 PROCEDURE — 94640 AIRWAY INHALATION TREATMENT: CPT

## 2023-06-02 PROCEDURE — 2580000003 HC RX 258: Performed by: INTERNAL MEDICINE

## 2023-06-02 PROCEDURE — 2580000003 HC RX 258: Performed by: SPECIALIST

## 2023-06-02 PROCEDURE — 6360000002 HC RX W HCPCS: Performed by: SPECIALIST

## 2023-06-02 PROCEDURE — 6370000000 HC RX 637 (ALT 250 FOR IP): Performed by: INTERNAL MEDICINE

## 2023-06-02 PROCEDURE — 82962 GLUCOSE BLOOD TEST: CPT

## 2023-06-02 PROCEDURE — 2060000000 HC ICU INTERMEDIATE R&B

## 2023-06-02 RX ADMIN — APIXABAN 2.5 MG: 2.5 TABLET, FILM COATED ORAL at 06:00

## 2023-06-02 RX ADMIN — INSULIN GLARGINE 76 UNITS: 100 INJECTION, SOLUTION SUBCUTANEOUS at 20:00

## 2023-06-02 RX ADMIN — MELOXICAM 7.5 MG: 7.5 TABLET ORAL at 08:42

## 2023-06-02 RX ADMIN — FERROUS SULFATE TAB 325 MG (65 MG ELEMENTAL FE) 325 MG: 325 (65 FE) TAB at 08:44

## 2023-06-02 RX ADMIN — SODIUM CHLORIDE, PRESERVATIVE FREE 10 ML: 5 INJECTION INTRAVENOUS at 20:00

## 2023-06-02 RX ADMIN — TAMSULOSIN HYDROCHLORIDE 0.4 MG: 0.4 CAPSULE ORAL at 08:42

## 2023-06-02 RX ADMIN — GABAPENTIN 200 MG: 100 CAPSULE ORAL at 16:52

## 2023-06-02 RX ADMIN — APIXABAN 2.5 MG: 2.5 TABLET, FILM COATED ORAL at 16:52

## 2023-06-02 RX ADMIN — CEFEPIME 2000 MG: 2 INJECTION, POWDER, FOR SOLUTION INTRAVENOUS at 00:15

## 2023-06-02 RX ADMIN — IPRATROPIUM BROMIDE AND ALBUTEROL SULFATE 1 DOSE: 2.5; .5 SOLUTION RESPIRATORY (INHALATION) at 16:40

## 2023-06-02 RX ADMIN — MICONAZOLE NITRATE: 2 OINTMENT TOPICAL at 20:00

## 2023-06-02 RX ADMIN — SODIUM CHLORIDE, PRESERVATIVE FREE 10 ML: 5 INJECTION INTRAVENOUS at 08:49

## 2023-06-02 RX ADMIN — CEFEPIME 2000 MG: 2 INJECTION, POWDER, FOR SOLUTION INTRAVENOUS at 12:58

## 2023-06-02 RX ADMIN — PANTOPRAZOLE SODIUM 40 MG: 40 TABLET, DELAYED RELEASE ORAL at 06:00

## 2023-06-02 RX ADMIN — MICONAZOLE NITRATE: 2 OINTMENT TOPICAL at 08:45

## 2023-06-02 RX ADMIN — INSULIN LISPRO 10 UNITS: 100 INJECTION, SOLUTION INTRAVENOUS; SUBCUTANEOUS at 16:52

## 2023-06-02 RX ADMIN — ATORVASTATIN CALCIUM 80 MG: 40 TABLET, FILM COATED ORAL at 20:00

## 2023-06-02 RX ADMIN — IPRATROPIUM BROMIDE AND ALBUTEROL SULFATE 1 DOSE: 2.5; .5 SOLUTION RESPIRATORY (INHALATION) at 19:55

## 2023-06-02 RX ADMIN — CETIRIZINE HYDROCHLORIDE 5 MG: 10 TABLET, FILM COATED ORAL at 08:41

## 2023-06-02 ASSESSMENT — PAIN SCALES - GENERAL
PAINLEVEL_OUTOF10: 5
PAINLEVEL_OUTOF10: 0

## 2023-06-03 PROBLEM — L03.116 CELLULITIS OF LEFT LEG: Status: ACTIVE | Noted: 2023-06-03

## 2023-06-03 LAB
METER GLUCOSE: 118 MG/DL (ref 74–99)
METER GLUCOSE: 119 MG/DL (ref 74–99)
METER GLUCOSE: 134 MG/DL (ref 74–99)
METER GLUCOSE: 81 MG/DL (ref 74–99)

## 2023-06-03 PROCEDURE — 6370000000 HC RX 637 (ALT 250 FOR IP): Performed by: INTERNAL MEDICINE

## 2023-06-03 PROCEDURE — 2580000003 HC RX 258: Performed by: INTERNAL MEDICINE

## 2023-06-03 PROCEDURE — 94640 AIRWAY INHALATION TREATMENT: CPT

## 2023-06-03 PROCEDURE — 82962 GLUCOSE BLOOD TEST: CPT

## 2023-06-03 PROCEDURE — 2060000000 HC ICU INTERMEDIATE R&B

## 2023-06-03 PROCEDURE — 2580000003 HC RX 258: Performed by: SPECIALIST

## 2023-06-03 PROCEDURE — 6360000002 HC RX W HCPCS: Performed by: SPECIALIST

## 2023-06-03 RX ORDER — POLYVINYL ALCOHOL 14 MG/ML
1 SOLUTION/ DROPS OPHTHALMIC 4 TIMES DAILY PRN
Status: DISCONTINUED | OUTPATIENT
Start: 2023-06-03 | End: 2023-06-05 | Stop reason: HOSPADM

## 2023-06-03 RX ADMIN — MICONAZOLE NITRATE: 2 OINTMENT TOPICAL at 08:59

## 2023-06-03 RX ADMIN — TAMSULOSIN HYDROCHLORIDE 0.4 MG: 0.4 CAPSULE ORAL at 08:59

## 2023-06-03 RX ADMIN — GABAPENTIN 200 MG: 100 CAPSULE ORAL at 17:08

## 2023-06-03 RX ADMIN — MICONAZOLE NITRATE: 2 OINTMENT TOPICAL at 21:25

## 2023-06-03 RX ADMIN — MELOXICAM 7.5 MG: 7.5 TABLET ORAL at 08:59

## 2023-06-03 RX ADMIN — ATORVASTATIN CALCIUM 80 MG: 40 TABLET, FILM COATED ORAL at 21:25

## 2023-06-03 RX ADMIN — APIXABAN 2.5 MG: 2.5 TABLET, FILM COATED ORAL at 17:08

## 2023-06-03 RX ADMIN — FERROUS SULFATE TAB 325 MG (65 MG ELEMENTAL FE) 325 MG: 325 (65 FE) TAB at 08:59

## 2023-06-03 RX ADMIN — CETIRIZINE HYDROCHLORIDE 5 MG: 10 TABLET, FILM COATED ORAL at 08:59

## 2023-06-03 RX ADMIN — POLYVINYL ALCOHOL 1 DROP: 14 SOLUTION/ DROPS OPHTHALMIC at 21:25

## 2023-06-03 RX ADMIN — IPRATROPIUM BROMIDE AND ALBUTEROL SULFATE 1 DOSE: 2.5; .5 SOLUTION RESPIRATORY (INHALATION) at 07:52

## 2023-06-03 RX ADMIN — SODIUM CHLORIDE, PRESERVATIVE FREE 10 ML: 5 INJECTION INTRAVENOUS at 21:25

## 2023-06-03 RX ADMIN — PANTOPRAZOLE SODIUM 40 MG: 40 TABLET, DELAYED RELEASE ORAL at 06:18

## 2023-06-03 RX ADMIN — SODIUM CHLORIDE, PRESERVATIVE FREE 10 ML: 5 INJECTION INTRAVENOUS at 08:59

## 2023-06-03 RX ADMIN — APIXABAN 2.5 MG: 2.5 TABLET, FILM COATED ORAL at 06:18

## 2023-06-03 RX ADMIN — CEFEPIME 2000 MG: 2 INJECTION, POWDER, FOR SOLUTION INTRAVENOUS at 00:20

## 2023-06-03 RX ADMIN — IPRATROPIUM BROMIDE AND ALBUTEROL SULFATE 1 DOSE: 2.5; .5 SOLUTION RESPIRATORY (INHALATION) at 19:42

## 2023-06-03 RX ADMIN — CEFEPIME 2000 MG: 2 INJECTION, POWDER, FOR SOLUTION INTRAVENOUS at 12:52

## 2023-06-03 ASSESSMENT — PAIN SCALES - GENERAL
PAINLEVEL_OUTOF10: 0

## 2023-06-04 LAB
METER GLUCOSE: 177 MG/DL (ref 74–99)
METER GLUCOSE: 254 MG/DL (ref 74–99)
METER GLUCOSE: 72 MG/DL (ref 74–99)
METER GLUCOSE: 96 MG/DL (ref 74–99)

## 2023-06-04 PROCEDURE — 94640 AIRWAY INHALATION TREATMENT: CPT

## 2023-06-04 PROCEDURE — 2580000003 HC RX 258: Performed by: SPECIALIST

## 2023-06-04 PROCEDURE — 82962 GLUCOSE BLOOD TEST: CPT

## 2023-06-04 PROCEDURE — 2580000003 HC RX 258: Performed by: INTERNAL MEDICINE

## 2023-06-04 PROCEDURE — 6370000000 HC RX 637 (ALT 250 FOR IP): Performed by: INTERNAL MEDICINE

## 2023-06-04 PROCEDURE — 6360000002 HC RX W HCPCS: Performed by: SPECIALIST

## 2023-06-04 PROCEDURE — 2060000000 HC ICU INTERMEDIATE R&B

## 2023-06-04 RX ORDER — ONDANSETRON 2 MG/ML
4 INJECTION INTRAMUSCULAR; INTRAVENOUS EVERY 6 HOURS PRN
Status: DISCONTINUED | OUTPATIENT
Start: 2023-06-04 | End: 2023-06-05 | Stop reason: HOSPADM

## 2023-06-04 RX ADMIN — INSULIN GLARGINE 76 UNITS: 100 INJECTION, SOLUTION SUBCUTANEOUS at 21:45

## 2023-06-04 RX ADMIN — SODIUM CHLORIDE, PRESERVATIVE FREE 10 ML: 5 INJECTION INTRAVENOUS at 09:07

## 2023-06-04 RX ADMIN — APIXABAN 2.5 MG: 2.5 TABLET, FILM COATED ORAL at 06:48

## 2023-06-04 RX ADMIN — CETIRIZINE HYDROCHLORIDE 5 MG: 10 TABLET, FILM COATED ORAL at 09:06

## 2023-06-04 RX ADMIN — Medication: at 09:10

## 2023-06-04 RX ADMIN — FERROUS SULFATE TAB 325 MG (65 MG ELEMENTAL FE) 325 MG: 325 (65 FE) TAB at 09:07

## 2023-06-04 RX ADMIN — IPRATROPIUM BROMIDE AND ALBUTEROL SULFATE 1 DOSE: 2.5; .5 SOLUTION RESPIRATORY (INHALATION) at 11:31

## 2023-06-04 RX ADMIN — MICONAZOLE NITRATE: 2 OINTMENT TOPICAL at 21:46

## 2023-06-04 RX ADMIN — GABAPENTIN 200 MG: 100 CAPSULE ORAL at 15:56

## 2023-06-04 RX ADMIN — ATORVASTATIN CALCIUM 80 MG: 40 TABLET, FILM COATED ORAL at 21:46

## 2023-06-04 RX ADMIN — SODIUM CHLORIDE, PRESERVATIVE FREE 10 ML: 5 INJECTION INTRAVENOUS at 21:46

## 2023-06-04 RX ADMIN — TAMSULOSIN HYDROCHLORIDE 0.4 MG: 0.4 CAPSULE ORAL at 09:06

## 2023-06-04 RX ADMIN — CEFEPIME 2000 MG: 2 INJECTION, POWDER, FOR SOLUTION INTRAVENOUS at 13:35

## 2023-06-04 RX ADMIN — MELOXICAM 7.5 MG: 7.5 TABLET ORAL at 09:07

## 2023-06-04 RX ADMIN — CEFEPIME 2000 MG: 2 INJECTION, POWDER, FOR SOLUTION INTRAVENOUS at 02:03

## 2023-06-04 RX ADMIN — MICONAZOLE NITRATE: 2 OINTMENT TOPICAL at 09:10

## 2023-06-04 RX ADMIN — APIXABAN 2.5 MG: 2.5 TABLET, FILM COATED ORAL at 15:56

## 2023-06-04 RX ADMIN — PANTOPRAZOLE SODIUM 40 MG: 40 TABLET, DELAYED RELEASE ORAL at 06:48

## 2023-06-04 RX ADMIN — CALCIUM CARBONATE 500 MG: 500 TABLET, CHEWABLE ORAL at 11:00

## 2023-06-04 ASSESSMENT — PAIN DESCRIPTION - LOCATION: LOCATION: BUTTOCKS

## 2023-06-04 ASSESSMENT — PAIN SCALES - GENERAL
PAINLEVEL_OUTOF10: 0
PAINLEVEL_OUTOF10: 3

## 2023-06-05 VITALS
HEIGHT: 72 IN | SYSTOLIC BLOOD PRESSURE: 156 MMHG | TEMPERATURE: 97.8 F | OXYGEN SATURATION: 97 % | RESPIRATION RATE: 20 BRPM | HEART RATE: 82 BPM | WEIGHT: 268.3 LBS | DIASTOLIC BLOOD PRESSURE: 67 MMHG | BODY MASS INDEX: 36.34 KG/M2

## 2023-06-05 LAB
METER GLUCOSE: 165 MG/DL (ref 74–99)
METER GLUCOSE: 184 MG/DL (ref 74–99)

## 2023-06-05 PROCEDURE — 2580000003 HC RX 258: Performed by: INTERNAL MEDICINE

## 2023-06-05 PROCEDURE — 97530 THERAPEUTIC ACTIVITIES: CPT

## 2023-06-05 PROCEDURE — 6370000000 HC RX 637 (ALT 250 FOR IP): Performed by: INTERNAL MEDICINE

## 2023-06-05 PROCEDURE — 97535 SELF CARE MNGMENT TRAINING: CPT

## 2023-06-05 PROCEDURE — 82962 GLUCOSE BLOOD TEST: CPT

## 2023-06-05 RX ORDER — AMMONIUM LACTATE 12 G/100G
LOTION TOPICAL
DISCHARGE
Start: 2023-06-06

## 2023-06-05 RX ORDER — FERROUS SULFATE 325(65) MG
325 TABLET ORAL DAILY
Qty: 30 TABLET | Refills: 3 | DISCHARGE
Start: 2023-06-05

## 2023-06-05 RX ORDER — POLYVINYL ALCOHOL 14 MG/ML
1 SOLUTION/ DROPS OPHTHALMIC 4 TIMES DAILY PRN
Refills: 4 | DISCHARGE
Start: 2023-06-05 | End: 2023-07-05

## 2023-06-05 RX ORDER — IPRATROPIUM BROMIDE AND ALBUTEROL SULFATE 2.5; .5 MG/3ML; MG/3ML
3 SOLUTION RESPIRATORY (INHALATION)
Qty: 90 ML | Refills: 0 | DISCHARGE
Start: 2023-06-05 | End: 2023-06-13

## 2023-06-05 RX ADMIN — MELOXICAM 7.5 MG: 7.5 TABLET ORAL at 08:07

## 2023-06-05 RX ADMIN — SODIUM CHLORIDE, PRESERVATIVE FREE 10 ML: 5 INJECTION INTRAVENOUS at 08:07

## 2023-06-05 RX ADMIN — MICONAZOLE NITRATE: 2 OINTMENT TOPICAL at 08:08

## 2023-06-05 RX ADMIN — APIXABAN 2.5 MG: 2.5 TABLET, FILM COATED ORAL at 06:05

## 2023-06-05 RX ADMIN — TAMSULOSIN HYDROCHLORIDE 0.4 MG: 0.4 CAPSULE ORAL at 08:07

## 2023-06-05 RX ADMIN — Medication: at 08:08

## 2023-06-05 RX ADMIN — FERROUS SULFATE TAB 325 MG (65 MG ELEMENTAL FE) 325 MG: 325 (65 FE) TAB at 08:07

## 2023-06-05 RX ADMIN — CETIRIZINE HYDROCHLORIDE 5 MG: 10 TABLET, FILM COATED ORAL at 08:07

## 2023-06-05 RX ADMIN — PANTOPRAZOLE SODIUM 40 MG: 40 TABLET, DELAYED RELEASE ORAL at 06:05

## 2023-06-05 NOTE — PROGRESS NOTES
Left msg with Dr. Jazmin Duarte office requesting call back for discharge order and med reconciliation.
Nurse to nurse report called to Barbara Smith at [facility].     Paperwork faxed to [phone]
Physical Therapy    Pt refused Rx this AM, states too tired, just worked with OT staff, was up on his feet and walking. Wishes respected.    Chava Pérez PTA 11940
Subjective: The patient is awake and alert. No problems overnight. Denies chest pain, angina, and dyspnea. Denies abdominal pain. Tolerating diet. No nausea or vomiting. Objective:    BP (!) 143/68   Pulse 79   Temp 98 °F (36.7 °C) (Temporal)   Resp 17   Ht 6' (1.829 m)   Wt 268 lb 4.8 oz (121.7 kg)   SpO2 94%   BMI 36.39 kg/m²     Heart:  RRR, no murmurs, gallops, or rubs.   Lungs:  CTA bilaterally, no wheeze, rales or rhonchi  Abd: bowel sounds present, nontender, nondistended, no masses  Extrem:  No clubbing, cyanosis    CBC:   Lab Results   Component Value Date/Time    WBC 10.5 05/31/2023 01:30 AM    RBC 3.19 05/31/2023 01:30 AM    HGB 9.5 05/31/2023 01:30 AM    HCT 30.3 05/31/2023 01:30 AM    MCV 95.0 05/31/2023 01:30 AM    MCH 29.8 05/31/2023 01:30 AM    MCHC 31.4 05/31/2023 01:30 AM    RDW 14.1 05/31/2023 01:30 AM     05/31/2023 01:30 AM    MPV 9.6 05/31/2023 01:30 AM     CMP:    Lab Results   Component Value Date/Time     05/31/2023 01:30 AM    K 4.2 05/31/2023 01:30 AM    K 5.1 09/30/2022 07:16 PM     05/31/2023 01:30 AM    CO2 24 05/31/2023 01:30 AM    BUN 34 05/31/2023 01:30 AM    CREATININE 1.2 05/31/2023 01:30 AM    GFRAA >60 09/30/2022 07:16 PM    LABGLOM 57 05/31/2023 01:30 AM    GLUCOSE 163 05/31/2023 01:30 AM    PROT 5.4 05/31/2023 01:30 AM    LABALBU 2.3 05/31/2023 01:30 AM    CALCIUM 8.1 05/31/2023 01:30 AM    BILITOT 0.3 05/31/2023 01:30 AM    ALKPHOS 102 05/31/2023 01:30 AM    AST 29 05/31/2023 01:30 AM    ALT 10 05/31/2023 01:30 AM     BMP:    Lab Results   Component Value Date/Time     05/31/2023 01:30 AM    K 4.2 05/31/2023 01:30 AM    K 5.1 09/30/2022 07:16 PM     05/31/2023 01:30 AM    CO2 24 05/31/2023 01:30 AM    BUN 34 05/31/2023 01:30 AM    LABALBU 2.3 05/31/2023 01:30 AM    CREATININE 1.2 05/31/2023 01:30 AM    CALCIUM 8.1 05/31/2023 01:30 AM    GFRAA >60 09/30/2022 07:16 PM    LABGLOM 57 05/31/2023 01:30 AM    GLUCOSE 163 05/31/2023
Updated son, Riya Oris of discharge plan and  time @ 1pm. Message left with other son, Elly Haider as well.      [facility]
crackles. Cardiovascular: Heart sounds rhythmic and regular. Abdomen: Positive bowel sounds to auscultation. Benign to palpation. Extremities: Mild to moderate edema. Legs are wrapped. No erythema.   Lines: Peripheral.    Laboratory and Tests Review:  Lab Results   Component Value Date    WBC 10.5 05/31/2023    WBC 13.6 (H) 05/30/2023    WBC 25.8 (H) 05/29/2023    HGB 9.5 (L) 05/31/2023    HCT 30.3 (L) 05/31/2023    MCV 95.0 05/31/2023     (L) 05/31/2023     Lab Results   Component Value Date    NEUTROABS 8.56 (H) 05/31/2023    NEUTROABS 12.08 (H) 05/30/2023    NEUTROABS 24.51 (H) 05/29/2023     No results found for: [facility]  Lab Results   Component Value Date    ALT 10 05/31/2023    AST 29 05/31/2023    ALKPHOS 102 05/31/2023    BILITOT 0.3 05/31/2023     Lab Results   Component Value Date/Time     05/31/2023 01:30 AM    K 4.2 05/31/2023 01:30 AM    K 5.1 09/30/2022 07:16 PM     05/31/2023 01:30 AM    CO2 24 05/31/2023 01:30 AM    BUN 34 05/31/2023 01:30 AM    CREATININE 1.2 05/31/2023 01:30 AM    CREATININE 1.2 05/30/2023 02:06 AM    CREATININE 1.2 05/29/2023 03:05 AM    GFRAA >60 09/30/2022 07:16 PM    LABGLOM 57 05/31/2023 01:30 AM    GLUCOSE 163 05/31/2023 01:30 AM    PROT 5.4 05/31/2023 01:30 AM    LABALBU 2.3 05/31/2023 01:30 AM    CALCIUM 8.1 05/31/2023 01:30 AM    BILITOT 0.3 05/31/2023 01:30 AM    ALKPHOS 102 05/31/2023 01:30 AM    AST 29 05/31/2023 01:30 AM    ALT 10 05/31/2023 01:30 AM     Lab Results   Component Value Date    CRP 14.2 (H) 05/31/2023    CRP 4.9 (H) 05/16/2022    CRP 8.9 (H) 03/14/2022     Lab Results   Component Value Date    SEDRATE 82 (H) 05/31/2023    SEDRATE 38 (H) 09/30/2022    SEDRATE 63 (H) 05/15/2022     Radiology:      Microbiology:   Rapid influenza: Negative  Rapid SARS-CoV-2: Negative  Respiratory panel: Negative  Streptococcus pneumoniae/Legionella urine Ag: negative  Blood cultures 5/20/2023: Negative so far  Urine culture 5/20/2023: <10,000 mixed
and MAX A for hygiene following bowel movement  max A, external cath and use of bedpan while seated EOB  Max A jose hygiene. Mod I   Bed Mobility  Supine-to-Sit: MIN A to lift trunk to midline  Sit-to-Supine: MAX A to lift BLE into bed  Rolling: MOD A to initiate rolling right/left during changing of linen   SBA supine to sit   Min A for LE's sit to supine  Mod I in order to maximize patient's independence/participation with ADLs and other functional tasks. Functional Transfers Sit-to-Stand: MIN A   from EOB, cues for hand placement, declined wearing offloading shoes  CGA from elevated bed surface. Pt stands from lift chair at home. Sleeps in chair. Mod I   Functional Mobility MIN A (FWW) side stepping  Min  A using w/w 2-3 steps forward and back. Mod I with functional mobility (with device, as needed/appropriate) in order to maximize independence with ADLs and other functional tasks. Balance Sitting: independent  Standing: CGA (with FWW)  static stand balance during ADL CGA Mod I dynamic standing balance during completion of ADLs and other functional tasks. Activity Tolerance fair  fair. Patient will demonstrate Good understanding and consistent implementation of energy conservation techniques and work simplification techniques into ADLs and functional tasks   Visual/  Perceptual WFL, reading glasses      N/A   B UE Strength 4-/5  deficits noted with limited fine motor skills. Patient will demonstrate 4+/5 B UE strength in order to maximize independence with ADLs and functional transfers. Comments:  Pt laying in the bed and agreeable to therapy. Increased status from prior session with assist of one therapist required for bed mobility and transfers. Pt sits and sleeps in recliner chair at nursing facility. Bed elevated to high level so that pt able to stand. He requested to take a few steps forward and back. He reports he uses a walker to go to and from bathroom at the nursing facility.

## 2023-06-05 NOTE — CARE COORDINATION
CASE MANAGEMENT. ... Discharge order on chart. Patient is accepted back to [facility]. Transport arranged for 1pm with [facility]. Nursing, facility notified and nursing will update patients family. N 16 in epic. Forms in chart.

## 2023-06-05 NOTE — DISCHARGE INSTRUCTIONS
BLE Dressing changes: Cleanse lower extremity wounds with NSS. Wash intact skin with foam cleanser. Apply Amlactin lotion to intact skin. Apply opticel to wounds then cover with ABDs. Secure with kerlix. Apply Ace wrap from toes to below knees. Change daily and prn.

## 2023-06-05 NOTE — PLAN OF CARE
Problem: Discharge Planning  Goal: Discharge to home or other facility with appropriate resources  6/5/2023 0906 by Rosalie Boswell RN  Outcome: Progressing     Problem: Pain  Goal: Verbalizes/displays adequate comfort level or baseline comfort level  6/5/2023 0906 by Rosalie Boswell RN  Outcome: Progressing     Problem: Safety - Adult  Goal: Free from fall injury  6/5/2023 0906 by Rosalie Boswell RN  Outcome: Progressing     Problem: ABCDS Injury Assessment  Goal: Absence of physical injury  6/5/2023 0906 by Rosalie Boswell RN  Outcome: Progressing

## 2023-06-28 LAB
ANION GAP SERPL CALCULATED.3IONS-SCNC: 8 MMOL/L (ref 7–16)
BASOPHILS # BLD: 0.02 E9/L (ref 0–0.2)
BASOPHILS NFR BLD: 0.2 % (ref 0–2)
BUN SERPL-MCNC: 44 MG/DL (ref 6–23)
CALCIUM SERPL-MCNC: 8.5 MG/DL (ref 8.6–10.2)
CHLORIDE SERPL-SCNC: 102 MMOL/L (ref 98–107)
CO2 SERPL-SCNC: 27 MMOL/L (ref 22–29)
CREAT SERPL-MCNC: 1.2 MG/DL (ref 0.7–1.2)
EOSINOPHIL # BLD: 0.2 E9/L (ref 0.05–0.5)
EOSINOPHIL NFR BLD: 1.7 % (ref 0–6)
ERYTHROCYTE [DISTWIDTH] IN BLOOD BY AUTOMATED COUNT: 14.1 FL (ref 11.5–15)
GLUCOSE SERPL-MCNC: 224 MG/DL (ref 74–99)
HCT VFR BLD AUTO: 32.8 % (ref 37–54)
HGB BLD-MCNC: 9.9 G/DL (ref 12.5–16.5)
IMM GRANULOCYTES # BLD: 0.06 E9/L
IMM GRANULOCYTES NFR BLD: 0.5 % (ref 0–5)
LYMPHOCYTES # BLD: 0.77 E9/L (ref 1.5–4)
LYMPHOCYTES NFR BLD: 6.5 % (ref 20–42)
MCH RBC QN AUTO: 28.7 PG (ref 26–35)
MCHC RBC AUTO-ENTMCNC: 30.2 % (ref 32–34.5)
MCV RBC AUTO: 95.1 FL (ref 80–99.9)
MONOCYTES # BLD: 0.71 E9/L (ref 0.1–0.95)
MONOCYTES NFR BLD: 6 % (ref 2–12)
NEUTROPHILS # BLD: 10.06 E9/L (ref 1.8–7.3)
NEUTS SEG NFR BLD: 85.1 % (ref 43–80)
PLATELET # BLD AUTO: 196 E9/L (ref 130–450)
PMV BLD AUTO: 9.4 FL (ref 7–12)
POTASSIUM SERPL-SCNC: 4.8 MMOL/L (ref 3.5–5)
RBC # BLD AUTO: 3.45 E12/L (ref 3.8–5.8)
SODIUM SERPL-SCNC: 137 MMOL/L (ref 132–146)
WBC # BLD: 11.8 E9/L (ref 4.5–11.5)

## 2023-06-30 LAB
BASOPHILS # BLD: 0.03 E9/L (ref 0–0.2)
BASOPHILS NFR BLD: 0.3 % (ref 0–2)
EOSINOPHIL # BLD: 0.34 E9/L (ref 0.05–0.5)
EOSINOPHIL NFR BLD: 3.4 % (ref 0–6)
ERYTHROCYTE [DISTWIDTH] IN BLOOD BY AUTOMATED COUNT: 14.1 FL (ref 11.5–15)
HCT VFR BLD AUTO: 32.4 % (ref 37–54)
HGB BLD-MCNC: 9.8 G/DL (ref 12.5–16.5)
IMM GRANULOCYTES # BLD: 0.05 E9/L
IMM GRANULOCYTES NFR BLD: 0.5 % (ref 0–5)
LYMPHOCYTES # BLD: 1.33 E9/L (ref 1.5–4)
LYMPHOCYTES NFR BLD: 13.3 % (ref 20–42)
MCH RBC QN AUTO: 29 PG (ref 26–35)
MCHC RBC AUTO-ENTMCNC: 30.2 % (ref 32–34.5)
MCV RBC AUTO: 95.9 FL (ref 80–99.9)
MONOCYTES # BLD: 0.95 E9/L (ref 0.1–0.95)
MONOCYTES NFR BLD: 9.5 % (ref 2–12)
NEUTROPHILS # BLD: 7.27 E9/L (ref 1.8–7.3)
NEUTS SEG NFR BLD: 73 % (ref 43–80)
PLATELET # BLD AUTO: 208 E9/L (ref 130–450)
PMV BLD AUTO: 9.1 FL (ref 7–12)
RBC # BLD AUTO: 3.38 E12/L (ref 3.8–5.8)
WBC # BLD: 10 E9/L (ref 4.5–11.5)

## 2023-07-05 ENCOUNTER — APPOINTMENT (OUTPATIENT)
Dept: GENERAL RADIOLOGY | Age: 88
DRG: 864 | End: 2023-07-05
Payer: MEDICARE

## 2023-07-05 ENCOUNTER — HOSPITAL ENCOUNTER (INPATIENT)
Age: 88
LOS: 6 days | Discharge: OTHER INSTITUTION | DRG: 864 | End: 2023-07-12
Attending: EMERGENCY MEDICINE | Admitting: INTERNAL MEDICINE
Payer: MEDICARE

## 2023-07-05 DIAGNOSIS — L97.509 ULCER OF FOOT, UNSPECIFIED LATERALITY, UNSPECIFIED ULCER STAGE (HCC): ICD-10-CM

## 2023-07-05 DIAGNOSIS — R50.9 FEVER, UNSPECIFIED FEVER CAUSE: Primary | ICD-10-CM

## 2023-07-05 DIAGNOSIS — R68.89 RIGORS: ICD-10-CM

## 2023-07-05 LAB
BASOPHILS # BLD: 0.02 E9/L (ref 0–0.2)
BASOPHILS NFR BLD: 0.1 % (ref 0–2)
EOSINOPHIL # BLD: 0.15 E9/L (ref 0.05–0.5)
EOSINOPHIL NFR BLD: 0.8 % (ref 0–6)
ERYTHROCYTE [DISTWIDTH] IN BLOOD BY AUTOMATED COUNT: 14.2 FL (ref 11.5–15)
HCT VFR BLD AUTO: 33.3 % (ref 37–54)
HGB BLD-MCNC: 10.4 G/DL (ref 12.5–16.5)
IMM GRANULOCYTES # BLD: 0.1 E9/L
IMM GRANULOCYTES NFR BLD: 0.5 % (ref 0–5)
LACTATE BLDV-SCNC: 2.2 MMOL/L (ref 0.5–1.9)
LYMPHOCYTES # BLD: 0.85 E9/L (ref 1.5–4)
LYMPHOCYTES NFR BLD: 4.5 % (ref 20–42)
MCH RBC QN AUTO: 28.8 PG (ref 26–35)
MCHC RBC AUTO-ENTMCNC: 31.2 % (ref 32–34.5)
MCV RBC AUTO: 92.2 FL (ref 80–99.9)
MONOCYTES # BLD: 0.94 E9/L (ref 0.1–0.95)
MONOCYTES NFR BLD: 5 % (ref 2–12)
NEUTROPHILS # BLD: 16.71 E9/L (ref 1.8–7.3)
NEUTS SEG NFR BLD: 89.1 % (ref 43–80)
PLATELET # BLD AUTO: 258 E9/L (ref 130–450)
PMV BLD AUTO: 9.2 FL (ref 7–12)
RBC # BLD AUTO: 3.61 E12/L (ref 3.8–5.8)
WBC # BLD: 18.8 E9/L (ref 4.5–11.5)

## 2023-07-05 PROCEDURE — 93005 ELECTROCARDIOGRAM TRACING: CPT | Performed by: EMERGENCY MEDICINE

## 2023-07-05 PROCEDURE — 81001 URINALYSIS AUTO W/SCOPE: CPT

## 2023-07-05 PROCEDURE — 99285 EMERGENCY DEPT VISIT HI MDM: CPT

## 2023-07-05 PROCEDURE — 71045 X-RAY EXAM CHEST 1 VIEW: CPT

## 2023-07-05 PROCEDURE — 84145 PROCALCITONIN (PCT): CPT

## 2023-07-05 PROCEDURE — 85025 COMPLETE CBC W/AUTO DIFF WBC: CPT

## 2023-07-05 PROCEDURE — 83605 ASSAY OF LACTIC ACID: CPT

## 2023-07-05 PROCEDURE — 87040 BLOOD CULTURE FOR BACTERIA: CPT

## 2023-07-05 PROCEDURE — 87150 DNA/RNA AMPLIFIED PROBE: CPT

## 2023-07-05 PROCEDURE — 96374 THER/PROPH/DIAG INJ IV PUSH: CPT

## 2023-07-05 ASSESSMENT — PAIN DESCRIPTION - LOCATION: LOCATION: HIP

## 2023-07-05 ASSESSMENT — LIFESTYLE VARIABLES
HOW OFTEN DO YOU HAVE A DRINK CONTAINING ALCOHOL: NEVER
HOW MANY STANDARD DRINKS CONTAINING ALCOHOL DO YOU HAVE ON A TYPICAL DAY: PATIENT DOES NOT DRINK

## 2023-07-05 ASSESSMENT — PAIN SCALES - GENERAL: PAINLEVEL_OUTOF10: 7

## 2023-07-06 PROBLEM — R50.9 FEVER AND CHILLS: Status: ACTIVE | Noted: 2023-07-06

## 2023-07-06 LAB
ANION GAP SERPL CALCULATED.3IONS-SCNC: 9 MMOL/L (ref 7–16)
BACTERIA URNS QL MICRO: ABNORMAL /HPF
BILIRUB UR QL STRIP: NEGATIVE
BUN SERPL-MCNC: 40 MG/DL (ref 6–23)
CALCIUM SERPL-MCNC: 8.4 MG/DL (ref 8.6–10.2)
CHLORIDE SERPL-SCNC: 100 MMOL/L (ref 98–107)
CLARITY UR: CLEAR
CO2 SERPL-SCNC: 26 MMOL/L (ref 22–29)
COLOR UR: YELLOW
CREAT SERPL-MCNC: 1.4 MG/DL (ref 0.7–1.2)
CRP SERPL HS-MCNC: 14.1 MG/DL (ref 0–0.4)
EKG ATRIAL RATE: 110 BPM
EKG ATRIAL RATE: 111 BPM
EKG P AXIS: 121 DEGREES
EKG P AXIS: 68 DEGREES
EKG P-R INTERVAL: 204 MS
EKG P-R INTERVAL: 224 MS
EKG Q-T INTERVAL: 304 MS
EKG Q-T INTERVAL: 318 MS
EKG QRS DURATION: 68 MS
EKG QRS DURATION: 70 MS
EKG QTC CALCULATION (BAZETT): 411 MS
EKG QTC CALCULATION (BAZETT): 432 MS
EKG R AXIS: -13 DEGREES
EKG R AXIS: -159 DEGREES
EKG T AXIS: 125 DEGREES
EKG T AXIS: 58 DEGREES
EKG VENTRICULAR RATE: 110 BPM
EKG VENTRICULAR RATE: 111 BPM
ERYTHROCYTE [SEDIMENTATION RATE] IN BLOOD BY WESTERGREN METHOD: 73 MM/HR (ref 0–15)
GLUCOSE SERPL-MCNC: 192 MG/DL (ref 74–99)
GLUCOSE UR STRIP-MCNC: NEGATIVE MG/DL
HGB UR QL STRIP: NEGATIVE
KETONES UR STRIP-MCNC: NEGATIVE MG/DL
LEUKOCYTE ESTERASE UR QL STRIP: NEGATIVE
METER GLUCOSE: 168 MG/DL (ref 74–99)
METER GLUCOSE: 242 MG/DL (ref 74–99)
METER GLUCOSE: 317 MG/DL (ref 74–99)
MUCOUS THREADS URNS QL MICRO: PRESENT /LPF
NITRITE UR QL STRIP: NEGATIVE
PH UR STRIP: 5.5 [PH] (ref 5–9)
POTASSIUM SERPL-SCNC: 5.1 MMOL/L (ref 3.5–5)
PROCALCITONIN: 0.21 NG/ML (ref 0–0.08)
PROT UR STRIP-MCNC: NORMAL MG/DL
RBC #/AREA URNS HPF: ABNORMAL /HPF (ref 0–2)
SODIUM SERPL-SCNC: 135 MMOL/L (ref 132–146)
SP GR UR STRIP: 1.02 (ref 1–1.03)
UROBILINOGEN UR STRIP-ACNC: 0.2 E.U./DL
WBC #/AREA URNS HPF: ABNORMAL /HPF (ref 0–5)

## 2023-07-06 PROCEDURE — 6370000000 HC RX 637 (ALT 250 FOR IP): Performed by: INTERNAL MEDICINE

## 2023-07-06 PROCEDURE — 82962 GLUCOSE BLOOD TEST: CPT

## 2023-07-06 PROCEDURE — 6360000002 HC RX W HCPCS: Performed by: SPECIALIST

## 2023-07-06 PROCEDURE — 86140 C-REACTIVE PROTEIN: CPT

## 2023-07-06 PROCEDURE — 93010 ELECTROCARDIOGRAM REPORT: CPT | Performed by: INTERNAL MEDICINE

## 2023-07-06 PROCEDURE — 87186 SC STD MICRODIL/AGAR DIL: CPT

## 2023-07-06 PROCEDURE — 1200000000 HC SEMI PRIVATE

## 2023-07-06 PROCEDURE — 2700000000 HC OXYGEN THERAPY PER DAY

## 2023-07-06 PROCEDURE — 6370000000 HC RX 637 (ALT 250 FOR IP): Performed by: EMERGENCY MEDICINE

## 2023-07-06 PROCEDURE — 87070 CULTURE OTHR SPECIMN AEROBIC: CPT

## 2023-07-06 PROCEDURE — 36415 COLL VENOUS BLD VENIPUNCTURE: CPT

## 2023-07-06 PROCEDURE — 2580000003 HC RX 258: Performed by: SPECIALIST

## 2023-07-06 PROCEDURE — 6370000000 HC RX 637 (ALT 250 FOR IP): Performed by: SPECIALIST

## 2023-07-06 PROCEDURE — 80048 BASIC METABOLIC PNL TOTAL CA: CPT

## 2023-07-06 PROCEDURE — 6360000002 HC RX W HCPCS: Performed by: EMERGENCY MEDICINE

## 2023-07-06 PROCEDURE — 94640 AIRWAY INHALATION TREATMENT: CPT

## 2023-07-06 PROCEDURE — 2580000003 HC RX 258: Performed by: EMERGENCY MEDICINE

## 2023-07-06 PROCEDURE — 85651 RBC SED RATE NONAUTOMATED: CPT

## 2023-07-06 PROCEDURE — 87147 CULTURE TYPE IMMUNOLOGIC: CPT

## 2023-07-06 RX ORDER — VIT A/VIT C/VIT E/ZINC/COPPER 2148-113
1 TABLET ORAL 2 TIMES DAILY WITH MEALS
Status: DISCONTINUED | OUTPATIENT
Start: 2023-07-06 | End: 2023-07-06 | Stop reason: SDUPTHER

## 2023-07-06 RX ORDER — INSULIN LISPRO 100 [IU]/ML
8 INJECTION, SOLUTION INTRAVENOUS; SUBCUTANEOUS
Status: DISCONTINUED | OUTPATIENT
Start: 2023-07-06 | End: 2023-07-13 | Stop reason: HOSPADM

## 2023-07-06 RX ORDER — FAMOTIDINE 20 MG/1
20 TABLET, FILM COATED ORAL NIGHTLY
Status: DISCONTINUED | OUTPATIENT
Start: 2023-07-06 | End: 2023-07-13 | Stop reason: HOSPADM

## 2023-07-06 RX ORDER — CHOLECALCIFEROL (VITAMIN D3) 50 MCG
2000 TABLET ORAL DAILY
Status: DISCONTINUED | OUTPATIENT
Start: 2023-07-06 | End: 2023-07-13 | Stop reason: HOSPADM

## 2023-07-06 RX ORDER — BISACODYL 10 MG
10 SUPPOSITORY, RECTAL RECTAL DAILY PRN
Status: DISCONTINUED | OUTPATIENT
Start: 2023-07-06 | End: 2023-07-13 | Stop reason: HOSPADM

## 2023-07-06 RX ORDER — LANOLIN ALCOHOL/MO/W.PET/CERES
10 CREAM (GRAM) TOPICAL NIGHTLY PRN
COMMUNITY

## 2023-07-06 RX ORDER — BUMETANIDE 1 MG/1
1 TABLET ORAL DAILY
Status: DISCONTINUED | OUTPATIENT
Start: 2023-07-06 | End: 2023-07-13 | Stop reason: HOSPADM

## 2023-07-06 RX ORDER — ANALGESIC BALM 1.74; 4.06 G/29G; G/29G
OINTMENT TOPICAL 2 TIMES DAILY PRN
Status: DISCONTINUED | OUTPATIENT
Start: 2023-07-06 | End: 2023-07-13 | Stop reason: HOSPADM

## 2023-07-06 RX ORDER — ANALGESIC BALM 1.74; 4.06 G/29G; G/29G
OINTMENT TOPICAL ONCE
Status: DISCONTINUED | OUTPATIENT
Start: 2023-07-06 | End: 2023-07-06

## 2023-07-06 RX ORDER — ONDANSETRON 2 MG/ML
4 INJECTION INTRAMUSCULAR; INTRAVENOUS ONCE
Status: COMPLETED | OUTPATIENT
Start: 2023-07-06 | End: 2023-07-06

## 2023-07-06 RX ORDER — FERROUS SULFATE 325(65) MG
325 TABLET ORAL DAILY
Status: DISCONTINUED | OUTPATIENT
Start: 2023-07-06 | End: 2023-07-13 | Stop reason: HOSPADM

## 2023-07-06 RX ORDER — ACETAMINOPHEN 325 MG/1
650 TABLET ORAL ONCE
Status: COMPLETED | OUTPATIENT
Start: 2023-07-06 | End: 2023-07-06

## 2023-07-06 RX ORDER — LOPERAMIDE HYDROCHLORIDE 2 MG/1
2 CAPSULE ORAL 4 TIMES DAILY PRN
Status: DISCONTINUED | OUTPATIENT
Start: 2023-07-06 | End: 2023-07-13 | Stop reason: HOSPADM

## 2023-07-06 RX ORDER — 0.9 % SODIUM CHLORIDE 0.9 %
30 INTRAVENOUS SOLUTION INTRAVENOUS ONCE
Status: COMPLETED | OUTPATIENT
Start: 2023-07-06 | End: 2023-07-06

## 2023-07-06 RX ORDER — ATORVASTATIN CALCIUM 40 MG/1
80 TABLET, FILM COATED ORAL NIGHTLY
Status: DISCONTINUED | OUTPATIENT
Start: 2023-07-06 | End: 2023-07-13 | Stop reason: HOSPADM

## 2023-07-06 RX ORDER — M-VIT,TX,IRON,MINS/CALC/FOLIC 27MG-0.4MG
1 TABLET ORAL DAILY
Status: DISCONTINUED | OUTPATIENT
Start: 2023-07-06 | End: 2023-07-13 | Stop reason: HOSPADM

## 2023-07-06 RX ORDER — FENTANYL CITRATE 50 UG/ML
50 INJECTION, SOLUTION INTRAMUSCULAR; INTRAVENOUS ONCE
Status: COMPLETED | OUTPATIENT
Start: 2023-07-06 | End: 2023-07-06

## 2023-07-06 RX ORDER — DOCUSATE SODIUM 100 MG/1
200 CAPSULE, LIQUID FILLED ORAL NIGHTLY
Status: DISCONTINUED | OUTPATIENT
Start: 2023-07-06 | End: 2023-07-13 | Stop reason: HOSPADM

## 2023-07-06 RX ORDER — ACETAMINOPHEN 325 MG/1
650 TABLET ORAL EVERY 4 HOURS PRN
Status: DISCONTINUED | OUTPATIENT
Start: 2023-07-06 | End: 2023-07-08

## 2023-07-06 RX ORDER — IPRATROPIUM BROMIDE AND ALBUTEROL SULFATE 2.5; .5 MG/3ML; MG/3ML
1 SOLUTION RESPIRATORY (INHALATION)
Status: DISCONTINUED | OUTPATIENT
Start: 2023-07-06 | End: 2023-07-13 | Stop reason: HOSPADM

## 2023-07-06 RX ORDER — POLYVINYL ALCOHOL 14 MG/ML
1 SOLUTION/ DROPS OPHTHALMIC 4 TIMES DAILY PRN
Status: DISCONTINUED | OUTPATIENT
Start: 2023-07-06 | End: 2023-07-13 | Stop reason: HOSPADM

## 2023-07-06 RX ORDER — INSULIN GLARGINE 100 [IU]/ML
30 INJECTION, SOLUTION SUBCUTANEOUS 2 TIMES DAILY
Status: DISCONTINUED | OUTPATIENT
Start: 2023-07-06 | End: 2023-07-13 | Stop reason: HOSPADM

## 2023-07-06 RX ORDER — CETIRIZINE HYDROCHLORIDE 10 MG/1
10 TABLET ORAL DAILY
Status: DISCONTINUED | OUTPATIENT
Start: 2023-07-06 | End: 2023-07-13 | Stop reason: HOSPADM

## 2023-07-06 RX ORDER — DOXYCYCLINE HYCLATE 100 MG/1
100 CAPSULE ORAL EVERY 12 HOURS SCHEDULED
Status: DISCONTINUED | OUTPATIENT
Start: 2023-07-06 | End: 2023-07-12

## 2023-07-06 RX ORDER — SODIUM HYPOCHLORITE 1.25 MG/ML
SOLUTION TOPICAL 2 TIMES DAILY
Status: DISCONTINUED | OUTPATIENT
Start: 2023-07-06 | End: 2023-07-13 | Stop reason: HOSPADM

## 2023-07-06 RX ORDER — GABAPENTIN 100 MG/1
200 CAPSULE ORAL 2 TIMES DAILY
Status: DISCONTINUED | OUTPATIENT
Start: 2023-07-06 | End: 2023-07-13 | Stop reason: HOSPADM

## 2023-07-06 RX ORDER — AMMONIUM LACTATE 12 G/100G
LOTION TOPICAL PRN
Status: DISCONTINUED | OUTPATIENT
Start: 2023-07-06 | End: 2023-07-13 | Stop reason: HOSPADM

## 2023-07-06 RX ORDER — DEXTROSE MONOHYDRATE 100 MG/ML
INJECTION, SOLUTION INTRAVENOUS CONTINUOUS PRN
Status: DISCONTINUED | OUTPATIENT
Start: 2023-07-06 | End: 2023-07-13 | Stop reason: HOSPADM

## 2023-07-06 RX ORDER — ASCORBIC ACID 500 MG
500 TABLET ORAL 2 TIMES DAILY
Status: DISCONTINUED | OUTPATIENT
Start: 2023-07-06 | End: 2023-07-13 | Stop reason: HOSPADM

## 2023-07-06 RX ORDER — LANOLIN ALCOHOL/MO/W.PET/CERES
10 CREAM (GRAM) TOPICAL NIGHTLY PRN
Status: DISCONTINUED | OUTPATIENT
Start: 2023-07-06 | End: 2023-07-13 | Stop reason: HOSPADM

## 2023-07-06 RX ORDER — TAMSULOSIN HYDROCHLORIDE 0.4 MG/1
0.4 CAPSULE ORAL DAILY
Status: DISCONTINUED | OUTPATIENT
Start: 2023-07-06 | End: 2023-07-13 | Stop reason: HOSPADM

## 2023-07-06 RX ADMIN — IPRATROPIUM BROMIDE AND ALBUTEROL SULFATE 1 DOSE: .5; 2.5 SOLUTION RESPIRATORY (INHALATION) at 18:39

## 2023-07-06 RX ADMIN — ACETAMINOPHEN 650 MG: 325 TABLET ORAL at 03:52

## 2023-07-06 RX ADMIN — BUMETANIDE 1 MG: 1 TABLET ORAL at 13:09

## 2023-07-06 RX ADMIN — TAMSULOSIN HYDROCHLORIDE 0.4 MG: 0.4 CAPSULE ORAL at 13:09

## 2023-07-06 RX ADMIN — Medication 500 MG: at 20:45

## 2023-07-06 RX ADMIN — PIPERACILLIN AND TAZOBACTAM 4500 MG: 4; .5 INJECTION, POWDER, LYOPHILIZED, FOR SOLUTION INTRAVENOUS at 05:13

## 2023-07-06 RX ADMIN — INSULIN GLARGINE 30 UNITS: 100 INJECTION, SOLUTION SUBCUTANEOUS at 13:09

## 2023-07-06 RX ADMIN — ONDANSETRON 4 MG: 2 INJECTION INTRAMUSCULAR; INTRAVENOUS at 06:17

## 2023-07-06 RX ADMIN — INSULIN LISPRO 8 UNITS: 100 INJECTION, SOLUTION INTRAVENOUS; SUBCUTANEOUS at 16:58

## 2023-07-06 RX ADMIN — FERROUS SULFATE TAB 325 MG (65 MG ELEMENTAL FE) 325 MG: 325 (65 FE) TAB at 13:09

## 2023-07-06 RX ADMIN — GABAPENTIN 200 MG: 100 CAPSULE ORAL at 13:09

## 2023-07-06 RX ADMIN — CEFEPIME 2000 MG: 2 INJECTION, POWDER, FOR SOLUTION INTRAVENOUS at 15:05

## 2023-07-06 RX ADMIN — MICONAZOLE NITRATE: 2 OINTMENT TOPICAL at 13:10

## 2023-07-06 RX ADMIN — Medication: at 20:47

## 2023-07-06 RX ADMIN — MULTIPLE VITAMINS W/ MINERALS TAB 1 TABLET: TAB at 13:09

## 2023-07-06 RX ADMIN — IPRATROPIUM BROMIDE AND ALBUTEROL SULFATE 1 DOSE: .5; 2.5 SOLUTION RESPIRATORY (INHALATION) at 16:16

## 2023-07-06 RX ADMIN — CETIRIZINE HYDROCHLORIDE 10 MG: 10 TABLET, FILM COATED ORAL at 13:09

## 2023-07-06 RX ADMIN — IPRATROPIUM BROMIDE AND ALBUTEROL SULFATE 1 DOSE: .5; 2.5 SOLUTION RESPIRATORY (INHALATION) at 11:40

## 2023-07-06 RX ADMIN — INSULIN GLARGINE 30 UNITS: 100 INJECTION, SOLUTION SUBCUTANEOUS at 20:46

## 2023-07-06 RX ADMIN — GABAPENTIN 200 MG: 100 CAPSULE ORAL at 20:44

## 2023-07-06 RX ADMIN — DOCUSATE SODIUM 200 MG: 100 CAPSULE, LIQUID FILLED ORAL at 20:46

## 2023-07-06 RX ADMIN — APIXABAN 2.5 MG: 2.5 TABLET, FILM COATED ORAL at 16:59

## 2023-07-06 RX ADMIN — SODIUM CHLORIDE 3417 ML: 9 INJECTION, SOLUTION INTRAVENOUS at 03:25

## 2023-07-06 RX ADMIN — DOXYCYCLINE HYCLATE 100 MG: 100 CAPSULE ORAL at 20:45

## 2023-07-06 RX ADMIN — ATORVASTATIN CALCIUM 80 MG: 40 TABLET, FILM COATED ORAL at 20:45

## 2023-07-06 RX ADMIN — ANORECTAL OINTMENT: 15.7; .44; 24; 20.6 OINTMENT TOPICAL at 20:47

## 2023-07-06 RX ADMIN — SODIUM CHLORIDE 3000 MG: 9 INJECTION, SOLUTION INTRAVENOUS at 06:23

## 2023-07-06 RX ADMIN — Medication 500 MG: at 13:09

## 2023-07-06 RX ADMIN — FAMOTIDINE 20 MG: 20 TABLET, FILM COATED ORAL at 20:45

## 2023-07-06 RX ADMIN — FENTANYL CITRATE 50 MCG: 50 INJECTION, SOLUTION INTRAMUSCULAR; INTRAVENOUS at 06:19

## 2023-07-06 RX ADMIN — Medication 2000 UNITS: at 13:10

## 2023-07-06 ASSESSMENT — PAIN SCALES - GENERAL: PAINLEVEL_OUTOF10: 4

## 2023-07-06 ASSESSMENT — PAIN DESCRIPTION - LOCATION: LOCATION: HIP

## 2023-07-06 ASSESSMENT — PAIN DESCRIPTION - ORIENTATION: ORIENTATION: LEFT

## 2023-07-07 LAB
ANION GAP SERPL CALCULATED.3IONS-SCNC: 7 MMOL/L (ref 7–16)
BUN SERPL-MCNC: 34 MG/DL (ref 6–23)
CALCIUM SERPL-MCNC: 8.3 MG/DL (ref 8.6–10.2)
CHLORIDE SERPL-SCNC: 102 MMOL/L (ref 98–107)
CO2 SERPL-SCNC: 29 MMOL/L (ref 22–29)
CREAT SERPL-MCNC: 1.3 MG/DL (ref 0.7–1.2)
ERYTHROCYTE [DISTWIDTH] IN BLOOD BY AUTOMATED COUNT: 14.5 FL (ref 11.5–15)
GLUCOSE SERPL-MCNC: 162 MG/DL (ref 74–99)
HCT VFR BLD AUTO: 28.8 % (ref 37–54)
HGB BLD-MCNC: 8.8 G/DL (ref 12.5–16.5)
MCH RBC QN AUTO: 28.9 PG (ref 26–35)
MCHC RBC AUTO-ENTMCNC: 30.6 % (ref 32–34.5)
MCV RBC AUTO: 94.4 FL (ref 80–99.9)
METER GLUCOSE: 134 MG/DL (ref 74–99)
METER GLUCOSE: 147 MG/DL (ref 74–99)
METER GLUCOSE: 316 MG/DL (ref 74–99)
METER GLUCOSE: 319 MG/DL (ref 74–99)
PLATELET # BLD AUTO: 214 E9/L (ref 130–450)
PMV BLD AUTO: 8.9 FL (ref 7–12)
POTASSIUM SERPL-SCNC: 4.4 MMOL/L (ref 3.5–5)
RBC # BLD AUTO: 3.05 E12/L (ref 3.8–5.8)
SODIUM SERPL-SCNC: 138 MMOL/L (ref 132–146)
WBC # BLD: 11.4 E9/L (ref 4.5–11.5)

## 2023-07-07 PROCEDURE — 6370000000 HC RX 637 (ALT 250 FOR IP): Performed by: SPECIALIST

## 2023-07-07 PROCEDURE — 36415 COLL VENOUS BLD VENIPUNCTURE: CPT

## 2023-07-07 PROCEDURE — 2580000003 HC RX 258: Performed by: SPECIALIST

## 2023-07-07 PROCEDURE — 85027 COMPLETE CBC AUTOMATED: CPT

## 2023-07-07 PROCEDURE — 80048 BASIC METABOLIC PNL TOTAL CA: CPT

## 2023-07-07 PROCEDURE — 82962 GLUCOSE BLOOD TEST: CPT

## 2023-07-07 PROCEDURE — 1200000000 HC SEMI PRIVATE

## 2023-07-07 PROCEDURE — 6370000000 HC RX 637 (ALT 250 FOR IP): Performed by: INTERNAL MEDICINE

## 2023-07-07 PROCEDURE — 94640 AIRWAY INHALATION TREATMENT: CPT

## 2023-07-07 PROCEDURE — 2700000000 HC OXYGEN THERAPY PER DAY

## 2023-07-07 PROCEDURE — 6360000002 HC RX W HCPCS: Performed by: SPECIALIST

## 2023-07-07 RX ADMIN — CEFEPIME 2000 MG: 2 INJECTION, POWDER, FOR SOLUTION INTRAVENOUS at 12:53

## 2023-07-07 RX ADMIN — MICONAZOLE NITRATE: 2 OINTMENT TOPICAL at 20:50

## 2023-07-07 RX ADMIN — DOXYCYCLINE HYCLATE 100 MG: 100 CAPSULE ORAL at 20:49

## 2023-07-07 RX ADMIN — ACETAMINOPHEN 650 MG: 325 TABLET ORAL at 20:49

## 2023-07-07 RX ADMIN — INSULIN GLARGINE 30 UNITS: 100 INJECTION, SOLUTION SUBCUTANEOUS at 21:11

## 2023-07-07 RX ADMIN — APIXABAN 2.5 MG: 2.5 TABLET, FILM COATED ORAL at 16:55

## 2023-07-07 RX ADMIN — IPRATROPIUM BROMIDE AND ALBUTEROL SULFATE 1 DOSE: .5; 2.5 SOLUTION RESPIRATORY (INHALATION) at 08:09

## 2023-07-07 RX ADMIN — INSULIN GLARGINE 30 UNITS: 100 INJECTION, SOLUTION SUBCUTANEOUS at 09:15

## 2023-07-07 RX ADMIN — MENTHOL AND METHYL SALICYLATE: 7.6; 29 OINTMENT TOPICAL at 09:17

## 2023-07-07 RX ADMIN — ATORVASTATIN CALCIUM 80 MG: 40 TABLET, FILM COATED ORAL at 20:49

## 2023-07-07 RX ADMIN — ANORECTAL OINTMENT: 15.7; .44; 24; 20.6 OINTMENT TOPICAL at 20:50

## 2023-07-07 RX ADMIN — TAMSULOSIN HYDROCHLORIDE 0.4 MG: 0.4 CAPSULE ORAL at 09:15

## 2023-07-07 RX ADMIN — Medication: at 09:17

## 2023-07-07 RX ADMIN — Medication 500 MG: at 09:15

## 2023-07-07 RX ADMIN — CEFEPIME 2000 MG: 2 INJECTION, POWDER, FOR SOLUTION INTRAVENOUS at 01:38

## 2023-07-07 RX ADMIN — IPRATROPIUM BROMIDE AND ALBUTEROL SULFATE 1 DOSE: .5; 2.5 SOLUTION RESPIRATORY (INHALATION) at 12:22

## 2023-07-07 RX ADMIN — BUMETANIDE 1 MG: 1 TABLET ORAL at 09:15

## 2023-07-07 RX ADMIN — FAMOTIDINE 20 MG: 20 TABLET, FILM COATED ORAL at 20:49

## 2023-07-07 RX ADMIN — MICONAZOLE NITRATE: 2 OINTMENT TOPICAL at 09:17

## 2023-07-07 RX ADMIN — Medication: at 20:51

## 2023-07-07 RX ADMIN — Medication: at 17:10

## 2023-07-07 RX ADMIN — DOXYCYCLINE HYCLATE 100 MG: 100 CAPSULE ORAL at 09:14

## 2023-07-07 RX ADMIN — DOCUSATE SODIUM 200 MG: 100 CAPSULE, LIQUID FILLED ORAL at 20:49

## 2023-07-07 RX ADMIN — GABAPENTIN 200 MG: 100 CAPSULE ORAL at 09:15

## 2023-07-07 RX ADMIN — Medication 2000 UNITS: at 09:15

## 2023-07-07 RX ADMIN — Medication 500 MG: at 20:49

## 2023-07-07 RX ADMIN — ANORECTAL OINTMENT: 15.7; .44; 24; 20.6 OINTMENT TOPICAL at 09:16

## 2023-07-07 RX ADMIN — GABAPENTIN 200 MG: 100 CAPSULE ORAL at 20:49

## 2023-07-07 RX ADMIN — APIXABAN 2.5 MG: 2.5 TABLET, FILM COATED ORAL at 06:06

## 2023-07-07 RX ADMIN — INSULIN LISPRO 8 UNITS: 100 INJECTION, SOLUTION INTRAVENOUS; SUBCUTANEOUS at 16:56

## 2023-07-07 RX ADMIN — FERROUS SULFATE TAB 325 MG (65 MG ELEMENTAL FE) 325 MG: 325 (65 FE) TAB at 09:15

## 2023-07-07 RX ADMIN — MULTIPLE VITAMINS W/ MINERALS TAB 1 TABLET: TAB at 09:15

## 2023-07-07 RX ADMIN — CETIRIZINE HYDROCHLORIDE 10 MG: 10 TABLET, FILM COATED ORAL at 09:15

## 2023-07-07 RX ADMIN — IPRATROPIUM BROMIDE AND ALBUTEROL SULFATE 1 DOSE: .5; 2.5 SOLUTION RESPIRATORY (INHALATION) at 20:34

## 2023-07-07 ASSESSMENT — PAIN SCALES - WONG BAKER: WONGBAKER_NUMERICALRESPONSE: 0

## 2023-07-07 ASSESSMENT — PAIN SCALES - GENERAL: PAINLEVEL_OUTOF10: 0

## 2023-07-08 LAB
A BAUMANNII DNA BLD POS QL NAA+NON-PROBE: NOT DETECTED
ANION GAP SERPL CALCULATED.3IONS-SCNC: 8 MMOL/L (ref 7–16)
BACTERIA WND AEROBE CULT: NORMAL
BOTTLE TYPE: ABNORMAL
BUN SERPL-MCNC: 34 MG/DL (ref 6–23)
C ALBICANS DNA BLD POS QL NAA+NON-PROBE: NOT DETECTED
C AURIS DNA BLD POS QL NAA+PROBE: NOT DETECTED
C GLABRATA DNA BLD POS QL NAA+NON-PROBE: NOT DETECTED
C KRUSEI DNA BLD POS QL NAA+NON-PROBE: NOT DETECTED
C PARAP DNA BLD POS QL NAA+NON-PROBE: NOT DETECTED
C TROPICLS DNA BLD POS QL NAA+NON-PROBE: NOT DETECTED
CALCIUM SERPL-MCNC: 8.1 MG/DL (ref 8.6–10.2)
CHLORIDE SERPL-SCNC: 102 MMOL/L (ref 98–107)
CO2 SERPL-SCNC: 27 MMOL/L (ref 22–29)
CREAT SERPL-MCNC: 1.2 MG/DL (ref 0.7–1.2)
CRYPTOCOCCUS NEOFORMANS/GATTII BY PCR: NOT DETECTED
E CLOAC COMP DNA BLD POS NAA+NON-PROBE: NOT DETECTED
E COLI DNA BLD POS QL NAA+NON-PROBE: NOT DETECTED
E FAECALIS DNA BLD POS QL NAA+PROBE: NOT DETECTED
E FAECIUM DNA BLD POS QL NAA+PROBE: NOT DETECTED
ENTEROBACT DNA BLD POS QL NAA+NON-PROBE: NOT DETECTED
ENTEROCOC DNA BLD POS QL NAA+NON-PROBE: NOT DETECTED
ERYTHROCYTE [DISTWIDTH] IN BLOOD BY AUTOMATED COUNT: 14.3 FL (ref 11.5–15)
GLUCOSE SERPL-MCNC: 277 MG/DL (ref 74–99)
GN BLD CULTURE PNL BLD POS NAA+PROBE: NOT DETECTED
HCT VFR BLD AUTO: 28.2 % (ref 37–54)
HGB BLD-MCNC: 8.6 G/DL (ref 12.5–16.5)
K OXYTOCA DNA BLD POS QL NAA+NON-PROBE: NOT DETECTED
K PNEUMON DNA SPEC QL NAA+PROBE: NOT DETECTED
K. AEROGENES DNA SPEC QL NAA+PROBE: NOT DETECTED
L MONOCYTOG DNA BLD POS QL NAA+NON-PROBE: NOT DETECTED
MCH RBC QN AUTO: 29.2 PG (ref 26–35)
MCHC RBC AUTO-ENTMCNC: 30.5 % (ref 32–34.5)
MCV RBC AUTO: 95.6 FL (ref 80–99.9)
MECA BLD POS QL NAA+NON-PROBE: DETECTED
METER GLUCOSE: 188 MG/DL (ref 74–99)
METER GLUCOSE: 216 MG/DL (ref 74–99)
METER GLUCOSE: 235 MG/DL (ref 74–99)
METER GLUCOSE: 272 MG/DL (ref 74–99)
N MEN DNA BLD POS QL NAA+NON-PROBE: NOT DETECTED
ORDER NUMBER: ABNORMAL
P AERUGINOSA DNA BLD POS NAA+NON-PROBE: NOT DETECTED
PLATELET # BLD AUTO: 215 E9/L (ref 130–450)
PMV BLD AUTO: 8.9 FL (ref 7–12)
POTASSIUM SERPL-SCNC: 4.5 MMOL/L (ref 3.5–5)
PROTEUS SP DNA BLD POS QL NAA+NON-PROBE: NOT DETECTED
RBC # BLD AUTO: 2.95 E12/L (ref 3.8–5.8)
S AUREUS DNA BLD POS QL NAA+NON-PROBE: NOT DETECTED
S AUREUS+CONS DNA BLD POS NAA+NON-PROBE: DETECTED
S EPIDERMIDIS DNA BLD POS QL NAA+PROBE: DETECTED
S LUGDUNENSIS DNA BLD POS QL NAA+PROBE: NOT DETECTED
S MALTOPH DNA BLD POS QL NAA+PROBE: NOT DETECTED
S MARCESCENS DNA BLD POS NAA+NON-PROBE: NOT DETECTED
S PNEUM DNA BLD POS QL NAA+NON-PROBE: NOT DETECTED
S PYO DNA BLD POS QL NAA+NON-PROBE: NOT DETECTED
SALMONELLA DNA BLD POS QL NAA+PROBE: NOT DETECTED
SODIUM SERPL-SCNC: 137 MMOL/L (ref 132–146)
SOURCE OF BLOOD CULTURE: ABNORMAL
STREPTOCOCCUS AGALACTIAE BY PCR: NOT DETECTED
STREPTOCOCCUS DNA BLD POS NAA+NON-PROBE: NOT DETECTED
WBC # BLD: 9.1 E9/L (ref 4.5–11.5)

## 2023-07-08 PROCEDURE — 6370000000 HC RX 637 (ALT 250 FOR IP): Performed by: INTERNAL MEDICINE

## 2023-07-08 PROCEDURE — 36415 COLL VENOUS BLD VENIPUNCTURE: CPT

## 2023-07-08 PROCEDURE — 80048 BASIC METABOLIC PNL TOTAL CA: CPT

## 2023-07-08 PROCEDURE — 85027 COMPLETE CBC AUTOMATED: CPT

## 2023-07-08 PROCEDURE — 2580000003 HC RX 258: Performed by: SPECIALIST

## 2023-07-08 PROCEDURE — 2700000000 HC OXYGEN THERAPY PER DAY

## 2023-07-08 PROCEDURE — 6360000002 HC RX W HCPCS: Performed by: SPECIALIST

## 2023-07-08 PROCEDURE — 6370000000 HC RX 637 (ALT 250 FOR IP): Performed by: SPECIALIST

## 2023-07-08 PROCEDURE — 1200000000 HC SEMI PRIVATE

## 2023-07-08 PROCEDURE — 82962 GLUCOSE BLOOD TEST: CPT

## 2023-07-08 PROCEDURE — 94640 AIRWAY INHALATION TREATMENT: CPT

## 2023-07-08 RX ORDER — ACETAMINOPHEN 500 MG
1000 TABLET ORAL EVERY 6 HOURS PRN
Status: DISCONTINUED | OUTPATIENT
Start: 2023-07-09 | End: 2023-07-13 | Stop reason: HOSPADM

## 2023-07-08 RX ADMIN — APIXABAN 2.5 MG: 2.5 TABLET, FILM COATED ORAL at 17:07

## 2023-07-08 RX ADMIN — CEFEPIME 2000 MG: 2 INJECTION, POWDER, FOR SOLUTION INTRAVENOUS at 01:46

## 2023-07-08 RX ADMIN — MENTHOL AND METHYL SALICYLATE: 7.6; 29 OINTMENT TOPICAL at 10:51

## 2023-07-08 RX ADMIN — INSULIN GLARGINE 30 UNITS: 100 INJECTION, SOLUTION SUBCUTANEOUS at 22:25

## 2023-07-08 RX ADMIN — IPRATROPIUM BROMIDE AND ALBUTEROL SULFATE 1 DOSE: .5; 2.5 SOLUTION RESPIRATORY (INHALATION) at 20:29

## 2023-07-08 RX ADMIN — DOXYCYCLINE HYCLATE 100 MG: 100 CAPSULE ORAL at 22:27

## 2023-07-08 RX ADMIN — Medication 2000 UNITS: at 10:42

## 2023-07-08 RX ADMIN — Medication 500 MG: at 10:42

## 2023-07-08 RX ADMIN — DOXYCYCLINE HYCLATE 100 MG: 100 CAPSULE ORAL at 10:42

## 2023-07-08 RX ADMIN — CEFEPIME 2000 MG: 2 INJECTION, POWDER, FOR SOLUTION INTRAVENOUS at 14:08

## 2023-07-08 RX ADMIN — INSULIN LISPRO 8 UNITS: 100 INJECTION, SOLUTION INTRAVENOUS; SUBCUTANEOUS at 14:05

## 2023-07-08 RX ADMIN — APIXABAN 2.5 MG: 2.5 TABLET, FILM COATED ORAL at 06:14

## 2023-07-08 RX ADMIN — INSULIN LISPRO 8 UNITS: 100 INJECTION, SOLUTION INTRAVENOUS; SUBCUTANEOUS at 17:07

## 2023-07-08 RX ADMIN — MICONAZOLE NITRATE: 2 OINTMENT TOPICAL at 22:29

## 2023-07-08 RX ADMIN — IPRATROPIUM BROMIDE AND ALBUTEROL SULFATE 1 DOSE: .5; 2.5 SOLUTION RESPIRATORY (INHALATION) at 09:16

## 2023-07-08 RX ADMIN — Medication: at 22:00

## 2023-07-08 RX ADMIN — ANORECTAL OINTMENT: 15.7; .44; 24; 20.6 OINTMENT TOPICAL at 10:51

## 2023-07-08 RX ADMIN — BUMETANIDE 1 MG: 1 TABLET ORAL at 10:42

## 2023-07-08 RX ADMIN — FERROUS SULFATE TAB 325 MG (65 MG ELEMENTAL FE) 325 MG: 325 (65 FE) TAB at 11:05

## 2023-07-08 RX ADMIN — ATORVASTATIN CALCIUM 80 MG: 40 TABLET, FILM COATED ORAL at 22:27

## 2023-07-08 RX ADMIN — Medication 10.5 MG: at 22:25

## 2023-07-08 RX ADMIN — TAMSULOSIN HYDROCHLORIDE 0.4 MG: 0.4 CAPSULE ORAL at 10:42

## 2023-07-08 RX ADMIN — INSULIN GLARGINE 30 UNITS: 100 INJECTION, SOLUTION SUBCUTANEOUS at 10:43

## 2023-07-08 RX ADMIN — IPRATROPIUM BROMIDE AND ALBUTEROL SULFATE 1 DOSE: .5; 2.5 SOLUTION RESPIRATORY (INHALATION) at 15:53

## 2023-07-08 RX ADMIN — Medication: at 10:50

## 2023-07-08 RX ADMIN — INSULIN LISPRO 8 UNITS: 100 INJECTION, SOLUTION INTRAVENOUS; SUBCUTANEOUS at 06:16

## 2023-07-08 RX ADMIN — GABAPENTIN 200 MG: 100 CAPSULE ORAL at 10:42

## 2023-07-08 RX ADMIN — IPRATROPIUM BROMIDE AND ALBUTEROL SULFATE 1 DOSE: .5; 2.5 SOLUTION RESPIRATORY (INHALATION) at 12:56

## 2023-07-08 RX ADMIN — CETIRIZINE HYDROCHLORIDE 10 MG: 10 TABLET, FILM COATED ORAL at 10:42

## 2023-07-08 RX ADMIN — GABAPENTIN 200 MG: 100 CAPSULE ORAL at 22:26

## 2023-07-08 RX ADMIN — ACETAMINOPHEN 650 MG: 325 TABLET ORAL at 11:41

## 2023-07-08 RX ADMIN — ANORECTAL OINTMENT: 15.7; .44; 24; 20.6 OINTMENT TOPICAL at 22:30

## 2023-07-08 RX ADMIN — Medication 500 MG: at 22:28

## 2023-07-08 RX ADMIN — Medication: at 10:51

## 2023-07-08 RX ADMIN — MICONAZOLE NITRATE: 2 OINTMENT TOPICAL at 11:05

## 2023-07-08 RX ADMIN — MULTIPLE VITAMINS W/ MINERALS TAB 1 TABLET: TAB at 10:42

## 2023-07-08 RX ADMIN — FAMOTIDINE 20 MG: 20 TABLET, FILM COATED ORAL at 22:27

## 2023-07-08 RX ADMIN — DOCUSATE SODIUM 200 MG: 100 CAPSULE, LIQUID FILLED ORAL at 22:26

## 2023-07-08 ASSESSMENT — PAIN DESCRIPTION - PAIN TYPE: TYPE: CHRONIC PAIN

## 2023-07-08 ASSESSMENT — PAIN DESCRIPTION - ORIENTATION
ORIENTATION: RIGHT
ORIENTATION: RIGHT

## 2023-07-08 ASSESSMENT — PAIN DESCRIPTION - DESCRIPTORS: DESCRIPTORS: SHARP

## 2023-07-08 ASSESSMENT — PAIN DESCRIPTION - LOCATION
LOCATION: LEG;HIP
LOCATION: HIP
LOCATION: HIP

## 2023-07-08 ASSESSMENT — PAIN SCALES - WONG BAKER: WONGBAKER_NUMERICALRESPONSE: 0

## 2023-07-08 ASSESSMENT — PAIN SCALES - GENERAL
PAINLEVEL_OUTOF10: 8
PAINLEVEL_OUTOF10: 8
PAINLEVEL_OUTOF10: 5

## 2023-07-08 ASSESSMENT — PAIN DESCRIPTION - FREQUENCY: FREQUENCY: CONTINUOUS

## 2023-07-08 ASSESSMENT — PAIN DESCRIPTION - ONSET: ONSET: ON-GOING

## 2023-07-09 LAB
ANION GAP SERPL CALCULATED.3IONS-SCNC: 9 MMOL/L (ref 7–16)
BACTERIA WND AEROBE CULT: ABNORMAL
BACTERIA WND AEROBE CULT: ABNORMAL
BUN SERPL-MCNC: 32 MG/DL (ref 6–23)
CALCIUM SERPL-MCNC: 8.7 MG/DL (ref 8.6–10.2)
CHLORIDE SERPL-SCNC: 102 MMOL/L (ref 98–107)
CO2 SERPL-SCNC: 29 MMOL/L (ref 22–29)
CREAT SERPL-MCNC: 1.3 MG/DL (ref 0.7–1.2)
ERYTHROCYTE [DISTWIDTH] IN BLOOD BY AUTOMATED COUNT: 14.1 FL (ref 11.5–15)
GLUCOSE SERPL-MCNC: 173 MG/DL (ref 74–99)
HCT VFR BLD AUTO: 29.9 % (ref 37–54)
HGB BLD-MCNC: 9.3 G/DL (ref 12.5–16.5)
MCH RBC QN AUTO: 29.1 PG (ref 26–35)
MCHC RBC AUTO-ENTMCNC: 31.1 % (ref 32–34.5)
MCV RBC AUTO: 93.4 FL (ref 80–99.9)
METER GLUCOSE: 141 MG/DL (ref 74–99)
METER GLUCOSE: 150 MG/DL (ref 74–99)
METER GLUCOSE: 215 MG/DL (ref 74–99)
METER GLUCOSE: 254 MG/DL (ref 74–99)
ORGANISM: ABNORMAL
ORGANISM: ABNORMAL
PLATELET # BLD AUTO: 228 E9/L (ref 130–450)
PMV BLD AUTO: 8.6 FL (ref 7–12)
POTASSIUM SERPL-SCNC: 4.4 MMOL/L (ref 3.5–5)
RBC # BLD AUTO: 3.2 E12/L (ref 3.8–5.8)
SODIUM SERPL-SCNC: 140 MMOL/L (ref 132–146)
WBC # BLD: 10.3 E9/L (ref 4.5–11.5)

## 2023-07-09 PROCEDURE — 36415 COLL VENOUS BLD VENIPUNCTURE: CPT

## 2023-07-09 PROCEDURE — 1200000000 HC SEMI PRIVATE

## 2023-07-09 PROCEDURE — 6360000002 HC RX W HCPCS: Performed by: SPECIALIST

## 2023-07-09 PROCEDURE — 6370000000 HC RX 637 (ALT 250 FOR IP): Performed by: INTERNAL MEDICINE

## 2023-07-09 PROCEDURE — 2580000003 HC RX 258: Performed by: SPECIALIST

## 2023-07-09 PROCEDURE — 85027 COMPLETE CBC AUTOMATED: CPT

## 2023-07-09 PROCEDURE — 6370000000 HC RX 637 (ALT 250 FOR IP): Performed by: SPECIALIST

## 2023-07-09 PROCEDURE — 94640 AIRWAY INHALATION TREATMENT: CPT

## 2023-07-09 PROCEDURE — 80048 BASIC METABOLIC PNL TOTAL CA: CPT

## 2023-07-09 PROCEDURE — 82962 GLUCOSE BLOOD TEST: CPT

## 2023-07-09 RX ADMIN — ANORECTAL OINTMENT: 15.7; .44; 24; 20.6 OINTMENT TOPICAL at 09:09

## 2023-07-09 RX ADMIN — INSULIN GLARGINE 30 UNITS: 100 INJECTION, SOLUTION SUBCUTANEOUS at 21:29

## 2023-07-09 RX ADMIN — DOCUSATE SODIUM 200 MG: 100 CAPSULE, LIQUID FILLED ORAL at 21:29

## 2023-07-09 RX ADMIN — ATORVASTATIN CALCIUM 80 MG: 40 TABLET, FILM COATED ORAL at 21:29

## 2023-07-09 RX ADMIN — CETIRIZINE HYDROCHLORIDE 10 MG: 10 TABLET, FILM COATED ORAL at 09:01

## 2023-07-09 RX ADMIN — CEFEPIME 2000 MG: 2 INJECTION, POWDER, FOR SOLUTION INTRAVENOUS at 13:49

## 2023-07-09 RX ADMIN — BUMETANIDE 1 MG: 1 TABLET ORAL at 09:01

## 2023-07-09 RX ADMIN — ACETAMINOPHEN 1000 MG: 500 TABLET ORAL at 02:04

## 2023-07-09 RX ADMIN — ACETAMINOPHEN 1000 MG: 500 TABLET ORAL at 21:32

## 2023-07-09 RX ADMIN — Medication 500 MG: at 09:05

## 2023-07-09 RX ADMIN — Medication 500 MG: at 21:30

## 2023-07-09 RX ADMIN — MICONAZOLE NITRATE: 2 OINTMENT TOPICAL at 09:10

## 2023-07-09 RX ADMIN — IPRATROPIUM BROMIDE AND ALBUTEROL SULFATE 1 DOSE: .5; 2.5 SOLUTION RESPIRATORY (INHALATION) at 20:34

## 2023-07-09 RX ADMIN — DOXYCYCLINE HYCLATE 100 MG: 100 CAPSULE ORAL at 21:29

## 2023-07-09 RX ADMIN — DOXYCYCLINE HYCLATE 100 MG: 100 CAPSULE ORAL at 09:00

## 2023-07-09 RX ADMIN — INSULIN LISPRO 8 UNITS: 100 INJECTION, SOLUTION INTRAVENOUS; SUBCUTANEOUS at 12:45

## 2023-07-09 RX ADMIN — Medication: at 09:10

## 2023-07-09 RX ADMIN — TAMSULOSIN HYDROCHLORIDE 0.4 MG: 0.4 CAPSULE ORAL at 09:00

## 2023-07-09 RX ADMIN — APIXABAN 2.5 MG: 2.5 TABLET, FILM COATED ORAL at 17:01

## 2023-07-09 RX ADMIN — INSULIN LISPRO 8 UNITS: 100 INJECTION, SOLUTION INTRAVENOUS; SUBCUTANEOUS at 17:01

## 2023-07-09 RX ADMIN — ACETAMINOPHEN 1000 MG: 500 TABLET ORAL at 13:51

## 2023-07-09 RX ADMIN — CEFEPIME 2000 MG: 2 INJECTION, POWDER, FOR SOLUTION INTRAVENOUS at 01:30

## 2023-07-09 RX ADMIN — GABAPENTIN 200 MG: 100 CAPSULE ORAL at 21:29

## 2023-07-09 RX ADMIN — IPRATROPIUM BROMIDE AND ALBUTEROL SULFATE 1 DOSE: .5; 2.5 SOLUTION RESPIRATORY (INHALATION) at 13:17

## 2023-07-09 RX ADMIN — Medication: at 12:45

## 2023-07-09 RX ADMIN — IPRATROPIUM BROMIDE AND ALBUTEROL SULFATE 1 DOSE: .5; 2.5 SOLUTION RESPIRATORY (INHALATION) at 08:36

## 2023-07-09 RX ADMIN — FAMOTIDINE 20 MG: 20 TABLET, FILM COATED ORAL at 21:29

## 2023-07-09 RX ADMIN — FERROUS SULFATE TAB 325 MG (65 MG ELEMENTAL FE) 325 MG: 325 (65 FE) TAB at 09:01

## 2023-07-09 RX ADMIN — Medication 2000 UNITS: at 09:01

## 2023-07-09 RX ADMIN — MULTIPLE VITAMINS W/ MINERALS TAB 1 TABLET: TAB at 09:00

## 2023-07-09 RX ADMIN — GABAPENTIN 200 MG: 100 CAPSULE ORAL at 09:00

## 2023-07-09 RX ADMIN — APIXABAN 2.5 MG: 2.5 TABLET, FILM COATED ORAL at 09:00

## 2023-07-09 RX ADMIN — INSULIN GLARGINE 30 UNITS: 100 INJECTION, SOLUTION SUBCUTANEOUS at 09:01

## 2023-07-09 ASSESSMENT — PAIN DESCRIPTION - LOCATION
LOCATION: BACK;HIP
LOCATION: HIP

## 2023-07-09 ASSESSMENT — PAIN SCALES - GENERAL
PAINLEVEL_OUTOF10: 0
PAINLEVEL_OUTOF10: 6
PAINLEVEL_OUTOF10: 10

## 2023-07-09 ASSESSMENT — PAIN DESCRIPTION - DESCRIPTORS: DESCRIPTORS: SHARP

## 2023-07-10 ENCOUNTER — APPOINTMENT (OUTPATIENT)
Dept: CT IMAGING | Age: 88
DRG: 864 | End: 2023-07-10
Payer: MEDICARE

## 2023-07-10 LAB
ANION GAP SERPL CALCULATED.3IONS-SCNC: 9 MMOL/L (ref 7–16)
BUN SERPL-MCNC: 34 MG/DL (ref 6–23)
CALCIUM SERPL-MCNC: 8.3 MG/DL (ref 8.6–10.2)
CHLORIDE SERPL-SCNC: 103 MMOL/L (ref 98–107)
CO2 SERPL-SCNC: 28 MMOL/L (ref 22–29)
CREAT SERPL-MCNC: 1.1 MG/DL (ref 0.7–1.2)
CRP SERPL HS-MCNC: 5.2 MG/DL (ref 0–0.4)
ERYTHROCYTE [DISTWIDTH] IN BLOOD BY AUTOMATED COUNT: 13.9 FL (ref 11.5–15)
ERYTHROCYTE [SEDIMENTATION RATE] IN BLOOD BY WESTERGREN METHOD: 65 MM/HR (ref 0–15)
GLUCOSE SERPL-MCNC: 170 MG/DL (ref 74–99)
HCT VFR BLD AUTO: 28.8 % (ref 37–54)
HGB BLD-MCNC: 9 G/DL (ref 12.5–16.5)
MCH RBC QN AUTO: 29.2 PG (ref 26–35)
MCHC RBC AUTO-ENTMCNC: 31.3 % (ref 32–34.5)
MCV RBC AUTO: 93.5 FL (ref 80–99.9)
METER GLUCOSE: 142 MG/DL (ref 74–99)
METER GLUCOSE: 175 MG/DL (ref 74–99)
METER GLUCOSE: 227 MG/DL (ref 74–99)
METER GLUCOSE: 232 MG/DL (ref 74–99)
PLATELET # BLD AUTO: 214 E9/L (ref 130–450)
PMV BLD AUTO: 8.8 FL (ref 7–12)
POTASSIUM SERPL-SCNC: 4.3 MMOL/L (ref 3.5–5)
RBC # BLD AUTO: 3.08 E12/L (ref 3.8–5.8)
SODIUM SERPL-SCNC: 140 MMOL/L (ref 132–146)
WBC # BLD: 10.7 E9/L (ref 4.5–11.5)

## 2023-07-10 PROCEDURE — 85027 COMPLETE CBC AUTOMATED: CPT

## 2023-07-10 PROCEDURE — 73701 CT LOWER EXTREMITY W/DYE: CPT

## 2023-07-10 PROCEDURE — 6370000000 HC RX 637 (ALT 250 FOR IP): Performed by: INTERNAL MEDICINE

## 2023-07-10 PROCEDURE — 80048 BASIC METABOLIC PNL TOTAL CA: CPT

## 2023-07-10 PROCEDURE — 82962 GLUCOSE BLOOD TEST: CPT

## 2023-07-10 PROCEDURE — 2580000003 HC RX 258: Performed by: INTERNAL MEDICINE

## 2023-07-10 PROCEDURE — 86140 C-REACTIVE PROTEIN: CPT

## 2023-07-10 PROCEDURE — 85651 RBC SED RATE NONAUTOMATED: CPT

## 2023-07-10 PROCEDURE — 1200000000 HC SEMI PRIVATE

## 2023-07-10 PROCEDURE — 6360000002 HC RX W HCPCS: Performed by: SPECIALIST

## 2023-07-10 PROCEDURE — 2580000003 HC RX 258: Performed by: SPECIALIST

## 2023-07-10 PROCEDURE — 36415 COLL VENOUS BLD VENIPUNCTURE: CPT

## 2023-07-10 PROCEDURE — 6370000000 HC RX 637 (ALT 250 FOR IP): Performed by: SPECIALIST

## 2023-07-10 PROCEDURE — 6360000004 HC RX CONTRAST MEDICATION: Performed by: RADIOLOGY

## 2023-07-10 PROCEDURE — 94640 AIRWAY INHALATION TREATMENT: CPT

## 2023-07-10 RX ORDER — SODIUM CHLORIDE 0.9 % (FLUSH) 0.9 %
5-40 SYRINGE (ML) INJECTION 2 TIMES DAILY
Status: DISCONTINUED | OUTPATIENT
Start: 2023-07-10 | End: 2023-07-13 | Stop reason: HOSPADM

## 2023-07-10 RX ADMIN — ANORECTAL OINTMENT: 15.7; .44; 24; 20.6 OINTMENT TOPICAL at 10:00

## 2023-07-10 RX ADMIN — IPRATROPIUM BROMIDE AND ALBUTEROL SULFATE 1 DOSE: .5; 2.5 SOLUTION RESPIRATORY (INHALATION) at 19:35

## 2023-07-10 RX ADMIN — DOXYCYCLINE HYCLATE 100 MG: 100 CAPSULE ORAL at 09:00

## 2023-07-10 RX ADMIN — APIXABAN 2.5 MG: 2.5 TABLET, FILM COATED ORAL at 05:42

## 2023-07-10 RX ADMIN — CEFEPIME 2000 MG: 2 INJECTION, POWDER, FOR SOLUTION INTRAVENOUS at 01:58

## 2023-07-10 RX ADMIN — Medication 500 MG: at 09:01

## 2023-07-10 RX ADMIN — ACETAMINOPHEN 1000 MG: 500 TABLET ORAL at 20:26

## 2023-07-10 RX ADMIN — INSULIN GLARGINE 30 UNITS: 100 INJECTION, SOLUTION SUBCUTANEOUS at 08:58

## 2023-07-10 RX ADMIN — Medication 500 MG: at 20:26

## 2023-07-10 RX ADMIN — INSULIN LISPRO 8 UNITS: 100 INJECTION, SOLUTION INTRAVENOUS; SUBCUTANEOUS at 06:00

## 2023-07-10 RX ADMIN — CEFEPIME 2000 MG: 2 INJECTION, POWDER, FOR SOLUTION INTRAVENOUS at 13:52

## 2023-07-10 RX ADMIN — INSULIN GLARGINE 30 UNITS: 100 INJECTION, SOLUTION SUBCUTANEOUS at 20:27

## 2023-07-10 RX ADMIN — IOPAMIDOL 75 ML: 755 INJECTION, SOLUTION INTRAVENOUS at 16:56

## 2023-07-10 RX ADMIN — CETIRIZINE HYDROCHLORIDE 10 MG: 10 TABLET, FILM COATED ORAL at 09:00

## 2023-07-10 RX ADMIN — FAMOTIDINE 20 MG: 20 TABLET, FILM COATED ORAL at 20:26

## 2023-07-10 RX ADMIN — IPRATROPIUM BROMIDE AND ALBUTEROL SULFATE 1 DOSE: .5; 2.5 SOLUTION RESPIRATORY (INHALATION) at 09:05

## 2023-07-10 RX ADMIN — APIXABAN 2.5 MG: 2.5 TABLET, FILM COATED ORAL at 16:36

## 2023-07-10 RX ADMIN — FERROUS SULFATE TAB 325 MG (65 MG ELEMENTAL FE) 325 MG: 325 (65 FE) TAB at 13:53

## 2023-07-10 RX ADMIN — GABAPENTIN 200 MG: 100 CAPSULE ORAL at 09:00

## 2023-07-10 RX ADMIN — ATORVASTATIN CALCIUM 80 MG: 40 TABLET, FILM COATED ORAL at 20:26

## 2023-07-10 RX ADMIN — BUMETANIDE 1 MG: 1 TABLET ORAL at 09:00

## 2023-07-10 RX ADMIN — MENTHOL AND METHYL SALICYLATE: 7.6; 29 OINTMENT TOPICAL at 20:34

## 2023-07-10 RX ADMIN — MICONAZOLE NITRATE: 2 OINTMENT TOPICAL at 20:33

## 2023-07-10 RX ADMIN — IPRATROPIUM BROMIDE AND ALBUTEROL SULFATE 1 DOSE: .5; 2.5 SOLUTION RESPIRATORY (INHALATION) at 16:16

## 2023-07-10 RX ADMIN — INSULIN LISPRO 8 UNITS: 100 INJECTION, SOLUTION INTRAVENOUS; SUBCUTANEOUS at 16:37

## 2023-07-10 RX ADMIN — MULTIPLE VITAMINS W/ MINERALS TAB 1 TABLET: TAB at 09:00

## 2023-07-10 RX ADMIN — Medication: at 18:16

## 2023-07-10 RX ADMIN — DOXYCYCLINE HYCLATE 100 MG: 100 CAPSULE ORAL at 20:25

## 2023-07-10 RX ADMIN — INSULIN LISPRO 8 UNITS: 100 INJECTION, SOLUTION INTRAVENOUS; SUBCUTANEOUS at 10:58

## 2023-07-10 RX ADMIN — DOCUSATE SODIUM 200 MG: 100 CAPSULE, LIQUID FILLED ORAL at 20:26

## 2023-07-10 RX ADMIN — MICONAZOLE NITRATE: 2 OINTMENT TOPICAL at 10:00

## 2023-07-10 RX ADMIN — Medication 2000 UNITS: at 09:00

## 2023-07-10 RX ADMIN — GABAPENTIN 200 MG: 100 CAPSULE ORAL at 20:26

## 2023-07-10 RX ADMIN — IPRATROPIUM BROMIDE AND ALBUTEROL SULFATE 1 DOSE: .5; 2.5 SOLUTION RESPIRATORY (INHALATION) at 13:16

## 2023-07-10 RX ADMIN — ANORECTAL OINTMENT: 15.7; .44; 24; 20.6 OINTMENT TOPICAL at 20:34

## 2023-07-10 RX ADMIN — TAMSULOSIN HYDROCHLORIDE 0.4 MG: 0.4 CAPSULE ORAL at 09:00

## 2023-07-10 RX ADMIN — Medication: at 20:37

## 2023-07-10 RX ADMIN — Medication 5 ML: at 20:37

## 2023-07-10 ASSESSMENT — PAIN SCALES - GENERAL
PAINLEVEL_OUTOF10: 0
PAINLEVEL_OUTOF10: 8

## 2023-07-10 ASSESSMENT — PAIN DESCRIPTION - ORIENTATION: ORIENTATION: RIGHT

## 2023-07-10 ASSESSMENT — PAIN DESCRIPTION - LOCATION: LOCATION: HIP;LEG

## 2023-07-10 ASSESSMENT — PAIN SCALES - WONG BAKER: WONGBAKER_NUMERICALRESPONSE: 0

## 2023-07-10 ASSESSMENT — PAIN DESCRIPTION - DESCRIPTORS: DESCRIPTORS: ACHING;DISCOMFORT

## 2023-07-10 NOTE — CARE COORDINATION
7/10/2023  Social Work Discharge Planning:Possible discharge today. .Pt is from [facility] and per liaison is a bedhold. No precert is needed. AGUSTINA and transport form are completed.  Electronically signed by ELKE Quintana on 7/10/2023 at 9:40 AM

## 2023-07-11 LAB
ANION GAP SERPL CALCULATED.3IONS-SCNC: 7 MMOL/L (ref 7–16)
BACTERIA BLD CULT ORG #2: ABNORMAL
BACTERIA BLD CULT: ABNORMAL
BUN SERPL-MCNC: 34 MG/DL (ref 6–23)
CALCIUM SERPL-MCNC: 8.9 MG/DL (ref 8.6–10.2)
CHLORIDE SERPL-SCNC: 101 MMOL/L (ref 98–107)
CO2 SERPL-SCNC: 30 MMOL/L (ref 22–29)
CREAT SERPL-MCNC: 1.1 MG/DL (ref 0.7–1.2)
ERYTHROCYTE [DISTWIDTH] IN BLOOD BY AUTOMATED COUNT: 14 FL (ref 11.5–15)
GLUCOSE SERPL-MCNC: 153 MG/DL (ref 74–99)
HCT VFR BLD AUTO: 29.9 % (ref 37–54)
HGB BLD-MCNC: 9.3 G/DL (ref 12.5–16.5)
MCH RBC QN AUTO: 29 PG (ref 26–35)
MCHC RBC AUTO-ENTMCNC: 31.1 % (ref 32–34.5)
MCV RBC AUTO: 93.1 FL (ref 80–99.9)
METER GLUCOSE: 128 MG/DL (ref 74–99)
METER GLUCOSE: 159 MG/DL (ref 74–99)
METER GLUCOSE: 179 MG/DL (ref 74–99)
METER GLUCOSE: 251 MG/DL (ref 74–99)
ORGANISM: ABNORMAL
ORGANISM: ABNORMAL
PLATELET # BLD AUTO: 235 E9/L (ref 130–450)
PMV BLD AUTO: 8.8 FL (ref 7–12)
POTASSIUM SERPL-SCNC: 4.2 MMOL/L (ref 3.5–5)
RBC # BLD AUTO: 3.21 E12/L (ref 3.8–5.8)
SODIUM SERPL-SCNC: 138 MMOL/L (ref 132–146)
WBC # BLD: 10.4 E9/L (ref 4.5–11.5)

## 2023-07-11 PROCEDURE — 36415 COLL VENOUS BLD VENIPUNCTURE: CPT

## 2023-07-11 PROCEDURE — 6370000000 HC RX 637 (ALT 250 FOR IP): Performed by: INTERNAL MEDICINE

## 2023-07-11 PROCEDURE — 1200000000 HC SEMI PRIVATE

## 2023-07-11 PROCEDURE — 94640 AIRWAY INHALATION TREATMENT: CPT

## 2023-07-11 PROCEDURE — 80048 BASIC METABOLIC PNL TOTAL CA: CPT

## 2023-07-11 PROCEDURE — 6360000002 HC RX W HCPCS: Performed by: SPECIALIST

## 2023-07-11 PROCEDURE — 2580000003 HC RX 258: Performed by: SPECIALIST

## 2023-07-11 PROCEDURE — 82962 GLUCOSE BLOOD TEST: CPT

## 2023-07-11 PROCEDURE — 6370000000 HC RX 637 (ALT 250 FOR IP): Performed by: SPECIALIST

## 2023-07-11 PROCEDURE — 2580000003 HC RX 258: Performed by: INTERNAL MEDICINE

## 2023-07-11 PROCEDURE — 85027 COMPLETE CBC AUTOMATED: CPT

## 2023-07-11 RX ORDER — POLYETHYLENE GLYCOL 3350 17 G/17G
17 POWDER, FOR SOLUTION ORAL DAILY
Status: DISCONTINUED | OUTPATIENT
Start: 2023-07-11 | End: 2023-07-13 | Stop reason: HOSPADM

## 2023-07-11 RX ADMIN — GABAPENTIN 200 MG: 100 CAPSULE ORAL at 19:53

## 2023-07-11 RX ADMIN — Medication: at 11:15

## 2023-07-11 RX ADMIN — Medication 2000 UNITS: at 09:41

## 2023-07-11 RX ADMIN — FERROUS SULFATE TAB 325 MG (65 MG ELEMENTAL FE) 325 MG: 325 (65 FE) TAB at 09:41

## 2023-07-11 RX ADMIN — TAMSULOSIN HYDROCHLORIDE 0.4 MG: 0.4 CAPSULE ORAL at 09:41

## 2023-07-11 RX ADMIN — ATORVASTATIN CALCIUM 80 MG: 40 TABLET, FILM COATED ORAL at 19:54

## 2023-07-11 RX ADMIN — MULTIPLE VITAMINS W/ MINERALS TAB 1 TABLET: TAB at 09:41

## 2023-07-11 RX ADMIN — Medication 10 ML: at 09:41

## 2023-07-11 RX ADMIN — ANORECTAL OINTMENT: 15.7; .44; 24; 20.6 OINTMENT TOPICAL at 09:44

## 2023-07-11 RX ADMIN — POLYETHYLENE GLYCOL 3350 17 G: 17 POWDER, FOR SOLUTION ORAL at 19:48

## 2023-07-11 RX ADMIN — IPRATROPIUM BROMIDE AND ALBUTEROL SULFATE 1 DOSE: .5; 2.5 SOLUTION RESPIRATORY (INHALATION) at 16:02

## 2023-07-11 RX ADMIN — APIXABAN 2.5 MG: 2.5 TABLET, FILM COATED ORAL at 16:37

## 2023-07-11 RX ADMIN — Medication 10 ML: at 19:54

## 2023-07-11 RX ADMIN — DOXYCYCLINE HYCLATE 100 MG: 100 CAPSULE ORAL at 09:40

## 2023-07-11 RX ADMIN — INSULIN LISPRO 8 UNITS: 100 INJECTION, SOLUTION INTRAVENOUS; SUBCUTANEOUS at 06:40

## 2023-07-11 RX ADMIN — CEFEPIME 2000 MG: 2 INJECTION, POWDER, FOR SOLUTION INTRAVENOUS at 13:11

## 2023-07-11 RX ADMIN — IPRATROPIUM BROMIDE AND ALBUTEROL SULFATE 1 DOSE: .5; 2.5 SOLUTION RESPIRATORY (INHALATION) at 13:18

## 2023-07-11 RX ADMIN — APIXABAN 2.5 MG: 2.5 TABLET, FILM COATED ORAL at 06:11

## 2023-07-11 RX ADMIN — ACETAMINOPHEN 1000 MG: 500 TABLET ORAL at 22:29

## 2023-07-11 RX ADMIN — CEFEPIME 2000 MG: 2 INJECTION, POWDER, FOR SOLUTION INTRAVENOUS at 00:38

## 2023-07-11 RX ADMIN — INSULIN LISPRO 8 UNITS: 100 INJECTION, SOLUTION INTRAVENOUS; SUBCUTANEOUS at 16:38

## 2023-07-11 RX ADMIN — INSULIN LISPRO 8 UNITS: 100 INJECTION, SOLUTION INTRAVENOUS; SUBCUTANEOUS at 11:18

## 2023-07-11 RX ADMIN — FAMOTIDINE 20 MG: 20 TABLET, FILM COATED ORAL at 19:54

## 2023-07-11 RX ADMIN — MICONAZOLE NITRATE: 2 OINTMENT TOPICAL at 09:45

## 2023-07-11 RX ADMIN — GABAPENTIN 200 MG: 100 CAPSULE ORAL at 09:40

## 2023-07-11 RX ADMIN — ANORECTAL OINTMENT: 15.7; .44; 24; 20.6 OINTMENT TOPICAL at 19:57

## 2023-07-11 RX ADMIN — ACETAMINOPHEN 1000 MG: 500 TABLET ORAL at 13:38

## 2023-07-11 RX ADMIN — CETIRIZINE HYDROCHLORIDE 10 MG: 10 TABLET, FILM COATED ORAL at 09:41

## 2023-07-11 RX ADMIN — Medication 500 MG: at 19:53

## 2023-07-11 RX ADMIN — MICONAZOLE NITRATE: 2 OINTMENT TOPICAL at 19:58

## 2023-07-11 RX ADMIN — Medication 500 MG: at 09:41

## 2023-07-11 RX ADMIN — INSULIN GLARGINE 30 UNITS: 100 INJECTION, SOLUTION SUBCUTANEOUS at 19:51

## 2023-07-11 RX ADMIN — DOXYCYCLINE HYCLATE 100 MG: 100 CAPSULE ORAL at 19:54

## 2023-07-11 RX ADMIN — BUMETANIDE 1 MG: 1 TABLET ORAL at 09:41

## 2023-07-11 RX ADMIN — IPRATROPIUM BROMIDE AND ALBUTEROL SULFATE 1 DOSE: .5; 2.5 SOLUTION RESPIRATORY (INHALATION) at 07:47

## 2023-07-11 RX ADMIN — INSULIN GLARGINE 30 UNITS: 100 INJECTION, SOLUTION SUBCUTANEOUS at 09:20

## 2023-07-11 RX ADMIN — DOCUSATE SODIUM 200 MG: 100 CAPSULE, LIQUID FILLED ORAL at 19:53

## 2023-07-11 ASSESSMENT — PAIN DESCRIPTION - DESCRIPTORS: DESCRIPTORS: ACHING;SORE

## 2023-07-11 ASSESSMENT — PAIN SCALES - WONG BAKER
WONGBAKER_NUMERICALRESPONSE: 0
WONGBAKER_NUMERICALRESPONSE: 0

## 2023-07-11 ASSESSMENT — PAIN DESCRIPTION - LOCATION
LOCATION: LEG
LOCATION: HIP
LOCATION: LEG

## 2023-07-11 ASSESSMENT — PAIN DESCRIPTION - ORIENTATION
ORIENTATION: RIGHT;LEFT
ORIENTATION: RIGHT

## 2023-07-11 ASSESSMENT — PAIN SCALES - GENERAL
PAINLEVEL_OUTOF10: 4
PAINLEVEL_OUTOF10: 0
PAINLEVEL_OUTOF10: 6
PAINLEVEL_OUTOF10: 0
PAINLEVEL_OUTOF10: 4

## 2023-07-11 ASSESSMENT — PAIN - FUNCTIONAL ASSESSMENT: PAIN_FUNCTIONAL_ASSESSMENT: PREVENTS OR INTERFERES SOME ACTIVE ACTIVITIES AND ADLS

## 2023-07-11 NOTE — PLAN OF CARE
Problem: Skin/Tissue Integrity  Goal: Absence of new skin breakdown  Outcome: Progressing     Problem: Pain  Goal: Verbalizes/displays adequate comfort level or baseline comfort level  Outcome: Progressing

## 2023-07-11 NOTE — CARE COORDINATION
7/11/2023  Social Work Discharge Planning:Possible discharge today. Awaiting ID plan-cultures. Pt is from [facility] and per liaison is a bedhold. No precert is needed. AGUSTINA and transport form are complete.  Electronically signed by ELKE Loya on 7/11/2023 at 10:08 AM

## 2023-07-12 VITALS
BODY MASS INDEX: 34.71 KG/M2 | WEIGHT: 256.25 LBS | DIASTOLIC BLOOD PRESSURE: 61 MMHG | RESPIRATION RATE: 16 BRPM | HEIGHT: 72 IN | HEART RATE: 91 BPM | OXYGEN SATURATION: 92 % | SYSTOLIC BLOOD PRESSURE: 131 MMHG | TEMPERATURE: 98.6 F

## 2023-07-12 LAB
METER GLUCOSE: 158 MG/DL (ref 74–99)
METER GLUCOSE: 170 MG/DL (ref 74–99)
METER GLUCOSE: 241 MG/DL (ref 74–99)
METER GLUCOSE: 98 MG/DL (ref 74–99)

## 2023-07-12 PROCEDURE — 6370000000 HC RX 637 (ALT 250 FOR IP): Performed by: INTERNAL MEDICINE

## 2023-07-12 PROCEDURE — 94640 AIRWAY INHALATION TREATMENT: CPT

## 2023-07-12 PROCEDURE — 82962 GLUCOSE BLOOD TEST: CPT

## 2023-07-12 PROCEDURE — 2580000003 HC RX 258: Performed by: INTERNAL MEDICINE

## 2023-07-12 PROCEDURE — 6370000000 HC RX 637 (ALT 250 FOR IP): Performed by: SPECIALIST

## 2023-07-12 PROCEDURE — 2580000003 HC RX 258: Performed by: SPECIALIST

## 2023-07-12 PROCEDURE — 6360000002 HC RX W HCPCS: Performed by: SPECIALIST

## 2023-07-12 RX ORDER — BISACODYL 10 MG
10 SUPPOSITORY, RECTAL RECTAL DAILY PRN
Status: DISCONTINUED | OUTPATIENT
Start: 2023-07-12 | End: 2023-07-12 | Stop reason: SDUPTHER

## 2023-07-12 RX ORDER — ONDANSETRON 4 MG/1
4 TABLET, ORALLY DISINTEGRATING ORAL EVERY 6 HOURS PRN
Status: DISCONTINUED | OUTPATIENT
Start: 2023-07-12 | End: 2023-07-13 | Stop reason: HOSPADM

## 2023-07-12 RX ORDER — SODIUM HYPOCHLORITE 1.25 MG/ML
SOLUTION TOPICAL DAILY
Qty: 1 EACH | Refills: 0 | Status: SHIPPED | OUTPATIENT
Start: 2023-07-12

## 2023-07-12 RX ADMIN — Medication 2000 UNITS: at 09:51

## 2023-07-12 RX ADMIN — CEFEPIME 2000 MG: 2 INJECTION, POWDER, FOR SOLUTION INTRAVENOUS at 14:46

## 2023-07-12 RX ADMIN — ATORVASTATIN CALCIUM 80 MG: 40 TABLET, FILM COATED ORAL at 22:01

## 2023-07-12 RX ADMIN — FAMOTIDINE 20 MG: 20 TABLET, FILM COATED ORAL at 22:01

## 2023-07-12 RX ADMIN — Medication 500 MG: at 22:01

## 2023-07-12 RX ADMIN — CEFEPIME 2000 MG: 2 INJECTION, POWDER, FOR SOLUTION INTRAVENOUS at 01:29

## 2023-07-12 RX ADMIN — POLYETHYLENE GLYCOL 3350 17 G: 17 POWDER, FOR SOLUTION ORAL at 09:49

## 2023-07-12 RX ADMIN — ONDANSETRON 4 MG: 4 TABLET, ORALLY DISINTEGRATING ORAL at 16:26

## 2023-07-12 RX ADMIN — MICONAZOLE NITRATE: 2 OINTMENT TOPICAL at 09:54

## 2023-07-12 RX ADMIN — BISACODYL 10 MG: 10 SUPPOSITORY RECTAL at 10:01

## 2023-07-12 RX ADMIN — Medication 10 ML: at 09:53

## 2023-07-12 RX ADMIN — BUMETANIDE 1 MG: 1 TABLET ORAL at 09:51

## 2023-07-12 RX ADMIN — DOCUSATE SODIUM 200 MG: 100 CAPSULE, LIQUID FILLED ORAL at 22:00

## 2023-07-12 RX ADMIN — Medication: at 14:53

## 2023-07-12 RX ADMIN — MAGNESIUM HYDROXIDE 30 ML: 400 SUSPENSION ORAL at 10:01

## 2023-07-12 RX ADMIN — CETIRIZINE HYDROCHLORIDE 10 MG: 10 TABLET, FILM COATED ORAL at 09:51

## 2023-07-12 RX ADMIN — APIXABAN 2.5 MG: 2.5 TABLET, FILM COATED ORAL at 06:20

## 2023-07-12 RX ADMIN — TAMSULOSIN HYDROCHLORIDE 0.4 MG: 0.4 CAPSULE ORAL at 09:51

## 2023-07-12 RX ADMIN — IPRATROPIUM BROMIDE AND ALBUTEROL SULFATE 1 DOSE: .5; 2.5 SOLUTION RESPIRATORY (INHALATION) at 15:37

## 2023-07-12 RX ADMIN — GABAPENTIN 200 MG: 100 CAPSULE ORAL at 22:00

## 2023-07-12 RX ADMIN — ANORECTAL OINTMENT: 15.7; .44; 24; 20.6 OINTMENT TOPICAL at 09:55

## 2023-07-12 RX ADMIN — DOXYCYCLINE HYCLATE 100 MG: 100 CAPSULE ORAL at 09:51

## 2023-07-12 RX ADMIN — FERROUS SULFATE TAB 325 MG (65 MG ELEMENTAL FE) 325 MG: 325 (65 FE) TAB at 09:51

## 2023-07-12 RX ADMIN — INSULIN LISPRO 8 UNITS: 100 INJECTION, SOLUTION INTRAVENOUS; SUBCUTANEOUS at 16:28

## 2023-07-12 RX ADMIN — Medication 500 MG: at 09:51

## 2023-07-12 RX ADMIN — MULTIPLE VITAMINS W/ MINERALS TAB 1 TABLET: TAB at 09:51

## 2023-07-12 RX ADMIN — INSULIN LISPRO 8 UNITS: 100 INJECTION, SOLUTION INTRAVENOUS; SUBCUTANEOUS at 09:46

## 2023-07-12 RX ADMIN — GABAPENTIN 200 MG: 100 CAPSULE ORAL at 09:51

## 2023-07-12 RX ADMIN — ACETAMINOPHEN 1000 MG: 500 TABLET ORAL at 14:52

## 2023-07-12 RX ADMIN — INSULIN GLARGINE 30 UNITS: 100 INJECTION, SOLUTION SUBCUTANEOUS at 09:46

## 2023-07-12 RX ADMIN — IPRATROPIUM BROMIDE AND ALBUTEROL SULFATE 1 DOSE: .5; 2.5 SOLUTION RESPIRATORY (INHALATION) at 12:49

## 2023-07-12 RX ADMIN — APIXABAN 2.5 MG: 2.5 TABLET, FILM COATED ORAL at 16:25

## 2023-07-12 ASSESSMENT — PAIN DESCRIPTION - LOCATION: LOCATION: LEG

## 2023-07-12 ASSESSMENT — PAIN SCALES - WONG BAKER: WONGBAKER_NUMERICALRESPONSE: 0

## 2023-07-12 ASSESSMENT — PAIN SCALES - GENERAL
PAINLEVEL_OUTOF10: 0
PAINLEVEL_OUTOF10: 0
PAINLEVEL_OUTOF10: 5

## 2023-07-12 ASSESSMENT — PAIN DESCRIPTION - ORIENTATION: ORIENTATION: RIGHT;LEFT

## 2023-07-12 NOTE — CARE COORDINATION
7/12/2023  Social Work Discharge Planning:One more dose of IV ATB. Possible discharge today. Pt is from [facility] and per liaison is a bedhold. No precert is needed. AGUSTINA and transport form are complete. Electronically signed by ELKE Hernandez on 7/12/2023 at 10:16 AM    7/12/2023 Social Work Discharge Planning: set up transport via [facility] for Pt to go to [id number] [facility] today at [facility] . Nurse here, LIZY mejia and son David Negron () were notified.  Electronically signed by ELKE Hernandez on 7/12/2023 at 3:14 PM

## 2023-07-12 NOTE — PLAN OF CARE
Problem: Skin/Tissue Integrity  Goal: Absence of new skin breakdown  Outcome: Adequate for Discharge     Problem: Discharge Planning  Goal: Discharge to home or other facility with appropriate resources  Outcome: Adequate for Discharge  Flowsheets (Taken 7/12/2023 1010)  Discharge to home or other facility with appropriate resources: Identify barriers to discharge with patient and caregiver     Problem: Pain  Goal: Verbalizes/displays adequate comfort level or baseline comfort level  Outcome: Adequate for Discharge     Problem: Safety - Adult  Goal: Free from fall injury  Outcome: Adequate for Discharge     Problem: ABCDS Injury Assessment  Goal: Absence of physical injury  Outcome: Adequate for Discharge     Problem: Chronic Conditions and Co-morbidities  Goal: Patient's chronic conditions and co-morbidity symptoms are monitored and maintained or improved  Outcome: Adequate for Discharge  Flowsheets (Taken 7/12/2023 1010)  Care Plan - Patient's Chronic Conditions and Co-Morbidity Symptoms are Monitored and Maintained or Improved: Monitor and assess patient's chronic conditions and comorbid symptoms for stability, deterioration, or improvement

## 2023-07-12 NOTE — PLAN OF CARE
Problem: Skin/Tissue Integrity  Goal: Absence of new skin breakdown  Description: 1. Monitor for areas of redness and/or skin breakdown  2. Assess vascular access sites hourly  3. Every 4-6 hours minimum:  Change oxygen saturation probe site  4. Every 4-6 hours:  If on nasal continuous positive airway pressure, respiratory therapy assess nares and determine need for appliance change or resting period.   7/11/2023 2346 by Jeanie Pitts  Outcome: Progressing  7/11/2023 1552 by Jason Gipson RN  Outcome: Progressing     Problem: Pain  Goal: Verbalizes/displays adequate comfort level or baseline comfort level  Recent Flowsheet Documentation  Taken 7/11/2023 1844 by Jeanie Pitts  Verbalizes/displays adequate comfort level or baseline comfort level: Encourage patient to monitor pain and request assistance  7/11/2023 1552 by Jason Gipson RN  Outcome: Progressing     Problem: Discharge Planning  Goal: Discharge to home or other facility with appropriate resources  Recent Flowsheet Documentation  Taken 7/11/2023 2000 by Jeanie Pitts  Discharge to home or other facility with appropriate resources: Identify barriers to discharge with patient and caregiver  Taken 7/11/2023 1115 by Jason Gipson RN  Discharge to home or other facility with appropriate resources: Refer to discharge planning if patient needs post-hospital services based on physician order or complex needs related to functional status, cognitive ability or social support system     Problem: Chronic Conditions and Co-morbidities  Goal: Patient's chronic conditions and co-morbidity symptoms are monitored and maintained or improved  Recent Flowsheet Documentation  Taken 7/11/2023 2000 by Henry Shila - Patient's Chronic Conditions and Co-Morbidity Symptoms are Monitored and Maintained or Improved: Monitor and assess patient's chronic conditions and comorbid symptoms for stability, deterioration, or improvement  Taken 7/11/2023 1115 by Jason Gipson

## 2023-08-08 LAB
ALBUMIN SERPL-MCNC: 2.4 G/DL (ref 3.5–5.2)
ALP SERPL-CCNC: 114 U/L (ref 40–129)
ALT SERPL-CCNC: 6 U/L (ref 0–40)
ANION GAP SERPL CALCULATED.3IONS-SCNC: 11 MMOL/L (ref 7–16)
AST SERPL-CCNC: 15 U/L (ref 0–39)
BASOPHILS # BLD: 0.03 K/UL (ref 0–0.2)
BASOPHILS NFR BLD: 0 % (ref 0–2)
BILIRUB SERPL-MCNC: 0.3 MG/DL (ref 0–1.2)
BUN SERPL-MCNC: 38 MG/DL (ref 6–23)
CALCIUM SERPL-MCNC: 8.2 MG/DL (ref 8.6–10.2)
CHLORIDE SERPL-SCNC: 104 MMOL/L (ref 98–107)
CO2 SERPL-SCNC: 25 MMOL/L (ref 22–29)
CREAT SERPL-MCNC: 1.4 MG/DL (ref 0.7–1.2)
EOSINOPHIL # BLD: 0 K/UL (ref 0.05–0.5)
EOSINOPHILS RELATIVE PERCENT: 0 % (ref 0–6)
ERYTHROCYTE [DISTWIDTH] IN BLOOD BY AUTOMATED COUNT: 15.5 % (ref 11.5–15)
GFR SERPL CREATININE-BSD FRML MDRD: 47 ML/MIN/1.73M2
GLUCOSE SERPL-MCNC: 281 MG/DL (ref 74–99)
HCT VFR BLD AUTO: 30.8 % (ref 37–54)
HGB BLD-MCNC: 9.1 G/DL (ref 12.5–16.5)
IMM GRANULOCYTES # BLD AUTO: 0.1 K/UL (ref 0–0.58)
IMM GRANULOCYTES NFR BLD: 1 % (ref 0–5)
LYMPHOCYTES NFR BLD: 0.73 K/UL (ref 1.5–4)
LYMPHOCYTES RELATIVE PERCENT: 4 % (ref 20–42)
MCH RBC QN AUTO: 28.8 PG (ref 26–35)
MCHC RBC AUTO-ENTMCNC: 29.5 G/DL (ref 32–34.5)
MCV RBC AUTO: 97.5 FL (ref 80–99.9)
MONOCYTES NFR BLD: 0.68 K/UL (ref 0.1–0.95)
MONOCYTES NFR BLD: 4 % (ref 2–12)
NEUTROPHILS NFR BLD: 91 % (ref 43–80)
NEUTS SEG NFR BLD: 16.41 K/UL (ref 1.8–7.3)
PLATELET # BLD AUTO: 173 K/UL (ref 130–450)
PMV BLD AUTO: 9.5 FL (ref 7–12)
POTASSIUM SERPL-SCNC: 4.5 MMOL/L (ref 3.5–5)
PROT SERPL-MCNC: 5.8 G/DL (ref 6.4–8.3)
RBC # BLD AUTO: 3.16 M/UL (ref 3.8–5.8)
SODIUM SERPL-SCNC: 140 MMOL/L (ref 132–146)
WBC OTHER # BLD: 18 K/UL (ref 4.5–11.5)

## 2023-08-30 LAB
ERYTHROCYTE [DISTWIDTH] IN BLOOD BY AUTOMATED COUNT: 14.7 % (ref 11.5–15)
HBA1C MFR BLD: 7.2 % (ref 4–5.6)
HCT VFR BLD AUTO: 29.5 % (ref 37–54)
HGB BLD-MCNC: 8.9 G/DL (ref 12.5–16.5)
MCH RBC QN AUTO: 27.9 PG (ref 26–35)
MCHC RBC AUTO-ENTMCNC: 30.2 G/DL (ref 32–34.5)
MCV RBC AUTO: 92.5 FL (ref 80–99.9)
PLATELET # BLD AUTO: 192 K/UL (ref 130–450)
PMV BLD AUTO: 8.9 FL (ref 7–12)
RBC # BLD AUTO: 3.19 M/UL (ref 3.8–5.8)
WBC OTHER # BLD: 9.1 K/UL (ref 4.5–11.5)

## 2023-08-30 NOTE — PROGRESS NOTES
SPO2 on room air at rest 99%  SPO2 during ambulation on room air 87%. Patient became disoriented and feeling like he was going to pass out. 4L nasal cannula applied. SPO2 95% on 4L nasal cannula. Sotyktu Pregnancy And Lactation Text: There is insufficient data to evaluate whether or not Sotyktu is safe to use during pregnancy.   It is not known if Sotyktu passes into breast milk and whether or not it is safe to use when breastfeeding.

## 2023-09-11 ENCOUNTER — OUTSIDE SERVICES (OUTPATIENT)
Dept: INTERNAL MEDICINE CLINIC | Age: 88
End: 2023-09-11

## 2023-09-11 LAB
ALBUMIN SERPL-MCNC: 2.9 G/DL (ref 3.5–5.2)
ALP SERPL-CCNC: 123 U/L (ref 40–129)
ALT SERPL-CCNC: 6 U/L (ref 0–40)
ANION GAP SERPL CALCULATED.3IONS-SCNC: 15 MMOL/L (ref 7–16)
AST SERPL-CCNC: 12 U/L (ref 0–39)
BASOPHILS # BLD: 0.01 K/UL (ref 0–0.2)
BASOPHILS NFR BLD: 0 % (ref 0–2)
BILIRUB SERPL-MCNC: 0.2 MG/DL (ref 0–1.2)
BUN SERPL-MCNC: 43 MG/DL (ref 6–23)
CALCIUM SERPL-MCNC: 8.7 MG/DL (ref 8.6–10.2)
CHLORIDE SERPL-SCNC: 107 MMOL/L (ref 98–107)
CO2 SERPL-SCNC: 22 MMOL/L (ref 22–29)
CREAT SERPL-MCNC: 1.2 MG/DL (ref 0.7–1.2)
EOSINOPHIL # BLD: 0.18 K/UL (ref 0.05–0.5)
EOSINOPHILS RELATIVE PERCENT: 2 % (ref 0–6)
ERYTHROCYTE [DISTWIDTH] IN BLOOD BY AUTOMATED COUNT: 14.8 % (ref 11.5–15)
GFR SERPL CREATININE-BSD FRML MDRD: 56 ML/MIN/1.73M2
GLUCOSE SERPL-MCNC: 266 MG/DL (ref 74–99)
HCT VFR BLD AUTO: 29.5 % (ref 37–54)
HGB BLD-MCNC: 8.8 G/DL (ref 12.5–16.5)
IMM GRANULOCYTES # BLD AUTO: 0.06 K/UL (ref 0–0.58)
IMM GRANULOCYTES NFR BLD: 1 % (ref 0–5)
LYMPHOCYTES NFR BLD: 0.99 K/UL (ref 1.5–4)
LYMPHOCYTES RELATIVE PERCENT: 8 % (ref 20–42)
MCH RBC QN AUTO: 27.6 PG (ref 26–35)
MCHC RBC AUTO-ENTMCNC: 29.8 G/DL (ref 32–34.5)
MCV RBC AUTO: 92.5 FL (ref 80–99.9)
MONOCYTES NFR BLD: 0.73 K/UL (ref 0.1–0.95)
MONOCYTES NFR BLD: 6 % (ref 2–12)
NEUTROPHILS NFR BLD: 84 % (ref 43–80)
NEUTS SEG NFR BLD: 10.36 K/UL (ref 1.8–7.3)
PLATELET # BLD AUTO: 197 K/UL (ref 130–450)
PMV BLD AUTO: 9.7 FL (ref 7–12)
POTASSIUM SERPL-SCNC: 5.1 MMOL/L (ref 3.5–5)
PROT SERPL-MCNC: 6.5 G/DL (ref 6.4–8.3)
RBC # BLD AUTO: 3.19 M/UL (ref 3.8–5.8)
SODIUM SERPL-SCNC: 144 MMOL/L (ref 132–146)
WBC OTHER # BLD: 12.3 K/UL (ref 4.5–11.5)

## 2023-09-11 ASSESSMENT — ENCOUNTER SYMPTOMS
RESPIRATORY NEGATIVE: 1
EYES NEGATIVE: 1
ALLERGIC/IMMUNOLOGIC NEGATIVE: 1
GASTROINTESTINAL NEGATIVE: 1

## 2023-09-11 NOTE — PROGRESS NOTES
[facility]     Episodic Visit       Jennifer Ross. : 1932    Visit Date: 2023  Facility:    [facility]      CC: Fatigue and chills      The patient is new to me. HPI:    Patient is a 25-year-old male with a past medical history of CHF, CKD, DM, HLD, HTN, venous insufficiency with bilateral lower extremity wounds most recently admitted to the hospital in July of this year for sepsis secondary to his bilateral extremity wounds where he was seen and evaluated by infectious disease requiring IV cefepime and oral Doxy secondary to Pseudomonas aeruginosa infection present on cultures. Patient was seen and evaluated in his room for a chief complaint of fatigue and chills. Patient has had worsening hyperglycemia with decreased appetite. He states he keeps getting chills and just \" does not feel right\". Stat labs were obtained overnight the patient was found to have a mild leukocytosis of 12.8. Extensive conversation about plan of care was discussed with the patient and all questions were answered.     History Reviewed:     Past Medical History:   Diagnosis Date    Arthritis     Cancer ([facility])     breast    Cellulitis     CHF (congestive heart failure) ([facility])     CKD (chronic kidney disease) stage 3, GFR 30-59 ml/min ([facility])     Diabetes mellitus ([address])     History of lumpectomy     Hx of blood clots     Hyperlipidemia     Hypertension     Left ventricular failure ([facility])     Macular degeneration     Obesity     Orthostatic hypotension     PE (pulmonary thromboembolism) ([facility])     Pressure injury of both heels, unstageable ([facility])     Staph aureus infection     Venous insufficiency        Allergies   Allergen Reactions    Clindamycin/Lincomycin     Sulfa Antibiotics        Current Outpatient Medications on File Prior to Visit   Medication Sig Dispense Refill    sodium hypochlorite (DAKINS) 0.125 % SOLN external solution Apply topically daily 1 each 0    melatonin 3 MG TABS tablet

## 2023-09-14 ENCOUNTER — OUTSIDE SERVICES (OUTPATIENT)
Dept: INTERNAL MEDICINE CLINIC | Age: 88
End: 2023-09-14

## 2023-09-14 LAB
BASOPHILS # BLD: 0.01 K/UL (ref 0–0.2)
BASOPHILS NFR BLD: 0 % (ref 0–2)
EOSINOPHIL # BLD: 0.46 K/UL (ref 0.05–0.5)
EOSINOPHILS RELATIVE PERCENT: 5 % (ref 0–6)
ERYTHROCYTE [DISTWIDTH] IN BLOOD BY AUTOMATED COUNT: 14.8 % (ref 11.5–15)
HCT VFR BLD AUTO: 27.3 % (ref 37–54)
HGB BLD-MCNC: 8.2 G/DL (ref 12.5–16.5)
IMM GRANULOCYTES # BLD AUTO: 0.06 K/UL (ref 0–0.58)
IMM GRANULOCYTES NFR BLD: 1 % (ref 0–5)
LYMPHOCYTES NFR BLD: 1.28 K/UL (ref 1.5–4)
LYMPHOCYTES RELATIVE PERCENT: 13 % (ref 20–42)
MCH RBC QN AUTO: 27.4 PG (ref 26–35)
MCHC RBC AUTO-ENTMCNC: 30 G/DL (ref 32–34.5)
MCV RBC AUTO: 91.3 FL (ref 80–99.9)
MICROORGANISM SPEC CULT: ABNORMAL
MICROORGANISM/AGENT SPEC: ABNORMAL
MONOCYTES NFR BLD: 0.91 K/UL (ref 0.1–0.95)
MONOCYTES NFR BLD: 9 % (ref 2–12)
NEUTROPHILS NFR BLD: 73 % (ref 43–80)
NEUTS SEG NFR BLD: 7.29 K/UL (ref 1.8–7.3)
PLATELET # BLD AUTO: 209 K/UL (ref 130–450)
PMV BLD AUTO: 9.2 FL (ref 7–12)
RBC # BLD AUTO: 2.99 M/UL (ref 3.8–5.8)
SERVICE CMNT-IMP: ABNORMAL
SPECIMEN DESCRIPTION: ABNORMAL
WBC OTHER # BLD: 10 K/UL (ref 4.5–11.5)

## 2023-09-14 ASSESSMENT — ENCOUNTER SYMPTOMS
EYES NEGATIVE: 1
ALLERGIC/IMMUNOLOGIC NEGATIVE: 1
RESPIRATORY NEGATIVE: 1
GASTROINTESTINAL NEGATIVE: 1

## 2023-09-14 NOTE — PROGRESS NOTES
[facility]     Episodic Visit       Consuelo Flores. : 1932    Visit Date: 2023  Facility:  [facility]      CC: follow up labs       The patient is new to me. HPI:    Patient is a 80-year-old male with a past medical history of CHF, CKD, DM, HLD, HTN, venous insufficiency with bilateral lower extremity wounds most recently admitted to the hospital in July of this year for sepsis secondary to his bilateral extremity wounds where he was seen and evaluated by infectious disease requiring IV cefepime and oral Doxy secondary to Pseudomonas aeruginosa infection present on cultures. Patient was seen and examined in his room where he states he is feeling much better after starting antibiotic on Monday evening. He states he is still having some chills but feels it is related to the heat and not functioning appropriately. There has been no documented fevers by nursing. Patient would like to finish out course of antibiotics as his acute illness was likely not related to his wounds and that of another acute issue which appears to be treated with p.o. Augmentin.     History Reviewed:     Past Medical History:   Diagnosis Date    Arthritis     Cancer ([address])     breast    Cellulitis     CHF (congestive heart failure) ([facility])     CKD (chronic kidney disease) stage 3, GFR 30-59 ml/min ([facility])     Diabetes mellitus ([address])     History of lumpectomy     Hx of blood clots     Hyperlipidemia     Hypertension     Left ventricular failure ([facility])     Macular degeneration     Obesity     Orthostatic hypotension     PE (pulmonary thromboembolism) ([facility])     Pressure injury of both heels, unstageable ([facility])     Staph aureus infection     Venous insufficiency        Allergies   Allergen Reactions    Clindamycin/Lincomycin     Sulfa Antibiotics        Current Outpatient Medications on File Prior to Visit   Medication Sig Dispense Refill    sodium hypochlorite (DAKINS) 0.125 %

## 2023-10-03 ENCOUNTER — APPOINTMENT (OUTPATIENT)
Dept: CT IMAGING | Age: 88
End: 2023-10-03
Payer: MEDICARE

## 2023-10-03 ENCOUNTER — APPOINTMENT (OUTPATIENT)
Dept: GENERAL RADIOLOGY | Age: 88
End: 2023-10-03
Payer: MEDICARE

## 2023-10-03 ENCOUNTER — HOSPITAL ENCOUNTER (INPATIENT)
Age: 88
LOS: 6 days | Discharge: HOSPICE | End: 2023-10-10
Attending: EMERGENCY MEDICINE | Admitting: INTERNAL MEDICINE
Payer: MEDICARE

## 2023-10-03 DIAGNOSIS — I48.91 ATRIAL FIBRILLATION WITH RAPID VENTRICULAR RESPONSE (HCC): ICD-10-CM

## 2023-10-03 DIAGNOSIS — L03.115 BILATERAL LOWER LEG CELLULITIS: ICD-10-CM

## 2023-10-03 DIAGNOSIS — L03.116 BILATERAL LOWER LEG CELLULITIS: ICD-10-CM

## 2023-10-03 DIAGNOSIS — A41.9 SEPSIS DUE TO PNEUMONIA (HCC): Primary | ICD-10-CM

## 2023-10-03 DIAGNOSIS — J18.9 SEPSIS DUE TO PNEUMONIA (HCC): Primary | ICD-10-CM

## 2023-10-03 LAB
ALBUMIN SERPL-MCNC: 3.3 G/DL (ref 3.5–5.2)
ALP SERPL-CCNC: 157 U/L (ref 40–129)
ALT SERPL-CCNC: 8 U/L (ref 0–40)
ANION GAP SERPL CALCULATED.3IONS-SCNC: 9 MMOL/L (ref 7–16)
AST SERPL-CCNC: 16 U/L (ref 0–39)
B.E.: 0.1 MMOL/L (ref -3–3)
BASOPHILS # BLD: 0.03 K/UL (ref 0–0.2)
BASOPHILS NFR BLD: 0 % (ref 0–2)
BILIRUB DIRECT SERPL-MCNC: <0.2 MG/DL (ref 0–0.3)
BILIRUB INDIRECT SERPL-MCNC: ABNORMAL MG/DL (ref 0–1)
BILIRUB SERPL-MCNC: 0.3 MG/DL (ref 0–1.2)
BILIRUB UR QL STRIP: NEGATIVE
BNP SERPL-MCNC: 758 PG/ML (ref 0–450)
BUN SERPL-MCNC: 41 MG/DL (ref 6–23)
CALCIUM SERPL-MCNC: 8.8 MG/DL (ref 8.6–10.2)
CHLORIDE SERPL-SCNC: 103 MMOL/L (ref 98–107)
CLARITY UR: CLEAR
CO2 SERPL-SCNC: 27 MMOL/L (ref 22–29)
COHB: 0.9 % (ref 0–1.5)
COLOR UR: YELLOW
COMMENT: NORMAL
CREAT SERPL-MCNC: 1.3 MG/DL (ref 0.7–1.2)
CRITICAL: ABNORMAL
DATE ANALYZED: ABNORMAL
DATE OF COLLECTION: ABNORMAL
EOSINOPHIL # BLD: 0.25 K/UL (ref 0.05–0.5)
EOSINOPHILS RELATIVE PERCENT: 2 % (ref 0–6)
ERYTHROCYTE [DISTWIDTH] IN BLOOD BY AUTOMATED COUNT: 15.4 % (ref 11.5–15)
GFR SERPL CREATININE-BSD FRML MDRD: 52 ML/MIN/1.73M2
GLUCOSE SERPL-MCNC: 99 MG/DL (ref 74–99)
GLUCOSE UR STRIP-MCNC: NEGATIVE MG/DL
HCO3: 25.7 MMOL/L (ref 22–26)
HCT VFR BLD AUTO: 34.1 % (ref 37–54)
HGB BLD-MCNC: 10.4 G/DL (ref 12.5–16.5)
HGB UR QL STRIP.AUTO: NEGATIVE
HHB: 0.8 % (ref 0–5)
IMM GRANULOCYTES # BLD AUTO: 0.08 K/UL (ref 0–0.58)
IMM GRANULOCYTES NFR BLD: 1 % (ref 0–5)
INR PPP: 1.3
KETONES UR STRIP-MCNC: NEGATIVE MG/DL
LAB: ABNORMAL
LACTATE BLDV-SCNC: 1.3 MMOL/L (ref 0.5–2.2)
LEUKOCYTE ESTERASE UR QL STRIP: NEGATIVE
LIPASE SERPL-CCNC: 7 U/L (ref 13–60)
LYMPHOCYTES NFR BLD: 0.62 K/UL (ref 1.5–4)
LYMPHOCYTES RELATIVE PERCENT: 4 % (ref 20–42)
Lab: 2040
MCH RBC QN AUTO: 27.6 PG (ref 26–35)
MCHC RBC AUTO-ENTMCNC: 30.5 G/DL (ref 32–34.5)
MCV RBC AUTO: 90.5 FL (ref 80–99.9)
METHB: 0.3 % (ref 0–1.5)
MODE: ABNORMAL
MONOCYTES NFR BLD: 0.88 K/UL (ref 0.1–0.95)
MONOCYTES NFR BLD: 5 % (ref 2–12)
NEUTROPHILS NFR BLD: 89 % (ref 43–80)
NEUTS SEG NFR BLD: 15.01 K/UL (ref 1.8–7.3)
NITRITE UR QL STRIP: NEGATIVE
O2 CONTENT: 16.7 ML/DL
O2 SATURATION: 99.2 % (ref 92–98.5)
O2HB: 98 % (ref 94–97)
OPERATOR ID: 2485
PATIENT TEMP: 37 C
PCO2: 45.6 MMHG (ref 35–45)
PH BLOOD GAS: 7.37 (ref 7.35–7.45)
PH UR STRIP: 6 [PH] (ref 5–9)
PLATELET # BLD AUTO: 215 K/UL (ref 130–450)
PMV BLD AUTO: 8.9 FL (ref 7–12)
PO2: 249.5 MMHG (ref 75–100)
POTASSIUM SERPL-SCNC: 5.2 MMOL/L (ref 3.5–5)
PROT SERPL-MCNC: 7.3 G/DL (ref 6.4–8.3)
PROT UR STRIP-MCNC: NEGATIVE MG/DL
PROTHROMBIN TIME: 14.2 SEC (ref 9.3–12.4)
RBC # BLD AUTO: 3.77 M/UL (ref 3.8–5.8)
SARS-COV-2 RDRP RESP QL NAA+PROBE: NOT DETECTED
SODIUM SERPL-SCNC: 139 MMOL/L (ref 132–146)
SOURCE, BLOOD GAS: ABNORMAL
SP GR UR STRIP: 1.02 (ref 1–1.03)
SPECIMEN DESCRIPTION: NORMAL
THB: 11.7 G/DL (ref 11.5–16.5)
TIME ANALYZED: 2049
TROPONIN I SERPL HS-MCNC: 75 NG/L (ref 0–11)
UROBILINOGEN UR STRIP-ACNC: 0.2 EU/DL (ref 0–1)
WBC OTHER # BLD: 16.9 K/UL (ref 4.5–11.5)

## 2023-10-03 PROCEDURE — 2700000000 HC OXYGEN THERAPY PER DAY

## 2023-10-03 PROCEDURE — 84484 ASSAY OF TROPONIN QUANT: CPT

## 2023-10-03 PROCEDURE — 83880 ASSAY OF NATRIURETIC PEPTIDE: CPT

## 2023-10-03 PROCEDURE — 6360000002 HC RX W HCPCS: Performed by: STUDENT IN AN ORGANIZED HEALTH CARE EDUCATION/TRAINING PROGRAM

## 2023-10-03 PROCEDURE — 73502 X-RAY EXAM HIP UNI 2-3 VIEWS: CPT

## 2023-10-03 PROCEDURE — 96375 TX/PRO/DX INJ NEW DRUG ADDON: CPT

## 2023-10-03 PROCEDURE — 85610 PROTHROMBIN TIME: CPT

## 2023-10-03 PROCEDURE — 81003 URINALYSIS AUTO W/O SCOPE: CPT

## 2023-10-03 PROCEDURE — 82248 BILIRUBIN DIRECT: CPT

## 2023-10-03 PROCEDURE — 99285 EMERGENCY DEPT VISIT HI MDM: CPT

## 2023-10-03 PROCEDURE — 2580000003 HC RX 258: Performed by: RADIOLOGY

## 2023-10-03 PROCEDURE — 6360000004 HC RX CONTRAST MEDICATION: Performed by: RADIOLOGY

## 2023-10-03 PROCEDURE — 80053 COMPREHEN METABOLIC PANEL: CPT

## 2023-10-03 PROCEDURE — 82805 BLOOD GASES W/O2 SATURATION: CPT

## 2023-10-03 PROCEDURE — 83690 ASSAY OF LIPASE: CPT

## 2023-10-03 PROCEDURE — 2580000003 HC RX 258: Performed by: STUDENT IN AN ORGANIZED HEALTH CARE EDUCATION/TRAINING PROGRAM

## 2023-10-03 PROCEDURE — 85025 COMPLETE CBC W/AUTO DIFF WBC: CPT

## 2023-10-03 PROCEDURE — 74177 CT ABD & PELVIS W/CONTRAST: CPT

## 2023-10-03 PROCEDURE — 83605 ASSAY OF LACTIC ACID: CPT

## 2023-10-03 PROCEDURE — 87040 BLOOD CULTURE FOR BACTERIA: CPT

## 2023-10-03 PROCEDURE — 93005 ELECTROCARDIOGRAM TRACING: CPT | Performed by: STUDENT IN AN ORGANIZED HEALTH CARE EDUCATION/TRAINING PROGRAM

## 2023-10-03 PROCEDURE — 71045 X-RAY EXAM CHEST 1 VIEW: CPT

## 2023-10-03 PROCEDURE — 87635 SARS-COV-2 COVID-19 AMP PRB: CPT

## 2023-10-03 RX ORDER — SODIUM CHLORIDE 0.9 % (FLUSH) 0.9 %
10 SYRINGE (ML) INJECTION PRN
Status: COMPLETED | OUTPATIENT
Start: 2023-10-03 | End: 2023-10-03

## 2023-10-03 RX ORDER — ONDANSETRON 2 MG/ML
4 INJECTION INTRAMUSCULAR; INTRAVENOUS ONCE
Status: COMPLETED | OUTPATIENT
Start: 2023-10-03 | End: 2023-10-03

## 2023-10-03 RX ORDER — SODIUM CHLORIDE 0.9 % (FLUSH) 0.9 %
5-40 SYRINGE (ML) INJECTION PRN
Status: DISCONTINUED | OUTPATIENT
Start: 2023-10-03 | End: 2023-10-10 | Stop reason: HOSPADM

## 2023-10-03 RX ORDER — SODIUM CHLORIDE 9 MG/ML
INJECTION, SOLUTION INTRAVENOUS PRN
Status: DISCONTINUED | OUTPATIENT
Start: 2023-10-03 | End: 2023-10-10 | Stop reason: HOSPADM

## 2023-10-03 RX ORDER — 0.9 % SODIUM CHLORIDE 0.9 %
1000 INTRAVENOUS SOLUTION INTRAVENOUS ONCE
Status: COMPLETED | OUTPATIENT
Start: 2023-10-03 | End: 2023-10-04

## 2023-10-03 RX ORDER — SODIUM CHLORIDE 0.9 % (FLUSH) 0.9 %
5-40 SYRINGE (ML) INJECTION EVERY 12 HOURS SCHEDULED
Status: DISCONTINUED | OUTPATIENT
Start: 2023-10-03 | End: 2023-10-10 | Stop reason: HOSPADM

## 2023-10-03 RX ADMIN — SODIUM CHLORIDE 1000 ML: 9 INJECTION, SOLUTION INTRAVENOUS at 21:25

## 2023-10-03 RX ADMIN — SODIUM CHLORIDE, PRESERVATIVE FREE 10 ML: 5 INJECTION INTRAVENOUS at 23:52

## 2023-10-03 RX ADMIN — ONDANSETRON 4 MG: 2 INJECTION INTRAMUSCULAR; INTRAVENOUS at 21:18

## 2023-10-03 RX ADMIN — IOPAMIDOL 75 ML: 755 INJECTION, SOLUTION INTRAVENOUS at 23:56

## 2023-10-04 PROBLEM — J18.9 SEPSIS DUE TO PNEUMONIA (HCC): Status: ACTIVE | Noted: 2023-10-04

## 2023-10-04 PROBLEM — E10.65 TYPE 1 DIABETES MELLITUS WITH HYPERGLYCEMIA (HCC): Status: ACTIVE | Noted: 2021-03-31

## 2023-10-04 PROBLEM — I48.91 ATRIAL FIBRILLATION WITH RAPID VENTRICULAR RESPONSE (HCC): Status: ACTIVE | Noted: 2023-10-04

## 2023-10-04 PROBLEM — A41.9 SEPSIS DUE TO PNEUMONIA (HCC): Status: ACTIVE | Noted: 2023-10-04

## 2023-10-04 LAB
ALBUMIN SERPL-MCNC: 2.8 G/DL (ref 3.5–5.2)
ALP SERPL-CCNC: 130 U/L (ref 40–129)
ALT SERPL-CCNC: 7 U/L (ref 0–40)
ANION GAP SERPL CALCULATED.3IONS-SCNC: 5 MMOL/L (ref 7–16)
ANION GAP SERPL CALCULATED.3IONS-SCNC: 9 MMOL/L (ref 7–16)
AST SERPL-CCNC: 22 U/L (ref 0–39)
B PARAP IS1001 DNA NPH QL NAA+NON-PROBE: NOT DETECTED
B PERT DNA SPEC QL NAA+PROBE: NOT DETECTED
BILIRUB SERPL-MCNC: 0.2 MG/DL (ref 0–1.2)
BUN SERPL-MCNC: 38 MG/DL (ref 6–23)
BUN SERPL-MCNC: 43 MG/DL (ref 6–23)
C PNEUM DNA NPH QL NAA+NON-PROBE: NOT DETECTED
CALCIUM SERPL-MCNC: 8.1 MG/DL (ref 8.6–10.2)
CALCIUM SERPL-MCNC: 8.4 MG/DL (ref 8.6–10.2)
CHLORIDE SERPL-SCNC: 109 MMOL/L (ref 98–107)
CHLORIDE SERPL-SCNC: 110 MMOL/L (ref 98–107)
CO2 SERPL-SCNC: 24 MMOL/L (ref 22–29)
CO2 SERPL-SCNC: 26 MMOL/L (ref 22–29)
CREAT SERPL-MCNC: 1.2 MG/DL (ref 0.7–1.2)
CREAT SERPL-MCNC: 1.3 MG/DL (ref 0.7–1.2)
EKG ATRIAL RATE: 129 BPM
EKG Q-T INTERVAL: 268 MS
EKG QRS DURATION: 80 MS
EKG QTC CALCULATION (BAZETT): 391 MS
EKG R AXIS: 6 DEGREES
EKG T AXIS: 85 DEGREES
EKG VENTRICULAR RATE: 128 BPM
ERYTHROCYTE [DISTWIDTH] IN BLOOD BY AUTOMATED COUNT: 15.6 % (ref 11.5–15)
FLUAV RNA NPH QL NAA+NON-PROBE: NOT DETECTED
FLUBV RNA NPH QL NAA+NON-PROBE: NOT DETECTED
GFR SERPL CREATININE-BSD FRML MDRD: 53 ML/MIN/1.73M2
GFR SERPL CREATININE-BSD FRML MDRD: 57 ML/MIN/1.73M2
GLUCOSE BLD-MCNC: 195 MG/DL (ref 74–99)
GLUCOSE BLD-MCNC: 200 MG/DL (ref 74–99)
GLUCOSE BLD-MCNC: 281 MG/DL (ref 74–99)
GLUCOSE SERPL-MCNC: 211 MG/DL (ref 74–99)
GLUCOSE SERPL-MCNC: 218 MG/DL (ref 74–99)
HADV DNA NPH QL NAA+NON-PROBE: NOT DETECTED
HCOV 229E RNA NPH QL NAA+NON-PROBE: NOT DETECTED
HCOV HKU1 RNA NPH QL NAA+NON-PROBE: NOT DETECTED
HCOV NL63 RNA NPH QL NAA+NON-PROBE: NOT DETECTED
HCOV OC43 RNA NPH QL NAA+NON-PROBE: NOT DETECTED
HCT VFR BLD AUTO: 33.2 % (ref 37–54)
HGB BLD-MCNC: 9.8 G/DL (ref 12.5–16.5)
HMPV RNA NPH QL NAA+NON-PROBE: NOT DETECTED
HPIV1 RNA NPH QL NAA+NON-PROBE: NOT DETECTED
HPIV2 RNA NPH QL NAA+NON-PROBE: NOT DETECTED
HPIV3 RNA NPH QL NAA+NON-PROBE: NOT DETECTED
HPIV4 RNA NPH QL NAA+NON-PROBE: NOT DETECTED
L PNEUMO1 AG UR QL IA.RAPID: NEGATIVE
LACTATE BLDV-SCNC: NORMAL MMOL/L (ref 0.5–1.9)
LACTIC ACID, SEPSIS WHOLE BLOOD: NORMAL MMOL/L (ref 0.5–1.9)
M PNEUMO DNA NPH QL NAA+NON-PROBE: NOT DETECTED
MCH RBC QN AUTO: 27.5 PG (ref 26–35)
MCHC RBC AUTO-ENTMCNC: 29.5 G/DL (ref 32–34.5)
MCV RBC AUTO: 93.3 FL (ref 80–99.9)
PLATELET # BLD AUTO: 183 K/UL (ref 130–450)
PMV BLD AUTO: 9.3 FL (ref 7–12)
POTASSIUM SERPL-SCNC: 5.1 MMOL/L (ref 3.5–5)
POTASSIUM SERPL-SCNC: 5.6 MMOL/L (ref 3.5–5)
PROCALCITONIN SERPL-MCNC: 0.21 NG/ML (ref 0–0.08)
PROT SERPL-MCNC: 6.2 G/DL (ref 6.4–8.3)
RBC # BLD AUTO: 3.56 M/UL (ref 3.8–5.8)
RSV RNA NPH QL NAA+NON-PROBE: NOT DETECTED
RV+EV RNA NPH QL NAA+NON-PROBE: NOT DETECTED
SARS-COV-2 RNA NPH QL NAA+NON-PROBE: NOT DETECTED
SODIUM SERPL-SCNC: 141 MMOL/L (ref 132–146)
SODIUM SERPL-SCNC: 142 MMOL/L (ref 132–146)
SPECIMEN DESCRIPTION: NORMAL
TROPONIN I SERPL HS-MCNC: 103 NG/L (ref 0–11)
WBC OTHER # BLD: 16.8 K/UL (ref 4.5–11.5)

## 2023-10-04 PROCEDURE — 85027 COMPLETE CBC AUTOMATED: CPT

## 2023-10-04 PROCEDURE — 2700000000 HC OXYGEN THERAPY PER DAY

## 2023-10-04 PROCEDURE — 36415 COLL VENOUS BLD VENIPUNCTURE: CPT

## 2023-10-04 PROCEDURE — 6370000000 HC RX 637 (ALT 250 FOR IP): Performed by: INTERNAL MEDICINE

## 2023-10-04 PROCEDURE — 96365 THER/PROPH/DIAG IV INF INIT: CPT

## 2023-10-04 PROCEDURE — 0202U NFCT DS 22 TRGT SARS-COV-2: CPT

## 2023-10-04 PROCEDURE — 96367 TX/PROPH/DG ADDL SEQ IV INF: CPT

## 2023-10-04 PROCEDURE — 2580000003 HC RX 258: Performed by: INTERNAL MEDICINE

## 2023-10-04 PROCEDURE — 6360000002 HC RX W HCPCS: Performed by: STUDENT IN AN ORGANIZED HEALTH CARE EDUCATION/TRAINING PROGRAM

## 2023-10-04 PROCEDURE — 2060000000 HC ICU INTERMEDIATE R&B

## 2023-10-04 PROCEDURE — 84145 PROCALCITONIN (PCT): CPT

## 2023-10-04 PROCEDURE — 96366 THER/PROPH/DIAG IV INF ADDON: CPT

## 2023-10-04 PROCEDURE — 93010 ELECTROCARDIOGRAM REPORT: CPT | Performed by: INTERNAL MEDICINE

## 2023-10-04 PROCEDURE — 94640 AIRWAY INHALATION TREATMENT: CPT

## 2023-10-04 PROCEDURE — 6360000002 HC RX W HCPCS: Performed by: INTERNAL MEDICINE

## 2023-10-04 PROCEDURE — 87449 NOS EACH ORGANISM AG IA: CPT

## 2023-10-04 PROCEDURE — 94664 DEMO&/EVAL PT USE INHALER: CPT

## 2023-10-04 PROCEDURE — 82962 GLUCOSE BLOOD TEST: CPT

## 2023-10-04 PROCEDURE — 80053 COMPREHEN METABOLIC PANEL: CPT

## 2023-10-04 PROCEDURE — 80048 BASIC METABOLIC PNL TOTAL CA: CPT

## 2023-10-04 PROCEDURE — 84484 ASSAY OF TROPONIN QUANT: CPT

## 2023-10-04 PROCEDURE — 2580000003 HC RX 258: Performed by: STUDENT IN AN ORGANIZED HEALTH CARE EDUCATION/TRAINING PROGRAM

## 2023-10-04 RX ORDER — TAMSULOSIN HYDROCHLORIDE 0.4 MG/1
0.4 CAPSULE ORAL DAILY
Status: DISCONTINUED | OUTPATIENT
Start: 2023-10-04 | End: 2023-10-10 | Stop reason: HOSPADM

## 2023-10-04 RX ORDER — BISACODYL 10 MG
10 SUPPOSITORY, RECTAL RECTAL DAILY PRN
Status: DISCONTINUED | OUTPATIENT
Start: 2023-10-04 | End: 2023-10-10 | Stop reason: HOSPADM

## 2023-10-04 RX ORDER — PANTOPRAZOLE SODIUM 40 MG/1
40 TABLET, DELAYED RELEASE ORAL
Status: DISCONTINUED | OUTPATIENT
Start: 2023-10-04 | End: 2023-10-10 | Stop reason: HOSPADM

## 2023-10-04 RX ORDER — PHENYLEPHRINE HCL, WITCH HAZEL 2.5; 5 MG/G; MG/G
1 GEL TOPICAL EVERY 6 HOURS PRN
COMMUNITY

## 2023-10-04 RX ORDER — ATORVASTATIN CALCIUM 40 MG/1
80 TABLET, FILM COATED ORAL NIGHTLY
Status: DISCONTINUED | OUTPATIENT
Start: 2023-10-04 | End: 2023-10-10 | Stop reason: HOSPADM

## 2023-10-04 RX ORDER — FLUTICASONE PROPIONATE 50 MCG
2 SPRAY, SUSPENSION (ML) NASAL
COMMUNITY

## 2023-10-04 RX ORDER — SODIUM CHLORIDE 9 MG/ML
INJECTION, SOLUTION INTRAVENOUS PRN
Status: DISCONTINUED | OUTPATIENT
Start: 2023-10-04 | End: 2023-10-04 | Stop reason: SDUPTHER

## 2023-10-04 RX ORDER — ONDANSETRON 2 MG/ML
4 INJECTION INTRAMUSCULAR; INTRAVENOUS EVERY 6 HOURS PRN
Status: DISCONTINUED | OUTPATIENT
Start: 2023-10-04 | End: 2023-10-10 | Stop reason: HOSPADM

## 2023-10-04 RX ORDER — GABAPENTIN 100 MG/1
200 CAPSULE ORAL 2 TIMES DAILY
Status: DISCONTINUED | OUTPATIENT
Start: 2023-10-04 | End: 2023-10-10 | Stop reason: HOSPADM

## 2023-10-04 RX ORDER — CETIRIZINE HYDROCHLORIDE 10 MG/1
5 TABLET ORAL DAILY
Status: DISCONTINUED | OUTPATIENT
Start: 2023-10-04 | End: 2023-10-10 | Stop reason: HOSPADM

## 2023-10-04 RX ORDER — TRAMADOL HYDROCHLORIDE 50 MG/1
50 TABLET ORAL EVERY 6 HOURS PRN
Status: DISCONTINUED | OUTPATIENT
Start: 2023-10-04 | End: 2023-10-10 | Stop reason: HOSPADM

## 2023-10-04 RX ORDER — ACETAMINOPHEN 325 MG/1
650 TABLET ORAL EVERY 4 HOURS PRN
Status: DISCONTINUED | OUTPATIENT
Start: 2023-10-04 | End: 2023-10-04 | Stop reason: SDUPTHER

## 2023-10-04 RX ORDER — ENOXAPARIN SODIUM 100 MG/ML
30 INJECTION SUBCUTANEOUS 2 TIMES DAILY
Status: DISCONTINUED | OUTPATIENT
Start: 2023-10-04 | End: 2023-10-04

## 2023-10-04 RX ORDER — CHOLECALCIFEROL (VITAMIN D3) 50 MCG
2000 TABLET ORAL DAILY
Status: DISCONTINUED | OUTPATIENT
Start: 2023-10-04 | End: 2023-10-10 | Stop reason: HOSPADM

## 2023-10-04 RX ORDER — SODIUM CHLORIDE 0.9 % (FLUSH) 0.9 %
5-40 SYRINGE (ML) INJECTION EVERY 12 HOURS SCHEDULED
Status: DISCONTINUED | OUTPATIENT
Start: 2023-10-04 | End: 2023-10-04 | Stop reason: SDUPTHER

## 2023-10-04 RX ORDER — AMMONIUM LACTATE 12 G/100G
LOTION TOPICAL PRN
Status: DISCONTINUED | OUTPATIENT
Start: 2023-10-04 | End: 2023-10-10 | Stop reason: HOSPADM

## 2023-10-04 RX ORDER — ONDANSETRON 4 MG/1
4 TABLET, FILM COATED ORAL EVERY 4 HOURS PRN
COMMUNITY

## 2023-10-04 RX ORDER — ONDANSETRON 4 MG/1
4 TABLET, ORALLY DISINTEGRATING ORAL EVERY 8 HOURS PRN
Status: DISCONTINUED | OUTPATIENT
Start: 2023-10-04 | End: 2023-10-10 | Stop reason: HOSPADM

## 2023-10-04 RX ORDER — ACETAMINOPHEN 325 MG/1
650 TABLET ORAL EVERY 4 HOURS PRN
Status: DISCONTINUED | OUTPATIENT
Start: 2023-10-04 | End: 2023-10-10 | Stop reason: HOSPADM

## 2023-10-04 RX ORDER — FAMOTIDINE 20 MG/1
20 TABLET, FILM COATED ORAL NIGHTLY
Status: DISCONTINUED | OUTPATIENT
Start: 2023-10-04 | End: 2023-10-10 | Stop reason: HOSPADM

## 2023-10-04 RX ORDER — VITS A,C,E/LUTEIN/MINERALS 300MCG-200
1 TABLET ORAL DAILY
Status: DISCONTINUED | OUTPATIENT
Start: 2023-10-04 | End: 2023-10-10 | Stop reason: HOSPADM

## 2023-10-04 RX ORDER — IPRATROPIUM BROMIDE AND ALBUTEROL SULFATE 2.5; .5 MG/3ML; MG/3ML
1 SOLUTION RESPIRATORY (INHALATION)
Status: DISCONTINUED | OUTPATIENT
Start: 2023-10-04 | End: 2023-10-10 | Stop reason: HOSPADM

## 2023-10-04 RX ORDER — DOCUSATE SODIUM 100 MG/1
200 CAPSULE, LIQUID FILLED ORAL NIGHTLY
Status: DISCONTINUED | OUTPATIENT
Start: 2023-10-04 | End: 2023-10-10 | Stop reason: HOSPADM

## 2023-10-04 RX ORDER — SODIUM CHLORIDE 0.9 % (FLUSH) 0.9 %
5-40 SYRINGE (ML) INJECTION PRN
Status: DISCONTINUED | OUTPATIENT
Start: 2023-10-04 | End: 2023-10-04 | Stop reason: SDUPTHER

## 2023-10-04 RX ORDER — LOPERAMIDE HYDROCHLORIDE 2 MG/1
2 CAPSULE ORAL 4 TIMES DAILY PRN
Status: DISCONTINUED | OUTPATIENT
Start: 2023-10-04 | End: 2023-10-10 | Stop reason: HOSPADM

## 2023-10-04 RX ORDER — INSULIN GLARGINE 100 [IU]/ML
76 INJECTION, SOLUTION SUBCUTANEOUS DAILY
Status: DISCONTINUED | OUTPATIENT
Start: 2023-10-04 | End: 2023-10-10 | Stop reason: HOSPADM

## 2023-10-04 RX ORDER — 0.9 % SODIUM CHLORIDE 0.9 %
1000 INTRAVENOUS SOLUTION INTRAVENOUS ONCE
Status: COMPLETED | OUTPATIENT
Start: 2023-10-04 | End: 2023-10-04

## 2023-10-04 RX ORDER — INSULIN LISPRO 100 [IU]/ML
10 INJECTION, SOLUTION INTRAVENOUS; SUBCUTANEOUS
Status: DISCONTINUED | OUTPATIENT
Start: 2023-10-04 | End: 2023-10-10 | Stop reason: HOSPADM

## 2023-10-04 RX ORDER — FERROUS SULFATE 325(65) MG
325 TABLET ORAL
Status: DISCONTINUED | OUTPATIENT
Start: 2023-10-04 | End: 2023-10-10 | Stop reason: HOSPADM

## 2023-10-04 RX ORDER — ENEMA 19; 7 G/133ML; G/133ML
1 ENEMA RECTAL DAILY PRN
Status: DISCONTINUED | OUTPATIENT
Start: 2023-10-04 | End: 2023-10-10 | Stop reason: HOSPADM

## 2023-10-04 RX ADMIN — TRAMADOL HYDROCHLORIDE 50 MG: 50 TABLET, COATED ORAL at 20:21

## 2023-10-04 RX ADMIN — CEFEPIME HYDROCHLORIDE 2000 MG: 2 INJECTION, POWDER, FOR SOLUTION INTRAVENOUS at 00:25

## 2023-10-04 RX ADMIN — CEFEPIME 2000 MG: 2 INJECTION, POWDER, FOR SOLUTION INTRAVENOUS at 23:28

## 2023-10-04 RX ADMIN — CEFEPIME 2000 MG: 2 INJECTION, POWDER, FOR SOLUTION INTRAVENOUS at 12:31

## 2023-10-04 RX ADMIN — IPRATROPIUM BROMIDE AND ALBUTEROL SULFATE 1 DOSE: .5; 2.5 SOLUTION RESPIRATORY (INHALATION) at 09:35

## 2023-10-04 RX ADMIN — Medication 1 TABLET: at 08:38

## 2023-10-04 RX ADMIN — IPRATROPIUM BROMIDE AND ALBUTEROL SULFATE 1 DOSE: .5; 2.5 SOLUTION RESPIRATORY (INHALATION) at 13:11

## 2023-10-04 RX ADMIN — INSULIN GLARGINE 76 UNITS: 100 INJECTION, SOLUTION SUBCUTANEOUS at 08:37

## 2023-10-04 RX ADMIN — GABAPENTIN 200 MG: 100 CAPSULE ORAL at 20:05

## 2023-10-04 RX ADMIN — ACETAMINOPHEN 650 MG: 325 TABLET ORAL at 20:03

## 2023-10-04 RX ADMIN — DOCUSATE SODIUM 200 MG: 100 CAPSULE, LIQUID FILLED ORAL at 20:05

## 2023-10-04 RX ADMIN — GABAPENTIN 200 MG: 100 CAPSULE ORAL at 08:38

## 2023-10-04 RX ADMIN — SODIUM CHLORIDE 1000 ML: 9 INJECTION, SOLUTION INTRAVENOUS at 03:18

## 2023-10-04 RX ADMIN — FERROUS SULFATE TAB 325 MG (65 MG ELEMENTAL FE) 325 MG: 325 (65 FE) TAB at 08:38

## 2023-10-04 RX ADMIN — VANCOMYCIN HYDROCHLORIDE 1250 MG: 10 INJECTION, POWDER, LYOPHILIZED, FOR SOLUTION INTRAVENOUS at 16:35

## 2023-10-04 RX ADMIN — FAMOTIDINE 20 MG: 20 TABLET ORAL at 20:05

## 2023-10-04 RX ADMIN — ONDANSETRON 4 MG: 2 INJECTION INTRAMUSCULAR; INTRAVENOUS at 20:21

## 2023-10-04 RX ADMIN — ONDANSETRON 4 MG: 2 INJECTION INTRAMUSCULAR; INTRAVENOUS at 06:01

## 2023-10-04 RX ADMIN — INSULIN LISPRO 10 UNITS: 100 INJECTION, SOLUTION INTRAVENOUS; SUBCUTANEOUS at 11:39

## 2023-10-04 RX ADMIN — INSULIN LISPRO 10 UNITS: 100 INJECTION, SOLUTION INTRAVENOUS; SUBCUTANEOUS at 16:35

## 2023-10-04 RX ADMIN — Medication 10 ML: at 00:26

## 2023-10-04 RX ADMIN — INSULIN LISPRO 10 UNITS: 100 INJECTION, SOLUTION INTRAVENOUS; SUBCUTANEOUS at 08:38

## 2023-10-04 RX ADMIN — Medication 10 ML: at 10:00

## 2023-10-04 RX ADMIN — PANTOPRAZOLE SODIUM 40 MG: 40 TABLET, DELAYED RELEASE ORAL at 06:00

## 2023-10-04 RX ADMIN — ATORVASTATIN CALCIUM 80 MG: 40 TABLET, FILM COATED ORAL at 20:05

## 2023-10-04 RX ADMIN — SODIUM CHLORIDE, PRESERVATIVE FREE 10 ML: 5 INJECTION INTRAVENOUS at 11:55

## 2023-10-04 RX ADMIN — APIXABAN 2.5 MG: 2.5 TABLET, FILM COATED ORAL at 20:05

## 2023-10-04 RX ADMIN — TAMSULOSIN HYDROCHLORIDE 0.4 MG: 0.4 CAPSULE ORAL at 08:38

## 2023-10-04 RX ADMIN — PETROLATUM: 420 OINTMENT TOPICAL at 06:01

## 2023-10-04 RX ADMIN — Medication 2000 UNITS: at 08:38

## 2023-10-04 RX ADMIN — IPRATROPIUM BROMIDE AND ALBUTEROL SULFATE 1 DOSE: .5; 2.5 SOLUTION RESPIRATORY (INHALATION) at 21:33

## 2023-10-04 RX ADMIN — APIXABAN 2.5 MG: 2.5 TABLET, FILM COATED ORAL at 08:38

## 2023-10-04 RX ADMIN — VANCOMYCIN HYDROCHLORIDE 2500 MG: 5 INJECTION, POWDER, LYOPHILIZED, FOR SOLUTION INTRAVENOUS at 01:59

## 2023-10-04 ASSESSMENT — PAIN SCALES - GENERAL
PAINLEVEL_OUTOF10: 0
PAINLEVEL_OUTOF10: 0
PAINLEVEL_OUTOF10: 8
PAINLEVEL_OUTOF10: 4

## 2023-10-04 ASSESSMENT — PAIN DESCRIPTION - LOCATION
LOCATION: HIP
LOCATION: HIP

## 2023-10-04 ASSESSMENT — PAIN DESCRIPTION - DESCRIPTORS
DESCRIPTORS: ACHING
DESCRIPTORS: ACHING

## 2023-10-04 ASSESSMENT — PAIN DESCRIPTION - ORIENTATION
ORIENTATION: RIGHT
ORIENTATION: RIGHT

## 2023-10-04 ASSESSMENT — PAIN DESCRIPTION - PAIN TYPE: TYPE: ACUTE PAIN

## 2023-10-04 NOTE — PROGRESS NOTES
Dr. Ladonna Jackson office notified of potassium 5.6 by charge nurse.      Electronically signed by Amaury Arce RN on 10/4/2023 at 7:07 PM

## 2023-10-04 NOTE — ACP (ADVANCE CARE PLANNING)
Advance Care Planning   Healthcare Decision Maker:    Primary Decision Maker (Active): Snow - [phone]    Secondary Decision Maker: Negrito Calhoun - [phone]    Click here to complete Healthcare Decision Makers including selection of the Healthcare Decision Maker Relationship (ie \"Primary\"). Today we documented Decision Maker(s) consistent with ACP documents on file.        Primary Decision Maker (Active): Snow - [phone]    Secondary Decision Maker: Negrito Pollard Child - [phone]

## 2023-10-04 NOTE — CARE COORDINATION
Social Work / Discharge Planning : Patient was admitted from [facility]> SW did touch base with Angelica from [facility] and she did verify patient is from [address] [place] [address] Rd can return at discharge. No pre-cert needed. AWait treatment plan. N 17 generated and transport forms completed. SW to follow.  Electronically signed by ELKE Subramanian on 10/4/23 at 10:34 AM EDT

## 2023-10-04 NOTE — PROGRESS NOTES
Pharmacy Consultation Note  (Antibiotic Dosing and Monitoring)    Initial consult date: 10-4-23  Consulting physician/provider: Michelle Ziegler  Drug: Vancomycin  Indication: HAP    Age/  Gender Height Weight IBW  Allergy Information   89 y.o./male 6' (182.9 cm) 258 lb (117 kg)     Ideal body weight: 77.6 kg (171 lb 1.2 oz)  Adjusted ideal body weight: 93.4 kg (205 lb 13.5 oz)   Clindamycin/lincomycin and Sulfa antibiotics      Renal Function:  Recent Labs     10/03/23  2058   BUN 41*   CREATININE 1.3*     No intake or output data in the 24 hours ending 10/04/23 0619    Vancomycin Monitoring:  Trough:  No results for input(s): \"VANCOTROUGH\" in the last 72 hours. Random:  No results for input(s): \"VANCORANDOM\" in the last 72 hours. No results for input(s): \"BLOODCULT2\" in the last 72 hours. Historical Cultures:  Organism   Date Value Ref Range Status   07/06/2023 Pseudomonas aeruginosa (A)  Final   07/06/2023 Corynebacterium species (A)  Final     No results for input(s): \"BC\" in the last 72 hours. Vancomycin Administration Times:  Recent vancomycin administrations                     vancomycin (VANCOCIN) 2,500 mg in sodium chloride 0.9 % 500 mL IVPB (mg) 2,500 mg New Bag 10/04/23 0159                    Assessment:  Patient is a 80 y.o. male who has been initiated on vancomycin  Estimated Creatinine Clearance: 50 mL/min (A) (based on SCr of 1.3 mg/dL (H)). Plan:  Clinical Pharmacy will establish a dosing regimen later today and adjust as needed.    Will check vancomycin levels when appropriate  Will continue to monitor renal function   Pharmacy to follow      JIMENEZ Lopez [facility] 10/4/2023 6:19 AM

## 2023-10-04 NOTE — PROGRESS NOTES
Notified Dr. Tommy Jimenez via voice mail as directed by secure message service that med rec was complete

## 2023-10-04 NOTE — PROGRESS NOTES
Pharmacy Consultation Note  (Antibiotic Dosing and Monitoring)    Initial consult date: 10/4/2023  Consulting physician/provider: Dr. Petty Tapia  Drug: Vancomycin  Indication: HAP    Age/  Gender Height Weight IBW  Allergy Information   89 y.o./male 6' (182.9 cm) 258 lb (117 kg)     Ideal body weight: 77.6 kg (171 lb 1.2 oz)  Adjusted ideal body weight: 93.4 kg (205 lb 13.5 oz)   Clindamycin/lincomycin and Sulfa antibiotics      Renal Function:  Recent Labs     10/03/23  2058 10/04/23  0730   BUN 41* 38*   CREATININE 1.3* 1.2     No intake or output data in the 24 hours ending 10/04/23 1118    Vancomycin Monitoring:  Trough:  No results for input(s): \"VANCOTROUGH\" in the last 72 hours. Random:  No results for input(s): \"VANCORANDOM\" in the last 72 hours. No results for input(s): \"BLOODCULT2\" in the last 72 hours. Historical Cultures:  Organism   Date Value Ref Range Status   07/06/2023 Pseudomonas aeruginosa (A)  Final   07/06/2023 Corynebacterium species (A)  Final     No results for input(s): \"BC\" in the last 72 hours. Vancomycin Administration Times:  Recent vancomycin administrations                     vancomycin (VANCOCIN) 2,500 mg in sodium chloride 0.9 % 500 mL IVPB (mg) 2,500 mg New Bag 10/04/23 0159                    Assessment:  Patient is a 80 y.o. male who has been initiated on vancomycin  Estimated Creatinine Clearance: 54 mL/min (based on SCr of 1.2 mg/dL). To dose vancomycin, pharmacy will be utilizing [employer] calculation software for goal AUC/EMILE 400-600 mg/L-hr (predicted AUC/EMILE = 475, Tr =14.1 mcg/mL)    Plan:   Will continue vancomycin 1250 mg IV every 24 hours  Will check vancomycin levels when appropriate  Will continue to monitor renal function   Pharmacy to follow    Mallorie Cardenas, Jessica, BCCCP  10/4/2023  11:18 AM

## 2023-10-04 NOTE — PROGRESS NOTES
Paged Dr. Rafy Anders for patient requesting pain meds, added abnormal labs from this am to page as well

## 2023-10-04 NOTE — H&P
CHIEF COMPLAINT:  Shortness of breat, chills      HISTORY OF PRESENT ILLNESS:      The patient is a 80 y.o. male patient of Dr. Yonatan Peterson who presents with the above. He describes the onset of shortness of breath yesterday in mid-afternoon. Patient states that he has had progressively worsening shortness of breath over the past several days. He started having chills around 5 PM yesterday without documented fevers at that point. Patient also notes that shortly after dinner he started experiencing abdominal pain and nausea without any episodes of vomiting this point. He has chronic lower extremity edema bilaterally that is no worse than baseline. States he has had a mildly productive cough with mucus. Denies any constipation or diarrhea. Denies any urinary urgency, frequency, hematuria, or dysuria. Of note patient was found to be in the mid 70s and low 80s on room air by EMS and was placed on a nonrebreather.      Past Medical History:    Past Medical History:   Diagnosis Date    Arthritis     Cancer ([facility])     breast    Cellulitis     CHF (congestive heart failure) ([facility])     CKD (chronic kidney disease) stage 3, GFR 30-59 ml/min ([facility])     Diabetes mellitus ([address])     History of lumpectomy     Hx of blood clots     Hyperlipidemia     Hypertension     Left ventricular failure ([facility])     Macular degeneration     Obesity     Orthostatic hypotension     PE (pulmonary thromboembolism) ([facility])     Pressure injury of both heels, unstageable ([address])     Staph aureus infection     Venous insufficiency        Past Surgical History:    Past Surgical History:   Procedure Laterality Date      PICC POWERPIC SINGLE  9/2/2021         MASTECTOMY      TOE AMPUTATION      TOE SURGERY      TONSILLECTOMY         Medications Prior to Admission:    Medications Prior to Admission: sodium hypochlorite (DAKINS) 0.125 % SOLN external solution, Apply topically daily  melatonin 3 MG TABS tablet, Take 10 mg by mouth nightly as needed  ipratropium 0.5

## 2023-10-04 NOTE — PROGRESS NOTES
P Quality Flow/Interdisciplinary Rounds Progress Note        Quality Flow Rounds held on October 4, 2023    Disciplines Attending:  Bedside Nurse, , , and Nursing Unit Leadership    Lore Osborn was admitted on 10/3/2023  8:23 PM    Anticipated Discharge Date:       Disposition:    Michael Score:  Michael Scale Score: 15    Readmission Risk              Risk of Unplanned Readmission:  28           Discussed patient goal for the day, patient clinical progression, and barriers to discharge.   The following Goal(s) of the Day/Commitment(s) have been identified:   await resp panel, iv abx      Vasu Muniz RN  October 4, 2023

## 2023-10-04 NOTE — DISCHARGE INSTR - COC
Continuity of Care Form    Patient Name: Consuelo Flores. :  1932  MRN:  [id number]    Admit date:  10/3/2023  Discharge date:  ***    Code Status Order: DNR-CCA   Advance Directives:     Admitting Physician:  Petty Tapia MD  PCP: Petty Tapia MD    Discharging Nurse: [facility] Unit/Room#: 8201/0504-Q  Discharging Unit Phone Number: ***    Emergency Contact:   Extended Emergency Contact Information  Primary Emergency Contact: Ky Sawant  Address: [address] [facility].           [facility], [address] [place] [address] of [address] Phone: [phone]  Work Phone: [phone]  Mobile Phone: [phone]  Relation: [address] [place] [address]   needed? No  Secondary Emergency Contact: Edson Sawant  Address: [address] [facility], [address] [place] [address],[address]  Home Phone: [phone]  Work Phone: [phone]  Mobile Phone: [phone]  Relation: Child   needed? No    Past Surgical History:  Past Surgical History:   Procedure Laterality Date    [facility]  2021         MASTECTOMY      TOE AMPUTATION      TOE SURGERY      TONSILLECTOMY         Immunization History:   Immunization History   Administered Date(s) Administered    Influenza Virus Vaccine 10/23/2016    Pneumococcal Conjugate Vaccine 2013    Td, unspecified formulation 2012       Active Problems:  Patient Active Problem List   Diagnosis Code    HTN (hypertension) I10    Breast cancer, male ([facility]) C50.929    Obesity (BMI 30.0-34. 9) E66.9    Right hip pain M25.551    Inadequately controlled diabetes mellitus ([address]) E11.65    Essential hypertension, benign I10    Hyperlipidemia with target LDL less than 100 E78.5    Dizzy spells R42    Troponin level elevated R79.89    Shortness of breath R06.02    Chest pressure R07.89    Acute respiratory insufficiency R06.89    Pneumonia J18.9    History of pulmonary embolus (PE) Z86.711    Acute left-sided CHF (congestive heart failure) (HCC) I50.1

## 2023-10-04 NOTE — ED PROVIDER NOTES
[address] [facility] [address] ENCOUNTER      Pt Name: Kalli West MRN: [id number]  Birthdate 11/6/1932  Date of evaluation: 10/3/2023  Provider: Yvonne Tubbs DO  PCP: Kamila Florez MD  Note Started: 8:25 PM EDT 10/3/23    CHIEF COMPLAINT       Chief Complaint   Patient presents with    Shortness of Breath     Started today    Hip Pain     R side       HISTORY OF PRESENT ILLNESS: 1 or more Elements   History From: Patient  Limitations to history : None    Kalli West is a 80 y.o. male who presents to the ED due to shortness of breath and abdominal pain. Patient states that he has had progressively worsening shortness of breath over the past several days. He started having chills around 5 PM tonight without documented fevers at this point. Patient also notes that shortly after dinner he started experiencing abdominal pain and nausea without any episodes of vomiting this point. He has chronic lower extremity edema bilaterally that is no worse than baseline. States he has had a mildly productive cough with mucus. Denies any constipation or diarrhea. Denies any urinary urgency, frequency, hematuria, or dysuria. Of note patient was found to be in the mid 70s and low 80s on room air by EMS and was placed on a nonrebreather. Nursing Notes were all reviewed and agreed with or any disagreements were addressed in the HPI. REVIEW OF SYSTEMS :    Positives and Pertinent negatives as per HPI. SURGICAL HISTORY     Past Surgical History:   Procedure Laterality Date    [facility] POWERPIC SINGLE  9/2/2021         MASTECTOMY      TOE AMPUTATION      TOE SURGERY      TONSILLECTOMY         CURRENTMEDICATIONS       Previous Medications    ACETAMINOPHEN (TYLENOL) 325 MG TABLET    Take 2 tablets by mouth every 4 hours as needed for Pain or Fever    AMMONIUM LACTATE (LAC-HYDRIN) 12 % LOTION    Apply topically as needed.     APIXABAN (ELIQUIS) 2.5 MG of patient's hip was also ordered to evaluate for possible fracture or dislocation. Patient initially given Zofran and 1 L normal saline bolus. CBC revealed leukocytosis of white count of 16.9 and anemia with hemoglobin 10.4. BMP revealed mild hypokalemia with a potassium of 5.2 and creatinine of 1.3 which is stable for the patient. Hepatic function panel was benign within his normal limits. Lactic acid was 1.3. Lipase was 7 within normal coagulation panel. Initial troponin was 75 with a repeat of 103. Urinalysis did not reveal any signs of infection. BNP was elevated 758. Patient's ABG was within normal limits while he was on nonrebreather. COVID was negative. X-ray of the hip revealed a right hip arthroscopy without obvious complications. Chest x-ray revealed opacity in the left mid and lower lung. Patient was started on vancomycin and cefepime for suspected sepsis from pneumonia. CT abdomen pelvis revealed bilateral perinephric stranding with trace pleural effusions and moderate to severe atherosclerosis. Patient has chronic lower extremity cellulitis as well which the antibiotic should cover for. Patient will be admitted to the hospital for further work-up and treatment of his sepsis by Dr. Sven Weeks at this time. Disposition Considerations (Tests not ordered but considered, Shared Decision Making, Pt Expectation of Test or Tx.):     FINAL IMPRESSION      1. Sepsis due to pneumonia ([address])    2. Bilateral lower leg cellulitis    3. Atrial fibrillation with rapid ventricular response ([address])          DISPOSITION/PLAN     DISPOSITION Admitted 10/04/2023 03:47:11 AM    PATIENT REFERRED TO:  No follow-up provider specified.     DISCHARGE MEDICATIONS:  New Prescriptions    No medications on file       DISCONTINUED MEDICATIONS:  Discontinued Medications    No medications on file            (Please note that portions of this note were completed with a voice recognition program.  Efforts were made to edit

## 2023-10-05 LAB
ALBUMIN SERPL-MCNC: 2.8 G/DL (ref 3.5–5.2)
ALP SERPL-CCNC: 117 U/L (ref 40–129)
ALT SERPL-CCNC: 8 U/L (ref 0–40)
ANION GAP SERPL CALCULATED.3IONS-SCNC: 8 MMOL/L (ref 7–16)
AST SERPL-CCNC: 24 U/L (ref 0–39)
BILIRUB SERPL-MCNC: 0.2 MG/DL (ref 0–1.2)
BUN SERPL-MCNC: 45 MG/DL (ref 6–23)
CALCIUM SERPL-MCNC: 8.4 MG/DL (ref 8.6–10.2)
CHLORIDE SERPL-SCNC: 109 MMOL/L (ref 98–107)
CO2 SERPL-SCNC: 25 MMOL/L (ref 22–29)
CREAT SERPL-MCNC: 1.3 MG/DL (ref 0.7–1.2)
ERYTHROCYTE [DISTWIDTH] IN BLOOD BY AUTOMATED COUNT: 15.9 % (ref 11.5–15)
GFR SERPL CREATININE-BSD FRML MDRD: 53 ML/MIN/1.73M2
GLUCOSE BLD-MCNC: 190 MG/DL (ref 74–99)
GLUCOSE BLD-MCNC: 204 MG/DL (ref 74–99)
GLUCOSE BLD-MCNC: 208 MG/DL (ref 74–99)
GLUCOSE BLD-MCNC: 214 MG/DL (ref 74–99)
GLUCOSE BLD-MCNC: 233 MG/DL (ref 74–99)
GLUCOSE SERPL-MCNC: 228 MG/DL (ref 74–99)
HCT VFR BLD AUTO: 32.8 % (ref 37–54)
HGB BLD-MCNC: 9.4 G/DL (ref 12.5–16.5)
MCH RBC QN AUTO: 27.2 PG (ref 26–35)
MCHC RBC AUTO-ENTMCNC: 28.7 G/DL (ref 32–34.5)
MCV RBC AUTO: 95.1 FL (ref 80–99.9)
PLATELET # BLD AUTO: 160 K/UL (ref 130–450)
PMV BLD AUTO: 9.4 FL (ref 7–12)
POTASSIUM SERPL-SCNC: 5.2 MMOL/L (ref 3.5–5)
PROT SERPL-MCNC: 6.2 G/DL (ref 6.4–8.3)
RBC # BLD AUTO: 3.45 M/UL (ref 3.8–5.8)
S PNEUM AG SPEC QL: NEGATIVE
SODIUM SERPL-SCNC: 142 MMOL/L (ref 132–146)
SPECIMEN SOURCE: NORMAL
WBC OTHER # BLD: 17.6 K/UL (ref 4.5–11.5)

## 2023-10-05 PROCEDURE — 94640 AIRWAY INHALATION TREATMENT: CPT

## 2023-10-05 PROCEDURE — 2580000003 HC RX 258: Performed by: INTERNAL MEDICINE

## 2023-10-05 PROCEDURE — 6370000000 HC RX 637 (ALT 250 FOR IP): Performed by: INTERNAL MEDICINE

## 2023-10-05 PROCEDURE — 85027 COMPLETE CBC AUTOMATED: CPT

## 2023-10-05 PROCEDURE — 2700000000 HC OXYGEN THERAPY PER DAY

## 2023-10-05 PROCEDURE — 6360000002 HC RX W HCPCS: Performed by: INTERNAL MEDICINE

## 2023-10-05 PROCEDURE — 36415 COLL VENOUS BLD VENIPUNCTURE: CPT

## 2023-10-05 PROCEDURE — 80053 COMPREHEN METABOLIC PANEL: CPT

## 2023-10-05 PROCEDURE — 2060000000 HC ICU INTERMEDIATE R&B

## 2023-10-05 PROCEDURE — 87899 AGENT NOS ASSAY W/OPTIC: CPT

## 2023-10-05 PROCEDURE — 82962 GLUCOSE BLOOD TEST: CPT

## 2023-10-05 RX ORDER — DEXTROSE MONOHYDRATE 100 MG/ML
INJECTION, SOLUTION INTRAVENOUS CONTINUOUS PRN
Status: DISCONTINUED | OUTPATIENT
Start: 2023-10-05 | End: 2023-10-10 | Stop reason: HOSPADM

## 2023-10-05 RX ADMIN — Medication 2000 UNITS: at 09:02

## 2023-10-05 RX ADMIN — APIXABAN 2.5 MG: 2.5 TABLET, FILM COATED ORAL at 20:57

## 2023-10-05 RX ADMIN — CETIRIZINE HYDROCHLORIDE 5 MG: 10 TABLET, FILM COATED ORAL at 09:03

## 2023-10-05 RX ADMIN — TRAMADOL HYDROCHLORIDE 50 MG: 50 TABLET, COATED ORAL at 15:58

## 2023-10-05 RX ADMIN — CEFEPIME 2000 MG: 2 INJECTION, POWDER, FOR SOLUTION INTRAVENOUS at 11:45

## 2023-10-05 RX ADMIN — Medication 10 ML: at 20:56

## 2023-10-05 RX ADMIN — DOCUSATE SODIUM 200 MG: 100 CAPSULE, LIQUID FILLED ORAL at 20:57

## 2023-10-05 RX ADMIN — FAMOTIDINE 20 MG: 20 TABLET ORAL at 20:57

## 2023-10-05 RX ADMIN — TAMSULOSIN HYDROCHLORIDE 0.4 MG: 0.4 CAPSULE ORAL at 09:02

## 2023-10-05 RX ADMIN — ATORVASTATIN CALCIUM 80 MG: 40 TABLET, FILM COATED ORAL at 20:57

## 2023-10-05 RX ADMIN — APIXABAN 2.5 MG: 2.5 TABLET, FILM COATED ORAL at 09:02

## 2023-10-05 RX ADMIN — TRAMADOL HYDROCHLORIDE 50 MG: 50 TABLET, COATED ORAL at 03:59

## 2023-10-05 RX ADMIN — ACETAMINOPHEN 650 MG: 325 TABLET ORAL at 20:57

## 2023-10-05 RX ADMIN — INSULIN LISPRO 10 UNITS: 100 INJECTION, SOLUTION INTRAVENOUS; SUBCUTANEOUS at 11:49

## 2023-10-05 RX ADMIN — Medication 10 ML: at 09:03

## 2023-10-05 RX ADMIN — PANTOPRAZOLE SODIUM 40 MG: 40 TABLET, DELAYED RELEASE ORAL at 06:35

## 2023-10-05 RX ADMIN — Medication 1 TABLET: at 09:02

## 2023-10-05 RX ADMIN — MAGNESIUM HYDROXIDE 30 ML: 400 SUSPENSION ORAL at 16:02

## 2023-10-05 RX ADMIN — ACETAMINOPHEN 650 MG: 325 TABLET ORAL at 03:59

## 2023-10-05 RX ADMIN — ONDANSETRON 4 MG: 2 INJECTION INTRAMUSCULAR; INTRAVENOUS at 03:59

## 2023-10-05 RX ADMIN — COLLAGENASE SANTYL: 250 OINTMENT TOPICAL at 16:06

## 2023-10-05 RX ADMIN — GABAPENTIN 200 MG: 100 CAPSULE ORAL at 09:02

## 2023-10-05 RX ADMIN — INSULIN LISPRO 10 UNITS: 100 INJECTION, SOLUTION INTRAVENOUS; SUBCUTANEOUS at 09:06

## 2023-10-05 RX ADMIN — INSULIN GLARGINE 76 UNITS: 100 INJECTION, SOLUTION SUBCUTANEOUS at 06:35

## 2023-10-05 RX ADMIN — FERROUS SULFATE TAB 325 MG (65 MG ELEMENTAL FE) 325 MG: 325 (65 FE) TAB at 09:02

## 2023-10-05 RX ADMIN — VANCOMYCIN HYDROCHLORIDE 1250 MG: 10 INJECTION, POWDER, LYOPHILIZED, FOR SOLUTION INTRAVENOUS at 14:17

## 2023-10-05 RX ADMIN — GABAPENTIN 200 MG: 100 CAPSULE ORAL at 20:57

## 2023-10-05 RX ADMIN — IPRATROPIUM BROMIDE AND ALBUTEROL SULFATE 1 DOSE: .5; 2.5 SOLUTION RESPIRATORY (INHALATION) at 17:35

## 2023-10-05 ASSESSMENT — PAIN DESCRIPTION - LOCATION
LOCATION: HIP
LOCATION: HIP

## 2023-10-05 ASSESSMENT — PAIN SCALES - GENERAL
PAINLEVEL_OUTOF10: 6
PAINLEVEL_OUTOF10: 0
PAINLEVEL_OUTOF10: 8
PAINLEVEL_OUTOF10: 7

## 2023-10-05 ASSESSMENT — PAIN DESCRIPTION - ORIENTATION
ORIENTATION: RIGHT
ORIENTATION: RIGHT;UPPER;OUTER

## 2023-10-05 ASSESSMENT — PAIN - FUNCTIONAL ASSESSMENT: PAIN_FUNCTIONAL_ASSESSMENT: PREVENTS OR INTERFERES SOME ACTIVE ACTIVITIES AND ADLS

## 2023-10-05 ASSESSMENT — PAIN DESCRIPTION - DESCRIPTORS
DESCRIPTORS: ACHING
DESCRIPTORS: ACHING

## 2023-10-05 ASSESSMENT — PAIN SCALES - WONG BAKER: WONGBAKER_NUMERICALRESPONSE: 0

## 2023-10-05 NOTE — PLAN OF CARE
Problem: Safety - Adult  Goal: Free from fall injury  Outcome: Progressing     Problem: Pain  Goal: Verbalizes/displays adequate comfort level or baseline comfort level  Outcome: Progressing     Problem: Skin/Tissue Integrity  Goal: Absence of new skin breakdown  Description: 1. Monitor for areas of redness and/or skin breakdown  2. Assess vascular access sites hourly  3. Every 4-6 hours minimum:  Change oxygen saturation probe site  4. Every 4-6 hours:  If on nasal continuous positive airway pressure, respiratory therapy assess nares and determine need for appliance change or resting period.   Outcome: Progressing     Problem: ABCDS Injury Assessment  Goal: Absence of physical injury  Outcome: Progressing     Problem: Chronic Conditions and Co-morbidities  Goal: Patient's chronic conditions and co-morbidity symptoms are monitored and maintained or improved  Outcome: Progressing

## 2023-10-05 NOTE — PROGRESS NOTES
Notified Dr. Wes Gaines of patients blood sugar. Per Dr. Wes Gaines, do not give Humalog if blood sugar is less than 100.

## 2023-10-05 NOTE — PROGRESS NOTES
-Patient receiving vancomycin 1250 mg IV q24h for HAP.  -Vancomycin level ordered with morning labs on 10/6.  -Will continue vancomycin 1250 mg IV q24h at this time.   -Will follow-up vancomycin level to determine future vancomycin dosing plans.

## 2023-10-05 NOTE — PROGRESS NOTES
4 Eyes Skin Assessment     NAME:  Christianne Betancourt YOB: 1932  MEDICAL RECORD NUMBER:  [id number]    The patient is being assessed for  Admission    I agree that at least one RN has performed a thorough Head to Toe Skin Assessment on the patient. ALL assessment sites listed below have been assessed. Areas assessed by both nurses:    Head, Face, Ears, Shoulders, Back, Chest, Arms, Elbows, Hands, Sacrum. Buttock, Coccyx, Ischium, Legs. Feet and Heels, and Under Medical Devices         Does the Patient have a Wound? Yes wound(s) were present on assessment.  LDA wound assessment was Initiated and completed by RN       Michael Prevention initiated by RN: Yes  Wound Care Orders initiated by RN: Yes    Pressure Injury (Stage 3,4, Unstageable, DTI, NWPT, and Complex wounds) if present, place Wound referral order by RN under : Yes    New Ostomies, if present place, Ostomy referral order under : No     Nurse 1 eSignature: Electronically signed by Papito Ventura RN on 10/5/23 at 12:48 AM EDT    **SHARE this note so that the co-signing nurse can place an eSignature**    Nurse 2 eSignature: Electronically signed by Minor Long RN on 10/5/23 at 12:52 AM EDT

## 2023-10-05 NOTE — PROGRESS NOTES
Dr. Estella Guillaume called back with orders for pain meds and lab redraw stat with orders to notify if K > 5.5.     Orders for pain medication received as well, see orders for further

## 2023-10-06 LAB
ALBUMIN SERPL-MCNC: 2.9 G/DL (ref 3.5–5.2)
ALP SERPL-CCNC: 113 U/L (ref 40–129)
ALT SERPL-CCNC: 8 U/L (ref 0–40)
ANION GAP SERPL CALCULATED.3IONS-SCNC: 7 MMOL/L (ref 7–16)
AST SERPL-CCNC: 22 U/L (ref 0–39)
BILIRUB SERPL-MCNC: 0.2 MG/DL (ref 0–1.2)
BUN SERPL-MCNC: 50 MG/DL (ref 6–23)
CALCIUM SERPL-MCNC: 8.5 MG/DL (ref 8.6–10.2)
CHLORIDE SERPL-SCNC: 108 MMOL/L (ref 98–107)
CO2 SERPL-SCNC: 27 MMOL/L (ref 22–29)
CREAT SERPL-MCNC: 1.3 MG/DL (ref 0.7–1.2)
ERYTHROCYTE [DISTWIDTH] IN BLOOD BY AUTOMATED COUNT: 15.7 % (ref 11.5–15)
GFR SERPL CREATININE-BSD FRML MDRD: 51 ML/MIN/1.73M2
GLUCOSE BLD-MCNC: 161 MG/DL (ref 74–99)
GLUCOSE BLD-MCNC: 199 MG/DL (ref 74–99)
GLUCOSE BLD-MCNC: 210 MG/DL (ref 74–99)
GLUCOSE BLD-MCNC: 220 MG/DL (ref 74–99)
GLUCOSE SERPL-MCNC: 204 MG/DL (ref 74–99)
HCT VFR BLD AUTO: 27.5 % (ref 37–54)
HGB BLD-MCNC: 8.1 G/DL (ref 12.5–16.5)
MCH RBC QN AUTO: 27.7 PG (ref 26–35)
MCHC RBC AUTO-ENTMCNC: 29.5 G/DL (ref 32–34.5)
MCV RBC AUTO: 94.2 FL (ref 80–99.9)
PLATELET # BLD AUTO: 124 K/UL (ref 130–450)
PMV BLD AUTO: 9.4 FL (ref 7–12)
POTASSIUM SERPL-SCNC: 5.2 MMOL/L (ref 3.5–5)
PROT SERPL-MCNC: 6 G/DL (ref 6.4–8.3)
RBC # BLD AUTO: 2.92 M/UL (ref 3.8–5.8)
SODIUM SERPL-SCNC: 142 MMOL/L (ref 132–146)
VANCOMYCIN TROUGH SERPL-MCNC: 20.8 UG/ML (ref 5–16)
WBC OTHER # BLD: 12.6 K/UL (ref 4.5–11.5)

## 2023-10-06 PROCEDURE — 6360000002 HC RX W HCPCS: Performed by: INTERNAL MEDICINE

## 2023-10-06 PROCEDURE — 80202 ASSAY OF VANCOMYCIN: CPT

## 2023-10-06 PROCEDURE — 6370000000 HC RX 637 (ALT 250 FOR IP): Performed by: INTERNAL MEDICINE

## 2023-10-06 PROCEDURE — 85027 COMPLETE CBC AUTOMATED: CPT

## 2023-10-06 PROCEDURE — 2580000003 HC RX 258: Performed by: INTERNAL MEDICINE

## 2023-10-06 PROCEDURE — 80053 COMPREHEN METABOLIC PANEL: CPT

## 2023-10-06 PROCEDURE — 2060000000 HC ICU INTERMEDIATE R&B

## 2023-10-06 PROCEDURE — 2700000000 HC OXYGEN THERAPY PER DAY

## 2023-10-06 PROCEDURE — 94640 AIRWAY INHALATION TREATMENT: CPT

## 2023-10-06 PROCEDURE — 82962 GLUCOSE BLOOD TEST: CPT

## 2023-10-06 RX ADMIN — INSULIN LISPRO 10 UNITS: 100 INJECTION, SOLUTION INTRAVENOUS; SUBCUTANEOUS at 15:57

## 2023-10-06 RX ADMIN — CEFEPIME 2000 MG: 2 INJECTION, POWDER, FOR SOLUTION INTRAVENOUS at 00:20

## 2023-10-06 RX ADMIN — PANTOPRAZOLE SODIUM 40 MG: 40 TABLET, DELAYED RELEASE ORAL at 05:15

## 2023-10-06 RX ADMIN — Medication 1 TABLET: at 09:12

## 2023-10-06 RX ADMIN — Medication 10 ML: at 09:12

## 2023-10-06 RX ADMIN — FERROUS SULFATE TAB 325 MG (65 MG ELEMENTAL FE) 325 MG: 325 (65 FE) TAB at 09:11

## 2023-10-06 RX ADMIN — INSULIN LISPRO 10 UNITS: 100 INJECTION, SOLUTION INTRAVENOUS; SUBCUTANEOUS at 09:12

## 2023-10-06 RX ADMIN — FAMOTIDINE 20 MG: 20 TABLET ORAL at 20:18

## 2023-10-06 RX ADMIN — ATORVASTATIN CALCIUM 80 MG: 40 TABLET, FILM COATED ORAL at 20:18

## 2023-10-06 RX ADMIN — GABAPENTIN 200 MG: 100 CAPSULE ORAL at 20:18

## 2023-10-06 RX ADMIN — IPRATROPIUM BROMIDE AND ALBUTEROL SULFATE 1 DOSE: .5; 2.5 SOLUTION RESPIRATORY (INHALATION) at 13:04

## 2023-10-06 RX ADMIN — PETROLATUM: 420 OINTMENT TOPICAL at 09:12

## 2023-10-06 RX ADMIN — VANCOMYCIN HYDROCHLORIDE 1250 MG: 10 INJECTION, POWDER, LYOPHILIZED, FOR SOLUTION INTRAVENOUS at 15:57

## 2023-10-06 RX ADMIN — IPRATROPIUM BROMIDE AND ALBUTEROL SULFATE 1 DOSE: .5; 2.5 SOLUTION RESPIRATORY (INHALATION) at 09:19

## 2023-10-06 RX ADMIN — APIXABAN 2.5 MG: 2.5 TABLET, FILM COATED ORAL at 09:12

## 2023-10-06 RX ADMIN — PETROLATUM: 420 OINTMENT TOPICAL at 00:21

## 2023-10-06 RX ADMIN — IPRATROPIUM BROMIDE AND ALBUTEROL SULFATE 1 DOSE: .5; 2.5 SOLUTION RESPIRATORY (INHALATION) at 20:45

## 2023-10-06 RX ADMIN — Medication 10 ML: at 20:18

## 2023-10-06 RX ADMIN — INSULIN LISPRO 10 UNITS: 100 INJECTION, SOLUTION INTRAVENOUS; SUBCUTANEOUS at 11:47

## 2023-10-06 RX ADMIN — TRAMADOL HYDROCHLORIDE 50 MG: 50 TABLET, COATED ORAL at 08:51

## 2023-10-06 RX ADMIN — CEFEPIME 2000 MG: 2 INJECTION, POWDER, FOR SOLUTION INTRAVENOUS at 23:28

## 2023-10-06 RX ADMIN — INSULIN GLARGINE 76 UNITS: 100 INJECTION, SOLUTION SUBCUTANEOUS at 05:14

## 2023-10-06 RX ADMIN — IPRATROPIUM BROMIDE AND ALBUTEROL SULFATE 1 DOSE: .5; 2.5 SOLUTION RESPIRATORY (INHALATION) at 16:18

## 2023-10-06 RX ADMIN — TAMSULOSIN HYDROCHLORIDE 0.4 MG: 0.4 CAPSULE ORAL at 09:12

## 2023-10-06 RX ADMIN — APIXABAN 2.5 MG: 2.5 TABLET, FILM COATED ORAL at 20:17

## 2023-10-06 RX ADMIN — Medication 2000 UNITS: at 09:12

## 2023-10-06 RX ADMIN — GABAPENTIN 200 MG: 100 CAPSULE ORAL at 09:12

## 2023-10-06 RX ADMIN — CEFEPIME 2000 MG: 2 INJECTION, POWDER, FOR SOLUTION INTRAVENOUS at 11:52

## 2023-10-06 RX ADMIN — COLLAGENASE SANTYL: 250 OINTMENT TOPICAL at 09:12

## 2023-10-06 RX ADMIN — CETIRIZINE HYDROCHLORIDE 5 MG: 10 TABLET, FILM COATED ORAL at 09:12

## 2023-10-06 RX ADMIN — DOCUSATE SODIUM 200 MG: 100 CAPSULE, LIQUID FILLED ORAL at 20:17

## 2023-10-06 RX ADMIN — TRAMADOL HYDROCHLORIDE 50 MG: 50 TABLET, COATED ORAL at 20:18

## 2023-10-06 ASSESSMENT — PAIN SCALES - GENERAL
PAINLEVEL_OUTOF10: 7
PAINLEVEL_OUTOF10: 2
PAINLEVEL_OUTOF10: 0
PAINLEVEL_OUTOF10: 6
PAINLEVEL_OUTOF10: 3
PAINLEVEL_OUTOF10: 7
PAINLEVEL_OUTOF10: 5

## 2023-10-06 ASSESSMENT — PAIN DESCRIPTION - PAIN TYPE: TYPE: ACUTE PAIN

## 2023-10-06 ASSESSMENT — PAIN SCALES - WONG BAKER
WONGBAKER_NUMERICALRESPONSE: 0

## 2023-10-06 ASSESSMENT — PAIN DESCRIPTION - ORIENTATION
ORIENTATION: RIGHT
ORIENTATION: RIGHT;OUTER

## 2023-10-06 ASSESSMENT — PAIN - FUNCTIONAL ASSESSMENT: PAIN_FUNCTIONAL_ASSESSMENT: PREVENTS OR INTERFERES SOME ACTIVE ACTIVITIES AND ADLS

## 2023-10-06 ASSESSMENT — PAIN DESCRIPTION - DESCRIPTORS
DESCRIPTORS: ACHING
DESCRIPTORS: DISCOMFORT;THROBBING

## 2023-10-06 ASSESSMENT — PAIN DESCRIPTION - LOCATION
LOCATION: HIP
LOCATION: HIP

## 2023-10-06 NOTE — PROGRESS NOTES
All leg and heels dressings changed. . Noted to follow. Sacral buttocks slight improvement. Bed pad saturated with urine  Delores Signs.  Erlinda Steve, CNS, Wound Care

## 2023-10-06 NOTE — PROGRESS NOTES
[facility] Quality Flow/Interdisciplinary Rounds Progress Note        Quality Flow Rounds held on October 6, 2023    Disciplines Attending:  Bedside Nurse, , , and Nursing Unit Leadership    [place]sridevi Kauffman. was admitted on 10/3/2023  8:23 PM    Anticipated Discharge Date:  Expected Discharge Date: 10/06/23    Disposition: SNF    Michael Score:  Michael Scale Score: 15    Readmission Risk              Risk of Unplanned Readmission:  32           Discussed patient goal for the day, patient clinical progression, and barriers to discharge.   The following Goal(s) of the Day/Commitment(s) have been identified:   continue IV abx, wean O2      Katty Gomez RN  October 6, 2023

## 2023-10-06 NOTE — PLAN OF CARE
Problem: Safety - Adult  Goal: Free from fall injury  Outcome: Progressing     Problem: Pain  Goal: Verbalizes/displays adequate comfort level or baseline comfort level  10/6/2023 1312 by Ara Cloud RN  Outcome: Progressing     Problem: Skin/Tissue Integrity  Goal: Absence of new skin breakdown  Description: 1. Monitor for areas of redness and/or skin breakdown  2. Assess vascular access sites hourly  3. Every 4-6 hours minimum:  Change oxygen saturation probe site  4. Every 4-6 hours:  If on nasal continuous positive airway pressure, respiratory therapy assess nares and determine need for appliance change or resting period.   10/6/2023 1312 by Ara Cloud RN  Outcome: Progressing

## 2023-10-06 NOTE — PLAN OF CARE
Problem: Safety - Adult  Goal: Free from fall injury  10/5/2023 2136 by Pamela Omalley RN  Outcome: Progressing  10/5/2023 1209 by Brittnee Sanchez RN  Outcome: Progressing     Problem: Pain  Goal: Verbalizes/displays adequate comfort level or baseline comfort level  10/5/2023 2136 by Pamela Omalley RN  Outcome: Progressing  10/5/2023 1209 by Brittnee Sanchez RN  Outcome: Progressing     Problem: Skin/Tissue Integrity  Goal: Absence of new skin breakdown  Description: 1. Monitor for areas of redness and/or skin breakdown  2. Assess vascular access sites hourly  3. Every 4-6 hours minimum:  Change oxygen saturation probe site  4. Every 4-6 hours:  If on nasal continuous positive airway pressure, respiratory therapy assess nares and determine need for appliance change or resting period.   10/5/2023 2136 by Pamela Omalley RN  Outcome: Progressing  10/5/2023 1209 by Brittnee Sanchez RN  Outcome: Progressing     Problem: ABCDS Injury Assessment  Goal: Absence of physical injury  10/5/2023 2136 by Pamela Omalley RN  Outcome: Progressing  10/5/2023 1209 by Brittnee Sanchez RN  Outcome: Progressing     Problem: Chronic Conditions and Co-morbidities  Goal: Patient's chronic conditions and co-morbidity symptoms are monitored and maintained or improved  10/5/2023 2136 by Pamela Omalley RN  Outcome: Progressing  10/5/2023 1209 by Brittnee Sanchez RN  Outcome: Progressing

## 2023-10-06 NOTE — FLOWSHEET NOTE
Initial Inpatient Wound Care    Admit Date: 10/3/2023  8:23 PM    Reason for consult:  Lower extremity chronic cellulitis    Significant history:  see H&P    Wound history:  2 years per patient    Findings:  awake and oriented. Verbally appropriate     10/05/23 1100   Wound 07/06/23 Heel Left   Date First Assessed/Time First Assessed: 07/06/23 0945   Present on Hospital Admission: Yes  Location: Heel  Wound Location Orientation: Left   Wound Image    Wound Etiology Pressure Unstageable   Dressing Change Due 10/05/23   Wound 10/05/23 Heel Right   Date First Assessed/Time First Assessed: 10/05/23 1100   Present on Original Admission: Yes  Location: Heel  Wound Location Orientation: Right   Wound Image    Wound Etiology Pressure Unstageable   Dressing Change Due 10/05/23   Wound Length (cm) 1 cm   Wound Width (cm) 1 cm   Wound Depth (cm)   (obs)   Wound Surface Area (cm^2) 1 cm^2   Wound Assessment Slough   Drainage Amount Small (< 25%)   Drainage Description Yellow   Odor None   Rashmi-wound Assessment   (pink)   Wound 10/05/23 Leg Right;Posterior; Lower   Date First Assessed/Time First Assessed: 10/05/23 1100   Present on Original Admission: Yes  Wound Approximate Age at First Assessment (Weeks): (c)   Location: (c) Leg  Wound Location Orientation: (c) Right;Posterior; Lower   Wound Image    Dressing Change Due 10/05/23   Wound Length (cm) 6 cm   Wound Width (cm) 10 cm   Wound Depth (cm)   (obs)   Wound Surface Area (cm^2) 60 cm^2   Wound Assessment   (yellow/brown/slough)   Drainage Amount Large (50-75% saturated)   Drainage Description Serosanguinous; Yellow   Odor Mild   Rashmi-wound Assessment   (denude, dermatitic)   Wound 10/05/23  Left Calf   Date First Assessed/Time First Assessed: 10/05/23 1100   Wound Approximate Age at First Assessment (Weeks): (c)   Location: (c)   Wound Location Orientation: Left  Wound Description (Comments): Calf   Wound Image    Dressing Change Due 10/05/23   Wound Length (cm) 10 cm   Wound

## 2023-10-06 NOTE — PROGRESS NOTES
-Vancomycin level was 20.8 mcg/mL roughly 13.5 hours after previous dose. -Vancomycin level correlates to AUC/EMILE of 522. -Will continue vancomycin 1250 mg IV q24h at this time and continue to monitor renal function.

## 2023-10-06 NOTE — CONSULTS
Comprehensive Nutrition Assessment    Type and Reason for Visit:  Initial, Consult, Wound    Nutrition Recommendations/Plan:   Continue current diet & monitor  Will add Renny BID & Glucerna daily     Malnutrition Assessment:  Malnutrition Status: At risk for malnutrition (Comment) (d/t chronic wounds) (10/06/23 1117)    Context:  Chronic Illness     Findings of the 6 clinical characteristics of malnutrition:  Energy Intake:  No significant decrease in energy intake  Weight Loss:  No significant weight loss     Body Fat Loss:  Unable to assess (d/t advanced age, edema)     Muscle Mass Loss:  Unable to assess (d/t advanced age, edema)    Fluid Accumulation:  Unable to assess (d/t multifactorial)     Strength:  Not Performed    Nutrition Assessment:    Pt w/ sepsis d/t PNA. Hx DM, CHF, breast CA. Pt w/ multiple chronic wounds, will add ONS & monitor. Nutrition Related Findings:    A&O/disoriented at times, -1.4 L, generalized +3 edema, abd soft, +BS, nausea, K+ 5.2 (10/4)   Wound Type: Multiple, Pressure Injury, Unstageable, Open Wounds       Current Nutrition Intake & Therapies:    Average Meal Intake: % (today per intake clipboard)  Average Supplements Intake: None Ordered  ADULT DIET; Regular; 5 carb choices (75 gm/meal)    Anthropometric Measures:  Height: 6' (182.9 cm)  Ideal Body Weight (IBW): 178 lbs (81 kg)    Admission Body Weight: 258 lb 1 oz (117.1 kg) (10/5 bed)  Current Body Weight: 258 lb 1 oz (117.1 kg) (10/5), 145 % IBW.  Weight Source: Bed Scale  Current BMI (kg/m2): 35  Usual Body Weight: 260 lb 13 oz (118.3 kg) (5/29/23 actual per EMR)  % Weight Change (Calculated): -1.1                    BMI Categories: Obese Class 2 (BMI 35.0 -39.9)    Estimated Daily Nutrient Needs:  Energy Requirements Based On: Kcal/kg  Weight Used for Energy Requirements: Current  Energy (kcal/day):   Weight Used for Protein Requirements: Ideal  Protein (g/day): 105-115 (1.3-1.4 as tolerated)  Method Used

## 2023-10-07 LAB
ALBUMIN SERPL-MCNC: 2.7 G/DL (ref 3.5–5.2)
ALP SERPL-CCNC: 101 U/L (ref 40–129)
ALT SERPL-CCNC: 7 U/L (ref 0–40)
ANION GAP SERPL CALCULATED.3IONS-SCNC: 8 MMOL/L (ref 7–16)
AST SERPL-CCNC: 14 U/L (ref 0–39)
BILIRUB SERPL-MCNC: 0.2 MG/DL (ref 0–1.2)
BUN SERPL-MCNC: 50 MG/DL (ref 6–23)
CALCIUM SERPL-MCNC: 8.6 MG/DL (ref 8.6–10.2)
CHLORIDE SERPL-SCNC: 108 MMOL/L (ref 98–107)
CO2 SERPL-SCNC: 26 MMOL/L (ref 22–29)
CREAT SERPL-MCNC: 1.3 MG/DL (ref 0.7–1.2)
ERYTHROCYTE [DISTWIDTH] IN BLOOD BY AUTOMATED COUNT: 15.4 % (ref 11.5–15)
GFR SERPL CREATININE-BSD FRML MDRD: 53 ML/MIN/1.73M2
GLUCOSE BLD-MCNC: 193 MG/DL (ref 74–99)
GLUCOSE BLD-MCNC: 207 MG/DL (ref 74–99)
GLUCOSE BLD-MCNC: 208 MG/DL (ref 74–99)
GLUCOSE BLD-MCNC: 276 MG/DL (ref 74–99)
GLUCOSE SERPL-MCNC: 201 MG/DL (ref 74–99)
HCT VFR BLD AUTO: 28.3 % (ref 37–54)
HGB BLD-MCNC: 8.4 G/DL (ref 12.5–16.5)
MCH RBC QN AUTO: 27.6 PG (ref 26–35)
MCHC RBC AUTO-ENTMCNC: 29.7 G/DL (ref 32–34.5)
MCV RBC AUTO: 93.1 FL (ref 80–99.9)
PLATELET # BLD AUTO: 137 K/UL (ref 130–450)
PMV BLD AUTO: 9.1 FL (ref 7–12)
POTASSIUM SERPL-SCNC: 5.1 MMOL/L (ref 3.5–5)
PROT SERPL-MCNC: 6.1 G/DL (ref 6.4–8.3)
RBC # BLD AUTO: 3.04 M/UL (ref 3.8–5.8)
SODIUM SERPL-SCNC: 142 MMOL/L (ref 132–146)
WBC OTHER # BLD: 11.6 K/UL (ref 4.5–11.5)

## 2023-10-07 PROCEDURE — 6370000000 HC RX 637 (ALT 250 FOR IP): Performed by: INTERNAL MEDICINE

## 2023-10-07 PROCEDURE — 80053 COMPREHEN METABOLIC PANEL: CPT

## 2023-10-07 PROCEDURE — 94640 AIRWAY INHALATION TREATMENT: CPT

## 2023-10-07 PROCEDURE — 6360000002 HC RX W HCPCS: Performed by: INTERNAL MEDICINE

## 2023-10-07 PROCEDURE — 85027 COMPLETE CBC AUTOMATED: CPT

## 2023-10-07 PROCEDURE — 82962 GLUCOSE BLOOD TEST: CPT

## 2023-10-07 PROCEDURE — 2700000000 HC OXYGEN THERAPY PER DAY

## 2023-10-07 PROCEDURE — 2060000000 HC ICU INTERMEDIATE R&B

## 2023-10-07 PROCEDURE — 2580000003 HC RX 258: Performed by: INTERNAL MEDICINE

## 2023-10-07 RX ADMIN — IPRATROPIUM BROMIDE AND ALBUTEROL SULFATE 1 DOSE: .5; 2.5 SOLUTION RESPIRATORY (INHALATION) at 20:46

## 2023-10-07 RX ADMIN — Medication 10 ML: at 09:13

## 2023-10-07 RX ADMIN — PANTOPRAZOLE SODIUM 40 MG: 40 TABLET, DELAYED RELEASE ORAL at 05:06

## 2023-10-07 RX ADMIN — FAMOTIDINE 20 MG: 20 TABLET ORAL at 19:57

## 2023-10-07 RX ADMIN — DOCUSATE SODIUM 200 MG: 100 CAPSULE, LIQUID FILLED ORAL at 19:56

## 2023-10-07 RX ADMIN — IPRATROPIUM BROMIDE AND ALBUTEROL SULFATE 1 DOSE: .5; 2.5 SOLUTION RESPIRATORY (INHALATION) at 12:47

## 2023-10-07 RX ADMIN — INSULIN LISPRO 10 UNITS: 100 INJECTION, SOLUTION INTRAVENOUS; SUBCUTANEOUS at 09:17

## 2023-10-07 RX ADMIN — Medication 1 TABLET: at 09:12

## 2023-10-07 RX ADMIN — TAMSULOSIN HYDROCHLORIDE 0.4 MG: 0.4 CAPSULE ORAL at 09:12

## 2023-10-07 RX ADMIN — INSULIN LISPRO 10 UNITS: 100 INJECTION, SOLUTION INTRAVENOUS; SUBCUTANEOUS at 16:38

## 2023-10-07 RX ADMIN — IPRATROPIUM BROMIDE AND ALBUTEROL SULFATE 1 DOSE: .5; 2.5 SOLUTION RESPIRATORY (INHALATION) at 16:32

## 2023-10-07 RX ADMIN — Medication 2000 UNITS: at 09:16

## 2023-10-07 RX ADMIN — PETROLATUM: 420 OINTMENT TOPICAL at 09:12

## 2023-10-07 RX ADMIN — TRAMADOL HYDROCHLORIDE 50 MG: 50 TABLET, COATED ORAL at 19:57

## 2023-10-07 RX ADMIN — VANCOMYCIN HYDROCHLORIDE 1250 MG: 10 INJECTION, POWDER, LYOPHILIZED, FOR SOLUTION INTRAVENOUS at 16:02

## 2023-10-07 RX ADMIN — INSULIN GLARGINE 76 UNITS: 100 INJECTION, SOLUTION SUBCUTANEOUS at 05:06

## 2023-10-07 RX ADMIN — COLLAGENASE SANTYL: 250 OINTMENT TOPICAL at 09:14

## 2023-10-07 RX ADMIN — ATORVASTATIN CALCIUM 80 MG: 40 TABLET, FILM COATED ORAL at 19:56

## 2023-10-07 RX ADMIN — CETIRIZINE HYDROCHLORIDE 5 MG: 10 TABLET, FILM COATED ORAL at 09:12

## 2023-10-07 RX ADMIN — IPRATROPIUM BROMIDE AND ALBUTEROL SULFATE 1 DOSE: .5; 2.5 SOLUTION RESPIRATORY (INHALATION) at 09:24

## 2023-10-07 RX ADMIN — INSULIN LISPRO 10 UNITS: 100 INJECTION, SOLUTION INTRAVENOUS; SUBCUTANEOUS at 11:43

## 2023-10-07 RX ADMIN — APIXABAN 2.5 MG: 2.5 TABLET, FILM COATED ORAL at 09:12

## 2023-10-07 RX ADMIN — APIXABAN 2.5 MG: 2.5 TABLET, FILM COATED ORAL at 19:56

## 2023-10-07 RX ADMIN — FERROUS SULFATE TAB 325 MG (65 MG ELEMENTAL FE) 325 MG: 325 (65 FE) TAB at 09:12

## 2023-10-07 RX ADMIN — Medication 10 ML: at 19:57

## 2023-10-07 RX ADMIN — GABAPENTIN 200 MG: 100 CAPSULE ORAL at 19:56

## 2023-10-07 RX ADMIN — CEFEPIME 2000 MG: 2 INJECTION, POWDER, FOR SOLUTION INTRAVENOUS at 11:47

## 2023-10-07 RX ADMIN — GABAPENTIN 200 MG: 100 CAPSULE ORAL at 09:12

## 2023-10-07 ASSESSMENT — PAIN DESCRIPTION - LOCATION
LOCATION: HIP
LOCATION: HIP

## 2023-10-07 ASSESSMENT — PAIN SCALES - GENERAL
PAINLEVEL_OUTOF10: 7
PAINLEVEL_OUTOF10: 8

## 2023-10-07 ASSESSMENT — PAIN DESCRIPTION - DESCRIPTORS: DESCRIPTORS: DISCOMFORT;ACHING

## 2023-10-07 ASSESSMENT — PAIN DESCRIPTION - ORIENTATION
ORIENTATION: RIGHT
ORIENTATION: RIGHT

## 2023-10-07 NOTE — PROGRESS NOTES
[facility] Quality Flow/Interdisciplinary Rounds Progress Note        Quality Flow Rounds held on October 7, 2023    Disciplines Attending:  Bedside Nurse and Nursing Unit Leadership    Marely Lorenzana. was admitted on 10/3/2023  8:23 PM    Anticipated Discharge Date:  Expected Discharge Date: 10/06/23    Disposition:    Michael Score:  Michael Scale Score: 17    Readmission Risk              Risk of Unplanned Readmission:  29           Discussed patient goal for the day, patient clinical progression, and barriers to discharge.   The following Goal(s) of the Day/Commitment(s) have been identified:   vanc q24h, cefepime q12h      Maria Eugenia Garduno RN  October 7, 2023

## 2023-10-07 NOTE — PROGRESS NOTES
Dr. Shania Persaud was updated on the potassium this morning of 5.1 at this time just continue to monitor.

## 2023-10-07 NOTE — PLAN OF CARE
Problem: Safety - Adult  Goal: Free from fall injury  10/6/2023 2002 by Fred Aponte RN  Outcome: Progressing  10/6/2023 1312 by Clem Hudson RN  Outcome: Progressing     Problem: Pain  Goal: Verbalizes/displays adequate comfort level or baseline comfort level  10/6/2023 2002 by Fred Aponte RN  Outcome: Progressing  10/6/2023 1312 by Clem Hudson RN  Outcome: Progressing  10/6/2023 1312 by Clem Hudson RN  Outcome: Progressing     Problem: Skin/Tissue Integrity  Goal: Absence of new skin breakdown  Description: 1. Monitor for areas of redness and/or skin breakdown  2. Assess vascular access sites hourly  3. Every 4-6 hours minimum:  Change oxygen saturation probe site  4. Every 4-6 hours:  If on nasal continuous positive airway pressure, respiratory therapy assess nares and determine need for appliance change or resting period.   10/6/2023 2002 by Fred Aponte RN  Outcome: Progressing  10/6/2023 1312 by Clem Hudson RN  Outcome: Progressing  10/6/2023 1312 by Clem Hudson RN  Outcome: Progressing     Problem: ABCDS Injury Assessment  Goal: Absence of physical injury  Outcome: Progressing     Problem: Chronic Conditions and Co-morbidities  Goal: Patient's chronic conditions and co-morbidity symptoms are monitored and maintained or improved  Outcome: Progressing     Problem: Nutrition Deficit:  Goal: Optimize nutritional status  Outcome: Progressing

## 2023-10-08 ENCOUNTER — APPOINTMENT (OUTPATIENT)
Dept: CT IMAGING | Age: 88
End: 2023-10-08
Payer: MEDICARE

## 2023-10-08 LAB
ALBUMIN SERPL-MCNC: 2.6 G/DL (ref 3.5–5.2)
ALP SERPL-CCNC: 97 U/L (ref 40–129)
ALT SERPL-CCNC: 6 U/L (ref 0–40)
ANION GAP SERPL CALCULATED.3IONS-SCNC: 5 MMOL/L (ref 7–16)
AST SERPL-CCNC: 13 U/L (ref 0–39)
BASOPHILS # BLD: 0.02 K/UL (ref 0–0.2)
BASOPHILS NFR BLD: 0 % (ref 0–2)
BILIRUB SERPL-MCNC: 0.2 MG/DL (ref 0–1.2)
BUN SERPL-MCNC: 50 MG/DL (ref 6–23)
CALCIUM SERPL-MCNC: 8.5 MG/DL (ref 8.6–10.2)
CHLORIDE SERPL-SCNC: 110 MMOL/L (ref 98–107)
CO2 SERPL-SCNC: 26 MMOL/L (ref 22–29)
CREAT SERPL-MCNC: 1.2 MG/DL (ref 0.7–1.2)
CRP SERPL HS-MCNC: 97 MG/L (ref 0–5)
EOSINOPHIL # BLD: 0.44 K/UL (ref 0.05–0.5)
EOSINOPHILS RELATIVE PERCENT: 4 % (ref 0–6)
ERYTHROCYTE [DISTWIDTH] IN BLOOD BY AUTOMATED COUNT: 15.5 % (ref 11.5–15)
ERYTHROCYTE [SEDIMENTATION RATE] IN BLOOD BY WESTERGREN METHOD: 110 MM/HR (ref 0–15)
GFR SERPL CREATININE-BSD FRML MDRD: 59 ML/MIN/1.73M2
GLUCOSE BLD-MCNC: 220 MG/DL (ref 74–99)
GLUCOSE BLD-MCNC: 221 MG/DL (ref 74–99)
GLUCOSE BLD-MCNC: 222 MG/DL (ref 74–99)
GLUCOSE BLD-MCNC: 227 MG/DL (ref 74–99)
GLUCOSE BLD-MCNC: 257 MG/DL (ref 74–99)
GLUCOSE SERPL-MCNC: 235 MG/DL (ref 74–99)
HCT VFR BLD AUTO: 27.6 % (ref 37–54)
HGB BLD-MCNC: 8.1 G/DL (ref 12.5–16.5)
IMM GRANULOCYTES # BLD AUTO: 0.07 K/UL (ref 0–0.58)
IMM GRANULOCYTES NFR BLD: 1 % (ref 0–5)
LACTATE BLDV-SCNC: 0.8 MMOL/L (ref 0.5–2.2)
LYMPHOCYTES NFR BLD: 0.91 K/UL (ref 1.5–4)
LYMPHOCYTES RELATIVE PERCENT: 9 % (ref 20–42)
MCH RBC QN AUTO: 27.5 PG (ref 26–35)
MCHC RBC AUTO-ENTMCNC: 29.3 G/DL (ref 32–34.5)
MCV RBC AUTO: 93.6 FL (ref 80–99.9)
MONOCYTES NFR BLD: 1.01 K/UL (ref 0.1–0.95)
MONOCYTES NFR BLD: 10 % (ref 2–12)
NEUTROPHILS NFR BLD: 75 % (ref 43–80)
NEUTS SEG NFR BLD: 7.45 K/UL (ref 1.8–7.3)
PLATELET # BLD AUTO: 150 K/UL (ref 130–450)
PMV BLD AUTO: 9.4 FL (ref 7–12)
POTASSIUM SERPL-SCNC: 5.3 MMOL/L (ref 3.5–5)
PROT SERPL-MCNC: 6 G/DL (ref 6.4–8.3)
RBC # BLD AUTO: 2.95 M/UL (ref 3.8–5.8)
SODIUM SERPL-SCNC: 141 MMOL/L (ref 132–146)
WBC OTHER # BLD: 9.9 K/UL (ref 4.5–11.5)

## 2023-10-08 PROCEDURE — 6370000000 HC RX 637 (ALT 250 FOR IP): Performed by: INTERNAL MEDICINE

## 2023-10-08 PROCEDURE — 2700000000 HC OXYGEN THERAPY PER DAY

## 2023-10-08 PROCEDURE — 80053 COMPREHEN METABOLIC PANEL: CPT

## 2023-10-08 PROCEDURE — 70450 CT HEAD/BRAIN W/O DYE: CPT

## 2023-10-08 PROCEDURE — 82962 GLUCOSE BLOOD TEST: CPT

## 2023-10-08 PROCEDURE — 87040 BLOOD CULTURE FOR BACTERIA: CPT

## 2023-10-08 PROCEDURE — 2580000003 HC RX 258: Performed by: INTERNAL MEDICINE

## 2023-10-08 PROCEDURE — 86140 C-REACTIVE PROTEIN: CPT

## 2023-10-08 PROCEDURE — 94640 AIRWAY INHALATION TREATMENT: CPT

## 2023-10-08 PROCEDURE — 36415 COLL VENOUS BLD VENIPUNCTURE: CPT

## 2023-10-08 PROCEDURE — 85652 RBC SED RATE AUTOMATED: CPT

## 2023-10-08 PROCEDURE — 83605 ASSAY OF LACTIC ACID: CPT

## 2023-10-08 PROCEDURE — 2060000000 HC ICU INTERMEDIATE R&B

## 2023-10-08 PROCEDURE — 87086 URINE CULTURE/COLONY COUNT: CPT

## 2023-10-08 PROCEDURE — 6360000002 HC RX W HCPCS: Performed by: INTERNAL MEDICINE

## 2023-10-08 PROCEDURE — 85025 COMPLETE CBC W/AUTO DIFF WBC: CPT

## 2023-10-08 RX ORDER — SODIUM CHLORIDE, SODIUM LACTATE, POTASSIUM CHLORIDE, CALCIUM CHLORIDE 600; 310; 30; 20 MG/100ML; MG/100ML; MG/100ML; MG/100ML
INJECTION, SOLUTION INTRAVENOUS CONTINUOUS
Status: DISCONTINUED | OUTPATIENT
Start: 2023-10-08 | End: 2023-10-09

## 2023-10-08 RX ORDER — LORAZEPAM 2 MG/ML
0.5 INJECTION INTRAMUSCULAR EVERY 6 HOURS PRN
Status: DISCONTINUED | OUTPATIENT
Start: 2023-10-08 | End: 2023-10-10

## 2023-10-08 RX ADMIN — IPRATROPIUM BROMIDE AND ALBUTEROL SULFATE 1 DOSE: .5; 2.5 SOLUTION RESPIRATORY (INHALATION) at 19:57

## 2023-10-08 RX ADMIN — CEFEPIME 2000 MG: 2 INJECTION, POWDER, FOR SOLUTION INTRAVENOUS at 13:28

## 2023-10-08 RX ADMIN — Medication 10 ML: at 09:44

## 2023-10-08 RX ADMIN — IPRATROPIUM BROMIDE AND ALBUTEROL SULFATE 1 DOSE: .5; 2.5 SOLUTION RESPIRATORY (INHALATION) at 12:27

## 2023-10-08 RX ADMIN — PETROLATUM: 420 OINTMENT TOPICAL at 09:41

## 2023-10-08 RX ADMIN — APIXABAN 2.5 MG: 2.5 TABLET, FILM COATED ORAL at 09:44

## 2023-10-08 RX ADMIN — IPRATROPIUM BROMIDE AND ALBUTEROL SULFATE 1 DOSE: .5; 2.5 SOLUTION RESPIRATORY (INHALATION) at 09:53

## 2023-10-08 RX ADMIN — VANCOMYCIN HYDROCHLORIDE 1250 MG: 10 INJECTION, POWDER, LYOPHILIZED, FOR SOLUTION INTRAVENOUS at 19:15

## 2023-10-08 RX ADMIN — INSULIN GLARGINE 38 UNITS: 100 INJECTION, SOLUTION SUBCUTANEOUS at 06:19

## 2023-10-08 RX ADMIN — IPRATROPIUM BROMIDE AND ALBUTEROL SULFATE 1 DOSE: .5; 2.5 SOLUTION RESPIRATORY (INHALATION) at 16:27

## 2023-10-08 RX ADMIN — Medication 1 TABLET: at 09:43

## 2023-10-08 RX ADMIN — CEFEPIME 2000 MG: 2 INJECTION, POWDER, FOR SOLUTION INTRAVENOUS at 00:35

## 2023-10-08 RX ADMIN — COLLAGENASE SANTYL: 250 OINTMENT TOPICAL at 09:42

## 2023-10-08 RX ADMIN — SODIUM CHLORIDE, POTASSIUM CHLORIDE, SODIUM LACTATE AND CALCIUM CHLORIDE: 600; 310; 30; 20 INJECTION, SOLUTION INTRAVENOUS at 22:21

## 2023-10-08 ASSESSMENT — PAIN SCALES - GENERAL: PAINLEVEL_OUTOF10: 0

## 2023-10-08 NOTE — PROGRESS NOTES
Spoke to Dr. Malcom Pino on call for Dr. Shawn Perez. Notified of patient inability to swallow. Make patient NPO and hold all meds.

## 2023-10-08 NOTE — PROGRESS NOTES
Updated Dr. Elena Tomlin at bedside, received orders as entered, Dr. Elena Tomlin aware IV team or Phleb may need to get peripheral draws, received 1 time order to cut lantus in half as patient presently not able to follow commands consistently and or able to swallow.

## 2023-10-08 NOTE — PROGRESS NOTES
Call placed to Dr. Doug Rodriguez via answering re: update on patient and family re: plan of care. Awaiting call back/ new orders.

## 2023-10-08 NOTE — PROGRESS NOTES
Paged Dr. Bruce Marie as directed by [employer] service for Dr. Tati Carey coverage for patient with AMS

## 2023-10-08 NOTE — PROGRESS NOTES
Call placed to Dr. Lauren Stern via answering service re: Patients inability to swallow. Awaiting call back/ new orders.

## 2023-10-08 NOTE — PROGRESS NOTES
Called RN for MRI safety form.  Per RN pt not good historian, so she will look into chart and other avenues of information to fill out safety form

## 2023-10-08 NOTE — PROGRESS NOTES
P Quality Flow/Interdisciplinary Rounds Progress Note        Quality Flow Rounds held on October 8, 2023    Disciplines Attending:  Bedside Nurse and Nursing Unit Leadership    Preston Amee was admitted on 10/3/2023  8:23 PM    Anticipated Discharge Date:  Expected Discharge Date: 10/06/23    Disposition:    Michael Score:  Michael Scale Score: 15    Readmission Risk              Risk of Unplanned Readmission:  31           Discussed patient goal for the day, patient clinical progression, and barriers to discharge.   The following Goal(s) of the Day/Commitment(s) have been identified:   monitor labs, iv antibiotics      Cam Bertrand RN  October 8, 2023

## 2023-10-09 ENCOUNTER — APPOINTMENT (OUTPATIENT)
Dept: GENERAL RADIOLOGY | Age: 88
End: 2023-10-09
Payer: MEDICARE

## 2023-10-09 ENCOUNTER — APPOINTMENT (OUTPATIENT)
Dept: CT IMAGING | Age: 88
End: 2023-10-09
Payer: MEDICARE

## 2023-10-09 LAB
ALBUMIN SERPL-MCNC: 2.8 G/DL (ref 3.5–5.2)
ALP SERPL-CCNC: 109 U/L (ref 40–129)
ALT SERPL-CCNC: 6 U/L (ref 0–40)
ANION GAP SERPL CALCULATED.3IONS-SCNC: 7 MMOL/L (ref 7–16)
AST SERPL-CCNC: 13 U/L (ref 0–39)
B.E.: -0.9 MMOL/L (ref -3–3)
B.E.: -1.7 MMOL/L (ref -3–3)
B.E.: 0.1 MMOL/L (ref -3–3)
BASOPHILS # BLD: 0.02 K/UL (ref 0–0.2)
BASOPHILS NFR BLD: 0 % (ref 0–2)
BILIRUB SERPL-MCNC: 0.2 MG/DL (ref 0–1.2)
BILIRUB UR QL STRIP: NEGATIVE
BNP SERPL-MCNC: 8198 PG/ML (ref 0–450)
BUN SERPL-MCNC: 49 MG/DL (ref 6–23)
CALCIUM SERPL-MCNC: 8.9 MG/DL (ref 8.6–10.2)
CHLORIDE SERPL-SCNC: 109 MMOL/L (ref 98–107)
CLARITY UR: ABNORMAL
CO2 SERPL-SCNC: 26 MMOL/L (ref 22–29)
COHB: 0.9 % (ref 0–1.5)
COHB: 1.3 % (ref 0–1.5)
COLOR UR: YELLOW
CREAT SERPL-MCNC: 1.2 MG/DL (ref 0.7–1.2)
CRITICAL: ABNORMAL
CRYSTALS URNS MICRO: ABNORMAL /HPF
DATE ANALYZED: ABNORMAL
DATE OF COLLECTION: ABNORMAL
EOSINOPHIL # BLD: 0.24 K/UL (ref 0.05–0.5)
EOSINOPHILS RELATIVE PERCENT: 2 % (ref 0–6)
EPI CELLS #/AREA URNS HPF: ABNORMAL /HPF
ERYTHROCYTE [DISTWIDTH] IN BLOOD BY AUTOMATED COUNT: 15.2 % (ref 11.5–15)
FIO2: 60 %
GFR SERPL CREATININE-BSD FRML MDRD: >60 ML/MIN/1.73M2
GLUCOSE BLD-MCNC: 170 MG/DL (ref 74–99)
GLUCOSE BLD-MCNC: 189 MG/DL (ref 74–99)
GLUCOSE BLD-MCNC: 217 MG/DL (ref 74–99)
GLUCOSE BLD-MCNC: 239 MG/DL (ref 74–99)
GLUCOSE SERPL-MCNC: 214 MG/DL (ref 74–99)
GLUCOSE UR STRIP-MCNC: 100 MG/DL
HCO3: 26.3 MMOL/L (ref 22–26)
HCO3: 26.8 MMOL/L (ref 22–26)
HCO3: 27.9 MMOL/L (ref 22–26)
HCT VFR BLD AUTO: 30.8 % (ref 37–54)
HGB BLD-MCNC: 9.2 G/DL (ref 12.5–16.5)
HGB UR QL STRIP.AUTO: ABNORMAL
HHB: 1 % (ref 0–5)
HHB: 1.5 % (ref 0–5)
IMM GRANULOCYTES # BLD AUTO: 0.07 K/UL (ref 0–0.58)
IMM GRANULOCYTES NFR BLD: 1 % (ref 0–5)
KETONES UR STRIP-MCNC: ABNORMAL MG/DL
LAB: ABNORMAL
LEUKOCYTE ESTERASE UR QL STRIP: NEGATIVE
LYMPHOCYTES NFR BLD: 0.68 K/UL (ref 1.5–4)
LYMPHOCYTES RELATIVE PERCENT: 6 % (ref 20–42)
Lab: 1210
Lab: 1645
Lab: 457
MCH RBC QN AUTO: 27.7 PG (ref 26–35)
MCHC RBC AUTO-ENTMCNC: 29.9 G/DL (ref 32–34.5)
MCV RBC AUTO: 92.8 FL (ref 80–99.9)
METHB: 0.3 % (ref 0–1.5)
METHB: 0.3 % (ref 0–1.5)
MICROORGANISM SPEC CULT: NO GROWTH
MICROORGANISM SPEC CULT: NORMAL
MICROORGANISM SPEC CULT: NORMAL
MODE: ABNORMAL
MONOCYTES NFR BLD: 0.84 K/UL (ref 0.1–0.95)
MONOCYTES NFR BLD: 7 % (ref 2–12)
NEUTROPHILS NFR BLD: 85 % (ref 43–80)
NEUTS SEG NFR BLD: 10.6 K/UL (ref 1.8–7.3)
NITRITE UR QL STRIP: NEGATIVE
O2 CONTENT: 13.9 ML/DL
O2 CONTENT: 14.1 ML/DL
O2 SATURATION: 66.2 % (ref 92–98.5)
O2 SATURATION: 98.5 % (ref 92–98.5)
O2 SATURATION: 99 % (ref 92–98.5)
O2HB: 96.9 % (ref 94–97)
O2HB: 97.8 % (ref 94–97)
OPERATOR ID: 2485
OPERATOR ID: 7874
OPERATOR ID: ABNORMAL
PATIENT TEMP: 37 C
PCO2: 54.3 MMHG (ref 35–45)
PCO2: 62.4 MMHG (ref 35–45)
PCO2: 64.2 MMHG (ref 35–45)
PFO2: 3.05 MMHG/%
PH BLOOD GAS: 7.24 (ref 7.35–7.45)
PH BLOOD GAS: 7.26 (ref 7.35–7.45)
PH BLOOD GAS: 7.31 (ref 7.35–7.45)
PH UR STRIP: 5.5 [PH] (ref 5–9)
PLATELET # BLD AUTO: 157 K/UL (ref 130–450)
PMV BLD AUTO: 8.9 FL (ref 7–12)
PO2: 156.3 MMHG (ref 75–100)
PO2: 183.1 MMHG (ref 75–100)
PO2: 40.3 MMHG (ref 75–100)
POTASSIUM SERPL-SCNC: 5.1 MMOL/L (ref 3.5–5)
PROCALCITONIN SERPL-MCNC: 0.28 NG/ML (ref 0–0.08)
PROT SERPL-MCNC: 6.6 G/DL (ref 6.4–8.3)
PROT UR STRIP-MCNC: 30 MG/DL
RBC # BLD AUTO: 3.32 M/UL (ref 3.8–5.8)
RBC #/AREA URNS HPF: ABNORMAL /HPF
RI(T): 1.01
SARS-COV-2 RDRP RESP QL NAA+PROBE: NOT DETECTED
SERVICE CMNT-IMP: NORMAL
SERVICE CMNT-IMP: NORMAL
SODIUM SERPL-SCNC: 142 MMOL/L (ref 132–146)
SOURCE, BLOOD GAS: ABNORMAL
SP GR UR STRIP: >1.03 (ref 1–1.03)
SPECIMEN DESCRIPTION: NORMAL
THB: 10.1 G/DL (ref 11.5–16.5)
THB: 9.8 G/DL (ref 11.5–16.5)
TIME ANALYZED: 1225
TIME ANALYZED: 1654
TIME ANALYZED: 501
UROBILINOGEN UR STRIP-ACNC: 0.2 EU/DL (ref 0–1)
WBC #/AREA URNS HPF: ABNORMAL /HPF
WBC OTHER # BLD: 12.5 K/UL (ref 4.5–11.5)

## 2023-10-09 PROCEDURE — 85025 COMPLETE CBC W/AUTO DIFF WBC: CPT

## 2023-10-09 PROCEDURE — 94640 AIRWAY INHALATION TREATMENT: CPT

## 2023-10-09 PROCEDURE — 87635 SARS-COV-2 COVID-19 AMP PRB: CPT

## 2023-10-09 PROCEDURE — 81001 URINALYSIS AUTO W/SCOPE: CPT

## 2023-10-09 PROCEDURE — 83880 ASSAY OF NATRIURETIC PEPTIDE: CPT

## 2023-10-09 PROCEDURE — 71045 X-RAY EXAM CHEST 1 VIEW: CPT

## 2023-10-09 PROCEDURE — 2580000003 HC RX 258: Performed by: INTERNAL MEDICINE

## 2023-10-09 PROCEDURE — 5A09357 ASSISTANCE WITH RESPIRATORY VENTILATION, LESS THAN 24 CONSECUTIVE HOURS, CONTINUOUS POSITIVE AIRWAY PRESSURE: ICD-10-PCS | Performed by: INTERNAL MEDICINE

## 2023-10-09 PROCEDURE — 94660 CPAP INITIATION&MGMT: CPT

## 2023-10-09 PROCEDURE — 6360000002 HC RX W HCPCS: Performed by: INTERNAL MEDICINE

## 2023-10-09 PROCEDURE — 71250 CT THORAX DX C-: CPT

## 2023-10-09 PROCEDURE — 80053 COMPREHEN METABOLIC PANEL: CPT

## 2023-10-09 PROCEDURE — 2060000000 HC ICU INTERMEDIATE R&B

## 2023-10-09 PROCEDURE — 6370000000 HC RX 637 (ALT 250 FOR IP): Performed by: INTERNAL MEDICINE

## 2023-10-09 PROCEDURE — 82805 BLOOD GASES W/O2 SATURATION: CPT

## 2023-10-09 PROCEDURE — 82803 BLOOD GASES ANY COMBINATION: CPT

## 2023-10-09 PROCEDURE — 2700000000 HC OXYGEN THERAPY PER DAY

## 2023-10-09 PROCEDURE — 84145 PROCALCITONIN (PCT): CPT

## 2023-10-09 PROCEDURE — 82962 GLUCOSE BLOOD TEST: CPT

## 2023-10-09 PROCEDURE — 2500000003 HC RX 250 WO HCPCS: Performed by: INTERNAL MEDICINE

## 2023-10-09 RX ORDER — FUROSEMIDE 10 MG/ML
20 INJECTION INTRAMUSCULAR; INTRAVENOUS ONCE
Status: COMPLETED | OUTPATIENT
Start: 2023-10-09 | End: 2023-10-09

## 2023-10-09 RX ORDER — ENALAPRILAT 1.25 MG/ML
0.62 INJECTION INTRAVENOUS ONCE
Status: COMPLETED | OUTPATIENT
Start: 2023-10-09 | End: 2023-10-09

## 2023-10-09 RX ORDER — ENOXAPARIN SODIUM 150 MG/ML
1 INJECTION SUBCUTANEOUS 2 TIMES DAILY
Status: DISCONTINUED | OUTPATIENT
Start: 2023-10-09 | End: 2023-10-10 | Stop reason: HOSPADM

## 2023-10-09 RX ORDER — ALBUTEROL SULFATE 2.5 MG/3ML
2.5 SOLUTION RESPIRATORY (INHALATION) EVERY 4 HOURS PRN
Status: DISCONTINUED | OUTPATIENT
Start: 2023-10-09 | End: 2023-10-10 | Stop reason: HOSPADM

## 2023-10-09 RX ORDER — FUROSEMIDE 10 MG/ML
40 INJECTION INTRAMUSCULAR; INTRAVENOUS 2 TIMES DAILY
Status: COMPLETED | OUTPATIENT
Start: 2023-10-09 | End: 2023-10-10

## 2023-10-09 RX ADMIN — IPRATROPIUM BROMIDE AND ALBUTEROL SULFATE 1 DOSE: .5; 2.5 SOLUTION RESPIRATORY (INHALATION) at 16:00

## 2023-10-09 RX ADMIN — Medication 10 ML: at 22:48

## 2023-10-09 RX ADMIN — ENALAPRILAT 0.62 MG: 1.25 INJECTION INTRAVENOUS at 09:18

## 2023-10-09 RX ADMIN — FUROSEMIDE 40 MG: 10 INJECTION, SOLUTION INTRAMUSCULAR; INTRAVENOUS at 17:18

## 2023-10-09 RX ADMIN — INSULIN GLARGINE 38 UNITS: 100 INJECTION, SOLUTION SUBCUTANEOUS at 06:20

## 2023-10-09 RX ADMIN — IPRATROPIUM BROMIDE AND ALBUTEROL SULFATE 1 DOSE: .5; 2.5 SOLUTION RESPIRATORY (INHALATION) at 12:40

## 2023-10-09 RX ADMIN — SODIUM CHLORIDE, PRESERVATIVE FREE 10 ML: 5 INJECTION INTRAVENOUS at 09:18

## 2023-10-09 RX ADMIN — LORAZEPAM 0.5 MG: 2 INJECTION INTRAMUSCULAR; INTRAVENOUS at 12:42

## 2023-10-09 RX ADMIN — IPRATROPIUM BROMIDE AND ALBUTEROL SULFATE 1 DOSE: .5; 2.5 SOLUTION RESPIRATORY (INHALATION) at 20:49

## 2023-10-09 RX ADMIN — IPRATROPIUM BROMIDE AND ALBUTEROL SULFATE 1 DOSE: .5; 2.5 SOLUTION RESPIRATORY (INHALATION) at 08:31

## 2023-10-09 RX ADMIN — CEFEPIME 2000 MG: 2 INJECTION, POWDER, FOR SOLUTION INTRAVENOUS at 01:33

## 2023-10-09 RX ADMIN — COLLAGENASE SANTYL: 250 OINTMENT TOPICAL at 07:00

## 2023-10-09 RX ADMIN — FUROSEMIDE 20 MG: 10 INJECTION, SOLUTION INTRAMUSCULAR; INTRAVENOUS at 06:20

## 2023-10-09 RX ADMIN — SODIUM CHLORIDE, PRESERVATIVE FREE 10 ML: 5 INJECTION INTRAVENOUS at 12:42

## 2023-10-09 RX ADMIN — ENOXAPARIN SODIUM 120 MG: 150 INJECTION SUBCUTANEOUS at 22:46

## 2023-10-09 RX ADMIN — CEFEPIME 2000 MG: 2 INJECTION, POWDER, FOR SOLUTION INTRAVENOUS at 12:20

## 2023-10-09 RX ADMIN — VANCOMYCIN HYDROCHLORIDE 1250 MG: 10 INJECTION, POWDER, LYOPHILIZED, FOR SOLUTION INTRAVENOUS at 22:46

## 2023-10-09 ASSESSMENT — PAIN SCALES - WONG BAKER: WONGBAKER_NUMERICALRESPONSE: 0

## 2023-10-09 ASSESSMENT — PAIN SCALES - GENERAL: PAINLEVEL_OUTOF10: 0

## 2023-10-09 NOTE — CONSULTS
Pulmonary Consultation    Admit Date: 10/3/2023  Requesting Physician: Dinorah Nagy MD    CC:  hypoxia     HPI:  information is obtained from the chart, patient is minimally responsive and not communicative. 80 y.o. male PMH for breast cancer s/p lumpectomy, who presents to the ED initially 10/3/23 due to shortness of breath and abdominal pain. Patient states that he has had progressively worsening shortness of breath over the past several days. He started having chills around 5 PM tonight without documented fevers at this point. Patient also notes that shortly after dinner he started experiencing abdominal pain and nausea without any episodes of vomiting this point. He has chronic lower extremity edema bilaterally that is no worse than baseline. States he has had a mildly productive cough with mucus. Denies any constipation or diarrhea. Denies any urinary urgency, frequency, hematuria, or dysuria. Of note patient was found to be in the mid 70s and low 80s on room air by EMS and was placed on a nonrebreather. Found to have a left lower lobe pneumonia and treated with cefepime and vanco. Had progressive hypoxia and confusion past few days requiring increase on 4LNC to 15LNRB.  Patient was appropriate and talking on admission, no hard to arouse stares into space and does not attempt to talk with increased breathing abdominal breathing mildly labored on 15LHFNC> pox 96%    10/9 pulmonary consulted     PMH:    Past Medical History:   Diagnosis Date    Arthritis     Cancer ([address])     breast    Cellulitis     CHF (congestive heart failure) ([facility])     CKD (chronic kidney disease) stage 3, GFR 30-59 ml/min ([facility])     Diabetes mellitus ([address])     History of lumpectomy     Hx of blood clots     Hyperlipidemia     Hypertension     Left ventricular failure ([facility])     Macular degeneration     Obesity     Orthostatic hypotension     PE (pulmonary thromboembolism) ([facility])     Pressure injury of both heels, unstageable ([address]) Prostate appears enlarged. BONES: Right hip arthroplasty. Degenerative changes of the spine. Narrowing of the thecal sac is suspected. ADDITIONAL FINDINGS: Skin thickening and subcutaneous edema lateral thighs. Subcutaneous densities in the anterior abdomen similar to prior. 1. Bilateral perinephric stranding, a nonspecific finding but correlate with urinalysis. 2. Bilateral trace pleural effusions. Interlobular septal thickening. Correlate with concern for edema. 3. Moderate to severe atherosclerosis. 4. Skin thickening lateral thighs. Correlate with any concern for cellulitis. 5. Other findings above. XR HIP 2-3 VW W PELVIS RIGHT    Result Date: 10/3/2023  EXAMINATION: ONE XRAY VIEW OF THE PELVIS AND TWO XRAY VIEWS RIGHT HIP 10/3/2023 10:21 pm COMPARISON: Pelvis and hip series from September 27, 2015. CT hip from July 10, 2023. HISTORY: ORDERING SYSTEM PROVIDED HISTORY: hip pain TECHNOLOGIST PROVIDED HISTORY: Reason for exam:->hip pain FINDINGS: Right hip arthroplasty normal overall alignment. There is no evidence of loosening. Osseous components surrounding the stem of the arthroplasty appear intact. Pubic symphysis appears congruent. Superior and inferior pubic rami appear intact. Moderate left hip osteoarthritis. Proximal left femur appears intact. Right hip arthroplasty. No obvious complications on these images. RECOMMENDATION: (Negative radiographs should not deter from obtaining cross-sectional imaging if there is high concern for an acute osseous injury. )     XR CHEST PORTABLE    Result Date: 10/3/2023  EXAMINATION: ONE XRAY VIEW OF THE CHEST 10/3/2023 10:21 pm COMPARISON: Chest series from July 5, 2023 HISTORY: ORDERING SYSTEM PROVIDED HISTORY: SOB TECHNOLOGIST PROVIDED HISTORY: Reason for exam:->SOB FINDINGS: Symmetric lung inflation. Ill-defined opacities in the left mid and lower lung. Opacity in the left mid lung is round. Possible trace left pleural effusion.   No

## 2023-10-09 NOTE — PROGRESS NOTES
Called placed via answering service to Dr. Dorcas Franks for notification of recent CXR result. Awaiting call back.

## 2023-10-09 NOTE — PROGRESS NOTES
Updated Dr. Florida Mendez at the bedside. Received orders for lasix and covid swab. Orders carried out.

## 2023-10-09 NOTE — PROGRESS NOTES
Spoke with patient son who expressed concerns regarding hydration status, relayed to son that physicians need to order IV fluids that nurses can not administer without physicians orders but relayed that we would contact provider to relay concerns, this was relayed to Dr. Es Conn who was contacted as directed by secure message service for coverage for Dr. Ashely Honeycutt, discussed that patient has not been consuming anything by mouth and family concerned, also discussed new agitation, received orders as entered, left vm with son to update.

## 2023-10-09 NOTE — PROGRESS NOTES
O2 demand increased, currently 15 lpm HFNC, advised Dr. Kenya Rivera, obtained orders for cxr abg's dc ivf add on bnp, consult pulm, orders carried out.

## 2023-10-09 NOTE — PROGRESS NOTES
Spoke with Dr. Susannah Ewing regarding latest ABG results.  States to continue bipap settings and repeat ABGs in am.

## 2023-10-10 VITALS
WEIGHT: 256.06 LBS | BODY MASS INDEX: 34.68 KG/M2 | DIASTOLIC BLOOD PRESSURE: 82 MMHG | TEMPERATURE: 97.8 F | OXYGEN SATURATION: 100 % | SYSTOLIC BLOOD PRESSURE: 154 MMHG | HEIGHT: 72 IN | HEART RATE: 100 BPM | RESPIRATION RATE: 17 BRPM

## 2023-10-10 LAB
ALBUMIN SERPL-MCNC: 2.9 G/DL (ref 3.5–5.2)
ALP SERPL-CCNC: 106 U/L (ref 40–129)
ALT SERPL-CCNC: 6 U/L (ref 0–40)
ANION GAP SERPL CALCULATED.3IONS-SCNC: 7 MMOL/L (ref 7–16)
AST SERPL-CCNC: 17 U/L (ref 0–39)
B.E.: 2.1 MMOL/L (ref -3–3)
BILIRUB SERPL-MCNC: 0.3 MG/DL (ref 0–1.2)
BUN SERPL-MCNC: 50 MG/DL (ref 6–23)
CALCIUM SERPL-MCNC: 9 MG/DL (ref 8.6–10.2)
CHLORIDE SERPL-SCNC: 110 MMOL/L (ref 98–107)
CO2 SERPL-SCNC: 28 MMOL/L (ref 22–29)
COHB: 0.8 % (ref 0–1.5)
CREAT SERPL-MCNC: 1.2 MG/DL (ref 0.7–1.2)
CRITICAL: ABNORMAL
DATE ANALYZED: ABNORMAL
DATE OF COLLECTION: ABNORMAL
FIO2: 40 %
GFR SERPL CREATININE-BSD FRML MDRD: 56 ML/MIN/1.73M2
GLUCOSE BLD-MCNC: 114 MG/DL (ref 74–99)
GLUCOSE BLD-MCNC: 99 MG/DL (ref 74–99)
GLUCOSE SERPL-MCNC: 145 MG/DL (ref 74–99)
HCO3: 28.3 MMOL/L (ref 22–26)
HHB: 3.6 % (ref 0–5)
LAB: ABNORMAL
Lab: 521
METHB: 0.3 % (ref 0–1.5)
MODE: ABNORMAL
O2 CONTENT: 13.4 ML/DL
O2 SATURATION: 96.4 % (ref 92–98.5)
O2HB: 95.3 % (ref 94–97)
OPERATOR ID: 2485
PATIENT TEMP: 37 C
PCO2: 52.6 MMHG (ref 35–45)
PEEP/CPAP: 5 CMH2O
PFO2: 2.17 MMHG/%
PH BLOOD GAS: 7.35 (ref 7.35–7.45)
PO2: 86.9 MMHG (ref 75–100)
POTASSIUM SERPL-SCNC: 4.6 MMOL/L (ref 3.5–5)
PROT SERPL-MCNC: 6.6 G/DL (ref 6.4–8.3)
PS: 12 CMH20
RI(T): 1.59
SODIUM SERPL-SCNC: 145 MMOL/L (ref 132–146)
SOURCE, BLOOD GAS: ABNORMAL
THB: 9.9 G/DL (ref 11.5–16.5)
TIME ANALYZED: 527

## 2023-10-10 PROCEDURE — 2580000003 HC RX 258: Performed by: INTERNAL MEDICINE

## 2023-10-10 PROCEDURE — 6360000002 HC RX W HCPCS: Performed by: INTERNAL MEDICINE

## 2023-10-10 PROCEDURE — 80053 COMPREHEN METABOLIC PANEL: CPT

## 2023-10-10 PROCEDURE — 6370000000 HC RX 637 (ALT 250 FOR IP): Performed by: INTERNAL MEDICINE

## 2023-10-10 PROCEDURE — 99222 1ST HOSP IP/OBS MODERATE 55: CPT | Performed by: NURSE PRACTITIONER

## 2023-10-10 PROCEDURE — 6360000002 HC RX W HCPCS: Performed by: NURSE PRACTITIONER

## 2023-10-10 PROCEDURE — 94660 CPAP INITIATION&MGMT: CPT

## 2023-10-10 PROCEDURE — 94640 AIRWAY INHALATION TREATMENT: CPT

## 2023-10-10 PROCEDURE — 82805 BLOOD GASES W/O2 SATURATION: CPT

## 2023-10-10 PROCEDURE — 82962 GLUCOSE BLOOD TEST: CPT

## 2023-10-10 PROCEDURE — 2700000000 HC OXYGEN THERAPY PER DAY

## 2023-10-10 RX ORDER — HALOPERIDOL 5 MG/ML
2 INJECTION INTRAMUSCULAR EVERY 4 HOURS PRN
Status: DISCONTINUED | OUTPATIENT
Start: 2023-10-10 | End: 2023-10-10 | Stop reason: HOSPADM

## 2023-10-10 RX ORDER — MORPHINE SULFATE 2 MG/ML
2 INJECTION, SOLUTION INTRAMUSCULAR; INTRAVENOUS
Status: DISCONTINUED | OUTPATIENT
Start: 2023-10-10 | End: 2023-10-10 | Stop reason: HOSPADM

## 2023-10-10 RX ORDER — MORPHINE SULFATE 4 MG/ML
4 INJECTION, SOLUTION INTRAMUSCULAR; INTRAVENOUS
Status: DISCONTINUED | OUTPATIENT
Start: 2023-10-10 | End: 2023-10-10 | Stop reason: HOSPADM

## 2023-10-10 RX ORDER — FUROSEMIDE 10 MG/ML
20 INJECTION INTRAMUSCULAR; INTRAVENOUS ONCE
Status: DISCONTINUED | OUTPATIENT
Start: 2023-10-10 | End: 2023-10-10

## 2023-10-10 RX ORDER — LORAZEPAM 2 MG/ML
1 INJECTION INTRAMUSCULAR EVERY 4 HOURS PRN
Status: DISCONTINUED | OUTPATIENT
Start: 2023-10-10 | End: 2023-10-10 | Stop reason: HOSPADM

## 2023-10-10 RX ORDER — GLYCOPYRROLATE 0.2 MG/ML
0.2 INJECTION INTRAMUSCULAR; INTRAVENOUS EVERY 8 HOURS PRN
Status: DISCONTINUED | OUTPATIENT
Start: 2023-10-10 | End: 2023-10-10 | Stop reason: HOSPADM

## 2023-10-10 RX ADMIN — COLLAGENASE SANTYL: 250 OINTMENT TOPICAL at 10:58

## 2023-10-10 RX ADMIN — ENOXAPARIN SODIUM 120 MG: 150 INJECTION SUBCUTANEOUS at 10:10

## 2023-10-10 RX ADMIN — MORPHINE SULFATE 4 MG: 4 INJECTION, SOLUTION INTRAMUSCULAR; INTRAVENOUS at 11:45

## 2023-10-10 RX ADMIN — Medication 10 ML: at 10:10

## 2023-10-10 RX ADMIN — IPRATROPIUM BROMIDE AND ALBUTEROL SULFATE 1 DOSE: .5; 2.5 SOLUTION RESPIRATORY (INHALATION) at 07:42

## 2023-10-10 RX ADMIN — LORAZEPAM 0.5 MG: 2 INJECTION INTRAMUSCULAR; INTRAVENOUS at 08:22

## 2023-10-10 RX ADMIN — LORAZEPAM 0.5 MG: 2 INJECTION INTRAMUSCULAR; INTRAVENOUS at 02:24

## 2023-10-10 RX ADMIN — FUROSEMIDE 40 MG: 10 INJECTION, SOLUTION INTRAMUSCULAR; INTRAVENOUS at 10:10

## 2023-10-10 RX ADMIN — HALOPERIDOL LACTATE 2 MG: 5 INJECTION, SOLUTION INTRAMUSCULAR at 05:58

## 2023-10-10 RX ADMIN — CEFEPIME 2000 MG: 2 INJECTION, POWDER, FOR SOLUTION INTRAVENOUS at 00:49

## 2023-10-10 NOTE — PROGRESS NOTES
Message left with Dr. J Luis Stacy nurse notifying them pt is leaving for [facility], [facility]

## 2023-10-10 NOTE — PROGRESS NOTES
[facility] Quality Flow/Interdisciplinary Rounds Progress Note        Quality Flow Rounds held on October 10, 2023    Disciplines Attending:  Bedside Nurse, , , and Nursing Unit Leadership    Toniesallie Luna. was admitted on 10/3/2023  8:23 PM    Anticipated Discharge Date:  Expected Discharge Date: 10/06/23    Disposition: tbd    Michael Score:  Michael Scale Score: 12    Readmission Risk              Risk of Unplanned Readmission:  33           Discussed patient goal for the day, patient clinical progression, and barriers to discharge.   The following Goal(s) of the Day/Commitment(s) have been identified:   palliative plan, wean O2 as able,       Saba Glover RN  October 10, 2023

## 2023-10-10 NOTE — CONSULTS
Palliative Care Department  [phone]  Palliative Care Initial Consult  Provider Shay Car, APRN - CNP     Kimberley Pruitt  [phone]  Hospital Day: 8  Date of Initial Consult: 10/9/2023  Referring Provider: Shekhar Valdivia MD  Palliative Medicine was consulted for assistance with: Goals of care    HPI:   Kimberley Donovan is a 80 y.o. with a medical history of arthritis, breast cancer, cellulitis, CHF, CKD, diabetes, hyperlipidemia, hypertension, macular degeneration, obesity, PE, venous insufficiency who was admitted on 10/3/2023 from nursing facility with a CHIEF COMPLAINT of shortness of breath, abdominal pain. Patient states that he has had progressively worsening shortness of breath over the past several days. The patient has a history of chronic bilateral lower extremity edema. Patient was found to be hypoxic on room air by EMS and was placed on a nonrebreather. Patient found to have a left lower lobe pneumonia and treated with cefepime and vanco. Had progressive hypoxia and confusion past few days requiring increase on 4LNC to 15LNRB. Pulmonology consulted. CT chest showed pulmonary edema with moderate to large bilateral pleural effusions, small groundglass opacities in the right lung apex. Palliative medicine consulted for further assistance. ASSESSMENT/PLAN:     Pertinent Hospital Diagnoses     Sepsis due to pneumonia  Hypertension  Elevated troponin  Atrial fibrillation with RVR      Palliative Care Encounter / Counseling Regarding Goals of Care  Please see detailed goals of care discussion as below  At this time, Kimberley Donovan, Does Not have capacity for medical decision-making.   Capacity is time limited and situation/question specific  During encounter [address] was surrogate medical decision-maker  Outcome of goals of care meeting:   Discussed CODE STATUS options  Discussed comfort measures/hospice philosophy  Code status Limited DNI  Advanced Directives: POA or

## 2023-10-10 NOTE — PROGRESS NOTES
HOSPICE [facility]    Referral received. Chart reviewed. Met with sameera Deluca and MEGHA outside of patient's room. They are very familiar with hospice and asked if patient would qualify for hospice house or return to [facility]. Patient's condition is poor. On bipap and tolerates 15L of oxygen. He has been agitated and dyspneic. Discussed both hospice house and [facility]. Both agreed hospice house would better suit his needs at this time. Their brother Nguyễn Claudio coming from [place]. They all spoke and decided to start comfort and if patient were to pass everyone has peace. Plan is to place back on 15L with more comfort medications ordered and move to hospice house. Spoke with Trent Coyle MSN-APRN-CNP, AGACNP-BC who accepted patient for inpatient level of care hospice. Received bed 107 with a requested transport of [facility]. [facility] able to transport at 1230. Ambulance forms placed in soft chart. Updated nursing. Requested discharge order be obtained. Please leave IV in place. Gave nurse to nurse report to hospice house. Signed consents with Natacha Deluca.     Electronically signed by Gilson Quinonez RN on 10/10/2023 at 11:45 AM  [phone], [phone]

## 2023-10-13 LAB
MICROORGANISM SPEC CULT: NORMAL
MICROORGANISM SPEC CULT: NORMAL
SERVICE CMNT-IMP: NORMAL
SERVICE CMNT-IMP: NORMAL
SPECIMEN DESCRIPTION: NORMAL
SPECIMEN DESCRIPTION: NORMAL